# Patient Record
Sex: MALE | Race: WHITE | Employment: OTHER | URBAN - NONMETROPOLITAN AREA
[De-identification: names, ages, dates, MRNs, and addresses within clinical notes are randomized per-mention and may not be internally consistent; named-entity substitution may affect disease eponyms.]

---

## 2017-03-20 ENCOUNTER — OFFICE VISIT (OUTPATIENT)
Dept: NEUROLOGY | Age: 72
End: 2017-03-20
Payer: MEDICARE

## 2017-03-20 VITALS
HEART RATE: 58 BPM | HEIGHT: 72 IN | BODY MASS INDEX: 24.52 KG/M2 | WEIGHT: 181 LBS | SYSTOLIC BLOOD PRESSURE: 140 MMHG | DIASTOLIC BLOOD PRESSURE: 78 MMHG

## 2017-03-20 DIAGNOSIS — M79.672 PAIN IN BOTH FEET: Primary | ICD-10-CM

## 2017-03-20 DIAGNOSIS — G25.81 RESTLESS LEG SYNDROME: ICD-10-CM

## 2017-03-20 DIAGNOSIS — M79.671 PAIN IN BOTH FEET: Primary | ICD-10-CM

## 2017-03-20 PROCEDURE — 1036F TOBACCO NON-USER: CPT | Performed by: PSYCHIATRY & NEUROLOGY

## 2017-03-20 PROCEDURE — 99213 OFFICE O/P EST LOW 20 MIN: CPT | Performed by: PSYCHIATRY & NEUROLOGY

## 2017-03-20 PROCEDURE — 1123F ACP DISCUSS/DSCN MKR DOCD: CPT | Performed by: PSYCHIATRY & NEUROLOGY

## 2017-03-20 PROCEDURE — 3017F COLORECTAL CA SCREEN DOC REV: CPT | Performed by: PSYCHIATRY & NEUROLOGY

## 2017-03-20 PROCEDURE — G8420 CALC BMI NORM PARAMETERS: HCPCS | Performed by: PSYCHIATRY & NEUROLOGY

## 2017-03-20 PROCEDURE — G8428 CUR MEDS NOT DOCUMENT: HCPCS | Performed by: PSYCHIATRY & NEUROLOGY

## 2017-03-20 PROCEDURE — G8484 FLU IMMUNIZE NO ADMIN: HCPCS | Performed by: PSYCHIATRY & NEUROLOGY

## 2017-03-20 PROCEDURE — 4040F PNEUMOC VAC/ADMIN/RCVD: CPT | Performed by: PSYCHIATRY & NEUROLOGY

## 2017-03-20 RX ORDER — DIAZEPAM 5 MG/1
5 TABLET ORAL EVERY 8 HOURS PRN
Qty: 90 TABLET | Refills: 5 | Status: SHIPPED | OUTPATIENT
Start: 2017-03-20 | End: 2017-11-06 | Stop reason: SDUPTHER

## 2017-07-31 ENCOUNTER — OFFICE VISIT (OUTPATIENT)
Dept: NEUROLOGY | Age: 72
End: 2017-07-31
Payer: MEDICARE

## 2017-07-31 VITALS
HEIGHT: 72 IN | BODY MASS INDEX: 24.65 KG/M2 | DIASTOLIC BLOOD PRESSURE: 80 MMHG | SYSTOLIC BLOOD PRESSURE: 126 MMHG | WEIGHT: 182 LBS

## 2017-07-31 DIAGNOSIS — M79.671 PAIN IN BOTH FEET: Primary | ICD-10-CM

## 2017-07-31 DIAGNOSIS — G25.81 RESTLESS LEG SYNDROME: ICD-10-CM

## 2017-07-31 DIAGNOSIS — M79.672 PAIN IN BOTH FEET: Primary | ICD-10-CM

## 2017-07-31 PROCEDURE — 1123F ACP DISCUSS/DSCN MKR DOCD: CPT | Performed by: PSYCHIATRY & NEUROLOGY

## 2017-07-31 PROCEDURE — 3017F COLORECTAL CA SCREEN DOC REV: CPT | Performed by: PSYCHIATRY & NEUROLOGY

## 2017-07-31 PROCEDURE — 4040F PNEUMOC VAC/ADMIN/RCVD: CPT | Performed by: PSYCHIATRY & NEUROLOGY

## 2017-07-31 PROCEDURE — G8427 DOCREV CUR MEDS BY ELIG CLIN: HCPCS | Performed by: PSYCHIATRY & NEUROLOGY

## 2017-07-31 PROCEDURE — G8420 CALC BMI NORM PARAMETERS: HCPCS | Performed by: PSYCHIATRY & NEUROLOGY

## 2017-07-31 PROCEDURE — 99213 OFFICE O/P EST LOW 20 MIN: CPT | Performed by: PSYCHIATRY & NEUROLOGY

## 2017-07-31 PROCEDURE — 1036F TOBACCO NON-USER: CPT | Performed by: PSYCHIATRY & NEUROLOGY

## 2017-08-21 LAB
ANION GAP SERPL CALCULATED.3IONS-SCNC: 15 MMOL/L (ref 7–19)
BUN BLDV-MCNC: 10 MG/DL (ref 8–23)
CALCIUM SERPL-MCNC: 9.3 MG/DL (ref 8.8–10.2)
CHLORIDE BLD-SCNC: 95 MMOL/L (ref 98–111)
CO2: 29 MMOL/L (ref 22–29)
CREAT SERPL-MCNC: 0.9 MG/DL (ref 0.5–1.2)
GFR NON-AFRICAN AMERICAN: >60
GLUCOSE BLD-MCNC: 85 MG/DL (ref 74–109)
POTASSIUM SERPL-SCNC: 4.3 MMOL/L (ref 3.5–5)
PROSTATE SPECIFIC ANTIGEN: 1.31 NG/ML (ref 0–4)
SODIUM BLD-SCNC: 139 MMOL/L (ref 136–145)

## 2017-08-28 ENCOUNTER — OFFICE VISIT (OUTPATIENT)
Dept: FAMILY MEDICINE CLINIC | Age: 72
End: 2017-08-28
Payer: MEDICARE

## 2017-08-28 VITALS
HEIGHT: 72 IN | TEMPERATURE: 98.3 F | WEIGHT: 185 LBS | HEART RATE: 56 BPM | SYSTOLIC BLOOD PRESSURE: 138 MMHG | DIASTOLIC BLOOD PRESSURE: 84 MMHG | OXYGEN SATURATION: 98 % | BODY MASS INDEX: 25.06 KG/M2 | RESPIRATION RATE: 18 BRPM

## 2017-08-28 DIAGNOSIS — I10 ESSENTIAL (PRIMARY) HYPERTENSION: ICD-10-CM

## 2017-08-28 DIAGNOSIS — E78.1 HYPERTRIGLYCERIDEMIA: ICD-10-CM

## 2017-08-28 DIAGNOSIS — E80.4 GILBERT'S SYNDROME: ICD-10-CM

## 2017-08-28 DIAGNOSIS — G60.9 IDIOPATHIC PERIPHERAL NEUROPATHY: ICD-10-CM

## 2017-08-28 DIAGNOSIS — L30.9 DERMATITIS: Primary | ICD-10-CM

## 2017-08-28 DIAGNOSIS — M15.9 GENERALIZED OSTEOARTHRITIS OF MULTIPLE SITES: ICD-10-CM

## 2017-08-28 DIAGNOSIS — R53.83 FATIGUE, UNSPECIFIED TYPE: ICD-10-CM

## 2017-08-28 DIAGNOSIS — F41.1 GENERALIZED ANXIETY DISORDER: ICD-10-CM

## 2017-08-28 DIAGNOSIS — K21.9 GASTROESOPHAGEAL REFLUX DISEASE WITHOUT ESOPHAGITIS: ICD-10-CM

## 2017-08-28 PROBLEM — K58.1 IRRITABLE BOWEL SYNDROME WITH CONSTIPATION: Status: ACTIVE | Noted: 2017-08-28

## 2017-08-28 PROBLEM — J30.1 SEASONAL ALLERGIC RHINITIS DUE TO POLLEN: Status: ACTIVE | Noted: 2017-08-28

## 2017-08-28 PROBLEM — F51.04 CHRONIC INSOMNIA: Status: ACTIVE | Noted: 2017-08-28

## 2017-08-28 PROBLEM — G25.81 RLS (RESTLESS LEGS SYNDROME): Status: ACTIVE | Noted: 2017-08-28

## 2017-08-28 PROCEDURE — 99214 OFFICE O/P EST MOD 30 MIN: CPT | Performed by: FAMILY MEDICINE

## 2017-08-28 PROCEDURE — G8419 CALC BMI OUT NRM PARAM NOF/U: HCPCS | Performed by: FAMILY MEDICINE

## 2017-08-28 PROCEDURE — 1036F TOBACCO NON-USER: CPT | Performed by: FAMILY MEDICINE

## 2017-08-28 PROCEDURE — 96372 THER/PROPH/DIAG INJ SC/IM: CPT | Performed by: FAMILY MEDICINE

## 2017-08-28 PROCEDURE — G8427 DOCREV CUR MEDS BY ELIG CLIN: HCPCS | Performed by: FAMILY MEDICINE

## 2017-08-28 PROCEDURE — 1123F ACP DISCUSS/DSCN MKR DOCD: CPT | Performed by: FAMILY MEDICINE

## 2017-08-28 PROCEDURE — 3017F COLORECTAL CA SCREEN DOC REV: CPT | Performed by: FAMILY MEDICINE

## 2017-08-28 PROCEDURE — 4040F PNEUMOC VAC/ADMIN/RCVD: CPT | Performed by: FAMILY MEDICINE

## 2017-08-28 RX ORDER — MELOXICAM 7.5 MG/1
7.5 TABLET ORAL 2 TIMES DAILY
COMMUNITY
End: 2018-03-05 | Stop reason: SDUPTHER

## 2017-08-28 RX ORDER — DEXAMETHASONE SODIUM PHOSPHATE 10 MG/ML
10 INJECTION INTRAMUSCULAR; INTRAVENOUS ONCE
Status: COMPLETED | OUTPATIENT
Start: 2017-08-28 | End: 2017-08-28

## 2017-08-28 RX ORDER — FLUTICASONE PROPIONATE 50 MCG
1 SPRAY, SUSPENSION (ML) NASAL DAILY
COMMUNITY
End: 2020-03-16 | Stop reason: SDUPTHER

## 2017-08-28 RX ORDER — BETAMETHASONE DIPROPIONATE 0.5 MG/G
CREAM TOPICAL 2 TIMES DAILY
COMMUNITY
End: 2018-03-05 | Stop reason: SDUPTHER

## 2017-08-28 RX ORDER — PSEUDOEPHEDRINE HCL 60 MG/1
60 TABLET ORAL EVERY 6 HOURS PRN
COMMUNITY

## 2017-08-28 RX ORDER — HYOSCYAMINE SULFATE 0.125 MG
125 TABLET ORAL EVERY 4 HOURS PRN
COMMUNITY
End: 2018-01-29 | Stop reason: SDUPTHER

## 2017-08-28 RX ORDER — IPRATROPIUM BROMIDE 42 UG/1
1 SPRAY, METERED NASAL DAILY PRN
COMMUNITY
End: 2019-05-14 | Stop reason: SDUPTHER

## 2017-08-28 RX ORDER — TRAZODONE HYDROCHLORIDE 50 MG/1
50 TABLET ORAL NIGHTLY
COMMUNITY
End: 2018-03-05 | Stop reason: SDUPTHER

## 2017-08-28 RX ADMIN — DEXAMETHASONE SODIUM PHOSPHATE 10 MG: 10 INJECTION INTRAMUSCULAR; INTRAVENOUS at 10:09

## 2017-11-06 ENCOUNTER — OFFICE VISIT (OUTPATIENT)
Dept: NEUROLOGY | Age: 72
End: 2017-11-06
Payer: MEDICARE

## 2017-11-06 VITALS
DIASTOLIC BLOOD PRESSURE: 76 MMHG | WEIGHT: 184 LBS | SYSTOLIC BLOOD PRESSURE: 136 MMHG | HEART RATE: 54 BPM | BODY MASS INDEX: 24.95 KG/M2 | OXYGEN SATURATION: 100 %

## 2017-11-06 DIAGNOSIS — G25.81 RESTLESS LEG SYNDROME: Primary | ICD-10-CM

## 2017-11-06 DIAGNOSIS — M79.672 PAIN IN BOTH FEET: ICD-10-CM

## 2017-11-06 DIAGNOSIS — M79.671 PAIN IN BOTH FEET: ICD-10-CM

## 2017-11-06 PROCEDURE — 99213 OFFICE O/P EST LOW 20 MIN: CPT | Performed by: PSYCHIATRY & NEUROLOGY

## 2017-11-06 PROCEDURE — 1123F ACP DISCUSS/DSCN MKR DOCD: CPT | Performed by: PSYCHIATRY & NEUROLOGY

## 2017-11-06 PROCEDURE — G8484 FLU IMMUNIZE NO ADMIN: HCPCS | Performed by: PSYCHIATRY & NEUROLOGY

## 2017-11-06 PROCEDURE — 3017F COLORECTAL CA SCREEN DOC REV: CPT | Performed by: PSYCHIATRY & NEUROLOGY

## 2017-11-06 PROCEDURE — 4040F PNEUMOC VAC/ADMIN/RCVD: CPT | Performed by: PSYCHIATRY & NEUROLOGY

## 2017-11-06 PROCEDURE — G8420 CALC BMI NORM PARAMETERS: HCPCS | Performed by: PSYCHIATRY & NEUROLOGY

## 2017-11-06 PROCEDURE — 1036F TOBACCO NON-USER: CPT | Performed by: PSYCHIATRY & NEUROLOGY

## 2017-11-06 PROCEDURE — G8427 DOCREV CUR MEDS BY ELIG CLIN: HCPCS | Performed by: PSYCHIATRY & NEUROLOGY

## 2017-11-06 RX ORDER — DIAZEPAM 5 MG/1
5 TABLET ORAL EVERY 8 HOURS PRN
Qty: 90 TABLET | Refills: 5 | Status: SHIPPED | OUTPATIENT
Start: 2017-11-06 | End: 2018-06-11 | Stop reason: SDUPTHER

## 2017-11-06 RX ORDER — GABAPENTIN 300 MG/1
CAPSULE ORAL
Qty: 60 CAPSULE | Refills: 5 | Status: SHIPPED | OUTPATIENT
Start: 2017-11-06 | End: 2018-06-11 | Stop reason: SDUPTHER

## 2017-11-06 NOTE — PROGRESS NOTES
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 The patient indicates he has had a long history of problem with his feet. He has spent approximately 8 to 12 hours per day for 40 years on his feet working five to six days a week period he has had inserts placed in his soles for the constant stress on his feet. Over the last two to three years he has noticed an increasing sensitivity over his feet. He will typically notice this at evening after he takes his shoes off. He describes a wave like sensation and moves over his feet and is difficult to describe. It is quite uncomfortable and limits his ability to sleep. He has been treated with Klonopin for possible restless leg syndrome without improvement in his symptoms. He denies any weakness or cramps in his legs. He has a chronic mild mid back pain. There is no involvement of his hands. He indicates his father also had complaints of pain in his feet when he got older in his 80's. Feels Requip is helping but now needs a second one. Feels slowly worsening with time. Neurontin did not help but Sinemet is helping at bedtime. Since his last visit about the same. Typically take 2 mg Requip at night. Also takes 500mg neurontin and then valium at bedtime. Feels symtoms are about the same. 2 nights in a week may only sleep about 2 hours due to restless legs. Norco helps. uses it maybe 2x per week. alpha lipoic acid helping. Symptoms are stable.     Chief complaint:restless legs      Active Ambulatory Problems     Diagnosis Date Noted    Pain in both feet 06/06/2016    Restless leg syndrome 06/06/2016    Dermatitis 08/28/2017    Fatigue 08/28/2017    Essential (primary) hypertension 08/28/2017    Gastroesophageal reflux disease without esophagitis 08/28/2017    RLS (restless legs syndrome) 08/28/2017    Gilbert's syndrome 08/28/2017    Hypertriglyceridemia 08/28/2017    Idiopathic peripheral neuropathy 08/28/2017    Generalized anxiety disorder 08/28/2017    Seasonal allergic rhinitis due to pollen 08/28/2017    Irritable bowel syndrome with constipation 08/28/2017    Chronic insomnia 08/28/2017    Generalized osteoarthritis of multiple sites 08/28/2017     Resolved Ambulatory Problems     Diagnosis Date Noted    No Resolved Ambulatory Problems     Past Medical History:   Diagnosis Date    Generalized osteoarthritis of multiple sites 8/28/2017    GERD (gastroesophageal reflux disease)     Hypertension     Hypertriglyceridemia 8/28/2017    Idiopathic peripheral neuropathy 8/28/2017    Irritable bowel syndrome with constipation 8/28/2017    Restless legs syndrome     Seasonal allergic rhinitis due to pollen 8/28/2017       Past Surgical History:   Procedure Laterality Date    HERNIA REPAIR  1999/2001    TURP  1994       Family History   Problem Relation Age of Onset    Dementia Mother     Heart Disease Father     Colon Cancer Father     Dementia Father     High Blood Pressure Father     Heart Attack Father        Allergies   Allergen Reactions    Sulfa Antibiotics        Social History     Social History    Marital status:      Spouse name: N/A    Number of children: N/A    Years of education: N/A     Occupational History    Not on file. Social History Main Topics    Smoking status: Never Smoker    Smokeless tobacco: Never Used    Alcohol use No    Drug use: No    Sexual activity: Not on file     Other Topics Concern    Not on file     Social History Narrative    No narrative on file       Review of Systems     Constitutional - No fever or chills. No diaphoresis or significant fatigue. HENT -  No tinnitus or significant hearing loss.   Eyes - no sudden vision change or eye pain  Respiratory - no significant shortness of breath or cough  Cardiovascular - no chest pain No palpitations or significant leg swelling  Gastrointestinal - no abdominal swelling or pain. Genitourinary - No difficulty urinating, dysuria  Musculoskeletal - no back pain or myalgia. Skin - no color change or rash  Neurologic - No seizures. No lateralizing weakness. Hematologic - no easy bruising or excessive bleeding. Psychiatric - no severe anxiety or nervousness. All other review of systems are negative. Current Outpatient Prescriptions   Medication Sig Dispense Refill    gabapentin (NEURONTIN) 300 MG capsule 2 caps at night 60 capsule 5    diazepam (VALIUM) 5 MG tablet Take 1 tablet by mouth every 8 hours as needed for Anxiety 90 tablet 5    betamethasone dipropionate (DIPROLENE) 0.05 % cream Apply topically 2 times daily Apply topically 2 times daily.       fluticasone (FLONASE) 50 MCG/ACT nasal spray 1 spray by Nasal route daily      hyoscyamine (ANASPAZ;LEVSIN) 125 MCG tablet Take 125 mcg by mouth every 4 hours as needed for Cramping      ipratropium (ATROVENT) 0.06 % nasal spray 1 spray by Nasal route daily as needed for Rhinitis      meloxicam (MOBIC) 7.5 MG tablet Take 7.5 mg by mouth 2 times daily      pseudoephedrine (SUDAFED) 60 MG tablet Take 60 mg by mouth every 6 hours as needed for Congestion      traZODone (DESYREL) 50 MG tablet Take 50 mg by mouth nightly      rOPINIRole (REQUIP) 1 MG tablet Take 1 tablet by mouth 2 times daily 180 tablet 4    gabapentin (NEURONTIN) 100 MG capsule 2 at night 60 capsule 5    acyclovir (ZOVIRAX) 400 MG tablet       bisoprolol-hydrochlorothiazide (ZIAC) 10-6.25 MG per tablet Take 1 tablet by mouth daily      fexofenadine (ALLEGRA) 60 MG tablet Take 60 mg by mouth daily      saw palmetto 160 MG capsule Take 160 mg by mouth daily      magnesium hydroxide (MILK OF MAGNESIA) 400 MG/5ML suspension Take by mouth daily as needed for Constipation      vitamin E 1000 UNITS capsule Take 1,000 Units by mouth daily      citalopram (CELEXA) 10 MG tablet Take 10 mg by mouth daily      omeprazole (PRILOSEC) 20 MG capsule

## 2017-12-18 ENCOUNTER — OFFICE VISIT (OUTPATIENT)
Dept: UROLOGY | Age: 72
End: 2017-12-18
Payer: MEDICARE

## 2017-12-18 VITALS
SYSTOLIC BLOOD PRESSURE: 135 MMHG | DIASTOLIC BLOOD PRESSURE: 74 MMHG | WEIGHT: 189 LBS | HEART RATE: 60 BPM | HEIGHT: 72 IN | BODY MASS INDEX: 25.6 KG/M2 | TEMPERATURE: 97.2 F

## 2017-12-18 DIAGNOSIS — N40.0 BENIGN NON-NODULAR PROSTATIC HYPERPLASIA WITHOUT LOWER URINARY TRACT SYMPTOMS: Primary | ICD-10-CM

## 2017-12-18 LAB
APPEARANCE FLUID: CLEAR
BILIRUBIN, POC: 0
BLOOD URINE, POC: NORMAL
CLARITY, POC: CLEAR
COLOR, POC: YELLOW
GLUCOSE URINE, POC: NORMAL
KETONES, POC: NORMAL
LEUKOCYTE EST, POC: NORMAL
NITRITE, POC: NORMAL
PH, POC: 7
PROTEIN, POC: NORMAL
SPECIFIC GRAVITY, POC: 1.01
UROBILINOGEN, POC: 0.2

## 2017-12-18 PROCEDURE — G8419 CALC BMI OUT NRM PARAM NOF/U: HCPCS | Performed by: UROLOGY

## 2017-12-18 PROCEDURE — 1123F ACP DISCUSS/DSCN MKR DOCD: CPT | Performed by: UROLOGY

## 2017-12-18 PROCEDURE — G8427 DOCREV CUR MEDS BY ELIG CLIN: HCPCS | Performed by: UROLOGY

## 2017-12-18 PROCEDURE — 4040F PNEUMOC VAC/ADMIN/RCVD: CPT | Performed by: UROLOGY

## 2017-12-18 PROCEDURE — 99213 OFFICE O/P EST LOW 20 MIN: CPT | Performed by: UROLOGY

## 2017-12-18 PROCEDURE — 1036F TOBACCO NON-USER: CPT | Performed by: UROLOGY

## 2017-12-18 PROCEDURE — 3017F COLORECTAL CA SCREEN DOC REV: CPT | Performed by: UROLOGY

## 2017-12-18 PROCEDURE — G8484 FLU IMMUNIZE NO ADMIN: HCPCS | Performed by: UROLOGY

## 2017-12-18 ASSESSMENT — ENCOUNTER SYMPTOMS
ABDOMINAL PAIN: 0
BLURRED VISION: 0
BACK PAIN: 0
SHORTNESS OF BREATH: 0
VOMITING: 0
SINUS PAIN: 0
EYE PAIN: 0
WHEEZING: 0

## 2017-12-18 NOTE — PROGRESS NOTES
Chaka Rivera is a 67 y.o. male who presents today   Chief Complaint   Patient presents with    Follow-up     im here today for my yearly  bph        Benign Prostatic Hypertrophy  Patient complains of lower urinary tract symptoms. He reports weak stream. He denies frequency, incomplete emptying, straining and urgency. Patient states symptoms are of mild severity. Onset of symptoms was several years ago and was gradual in onset. His AUA Symptom Score is, 2/35 . He has a family history of prostate cancer. He reports a history of no complicating symptoms. He denies flank pain, gross hematuria, kidney stones and recurrent UTI. He had a TURP in the remote past she takes saw palmetto. He's having no voiding problems or change in his voiding symptoms in the last year      Past Medical History:   Diagnosis Date    Generalized osteoarthritis of multiple sites 8/28/2017    GERD (gastroesophageal reflux disease)     Hypertension     Hypertriglyceridemia 8/28/2017    Idiopathic peripheral neuropathy 8/28/2017    Irritable bowel syndrome with constipation 8/28/2017    Restless legs syndrome     Seasonal allergic rhinitis due to pollen 8/28/2017       Past Surgical History:   Procedure Laterality Date    HERNIA REPAIR  1999/2001    TURP  1994       Current Outpatient Prescriptions   Medication Sig Dispense Refill    gabapentin (NEURONTIN) 300 MG capsule 2 caps at night 60 capsule 5    diazepam (VALIUM) 5 MG tablet Take 1 tablet by mouth every 8 hours as needed for Anxiety 90 tablet 5    betamethasone dipropionate (DIPROLENE) 0.05 % cream Apply topically 2 times daily Apply topically 2 times daily.       fluticasone (FLONASE) 50 MCG/ACT nasal spray 1 spray by Nasal route daily      hyoscyamine (ANASPAZ;LEVSIN) 125 MCG tablet Take 125 mcg by mouth every 4 hours as needed for Cramping      ipratropium (ATROVENT) 0.06 % nasal spray 1 spray by Nasal route daily as needed for Rhinitis      meloxicam (MOBIC) 7.5 MG tablet Take 7.5 mg by mouth 2 times daily      pseudoephedrine (SUDAFED) 60 MG tablet Take 60 mg by mouth every 6 hours as needed for Congestion      traZODone (DESYREL) 50 MG tablet Take 50 mg by mouth nightly      rOPINIRole (REQUIP) 1 MG tablet Take 1 tablet by mouth 2 times daily 180 tablet 4    gabapentin (NEURONTIN) 100 MG capsule 2 at night 60 capsule 5    acyclovir (ZOVIRAX) 400 MG tablet       bisoprolol-hydrochlorothiazide (ZIAC) 10-6.25 MG per tablet Take 1 tablet by mouth daily      fexofenadine (ALLEGRA) 60 MG tablet Take 60 mg by mouth daily      saw palmetto 160 MG capsule Take 160 mg by mouth daily      magnesium hydroxide (MILK OF MAGNESIA) 400 MG/5ML suspension Take by mouth daily as needed for Constipation      vitamin E 1000 UNITS capsule Take 1,000 Units by mouth daily      citalopram (CELEXA) 10 MG tablet Take 10 mg by mouth daily      omeprazole (PRILOSEC) 20 MG capsule Take 20 mg by mouth 2 times daily      lisinopril-hydrochlorothiazide (PRINZIDE;ZESTORETIC) 20-12.5 MG per tablet Take 2 tablets by mouth daily        No current facility-administered medications for this visit. Allergies   Allergen Reactions    Sulfa Antibiotics        Social History     Social History    Marital status:      Spouse name: N/A    Number of children: N/A    Years of education: N/A     Social History Main Topics    Smoking status: Never Smoker    Smokeless tobacco: Never Used    Alcohol use No    Drug use: No    Sexual activity: Not Asked     Other Topics Concern    None     Social History Narrative    None       Family History   Problem Relation Age of Onset    Dementia Mother     Heart Disease Father     Colon Cancer Father     Dementia Father     High Blood Pressure Father     Heart Attack Father        REVIEW OF SYSTEMS:  Review of Systems   Constitutional: Negative for malaise/fatigue and weight loss. HENT: Negative for nosebleeds and sinus pain.     Eyes: is alert and oriented to person, place, and time. Skin: Skin is warm and dry. Psychiatric: He has a normal mood and affect. His behavior is normal.   Nursing note and vitals reviewed. DATA:  CMP:    Lab Results   Component Value Date     08/21/2017    K 4.3 08/21/2017    CL 95 08/21/2017    CO2 29 08/21/2017    BUN 10 08/21/2017    CREATININE 0.9 08/21/2017    LABGLOM >60 08/21/2017    GLUCOSE 85 08/21/2017    CALCIUM 9.3 08/21/2017     Results for orders placed or performed in visit on 12/18/17   POCT Urinalysis no Micro   Result Value Ref Range    Color, UA yellow     Clarity, UA clear     Glucose, UA POC neg     Bilirubin, UA 0     Ketones, UA neg     Spec Grav, UA 1.015     Blood, UA POC trace     pH, UA 7.0     Protein, UA POC neg     Urobilinogen, UA 0.2     Leukocytes, UA neg     Nitrite, UA neg     Appearance, Fluid Clear Clear, Slightly Cloudy     Lab Results   Component Value Date    PSA 1.31 08/21/2017       1. Benign non-nodular prostatic hyperplasia without lower urinary tract symptoms    - POCT Urinalysis no Micro  - PSA, Diagnostic; Future      Orders Placed This Encounter   Procedures    PSA, Diagnostic     1yr     Standing Status:   Future     Standing Expiration Date:   12/18/2019    POCT Urinalysis no Micro        Return in about 1 year (around 12/18/2018) for PSA prior to vext visit.

## 2018-01-29 RX ORDER — HYOSCYAMINE SULFATE 0.125 MG
125 TABLET ORAL EVERY 4 HOURS PRN
Qty: 180 TABLET | Refills: 11 | Status: SHIPPED | OUTPATIENT
Start: 2018-01-29 | End: 2019-03-12 | Stop reason: SDUPTHER

## 2018-01-30 RX ORDER — ROPINIROLE 1 MG/1
1 TABLET, FILM COATED ORAL 2 TIMES DAILY
Qty: 180 TABLET | Refills: 4 | Status: SHIPPED | OUTPATIENT
Start: 2018-01-30 | End: 2018-09-10 | Stop reason: SDUPTHER

## 2018-02-26 DIAGNOSIS — I10 ESSENTIAL (PRIMARY) HYPERTENSION: ICD-10-CM

## 2018-02-26 DIAGNOSIS — R53.83 FATIGUE, UNSPECIFIED TYPE: ICD-10-CM

## 2018-02-26 LAB
ALBUMIN SERPL-MCNC: 4.4 G/DL (ref 3.5–5.2)
ALP BLD-CCNC: 97 U/L (ref 40–130)
ALT SERPL-CCNC: 25 U/L (ref 5–41)
ANION GAP SERPL CALCULATED.3IONS-SCNC: 11 MMOL/L (ref 7–19)
AST SERPL-CCNC: 27 U/L (ref 5–40)
BILIRUB SERPL-MCNC: 1.1 MG/DL (ref 0.2–1.2)
BUN BLDV-MCNC: 11 MG/DL (ref 8–23)
CALCIUM SERPL-MCNC: 9.1 MG/DL (ref 8.8–10.2)
CHLORIDE BLD-SCNC: 90 MMOL/L (ref 98–111)
CHOLESTEROL, TOTAL: 170 MG/DL (ref 160–199)
CO2: 31 MMOL/L (ref 22–29)
CREAT SERPL-MCNC: 1 MG/DL (ref 0.5–1.2)
GFR NON-AFRICAN AMERICAN: >60
GLUCOSE BLD-MCNC: 98 MG/DL (ref 74–109)
HCT VFR BLD CALC: 46.7 % (ref 42–52)
HDLC SERPL-MCNC: 63 MG/DL (ref 55–121)
HEMOGLOBIN: 15.3 G/DL (ref 14–18)
LDL CHOLESTEROL CALCULATED: 78 MG/DL
MCH RBC QN AUTO: 28.3 PG (ref 27–31)
MCHC RBC AUTO-ENTMCNC: 32.8 G/DL (ref 33–37)
MCV RBC AUTO: 86.5 FL (ref 80–94)
PDW BLD-RTO: 14.9 % (ref 11.5–14.5)
PLATELET # BLD: 162 K/UL (ref 130–400)
PMV BLD AUTO: 10.9 FL (ref 9.4–12.4)
POTASSIUM SERPL-SCNC: 4.7 MMOL/L (ref 3.5–5)
RBC # BLD: 5.4 M/UL (ref 4.7–6.1)
SODIUM BLD-SCNC: 132 MMOL/L (ref 136–145)
TOTAL PROTEIN: 7 G/DL (ref 6.6–8.7)
TRIGL SERPL-MCNC: 147 MG/DL (ref 0–149)
WBC # BLD: 5 K/UL (ref 4.8–10.8)

## 2018-03-05 ENCOUNTER — OFFICE VISIT (OUTPATIENT)
Dept: NEUROLOGY | Age: 73
End: 2018-03-05
Payer: MEDICARE

## 2018-03-05 ENCOUNTER — OFFICE VISIT (OUTPATIENT)
Dept: FAMILY MEDICINE CLINIC | Age: 73
End: 2018-03-05
Payer: MEDICARE

## 2018-03-05 VITALS
WEIGHT: 190 LBS | BODY MASS INDEX: 25.77 KG/M2 | DIASTOLIC BLOOD PRESSURE: 82 MMHG | SYSTOLIC BLOOD PRESSURE: 124 MMHG | OXYGEN SATURATION: 98 % | HEART RATE: 55 BPM | RESPIRATION RATE: 16 BRPM | TEMPERATURE: 98.2 F

## 2018-03-05 VITALS
SYSTOLIC BLOOD PRESSURE: 118 MMHG | WEIGHT: 187 LBS | DIASTOLIC BLOOD PRESSURE: 78 MMHG | HEIGHT: 72 IN | BODY MASS INDEX: 25.33 KG/M2

## 2018-03-05 DIAGNOSIS — F41.1 GENERALIZED ANXIETY DISORDER: ICD-10-CM

## 2018-03-05 DIAGNOSIS — G25.81 RESTLESS LEG SYNDROME: Primary | ICD-10-CM

## 2018-03-05 DIAGNOSIS — M79.671 PAIN IN BOTH FEET: ICD-10-CM

## 2018-03-05 DIAGNOSIS — Z12.5 ENCOUNTER FOR PROSTATE CANCER SCREENING: ICD-10-CM

## 2018-03-05 DIAGNOSIS — G60.9 IDIOPATHIC PERIPHERAL NEUROPATHY: ICD-10-CM

## 2018-03-05 DIAGNOSIS — M79.672 PAIN IN BOTH FEET: ICD-10-CM

## 2018-03-05 DIAGNOSIS — Z00.00 ANNUAL PHYSICAL EXAM: Primary | ICD-10-CM

## 2018-03-05 DIAGNOSIS — F51.04 CHRONIC INSOMNIA: ICD-10-CM

## 2018-03-05 DIAGNOSIS — K21.9 GASTROESOPHAGEAL REFLUX DISEASE WITHOUT ESOPHAGITIS: ICD-10-CM

## 2018-03-05 DIAGNOSIS — L30.9 DERMATITIS: ICD-10-CM

## 2018-03-05 DIAGNOSIS — I10 ESSENTIAL (PRIMARY) HYPERTENSION: ICD-10-CM

## 2018-03-05 DIAGNOSIS — E78.1 HYPERTRIGLYCERIDEMIA: ICD-10-CM

## 2018-03-05 PROCEDURE — 3017F COLORECTAL CA SCREEN DOC REV: CPT | Performed by: PSYCHIATRY & NEUROLOGY

## 2018-03-05 PROCEDURE — G8419 CALC BMI OUT NRM PARAM NOF/U: HCPCS | Performed by: PSYCHIATRY & NEUROLOGY

## 2018-03-05 PROCEDURE — 3017F COLORECTAL CA SCREEN DOC REV: CPT | Performed by: FAMILY MEDICINE

## 2018-03-05 PROCEDURE — 4040F PNEUMOC VAC/ADMIN/RCVD: CPT | Performed by: FAMILY MEDICINE

## 2018-03-05 PROCEDURE — G0439 PPPS, SUBSEQ VISIT: HCPCS | Performed by: FAMILY MEDICINE

## 2018-03-05 PROCEDURE — 1123F ACP DISCUSS/DSCN MKR DOCD: CPT | Performed by: PSYCHIATRY & NEUROLOGY

## 2018-03-05 PROCEDURE — 1036F TOBACCO NON-USER: CPT | Performed by: FAMILY MEDICINE

## 2018-03-05 PROCEDURE — G8484 FLU IMMUNIZE NO ADMIN: HCPCS | Performed by: FAMILY MEDICINE

## 2018-03-05 PROCEDURE — 1123F ACP DISCUSS/DSCN MKR DOCD: CPT | Performed by: FAMILY MEDICINE

## 2018-03-05 PROCEDURE — G8419 CALC BMI OUT NRM PARAM NOF/U: HCPCS | Performed by: FAMILY MEDICINE

## 2018-03-05 PROCEDURE — G8427 DOCREV CUR MEDS BY ELIG CLIN: HCPCS | Performed by: FAMILY MEDICINE

## 2018-03-05 PROCEDURE — 4040F PNEUMOC VAC/ADMIN/RCVD: CPT | Performed by: PSYCHIATRY & NEUROLOGY

## 2018-03-05 PROCEDURE — G8427 DOCREV CUR MEDS BY ELIG CLIN: HCPCS | Performed by: PSYCHIATRY & NEUROLOGY

## 2018-03-05 PROCEDURE — G8484 FLU IMMUNIZE NO ADMIN: HCPCS | Performed by: PSYCHIATRY & NEUROLOGY

## 2018-03-05 PROCEDURE — 99214 OFFICE O/P EST MOD 30 MIN: CPT | Performed by: FAMILY MEDICINE

## 2018-03-05 PROCEDURE — 99213 OFFICE O/P EST LOW 20 MIN: CPT | Performed by: PSYCHIATRY & NEUROLOGY

## 2018-03-05 PROCEDURE — 1036F TOBACCO NON-USER: CPT | Performed by: PSYCHIATRY & NEUROLOGY

## 2018-03-05 RX ORDER — BISOPROLOL FUMARATE AND HYDROCHLOROTHIAZIDE 10; 6.25 MG/1; MG/1
1 TABLET ORAL DAILY
Qty: 90 TABLET | Refills: 3 | Status: SHIPPED | OUTPATIENT
Start: 2018-03-05 | End: 2018-09-10 | Stop reason: DRUGHIGH

## 2018-03-05 RX ORDER — LISINOPRIL AND HYDROCHLOROTHIAZIDE 20; 12.5 MG/1; MG/1
2 TABLET ORAL DAILY
Qty: 90 TABLET | Refills: 3 | Status: SHIPPED | OUTPATIENT
Start: 2018-03-05 | End: 2019-03-12 | Stop reason: SDUPTHER

## 2018-03-05 RX ORDER — BETAMETHASONE DIPROPIONATE 0.5 MG/G
CREAM TOPICAL 2 TIMES DAILY
Qty: 45 G | Refills: 11 | Status: SHIPPED | OUTPATIENT
Start: 2018-03-05 | End: 2020-03-16 | Stop reason: SDUPTHER

## 2018-03-05 RX ORDER — TRAZODONE HYDROCHLORIDE 50 MG/1
50 TABLET ORAL NIGHTLY
Qty: 90 TABLET | Refills: 3 | Status: SHIPPED | OUTPATIENT
Start: 2018-03-05 | End: 2019-03-12 | Stop reason: SDUPTHER

## 2018-03-05 RX ORDER — MELOXICAM 7.5 MG/1
7.5 TABLET ORAL 2 TIMES DAILY
Qty: 180 TABLET | Refills: 3 | Status: SHIPPED | OUTPATIENT
Start: 2018-03-05 | End: 2019-03-12 | Stop reason: SDUPTHER

## 2018-03-05 RX ORDER — OMEPRAZOLE 20 MG/1
20 CAPSULE, DELAYED RELEASE ORAL 2 TIMES DAILY
Qty: 90 CAPSULE | Refills: 3 | Status: SHIPPED | OUTPATIENT
Start: 2018-03-05 | End: 2019-03-12 | Stop reason: SDUPTHER

## 2018-03-05 ASSESSMENT — ENCOUNTER SYMPTOMS
CONSTIPATION: 0
DIARRHEA: 0
SHORTNESS OF BREATH: 0
COUGH: 0
ABDOMINAL PAIN: 0
CHEST TIGHTNESS: 0
ANAL BLEEDING: 0
NAUSEA: 0

## 2018-03-05 ASSESSMENT — ANXIETY QUESTIONNAIRES: GAD7 TOTAL SCORE: 0

## 2018-03-05 ASSESSMENT — LIFESTYLE VARIABLES: HOW OFTEN DO YOU HAVE A DRINK CONTAINING ALCOHOL: 0

## 2018-03-05 ASSESSMENT — PATIENT HEALTH QUESTIONNAIRE - PHQ9: SUM OF ALL RESPONSES TO PHQ QUESTIONS 1-9: 0

## 2018-03-05 NOTE — PROGRESS NOTES
[facility] [address] [place], [place] [id number] [facility]  [address] [facility] [address]  Dept: [phone]  Dept Fax: [phone]  Loc: [phone]    Elena Lo is a 67 y.o. male who presents today for his medical conditions/complaints as noted below. Elena Lo is here for Annual Exam        HPI:   CC: Here today to discuss the following:    He continues to have bilateral elbow skin rash. He has seen dermatology and placed on betamethasone. The rash has not resolved. He states he had a biopsy performed by dermatology but I do not have those records available. He does not recall what diagnosis they gave him for the rash. Insomnia  Currently stable. Satisfied with current treatment regimen. No adverse effects from medication. Restless Legs Syndrome  Symptoms are well controlled on current medication. No adverse effects with medication. Symptoms are primarily at night and stable. No medication adjustment requested today. No issues with lower extremity claudication or numbness. Hypertension  Compliant with medications. No adverse effects from medication. No lightheadedness, palpitations, or chest pain. Lower Back Pain, Chronic  Compliant with medications. No adverse effects from medication. Symptoms continue to be stable. Lower back pain is described as a dull ache and occasionally sharp and stabbing. No radiation. Relieved with his current pain medication. No numbness or weakness in lower extremities. Currently satisfied with treatment regimen as it provides some relief. Irritable bowel syndrome  Symptoms are currently controlled. Tolerating current medication. Satisfied with results. \Allergies  Allergies are currently stable. Depression with Anxiety  Compliant with medications. No adverse effects from medication. Depression and anxiety symptoms are stable today. No suicidal or homicidal ideation.  Excessive worry, insomnia, and anxiousness are

## 2018-03-05 NOTE — PROGRESS NOTES
Review of Systems    Constitutional  No fever or chills. No diaphoresis or significant fatigue. HENT   No tinnitus or significant hearing loss. Eyes  no sudden vision change or eye pain  Respiratory  no significant shortness of breath or cough  Cardiovascular  no chest pain No palpitations or significant leg swelling  Gastrointestinal  no abdominal swelling or pain. Genitourinary  No difficulty urinating, dysuria  Musculoskeletal  no back pain or myalgia. Skin  no color change or rash  Neurologic  No seizures. No lateralizing weakness. Hematologic  no easy bruising or excessive bleeding. Psychiatric  no severe anxiety or nervousness. All other review of systems are negative.

## 2018-03-05 NOTE — PROGRESS NOTES
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 The patient indicates he has had a long history of problem with his feet. He has spent approximately 8 to 12 hours per day for 40 years on his feet working five to six days a week period he has had inserts placed in his soles for the constant stress on his feet. Over the last two to three years he has noticed an increasing sensitivity over his feet. He will typically notice this at evening after he takes his shoes off. He describes a wave like sensation and moves over his feet and is difficult to describe. It is quite uncomfortable and limits his ability to sleep. He has been treated with Klonopin for possible restless leg syndrome without improvement in his symptoms. He denies any weakness or cramps in his legs. He has a chronic mild mid back pain. There is no involvement of his hands. He indicates his father also had complaints of pain in his feet when he got older in his 80's. Feels Requip is helping but now needs a second one. Feels slowly worsening with time. Neurontin did not help but Sinemet is helping at bedtime. Since his last visit about the same. Typically take 2 mg Requip at night. Also takes 500mg neurontin and then valium at bedtime. Feels symtoms are about the same. 2 nights in a week may only sleep about 2 hours due to restless legs. Norco helps. uses it maybe 2x per week. alpha lipoic acid helping. Symptoms are stable. no new issues.     Chief complaint:restless legs      Active Ambulatory Problems     Diagnosis Date Noted    Pain in both feet 06/06/2016    Restless leg syndrome 06/06/2016    Dermatitis 08/28/2017    Fatigue 08/28/2017    Essential (primary) hypertension 08/28/2017    Gastroesophageal reflux disease without esophagitis 08/28/2017    RLS (restless legs syndrome) 08/28/2017    Gilbert's syndrome 08/28/2017    Hypertriglyceridemia 08/28/2017    abdominal swelling or pain. Genitourinary  No difficulty urinating, dysuria  Musculoskeletal  no back pain or myalgia. Skin  no color change or rash  Neurologic  No seizures. No lateralizing weakness. Hematologic  no easy bruising or excessive bleeding. Psychiatric  no severe anxiety or nervousness. All other review of systems are negative. Current Outpatient Prescriptions   Medication Sig Dispense Refill    rOPINIRole (REQUIP) 1 MG tablet Take 1 tablet by mouth 2 times daily 180 tablet 4    hyoscyamine (LEVSIN/SL) 125 MCG sublingual tablet TAKE (1) TABLET BY MOUTH FOUR TIMES DAILY AS NEEDED. 120 tablet 5    hyoscyamine (ANASPAZ;LEVSIN) 125 MCG tablet Take 1 tablet by mouth every 4 hours as needed for Cramping 180 tablet 11    gabapentin (NEURONTIN) 300 MG capsule 2 caps at night 60 capsule 5    diazepam (VALIUM) 5 MG tablet Take 1 tablet by mouth every 8 hours as needed for Anxiety 90 tablet 5    betamethasone dipropionate (DIPROLENE) 0.05 % cream Apply topically 2 times daily Apply topically 2 times daily.       fluticasone (FLONASE) 50 MCG/ACT nasal spray 1 spray by Nasal route daily      ipratropium (ATROVENT) 0.06 % nasal spray 1 spray by Nasal route daily as needed for Rhinitis      meloxicam (MOBIC) 7.5 MG tablet Take 7.5 mg by mouth 2 times daily      pseudoephedrine (SUDAFED) 60 MG tablet Take 60 mg by mouth every 6 hours as needed for Congestion      traZODone (DESYREL) 50 MG tablet Take 50 mg by mouth nightly      gabapentin (NEURONTIN) 100 MG capsule 2 at night 60 capsule 5    acyclovir (ZOVIRAX) 400 MG tablet       bisoprolol-hydrochlorothiazide (ZIAC) 10-6.25 MG per tablet Take 1 tablet by mouth daily      fexofenadine (ALLEGRA) 60 MG tablet Take 60 mg by mouth daily      saw palmetto 160 MG capsule Take 160 mg by mouth daily      magnesium hydroxide (MILK OF MAGNESIA) 400 MG/5ML suspension Take by mouth daily as needed for Constipation      vitamin E 1000 UNITS capsule Take 1,000 Units by mouth daily      citalopram (CELEXA) 10 MG tablet Take 10 mg by mouth daily      omeprazole (PRILOSEC) 20 MG capsule Take 20 mg by mouth 2 times daily      lisinopril-hydrochlorothiazide (PRINZIDE;ZESTORETIC) 20-12.5 MG per tablet Take 2 tablets by mouth daily        No current facility-administered medications for this visit. /78   Ht 6' (1.829 m)   Wt 187 lb (84.8 kg)   BMI 25.36 kg/m²     Constitutional  well developed, well nourished. HENT  head normocephalic. Eyes  conjunctiva normal.  EOMS normal.  Neck- ROM appears normal, no tracheal deviation. Extremities -no edema     Musculoskeletal  ROM appears normal.  No significant edema. Skin  warm, dry, and intact. No rash, erythema, or pallor. Psychiatric  mood, affect, and behavior appear normal.      Neurological exam  Awake, alert, fluent oriented appropriate affect  Speech normal without dysarthria    Cranial Nerve Exam  EOMI, No nystagmus, conjugate eye movements, no ptosis  Symmetric facies      Motor Exam  antigravity      Tremors- no tremors in hands or head noted    Gait  Normal base and speed  No ataxia      No results found for: [id number]  Lab Results   Component Value Date    WBC 5.0 02/26/2018    HGB 15.3 02/26/2018    HCT 46.7 02/26/2018    MCV 86.5 02/26/2018     02/26/2018     Lab Results   Component Value Date     (L) 02/26/2018    K 4.7 02/26/2018    CL 90 (L) 02/26/2018    CO2 31 (H) 02/26/2018    BUN 11 02/26/2018    CREATININE 1.0 02/26/2018    GLUCOSE 98 02/26/2018    CALCIUM 9.1 02/26/2018    PROT 7.0 02/26/2018    LABALBU 4.4 02/26/2018    BILITOT 1.1 02/26/2018    ALKPHOS 97 02/26/2018    AST 27 02/26/2018    ALT 25 02/26/2018    LABGLOM >60 02/26/2018           Assessment    ICD-10-CM ICD-9-CM    1. Restless leg syndrome G25.81 333.94    2.  Pain in both feet M79.671 729.5     M79.672         His neurological examination today was unremarkable except for some mild decreased vibration over his feet. The rest of this sensory exam and motor exam was unremarkable. His reflexes were intact and he had no sway on Romberg. Based upon his history and examination it is unclear what the etiology of his discomfort is. Certainly a sensory neuropathy in his feet is a possibility along with restless leg syndrome although his symptoms are quite limited to his feet. The other possibility is that he has some chronic trauma to his feet from a history of long standing stress on these extremities from his work leading to his symptoms of discomfort. His EMG with nerve conduction study of his legs suggested a mild sensory neuropathy. His blood work including JERRI, heavy metals screen, hemoglobin A1C, HIV, RPR, sed rate, SPEP, and B12 was unremarkable. Requip can be helpful in both restless leg syndrome and neuropathy. He is off his Sinemet and neurontin at night. He is also taking trazadone at a low dose to help him sleep. At this time he will have his Valium increased to 5 mg at bedtime. He is on 2 mg Requip at night. He will have his dose of Neurontin continued. At this time he was given a small dose of Lortab for breakthrough pain. continue  Present care. Planning on going to [place] in February. The patient indicated understanding of the diagnoses and treatment plan. He is to follow up with me in approximately 3 months and call with any further problems. continue care    Plan    No orders of the defined types were placed in this encounter. No orders of the defined types were placed in this encounter. Return in about 3 months (around 6/5/2018).

## 2018-06-11 ENCOUNTER — OFFICE VISIT (OUTPATIENT)
Dept: NEUROLOGY | Age: 73
End: 2018-06-11
Payer: MEDICARE

## 2018-06-11 VITALS
BODY MASS INDEX: 25.6 KG/M2 | DIASTOLIC BLOOD PRESSURE: 71 MMHG | SYSTOLIC BLOOD PRESSURE: 118 MMHG | HEIGHT: 72 IN | WEIGHT: 189 LBS

## 2018-06-11 DIAGNOSIS — M79.672 PAIN IN BOTH FEET: ICD-10-CM

## 2018-06-11 DIAGNOSIS — G25.81 RESTLESS LEG SYNDROME: Primary | ICD-10-CM

## 2018-06-11 DIAGNOSIS — M79.671 PAIN IN BOTH FEET: ICD-10-CM

## 2018-06-11 PROCEDURE — 4040F PNEUMOC VAC/ADMIN/RCVD: CPT | Performed by: PSYCHIATRY & NEUROLOGY

## 2018-06-11 PROCEDURE — G8419 CALC BMI OUT NRM PARAM NOF/U: HCPCS | Performed by: PSYCHIATRY & NEUROLOGY

## 2018-06-11 PROCEDURE — G8428 CUR MEDS NOT DOCUMENT: HCPCS | Performed by: PSYCHIATRY & NEUROLOGY

## 2018-06-11 PROCEDURE — 3017F COLORECTAL CA SCREEN DOC REV: CPT | Performed by: PSYCHIATRY & NEUROLOGY

## 2018-06-11 PROCEDURE — 1123F ACP DISCUSS/DSCN MKR DOCD: CPT | Performed by: PSYCHIATRY & NEUROLOGY

## 2018-06-11 PROCEDURE — 1036F TOBACCO NON-USER: CPT | Performed by: PSYCHIATRY & NEUROLOGY

## 2018-06-11 PROCEDURE — 99213 OFFICE O/P EST LOW 20 MIN: CPT | Performed by: PSYCHIATRY & NEUROLOGY

## 2018-06-11 RX ORDER — GABAPENTIN 300 MG/1
CAPSULE ORAL
Qty: 180 CAPSULE | Refills: 5 | Status: SHIPPED | OUTPATIENT
Start: 2018-06-11 | End: 2019-09-16 | Stop reason: ALTCHOICE

## 2018-06-11 RX ORDER — ROPINIROLE 2 MG/1
2 TABLET, FILM COATED ORAL NIGHTLY
Qty: 90 TABLET | Refills: 5 | Status: SHIPPED | OUTPATIENT
Start: 2018-06-11 | End: 2018-09-17 | Stop reason: SDUPTHER

## 2018-06-11 RX ORDER — DIAZEPAM 5 MG/1
5 TABLET ORAL EVERY 8 HOURS PRN
Qty: 90 TABLET | Refills: 5 | Status: SHIPPED | OUTPATIENT
Start: 2018-06-11 | End: 2018-07-11

## 2018-06-29 ENCOUNTER — TELEPHONE (OUTPATIENT)
Dept: FAMILY MEDICINE CLINIC | Age: 73
End: 2018-06-29

## 2018-06-29 NOTE — TELEPHONE ENCOUNTER
Pt is needing a statement saying that he is not taking Hyoscyamine for Parkinsons. He needs the letter to state what he is taking the drug for. Pt needs this so he can get drug card.  Call [phone]

## 2018-08-31 DIAGNOSIS — Z12.5 ENCOUNTER FOR PROSTATE CANCER SCREENING: ICD-10-CM

## 2018-08-31 LAB — PROSTATE SPECIFIC ANTIGEN: 1.97 NG/ML (ref 0–4)

## 2018-09-10 ENCOUNTER — OFFICE VISIT (OUTPATIENT)
Dept: FAMILY MEDICINE CLINIC | Age: 73
End: 2018-09-10
Payer: MEDICARE

## 2018-09-10 VITALS
TEMPERATURE: 97.8 F | DIASTOLIC BLOOD PRESSURE: 86 MMHG | WEIGHT: 191 LBS | SYSTOLIC BLOOD PRESSURE: 130 MMHG | HEART RATE: 67 BPM | BODY MASS INDEX: 25.9 KG/M2 | RESPIRATION RATE: 16 BRPM | OXYGEN SATURATION: 98 %

## 2018-09-10 DIAGNOSIS — K21.9 GASTROESOPHAGEAL REFLUX DISEASE WITHOUT ESOPHAGITIS: Primary | ICD-10-CM

## 2018-09-10 DIAGNOSIS — E78.1 HYPERTRIGLYCERIDEMIA: ICD-10-CM

## 2018-09-10 DIAGNOSIS — F51.04 CHRONIC INSOMNIA: ICD-10-CM

## 2018-09-10 DIAGNOSIS — R53.83 OTHER FATIGUE: ICD-10-CM

## 2018-09-10 DIAGNOSIS — E78.01 FAMILIAL HYPERCHOLESTEROLEMIA: ICD-10-CM

## 2018-09-10 DIAGNOSIS — F41.1 GENERALIZED ANXIETY DISORDER: ICD-10-CM

## 2018-09-10 PROCEDURE — G8427 DOCREV CUR MEDS BY ELIG CLIN: HCPCS | Performed by: FAMILY MEDICINE

## 2018-09-10 PROCEDURE — 1123F ACP DISCUSS/DSCN MKR DOCD: CPT | Performed by: FAMILY MEDICINE

## 2018-09-10 PROCEDURE — 1036F TOBACCO NON-USER: CPT | Performed by: FAMILY MEDICINE

## 2018-09-10 PROCEDURE — 99214 OFFICE O/P EST MOD 30 MIN: CPT | Performed by: FAMILY MEDICINE

## 2018-09-10 PROCEDURE — 3017F COLORECTAL CA SCREEN DOC REV: CPT | Performed by: FAMILY MEDICINE

## 2018-09-10 PROCEDURE — 1101F PT FALLS ASSESS-DOCD LE1/YR: CPT | Performed by: FAMILY MEDICINE

## 2018-09-10 PROCEDURE — 4040F PNEUMOC VAC/ADMIN/RCVD: CPT | Performed by: FAMILY MEDICINE

## 2018-09-10 PROCEDURE — G8419 CALC BMI OUT NRM PARAM NOF/U: HCPCS | Performed by: FAMILY MEDICINE

## 2018-09-10 RX ORDER — BISOPROLOL FUMARATE AND HYDROCHLOROTHIAZIDE 5; 6.25 MG/1; MG/1
1 TABLET ORAL DAILY
COMMUNITY
End: 2019-04-15

## 2018-09-10 ASSESSMENT — ENCOUNTER SYMPTOMS
CONSTIPATION: 0
ANAL BLEEDING: 0
DIARRHEA: 0
NAUSEA: 0
COUGH: 0
SHORTNESS OF BREATH: 0
ABDOMINAL PAIN: 0
CHEST TIGHTNESS: 0

## 2018-09-10 NOTE — PROGRESS NOTES
[facility] [address] [place] [address]  [address] [address]  Dept: [phone]  Dept Fax: [phone]  Loc: [phone]    Rajani Bond is a 75 y.o. male who presents today for his medical conditions/complaints as noted below. Rajani Bond is here for 6 Month Follow-Up        HPI:   CC: Here today to discuss the following:    Insomnia  Currently stable. Satisfied with current treatment regimen. No adverse effects from medication. Restless Legs Syndrome  Symptoms are well controlled on current medication. No adverse effects with medication. Symptoms are primarily at night and stable. No medication adjustment requested today. No issues with lower extremity claudication or numbness. Gastroesophageal Reflux Disease  Symptoms currently under control. Medication adequately controls his symptoms. No hematochezia or melena. Hypertension  Compliant with medications. No adverse effects from medication. No lightheadedness, palpitations, or chest pain. Depression with Anxiety  Compliant with medications. No adverse effects from medication. Depression and anxiety symptoms are stable today. No suicidal or homicidal ideation. Excessive worry, insomnia, and anxiousness are stable. Energy, concentration, and apathy are stable.           HPI    Past Medical History:   Diagnosis Date    Generalized osteoarthritis of multiple sites 8/28/2017    GERD (gastroesophageal reflux disease)     Hypertension     Hypertriglyceridemia 8/28/2017    Idiopathic peripheral neuropathy 8/28/2017    Irritable bowel syndrome with constipation 8/28/2017    Restless legs syndrome     Seasonal allergic rhinitis due to pollen 8/28/2017      Past Surgical History:   Procedure Laterality Date    HERNIA REPAIR  1999/2001    BARBARA  1994       Family History   Problem Relation Age of Onset    Dementia Mother     Heart Disease Father     Colon Cancer Father     Dementia MG tablet Take 10 mg by mouth daily       No current facility-administered medications for this visit. Allergies   Allergen Reactions    Sulfa Antibiotics        Health Maintenance   Topic Date Due    Flu vaccine (1) 09/01/2018    DTaP/Tdap/Td vaccine (1 - Tdap) 01/01/2021 (Originally 1/2/2011)    Shingles Vaccine (2 of 2 - 2 Dose Series) 11/30/2018    Potassium monitoring  02/26/2019    Creatinine monitoring  02/26/2019    Colon cancer screen colonoscopy  04/18/2021    Lipid screen  02/26/2023    Pneumococcal low/med risk  Completed       Subjective:      Review of Systems   Constitutional: Negative for chills and fever. HENT: Negative for congestion. Respiratory: Negative for cough, chest tightness and shortness of breath. Cardiovascular: Negative for chest pain, palpitations and leg swelling. Gastrointestinal: Negative for abdominal pain, anal bleeding, constipation, diarrhea and nausea. Genitourinary: Negative for difficulty urinating. Psychiatric/Behavioral: Negative. See HPI for visit specific review of symptoms. All others negative      Objective:   /86   Pulse 67   Temp 97.8 °F (36.6 °C)   Resp 16   Wt 191 lb (86.6 kg)   SpO2 98%   BMI 25.90 kg/m²   Physical Exam   Constitutional: He appears well-developed. He does not appear ill. Eyes: Pupils are equal, round, and reactive to light. Neck: Normal range of motion. Neck supple. Cardiovascular: Normal rate and regular rhythm. Exam reveals no friction rub. No murmur heard. Pulmonary/Chest: Effort normal and breath sounds normal. No respiratory distress. He has no wheezes. He has no rales. Abdominal: Soft. Bowel sounds are normal. He exhibits no distension. There is no tenderness. There is no rebound and no guarding. Musculoskeletal: He exhibits no edema. Neurological: He is alert. Psychiatric: He has a normal mood and affect.  His behavior is normal.     Recent Results (from the past 672 hour(s))   Psa screening    Collection Time: 08/31/18  6:59 AM   Result Value Ref Range    PSA 1.97 0.00 - 4.00 ng/mL       Lab Results   Component Value Date    CHOL 170 02/26/2018     Lab Results   Component Value Date    TRIG 147 02/26/2018     Lab Results   Component Value Date    HDL 63 02/26/2018     Lab Results   Component Value Date    LDLCALC 78 02/26/2018     No results found for: LABVLDL, VLDL  No results found for: CHOLHDLRATIO          Assessment & Plan: The following diagnoses and conditions are stable with no further orders unless indicated:  1. Gastroesophageal reflux disease without esophagitis  Continue with omeprazole    Lab Results   Component Value Date    CREATININE 1.0 02/26/2018     Lab Results   Component Value Date    BUN 11 02/26/2018         2. Familial hypercholesterolemia  Lab Results   Component Value Date    CHOL 170 02/26/2018     Lab Results   Component Value Date    TRIG 147 02/26/2018     Lab Results   Component Value Date    HDL 63 02/26/2018     Lab Results   Component Value Date    LDLCALC 78 02/26/2018     No results found for: LABVLDL, VLDL  No results found for: CHOLHDLRATIO  Recheck next visit. Discussed lifestyle changes such as diet and exercise. Recommended eliminate processed food from diet such as sugar and fried foods. Recommended exercising at least 150 minutes/week. Try to do full body resistance training twice a week as well. Offered suggestions for calorie counting such as phone apps and online resources such as My fitness pal and Lose it. Discussed healthy weight. 3. Other fatigue  Check a CBC next visit  Lab Results   Component Value Date    HGB 15.3 02/26/2018       - CBC Auto Differential; Future    4. Hypertriglyceridemia    - Comprehensive Metabolic Panel; Future  - Lipid Panel; Future    5. Chronic insomnia  Continue his current medication    6.  Generalized anxiety disorder  Stable            Return in about 6 months (around 3/10/2019) for

## 2018-09-17 ENCOUNTER — OFFICE VISIT (OUTPATIENT)
Dept: NEUROLOGY | Age: 73
End: 2018-09-17
Payer: MEDICARE

## 2018-09-17 VITALS
BODY MASS INDEX: 25.19 KG/M2 | SYSTOLIC BLOOD PRESSURE: 134 MMHG | HEIGHT: 72 IN | HEART RATE: 65 BPM | DIASTOLIC BLOOD PRESSURE: 84 MMHG | WEIGHT: 186 LBS

## 2018-09-17 DIAGNOSIS — M79.672 PAIN IN BOTH FEET: ICD-10-CM

## 2018-09-17 DIAGNOSIS — M79.671 PAIN IN BOTH FEET: ICD-10-CM

## 2018-09-17 DIAGNOSIS — G25.81 RESTLESS LEG SYNDROME: Primary | ICD-10-CM

## 2018-09-17 PROCEDURE — G8419 CALC BMI OUT NRM PARAM NOF/U: HCPCS | Performed by: PSYCHIATRY & NEUROLOGY

## 2018-09-17 PROCEDURE — 1101F PT FALLS ASSESS-DOCD LE1/YR: CPT | Performed by: PSYCHIATRY & NEUROLOGY

## 2018-09-17 PROCEDURE — 1123F ACP DISCUSS/DSCN MKR DOCD: CPT | Performed by: PSYCHIATRY & NEUROLOGY

## 2018-09-17 PROCEDURE — 99213 OFFICE O/P EST LOW 20 MIN: CPT | Performed by: PSYCHIATRY & NEUROLOGY

## 2018-09-17 PROCEDURE — G8427 DOCREV CUR MEDS BY ELIG CLIN: HCPCS | Performed by: PSYCHIATRY & NEUROLOGY

## 2018-09-17 PROCEDURE — 1036F TOBACCO NON-USER: CPT | Performed by: PSYCHIATRY & NEUROLOGY

## 2018-09-17 PROCEDURE — 4040F PNEUMOC VAC/ADMIN/RCVD: CPT | Performed by: PSYCHIATRY & NEUROLOGY

## 2018-09-17 PROCEDURE — 3017F COLORECTAL CA SCREEN DOC REV: CPT | Performed by: PSYCHIATRY & NEUROLOGY

## 2018-09-17 RX ORDER — ROPINIROLE 2 MG/1
2 TABLET, FILM COATED ORAL 2 TIMES DAILY PRN
Qty: 100 TABLET | Refills: 5 | Status: SHIPPED | OUTPATIENT
Start: 2018-09-17 | End: 2019-03-12 | Stop reason: SDUPTHER

## 2018-09-17 RX ORDER — DIAZEPAM 5 MG/1
5 TABLET ORAL 3 TIMES DAILY PRN
COMMUNITY
End: 2020-01-29 | Stop reason: SDUPTHER

## 2018-09-17 NOTE — PROGRESS NOTES
daily      magnesium hydroxide (MILK OF MAGNESIA) 400 MG/5ML suspension Take by mouth daily as needed for Constipation      vitamin E 1000 UNITS capsule Take 1,000 Units by mouth daily      citalopram (CELEXA) 10 MG tablet Take 10 mg by mouth daily      gabapentin (NEURONTIN) 300 MG capsule 2 caps at night. 180 capsule 5     No current facility-administered medications for this visit. /84   Pulse 65   Ht 6' (1.829 m)   Wt 186 lb (84.4 kg)   BMI 25.23 kg/m²     Constitutional  well developed, well nourished. HENT  head normocephalic. Eyes  conjunctiva normal.  EOMS normal.  Neck- ROM appears normal, no tracheal deviation. Extremities -no edema     Musculoskeletal  ROM appears normal.  No significant edema. Skin  warm, dry, and intact. No rash, erythema, or pallor. Psychiatric  mood, affect, and behavior appear normal.      Neurological exam  Awake, alert, fluent oriented appropriate affect  Speech normal without dysarthria    Cranial Nerve Exam  EOMI, No nystagmus, conjugate eye movements, no ptosis  Symmetric facies      Motor Exam  antigravity      Tremors- no tremors in hands or head noted    Gait  Normal base and speed  No ataxia      No results found for: [id number]  Lab Results   Component Value Date    WBC 5.0 02/26/2018    HGB 15.3 02/26/2018    HCT 46.7 02/26/2018    MCV 86.5 02/26/2018     02/26/2018     Lab Results   Component Value Date     (L) 02/26/2018    K 4.7 02/26/2018    CL 90 (L) 02/26/2018    CO2 31 (H) 02/26/2018    BUN 11 02/26/2018    CREATININE 1.0 02/26/2018    GLUCOSE 98 02/26/2018    CALCIUM 9.1 02/26/2018    PROT 7.0 02/26/2018    LABALBU 4.4 02/26/2018    BILITOT 1.1 02/26/2018    ALKPHOS 97 02/26/2018    AST 27 02/26/2018    ALT 25 02/26/2018    LABGLOM >60 02/26/2018           Assessment    ICD-10-CM ICD-9-CM    1. Restless leg syndrome G25.81 333.94 rOPINIRole (REQUIP) 2 MG tablet   2.  Pain in both feet M79.671 729.5 rOPINIRole (REQUIP)

## 2018-12-31 ENCOUNTER — OFFICE VISIT (OUTPATIENT)
Dept: NEUROLOGY | Age: 73
End: 2018-12-31
Payer: MEDICARE

## 2018-12-31 VITALS
HEIGHT: 72 IN | HEART RATE: 62 BPM | WEIGHT: 193 LBS | BODY MASS INDEX: 26.14 KG/M2 | DIASTOLIC BLOOD PRESSURE: 78 MMHG | SYSTOLIC BLOOD PRESSURE: 128 MMHG

## 2018-12-31 DIAGNOSIS — G25.81 RESTLESS LEG SYNDROME: Primary | ICD-10-CM

## 2018-12-31 PROCEDURE — 1101F PT FALLS ASSESS-DOCD LE1/YR: CPT | Performed by: PSYCHIATRY & NEUROLOGY

## 2018-12-31 PROCEDURE — 3017F COLORECTAL CA SCREEN DOC REV: CPT | Performed by: PSYCHIATRY & NEUROLOGY

## 2018-12-31 PROCEDURE — 4040F PNEUMOC VAC/ADMIN/RCVD: CPT | Performed by: PSYCHIATRY & NEUROLOGY

## 2018-12-31 PROCEDURE — G8419 CALC BMI OUT NRM PARAM NOF/U: HCPCS | Performed by: PSYCHIATRY & NEUROLOGY

## 2018-12-31 PROCEDURE — 1036F TOBACCO NON-USER: CPT | Performed by: PSYCHIATRY & NEUROLOGY

## 2018-12-31 PROCEDURE — G8484 FLU IMMUNIZE NO ADMIN: HCPCS | Performed by: PSYCHIATRY & NEUROLOGY

## 2018-12-31 PROCEDURE — G8427 DOCREV CUR MEDS BY ELIG CLIN: HCPCS | Performed by: PSYCHIATRY & NEUROLOGY

## 2018-12-31 PROCEDURE — 99213 OFFICE O/P EST LOW 20 MIN: CPT | Performed by: PSYCHIATRY & NEUROLOGY

## 2018-12-31 PROCEDURE — 1123F ACP DISCUSS/DSCN MKR DOCD: CPT | Performed by: PSYCHIATRY & NEUROLOGY

## 2018-12-31 NOTE — PROGRESS NOTES
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 The patient indicates he has had a long history of problem with his feet. He has spent approximately 8 to 12 hours per day for 40 years on his feet working five to six days a week period he has had inserts placed in his soles for the constant stress on his feet. Over the last two to three years he has noticed an increasing sensitivity over his feet. He will typically notice this at evening after he takes his shoes off. He describes a wave like sensation and moves over his feet and is difficult to describe. It is quite uncomfortable and limits his ability to sleep. He has been treated with Klonopin for possible restless leg syndrome without improvement in his symptoms. He denies any weakness or cramps in his legs. He has a chronic mild mid back pain. There is no involvement of his hands. He indicates his father also had complaints of pain in his feet when he got older in his 80's. Feels Requip is helping but now needs a second one. Feels slowly worsening with time. Neurontin did not help but Sinemet is helping at bedtime. Since his last visit about the same. Typically take 2 mg Requip at night. Also takes 500mg neurontin and then valium at bedtime. Feels symtoms are about the same. 2 nights in a week may only sleep about 2 hours due to restless legs. Norco helps. uses it maybe 2x per week. alpha lipoic acid helping. Symptoms are stable.  No acute issues    Chief complaint:restless legs      Active Ambulatory Problems     Diagnosis Date Noted    Pain in both feet 06/06/2016    Restless leg syndrome 06/06/2016    Dermatitis 08/28/2017    Fatigue 08/28/2017    Essential (primary) hypertension 08/28/2017    Gastroesophageal reflux disease without esophagitis 08/28/2017    RLS (restless legs syndrome) 08/28/2017    Gilbert's syndrome 08/28/2017    Hypertriglyceridemia 08/28/2017    swelling  Gastrointestinal - no abdominal swelling or pain. Genitourinary - No difficulty urinating, dysuria  Musculoskeletal - no back pain or myalgia. Skin - no color change or rash  Neurologic - No seizures. No lateralizing weakness. Hematologic - no easy bruising or excessive bleeding. Psychiatric - no severe anxiety or nervousness. All other review of systems are negative. Current Outpatient Prescriptions   Medication Sig Dispense Refill    diazepam (VALIUM) 5 MG tablet Take 5 mg by mouth 3 times daily as needed for Anxiety. Arty Reap rOPINIRole (REQUIP) 2 MG tablet Take 1 tablet by mouth 2 times daily as needed (restless legs) 100 tablet 5    bisoprolol-hydrochlorothiazide (ZIAC) 5-6.25 MG per tablet Take 1 tablet by mouth daily      Crisaborole (EUCRISA) 2 % OINT Apply BID to affected area 60 g 5    omeprazole (PRILOSEC) 20 MG delayed release capsule Take 1 capsule by mouth 2 times daily 90 capsule 3    meloxicam (MOBIC) 7.5 MG tablet Take 1 tablet by mouth 2 times daily 180 tablet 3    lisinopril-hydrochlorothiazide (PRINZIDE;ZESTORETIC) 20-12.5 MG per tablet Take 2 tablets by mouth daily 90 tablet 3    traZODone (DESYREL) 50 MG tablet Take 1 tablet by mouth nightly 90 tablet 3    betamethasone dipropionate (DIPROLENE) 0.05 % cream Apply topically 2 times daily Apply topically 2 times daily.  45 g 11    hyoscyamine (ANASPAZ;LEVSIN) 125 MCG tablet Take 1 tablet by mouth every 4 hours as needed for Cramping 180 tablet 11    fluticasone (FLONASE) 50 MCG/ACT nasal spray 1 spray by Nasal route daily      ipratropium (ATROVENT) 0.06 % nasal spray 1 spray by Nasal route daily as needed for Rhinitis      pseudoephedrine (SUDAFED) 60 MG tablet Take 60 mg by mouth every 6 hours as needed for Congestion      acyclovir (ZOVIRAX) 400 MG tablet       fexofenadine (ALLEGRA) 60 MG tablet Take 60 mg by mouth daily      saw palmetto 160 MG capsule Take 160 mg by mouth daily      magnesium hydroxide

## 2019-01-08 ENCOUNTER — OFFICE VISIT (OUTPATIENT)
Dept: UROLOGY | Age: 74
End: 2019-01-08
Payer: MEDICARE

## 2019-01-08 VITALS — HEIGHT: 72 IN | TEMPERATURE: 97.1 F | BODY MASS INDEX: 26.14 KG/M2 | WEIGHT: 193 LBS

## 2019-01-08 DIAGNOSIS — N40.0 BENIGN NON-NODULAR PROSTATIC HYPERPLASIA WITHOUT LOWER URINARY TRACT SYMPTOMS: Primary | ICD-10-CM

## 2019-01-08 LAB
BILIRUBIN, POC: 0
BLOOD URINE, POC: NORMAL
CLARITY, POC: CLEAR
COLOR, POC: YELLOW
GLUCOSE URINE, POC: NORMAL
KETONES, POC: NORMAL
LEUKOCYTE EST, POC: NORMAL
NITRITE, POC: NORMAL
PH, POC: 8.5
PROTEIN, POC: NORMAL
SPECIFIC GRAVITY, POC: 1.02
UROBILINOGEN, POC: 0.2

## 2019-01-08 PROCEDURE — 1036F TOBACCO NON-USER: CPT | Performed by: PHYSICIAN ASSISTANT

## 2019-01-08 PROCEDURE — G8484 FLU IMMUNIZE NO ADMIN: HCPCS | Performed by: PHYSICIAN ASSISTANT

## 2019-01-08 PROCEDURE — 81003 URINALYSIS AUTO W/O SCOPE: CPT | Performed by: PHYSICIAN ASSISTANT

## 2019-01-08 PROCEDURE — G8427 DOCREV CUR MEDS BY ELIG CLIN: HCPCS | Performed by: PHYSICIAN ASSISTANT

## 2019-01-08 PROCEDURE — 4040F PNEUMOC VAC/ADMIN/RCVD: CPT | Performed by: PHYSICIAN ASSISTANT

## 2019-01-08 PROCEDURE — 1123F ACP DISCUSS/DSCN MKR DOCD: CPT | Performed by: PHYSICIAN ASSISTANT

## 2019-01-08 PROCEDURE — 3017F COLORECTAL CA SCREEN DOC REV: CPT | Performed by: PHYSICIAN ASSISTANT

## 2019-01-08 PROCEDURE — 1101F PT FALLS ASSESS-DOCD LE1/YR: CPT | Performed by: PHYSICIAN ASSISTANT

## 2019-01-08 PROCEDURE — 99213 OFFICE O/P EST LOW 20 MIN: CPT | Performed by: PHYSICIAN ASSISTANT

## 2019-01-08 PROCEDURE — G8419 CALC BMI OUT NRM PARAM NOF/U: HCPCS | Performed by: PHYSICIAN ASSISTANT

## 2019-01-08 ASSESSMENT — ENCOUNTER SYMPTOMS
VOMITING: 0
WHEEZING: 0
SINUS PAIN: 0
EYE PAIN: 0
BACK PAIN: 0
ABDOMINAL PAIN: 0
SHORTNESS OF BREATH: 0

## 2019-02-05 ENCOUNTER — TELEPHONE (OUTPATIENT)
Dept: NEUROLOGY | Age: 74
End: 2019-02-05

## 2019-03-06 DIAGNOSIS — E78.1 HYPERTRIGLYCERIDEMIA: ICD-10-CM

## 2019-03-06 DIAGNOSIS — R53.83 OTHER FATIGUE: ICD-10-CM

## 2019-03-06 LAB
ALBUMIN SERPL-MCNC: 4.5 G/DL (ref 3.5–5.2)
ALP BLD-CCNC: 105 U/L (ref 40–130)
ALT SERPL-CCNC: 32 U/L (ref 5–41)
ANION GAP SERPL CALCULATED.3IONS-SCNC: 15 MMOL/L (ref 7–19)
AST SERPL-CCNC: 34 U/L (ref 5–40)
BASOPHILS ABSOLUTE: 0.1 K/UL (ref 0–0.2)
BASOPHILS RELATIVE PERCENT: 1.1 % (ref 0–1)
BILIRUB SERPL-MCNC: 1.4 MG/DL (ref 0.2–1.2)
BUN BLDV-MCNC: 11 MG/DL (ref 8–23)
CALCIUM SERPL-MCNC: 9.5 MG/DL (ref 8.8–10.2)
CHLORIDE BLD-SCNC: 97 MMOL/L (ref 98–111)
CHOLESTEROL, TOTAL: 165 MG/DL (ref 160–199)
CO2: 28 MMOL/L (ref 22–29)
CREAT SERPL-MCNC: 1.1 MG/DL (ref 0.5–1.2)
EOSINOPHILS ABSOLUTE: 0.3 K/UL (ref 0–0.6)
EOSINOPHILS RELATIVE PERCENT: 6.1 % (ref 0–5)
GFR NON-AFRICAN AMERICAN: >60
GLUCOSE BLD-MCNC: 104 MG/DL (ref 74–109)
HCT VFR BLD CALC: 46.8 % (ref 42–52)
HDLC SERPL-MCNC: 63 MG/DL (ref 55–121)
HEMOGLOBIN: 14.8 G/DL (ref 14–18)
LDL CHOLESTEROL CALCULATED: 85 MG/DL
LYMPHOCYTES ABSOLUTE: 1 K/UL (ref 1.1–4.5)
LYMPHOCYTES RELATIVE PERCENT: 18.4 % (ref 20–40)
MCH RBC QN AUTO: 27.8 PG (ref 27–31)
MCHC RBC AUTO-ENTMCNC: 31.6 G/DL (ref 33–37)
MCV RBC AUTO: 88 FL (ref 80–94)
MONOCYTES ABSOLUTE: 0.5 K/UL (ref 0–0.9)
MONOCYTES RELATIVE PERCENT: 10.2 % (ref 0–10)
NEUTROPHILS ABSOLUTE: 3.3 K/UL (ref 1.5–7.5)
NEUTROPHILS RELATIVE PERCENT: 63.8 % (ref 50–65)
PDW BLD-RTO: 14.9 % (ref 11.5–14.5)
PLATELET # BLD: 168 K/UL (ref 130–400)
PMV BLD AUTO: 10.4 FL (ref 9.4–12.4)
POTASSIUM SERPL-SCNC: 5.3 MMOL/L (ref 3.5–5)
RBC # BLD: 5.32 M/UL (ref 4.7–6.1)
SODIUM BLD-SCNC: 140 MMOL/L (ref 136–145)
TOTAL PROTEIN: 6.9 G/DL (ref 6.6–8.7)
TRIGL SERPL-MCNC: 84 MG/DL (ref 0–149)
WBC # BLD: 5.2 K/UL (ref 4.8–10.8)

## 2019-03-11 ASSESSMENT — LIFESTYLE VARIABLES: HOW OFTEN DO YOU HAVE A DRINK CONTAINING ALCOHOL: 0

## 2019-03-12 ENCOUNTER — OFFICE VISIT (OUTPATIENT)
Dept: FAMILY MEDICINE CLINIC | Age: 74
End: 2019-03-12
Payer: MEDICARE

## 2019-03-12 VITALS
TEMPERATURE: 97.8 F | DIASTOLIC BLOOD PRESSURE: 78 MMHG | OXYGEN SATURATION: 98 % | HEIGHT: 72 IN | SYSTOLIC BLOOD PRESSURE: 126 MMHG | HEART RATE: 63 BPM | WEIGHT: 194 LBS | BODY MASS INDEX: 26.28 KG/M2

## 2019-03-12 DIAGNOSIS — Z00.00 ANNUAL PHYSICAL EXAM: Primary | ICD-10-CM

## 2019-03-12 DIAGNOSIS — E78.1 HYPERTRIGLYCERIDEMIA: ICD-10-CM

## 2019-03-12 DIAGNOSIS — I10 ESSENTIAL (PRIMARY) HYPERTENSION: ICD-10-CM

## 2019-03-12 DIAGNOSIS — K21.9 GASTROESOPHAGEAL REFLUX DISEASE WITHOUT ESOPHAGITIS: ICD-10-CM

## 2019-03-12 DIAGNOSIS — M79.671 PAIN IN BOTH FEET: ICD-10-CM

## 2019-03-12 DIAGNOSIS — E80.4 GILBERT'S SYNDROME: ICD-10-CM

## 2019-03-12 DIAGNOSIS — G60.9 IDIOPATHIC PERIPHERAL NEUROPATHY: ICD-10-CM

## 2019-03-12 DIAGNOSIS — J30.89 CHRONIC NONSEASONAL ALLERGIC RHINITIS DUE TO POLLEN: ICD-10-CM

## 2019-03-12 DIAGNOSIS — F51.04 CHRONIC INSOMNIA: ICD-10-CM

## 2019-03-12 DIAGNOSIS — G25.81 RESTLESS LEG SYNDROME: ICD-10-CM

## 2019-03-12 DIAGNOSIS — Z12.5 ENCOUNTER FOR PROSTATE CANCER SCREENING: ICD-10-CM

## 2019-03-12 DIAGNOSIS — M79.672 PAIN IN BOTH FEET: ICD-10-CM

## 2019-03-12 PROCEDURE — G8484 FLU IMMUNIZE NO ADMIN: HCPCS | Performed by: FAMILY MEDICINE

## 2019-03-12 PROCEDURE — G0439 PPPS, SUBSEQ VISIT: HCPCS | Performed by: FAMILY MEDICINE

## 2019-03-12 PROCEDURE — 1036F TOBACCO NON-USER: CPT | Performed by: FAMILY MEDICINE

## 2019-03-12 PROCEDURE — 3017F COLORECTAL CA SCREEN DOC REV: CPT | Performed by: FAMILY MEDICINE

## 2019-03-12 PROCEDURE — G8427 DOCREV CUR MEDS BY ELIG CLIN: HCPCS | Performed by: FAMILY MEDICINE

## 2019-03-12 PROCEDURE — 1123F ACP DISCUSS/DSCN MKR DOCD: CPT | Performed by: FAMILY MEDICINE

## 2019-03-12 PROCEDURE — 99213 OFFICE O/P EST LOW 20 MIN: CPT | Performed by: FAMILY MEDICINE

## 2019-03-12 PROCEDURE — 1101F PT FALLS ASSESS-DOCD LE1/YR: CPT | Performed by: FAMILY MEDICINE

## 2019-03-12 PROCEDURE — 4040F PNEUMOC VAC/ADMIN/RCVD: CPT | Performed by: FAMILY MEDICINE

## 2019-03-12 PROCEDURE — G8419 CALC BMI OUT NRM PARAM NOF/U: HCPCS | Performed by: FAMILY MEDICINE

## 2019-03-12 RX ORDER — OMEPRAZOLE 20 MG/1
20 CAPSULE, DELAYED RELEASE ORAL 2 TIMES DAILY
Qty: 180 CAPSULE | Refills: 3 | Status: SHIPPED | OUTPATIENT
Start: 2019-03-12 | End: 2020-03-16 | Stop reason: SDUPTHER

## 2019-03-12 RX ORDER — CITALOPRAM 10 MG/1
20 TABLET ORAL DAILY
Qty: 90 TABLET | Refills: 3 | Status: SHIPPED | OUTPATIENT
Start: 2019-03-12 | End: 2020-03-16 | Stop reason: SDUPTHER

## 2019-03-12 RX ORDER — TRAZODONE HYDROCHLORIDE 50 MG/1
50 TABLET ORAL NIGHTLY
Qty: 90 TABLET | Refills: 3 | Status: SHIPPED | OUTPATIENT
Start: 2019-03-12 | End: 2020-03-16 | Stop reason: SDUPTHER

## 2019-03-12 RX ORDER — HYOSCYAMINE SULFATE 0.125 MG
125 TABLET ORAL EVERY 4 HOURS PRN
Qty: 180 TABLET | Refills: 11 | Status: SHIPPED | OUTPATIENT
Start: 2019-03-12 | End: 2020-03-16 | Stop reason: SDUPTHER

## 2019-03-12 RX ORDER — MELOXICAM 7.5 MG/1
7.5 TABLET ORAL 2 TIMES DAILY
Qty: 180 TABLET | Refills: 3 | Status: SHIPPED | OUTPATIENT
Start: 2019-03-12 | End: 2020-03-16 | Stop reason: SDUPTHER

## 2019-03-12 RX ORDER — BISOPROLOL FUMARATE 5 MG/1
5 TABLET ORAL DAILY
Qty: 100 TABLET | Refills: 11 | Status: SHIPPED | OUTPATIENT
Start: 2019-03-12 | End: 2020-03-16 | Stop reason: SDUPTHER

## 2019-03-12 RX ORDER — ROPINIROLE 2 MG/1
2 TABLET, FILM COATED ORAL 2 TIMES DAILY PRN
Qty: 180 TABLET | Refills: 3 | Status: SHIPPED | OUTPATIENT
Start: 2019-03-12 | End: 2020-01-29 | Stop reason: SDUPTHER

## 2019-03-12 RX ORDER — LISINOPRIL AND HYDROCHLOROTHIAZIDE 20; 12.5 MG/1; MG/1
2 TABLET ORAL DAILY
Qty: 90 TABLET | Refills: 3 | Status: SHIPPED | OUTPATIENT
Start: 2019-03-12 | End: 2020-03-16 | Stop reason: SDUPTHER

## 2019-03-12 ASSESSMENT — PATIENT HEALTH QUESTIONNAIRE - PHQ9
SUM OF ALL RESPONSES TO PHQ QUESTIONS 1-9: 0
SUM OF ALL RESPONSES TO PHQ QUESTIONS 1-9: 0

## 2019-04-15 ENCOUNTER — OFFICE VISIT (OUTPATIENT)
Dept: NEUROLOGY | Age: 74
End: 2019-04-15
Payer: MEDICARE

## 2019-04-15 VITALS — SYSTOLIC BLOOD PRESSURE: 121 MMHG | HEART RATE: 54 BPM | DIASTOLIC BLOOD PRESSURE: 74 MMHG

## 2019-04-15 DIAGNOSIS — M79.672 PAIN IN BOTH FEET: ICD-10-CM

## 2019-04-15 DIAGNOSIS — M79.671 PAIN IN BOTH FEET: ICD-10-CM

## 2019-04-15 DIAGNOSIS — G25.81 RESTLESS LEG SYNDROME: Primary | ICD-10-CM

## 2019-04-15 PROCEDURE — 1036F TOBACCO NON-USER: CPT | Performed by: PSYCHIATRY & NEUROLOGY

## 2019-04-15 PROCEDURE — 3017F COLORECTAL CA SCREEN DOC REV: CPT | Performed by: PSYCHIATRY & NEUROLOGY

## 2019-04-15 PROCEDURE — 99214 OFFICE O/P EST MOD 30 MIN: CPT | Performed by: PSYCHIATRY & NEUROLOGY

## 2019-04-15 PROCEDURE — G8427 DOCREV CUR MEDS BY ELIG CLIN: HCPCS | Performed by: PSYCHIATRY & NEUROLOGY

## 2019-04-15 PROCEDURE — 1123F ACP DISCUSS/DSCN MKR DOCD: CPT | Performed by: PSYCHIATRY & NEUROLOGY

## 2019-04-15 PROCEDURE — G8419 CALC BMI OUT NRM PARAM NOF/U: HCPCS | Performed by: PSYCHIATRY & NEUROLOGY

## 2019-04-15 PROCEDURE — 4040F PNEUMOC VAC/ADMIN/RCVD: CPT | Performed by: PSYCHIATRY & NEUROLOGY

## 2019-04-15 RX ORDER — METHYLPREDNISOLONE 4 MG/1
TABLET ORAL
Qty: 1 KIT | Refills: 1 | Status: SHIPPED | OUTPATIENT
Start: 2019-04-15 | End: 2019-04-21

## 2019-04-15 RX ORDER — CYCLOBENZAPRINE HCL 10 MG
10 TABLET ORAL 3 TIMES DAILY PRN
Qty: 90 TABLET | Refills: 0 | Status: SHIPPED | OUTPATIENT
Start: 2019-04-15 | End: 2019-05-15

## 2019-04-15 NOTE — PROGRESS NOTES
Physical activity:     Days per week: Not on file     Minutes per session: Not on file    Stress: Not on file   Relationships    Social connections:     Talks on phone: Not on file     Gets together: Not on file     Attends Buddhism service: Not on file     Active member of club or organization: Not on file     Attends meetings of clubs or organizations: Not on file     Relationship status: Not on file    Intimate partner violence:     Fear of current or ex partner: Not on file     Emotionally abused: Not on file     Physically abused: Not on file     Forced sexual activity: Not on file   Other Topics Concern    Not on file   Social History Narrative    Not on file   Review of Systems     Constitutional - No fever or chills. No diaphoresis or significant fatigue. HENT -  No tinnitus or significant hearing loss. Eyes - no sudden vision change or eye pain  Respiratory - no significant shortness of breath or cough  Cardiovascular - no chest pain No palpitations or significant leg swelling  Gastrointestinal - no abdominal swelling or pain. Genitourinary - No difficulty urinating, dysuria  Musculoskeletal - no back pain or myalgia. Skin - no color change or rash  Neurologic - No seizures. No lateralizing weakness. Hematologic - no easy bruising or excessive bleeding. Psychiatric - no severe anxiety or nervousness. All other review of systems are negative    Current Outpatient Medications   Medication Sig Dispense Refill    cyclobenzaprine (FLEXERIL) 10 MG tablet Take 1 tablet by mouth 3 times daily as needed for Muscle spasms 90 tablet 0    methylPREDNISolone (MEDROL, AALIYAH,) 4 MG tablet Take by mouth.  1 kit 1    omeprazole (PRILOSEC) 20 MG delayed release capsule Take 1 capsule by mouth 2 times daily 180 capsule 3    meloxicam (MOBIC) 7.5 MG tablet Take 1 tablet by mouth 2 times daily 180 tablet 3    lisinopril-hydrochlorothiazide (PRINZIDE;ZESTORETIC) 20-12.5 MG per tablet Take 2 tablets by mouth appropriate affect  Speech normal without dysarthria    Cranial Nerve Exam  EOMI, No nystagmus, conjugate eye movements, no ptosis  Symmetric facies      Motor Exam  antigravity      Tremors- no tremors in hands or head noted    Gait  Normal base and speed  No ataxia      No results found for: [id number]  Lab Results   Component Value Date    WBC 5.2 03/06/2019    HGB 14.8 03/06/2019    HCT 46.8 03/06/2019    MCV 88.0 03/06/2019     03/06/2019     Lab Results   Component Value Date     03/06/2019    K 5.3 (H) 03/06/2019    CL 97 (L) 03/06/2019    CO2 28 03/06/2019    BUN 11 03/06/2019    CREATININE 1.1 03/06/2019    GLUCOSE 104 03/06/2019    CALCIUM 9.5 03/06/2019    PROT 6.9 03/06/2019    LABALBU 4.5 03/06/2019    BILITOT 1.4 (H) 03/06/2019    ALKPHOS 105 03/06/2019    AST 34 03/06/2019    ALT 32 03/06/2019    LABGLOM >60 03/06/2019           Assessment    ICD-10-CM    1. Restless leg syndrome G25.81    2. Pain in both feet M79.671     M79.672        His neurological examination today was unremarkable except for some mild decreased vibration over his feet. The rest of this sensory exam and motor exam was unremarkable. His reflexes were intact and he had no sway on Romberg. Based upon his history and examination it is unclear what the etiology of his discomfort is. Certainly a sensory neuropathy in his feet is a possibility along with restless leg syndrome although his symptoms are quite limited to his feet. The other possibility is that he has some chronic trauma to his feet from a history of long standing stress on these extremities from his work leading to his symptoms of discomfort. His EMG with nerve conduction study of his legs suggested a mild sensory neuropathy. His blood work including JERRI, heavy metals screen, hemoglobin A1C, HIV, RPR, sed rate, SPEP, and B12 was unremarkable. Requip can be helpful in both restless leg syndrome and neuropathy. He is off his Sinemet and neurontin at night.  He is also taking trazadone at a low dose to help him sleep. At this time he will have his Valium increased to 5 mg at bedtime. He is on 2 mg Requip at night. He will have his dose of Neurontin continued. At this time he was given a small dose of Lortab for breakthrough pain. The patient indicated understanding of the diagnoses and treatment plan. He will be given a medrol dose aaliyah and flexeril to see if this helps his sciatica. He is to follow up with me in approximately 3 months and call with any further problems. continue current care    Plan    No orders of the defined types were placed in this encounter. Orders Placed This Encounter   Medications    cyclobenzaprine (FLEXERIL) 10 MG tablet     Sig: Take 1 tablet by mouth 3 times daily as needed for Muscle spasms     Dispense:  90 tablet     Refill:  0    methylPREDNISolone (MEDROL, AALIYAH,) 4 MG tablet     Sig: Take by mouth. Dispense:  1 kit     Refill:  1       Return in about 3 months (around 7/15/2019).

## 2019-05-14 RX ORDER — IPRATROPIUM BROMIDE 42 UG/1
1 SPRAY, METERED NASAL DAILY PRN
Qty: 1 BOTTLE | Refills: 2 | Status: SHIPPED | OUTPATIENT
Start: 2019-05-14 | End: 2020-03-16 | Stop reason: SDUPTHER

## 2019-07-15 ENCOUNTER — OFFICE VISIT (OUTPATIENT)
Dept: NEUROLOGY | Age: 74
End: 2019-07-15
Payer: MEDICARE

## 2019-07-15 VITALS
DIASTOLIC BLOOD PRESSURE: 71 MMHG | WEIGHT: 196 LBS | HEIGHT: 72 IN | SYSTOLIC BLOOD PRESSURE: 131 MMHG | HEART RATE: 56 BPM | BODY MASS INDEX: 26.55 KG/M2

## 2019-07-15 DIAGNOSIS — M79.671 PAIN IN BOTH FEET: ICD-10-CM

## 2019-07-15 DIAGNOSIS — M79.672 PAIN IN BOTH FEET: ICD-10-CM

## 2019-07-15 DIAGNOSIS — G25.81 RESTLESS LEG SYNDROME: Primary | ICD-10-CM

## 2019-07-15 PROCEDURE — 1123F ACP DISCUSS/DSCN MKR DOCD: CPT | Performed by: PSYCHIATRY & NEUROLOGY

## 2019-07-15 PROCEDURE — 99213 OFFICE O/P EST LOW 20 MIN: CPT | Performed by: PSYCHIATRY & NEUROLOGY

## 2019-07-15 PROCEDURE — 3017F COLORECTAL CA SCREEN DOC REV: CPT | Performed by: PSYCHIATRY & NEUROLOGY

## 2019-07-15 PROCEDURE — G8427 DOCREV CUR MEDS BY ELIG CLIN: HCPCS | Performed by: PSYCHIATRY & NEUROLOGY

## 2019-07-15 PROCEDURE — G8419 CALC BMI OUT NRM PARAM NOF/U: HCPCS | Performed by: PSYCHIATRY & NEUROLOGY

## 2019-07-15 PROCEDURE — 1036F TOBACCO NON-USER: CPT | Performed by: PSYCHIATRY & NEUROLOGY

## 2019-07-15 PROCEDURE — 4040F PNEUMOC VAC/ADMIN/RCVD: CPT | Performed by: PSYCHIATRY & NEUROLOGY

## 2019-07-15 NOTE — PROGRESS NOTES
03/06/2019     03/06/2019     Lab Results   Component Value Date     03/06/2019    K 5.3 (H) 03/06/2019    CL 97 (L) 03/06/2019    CO2 28 03/06/2019    BUN 11 03/06/2019    CREATININE 1.1 03/06/2019    GLUCOSE 104 03/06/2019    CALCIUM 9.5 03/06/2019    PROT 6.9 03/06/2019    LABALBU 4.5 03/06/2019    BILITOT 1.4 (H) 03/06/2019    ALKPHOS 105 03/06/2019    AST 34 03/06/2019    ALT 32 03/06/2019    LABGLOM >60 03/06/2019           Assessment    ICD-10-CM    1. Restless leg syndrome G25.81    2. Pain in both feet M79.671     M79.672        His neurological examination today was unremarkable except for some mild decreased vibration over his feet. The rest of this sensory exam and motor exam was unremarkable. His reflexes were intact and he had no sway on Romberg. Based upon his history and examination it is unclear what the etiology of his discomfort is. Certainly a sensory neuropathy in his feet is a possibility along with restless leg syndrome although his symptoms are quite limited to his feet. The other possibility is that he has some chronic trauma to his feet from a history of long standing stress on these extremities from his work leading to his symptoms of discomfort. His EMG with nerve conduction study of his legs suggested a mild sensory neuropathy. His blood work including JERRI, heavy metals screen, hemoglobin A1C, HIV, RPR, sed rate, SPEP, and B12 was unremarkable. Requip can be helpful in both restless leg syndrome and neuropathy. He is off his Sinemet and neurontin at night. He is also taking trazadone at a low dose to help him sleep. At this time he will have his Valium increased to 5 mg at bedtime. He is on 2 mg Requip at night. At this time he was given a small dose of Lortab for breakthrough pain. The patient indicated understanding of the diagnoses and treatment plan. He was given a medrol dose jackeline and flexeril to see if this helps his sciatica.  He is to follow up with me in approximately

## 2019-09-09 ENCOUNTER — HOSPITAL ENCOUNTER (OUTPATIENT)
Dept: GENERAL RADIOLOGY | Facility: HOSPITAL | Age: 74
Discharge: HOME | End: 2019-09-09
Admitting: NURSE PRACTITIONER

## 2019-09-09 ENCOUNTER — TRANSCRIBE ORDERS (OUTPATIENT)
Dept: GENERAL RADIOLOGY | Facility: HOSPITAL | Age: 74
End: 2019-09-09

## 2019-09-09 DIAGNOSIS — M54.42 ACUTE BACK PAIN WITH SCIATICA, LEFT: Primary | ICD-10-CM

## 2019-09-09 DIAGNOSIS — I10 ESSENTIAL (PRIMARY) HYPERTENSION: ICD-10-CM

## 2019-09-09 DIAGNOSIS — Z12.5 ENCOUNTER FOR PROSTATE CANCER SCREENING: ICD-10-CM

## 2019-09-09 DIAGNOSIS — M54.42 ACUTE BACK PAIN WITH SCIATICA, LEFT: ICD-10-CM

## 2019-09-09 LAB
ALBUMIN SERPL-MCNC: 4.4 G/DL (ref 3.5–5.2)
ALP BLD-CCNC: 110 U/L (ref 40–130)
ALT SERPL-CCNC: 41 U/L (ref 5–41)
ANION GAP SERPL CALCULATED.3IONS-SCNC: 11 MMOL/L (ref 7–19)
AST SERPL-CCNC: 31 U/L (ref 5–40)
BILIRUB SERPL-MCNC: 1.4 MG/DL (ref 0.2–1.2)
BUN BLDV-MCNC: 3 MG/DL (ref 8–23)
CALCIUM SERPL-MCNC: 9.4 MG/DL (ref 8.8–10.2)
CHLORIDE BLD-SCNC: 95 MMOL/L (ref 98–111)
CO2: 30 MMOL/L (ref 22–29)
CREAT SERPL-MCNC: 1.1 MG/DL (ref 0.5–1.2)
GFR NON-AFRICAN AMERICAN: >60
GLUCOSE BLD-MCNC: 102 MG/DL (ref 74–109)
POTASSIUM SERPL-SCNC: 5.1 MMOL/L (ref 3.5–5)
PROSTATE SPECIFIC ANTIGEN: 1.97 NG/ML (ref 0–4)
SODIUM BLD-SCNC: 136 MMOL/L (ref 136–145)
TOTAL PROTEIN: 6.9 G/DL (ref 6.6–8.7)

## 2019-09-09 PROCEDURE — 72220 X-RAY EXAM SACRUM TAILBONE: CPT

## 2019-09-09 PROCEDURE — 72110 X-RAY EXAM L-2 SPINE 4/>VWS: CPT

## 2019-09-16 ENCOUNTER — OFFICE VISIT (OUTPATIENT)
Dept: FAMILY MEDICINE CLINIC | Age: 74
End: 2019-09-16
Payer: MEDICARE

## 2019-09-16 VITALS
BODY MASS INDEX: 25.63 KG/M2 | SYSTOLIC BLOOD PRESSURE: 122 MMHG | HEART RATE: 64 BPM | WEIGHT: 189 LBS | TEMPERATURE: 98.3 F | OXYGEN SATURATION: 99 % | DIASTOLIC BLOOD PRESSURE: 84 MMHG

## 2019-09-16 DIAGNOSIS — G25.81 RESTLESS LEG SYNDROME: ICD-10-CM

## 2019-09-16 DIAGNOSIS — K21.9 GASTROESOPHAGEAL REFLUX DISEASE WITHOUT ESOPHAGITIS: ICD-10-CM

## 2019-09-16 DIAGNOSIS — F51.04 CHRONIC INSOMNIA: ICD-10-CM

## 2019-09-16 DIAGNOSIS — E78.1 HYPERTRIGLYCERIDEMIA: ICD-10-CM

## 2019-09-16 DIAGNOSIS — J30.89 CHRONIC NONSEASONAL ALLERGIC RHINITIS DUE TO POLLEN: ICD-10-CM

## 2019-09-16 DIAGNOSIS — I10 ESSENTIAL (PRIMARY) HYPERTENSION: Primary | ICD-10-CM

## 2019-09-16 PROCEDURE — 1123F ACP DISCUSS/DSCN MKR DOCD: CPT | Performed by: FAMILY MEDICINE

## 2019-09-16 PROCEDURE — G8419 CALC BMI OUT NRM PARAM NOF/U: HCPCS | Performed by: FAMILY MEDICINE

## 2019-09-16 PROCEDURE — 99214 OFFICE O/P EST MOD 30 MIN: CPT | Performed by: FAMILY MEDICINE

## 2019-09-16 PROCEDURE — 1036F TOBACCO NON-USER: CPT | Performed by: FAMILY MEDICINE

## 2019-09-16 PROCEDURE — G8427 DOCREV CUR MEDS BY ELIG CLIN: HCPCS | Performed by: FAMILY MEDICINE

## 2019-09-16 PROCEDURE — 3017F COLORECTAL CA SCREEN DOC REV: CPT | Performed by: FAMILY MEDICINE

## 2019-09-16 PROCEDURE — 4040F PNEUMOC VAC/ADMIN/RCVD: CPT | Performed by: FAMILY MEDICINE

## 2019-09-16 NOTE — PROGRESS NOTES
8/28/2017      Past Surgical History:   Procedure Laterality Date    HERNIA REPAIR  1999/2001    BARBARA  1994       Family History   Problem Relation Age of Onset    Dementia Mother     Heart Disease Father     Colon Cancer Father     Dementia Father     High Blood Pressure Father     Heart Attack Father        Social History     Tobacco Use    Smoking status: Never Smoker    Smokeless tobacco: Never Used   Substance Use Topics    Alcohol use: No     Alcohol/week: 0.0 standard drinks     Current Outpatient Medications   Medication Sig Dispense Refill    ipratropium (ATROVENT) 0.06 % nasal spray 1 spray by Nasal route daily as needed for Rhinitis 1 Bottle 2    omeprazole (PRILOSEC) 20 MG delayed release capsule Take 1 capsule by mouth 2 times daily 180 capsule 3    meloxicam (MOBIC) 7.5 MG tablet Take 1 tablet by mouth 2 times daily 180 tablet 3    lisinopril-hydrochlorothiazide (PRINZIDE;ZESTORETIC) 20-12.5 MG per tablet Take 2 tablets by mouth daily 90 tablet 3    rOPINIRole (REQUIP) 2 MG tablet Take 1 tablet by mouth 2 times daily as needed (restless legs) 180 tablet 3    citalopram (CELEXA) 10 MG tablet Take 2 tablets by mouth daily 90 tablet 3    traZODone (DESYREL) 50 MG tablet Take 1 tablet by mouth nightly 90 tablet 3    hyoscyamine (ANASPAZ;LEVSIN) 125 MCG tablet Take 1 tablet by mouth every 4 hours as needed for Cramping 180 tablet 11    bisoprolol (ZEBETA) 5 MG tablet Take 1 tablet by mouth daily 100 tablet 11    diazepam (VALIUM) 5 MG tablet Take 5 mg by mouth 3 times daily as needed for Anxiety. [employer] betamethasone dipropionate (DIPROLENE) 0.05 % cream Apply topically 2 times daily Apply topically 2 times daily.  45 g 11    fluticasone (FLONASE) 50 MCG/ACT nasal spray 1 spray by Nasal route daily      pseudoephedrine (SUDAFED) 60 MG tablet Take 60 mg by mouth every 6 hours as needed for Congestion      acyclovir (ZOVIRAX) 400 MG tablet       fexofenadine (ALLEGRA) 60 MG tablet Take

## 2019-11-04 ENCOUNTER — OFFICE VISIT (OUTPATIENT)
Dept: NEUROLOGY | Age: 74
End: 2019-11-04
Payer: MEDICARE

## 2019-11-04 VITALS
WEIGHT: 195 LBS | HEIGHT: 72 IN | BODY MASS INDEX: 26.41 KG/M2 | HEART RATE: 66 BPM | DIASTOLIC BLOOD PRESSURE: 77 MMHG | SYSTOLIC BLOOD PRESSURE: 133 MMHG

## 2019-11-04 DIAGNOSIS — M79.671 PAIN IN BOTH FEET: ICD-10-CM

## 2019-11-04 DIAGNOSIS — G25.81 RESTLESS LEG SYNDROME: Primary | ICD-10-CM

## 2019-11-04 DIAGNOSIS — M79.672 PAIN IN BOTH FEET: ICD-10-CM

## 2019-11-04 DIAGNOSIS — M21.372 LEFT FOOT DROP: ICD-10-CM

## 2019-11-04 PROCEDURE — 4040F PNEUMOC VAC/ADMIN/RCVD: CPT | Performed by: PSYCHIATRY & NEUROLOGY

## 2019-11-04 PROCEDURE — G8427 DOCREV CUR MEDS BY ELIG CLIN: HCPCS | Performed by: PSYCHIATRY & NEUROLOGY

## 2019-11-04 PROCEDURE — 1036F TOBACCO NON-USER: CPT | Performed by: PSYCHIATRY & NEUROLOGY

## 2019-11-04 PROCEDURE — G8484 FLU IMMUNIZE NO ADMIN: HCPCS | Performed by: PSYCHIATRY & NEUROLOGY

## 2019-11-04 PROCEDURE — 3017F COLORECTAL CA SCREEN DOC REV: CPT | Performed by: PSYCHIATRY & NEUROLOGY

## 2019-11-04 PROCEDURE — 99214 OFFICE O/P EST MOD 30 MIN: CPT | Performed by: PSYCHIATRY & NEUROLOGY

## 2019-11-04 PROCEDURE — G8417 CALC BMI ABV UP PARAM F/U: HCPCS | Performed by: PSYCHIATRY & NEUROLOGY

## 2019-11-04 PROCEDURE — 1123F ACP DISCUSS/DSCN MKR DOCD: CPT | Performed by: PSYCHIATRY & NEUROLOGY

## 2019-12-11 ENCOUNTER — TELEPHONE (OUTPATIENT)
Dept: NEUROLOGY | Age: 74
End: 2019-12-11

## 2020-01-13 ENCOUNTER — OFFICE VISIT (OUTPATIENT)
Dept: UROLOGY | Age: 75
End: 2020-01-13
Payer: MEDICARE

## 2020-01-13 VITALS
TEMPERATURE: 96.7 F | BODY MASS INDEX: 26.01 KG/M2 | HEART RATE: 59 BPM | SYSTOLIC BLOOD PRESSURE: 137 MMHG | HEIGHT: 72 IN | WEIGHT: 192 LBS | DIASTOLIC BLOOD PRESSURE: 76 MMHG

## 2020-01-13 LAB
BILIRUBIN, POC: 0
BLOOD URINE, POC: NORMAL
CLARITY, POC: CLEAR
COLOR, POC: NORMAL
GLUCOSE URINE, POC: NORMAL
KETONES, POC: NORMAL
LEUKOCYTE EST, POC: NORMAL
NITRITE, POC: NORMAL
PH, POC: 7
PROTEIN, POC: NORMAL
SPECIFIC GRAVITY, POC: 1.02
UROBILINOGEN, POC: 0.2

## 2020-01-13 PROCEDURE — 1036F TOBACCO NON-USER: CPT | Performed by: PHYSICIAN ASSISTANT

## 2020-01-13 PROCEDURE — 4040F PNEUMOC VAC/ADMIN/RCVD: CPT | Performed by: PHYSICIAN ASSISTANT

## 2020-01-13 PROCEDURE — 81003 URINALYSIS AUTO W/O SCOPE: CPT | Performed by: PHYSICIAN ASSISTANT

## 2020-01-13 PROCEDURE — 3017F COLORECTAL CA SCREEN DOC REV: CPT | Performed by: PHYSICIAN ASSISTANT

## 2020-01-13 PROCEDURE — G8484 FLU IMMUNIZE NO ADMIN: HCPCS | Performed by: PHYSICIAN ASSISTANT

## 2020-01-13 PROCEDURE — 1123F ACP DISCUSS/DSCN MKR DOCD: CPT | Performed by: PHYSICIAN ASSISTANT

## 2020-01-13 PROCEDURE — 99213 OFFICE O/P EST LOW 20 MIN: CPT | Performed by: PHYSICIAN ASSISTANT

## 2020-01-13 PROCEDURE — G8417 CALC BMI ABV UP PARAM F/U: HCPCS | Performed by: PHYSICIAN ASSISTANT

## 2020-01-13 PROCEDURE — G8427 DOCREV CUR MEDS BY ELIG CLIN: HCPCS | Performed by: PHYSICIAN ASSISTANT

## 2020-01-13 ASSESSMENT — ENCOUNTER SYMPTOMS
ABDOMINAL PAIN: 0
EYE PAIN: 0
BACK PAIN: 0
WHEEZING: 0
VOMITING: 0
SHORTNESS OF BREATH: 0
SINUS PAIN: 0

## 2020-01-13 NOTE — PROGRESS NOTES
symptoms  Benign digital rectal exam AUA score 1 out of 35 he is on no urologic medications. PSA 09/09/2019 was 1.97. And follow-up. - POCT Urinalysis No Micro (Auto)  - PSA, Diagnostic; Future      Orders Placed This Encounter   Procedures    PSA, Diagnostic     Standing Status:   Future     Standing Expiration Date:   1/13/2021    POCT Urinalysis No Micro (Auto)     No orders of the defined types were placed in this encounter. Plan:  Follow up in 1 year with PSA.

## 2020-01-29 ENCOUNTER — OFFICE VISIT (OUTPATIENT)
Dept: NEUROLOGY | Age: 75
End: 2020-01-29
Payer: MEDICARE

## 2020-01-29 VITALS
SYSTOLIC BLOOD PRESSURE: 132 MMHG | HEART RATE: 50 BPM | DIASTOLIC BLOOD PRESSURE: 83 MMHG | HEIGHT: 72 IN | WEIGHT: 185 LBS | BODY MASS INDEX: 25.06 KG/M2

## 2020-01-29 PROCEDURE — 99213 OFFICE O/P EST LOW 20 MIN: CPT | Performed by: PSYCHIATRY & NEUROLOGY

## 2020-01-29 PROCEDURE — G8417 CALC BMI ABV UP PARAM F/U: HCPCS | Performed by: PSYCHIATRY & NEUROLOGY

## 2020-01-29 PROCEDURE — 1123F ACP DISCUSS/DSCN MKR DOCD: CPT | Performed by: PSYCHIATRY & NEUROLOGY

## 2020-01-29 PROCEDURE — G8484 FLU IMMUNIZE NO ADMIN: HCPCS | Performed by: PSYCHIATRY & NEUROLOGY

## 2020-01-29 PROCEDURE — 1036F TOBACCO NON-USER: CPT | Performed by: PSYCHIATRY & NEUROLOGY

## 2020-01-29 PROCEDURE — G8427 DOCREV CUR MEDS BY ELIG CLIN: HCPCS | Performed by: PSYCHIATRY & NEUROLOGY

## 2020-01-29 PROCEDURE — 3017F COLORECTAL CA SCREEN DOC REV: CPT | Performed by: PSYCHIATRY & NEUROLOGY

## 2020-01-29 PROCEDURE — 4040F PNEUMOC VAC/ADMIN/RCVD: CPT | Performed by: PSYCHIATRY & NEUROLOGY

## 2020-01-29 RX ORDER — ROPINIROLE 2 MG/1
2 TABLET, FILM COATED ORAL 3 TIMES DAILY PRN
Qty: 200 TABLET | Refills: 5 | Status: SHIPPED | OUTPATIENT
Start: 2020-01-29 | End: 2020-03-16 | Stop reason: SDUPTHER

## 2020-01-29 RX ORDER — DIAZEPAM 5 MG/1
5 TABLET ORAL EVERY 6 HOURS PRN
Qty: 100 TABLET | Refills: 5 | Status: SHIPPED | OUTPATIENT
Start: 2020-01-29 | End: 2020-10-26 | Stop reason: SDUPTHER

## 2020-01-29 NOTE — PROGRESS NOTES
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 The patient indicates he has had a long history of problem with his feet. He has spent approximately 8 to 12 hours per day for 40 years on his feet working five to six days a week period he has had inserts placed in his soles for the constant stress on his feet. He has noticed an increasing sensitivity over his feet. He will typically notice this at evening after he takes his shoes off. He describes a wave like sensation and moves over his feet and is difficult to describe. It is quite uncomfortable and limits his ability to sleep. He has been treated with Klonopin for possible restless leg syndrome without improvement in his symptoms. He denies any weakness or cramps in his legs. He has a chronic mild mid back pain. There is no involvement of his hands. He indicates his father also had complaints of pain in his feet when he got older in his 80's. Feels Requip is helping but now needs a second one. Feels slowly worsening with time. Neurontin did not help but Sinemet is helping at bedtime. Since his last visit about the same. Typically take 2 mg Requip at night. Also takes 500mg neurontin and then valium at bedtime. Feels symtoms are about the same. 2 nights in a week may only sleep about 2 hours due to restless legs. Norco helps. uses it maybe 2x per week. alpha lipoic acid helping. Feel better coming off Neuronin and cognitive issues better. Left leg turing over years but worse after sliding off chair. x ray spine degenerative changes. Whe to orthopedics who gave him steroid injection and then therapy and now pain in feet is better.     Chief complaint:restless legs      Active Ambulatory Problems     Diagnosis Date Noted    Pain in both feet 06/06/2016    Restless leg syndrome 06/06/2016    Dermatitis 08/28/2017    Fatigue 08/28/2017    Essential (primary) hypertension 08/28/2017    Component Value Date    WBC 5.2 03/06/2019    HGB 14.8 03/06/2019    HCT 46.8 03/06/2019    MCV 88.0 03/06/2019     03/06/2019     Lab Results   Component Value Date     09/09/2019    K 5.1 (H) 09/09/2019    CL 95 (L) 09/09/2019    CO2 30 (H) 09/09/2019    BUN 3 (L) 09/09/2019    CREATININE 1.1 09/09/2019    GLUCOSE 102 09/09/2019    CALCIUM 9.4 09/09/2019    PROT 6.9 09/09/2019    LABALBU 4.4 09/09/2019    BILITOT 1.4 (H) 09/09/2019    ALKPHOS 110 09/09/2019    AST 31 09/09/2019    ALT 41 09/09/2019    LABGLOM >60 09/09/2019           Assessment    ICD-10-CM    1. Restless leg syndrome G25.81 rOPINIRole (REQUIP) 2 MG tablet     diazepam (VALIUM) 5 MG tablet   2. Pain in both feet M79.671 rOPINIRole (REQUIP) 2 MG tablet    M79.672 diazepam (VALIUM) 5 MG tablet       His neurological examination was significant for a left foot drop and weakness in inversion and eversionfor some mild decreased vibration over his feet and decreased sensation lateral left lower leg The rest of this sensory exam and motor exam was unremarkable. His reflexes were intact and he had no sway on Romberg. Based upon his history and examination it is unclear what the etiology of his discomfort is. Certainly a sensory neuropathy in his feet is a possibility along with restless leg syndrome although his symptoms are quite limited to his feet. The other possibility is that he has some chronic trauma to his feet from a history of long standing stress on these extremities from his work leading to his symptoms of discomfort. His EMG with nerve conduction study of his legs suggested a mild sensory neuropathy. His blood work including JERRI, heavy metals screen, hemoglobin A1C, HIV, RPR, sed rate, SPEP, and B12 was unremarkable. Requip can be helpful in both restless leg syndrome and neuropathy. He is off his Sinemet and neurontin at night. He is also taking trazadone at a low dose to help him sleep.  At this time he will have his Valium increased to 5 mg at bedtime. He is on 2 mg Requip at night. At this time he was given a small dose of Lortab for breakthrough pain. The patient indicated understanding of the diagnoses and treatment plan. He was given a medrol dose jackeline and flexeril to see if this helps his sciatica. He will be referred to PT for his left foot drop which is most likely secondary to a peroneal nerve lesion. Long talk about narcotics as he is scared to take it. Continue care    Plan    No orders of the defined types were placed in this encounter. Orders Placed This Encounter   Medications    rOPINIRole (REQUIP) 2 MG tablet     Sig: Take 1 tablet by mouth 3 times daily as needed (restless legs)     Dispense:  200 tablet     Refill:  5    diazepam (VALIUM) 5 MG tablet     Sig: Take 1 tablet by mouth every 6 hours as needed for Anxiety for up to 30 days. Dispense:  100 tablet     Refill:  5       Return in about 3 months (around 4/29/2020).

## 2020-03-09 DIAGNOSIS — I10 ESSENTIAL (PRIMARY) HYPERTENSION: ICD-10-CM

## 2020-03-09 DIAGNOSIS — E78.1 HYPERTRIGLYCERIDEMIA: ICD-10-CM

## 2020-03-09 LAB
ALBUMIN SERPL-MCNC: 4.2 G/DL (ref 3.5–5.2)
ALP BLD-CCNC: 107 U/L (ref 40–130)
ALT SERPL-CCNC: 28 U/L (ref 5–41)
ANION GAP SERPL CALCULATED.3IONS-SCNC: 11 MMOL/L (ref 7–19)
AST SERPL-CCNC: 25 U/L (ref 5–40)
BASOPHILS ABSOLUTE: 0 K/UL (ref 0–0.2)
BASOPHILS RELATIVE PERCENT: 0.7 % (ref 0–1)
BILIRUB SERPL-MCNC: 0.9 MG/DL (ref 0.2–1.2)
BUN BLDV-MCNC: 13 MG/DL (ref 8–23)
CALCIUM SERPL-MCNC: 9.2 MG/DL (ref 8.8–10.2)
CHLORIDE BLD-SCNC: 97 MMOL/L (ref 98–111)
CHOLESTEROL, TOTAL: 155 MG/DL (ref 160–199)
CO2: 29 MMOL/L (ref 22–29)
CREAT SERPL-MCNC: 1 MG/DL (ref 0.5–1.2)
EOSINOPHILS ABSOLUTE: 0.3 K/UL (ref 0–0.6)
EOSINOPHILS RELATIVE PERCENT: 4.8 % (ref 0–5)
GFR NON-AFRICAN AMERICAN: >60
GLUCOSE BLD-MCNC: 88 MG/DL (ref 74–109)
HCT VFR BLD CALC: 44.5 % (ref 42–52)
HDLC SERPL-MCNC: 49 MG/DL (ref 55–121)
HEMOGLOBIN: 14.3 G/DL (ref 14–18)
IMMATURE GRANULOCYTES #: 0 K/UL
LDL CHOLESTEROL CALCULATED: 77 MG/DL
LYMPHOCYTES ABSOLUTE: 1.3 K/UL (ref 1.1–4.5)
LYMPHOCYTES RELATIVE PERCENT: 23.2 % (ref 20–40)
MCH RBC QN AUTO: 28 PG (ref 27–31)
MCHC RBC AUTO-ENTMCNC: 32.1 G/DL (ref 33–37)
MCV RBC AUTO: 87.1 FL (ref 80–94)
MONOCYTES ABSOLUTE: 0.5 K/UL (ref 0–0.9)
MONOCYTES RELATIVE PERCENT: 9.3 % (ref 0–10)
NEUTROPHILS ABSOLUTE: 3.5 K/UL (ref 1.5–7.5)
NEUTROPHILS RELATIVE PERCENT: 61.6 % (ref 50–65)
PDW BLD-RTO: 14.3 % (ref 11.5–14.5)
PLATELET # BLD: 193 K/UL (ref 130–400)
PMV BLD AUTO: 10.8 FL (ref 9.4–12.4)
POTASSIUM SERPL-SCNC: 4.6 MMOL/L (ref 3.5–5)
RBC # BLD: 5.11 M/UL (ref 4.7–6.1)
SODIUM BLD-SCNC: 137 MMOL/L (ref 136–145)
TOTAL PROTEIN: 6.7 G/DL (ref 6.6–8.7)
TRIGL SERPL-MCNC: 144 MG/DL (ref 0–149)
WBC # BLD: 5.6 K/UL (ref 4.8–10.8)

## 2020-03-16 ENCOUNTER — OFFICE VISIT (OUTPATIENT)
Dept: FAMILY MEDICINE CLINIC | Age: 75
End: 2020-03-16
Payer: MEDICARE

## 2020-03-16 VITALS
WEIGHT: 189.5 LBS | DIASTOLIC BLOOD PRESSURE: 84 MMHG | BODY MASS INDEX: 25.67 KG/M2 | HEIGHT: 72 IN | TEMPERATURE: 97.4 F | HEART RATE: 55 BPM | SYSTOLIC BLOOD PRESSURE: 122 MMHG | OXYGEN SATURATION: 99 %

## 2020-03-16 PROCEDURE — 1123F ACP DISCUSS/DSCN MKR DOCD: CPT | Performed by: FAMILY MEDICINE

## 2020-03-16 PROCEDURE — G0439 PPPS, SUBSEQ VISIT: HCPCS | Performed by: FAMILY MEDICINE

## 2020-03-16 PROCEDURE — 99214 OFFICE O/P EST MOD 30 MIN: CPT | Performed by: FAMILY MEDICINE

## 2020-03-16 PROCEDURE — 4040F PNEUMOC VAC/ADMIN/RCVD: CPT | Performed by: FAMILY MEDICINE

## 2020-03-16 PROCEDURE — G8427 DOCREV CUR MEDS BY ELIG CLIN: HCPCS | Performed by: FAMILY MEDICINE

## 2020-03-16 PROCEDURE — 3017F COLORECTAL CA SCREEN DOC REV: CPT | Performed by: FAMILY MEDICINE

## 2020-03-16 PROCEDURE — G8482 FLU IMMUNIZE ORDER/ADMIN: HCPCS | Performed by: FAMILY MEDICINE

## 2020-03-16 PROCEDURE — 1036F TOBACCO NON-USER: CPT | Performed by: FAMILY MEDICINE

## 2020-03-16 PROCEDURE — G8417 CALC BMI ABV UP PARAM F/U: HCPCS | Performed by: FAMILY MEDICINE

## 2020-03-16 RX ORDER — BETAMETHASONE DIPROPIONATE 0.5 MG/G
CREAM TOPICAL 2 TIMES DAILY
Qty: 45 G | Refills: 11 | Status: SHIPPED | OUTPATIENT
Start: 2020-03-16

## 2020-03-16 RX ORDER — BISOPROLOL FUMARATE 5 MG/1
5 TABLET ORAL DAILY
Qty: 90 TABLET | Refills: 3 | Status: SHIPPED | OUTPATIENT
Start: 2020-03-16 | End: 2021-04-26 | Stop reason: SDUPTHER

## 2020-03-16 RX ORDER — LISINOPRIL AND HYDROCHLOROTHIAZIDE 20; 12.5 MG/1; MG/1
2 TABLET ORAL DAILY
Qty: 180 TABLET | Refills: 3 | Status: SHIPPED | OUTPATIENT
Start: 2020-03-16 | End: 2021-04-26 | Stop reason: SDUPTHER

## 2020-03-16 RX ORDER — OMEPRAZOLE 20 MG/1
20 CAPSULE, DELAYED RELEASE ORAL 2 TIMES DAILY
Qty: 180 CAPSULE | Refills: 3 | Status: SHIPPED | OUTPATIENT
Start: 2020-03-16 | End: 2021-04-26 | Stop reason: SDUPTHER

## 2020-03-16 RX ORDER — HYOSCYAMINE SULFATE 0.125 MG
125 TABLET ORAL EVERY 4 HOURS PRN
Qty: 180 TABLET | Refills: 11 | Status: SHIPPED | OUTPATIENT
Start: 2020-03-16

## 2020-03-16 RX ORDER — ROPINIROLE 2 MG/1
2 TABLET, FILM COATED ORAL 3 TIMES DAILY PRN
Qty: 270 TABLET | Refills: 5 | Status: SHIPPED | OUTPATIENT
Start: 2020-03-16 | End: 2020-10-26 | Stop reason: SDUPTHER

## 2020-03-16 RX ORDER — FLUTICASONE PROPIONATE 50 MCG
1 SPRAY, SUSPENSION (ML) NASAL DAILY
Qty: 1 BOTTLE | Refills: 3 | Status: SHIPPED | OUTPATIENT
Start: 2020-03-16

## 2020-03-16 RX ORDER — CITALOPRAM 10 MG/1
20 TABLET ORAL DAILY
Qty: 180 TABLET | Refills: 3 | Status: SHIPPED | OUTPATIENT
Start: 2020-03-16 | End: 2021-04-26 | Stop reason: SDUPTHER

## 2020-03-16 RX ORDER — ACYCLOVIR 400 MG/1
400 TABLET ORAL 4 TIMES DAILY
Qty: 30 TABLET | Refills: 5 | Status: SHIPPED | OUTPATIENT
Start: 2020-03-16

## 2020-03-16 RX ORDER — MELOXICAM 7.5 MG/1
7.5 TABLET ORAL 2 TIMES DAILY
Qty: 180 TABLET | Refills: 3 | Status: SHIPPED | OUTPATIENT
Start: 2020-03-16 | End: 2021-04-26 | Stop reason: SDUPTHER

## 2020-03-16 RX ORDER — TRAZODONE HYDROCHLORIDE 50 MG/1
50 TABLET ORAL NIGHTLY
Qty: 90 TABLET | Refills: 3 | Status: SHIPPED | OUTPATIENT
Start: 2020-03-16 | End: 2021-04-26 | Stop reason: SDUPTHER

## 2020-03-16 RX ORDER — IPRATROPIUM BROMIDE 42 UG/1
1 SPRAY, METERED NASAL DAILY PRN
Qty: 1 BOTTLE | Refills: 2 | Status: SHIPPED | OUTPATIENT
Start: 2020-03-16 | End: 2021-04-26 | Stop reason: SDUPTHER

## 2020-03-16 ASSESSMENT — LIFESTYLE VARIABLES: HOW OFTEN DO YOU HAVE A DRINK CONTAINING ALCOHOL: 0

## 2020-03-16 ASSESSMENT — PATIENT HEALTH QUESTIONNAIRE - PHQ9
SUM OF ALL RESPONSES TO PHQ QUESTIONS 1-9: 0
SUM OF ALL RESPONSES TO PHQ QUESTIONS 1-9: 0

## 2020-03-16 NOTE — PROGRESS NOTES
were made and/or referrals ordered. Risk Factor Screenings with Interventions     Fall Risk:  Timed Up and Go Test > 12 seconds?  (Complete if either Fall Risk answers are Yes): no  2 or more falls in past year?: no  Fall with injury in past year?: no  Fall Risk Interventions:    · Not indicated     Depression:  PHQ-2 Score: 0  Depression Interventions:  · Not indicated     Anxiety:     Anxiety Interventions:  · Not indicated     Cognitive:  Clock Drawing Test (CDT) Score: Normal  Cognitive Impairment Interventions:  · Not indicated     Substance Abuse:  Social History     Socioeconomic History    Marital status:      Spouse name: Not on file    Number of children: Not on file    Years of education: Not on file    Highest education level: Not on file   Occupational History    Not on file   Social Needs    Financial resource strain: Not on file    Food insecurity     Worry: Not on file     Inability: Not on file    Transportation needs     Medical: Not on file     Non-medical: Not on file   Tobacco Use    Smoking status: Never Smoker    Smokeless tobacco: Never Used   Substance and Sexual Activity    Alcohol use: No     Alcohol/week: 0.0 standard drinks    Drug use: No    Sexual activity: Not on file   Lifestyle    Physical activity     Days per week: Not on file     Minutes per session: Not on file    Stress: Not on file   Relationships    Social connections     Talks on phone: Not on file     Gets together: Not on file     Attends Orthodox service: Not on file     Active member of club or organization: Not on file     Attends meetings of clubs or organizations: Not on file     Relationship status: Not on file    Intimate partner violence     Fear of current or ex partner: Not on file     Emotionally abused: Not on file     Physically abused: Not on file     Forced sexual activity: Not on file   Other Topics Concern    Not on file   Social History Narrative    Not on file     Audit

## 2020-03-16 NOTE — PROGRESS NOTES
[facility] [address] [place] [address]  Dept: [phone]  Dept Fax: [phone]  Loc: [phone]    Sophie Parada is a 75 y.o. male who presents today for his medical conditions/complaints as noted below. Sophie Parada is here for Medicare AWV        HPI:   CC: Here today to discuss the followin-year-old here for annual wellness visit and to address the following issues:    Hypertension  Compliant with medications. No adverse effects from medication. No lightheadedness, palpitations, or chest pain. Gastroesophageal Reflux Disease  Symptoms currently under control. Medication adequately controls his symptoms. No hematochezia or melena. Restless Legs Syndrome  Symptoms are well controlled on current medication. No adverse effects with medication. Symptoms are primarily at night and stable. No medication adjustment requested today. No issues with lower extremity claudication or numbness. Insomnia  Currently stable. Satisfied with current treatment regimen. No adverse effects from medication. Depression with Anxiety  Compliant with medications. No adverse effects from medication. Depression and anxiety symptoms are stable today. No suicidal or homicidal ideation. Excessive worry, insomnia, and anxiousness are stable. Energy, concentration, and apathy are stable. He was having some increased left hip pain last visit. He had an MRI and was told he had degenerative changes. He then saw he spice specialist at the [address]. He has done physical therapy for 2 months.          HPI    Past Medical History:   Diagnosis Date    Generalized anxiety disorder     Generalized osteoarthritis of multiple sites 2017    GERD (gastroesophageal reflux disease)     Hypertension     Hypertriglyceridemia 2017    Idiopathic peripheral neuropathy 2017    Irritable bowel syndrome with constipation 8/28/2017    Restless legs syndrome     Seasonal allergic rhinitis due to pollen 8/28/2017      Past Surgical History:   Procedure Laterality Date    HERNIA REPAIR  1999/2001    TURP  1994       Family History   Problem Relation Age of Onset    Dementia Mother     Heart Disease Father     Colon Cancer Father     Dementia Father     High Blood Pressure Father     Heart Attack Father        Social History     Tobacco Use    Smoking status: Never Smoker    Smokeless tobacco: Never Used   Substance Use Topics    Alcohol use: No     Alcohol/week: 0.0 standard drinks     Current Outpatient Medications   Medication Sig Dispense Refill    pseudoephedrine (SUDAFED) 60 MG tablet Take 60 mg by mouth every 6 hours as needed for Congestion      fexofenadine (ALLEGRA) 60 MG tablet Take 60 mg by mouth daily      saw palmetto 160 MG capsule Take 160 mg by mouth daily      magnesium hydroxide (MILK OF MAGNESIA) 400 MG/5ML suspension Take by mouth daily as needed for Constipation      vitamin E 1000 UNITS capsule Take 1,000 Units by mouth daily      bisoprolol (ZEBETA) 5 MG tablet Take 1 tablet by mouth daily 90 tablet 3    citalopram (CELEXA) 10 MG tablet Take 2 tablets by mouth daily 180 tablet 3    betamethasone dipropionate (DIPROLENE) 0.05 % cream Apply topically 2 times daily Apply topically 2 times daily.  45 g 11    hyoscyamine (ANASPAZ;LEVSIN) 125 MCG tablet Take 1 tablet by mouth every 4 hours as needed for Cramping 180 tablet 11    ipratropium (ATROVENT) 0.06 % nasal spray 1 spray by Nasal route daily as needed for Rhinitis 1 Bottle 2    lisinopril-hydroCHLOROthiazide (PRINZIDE;ZESTORETIC) 20-12.5 MG per tablet Take 2 tablets by mouth daily 180 tablet 3    meloxicam (MOBIC) 7.5 MG tablet Take 1 tablet by mouth 2 times daily 180 tablet 3    omeprazole (PRILOSEC) 20 MG delayed release capsule Take 1 capsule by mouth 2 times daily 180 capsule 3    rOPINIRole (REQUIP) Neck Symmetric. Normal tracheal position. No thyroid enlargement  Cardiovascular: Normal rate and regular rhythm. Exam reveals no friction rub. Carotid arteries: no bruit observed. No murmur heard. Respiratory:  Effort normal and breath sounds normal. No respiratory distress. No wheezes. No rales. No use of accessory muscles or intercostal retractions. Abdominal: Soft. Bowel sounds are normal. exhibits no distension. There is no tenderness. There is no rebound and no guarding. Musculoskeletal: exhibits no edema. Normal gait. Neurological: alert. Psychiatric: normal mood and affect. His behavior is normal. Normal judgement and insight observed.       Recent Results (from the past 672 hour(s))   CBC Auto Differential    Collection Time: 03/09/20  7:40 AM   Result Value Ref Range    WBC 5.6 4.8 - 10.8 K/uL    RBC 5.11 4.70 - 6.10 M/uL    Hemoglobin 14.3 14.0 - 18.0 g/dL    Hematocrit 44.5 42.0 - 52.0 %    MCV 87.1 80.0 - 94.0 fL    MCH 28.0 27.0 - 31.0 pg    MCHC 32.1 (L) 33.0 - 37.0 g/dL    RDW 14.3 11.5 - 14.5 %    Platelets 715 242 - 046 K/uL    MPV 10.8 9.4 - 12.4 fL    Neutrophils % 61.6 50.0 - 65.0 %    Lymphocytes % 23.2 20.0 - 40.0 %    Monocytes % 9.3 0.0 - 10.0 %    Eosinophils % 4.8 0.0 - 5.0 %    Basophils % 0.7 0.0 - 1.0 %    Neutrophils Absolute 3.5 1.5 - 7.5 K/uL    Immature Granulocytes # 0.0 K/uL    Lymphocytes Absolute 1.3 1.1 - 4.5 K/uL    Monocytes Absolute 0.50 0.00 - 0.90 K/uL    Eosinophils Absolute 0.30 0.00 - 0.60 K/uL    Basophils Absolute 0.00 0.00 - 0.20 K/uL   Lipid Panel    Collection Time: 03/09/20  7:40 AM   Result Value Ref Range    Cholesterol, Total 155 (L) 160 - 199 mg/dL    Triglycerides 144 0 - 149 mg/dL    HDL 49 (L) 55 - 121 mg/dL    LDL Calculated 77 <100 mg/dL   Comprehensive Metabolic Panel    Collection Time: 03/09/20  7:40 AM   Result Value Ref Range    Sodium 137 136 - 145 mmol/L    Potassium 4.6 3.5 - 5.0 mmol/L    Chloride 97 (L) 98 - 111 mmol/L    CO2 29 22 - 29 mmol/L    Anion Gap 11 7 - 19 mmol/L    Glucose 88 74 - 109 mg/dL    BUN 13 8 - 23 mg/dL    CREATININE 1.0 0.5 - 1.2 mg/dL    GFR Non-African American >60 >60    Calcium 9.2 8.8 - 10.2 mg/dL    Total Protein 6.7 6.6 - 8.7 g/dL    Alb 4.2 3.5 - 5.2 g/dL    Total Bilirubin 0.9 0.2 - 1.2 mg/dL    Alkaline Phosphatase 107 40 - 130 U/L    ALT 28 5 - 41 U/L    AST 25 5 - 40 U/L               Assessment & Plan: The following diagnoses and conditions are stable with no further orders unless indicated:  1. Annual physical exam  Discussed lifestyle changes such as diet and exercise. Recommended eliminate processed food from diet such as sugar and fried foods. Recommended exercising at least 150 minutes/week. Try to do full body resistance training twice a week as well. Offered suggestions for calorie counting such as phone apps and online resources such as My fitness pal and Lose it. Discussed healthy weight. 2. Essential (primary) hypertension  BP Readings from Last 3 Encounters:   03/16/20 122/84   01/29/20 132/83   01/13/20 137/76     Blood pressure stable  - bisoprolol (ZEBETA) 5 MG tablet; Take 1 tablet by mouth daily  Dispense: 90 tablet; Refill: 3  - lisinopril-hydroCHLOROthiazide (PRINZIDE;ZESTORETIC) 20-12.5 MG per tablet; Take 2 tablets by mouth daily  Dispense: 180 tablet; Refill: 3    3. Gastroesophageal reflux disease without esophagitis  Stable  - omeprazole (PRILOSEC) 20 MG delayed release capsule; Take 1 capsule by mouth 2 times daily  Dispense: 180 capsule; Refill: 3    4. Restless leg syndrome  Continue with Requip  - rOPINIRole (REQUIP) 2 MG tablet; Take 1 tablet by mouth 3 times daily as needed (restless legs)  Dispense: 270 tablet; Refill: 5    5. Pain in both feet    - rOPINIRole (REQUIP) 2 MG tablet; Take 1 tablet by mouth 3 times daily as needed (restless legs)  Dispense: 270 tablet; Refill: 5    6. Chronic insomnia  Stable  - traZODone (DESYREL) 50 MG tablet;  Take 1 tablet by mouth nightly  Dispense: 90 tablet; Refill: 3            Return in about 6 months (around 9/16/2020) for Routine follow up - 15 minute visit. Discussed use, benefit, and side effects of prescribed medications. All patient questions answered. Pt voiced understanding. Reviewed health maintenance. Instructedto continue current medications, diet and exercise. Patient agreed with treatmentplan. Follow up as directed.        Note dictated using [id number] [place] [address]

## 2020-04-27 ENCOUNTER — TELEMEDICINE (OUTPATIENT)
Dept: NEUROLOGY | Age: 75
End: 2020-04-27
Payer: MEDICARE

## 2020-04-27 PROCEDURE — 3017F COLORECTAL CA SCREEN DOC REV: CPT | Performed by: PSYCHIATRY & NEUROLOGY

## 2020-04-27 PROCEDURE — G8427 DOCREV CUR MEDS BY ELIG CLIN: HCPCS | Performed by: PSYCHIATRY & NEUROLOGY

## 2020-04-27 PROCEDURE — 1123F ACP DISCUSS/DSCN MKR DOCD: CPT | Performed by: PSYCHIATRY & NEUROLOGY

## 2020-04-27 PROCEDURE — 4040F PNEUMOC VAC/ADMIN/RCVD: CPT | Performed by: PSYCHIATRY & NEUROLOGY

## 2020-04-27 PROCEDURE — 99213 OFFICE O/P EST LOW 20 MIN: CPT | Performed by: PSYCHIATRY & NEUROLOGY

## 2020-04-27 RX ORDER — DIAZEPAM 5 MG/1
5 TABLET ORAL EVERY 6 HOURS PRN
COMMUNITY

## 2020-04-27 NOTE — PROGRESS NOTES
lisinopril-hydroCHLOROthiazide (PRINZIDE;ZESTORETIC) 20-12.5 MG per tablet Take 2 tablets by mouth daily 180 tablet 3    meloxicam (MOBIC) 7.5 MG tablet Take 1 tablet by mouth 2 times daily 180 tablet 3    omeprazole (PRILOSEC) 20 MG delayed release capsule Take 1 capsule by mouth 2 times daily 180 capsule 3    rOPINIRole (REQUIP) 2 MG tablet Take 1 tablet by mouth 3 times daily as needed (restless legs) 270 tablet 5    traZODone (DESYREL) 50 MG tablet Take 1 tablet by mouth nightly 90 tablet 3    acyclovir (ZOVIRAX) 400 MG tablet Take 1 tablet by mouth 4 times daily 30 tablet 5    fluticasone (FLONASE) 50 MCG/ACT nasal spray 1 spray by Nasal route daily 1 Bottle 3    pseudoephedrine (SUDAFED) 60 MG tablet Take 60 mg by mouth every 6 hours as needed for Congestion      fexofenadine (ALLEGRA) 60 MG tablet Take 60 mg by mouth daily      saw palmetto 160 MG capsule Take 160 mg by mouth daily      magnesium hydroxide (MILK OF MAGNESIA) 400 MG/5ML suspension Take by mouth daily as needed for Constipation      vitamin E 1000 UNITS capsule Take 1,000 Units by mouth daily       No current facility-administered medications for this visit. There were no vitals taken for this visit. Constitutional - well developed, well nourished. HENT - head normocephalic. Eyes - conjunctiva normal.  EOMS normal.  Neck- ROM appears normal, no tracheal deviation. Extremities -no edema     Musculoskeletal - ROM appears normal.  No significant edema. Skin - warm, dry, and intact. No rash, erythema, or pallor.   Psychiatric - mood, affect, and behavior appear normal.      Neurological exam  Awake, alert, fluent oriented appropriate affect  Speech normal without dysarthria    Cranial Nerve Exam  EOMI, No nystagmus, conjugate eye movements, no ptosis  Symmetric facies      Motor Exam  Antigravity left foot drop      Tremors- no tremors in hands or head noted    Gait        No results found for: Lizz Zamudio  Lab Results and treatment plan. Continue current care    Tele video call 15 minutes  Both parties in [facility] to E&M services    Plan    No orders of the defined types were placed in this encounter. No orders of the defined types were placed in this encounter. Return in about 6 months (around 10/27/2020).

## 2020-09-28 ENCOUNTER — OFFICE VISIT (OUTPATIENT)
Dept: FAMILY MEDICINE CLINIC | Age: 75
End: 2020-09-28
Payer: MEDICARE

## 2020-09-28 VITALS
OXYGEN SATURATION: 99 % | TEMPERATURE: 97.3 F | WEIGHT: 192 LBS | DIASTOLIC BLOOD PRESSURE: 80 MMHG | BODY MASS INDEX: 26.04 KG/M2 | HEART RATE: 59 BPM | SYSTOLIC BLOOD PRESSURE: 120 MMHG

## 2020-09-28 DIAGNOSIS — N40.0 BENIGN NON-NODULAR PROSTATIC HYPERPLASIA WITHOUT LOWER URINARY TRACT SYMPTOMS: ICD-10-CM

## 2020-09-28 LAB — PROSTATE SPECIFIC ANTIGEN: 2.94 NG/ML (ref 0–4)

## 2020-09-28 PROCEDURE — G8417 CALC BMI ABV UP PARAM F/U: HCPCS | Performed by: FAMILY MEDICINE

## 2020-09-28 PROCEDURE — 1123F ACP DISCUSS/DSCN MKR DOCD: CPT | Performed by: FAMILY MEDICINE

## 2020-09-28 PROCEDURE — 1036F TOBACCO NON-USER: CPT | Performed by: FAMILY MEDICINE

## 2020-09-28 PROCEDURE — 99214 OFFICE O/P EST MOD 30 MIN: CPT | Performed by: FAMILY MEDICINE

## 2020-09-28 PROCEDURE — G8427 DOCREV CUR MEDS BY ELIG CLIN: HCPCS | Performed by: FAMILY MEDICINE

## 2020-09-28 PROCEDURE — 4040F PNEUMOC VAC/ADMIN/RCVD: CPT | Performed by: FAMILY MEDICINE

## 2020-09-28 NOTE — PROGRESS NOTES
[facility] PHYSICIAN SERVICES  [facility] FAM[facility] MEDICINE  [address] [facility] [address]  [address] [address]  Dept: [phone]  Dept Fax: [phone]  Loc: [phone]    Vernice Nissen is a 67 y.o. male who presents today for his medical conditions/complaints as noted below. Vernice Nissen is here for 6 Month Follow-Up        HPI:   CC: Here today to discuss the followin-year-old here for six-month follow-up. Hypertension  Compliant with medications. No adverse effects from medication. No lightheadedness, palpitations, or chest pain. BP Readings from Last 3 Encounters:   20 120/80   20 122/84   20 132/83       Since his last visit with me he followed up with his neurologist in April. He has a history of restless leg syndrome. Is being evaluated and managed for combination of peripheral neuropathy as well as restless leg syndrome. EMG performed did show mild sensory neuropathy. He had his Valium increased to 5 mg and was maintained on Requip. Restless Legs Syndrome  Symptoms are well controlled on current medication. No adverse effects with medication. Symptoms are primarily at night and stable. No medication adjustment requested today. No issues with lower extremity claudication or numbness. Gastroesophageal Reflux Disease  Symptoms currently under control. Medication adequately controls his symptoms. No hematochezia or melena. Insomnia  Currently stable. Satisfied with current treatment regimen. No adverse effects from medication. Allergies  Allergies are currently stable. Hyperlipidemia  Tolerating current cholesterol medication without side effects. No body aches.  Attempting to reduce processed sugar and cholesterol from diet.      _______________________________________________________________        HPI    Past Medical History:   Diagnosis Date    Generalized anxiety disorder     Generalized osteoarthritis of multiple sites 2017   [facility] GERD (gastroesophageal reflux disease)     Hypertension     Hypertriglyceridemia 8/28/2017    Idiopathic peripheral neuropathy 8/28/2017    Irritable bowel syndrome with constipation 8/28/2017    Restless legs syndrome     Seasonal allergic rhinitis due to pollen 8/28/2017      Past Surgical History:   Procedure Laterality Date    HERNIA REPAIR  1999/2001    TURP  1994       Family History   Problem Relation Age of Onset    Dementia Mother     Heart Disease Father     Colon Cancer Father     Dementia Father     High Blood Pressure Father     Heart Attack Father        Social History     Tobacco Use    Smoking status: Never Smoker    Smokeless tobacco: Never Used   Substance Use Topics    Alcohol use: No     Alcohol/week: 0.0 standard drinks     Current Outpatient Medications   Medication Sig Dispense Refill    diazePAM (VALIUM) 5 MG tablet Take 5 mg by mouth every 6 hours as needed for Anxiety.  bisoprolol (ZEBETA) 5 MG tablet Take 1 tablet by mouth daily 90 tablet 3    citalopram (CELEXA) 10 MG tablet Take 2 tablets by mouth daily 180 tablet 3    betamethasone dipropionate (DIPROLENE) 0.05 % cream Apply topically 2 times daily Apply topically 2 times daily.  45 g 11    hyoscyamine (ANASPAZ;LEVSIN) 125 MCG tablet Take 1 tablet by mouth every 4 hours as needed for Cramping 180 tablet 11    ipratropium (ATROVENT) 0.06 % nasal spray 1 spray by Nasal route daily as needed for Rhinitis 1 Bottle 2    lisinopril-hydroCHLOROthiazide (PRINZIDE;ZESTORETIC) 20-12.5 MG per tablet Take 2 tablets by mouth daily 180 tablet 3    meloxicam (MOBIC) 7.5 MG tablet Take 1 tablet by mouth 2 times daily 180 tablet 3    omeprazole (PRILOSEC) 20 MG delayed release capsule Take 1 capsule by mouth 2 times daily 180 capsule 3    rOPINIRole (REQUIP) 2 MG tablet Take 1 tablet by mouth 3 times daily as needed (restless legs) 270 tablet 5    traZODone (DESYREL) 50 MG tablet Take 1 tablet by mouth nightly 90 tablet 3  acyclovir (ZOVIRAX) 400 MG tablet Take 1 tablet by mouth 4 times daily 30 tablet 5    fluticasone (FLONASE) 50 MCG/ACT nasal spray 1 spray by Nasal route daily 1 Bottle 3    pseudoephedrine (SUDAFED) 60 MG tablet Take 60 mg by mouth every 6 hours as needed for Congestion      fexofenadine (ALLEGRA) 60 MG tablet Take 60 mg by mouth daily      saw palmetto 160 MG capsule Take 160 mg by mouth daily      magnesium hydroxide (MILK OF MAGNESIA) 400 MG/5ML suspension Take by mouth daily as needed for Constipation      vitamin E 1000 UNITS capsule Take 1,000 Units by mouth daily       No current facility-administered medications for this visit. Allergies   Allergen Reactions    Sulfa Antibiotics        Health Maintenance   Topic Date Due    Flu vaccine (1) 09/01/2020    DTaP/Tdap/Td vaccine (1 - Tdap) 01/01/2021 (Originally 8/20/1964)    Potassium monitoring  03/09/2021    Creatinine monitoring  03/09/2021    Annual Wellness Visit (AWV)  03/17/2021    Shingles Vaccine  Completed    Pneumococcal 65+ years Vaccine  Completed    Hepatitis A vaccine  Aged Out    Hepatitis B vaccine  Aged Out    Hib vaccine  Aged Out    Meningococcal (ACWY) vaccine  Aged Out       Subjective:      Review of Systems  Review of Systems   Constitutional: Negative for chills and fever. HENT: Negative for congestion. Respiratory: Negative for cough, chest tightness and shortness of breath. Cardiovascular: Negative for chest pain, palpitations and leg swelling. Gastrointestinal: Negative for abdominal pain, anal bleeding, constipation, diarrhea and nausea. Genitourinary: Negative for difficulty urinating. Psychiatric/Behavioral: Negative. SeeHPI for visit specific review of symptoms. All others negative      Objective:   /80   Pulse 59   Temp 97.3 °F (36.3 °C)   Wt 192 lb (87.1 kg)   SpO2 99%   BMI 26.04 kg/m²   Physical Exam  Physical Exam   Constitutional: He appears well-developed.  Does not appear ill. Eyes: Pupils are equal, round, and reactive to light. Conjunctiva and Lids normal.  Neck: Normal range of motion. Neck supple. No masses. Neck Symmetric. Normal tracheal position. No thyroid enlargement  Cardiovascular: Normal rate and regular rhythm. Exam reveals no friction rub. Carotid arteries: no bruit observed. No murmur heard. Respiratory:  Effort normal and breath sounds normal. No respiratory distress. No wheezes. No rales. No use of accessory muscles or intercostal retractions. Abdominal: Soft. Bowel sounds are normal. exhibits no distension. There is no tenderness. There is no rebound and no guarding. Musculoskeletal: exhibits no edema. Normal gait. Neurological: alert. Psychiatric: normal mood and affect. His behavior is normal. Normal judgement and insight observed. Recent Results (from the past 672 hour(s))   PSA, Diagnostic    Collection Time: 09/28/20  9:02 AM   Result Value Ref Range    PSA 2.94 0.00 - 4.00 ng/mL               Assessment & Plan: The following diagnoses and conditions are stable with no further orders unless indicated:  1. Essential (primary) hypertension  BP Readings from Last 3 Encounters:   09/28/20 120/80   03/16/20 122/84   01/29/20 132/83     Blood pressure stable  - CBC Auto Differential; Future    2. Need for influenza vaccination      3. Gastroesophageal reflux disease without esophagitis  Stable    4. Restless leg syndrome  Stable    5. Chronic insomnia  Stable    6. Chronic nonseasonal allergic rhinitis due to pollen  Stable    7.  Hypertriglyceridemia  Lab Results   Component Value Date    CHOL 155 (L) 03/09/2020    CHOL 165 03/06/2019    CHOL 170 02/26/2018     Lab Results   Component Value Date    TRIG 144 03/09/2020    TRIG 84 03/06/2019    TRIG 147 02/26/2018     Lab Results   Component Value Date    HDL 49 (L) 03/09/2020    HDL 63 03/06/2019    HDL 63 02/26/2018     Lab Results   Component Value Date    LDLCALC 77 03/09/2020 [address] 03/06/2019    LDLCALC 78 02/26/2018     No results found for: LABVLDL, VLDL  No results found for: CHOLHDLRATIO  Recheck next visit  - Comprehensive Metabolic Panel; Future  - Lipid Panel; Future    8. Depression with anxiety  Stable            Return in about 6 months (around 3/28/2021) for AWV - 30 minute visit. Discussed use, benefit, and side effects of prescribed medications. All patient questions answered. Pt voiced understanding. Reviewed health maintenance. Instructedto continue current medications, diet and exercise. Patient agreed with treatmentplan. Follow up as directed.        Note dictated using [id number] [email or web address]

## 2020-10-26 ENCOUNTER — OFFICE VISIT (OUTPATIENT)
Dept: NEUROLOGY | Age: 75
End: 2020-10-26
Payer: MEDICARE

## 2020-10-26 VITALS
SYSTOLIC BLOOD PRESSURE: 131 MMHG | BODY MASS INDEX: 26.01 KG/M2 | DIASTOLIC BLOOD PRESSURE: 78 MMHG | WEIGHT: 192 LBS | HEIGHT: 72 IN | HEART RATE: 58 BPM

## 2020-10-26 PROCEDURE — 4040F PNEUMOC VAC/ADMIN/RCVD: CPT | Performed by: PSYCHIATRY & NEUROLOGY

## 2020-10-26 PROCEDURE — G8427 DOCREV CUR MEDS BY ELIG CLIN: HCPCS | Performed by: PSYCHIATRY & NEUROLOGY

## 2020-10-26 PROCEDURE — 1036F TOBACCO NON-USER: CPT | Performed by: PSYCHIATRY & NEUROLOGY

## 2020-10-26 PROCEDURE — G8484 FLU IMMUNIZE NO ADMIN: HCPCS | Performed by: PSYCHIATRY & NEUROLOGY

## 2020-10-26 PROCEDURE — 99213 OFFICE O/P EST LOW 20 MIN: CPT | Performed by: PSYCHIATRY & NEUROLOGY

## 2020-10-26 PROCEDURE — G8417 CALC BMI ABV UP PARAM F/U: HCPCS | Performed by: PSYCHIATRY & NEUROLOGY

## 2020-10-26 PROCEDURE — 1123F ACP DISCUSS/DSCN MKR DOCD: CPT | Performed by: PSYCHIATRY & NEUROLOGY

## 2020-10-26 RX ORDER — DIAZEPAM 5 MG/1
5 TABLET ORAL EVERY 6 HOURS PRN
Qty: 100 TABLET | Refills: 5 | Status: SHIPPED | OUTPATIENT
Start: 2020-10-26 | End: 2021-04-29 | Stop reason: SDUPTHER

## 2020-10-26 RX ORDER — ROPINIROLE 2 MG/1
2 TABLET, FILM COATED ORAL 3 TIMES DAILY PRN
Qty: 270 TABLET | Refills: 5 | Status: SHIPPED | OUTPATIENT
Start: 2020-10-26 | End: 2021-11-03 | Stop reason: SDUPTHER

## 2020-10-26 NOTE — PROGRESS NOTES
esophagitis 08/28/2017    RLS (restless legs syndrome) 08/28/2017    Gilbert's syndrome 08/28/2017    Hypertriglyceridemia 08/28/2017    Idiopathic peripheral neuropathy 08/28/2017    Generalized anxiety disorder 08/28/2017    Seasonal allergic rhinitis due to pollen 08/28/2017    Irritable bowel syndrome with constipation 08/28/2017    Chronic insomnia 08/28/2017    Generalized osteoarthritis of multiple sites 08/28/2017     Resolved Ambulatory Problems     Diagnosis Date Noted    No Resolved Ambulatory Problems     Past Medical History:   Diagnosis Date    GERD (gastroesophageal reflux disease)     Hypertension     Restless legs syndrome        Past Surgical History:   Procedure Laterality Date    HERNIA REPAIR  1999/2001    TURP  1994       Family History   Problem Relation Age of Onset    Dementia Mother     Heart Disease Father     Colon Cancer Father     Dementia Father     High Blood Pressure Father     Heart Attack Father        Allergies   Allergen Reactions    Sulfa Antibiotics        Social History     Socioeconomic History    Marital status:      Spouse name: Not on file    Number of children: Not on file    Years of education: Not on file    Highest education level: Not on file   Occupational History    Not on file   Social Needs    Financial resource strain: Not on file    Food insecurity     Worry: Not on file     Inability: Not on file    Transportation needs     Medical: Not on file     Non-medical: Not on file   Tobacco Use    Smoking status: Never Smoker    Smokeless tobacco: Never Used   Substance and Sexual Activity    Alcohol use: No     Alcohol/week: 0.0 standard drinks    Drug use: No    Sexual activity: Not on file   Lifestyle    Physical activity     Days per week: Not on file     Minutes per session: Not on file    Stress: Not on file   Relationships    Social connections     Talks on phone: Not on file     Gets together: Not on file     Attends Yazidi service: Not on file     Active member of club or organization: Not on file     Attends meetings of clubs or organizations: Not on file     Relationship status: Not on file    Intimate partner violence     Fear of current or ex partner: Not on file     Emotionally abused: Not on file     Physically abused: Not on file     Forced sexual activity: Not on file   Other Topics Concern    Not on file   Social History Narrative    Not on file   Review of Systems     Constitutional - No fever or chills. No diaphoresis or significant fatigue. HENT -  No tinnitus or significant hearing loss. Eyes - no sudden vision change or eye pain  Respiratory - no significant shortness of breath or cough  Cardiovascular - no chest pain No palpitations or significant leg swelling  Gastrointestinal - no abdominal swelling or pain. Genitourinary - No difficulty urinating, dysuria  Musculoskeletal - no back pain or myalgia. Skin - no color change or rash  Neurologic - No seizures. No lateralizing weakness. Hematologic - no easy bruising or excessive bleeding. Psychiatric - no severe anxiety or nervousness. All other review of systems are negative. Current Outpatient Medications   Medication Sig Dispense Refill    diazePAM (VALIUM) 5 MG tablet Take 1 tablet by mouth every 6 hours as needed for Anxiety for up to 30 days. 100 tablet 5    rOPINIRole (REQUIP) 2 MG tablet Take 1 tablet by mouth 3 times daily as needed (restless legs) 270 tablet 5    diazePAM (VALIUM) 5 MG tablet Take 5 mg by mouth every 6 hours as needed for Anxiety.  bisoprolol (ZEBETA) 5 MG tablet Take 1 tablet by mouth daily 90 tablet 3    citalopram (CELEXA) 10 MG tablet Take 2 tablets by mouth daily 180 tablet 3    betamethasone dipropionate (DIPROLENE) 0.05 % cream Apply topically 2 times daily Apply topically 2 times daily.  45 g 11    hyoscyamine (ANASPAZ;LEVSIN) 125 MCG tablet Take 1 tablet by mouth every 4 hours as needed for Cramping 180 tablet 11    ipratropium (ATROVENT) 0.06 % nasal spray 1 spray by Nasal route daily as needed for Rhinitis 1 Bottle 2    lisinopril-hydroCHLOROthiazide (PRINZIDE;ZESTORETIC) 20-12.5 MG per tablet Take 2 tablets by mouth daily 180 tablet 3    meloxicam (MOBIC) 7.5 MG tablet Take 1 tablet by mouth 2 times daily 180 tablet 3    omeprazole (PRILOSEC) 20 MG delayed release capsule Take 1 capsule by mouth 2 times daily 180 capsule 3    traZODone (DESYREL) 50 MG tablet Take 1 tablet by mouth nightly 90 tablet 3    acyclovir (ZOVIRAX) 400 MG tablet Take 1 tablet by mouth 4 times daily 30 tablet 5    fluticasone (FLONASE) 50 MCG/ACT nasal spray 1 spray by Nasal route daily 1 Bottle 3    pseudoephedrine (SUDAFED) 60 MG tablet Take 60 mg by mouth every 6 hours as needed for Congestion      fexofenadine (ALLEGRA) 60 MG tablet Take 60 mg by mouth daily      saw palmetto 160 MG capsule Take 160 mg by mouth daily      magnesium hydroxide (MILK OF MAGNESIA) 400 MG/5ML suspension Take by mouth daily as needed for Constipation      vitamin E 1000 UNITS capsule Take 1,000 Units by mouth daily       No current facility-administered medications for this visit. /78   Pulse 58   Ht 6' (1.829 m)   Wt 192 lb (87.1 kg)   BMI 26.04 kg/m²     Constitutional - well developed, well nourished. Eyes - conjunctiva normal.  Ear, nose, throat - No scars, masses, or lesions over external nose or ears, no atrophy of tongue  Neck-symmetric, no masses noted, no jugular vein distension  Respiration- chest wall appears symmetric, good expansion,   normal effort without use of accessory muscles  Musculoskeletal - no significant wasting of muscles noted, no bony deformities  Extremities-no clubbing, cyanosis or edema  Skin - warm, dry, and intact. No rash, erythema, or pallor.   Psychiatric - mood, affect, and behavior appear normal.      Neurological exam  Awake, alert, fluent oriented  appropriate a history of long standing stress on these extremities from his work leading to his symptoms of discomfort. His EMG with nerve conduction study of his legs suggested a mild sensory neuropathy. His blood work including JERRI, heavy metals screen, hemoglobin A1C, HIV, RPR, sed rate, SPEP, and B12 was unremarkable. Requip can be helpful in both restless leg syndrome and neuropathy. He is off his Sinemet and neurontin at night. He is also taking trazadone at a low dose to help him sleep. At this time he will have his Valium increased to 5 mg at bedtime. He is on 2 mg Requip at night. He is off Lortab. The patient indicated understanding of the diagnoses and treatment plan. Continue present care. Follow 6 months      Plan    No orders of the defined types were placed in this encounter. Orders Placed This Encounter   Medications    diazePAM (VALIUM) 5 MG tablet     Sig: Take 1 tablet by mouth every 6 hours as needed for Anxiety for up to 30 days. Dispense:  100 tablet     Refill:  5    rOPINIRole (REQUIP) 2 MG tablet     Sig: Take 1 tablet by mouth 3 times daily as needed (restless legs)     Dispense:  270 tablet     Refill:  5       Return in about 6 months (around 4/26/2021).

## 2021-01-14 ENCOUNTER — APPOINTMENT (OUTPATIENT)
Dept: VACCINE CLINIC | Facility: HOSPITAL | Age: 76
End: 2021-01-14

## 2021-02-08 ENCOUNTER — OFFICE VISIT (OUTPATIENT)
Dept: FAMILY MEDICINE CLINIC | Age: 76
End: 2021-02-08
Payer: MEDICARE

## 2021-02-08 VITALS
HEIGHT: 72 IN | OXYGEN SATURATION: 100 % | HEART RATE: 62 BPM | SYSTOLIC BLOOD PRESSURE: 132 MMHG | TEMPERATURE: 97.3 F | BODY MASS INDEX: 26.61 KG/M2 | WEIGHT: 196.5 LBS | DIASTOLIC BLOOD PRESSURE: 82 MMHG

## 2021-02-08 DIAGNOSIS — F51.04 CHRONIC INSOMNIA: ICD-10-CM

## 2021-02-08 DIAGNOSIS — N40.0 BENIGN PROSTATIC HYPERPLASIA WITHOUT LOWER URINARY TRACT SYMPTOMS: Primary | ICD-10-CM

## 2021-02-08 DIAGNOSIS — K21.9 GASTROESOPHAGEAL REFLUX DISEASE WITHOUT ESOPHAGITIS: ICD-10-CM

## 2021-02-08 DIAGNOSIS — F41.8 DEPRESSION WITH ANXIETY: ICD-10-CM

## 2021-02-08 DIAGNOSIS — I10 ESSENTIAL (PRIMARY) HYPERTENSION: ICD-10-CM

## 2021-02-08 PROCEDURE — 1036F TOBACCO NON-USER: CPT | Performed by: FAMILY MEDICINE

## 2021-02-08 PROCEDURE — G8484 FLU IMMUNIZE NO ADMIN: HCPCS | Performed by: FAMILY MEDICINE

## 2021-02-08 PROCEDURE — 99214 OFFICE O/P EST MOD 30 MIN: CPT | Performed by: FAMILY MEDICINE

## 2021-02-08 PROCEDURE — G8417 CALC BMI ABV UP PARAM F/U: HCPCS | Performed by: FAMILY MEDICINE

## 2021-02-08 PROCEDURE — 4040F PNEUMOC VAC/ADMIN/RCVD: CPT | Performed by: FAMILY MEDICINE

## 2021-02-08 PROCEDURE — 1123F ACP DISCUSS/DSCN MKR DOCD: CPT | Performed by: FAMILY MEDICINE

## 2021-02-08 PROCEDURE — G8427 DOCREV CUR MEDS BY ELIG CLIN: HCPCS | Performed by: FAMILY MEDICINE

## 2021-02-08 SDOH — ECONOMIC STABILITY: TRANSPORTATION INSECURITY
IN THE PAST 12 MONTHS, HAS THE LACK OF TRANSPORTATION KEPT YOU FROM MEDICAL APPOINTMENTS OR FROM GETTING MEDICATIONS?: NO

## 2021-02-08 SDOH — ECONOMIC STABILITY: INCOME INSECURITY: HOW HARD IS IT FOR YOU TO PAY FOR THE VERY BASICS LIKE FOOD, HOUSING, MEDICAL CARE, AND HEATING?: NOT HARD AT ALL

## 2021-02-08 SDOH — ECONOMIC STABILITY: TRANSPORTATION INSECURITY
IN THE PAST 12 MONTHS, HAS LACK OF TRANSPORTATION KEPT YOU FROM MEETINGS, WORK, OR FROM GETTING THINGS NEEDED FOR DAILY LIVING?: NO

## 2021-02-08 SDOH — ECONOMIC STABILITY: FOOD INSECURITY: WITHIN THE PAST 12 MONTHS, THE FOOD YOU BOUGHT JUST DIDN'T LAST AND YOU DIDN'T HAVE MONEY TO GET MORE.: NEVER TRUE

## 2021-02-08 ASSESSMENT — PATIENT HEALTH QUESTIONNAIRE - PHQ9
SUM OF ALL RESPONSES TO PHQ9 QUESTIONS 1 & 2: 0
SUM OF ALL RESPONSES TO PHQ QUESTIONS 1-9: 0
SUM OF ALL RESPONSES TO PHQ QUESTIONS 1-9: 0
2. FEELING DOWN, DEPRESSED OR HOPELESS: 0

## 2021-02-08 NOTE — PROGRESS NOTES
[facility] PHYSICIAN SERVICES  [facility] FAMILY MEDICINE  [address] [address] [address]  Dept: [phone]  Dept Fax: [phone]  Loc: [phone]    Magdaleno Sheppard is a 67 y.o. male who presents today for his medical conditions/complaints as noted below. Magdaleno Sheppard is here for Benign Prostatic Hypertrophy (Discuss urology symptoms)        HPI:   CC: Here today to discuss the following:    Benign Prostatic Hypertrophy  Symptoms are currently stable. He has been followed by urology but wants to switch his care back to me. His most recent PSA was in September    Hypertension  Compliant with medications. No adverse effects from medication. No lightheadedness, palpitations, or chest pain. Gastroesophageal Reflux Disease  Symptoms currently under control. Medication adequately controls his symptoms. No hematochezia or melena. Anxiety  Compliant with medications. No adverse effects from medication. No panic or anxiety attacks since last visit. Symptoms are controlled. No excessive worry or insomnia. No issues with depression    Insomnia  Currently stable. Satisfied with current treatment regimen. No adverse effects from medication.              HPI    Past Medical History:   Diagnosis Date    Generalized anxiety disorder     Generalized osteoarthritis of multiple sites 8/28/2017    GERD (gastroesophageal reflux disease)     Hypertension     Hypertriglyceridemia 8/28/2017    Idiopathic peripheral neuropathy 8/28/2017    Irritable bowel syndrome with constipation 8/28/2017    Restless legs syndrome     Seasonal allergic rhinitis due to pollen 8/28/2017      Past Surgical History:   Procedure Laterality Date    HERNIA REPAIR  1999/2001    TURP  1994       Family History   Problem Relation Age of Onset    Dementia Mother     Heart Disease Father     Colon Cancer Father     Dementia Father     High Blood Pressure Father     Heart Attack Father Social History     Tobacco Use    Smoking status: Never Smoker    Smokeless tobacco: Never Used   Substance Use Topics    Alcohol use: No     Alcohol/week: 0.0 standard drinks     Current Outpatient Medications   Medication Sig Dispense Refill    rOPINIRole (REQUIP) 2 MG tablet Take 1 tablet by mouth 3 times daily as needed (restless legs) 270 tablet 5    diazePAM (VALIUM) 5 MG tablet Take 5 mg by mouth every 6 hours as needed for Anxiety.  bisoprolol (ZEBETA) 5 MG tablet Take 1 tablet by mouth daily 90 tablet 3    citalopram (CELEXA) 10 MG tablet Take 2 tablets by mouth daily 180 tablet 3    betamethasone dipropionate (DIPROLENE) 0.05 % cream Apply topically 2 times daily Apply topically 2 times daily.  45 g 11    hyoscyamine (ANASPAZ;LEVSIN) 125 MCG tablet Take 1 tablet by mouth every 4 hours as needed for Cramping 180 tablet 11    ipratropium (ATROVENT) 0.06 % nasal spray 1 spray by Nasal route daily as needed for Rhinitis 1 Bottle 2    lisinopril-hydroCHLOROthiazide (PRINZIDE;ZESTORETIC) 20-12.5 MG per tablet Take 2 tablets by mouth daily 180 tablet 3    meloxicam (MOBIC) 7.5 MG tablet Take 1 tablet by mouth 2 times daily 180 tablet 3    omeprazole (PRILOSEC) 20 MG delayed release capsule Take 1 capsule by mouth 2 times daily 180 capsule 3    traZODone (DESYREL) 50 MG tablet Take 1 tablet by mouth nightly 90 tablet 3    acyclovir (ZOVIRAX) 400 MG tablet Take 1 tablet by mouth 4 times daily 30 tablet 5    fluticasone (FLONASE) 50 MCG/ACT nasal spray 1 spray by Nasal route daily 1 Bottle 3    pseudoephedrine (SUDAFED) 60 MG tablet Take 60 mg by mouth every 6 hours as needed for Congestion      fexofenadine (ALLEGRA) 60 MG tablet Take 60 mg by mouth daily      saw palmetto 160 MG capsule Take 160 mg by mouth daily      magnesium hydroxide (MILK OF MAGNESIA) 400 MG/5ML suspension Take by mouth daily as needed for Constipation  vitamin E 1000 UNITS capsule Take 1,000 Units by mouth daily       No current facility-administered medications for this visit. Allergies   Allergen Reactions    Sulfa Antibiotics        Health Maintenance   Topic Date Due    Hepatitis C screen  1945    DTaP/Tdap/Td vaccine (1 - Tdap) 08/20/1963    COVID-19 Vaccine (2 of 2 - Moderna series) 02/08/2021    Potassium monitoring  03/09/2021    Creatinine monitoring  03/09/2021    Annual Wellness Visit (AWV)  03/17/2021    Flu vaccine  Completed    Shingles Vaccine  Completed    Pneumococcal 65+ years Vaccine  Completed    Hepatitis A vaccine  Aged Out    Hepatitis B vaccine  Aged Out    Hib vaccine  Aged Out    Meningococcal (ACWY) vaccine  Aged Out       Subjective:      Review of Systems    Review of Systems   Constitutional: Negative for chills and fever. HENT: Negative for congestion. Respiratory: Negative for cough, chest tightness and shortness of breath. Cardiovascular: Negative for chest pain, palpitations and leg swelling. Gastrointestinal: Negative for abdominal pain, anal bleeding, constipation, diarrhea and nausea. Genitourinary: Negative for difficulty urinating. Psychiatric/Behavioral: Negative. SeeHPI for visit specific review of symptoms. All others negative      Objective:   /82   Pulse 62   Temp 97.3 °F (36.3 °C) (Temporal)   Ht 6' (1.829 m)   Wt 196 lb 8 oz (89.1 kg)   SpO2 100%   BMI 26.65 kg/m²   Physical Exam  Physical Exam   Constitutional: He appears well-developed. Does not appear ill. Eyes: Pupils are equal, round, and reactive to light. Conjunctiva and Lids normal.  Neck: Normal range of motion. Neck supple. No masses. Neck Symmetric. Normal tracheal position. No thyroid enlargement  Cardiovascular: Normal rate and regular rhythm. Exam reveals no friction rub. Carotid arteries: no bruit observed. No murmur heard. Respiratory:  Effort normal and breath sounds normal. No respiratory distress. No wheezes. No rales. No use of accessory muscles or intercostal retractions. Abdominal: Soft. Bowel sounds are normal. exhibits no distension. There is no tenderness. There is no rebound and no guarding. Musculoskeletal: exhibits no edema. Normal gait. Neurological: alert. Psychiatric: normal mood and affect. His behavior is normal. Normal judgement and insight observed. No results found for this or any previous visit (from the past 672 hour(s)). Assessment & Plan: The following diagnoses and conditions are stable with no further orders unless indicated:  1. Benign prostatic hyperplasia without lower urinary tract symptoms  Stable  Recheck PSA in September  2. Essential (primary) hypertension  BP Readings from Last 3 Encounters:   02/08/21 132/82   10/26/20 131/78   09/28/20 120/80     Stable    3. Gastroesophageal reflux disease without esophagitis  Stable    4. Depression with anxiety  Stable    5. Chronic insomnia  Stable            Return if symptoms worsen or fail to improve. Discussed use, benefit, and side effects of prescribed medications. All patient questions answered. Pt voiced understanding. Reviewed health maintenance. Instructedto continue current medications, diet and exercise. Patient agreed with treatmentplan. Follow up as directed. Note dictated using Dragon Dictation software  Sometimes this dictation software makes erroneous transcriptions.

## 2021-02-11 ENCOUNTER — APPOINTMENT (OUTPATIENT)
Dept: VACCINE CLINIC | Facility: HOSPITAL | Age: 76
End: 2021-02-11

## 2021-02-23 ENCOUNTER — OFFICE VISIT (OUTPATIENT)
Dept: GASTROENTEROLOGY | Facility: CLINIC | Age: 76
End: 2021-02-23

## 2021-02-23 VITALS
WEIGHT: 197 LBS | TEMPERATURE: 96.9 F | HEIGHT: 72 IN | OXYGEN SATURATION: 98 % | SYSTOLIC BLOOD PRESSURE: 146 MMHG | BODY MASS INDEX: 26.68 KG/M2 | HEART RATE: 50 BPM | DIASTOLIC BLOOD PRESSURE: 82 MMHG

## 2021-02-23 DIAGNOSIS — K21.9 GASTROESOPHAGEAL REFLUX DISEASE WITHOUT ESOPHAGITIS: ICD-10-CM

## 2021-02-23 DIAGNOSIS — Z86.010 HX OF COLONIC POLYP: Primary | ICD-10-CM

## 2021-02-23 DIAGNOSIS — I10 ESSENTIAL HYPERTENSION: ICD-10-CM

## 2021-02-23 PROBLEM — Z86.0100 HX OF COLONIC POLYP: Status: ACTIVE | Noted: 2021-02-23

## 2021-02-23 PROCEDURE — 99204 OFFICE O/P NEW MOD 45 MIN: CPT | Performed by: NURSE PRACTITIONER

## 2021-02-23 RX ORDER — POLYETHYLENE GLYCOL 3350, SODIUM SULFATE ANHYDROUS, SODIUM BICARBONATE, SODIUM CHLORIDE, POTASSIUM CHLORIDE 236; 22.74; 6.74; 5.86; 2.97 G/4L; G/4L; G/4L; G/4L; G/4L
4 POWDER, FOR SOLUTION ORAL ONCE
Qty: 4000 ML | Refills: 0 | Status: SHIPPED | OUTPATIENT
Start: 2021-02-23 | End: 2021-02-23

## 2021-02-23 NOTE — PROGRESS NOTES
[facility] Gastroenterology    Primary Physician Keenan Perez MD    2/23/2021    Agueda Tyler   1945      Chief Complaint   Patient presents with   • Colonoscopy   • Endoscopy   gerd    Subjective     HPI    Agueda Tyler is a 75 y.o. male who presents as a referral for preventative maintenance. He has no complaints of nausea or vomiting. No change in bowels. No wt loss. No BRBPR. No melena. No abdominal pain.       GERD  He has 1-2 days per week with breakthrough reflux symptoms manifested by heartburn. He takes omeprazole twice daily.  He has noted eating late or spicy foods will trigger reflux symptoms. He has gained some weight. No dysphagia. No weight loss. No n/v. No cough.            Last colonoscopy was 4/2016 noting sigmoid diverticulosis and internal hemorrhoid.  The patient does have history of colon polyps. The patient does not  have history of colon cancer. There is not a family history of colon polyps/colon cancer.       Last EGD April 2016 noting Z-line variable at the GE junction biopsied and a 2 cm hiatal hernia.  Path was negative for Palmer's esophagus.      Past Medical History:   Diagnosis Date   • Anxiety    • BPH (benign prostatic hyperplasia)    • Colon polyp    • Diverticulosis    • Essential hypertension    • GERD (gastroesophageal reflux disease)    • Hypertriglyceridemia    • Idiopathic peripheral neuropathy    • Osteoarthritis        Past Surgical History:   Procedure Laterality Date   • COLONOSCOPY  04/18/2016    diverticulosis, internal hemorrhoids,    • COLONOSCOPY  03/30/2011    internal hemorrhoid, diverticulosis   • TURP / TRANSURETHRAL INCISION / DRAINAGE PROSTATE         Outpatient Medications Marked as Taking for the 2/23/21 encounter (Office Visit) with Ling Lee APRN   Medication Sig Dispense Refill   • Bisoprolol-hydroCHLOROthiazide (ZIAC PO) Take  by mouth.     • lisinopril-hydrochlorothiazide (PRINZIDE,ZESTORETIC) 20-12.5 MG per tablet Take 1  tablet by mouth Daily.     • Magnesium Hydroxide (MILK OF MAGNESIA PO) Take  by mouth.     • Meloxicam (MOBIC PO) Take  by mouth.     • omeprazole (priLOSEC) 20 MG capsule Take 20 mg by mouth Daily.     • Psyllium (METAMUCIL PO) Take  by mouth As Needed.     • rOPINIRole HCl (REQUIP PO) Take  by mouth.         Allergies   Allergen Reactions   • Sulfa Antibiotics Rash       Social History     Socioeconomic History   • Marital status:      Spouse name: Not on file   • Number of children: Not on file   • Years of education: Not on file   • Highest education level: Not on file   Tobacco Use   • Smoking status: Never Smoker   • Smokeless tobacco: Never Used   Substance and Sexual Activity   • Alcohol use: Not Currently   • Drug use: Not Currently   • Sexual activity: Defer       Family History   Problem Relation Age of Onset   • Colon cancer Neg Hx        Review of Systems   Constitutional: Negative for chills, fever and unexpected weight change.   Respiratory: Negative for shortness of breath and wheezing.    Cardiovascular: Negative for chest pain and palpitations.   Gastrointestinal: Negative for abdominal distention, abdominal pain, anal bleeding, blood in stool, constipation, diarrhea, nausea and vomiting.       Objective     Vitals:    02/23/21 0814   BP: 146/82   Pulse: 50   Temp: 96.9 °F (36.1 °C)   SpO2: 98%         02/23/21 0814   Weight: 89.4 kg (197 lb)     Body mass index is 26.72 kg/m².    Physical Exam  Vitals signs reviewed.   Constitutional:       General: He is not in acute distress.  Cardiovascular:      Rate and Rhythm: Normal rate and regular rhythm.      Heart sounds: Normal heart sounds.   Pulmonary:      Effort: Pulmonary effort is normal.      Breath sounds: Normal breath sounds.   Abdominal:      General: Bowel sounds are normal. There is no distension.      Palpations: Abdomen is soft.      Tenderness: There is no abdominal tenderness.   Skin:     General: Skin is warm and dry.    Neurological:      Mental Status: He is alert.         Imaging Results (Most Recent)     None          Assessment/Plan     Diagnoses and all orders for this visit:    1. Hx of colonic polyp (Primary)  -     Case Request; Standing  -     Case Request    2. Gastroesophageal reflux disease without esophagitis  -     Case Request; Standing  -     Case Request    3. Essential hypertension    Other orders  -     Follow Anesthesia Guidelines / Protocol; Future  -     Implement Anesthesia Orders Day of Procedure; Standing  -     Obtain Informed Consent; Standing  -     Obtain Informed Consent; Future  -     polyethylene glycol (Golytely) 236 g solution; Take 4,000 mL by mouth 1 (One) Time for 1 dose. Take as directed per instruction sheet.  Dispense: 4000 mL; Refill: 0      Plan for colonoscopy. The patient was advised to take any blood pressure or heart  medications the morning of  procedure if that is when he/she normally takes.        In regards to reflux, I discussed non pharmaceutical treatment of gerd.  This includes gradually losing weight to achieve ideal body wt., elevation of the head of bed by 4-6 inches, nothing to eat or drink 3 hours prior to lying down, avoiding tight clothing, stress reduction, tobacco cessation, reduction of alcohol intake, and dietary restrictions (avoiding caffeine, coffee, fatty foods, mints, chocolate, spicy foods and tomato based sauces as much as possible).    I advised calcium with vit. D supplementation daily and why.  I discussed possible assoc. of renal disease and dementia with PPI use, although so far there has been no definitive evidence of causation.  An ideal goal, would be to stop PPI and other acid reducing medication use as soon as medically reasonable and use non-medical treatments such as healthy life style modifications to control symptoms and disease.  Although that goal is not obtainable for all, life style changes should always be tried to see if that goal can be  obtained.            Body mass index is 26.72 kg/m².    Patient's Body mass index is 26.72 kg/m². BMI is within normal parameters. No follow-up required..      ESOPHAGOGASTRODUODENOSCOPY WITH ANESTHESIA (N/A), COLONOSCOPY WITH ANESTHESIA (N/A)  All risks, benefits, alternatives, and indications of colonoscopy procedure have been discussed with the patient. Risks to include perforation of the colon requiring possible surgery or colostomy, risk of bleeding from biopsies or removal of colon tissue, possibility of missing a colon polyp or cancer, or adverse drug reaction.  Benefits to include the diagnosis and management of disease of the colon and rectum. Alternatives to include barium enema, radiographic evaluation, lab testing or no intervention. Pt verbalizes understanding and agrees.     Risk, benefits, and alternatives of endoscopy were explained in full.  They understand that there is a risk of bleeding, perforation, and infection.  The risk of perforation goes up with esophageal dilation.  Other options to evaluate UGI complaints could involve barium swallow or UGI series, but these would be diagnostic tests only.  Patient was given time to ask questions.  I answered them to their satisfaction and they are agreeable to proceeding.         MIO De Anda      EMR Dragon/transcription disclaimer:  Much of this encounter note is electronic transcription/translation of spoken language to printed text.  The electronic translation of spoken language may be erroneous, or at times, nonsensical words or phrases may be inadvertently transcribed.  Although I have reviewed the note for such errors, some may still exist.

## 2021-03-08 ENCOUNTER — TRANSCRIBE ORDERS (OUTPATIENT)
Dept: ADMINISTRATIVE | Facility: HOSPITAL | Age: 76
End: 2021-03-08

## 2021-03-08 ENCOUNTER — LAB (OUTPATIENT)
Dept: LAB | Facility: HOSPITAL | Age: 76
End: 2021-03-08

## 2021-03-08 DIAGNOSIS — Z01.818 PREOP TESTING: Primary | ICD-10-CM

## 2021-03-08 PROCEDURE — U0003 INFECTIOUS AGENT DETECTION BY NUCLEIC ACID (DNA OR RNA); SEVERE ACUTE RESPIRATORY SYNDROME CORONAVIRUS 2 (SARS-COV-2) (CORONAVIRUS DISEASE [COVID-19]), AMPLIFIED PROBE TECHNIQUE, MAKING USE OF HIGH THROUGHPUT TECHNOLOGIES AS DESCRIBED BY CMS-2020-01-R: HCPCS | Performed by: INTERNAL MEDICINE

## 2021-03-08 PROCEDURE — C9803 HOPD COVID-19 SPEC COLLECT: HCPCS | Performed by: INTERNAL MEDICINE

## 2021-03-08 PROCEDURE — U0005 INFEC AGEN DETEC AMPLI PROBE: HCPCS | Performed by: INTERNAL MEDICINE

## 2021-03-09 LAB — SARS-COV-2 RNA RESP QL NAA+PROBE: NOT DETECTED

## 2021-03-11 ENCOUNTER — HOSPITAL ENCOUNTER (OUTPATIENT)
Facility: HOSPITAL | Age: 76
Setting detail: HOSPITAL OUTPATIENT SURGERY
Discharge: HOME | End: 2021-03-11
Attending: INTERNAL MEDICINE | Admitting: INTERNAL MEDICINE

## 2021-03-11 ENCOUNTER — ANESTHESIA EVENT (OUTPATIENT)
Dept: GASTROENTEROLOGY | Facility: HOSPITAL | Age: 76
End: 2021-03-11

## 2021-03-11 ENCOUNTER — TELEPHONE (OUTPATIENT)
Dept: GASTROENTEROLOGY | Facility: CLINIC | Age: 76
End: 2021-03-11

## 2021-03-11 ENCOUNTER — ANESTHESIA (OUTPATIENT)
Dept: GASTROENTEROLOGY | Facility: HOSPITAL | Age: 76
End: 2021-03-11

## 2021-03-11 VITALS
DIASTOLIC BLOOD PRESSURE: 70 MMHG | BODY MASS INDEX: 26.41 KG/M2 | RESPIRATION RATE: 13 BRPM | OXYGEN SATURATION: 98 % | TEMPERATURE: 98 F | HEIGHT: 72 IN | SYSTOLIC BLOOD PRESSURE: 105 MMHG | WEIGHT: 195 LBS | HEART RATE: 59 BPM

## 2021-03-11 DIAGNOSIS — Z86.010 HX OF COLONIC POLYP: ICD-10-CM

## 2021-03-11 DIAGNOSIS — K21.9 GASTROESOPHAGEAL REFLUX DISEASE WITHOUT ESOPHAGITIS: ICD-10-CM

## 2021-03-11 PROCEDURE — 43239 EGD BIOPSY SINGLE/MULTIPLE: CPT | Performed by: INTERNAL MEDICINE

## 2021-03-11 PROCEDURE — G0105 COLORECTAL SCRN; HI RISK IND: HCPCS | Performed by: INTERNAL MEDICINE

## 2021-03-11 PROCEDURE — 25010000002 PROPOFOL 10 MG/ML EMULSION: Performed by: NURSE ANESTHETIST, CERTIFIED REGISTERED

## 2021-03-11 PROCEDURE — 88305 TISSUE EXAM BY PATHOLOGIST: CPT | Performed by: INTERNAL MEDICINE

## 2021-03-11 RX ORDER — SODIUM CHLORIDE 0.9 % (FLUSH) 0.9 %
10 SYRINGE (ML) INJECTION EVERY 12 HOURS SCHEDULED
Status: CANCELLED | OUTPATIENT
Start: 2021-03-11

## 2021-03-11 RX ORDER — LIDOCAINE HYDROCHLORIDE 10 MG/ML
0.5 INJECTION, SOLUTION EPIDURAL; INFILTRATION; INTRACAUDAL; PERINEURAL ONCE AS NEEDED
Status: DISCONTINUED | OUTPATIENT
Start: 2021-03-11 | End: 2021-03-11 | Stop reason: HOSPADM

## 2021-03-11 RX ORDER — SODIUM CHLORIDE 0.9 % (FLUSH) 0.9 %
10 SYRINGE (ML) INJECTION AS NEEDED
Status: CANCELLED | OUTPATIENT
Start: 2021-03-11

## 2021-03-11 RX ORDER — SODIUM CHLORIDE 9 MG/ML
500 INJECTION, SOLUTION INTRAVENOUS CONTINUOUS PRN
Status: DISCONTINUED | OUTPATIENT
Start: 2021-03-11 | End: 2021-03-11 | Stop reason: HOSPADM

## 2021-03-11 RX ORDER — ONDANSETRON 2 MG/ML
4 INJECTION INTRAMUSCULAR; INTRAVENOUS ONCE AS NEEDED
Status: DISCONTINUED | OUTPATIENT
Start: 2021-03-11 | End: 2021-03-11 | Stop reason: HOSPADM

## 2021-03-11 RX ORDER — SODIUM CHLORIDE 9 MG/ML
100 INJECTION, SOLUTION INTRAVENOUS CONTINUOUS
Status: CANCELLED | OUTPATIENT
Start: 2021-03-11

## 2021-03-11 RX ORDER — MIDAZOLAM HYDROCHLORIDE 1 MG/ML
0.5 INJECTION, SOLUTION INTRAMUSCULAR; INTRAVENOUS
Status: CANCELLED | OUTPATIENT
Start: 2021-03-11

## 2021-03-11 RX ORDER — PROPOFOL 10 MG/ML
VIAL (ML) INTRAVENOUS AS NEEDED
Status: DISCONTINUED | OUTPATIENT
Start: 2021-03-11 | End: 2021-03-11 | Stop reason: SURG

## 2021-03-11 RX ORDER — MIDAZOLAM HYDROCHLORIDE 1 MG/ML
1 INJECTION, SOLUTION INTRAMUSCULAR; INTRAVENOUS
Status: CANCELLED | OUTPATIENT
Start: 2021-03-11

## 2021-03-11 RX ORDER — SODIUM CHLORIDE 0.9 % (FLUSH) 0.9 %
10 SYRINGE (ML) INJECTION AS NEEDED
Status: DISCONTINUED | OUTPATIENT
Start: 2021-03-11 | End: 2021-03-11 | Stop reason: HOSPADM

## 2021-03-11 RX ADMIN — SODIUM CHLORIDE 500 ML: 9 INJECTION, SOLUTION INTRAVENOUS at 08:51

## 2021-03-11 RX ADMIN — PROPOFOL 260 MG: 10 INJECTION, EMULSION INTRAVENOUS at 09:47

## 2021-03-11 NOTE — ANESTHESIA PREPROCEDURE EVALUATION
Anesthesia Evaluation     NPO Solid Status: > 8 hours  NPO Liquid Status: > 4 hours           Airway   No difficulty expected  Dental - normal exam     Pulmonary    (-) not a smoker  Cardiovascular   Exercise tolerance: good (4-7 METS)    (+) hypertension, hyperlipidemia,   (-) past MI, CAD      Neuro/Psych  (-) seizures, CVA  GI/Hepatic/Renal/Endo    (+)  GERD,    (-) liver disease, no renal disease, diabetes    Musculoskeletal (-) negative ROS    Abdominal    Substance History - negative use     OB/GYN negative ob/gyn ROS         Other                        Anesthesia Plan    ASA 2     MAC     intravenous induction     Anesthetic plan, all risks, benefits, and alternatives have been provided, discussed and informed consent has been obtained with: patient.

## 2021-03-11 NOTE — ANESTHESIA POSTPROCEDURE EVALUATION
"Patient: Agueda Tyler    Procedure Summary     Date: 03/11/21 Room / Location:  [facility] /  [facility]    Anesthesia Start: 0940 Anesthesia Stop: 1020    Procedures:       ESOPHAGOGASTRODUODENOSCOPY WITH ANESTHESIA (N/A )      COLONOSCOPY WITH ANESTHESIA (N/A ) Diagnosis:       Hx of colonic polyp      Gastroesophageal reflux disease without esophagitis      (Hx of colonic polyp [Z86.010])      (Gastroesophageal reflux disease without esophagitis [K21.9])    Surgeons: Ricky House MD Provider: David Marie CRNA    Anesthesia Type: MAC ASA Status: 2          Anesthesia Type: MAC    Vitals  Vitals Value Taken Time   /70 03/11/21 1035   Temp     Pulse 50 03/11/21 1036   Resp 13 03/11/21 1035   SpO2 100 % 03/11/21 1036   Vitals shown include unvalidated device data.        Post Anesthesia Care and Evaluation    Patient location during evaluation: PACU  Patient participation: complete - patient participated  Level of consciousness: awake and alert  Pain management: adequate  Airway patency: patent  Anesthetic complications: No anesthetic complications    Cardiovascular status: acceptable  Respiratory status: acceptable  Hydration status: acceptable    Comments: Blood pressure 105/70, pulse 59, temperature 98 °F (36.7 °C), temperature source Temporal, resp. rate 13, height 182.9 cm (72\"), weight 88.5 kg (195 lb), SpO2 98 %.    Pt discharged from PACU based on mary score >8      "

## 2021-03-12 LAB
CYTO UR: NORMAL
LAB AP CASE REPORT: NORMAL
PATH REPORT.FINAL DX SPEC: NORMAL
PATH REPORT.GROSS SPEC: NORMAL

## 2021-04-19 ASSESSMENT — PATIENT HEALTH QUESTIONNAIRE - PHQ9
1. LITTLE INTEREST OR PLEASURE IN DOING THINGS: 0
SUM OF ALL RESPONSES TO PHQ9 QUESTIONS 1 & 2: 0
2. FEELING DOWN, DEPRESSED OR HOPELESS: 0
SUM OF ALL RESPONSES TO PHQ QUESTIONS 1-9: 0

## 2021-04-19 ASSESSMENT — LIFESTYLE VARIABLES
AUDIT-C TOTAL SCORE: INCOMPLETE
HOW OFTEN DO YOU HAVE A DRINK CONTAINING ALCOHOL: NEVER
AUDIT TOTAL SCORE: INCOMPLETE

## 2021-04-20 DIAGNOSIS — I10 ESSENTIAL (PRIMARY) HYPERTENSION: ICD-10-CM

## 2021-04-20 DIAGNOSIS — E78.1 HYPERTRIGLYCERIDEMIA: ICD-10-CM

## 2021-04-20 LAB
ALBUMIN SERPL-MCNC: 4.5 G/DL (ref 3.5–5.2)
ALP BLD-CCNC: 114 U/L (ref 40–130)
ALT SERPL-CCNC: 21 U/L (ref 5–41)
ANION GAP SERPL CALCULATED.3IONS-SCNC: 11 MMOL/L (ref 7–19)
AST SERPL-CCNC: 30 U/L (ref 5–40)
BASOPHILS ABSOLUTE: 0.1 K/UL (ref 0–0.2)
BASOPHILS RELATIVE PERCENT: 1.2 % (ref 0–1)
BILIRUB SERPL-MCNC: 1.3 MG/DL (ref 0.2–1.2)
BUN BLDV-MCNC: 19 MG/DL (ref 8–23)
CALCIUM SERPL-MCNC: 9.3 MG/DL (ref 8.8–10.2)
CHLORIDE BLD-SCNC: 96 MMOL/L (ref 98–111)
CHOLESTEROL, TOTAL: 160 MG/DL (ref 160–199)
CO2: 28 MMOL/L (ref 22–29)
CREAT SERPL-MCNC: 1.1 MG/DL (ref 0.5–1.2)
EOSINOPHILS ABSOLUTE: 0.3 K/UL (ref 0–0.6)
EOSINOPHILS RELATIVE PERCENT: 5.2 % (ref 0–5)
GFR AFRICAN AMERICAN: >59
GFR NON-AFRICAN AMERICAN: >60
GLUCOSE BLD-MCNC: 92 MG/DL (ref 74–109)
HCT VFR BLD CALC: 43.5 % (ref 42–52)
HDLC SERPL-MCNC: 55 MG/DL (ref 55–121)
HEMOGLOBIN: 13.8 G/DL (ref 14–18)
IMMATURE GRANULOCYTES #: 0 K/UL
LDL CHOLESTEROL CALCULATED: 82 MG/DL
LYMPHOCYTES ABSOLUTE: 1.3 K/UL (ref 1.1–4.5)
LYMPHOCYTES RELATIVE PERCENT: 22 % (ref 20–40)
MCH RBC QN AUTO: 26.5 PG (ref 27–31)
MCHC RBC AUTO-ENTMCNC: 31.7 G/DL (ref 33–37)
MCV RBC AUTO: 83.7 FL (ref 80–94)
MONOCYTES ABSOLUTE: 0.6 K/UL (ref 0–0.9)
MONOCYTES RELATIVE PERCENT: 9.8 % (ref 0–10)
NEUTROPHILS ABSOLUTE: 3.6 K/UL (ref 1.5–7.5)
NEUTROPHILS RELATIVE PERCENT: 61.5 % (ref 50–65)
PDW BLD-RTO: 14.9 % (ref 11.5–14.5)
PLATELET # BLD: 185 K/UL (ref 130–400)
PMV BLD AUTO: 10.7 FL (ref 9.4–12.4)
POTASSIUM SERPL-SCNC: 4.3 MMOL/L (ref 3.5–5)
RBC # BLD: 5.2 M/UL (ref 4.7–6.1)
SODIUM BLD-SCNC: 135 MMOL/L (ref 136–145)
TOTAL PROTEIN: 6.9 G/DL (ref 6.6–8.7)
TRIGL SERPL-MCNC: 117 MG/DL (ref 0–149)
WBC # BLD: 5.8 K/UL (ref 4.8–10.8)

## 2021-04-26 ENCOUNTER — OFFICE VISIT (OUTPATIENT)
Dept: FAMILY MEDICINE CLINIC | Age: 76
End: 2021-04-26
Payer: MEDICARE

## 2021-04-26 VITALS
SYSTOLIC BLOOD PRESSURE: 132 MMHG | BODY MASS INDEX: 26.55 KG/M2 | TEMPERATURE: 97.3 F | DIASTOLIC BLOOD PRESSURE: 88 MMHG | OXYGEN SATURATION: 98 % | HEIGHT: 72 IN | HEART RATE: 53 BPM | WEIGHT: 196 LBS

## 2021-04-26 DIAGNOSIS — K21.9 GASTROESOPHAGEAL REFLUX DISEASE WITHOUT ESOPHAGITIS: ICD-10-CM

## 2021-04-26 DIAGNOSIS — I10 ESSENTIAL (PRIMARY) HYPERTENSION: ICD-10-CM

## 2021-04-26 DIAGNOSIS — D64.9 ANEMIA, UNSPECIFIED TYPE: ICD-10-CM

## 2021-04-26 DIAGNOSIS — Z12.5 ENCOUNTER FOR PROSTATE CANCER SCREENING: ICD-10-CM

## 2021-04-26 DIAGNOSIS — F51.04 CHRONIC INSOMNIA: ICD-10-CM

## 2021-04-26 DIAGNOSIS — Z11.59 NEED FOR HEPATITIS C SCREENING TEST: ICD-10-CM

## 2021-04-26 DIAGNOSIS — Z00.00 ANNUAL PHYSICAL EXAM: Primary | ICD-10-CM

## 2021-04-26 DIAGNOSIS — F41.8 DEPRESSION WITH ANXIETY: ICD-10-CM

## 2021-04-26 PROCEDURE — 1123F ACP DISCUSS/DSCN MKR DOCD: CPT | Performed by: FAMILY MEDICINE

## 2021-04-26 PROCEDURE — G0439 PPPS, SUBSEQ VISIT: HCPCS | Performed by: FAMILY MEDICINE

## 2021-04-26 PROCEDURE — G8427 DOCREV CUR MEDS BY ELIG CLIN: HCPCS | Performed by: FAMILY MEDICINE

## 2021-04-26 PROCEDURE — 4040F PNEUMOC VAC/ADMIN/RCVD: CPT | Performed by: FAMILY MEDICINE

## 2021-04-26 PROCEDURE — 1036F TOBACCO NON-USER: CPT | Performed by: FAMILY MEDICINE

## 2021-04-26 PROCEDURE — 99214 OFFICE O/P EST MOD 30 MIN: CPT | Performed by: FAMILY MEDICINE

## 2021-04-26 PROCEDURE — G8417 CALC BMI ABV UP PARAM F/U: HCPCS | Performed by: FAMILY MEDICINE

## 2021-04-26 RX ORDER — LISINOPRIL AND HYDROCHLOROTHIAZIDE 20; 12.5 MG/1; MG/1
2 TABLET ORAL DAILY
Qty: 180 TABLET | Refills: 3 | Status: SHIPPED | OUTPATIENT
Start: 2021-04-26 | End: 2022-03-11 | Stop reason: ALTCHOICE

## 2021-04-26 RX ORDER — IPRATROPIUM BROMIDE 42 UG/1
1 SPRAY, METERED NASAL DAILY PRN
Qty: 1 BOTTLE | Refills: 2 | Status: SHIPPED | OUTPATIENT
Start: 2021-04-26

## 2021-04-26 RX ORDER — OMEPRAZOLE 20 MG/1
20 CAPSULE, DELAYED RELEASE ORAL 2 TIMES DAILY
Qty: 180 CAPSULE | Refills: 3 | Status: SHIPPED | OUTPATIENT
Start: 2021-04-26

## 2021-04-26 RX ORDER — MELOXICAM 7.5 MG/1
7.5 TABLET ORAL 2 TIMES DAILY
Qty: 180 TABLET | Refills: 3 | Status: SHIPPED | OUTPATIENT
Start: 2021-04-26

## 2021-04-26 RX ORDER — CITALOPRAM 10 MG/1
20 TABLET ORAL DAILY
Qty: 180 TABLET | Refills: 3 | Status: SHIPPED | OUTPATIENT
Start: 2021-04-26

## 2021-04-26 RX ORDER — BISOPROLOL FUMARATE 5 MG/1
5 TABLET ORAL DAILY
Qty: 90 TABLET | Refills: 3 | Status: SHIPPED | OUTPATIENT
Start: 2021-04-26 | End: 2022-01-07 | Stop reason: SDUPTHER

## 2021-04-26 RX ORDER — TRAZODONE HYDROCHLORIDE 50 MG/1
50 TABLET ORAL NIGHTLY
Qty: 90 TABLET | Refills: 3 | Status: SHIPPED | OUTPATIENT
Start: 2021-04-26

## 2021-04-26 NOTE — PROGRESS NOTES
acyclovir (ZOVIRAX) 400 MG tablet Take 1 tablet by mouth 4 times daily Yes Matheus Infante MD   fluticasone [facility]) 50 MCG/ACT nasal spray 1 spray by Nasal route daily Yes Matheus Infante MD   pseudoephedrine (SUDAFED) 60 MG tablet Take 60 mg by mouth every 6 hours as needed for Congestion Yes Historical Provider, MD   fexofenadine (ALLEGRA) 60 MG tablet Take 60 mg by mouth daily Yes Historical Provider, MD   saw palmetto 160 MG capsule Take 160 mg by mouth daily Yes Historical Provider, MD   magnesium hydroxide (MILK OF MAGNESIA) 400 MG/5ML suspension Take by mouth daily as needed for Constipation Yes Historical Provider, MD   vitamin E 1000 UNITS capsule Take 1,000 Units by mouth daily Yes Historical Provider, MD       Past Medical History:   Diagnosis Date    Generalized anxiety disorder     Generalized osteoarthritis of multiple sites 8/28/2017    GERD (gastroesophageal reflux disease)     Hypertension     Hypertriglyceridemia 8/28/2017    Idiopathic peripheral neuropathy 8/28/2017    Irritable bowel syndrome with constipation 8/28/2017    Restless legs syndrome     Seasonal allergic rhinitis due to pollen 8/28/2017       Past Surgical History:   Procedure Laterality Date    HERNIA REPAIR  1999/2001    BARBARA  1994       Family History   Problem Relation Age of Onset    Dementia Mother     Heart Disease Father     Colon Cancer Father     Dementia Father     High Blood Pressure Father     Heart Attack Father        CareTeam (Including outside providers/suppliers regularly involved in providing care):   Patient Care Team:  Matheus Infante MD as PCP - General (Family Medicine)  Matheus Infante MD as PCP - [facility] Empaneled Provider  Rita Pringle MD as Neurologist (Neurology)    Wt Readings from Last 3 Encounters:   04/26/21 196 lb (88.9 kg)   02/08/21 196 lb 8 oz (89.1 kg)   10/26/20 192 lb (87.1 kg)     Vitals:    04/26/21 0806   BP: (!) 132/92   Pulse: 53   Temp: 97.3 °F (36.3 °C) SpO2: 98%   Weight: 196 lb (88.9 kg)   Height: 6' (1.829 m)           The following problems were reviewed today and where indicated follow up appointments were made and/or referrals ordered.     Risk Factor Screenings with Interventions     Fall Risk:  2 or more falls in past year?: no  Fall with injury in past year?: no  Fall Risk Interventions:    · Not indicated     Depression:  PHQ-2 Score: 0  Depression Interventions:  · Not indicated     Anxiety:     Anxiety Interventions:  · Not indicated     Cognitive:  Clock Drawing Test (CDT) Score: Normal  Cognitive Impairment Interventions:  · Not indicated     Substance Abuse:  Social History     Socioeconomic History    Marital status:      Spouse name: Not on file    Number of children: Not on file    Years of education: Not on file    Highest education level: Not on file   Occupational History    Not on file   Social Needs    Financial resource strain: Not hard at all   [employer] insecurity     Worry: Never true     Inability: Never true   [employer] needs     Medical: No     Non-medical: No   Tobacco Use    Smoking status: Never Smoker    Smokeless tobacco: Never Used   Substance and Sexual Activity    Alcohol use: No     Alcohol/week: 0.0 standard drinks    Drug use: No    Sexual activity: Not on file   Lifestyle    Physical activity     Days per week: Not on file     Minutes per session: Not on file    Stress: Not on file   Relationships    Social connections     Talks on phone: Not on file     Gets together: Not on file     Attends [employer] service: Not on file     Active member of club or organization: Not on file     Attends meetings of clubs or organizations: Not on file     Relationship status: Not on file    Intimate partner violence     Fear of current or ex partner: Not on file     Emotionally abused: Not on file     Physically abused: Not on file     Forced sexual activity: Not on file   Other Topics Concern    Not on file Social History Narrative    Not on file     Audit Questionnaire: Screen for Alcohol Misuse  How often do you have a drink containing alcohol?: Never  Substance Abuse Interventions:  · Not indicated     Health Risk Assessment:     General  In general, how would you say your health is?: Excellent  In the past 7 days, have you experienced any of the following? New or Increased Pain, New or Increased Fatigue, Loneliness, Social Isolation, Stress or Anger?: (!) Stress  Do you get the social and emotional support that you need?: Yes  Do you have a Living Will?: Yes  General Health Risk Interventions:  · Not indicated     Health Habits/Nutrition  Do you exercise for at least 20 minutes 2-3 times per week?: Yes  Have you lost any weight without trying in the past 3 months?: No  Do you eat only one meal per day?: No  Have you seen the dentist within the past year?: Yes  Body mass index is 26.58 kg/m². Health Habits/Nutrition Interventions:  · Not indicated     Hearing/Vision  Do you or your family notice any trouble with your hearing that hasn't been managed with hearing aids?: No  Do you have difficulty driving, watching TV, or doing any of your daily activities because of your eyesight?: No  Have you had an eye exam within the past year?: Yes  Hearing/Vision Interventions:  · Not indicated     Safety  Do you have working smoke detectors?: Yes  Have all throw rugs been removed or fastened?: Yes  Do you have non-slip mats or surfaces in all bathtubs/showers?: Yes  Do all of your stairways have a railing or banister?: Yes  Are your doorways, halls and stairs free of clutter?: Yes  Do you always fasten your seatbelt when you are in a car?: Yes  Safety Interventions:  · Not indicated     ADLs  In the past 7 days, did you need help from others to perform any of the following everyday activities?  Eating, dressing, grooming, bathing, toileting, or walking/balance?: None  In the past 7 days, did you need help from others to

## 2021-04-26 NOTE — PROGRESS NOTES
[facility] PHYSICIAN SERVICES  [facility] FAMILY MEDICINE  [address] [facility] [address] [address]  Dept: [phone]  Dept Fax: [phone]  Loc: [phone]    Vaishali Saleem is a 67 y.o. male who presents today for his medical conditions/complaints as noted below. Vaishali Saleem is here for Medicare AWV        HPI:   CC: Here today to discuss the following:    Here today for annual physical and to address the following issues:    Hypertension  Compliant with medications. No adverse effects from medication. No lightheadedness, palpitations, or chest pain. Gastroesophageal Reflux Disease  Symptoms currently under control. Medication adequately controls his symptoms. No hematochezia or melena. Insomnia  Currently stable. Satisfied with current treatment regimen. No adverse effects from medication. Depression with Anxiety  Compliant with medications. No adverse effects from medication. Depression and anxiety symptoms are stable today. No suicidal or homicidal ideation. Excessive worry, insomnia, and anxiousness are stable. Energy, concentration, and apathy are stable.             HPI    Past Medical History:   Diagnosis Date    Generalized anxiety disorder     Generalized osteoarthritis of multiple sites 8/28/2017    GERD (gastroesophageal reflux disease)     Hypertension     Hypertriglyceridemia 8/28/2017    Idiopathic peripheral neuropathy 8/28/2017    Irritable bowel syndrome with constipation 8/28/2017    Restless legs syndrome     Seasonal allergic rhinitis due to pollen 8/28/2017      Past Surgical History:   Procedure Laterality Date    HERNIA REPAIR  1999/2001    BARBARA  1994       Family History   Problem Relation Age of Onset    Dementia Mother     Heart Disease Father     Colon Cancer Father     Dementia Father     High Blood Pressure Father     Heart Attack Father        Social History     Tobacco Use    Smoking status: Never Smoker    Smokeless tobacco: Never Used   Substance Use Topics    Alcohol use: No     Alcohol/week: 0.0 standard drinks     Current Outpatient Medications   Medication Sig Dispense Refill    bisoprolol (ZEBETA) 5 MG tablet Take 1 tablet by mouth daily 90 tablet 3    lisinopril-hydroCHLOROthiazide (PRINZIDE;ZESTORETIC) 20-12.5 MG per tablet Take 2 tablets by mouth daily 180 tablet 3    meloxicam (MOBIC) 7.5 MG tablet Take 1 tablet by mouth 2 times daily 180 tablet 3    omeprazole (PRILOSEC) 20 MG delayed release capsule Take 1 capsule by mouth 2 times daily 180 capsule 3    citalopram (CELEXA) 10 MG tablet Take 2 tablets by mouth daily 180 tablet 3    traZODone (DESYREL) 50 MG tablet Take 1 tablet by mouth nightly 90 tablet 3    ipratropium (ATROVENT) 0.06 % nasal spray 1 spray by Nasal route daily as needed for Rhinitis 1 Bottle 2    rOPINIRole (REQUIP) 2 MG tablet Take 1 tablet by mouth 3 times daily as needed (restless legs) 270 tablet 5    diazePAM (VALIUM) 5 MG tablet Take 5 mg by mouth every 6 hours as needed for Anxiety.  betamethasone dipropionate (DIPROLENE) 0.05 % cream Apply topically 2 times daily Apply topically 2 times daily. 45 g 11    hyoscyamine (ANASPAZ;LEVSIN) 125 MCG tablet Take 1 tablet by mouth every 4 hours as needed for Cramping 180 tablet 11    acyclovir (ZOVIRAX) 400 MG tablet Take 1 tablet by mouth 4 times daily 30 tablet 5    fluticasone (FLONASE) 50 MCG/ACT nasal spray 1 spray by Nasal route daily 1 Bottle 3    pseudoephedrine (SUDAFED) 60 MG tablet Take 60 mg by mouth every 6 hours as needed for Congestion      fexofenadine (ALLEGRA) 60 MG tablet Take 60 mg by mouth daily      saw palmetto 160 MG capsule Take 160 mg by mouth daily      magnesium hydroxide (MILK OF MAGNESIA) 400 MG/5ML suspension Take by mouth daily as needed for Constipation      vitamin E 1000 UNITS capsule Take 1,000 Units by mouth daily       No current facility-administered medications for this visit.       Allergies 672 hour(s))   Lipid Panel    Collection Time: 04/20/21  6:59 AM   Result Value Ref Range    Cholesterol, Total 160 160 - 199 mg/dL    Triglycerides 117 0 - 149 mg/dL    HDL 55 55 - 121 mg/dL    LDL Calculated 82 <100 mg/dL   CBC Auto Differential    Collection Time: 04/20/21  6:59 AM   Result Value Ref Range    WBC 5.8 4.8 - 10.8 K/uL    RBC 5.20 4.70 - 6.10 M/uL    Hemoglobin 13.8 (L) 14.0 - 18.0 g/dL    Hematocrit 43.5 42.0 - 52.0 %    MCV 83.7 80.0 - 94.0 fL    MCH 26.5 (L) 27.0 - 31.0 pg    MCHC 31.7 (L) 33.0 - 37.0 g/dL    RDW 14.9 (H) 11.5 - 14.5 %    Platelets 859 089 - 692 K/uL    MPV 10.7 9.4 - 12.4 fL    Neutrophils % 61.5 50.0 - 65.0 %    Lymphocytes % 22.0 20.0 - 40.0 %    Monocytes % 9.8 0.0 - 10.0 %    Eosinophils % 5.2 (H) 0.0 - 5.0 %    Basophils % 1.2 (H) 0.0 - 1.0 %    Neutrophils Absolute 3.6 1.5 - 7.5 K/uL    Immature Granulocytes # 0.0 K/uL    Lymphocytes Absolute 1.3 1.1 - 4.5 K/uL    Monocytes Absolute 0.60 0.00 - 0.90 K/uL    Eosinophils Absolute 0.30 0.00 - 0.60 K/uL    Basophils Absolute 0.10 0.00 - 0.20 K/uL   Comprehensive Metabolic Panel    Collection Time: 04/20/21  6:59 AM   Result Value Ref Range    Sodium 135 (L) 136 - 145 mmol/L    Potassium 4.3 3.5 - 5.0 mmol/L    Chloride 96 (L) 98 - 111 mmol/L    CO2 28 22 - 29 mmol/L    Anion Gap 11 7 - 19 mmol/L    Glucose 92 74 - 109 mg/dL    BUN 19 8 - 23 mg/dL    CREATININE 1.1 0.5 - 1.2 mg/dL    GFR Non-African American >60 >60    GFR African American >59 >59    Calcium 9.3 8.8 - 10.2 mg/dL    Total Protein 6.9 6.6 - 8.7 g/dL    Albumin 4.5 3.5 - 5.2 g/dL    Total Bilirubin 1.3 (H) 0.2 - 1.2 mg/dL    Alkaline Phosphatase 114 40 - 130 U/L    ALT 21 5 - 41 U/L    AST 30 5 - 40 U/L               Assessment & Plan: The following diagnoses and conditions are stable with no further orders unless indicated:  1. Annual physical exam  Discussed lifestyle changes such as diet and exercise.  Recommended eliminate processed food from diet such

## 2021-04-29 ENCOUNTER — OFFICE VISIT (OUTPATIENT)
Dept: NEUROLOGY | Age: 76
End: 2021-04-29
Payer: MEDICARE

## 2021-04-29 VITALS
WEIGHT: 196 LBS | SYSTOLIC BLOOD PRESSURE: 128 MMHG | DIASTOLIC BLOOD PRESSURE: 79 MMHG | HEIGHT: 72 IN | HEART RATE: 54 BPM | BODY MASS INDEX: 26.55 KG/M2

## 2021-04-29 DIAGNOSIS — G25.81 RESTLESS LEG SYNDROME: Primary | ICD-10-CM

## 2021-04-29 DIAGNOSIS — M79.671 PAIN IN BOTH FEET: ICD-10-CM

## 2021-04-29 DIAGNOSIS — M79.672 PAIN IN BOTH FEET: ICD-10-CM

## 2021-04-29 PROCEDURE — 1036F TOBACCO NON-USER: CPT | Performed by: PSYCHIATRY & NEUROLOGY

## 2021-04-29 PROCEDURE — 99213 OFFICE O/P EST LOW 20 MIN: CPT | Performed by: PSYCHIATRY & NEUROLOGY

## 2021-04-29 PROCEDURE — G8417 CALC BMI ABV UP PARAM F/U: HCPCS | Performed by: PSYCHIATRY & NEUROLOGY

## 2021-04-29 PROCEDURE — G8427 DOCREV CUR MEDS BY ELIG CLIN: HCPCS | Performed by: PSYCHIATRY & NEUROLOGY

## 2021-04-29 PROCEDURE — 1123F ACP DISCUSS/DSCN MKR DOCD: CPT | Performed by: PSYCHIATRY & NEUROLOGY

## 2021-04-29 PROCEDURE — 4040F PNEUMOC VAC/ADMIN/RCVD: CPT | Performed by: PSYCHIATRY & NEUROLOGY

## 2021-04-29 RX ORDER — DIAZEPAM 5 MG/1
5 TABLET ORAL EVERY 6 HOURS PRN
Qty: 100 TABLET | Refills: 5 | Status: SHIPPED | OUTPATIENT
Start: 2021-04-29 | End: 2021-11-03 | Stop reason: SDUPTHER

## 2021-04-29 NOTE — PROGRESS NOTES
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 The patient indicates he has had a long history of problem with his feet. He has spent approximately 8 to 12 hours per day for 40 years on his feet working five to six days a week period he has had inserts placed in his soles for the constant stress on his feet. He has noticed an increasing sensitivity over his feet. He will typically notice this at evening after he takes his shoes off. He describes a wave like sensation and moves over his feet and is difficult to describe. It is quite uncomfortable and limits his ability to sleep. He has been treated with Klonopin for possible restless leg syndrome without improvement in his symptoms. He denies any weakness or cramps in his legs. He has a chronic mild mid back pain. There is no involvement of his hands. He indicates his father also had complaints of pain in his feet when he got older in his 80's. Feels Requip is helping but now needs a second one. Feels slowly worsening with time. Neurontin did not help but Sinemet is helping at bedtime. Typically take 2 mg Requip at night. . 2 nights in a week may only sleep about 2 hours due to restless legs. Norco helps. uses it maybe 2x per week. alpha lipoic acid helping. Feel better coming off Neuronin and cognitive issues better. Left leg turing over years but worse after sliding off chair. x ray spine degenerative changes. Whe to orthopedics who gave him steroid injection and then therapy and now pain in feet is better. On Requip, Valium and alpha lipoic acid. He does not take the Norco. Stable. Feel sugar is trigger.     Chief complaint:restless legs      Active Ambulatory Problems     Diagnosis Date Noted    Pain in both feet 06/06/2016    Restless leg syndrome 06/06/2016    Dermatitis 08/28/2017    Fatigue 08/28/2017    Essential (primary) hypertension 08/28/2017    Gastroesophageal reflux disease without esophagitis 08/28/2017    RLS (restless legs syndrome) 08/28/2017    Gilbert's syndrome 08/28/2017    Hypertriglyceridemia 08/28/2017    Idiopathic peripheral neuropathy 08/28/2017    Generalized anxiety disorder 08/28/2017    Seasonal allergic rhinitis due to pollen 08/28/2017    Irritable bowel syndrome with constipation 08/28/2017    Chronic insomnia 08/28/2017    Generalized osteoarthritis of multiple sites 08/28/2017     Resolved Ambulatory Problems     Diagnosis Date Noted    No Resolved Ambulatory Problems     Past Medical History:   Diagnosis Date    GERD (gastroesophageal reflux disease)     Hypertension     Restless legs syndrome        Past Surgical History:   Procedure Laterality Date    HERNIA REPAIR  1999/2001    TURP  1994       Family History   Problem Relation Age of Onset    Dementia Mother     Heart Disease Father     Colon Cancer Father     Dementia Father     High Blood Pressure Father     Heart Attack Father        Allergies   Allergen Reactions    Sulfa Antibiotics        Social History     Socioeconomic History    Marital status:      Spouse name: Not on file    Number of children: Not on file    Years of education: Not on file    Highest education level: Not on file   Occupational History    Not on file   Social Needs    Financial resource strain: Not hard at all   [employer] insecurity     Worry: Never true     Inability: Never true    Transportation needs     Medical: No     Non-medical: No   Tobacco Use    Smoking status: Never Smoker    Smokeless tobacco: Never Used   Substance and Sexual Activity    Alcohol use: No     Alcohol/week: 0.0 standard drinks    Drug use: No    Sexual activity: Not on file   Lifestyle    Physical activity     Days per week: Not on file     Minutes per session: Not on file    Stress: Not on file   Relationships    Social connections     Talks on phone: Not on file     Gets together: Not on file     Attends nasal spray 1 spray by Nasal route daily as needed for Rhinitis 1 Bottle 2    rOPINIRole (REQUIP) 2 MG tablet Take 1 tablet by mouth 3 times daily as needed (restless legs) 270 tablet 5    diazePAM (VALIUM) 5 MG tablet Take 5 mg by mouth every 6 hours as needed for Anxiety.  betamethasone dipropionate (DIPROLENE) 0.05 % cream Apply topically 2 times daily Apply topically 2 times daily. 45 g 11    hyoscyamine (ANASPAZ;LEVSIN) 125 MCG tablet Take 1 tablet by mouth every 4 hours as needed for Cramping 180 tablet 11    acyclovir (ZOVIRAX) 400 MG tablet Take 1 tablet by mouth 4 times daily 30 tablet 5    fluticasone (FLONASE) 50 MCG/ACT nasal spray 1 spray by Nasal route daily 1 Bottle 3    pseudoephedrine (SUDAFED) 60 MG tablet Take 60 mg by mouth every 6 hours as needed for Congestion      fexofenadine (ALLEGRA) 60 MG tablet Take 60 mg by mouth daily      saw palmetto 160 MG capsule Take 160 mg by mouth daily      magnesium hydroxide (MILK OF MAGNESIA) 400 MG/5ML suspension Take by mouth daily as needed for Constipation      vitamin E 1000 UNITS capsule Take 1,000 Units by mouth daily       No current facility-administered medications for this visit. /79   Pulse 54   Ht 6' (1.829 m)   Wt 196 lb (88.9 kg)   BMI 26.58 kg/m²     Constitutional  well developed, well nourished. Eyes  conjunctiva normal.  Ear, nose, throat - No scars, masses, or lesions over external nose or ears, no atrophy of tongue  Neck-symmetric, no masses noted, no jugular vein distension  Respiration- chest wall appears symmetric, good expansion,   normal effort without use of accessory muscles  Musculoskeletal  no significant wasting of muscles noted, no bony deformities  Extremities-no clubbing, cyanosis or edema  Skin  warm, dry, and intact. No rash, erythema, or pallor.   Psychiatric  mood, affect, and behavior appear normal.      Neurological exam  Awake, alert, fluent oriented  appropriate affect  Attention and concentration appear appropriate  Recent and remote memory appears unremarkable  Speech normal without dysarthria  No clear issues with language of fund of knowledge    Cranial Nerve Exam     CN III, IV,VI-EOMI, No nystagmus, conjugate eye movements, no ptosis  CN VII-no facial assymetry        Motor Exam  antigravity throughout upper and lower extremities bilaterally    Tremors- no tremors in hands or head noted    Gait  Normal base and speed  No ataxia    No results found for: [id number]  Lab Results   Component Value Date    WBC 5.8 04/20/2021    HGB 13.8 (L) 04/20/2021    HCT 43.5 04/20/2021    MCV 83.7 04/20/2021     04/20/2021     Lab Results   Component Value Date     (L) 04/20/2021    K 4.3 04/20/2021    CL 96 (L) 04/20/2021    CO2 28 04/20/2021    BUN 19 04/20/2021    CREATININE 1.1 04/20/2021    GLUCOSE 92 04/20/2021    CALCIUM 9.3 04/20/2021    PROT 6.9 04/20/2021    LABALBU 4.5 04/20/2021    BILITOT 1.3 (H) 04/20/2021    ALKPHOS 114 04/20/2021    AST 30 04/20/2021    ALT 21 04/20/2021    LABGLOM >60 04/20/2021    GFRAA >59 04/20/2021           Assessment    ICD-10-CM    1. Restless leg syndrome  G25.81 diazePAM (VALIUM) 5 MG tablet   2. Pain in both feet  M79.671 diazePAM (VALIUM) 5 MG tablet    M79.672        His neurological examination previously was significant for a left foot drop and weakness in inversion and eversionfor some mild decreased vibration over his feet and decreased sensation lateral left lower leg The rest of this sensory exam and motor exam was unremarkable. His reflexes were intact and he had no sway on Romberg. Based upon his history and examination it is unclear what the etiology of his discomfort is. Certainly a sensory neuropathy in his feet is a possibility along with restless leg syndrome although his symptoms are quite limited to his feet.  The other possibility is that he has some chronic trauma to his feet from a history of long standing stress on these extremities from his work leading to his symptoms of discomfort. His EMG with nerve conduction study of his legs suggested a mild sensory neuropathy. His blood work including JERRI, heavy metals screen, hemoglobin A1C, HIV, RPR, sed rate, SPEP, and B12 was unremarkable. Requip can be helpful in both restless leg syndrome and neuropathy. He is off his Sinemet and neurontin at night. He is also taking trazadone at a low dose to help him sleep. At this time he will have his Valium increased to 5 mg at bedtime. He is on 2 mg Requip at night. He is off Lortab. The patient indicated understanding of the diagnoses and treatment plan. Continue current care as noted. . Follow 6 months      Plan    No orders of the defined types were placed in this encounter. Orders Placed This Encounter   Medications    diazePAM (VALIUM) 5 MG tablet     Sig: Take 1 tablet by mouth every 6 hours as needed for Anxiety for up to 30 days. Dispense:  100 tablet     Refill:  5       Return in about 6 months (around 10/29/2021).

## 2021-05-17 ENCOUNTER — TRANSCRIBE ORDERS (OUTPATIENT)
Dept: GENERAL RADIOLOGY | Facility: HOSPITAL | Age: 76
End: 2021-05-17

## 2021-05-17 ENCOUNTER — HOSPITAL ENCOUNTER (OUTPATIENT)
Dept: GENERAL RADIOLOGY | Facility: HOSPITAL | Age: 76
Discharge: HOME | End: 2021-05-17
Admitting: INTERNAL MEDICINE

## 2021-05-17 DIAGNOSIS — M25.531 RIGHT WRIST PAIN: ICD-10-CM

## 2021-05-17 DIAGNOSIS — M25.531 RIGHT WRIST PAIN: Primary | ICD-10-CM

## 2021-05-17 PROCEDURE — 73110 X-RAY EXAM OF WRIST: CPT

## 2021-06-07 DIAGNOSIS — D64.9 ANEMIA, UNSPECIFIED TYPE: ICD-10-CM

## 2021-06-07 LAB
BASOPHILS ABSOLUTE: 0.1 K/UL (ref 0–0.2)
BASOPHILS RELATIVE PERCENT: 0.9 % (ref 0–1)
EOSINOPHILS ABSOLUTE: 0.3 K/UL (ref 0–0.6)
EOSINOPHILS RELATIVE PERCENT: 3.3 % (ref 0–5)
FERRITIN: 31.2 NG/ML (ref 30–400)
FOLATE: >20 NG/ML (ref 4.5–32.2)
HCT VFR BLD CALC: 42.1 % (ref 42–52)
HEMOGLOBIN: 13.7 G/DL (ref 14–18)
IMMATURE GRANULOCYTES #: 0 K/UL
IRON: 45 UG/DL (ref 59–158)
LYMPHOCYTES ABSOLUTE: 1.3 K/UL (ref 1.1–4.5)
LYMPHOCYTES RELATIVE PERCENT: 16.4 % (ref 20–40)
MCH RBC QN AUTO: 26.8 PG (ref 27–31)
MCHC RBC AUTO-ENTMCNC: 32.5 G/DL (ref 33–37)
MCV RBC AUTO: 82.2 FL (ref 80–94)
MONOCYTES ABSOLUTE: 0.8 K/UL (ref 0–0.9)
MONOCYTES RELATIVE PERCENT: 9.6 % (ref 0–10)
NEUTROPHILS ABSOLUTE: 5.6 K/UL (ref 1.5–7.5)
NEUTROPHILS RELATIVE PERCENT: 69.4 % (ref 50–65)
PDW BLD-RTO: 15.8 % (ref 11.5–14.5)
PLATELET # BLD: 171 K/UL (ref 130–400)
PMV BLD AUTO: 10.5 FL (ref 9.4–12.4)
RBC # BLD: 5.12 M/UL (ref 4.7–6.1)
TOTAL IRON BINDING CAPACITY: 367 UG/DL (ref 250–400)
VITAMIN B-12: 444 PG/ML (ref 211–946)
WBC # BLD: 8.1 K/UL (ref 4.8–10.8)

## 2021-06-09 DIAGNOSIS — D64.9 ANEMIA, UNSPECIFIED TYPE: Primary | ICD-10-CM

## 2021-07-12 ENCOUNTER — TELEPHONE (OUTPATIENT)
Dept: NEUROLOGY | Age: 76
End: 2021-07-12

## 2021-07-12 NOTE — TELEPHONE ENCOUNTER
Spoke with patient about a changed appointment with Dr Piedad Rice , patient is aware of the changed appointment .

## 2021-09-12 ENCOUNTER — HOSPITAL ENCOUNTER (EMERGENCY)
Facility: HOSPITAL | Age: 76
Discharge: HOME | End: 2021-09-12
Attending: EMERGENCY MEDICINE | Admitting: INTERNAL MEDICINE

## 2021-09-12 ENCOUNTER — APPOINTMENT (OUTPATIENT)
Dept: GENERAL RADIOLOGY | Facility: HOSPITAL | Age: 76
End: 2021-09-12

## 2021-09-12 VITALS
SYSTOLIC BLOOD PRESSURE: 140 MMHG | HEIGHT: 72 IN | WEIGHT: 192 LBS | RESPIRATION RATE: 18 BRPM | DIASTOLIC BLOOD PRESSURE: 86 MMHG | TEMPERATURE: 98.4 F | OXYGEN SATURATION: 98 % | HEART RATE: 51 BPM | BODY MASS INDEX: 26.01 KG/M2

## 2021-09-12 DIAGNOSIS — I10 ESSENTIAL HYPERTENSION: Primary | ICD-10-CM

## 2021-09-12 LAB
ALBUMIN SERPL-MCNC: 4.3 G/DL (ref 3.5–5.2)
ALBUMIN/GLOB SERPL: 2 G/DL
ALP SERPL-CCNC: 122 U/L (ref 39–117)
ALT SERPL W P-5'-P-CCNC: 21 U/L (ref 1–41)
ANION GAP SERPL CALCULATED.3IONS-SCNC: 11 MMOL/L (ref 5–15)
AST SERPL-CCNC: 31 U/L (ref 1–40)
BASOPHILS # BLD AUTO: 0.05 10*3/MM3 (ref 0–0.2)
BASOPHILS NFR BLD AUTO: 0.5 % (ref 0–1.5)
BILIRUB SERPL-MCNC: 1.4 MG/DL (ref 0–1.2)
BUN SERPL-MCNC: 13 MG/DL (ref 8–23)
BUN/CREAT SERPL: 16.3 (ref 7–25)
CALCIUM SPEC-SCNC: 9 MG/DL (ref 8.6–10.5)
CHLORIDE SERPL-SCNC: 93 MMOL/L (ref 98–107)
CK SERPL-CCNC: 342 U/L (ref 20–200)
CO2 SERPL-SCNC: 26 MMOL/L (ref 22–29)
CREAT SERPL-MCNC: 0.8 MG/DL (ref 0.76–1.27)
D DIMER PPP FEU-MCNC: 0.37 MG/L (FEU) (ref 0–0.5)
DEPRECATED RDW RBC AUTO: 41.6 FL (ref 37–54)
EOSINOPHIL # BLD AUTO: 0.2 10*3/MM3 (ref 0–0.4)
EOSINOPHIL NFR BLD AUTO: 2.1 % (ref 0.3–6.2)
ERYTHROCYTE [DISTWIDTH] IN BLOOD BY AUTOMATED COUNT: 14.6 % (ref 12.3–15.4)
GFR SERPL CREATININE-BSD FRML MDRD: 94 ML/MIN/1.73
GLOBULIN UR ELPH-MCNC: 2.1 GM/DL
GLUCOSE SERPL-MCNC: 102 MG/DL (ref 65–99)
HCT VFR BLD AUTO: 40.6 % (ref 37.5–51)
HGB BLD-MCNC: 14.1 G/DL (ref 13–17.7)
IMM GRANULOCYTES # BLD AUTO: 0.04 10*3/MM3 (ref 0–0.05)
IMM GRANULOCYTES NFR BLD AUTO: 0.4 % (ref 0–0.5)
LIPASE SERPL-CCNC: 55 U/L (ref 13–60)
LYMPHOCYTES # BLD AUTO: 1.11 10*3/MM3 (ref 0.7–3.1)
LYMPHOCYTES NFR BLD AUTO: 11.5 % (ref 19.6–45.3)
MCH RBC QN AUTO: 27.4 PG (ref 26.6–33)
MCHC RBC AUTO-ENTMCNC: 34.7 G/DL (ref 31.5–35.7)
MCV RBC AUTO: 79 FL (ref 79–97)
MONOCYTES # BLD AUTO: 0.72 10*3/MM3 (ref 0.1–0.9)
MONOCYTES NFR BLD AUTO: 7.4 % (ref 5–12)
NEUTROPHILS NFR BLD AUTO: 7.57 10*3/MM3 (ref 1.7–7)
NEUTROPHILS NFR BLD AUTO: 78.1 % (ref 42.7–76)
NRBC BLD AUTO-RTO: 0 /100 WBC (ref 0–0.2)
NT-PROBNP SERPL-MCNC: 115.6 PG/ML (ref 0–1800)
PLATELET # BLD AUTO: 161 10*3/MM3 (ref 140–450)
PMV BLD AUTO: 10.1 FL (ref 6–12)
POTASSIUM SERPL-SCNC: 3.8 MMOL/L (ref 3.5–5.2)
PROT SERPL-MCNC: 6.4 G/DL (ref 6–8.5)
RBC # BLD AUTO: 5.14 10*6/MM3 (ref 4.14–5.8)
SODIUM SERPL-SCNC: 130 MMOL/L (ref 136–145)
TROPONIN T SERPL-MCNC: <0.01 NG/ML (ref 0–0.03)
TROPONIN T SERPL-MCNC: <0.01 NG/ML (ref 0–0.03)
TSH SERPL DL<=0.05 MIU/L-ACNC: 5.41 UIU/ML (ref 0.27–4.2)
WBC # BLD AUTO: 9.69 10*3/MM3 (ref 3.4–10.8)

## 2021-09-12 PROCEDURE — 93010 ELECTROCARDIOGRAM REPORT: CPT | Performed by: EMERGENCY MEDICINE

## 2021-09-12 PROCEDURE — 82550 ASSAY OF CK (CPK): CPT | Performed by: INTERNAL MEDICINE

## 2021-09-12 PROCEDURE — 83690 ASSAY OF LIPASE: CPT | Performed by: INTERNAL MEDICINE

## 2021-09-12 PROCEDURE — 85379 FIBRIN DEGRADATION QUANT: CPT | Performed by: INTERNAL MEDICINE

## 2021-09-12 PROCEDURE — 99284 EMERGENCY DEPT VISIT MOD MDM: CPT

## 2021-09-12 PROCEDURE — 93005 ELECTROCARDIOGRAM TRACING: CPT

## 2021-09-12 PROCEDURE — 71045 X-RAY EXAM CHEST 1 VIEW: CPT

## 2021-09-12 PROCEDURE — 84443 ASSAY THYROID STIM HORMONE: CPT | Performed by: INTERNAL MEDICINE

## 2021-09-12 PROCEDURE — 80053 COMPREHEN METABOLIC PANEL: CPT | Performed by: INTERNAL MEDICINE

## 2021-09-12 PROCEDURE — 85025 COMPLETE CBC W/AUTO DIFF WBC: CPT | Performed by: INTERNAL MEDICINE

## 2021-09-12 PROCEDURE — 93005 ELECTROCARDIOGRAM TRACING: CPT | Performed by: INTERNAL MEDICINE

## 2021-09-12 PROCEDURE — 83880 ASSAY OF NATRIURETIC PEPTIDE: CPT | Performed by: INTERNAL MEDICINE

## 2021-09-12 PROCEDURE — 84484 ASSAY OF TROPONIN QUANT: CPT | Performed by: INTERNAL MEDICINE

## 2021-09-12 RX ORDER — NITROGLYCERIN 0.4 MG/1
0.4 TABLET SUBLINGUAL
Status: DISCONTINUED | OUTPATIENT
Start: 2021-09-12 | End: 2021-09-13 | Stop reason: HOSPADM

## 2021-09-12 RX ORDER — ASPIRIN 81 MG/1
324 TABLET, CHEWABLE ORAL ONCE
Status: COMPLETED | OUTPATIENT
Start: 2021-09-12 | End: 2021-09-12

## 2021-09-12 RX ADMIN — ASPIRIN 324 MG: 81 TABLET, CHEWABLE ORAL at 20:29

## 2021-09-13 NOTE — ED PROVIDER NOTES
Subjective   Patient is a 76-year-old gentleman with known history of hypertension he states that his blood pressures been up more than usual the last couple days.  He states the last 3 days he has been having some nausea and fatigue and occasionally having some cold sweats.  He states tonight he developed the nausea along with the fatigue and cold sweats he states he really did not have true chest pain but did have some weird sensation at which point time he checked his blood pressure and noticed a systolic blood pressure over 190 and a diastolic over 110 because this he decided come to the ER for further evaluation.  He denies any slurred speech or difficulty with range of motion.          Review of Systems   Constitutional: Positive for diaphoresis and fatigue. Negative for chills and fever.   HENT: Negative for congestion, ear pain and sinus pressure.    Eyes: Negative for photophobia, pain and visual disturbance.   Respiratory: Negative for cough, chest tightness, shortness of breath and wheezing.    Cardiovascular: Negative for chest pain and palpitations.   Gastrointestinal: Positive for nausea. Negative for abdominal pain and diarrhea.   Endocrine: Negative for cold intolerance and heat intolerance.   Genitourinary: Negative for difficulty urinating and urgency.   Musculoskeletal: Negative for arthralgias, joint swelling and myalgias.   Skin: Negative for color change and wound.   Neurological: Negative for dizziness and headaches.   Hematological: Negative for adenopathy. Does not bruise/bleed easily.   Psychiatric/Behavioral: Negative for agitation, behavioral problems, confusion and decreased concentration.       Past Medical History:   Diagnosis Date   • Anxiety    • BPH (benign prostatic hyperplasia)    • Colon polyp    • Diverticulosis    • Essential hypertension    • GERD (gastroesophageal reflux disease)    • Hypertriglyceridemia    • Idiopathic peripheral neuropathy    • Osteoarthritis         Allergies   Allergen Reactions   • Sulfa Antibiotics Rash       Past Surgical History:   Procedure Laterality Date   • COLONOSCOPY  04/18/2016    diverticulosis, internal hemorrhoids,    • COLONOSCOPY  03/30/2011    internal hemorrhoid, diverticulosis   • COLONOSCOPY N/A 3/11/2021    Procedure: COLONOSCOPY WITH ANESTHESIA;  Surgeon: Ricky House MD;  Location:  [facility];  Service: Gastroenterology;  Laterality: N/A;  pre: hx polyps  post: diverticulosis. hemorrhoid.   Keenan Perez MD   • ENDOSCOPY N/A 3/11/2021    Procedure: ESOPHAGOGASTRODUODENOSCOPY WITH ANESTHESIA;  Surgeon: Ricky House MD;  Location:  [facility];  Service: Gastroenterology;  Laterality: N/A;  pre: GERD  post: hiatal hernia. gastric polyps.  Keenan Preez MD   • HERNIA REPAIR      inguinal   • TURP / TRANSURETHRAL INCISION / DRAINAGE PROSTATE         Family History   Problem Relation Age of Onset   • Colon cancer Neg Hx        Social History     Socioeconomic History   • Marital status:      Spouse name: Not on file   • Number of children: Not on file   • Years of education: Not on file   • Highest education level: Not on file   Tobacco Use   • Smoking status: Never Smoker   • Smokeless tobacco: Never Used   Substance and Sexual Activity   • Alcohol use: Not Currently   • Drug use: Not Currently   • Sexual activity: Defer           Objective   Physical Exam  Vitals and nursing note reviewed.   Constitutional:       Appearance: Normal appearance. He is well-developed.   HENT:      Head: Normocephalic and atraumatic.   Eyes:      Extraocular Movements: Extraocular movements intact.      Conjunctiva/sclera: Conjunctivae normal.      Pupils: Pupils are equal, round, and reactive to light.   Cardiovascular:      Rate and Rhythm: Normal rate and regular rhythm.      Heart sounds: Normal heart sounds.   Pulmonary:      Effort: Pulmonary effort is normal.      Breath sounds: Normal breath sounds.    Abdominal:      General: Bowel sounds are normal.      Palpations: Abdomen is soft.      Tenderness: There is no abdominal tenderness.   Musculoskeletal:         General: Normal range of motion.      Cervical back: Normal range of motion and neck supple.   Skin:     General: Skin is warm and dry.      Findings: No rash.   Neurological:      General: No focal deficit present.      Mental Status: He is alert and oriented to person, place, and time.      Cranial Nerves: No cranial nerve deficit.      Deep Tendon Reflexes: Reflexes are normal and symmetric.   Psychiatric:         Behavior: Behavior normal.         Thought Content: Thought content normal.         Procedures           ED Course  ED Course as of Sep 12 2308   Sun Sep 12, 2021   2246 Labs and imaging reviewed with the patient the patient be discharged home with recommendation to follow-up with primary care provider for further work-up    [RW]      ED Course User Index  [RW] Ryan Molina MD                                         HEART Score (for prediction of 6-week risk of major adverse cardiac event) reviewed and/or performed as part of the patient evaluation and treatment planning process.  The result associated with this review/performance is: 3       MDM    Final diagnoses:   Essential hypertension       ED Disposition  ED Disposition     ED Disposition Condition Comment    Discharge Stable           Keenan Perez MD  [address] [place] [address] [address]  [facility] [address]  [phone]    In 1 day  Further work-up of persistent hypertension and fatigue         Medication List      No changes were made to your prescriptions during this visit.          Ryan Molina MD  09/12/21 2248       Ryan Molina MD  09/12/21 2308

## 2021-09-14 ENCOUNTER — OFFICE VISIT (OUTPATIENT)
Dept: FAMILY MEDICINE CLINIC | Age: 76
End: 2021-09-14
Payer: MEDICARE

## 2021-09-14 VITALS
HEART RATE: 54 BPM | TEMPERATURE: 97.4 F | DIASTOLIC BLOOD PRESSURE: 96 MMHG | WEIGHT: 193 LBS | SYSTOLIC BLOOD PRESSURE: 158 MMHG | OXYGEN SATURATION: 99 % | BODY MASS INDEX: 26.18 KG/M2

## 2021-09-14 DIAGNOSIS — E03.8 SUBCLINICAL HYPOTHYROIDISM: ICD-10-CM

## 2021-09-14 DIAGNOSIS — R42 DIZZINESS: ICD-10-CM

## 2021-09-14 DIAGNOSIS — R06.02 SHORTNESS OF BREATH: ICD-10-CM

## 2021-09-14 DIAGNOSIS — E87.1 HYPONATREMIA: ICD-10-CM

## 2021-09-14 DIAGNOSIS — R94.31 ABNORMAL ELECTROCARDIOGRAM (ECG) (EKG): ICD-10-CM

## 2021-09-14 DIAGNOSIS — I10 ESSENTIAL (PRIMARY) HYPERTENSION: Primary | ICD-10-CM

## 2021-09-14 LAB
QT INTERVAL: 438 MS
QT INTERVAL: 464 MS
QTC INTERVAL: 422 MS
QTC INTERVAL: 427 MS

## 2021-09-14 PROCEDURE — 1036F TOBACCO NON-USER: CPT | Performed by: FAMILY MEDICINE

## 2021-09-14 PROCEDURE — 1123F ACP DISCUSS/DSCN MKR DOCD: CPT | Performed by: FAMILY MEDICINE

## 2021-09-14 PROCEDURE — G8428 CUR MEDS NOT DOCUMENT: HCPCS | Performed by: FAMILY MEDICINE

## 2021-09-14 PROCEDURE — 99214 OFFICE O/P EST MOD 30 MIN: CPT | Performed by: FAMILY MEDICINE

## 2021-09-14 PROCEDURE — 4040F PNEUMOC VAC/ADMIN/RCVD: CPT | Performed by: FAMILY MEDICINE

## 2021-09-14 PROCEDURE — G8417 CALC BMI ABV UP PARAM F/U: HCPCS | Performed by: FAMILY MEDICINE

## 2021-09-14 RX ORDER — HYDROXYZINE HYDROCHLORIDE 10 MG/1
TABLET, FILM COATED ORAL
COMMUNITY
Start: 2021-09-09

## 2021-09-14 RX ORDER — HYDROCORTISONE ACETATE 25 MG/1
25 SUPPOSITORY RECTAL EVERY 12 HOURS
Qty: 12 SUPPOSITORY | Refills: 11 | Status: SHIPPED | OUTPATIENT
Start: 2021-09-14

## 2021-09-14 RX ORDER — LEVOCETIRIZINE DIHYDROCHLORIDE 5 MG/1
5 TABLET, FILM COATED ORAL NIGHTLY
COMMUNITY

## 2021-09-14 RX ORDER — AMLODIPINE BESYLATE 2.5 MG/1
2.5 TABLET ORAL DAILY
Qty: 30 TABLET | Refills: 5 | Status: SHIPPED | OUTPATIENT
Start: 2021-09-14 | End: 2022-01-31 | Stop reason: SDUPTHER

## 2021-09-14 NOTE — PROGRESS NOTES
[facility] PHYSICIAN SERVICES  [address] [facility] MEDICINE  [address] [address] [address]  Dept: [phone]  Dept Fax: [phone]  Loc: [phone]    Deedee Lynn is a 67 y.o. male who presents today for his medical conditions/complaints as noted below. Deedee Lynn is here for No chief complaint on file. HPI:   CC: Here today to discuss the followin-year-old went to the emergency department on  for concerns for elevated blood pressure. He states the previous 3 days he been experiencing some nausea and fatigue and occasional cold sweats. He had checked his blood pressure and it was 190/110 and he decided to go to emergency department to be further evaluated. No changes were made to his prescriptions    In the emergency department laboratory work and EKG were obtained. His troponin was negative. D-dimer was negative  TSH was slightly elevated at 5.4  Chest x-ray was clear. Atopic dermatitis has developed on the left arm and has been going to dermatology. He is taking hydroxyzine and zyrtec for his itching. He had adverse effects to these two medications. His rls was worse on this combination as well.          HPI    Past Medical History:   Diagnosis Date    Generalized anxiety disorder     Generalized osteoarthritis of multiple sites 2017    GERD (gastroesophageal reflux disease)     Hypertension     Hypertriglyceridemia 2017    Idiopathic peripheral neuropathy 2017    Irritable bowel syndrome with constipation 2017    Restless legs syndrome     Seasonal allergic rhinitis due to pollen 2017      Past Surgical History:   Procedure Laterality Date    HERNIA REPAIR      BARBARA         Family History   Problem Relation Age of Onset    Dementia Mother     Heart Disease Father     Colon Cancer Father     Dementia Father     High Blood Pressure Father     Heart Attack Father        Social History Tobacco Use    Smoking status: Never Smoker    Smokeless tobacco: Never Used   Substance Use Topics    Alcohol use: No     Alcohol/week: 0.0 standard drinks     Current Outpatient Medications   Medication Sig Dispense Refill    hydrOXYzine (ATARAX) 10 MG tablet TAKE 2 TABLETS BY MOUTH ONCE DAILY AT BEDTIME AS NEEDED FOR ITCHING.  levocetirizine (XYZAL) 5 MG tablet Take 5 mg by mouth nightly      amLODIPine (NORVASC) 2.5 MG tablet Take 1 tablet by mouth daily 30 tablet 5    hydrocortisone (ANUSOL-HC) 25 MG suppository Place 1 suppository rectally every 12 hours 12 suppository 11    bisoprolol (ZEBETA) 5 MG tablet Take 1 tablet by mouth daily 90 tablet 3    lisinopril-hydroCHLOROthiazide (PRINZIDE;ZESTORETIC) 20-12.5 MG per tablet Take 2 tablets by mouth daily 180 tablet 3    meloxicam (MOBIC) 7.5 MG tablet Take 1 tablet by mouth 2 times daily 180 tablet 3    omeprazole (PRILOSEC) 20 MG delayed release capsule Take 1 capsule by mouth 2 times daily 180 capsule 3    citalopram (CELEXA) 10 MG tablet Take 2 tablets by mouth daily 180 tablet 3    traZODone (DESYREL) 50 MG tablet Take 1 tablet by mouth nightly 90 tablet 3    ipratropium (ATROVENT) 0.06 % nasal spray 1 spray by Nasal route daily as needed for Rhinitis 1 Bottle 2    rOPINIRole (REQUIP) 2 MG tablet Take 1 tablet by mouth 3 times daily as needed (restless legs) 270 tablet 5    diazePAM (VALIUM) 5 MG tablet Take 5 mg by mouth every 6 hours as needed for Anxiety.  betamethasone dipropionate (DIPROLENE) 0.05 % cream Apply topically 2 times daily Apply topically 2 times daily.  45 g 11    hyoscyamine (ANASPAZ;LEVSIN) 125 MCG tablet Take 1 tablet by mouth every 4 hours as needed for Cramping 180 tablet 11    acyclovir (ZOVIRAX) 400 MG tablet Take 1 tablet by mouth 4 times daily 30 tablet 5    fluticasone (FLONASE) 50 MCG/ACT nasal spray 1 spray by Nasal route daily 1 Bottle 3    pseudoephedrine (SUDAFED) 60 MG tablet Take 60 mg by mouth every 6 hours as needed for Congestion      fexofenadine (ALLEGRA) 60 MG tablet Take 60 mg by mouth daily      saw palmetto 160 MG capsule Take 160 mg by mouth daily      magnesium hydroxide (MILK OF MAGNESIA) 400 MG/5ML suspension Take by mouth daily as needed for Constipation      vitamin E 1000 UNITS capsule Take 1,000 Units by mouth daily       No current facility-administered medications for this visit. Allergies   Allergen Reactions    Sulfa Antibiotics        Health Maintenance   Topic Date Due    Flu vaccine (1) 09/01/2021    Potassium monitoring  04/20/2022    Creatinine monitoring  04/20/2022    Annual Wellness Visit (AWV)  04/27/2022    DTaP/Tdap/Td vaccine (2 - Td or Tdap) 04/26/2031    Shingles Vaccine  Completed    Pneumococcal 65+ years Vaccine  Completed    COVID-19 Vaccine  Completed    Hepatitis C screen  Completed    Hepatitis A vaccine  Aged Out    Hepatitis B vaccine  Aged Out    Hib vaccine  Aged Out    Meningococcal (ACWY) vaccine  Aged Out       Subjective:      Review of Systems   Constitutional: Positive for fatigue. Negative for chills and fever. HENT: Negative for congestion. Respiratory: Positive for shortness of breath. Negative for cough and chest tightness. Cardiovascular: Negative for chest pain, palpitations and leg swelling. Gastrointestinal: Negative for abdominal pain, anal bleeding, constipation, diarrhea and nausea. Genitourinary: Negative for difficulty urinating. Psychiatric/Behavioral: Negative for agitation, behavioral problems, confusion, decreased concentration, dysphoric mood and hallucinations. The patient is nervous/anxious. The patient is not hyperactive. SeeHPI for visit specific review of symptoms. All others negative      Objective:   BP (!) 158/96   Pulse 54   Temp 97.4 °F (36.3 °C)   Wt 193 lb (87.5 kg)   SpO2 99%   BMI 26.18 kg/m²   Physical Exam  Constitutional:       Appearance: He is well-developed.  He is not ill-appearing. Eyes:      Pupils: Pupils are equal, round, and reactive to light. Cardiovascular:      Rate and Rhythm: Normal rate and regular rhythm. Heart sounds: No murmur heard. No friction rub. Pulmonary:      Effort: Pulmonary effort is normal. No respiratory distress. Breath sounds: Normal breath sounds. No wheezing or rales. Abdominal:      General: Bowel sounds are normal. There is no distension. Palpations: Abdomen is soft. Tenderness: There is no abdominal tenderness. There is no guarding or rebound. Musculoskeletal:      Cervical back: Normal range of motion and neck supple. Neurological:      Mental Status: He is alert. Psychiatric:         Behavior: Behavior normal.           No results found for this or any previous visit (from the past 672 hour(s)). Assessment & Plan: The following diagnoses and conditions are stable with no further orders unless indicated:  1. Essential (primary) hypertension  BP Readings from Last 3 Encounters:   09/14/21 (!) 158/96   04/29/21 128/79   04/26/21 132/88     Suggest restart amlodipine 2.5 mg daily  We will follow up in 2 weeks  - amLODIPine (NORVASC) 2.5 MG tablet; Take 1 tablet by mouth daily  Dispense: 30 tablet; Refill: 5    2. Hyponatremia    - T4, Free; Future  - Comprehensive Metabolic Panel; Future  - Creatinine, Random Urine; Future  - Magnesium; Future  - Osmolality, Serum; Future  - Osmolality, Urine; Future  - Sodium, urine, random; Future  - Uric Acid; Future    3. Subclinical hypothyroidism    - T3, Free; Future  - Thyroid Peroxidase Antibody; Future  - TSH without Reflex; Future    4. Shortness of breath  - ECHO Exercise Stress Test  Concern for sudden increase in blood pressure and shortness of breath would suggest getting a stress echocardiogram  5. Dizziness    - VL DUP CAROTID BILATERAL; Future      6.  Abnormal electrocardiogram (ECG) (EKG)     - ECHO Exercise Stress Test            Return in about 2 weeks (around 9/28/2021) for Routine follow up - 15 minute visit. Discussed use, benefit, and side effects of prescribed medications. All patient questions answered. Pt voiced understanding. Reviewed health maintenance. Instructedto continue current medications, diet and exercise. Patient agreed with treatmentplan. Follow up as directed. Note dictated using Dragon Dictation software  Sometimes this dictation software makes erroneous transcriptions.

## 2021-09-15 ENCOUNTER — TRANSCRIBE ORDERS (OUTPATIENT)
Dept: ADMINISTRATIVE | Facility: HOSPITAL | Age: 76
End: 2021-09-15

## 2021-09-15 DIAGNOSIS — R42 DIZZINESS: ICD-10-CM

## 2021-09-15 DIAGNOSIS — R94.31 ABNORMAL EKG: Primary | ICD-10-CM

## 2021-09-15 ASSESSMENT — ENCOUNTER SYMPTOMS
COUGH: 0
CONSTIPATION: 0
CHEST TIGHTNESS: 0
DIARRHEA: 0
ANAL BLEEDING: 0
NAUSEA: 0
SHORTNESS OF BREATH: 1
ABDOMINAL PAIN: 0

## 2021-09-28 ENCOUNTER — OFFICE VISIT (OUTPATIENT)
Dept: FAMILY MEDICINE CLINIC | Age: 76
End: 2021-09-28
Payer: MEDICARE

## 2021-09-28 VITALS
DIASTOLIC BLOOD PRESSURE: 84 MMHG | OXYGEN SATURATION: 99 % | SYSTOLIC BLOOD PRESSURE: 124 MMHG | TEMPERATURE: 97.5 F | BODY MASS INDEX: 26.85 KG/M2 | HEART RATE: 56 BPM | WEIGHT: 198 LBS

## 2021-09-28 DIAGNOSIS — I10 ESSENTIAL (PRIMARY) HYPERTENSION: Primary | ICD-10-CM

## 2021-09-28 DIAGNOSIS — D50.9 IRON DEFICIENCY ANEMIA, UNSPECIFIED IRON DEFICIENCY ANEMIA TYPE: ICD-10-CM

## 2021-09-28 PROCEDURE — G8417 CALC BMI ABV UP PARAM F/U: HCPCS | Performed by: FAMILY MEDICINE

## 2021-09-28 PROCEDURE — 4040F PNEUMOC VAC/ADMIN/RCVD: CPT | Performed by: FAMILY MEDICINE

## 2021-09-28 PROCEDURE — 99213 OFFICE O/P EST LOW 20 MIN: CPT | Performed by: FAMILY MEDICINE

## 2021-09-28 PROCEDURE — G8428 CUR MEDS NOT DOCUMENT: HCPCS | Performed by: FAMILY MEDICINE

## 2021-09-28 PROCEDURE — 1123F ACP DISCUSS/DSCN MKR DOCD: CPT | Performed by: FAMILY MEDICINE

## 2021-09-28 PROCEDURE — 1036F TOBACCO NON-USER: CPT | Performed by: FAMILY MEDICINE

## 2021-09-28 NOTE — PROGRESS NOTES
[facility] PHYSICIAN SERVICES  [facility] FAMILY MEDICINE  [address] [address] [address]  Dept: [phone]  Dept Fax: [phone]  Loc: [phone]    Elizabeth Plummer is a 67 y.o. male who presents today for his medical conditions/complaints as noted below. Elizabeth Plummer is here for Follow-up        HPI:   CC: Here today to discuss the followin-week follow-up. Last visit he was following up from ER visit where his blood pressure was 190/110. His blood pressure in the office was 158/96 and he was started on amlodipine 2.5 mg daily. Tolerating medication well. His blood pressure has been consistently less than 140/90. He has a carotid ultrasound as well as a stress test scheduled for later this week. He is on hydroxyzine and zyrtec from his dermatologist for idiopathic urticaria seems to be responding well. Lc MAGAÑA    Past Medical History:   Diagnosis Date    Generalized anxiety disorder     Generalized osteoarthritis of multiple sites 2017    GERD (gastroesophageal reflux disease)     Hypertension     Hypertriglyceridemia 2017    Idiopathic peripheral neuropathy 2017    Irritable bowel syndrome with constipation 2017    Restless legs syndrome     Seasonal allergic rhinitis due to pollen 2017      Past Surgical History:   Procedure Laterality Date    HERNIA REPAIR      BARBARA         Family History   Problem Relation Age of Onset    Dementia Mother     Heart Disease Father     Colon Cancer Father     Dementia Father     High Blood Pressure Father     Heart Attack Father        Social History     Tobacco Use    Smoking status: Never Smoker    Smokeless tobacco: Never Used   Substance Use Topics    Alcohol use: No     Alcohol/week: 0.0 standard drinks     Current Outpatient Medications   Medication Sig Dispense Refill    hydrOXYzine (ATARAX) 10 MG tablet TAKE 2 TABLETS BY MOUTH ONCE DAILY AT BEDTIME AS NEEDED FOR ITCHING.  levocetirizine (XYZAL) 5 MG tablet Take 5 mg by mouth nightly      amLODIPine (NORVASC) 2.5 MG tablet Take 1 tablet by mouth daily 30 tablet 5    hydrocortisone (ANUSOL-HC) 25 MG suppository Place 1 suppository rectally every 12 hours 12 suppository 11    bisoprolol (ZEBETA) 5 MG tablet Take 1 tablet by mouth daily 90 tablet 3    lisinopril-hydroCHLOROthiazide (PRINZIDE;ZESTORETIC) 20-12.5 MG per tablet Take 2 tablets by mouth daily 180 tablet 3    meloxicam (MOBIC) 7.5 MG tablet Take 1 tablet by mouth 2 times daily 180 tablet 3    omeprazole (PRILOSEC) 20 MG delayed release capsule Take 1 capsule by mouth 2 times daily 180 capsule 3    citalopram (CELEXA) 10 MG tablet Take 2 tablets by mouth daily 180 tablet 3    traZODone (DESYREL) 50 MG tablet Take 1 tablet by mouth nightly 90 tablet 3    ipratropium (ATROVENT) 0.06 % nasal spray 1 spray by Nasal route daily as needed for Rhinitis 1 Bottle 2    rOPINIRole (REQUIP) 2 MG tablet Take 1 tablet by mouth 3 times daily as needed (restless legs) 270 tablet 5    diazePAM (VALIUM) 5 MG tablet Take 5 mg by mouth every 6 hours as needed for Anxiety.  betamethasone dipropionate (DIPROLENE) 0.05 % cream Apply topically 2 times daily Apply topically 2 times daily.  45 g 11    hyoscyamine (ANASPAZ;LEVSIN) 125 MCG tablet Take 1 tablet by mouth every 4 hours as needed for Cramping 180 tablet 11    acyclovir (ZOVIRAX) 400 MG tablet Take 1 tablet by mouth 4 times daily 30 tablet 5    fluticasone (FLONASE) 50 MCG/ACT nasal spray 1 spray by Nasal route daily 1 Bottle 3    pseudoephedrine (SUDAFED) 60 MG tablet Take 60 mg by mouth every 6 hours as needed for Congestion      fexofenadine (ALLEGRA) 60 MG tablet Take 60 mg by mouth daily      saw palmetto 160 MG capsule Take 160 mg by mouth daily      magnesium hydroxide (MILK OF MAGNESIA) 400 MG/5ML suspension Take by mouth daily as needed for Constipation      vitamin E 1000 UNITS capsule Take 1,000 Units by mouth daily       No current facility-administered medications for this visit. Allergies   Allergen Reactions    Sulfa Antibiotics        Health Maintenance   Topic Date Due    Flu vaccine (1) 09/01/2021    Potassium monitoring  04/20/2022    Creatinine monitoring  04/20/2022    Annual Wellness Visit (AWV)  04/27/2022    DTaP/Tdap/Td vaccine (2 - Td or Tdap) 04/26/2031    Shingles Vaccine  Completed    Pneumococcal 65+ years Vaccine  Completed    COVID-19 Vaccine  Completed    Hepatitis C screen  Completed    Hepatitis A vaccine  Aged Out    Hepatitis B vaccine  Aged Out    Hib vaccine  Aged Out    Meningococcal (ACWY) vaccine  Aged Out       Subjective:      Review of Systems    Review of Systems   Constitutional: Negative for chills and fever. HENT: Negative for congestion. Respiratory: Negative for cough, chest tightness and shortness of breath. Cardiovascular: Negative for chest pain, palpitations and leg swelling. Gastrointestinal: Negative for abdominal pain, anal bleeding, constipation, diarrhea and nausea. Genitourinary: Negative for difficulty urinating. Psychiatric/Behavioral: Negative. SeeHPI for visit specific review of symptoms. All others negative      Objective:   /84   Pulse 56   Temp 97.5 °F (36.4 °C)   Wt 198 lb (89.8 kg)   SpO2 99%   BMI 26.85 kg/m²   Physical Exam  Physical Exam   Constitutional: He appears well-developed. Does not appear ill. Neck: Normal range of motion. Neck supple. No masses. Neck Symmetric. Normal tracheal position. No thyroid enlargement  Cardiovascular: Normal rate and regular rhythm. Exam reveals no friction rub. Carotid arteries: no bruit observed. No murmur heard. Respiratory:  Effort normal and breath sounds normal. No respiratory distress. No wheezes. No rales. No use of accessory muscles or intercostal retractions. Neurological: alert. Psychiatric: normal mood and affect.  His behavior is normal. Normal judgement and insight observed. No results found for this or any previous visit (from the past 672 hour(s)). Assessment & Plan: The following diagnoses and conditions are stable with no further orders unless indicated:  1. Essential (primary) hypertension  BP Readings from Last 3 Encounters:   09/28/21 124/84   09/14/21 (!) 158/96   04/29/21 128/79     Continue with current medication regimen  Advised him to get laboratory work today. He is scheduled to get a stress test later this week. Return today (on 9/28/2021). Discussed use, benefit, and side effects of prescribed medications. All patient questions answered. Pt voiced understanding. Reviewed health maintenance. Instructedto continue current medications, diet and exercise. Patient agreed with treatmentplan. Follow up as directed. Note dictated using Dragon Dictation software  Sometimes this dictation software makes erroneous transcriptions.

## 2021-09-30 ENCOUNTER — HOSPITAL ENCOUNTER (OUTPATIENT)
Dept: ULTRASOUND IMAGING | Facility: HOSPITAL | Age: 76
Discharge: HOME | End: 2021-09-30

## 2021-09-30 ENCOUNTER — HOSPITAL ENCOUNTER (OUTPATIENT)
Dept: CARDIOLOGY | Facility: HOSPITAL | Age: 76
Discharge: HOME | End: 2021-09-30

## 2021-09-30 VITALS
WEIGHT: 195 LBS | SYSTOLIC BLOOD PRESSURE: 144 MMHG | BODY MASS INDEX: 26.41 KG/M2 | HEIGHT: 72 IN | DIASTOLIC BLOOD PRESSURE: 84 MMHG | HEART RATE: 57 BPM

## 2021-09-30 DIAGNOSIS — R42 DIZZINESS: ICD-10-CM

## 2021-09-30 DIAGNOSIS — R94.31 ABNORMAL EKG: ICD-10-CM

## 2021-09-30 PROCEDURE — 93350 STRESS TTE ONLY: CPT | Performed by: INTERNAL MEDICINE

## 2021-09-30 PROCEDURE — 93352 ADMIN ECG CONTRAST AGENT: CPT | Performed by: INTERNAL MEDICINE

## 2021-09-30 PROCEDURE — 93880 EXTRACRANIAL BILAT STUDY: CPT

## 2021-09-30 PROCEDURE — 25010000002 PERFLUTREN 6.52 MG/ML SUSPENSION: Performed by: INTERNAL MEDICINE

## 2021-09-30 PROCEDURE — 93017 CV STRESS TEST TRACING ONLY: CPT

## 2021-09-30 PROCEDURE — 93350 STRESS TTE ONLY: CPT

## 2021-09-30 PROCEDURE — 93880 EXTRACRANIAL BILAT STUDY: CPT | Performed by: SURGERY

## 2021-09-30 PROCEDURE — 93018 CV STRESS TEST I&R ONLY: CPT | Performed by: INTERNAL MEDICINE

## 2021-09-30 RX ADMIN — PERFLUTREN 8.48 MG: 6.52 INJECTION, SUSPENSION INTRAVENOUS at 12:34

## 2021-10-01 DIAGNOSIS — R42 DIZZINESS: ICD-10-CM

## 2021-10-01 LAB
BH CV STRESS BP STAGE 1: NORMAL
BH CV STRESS BP STAGE 2: NORMAL
BH CV STRESS BP STAGE 3: NORMAL
BH CV STRESS DURATION MIN STAGE 1: 3
BH CV STRESS DURATION MIN STAGE 2: 3
BH CV STRESS DURATION MIN STAGE 3: 0
BH CV STRESS DURATION SEC STAGE 1: 0
BH CV STRESS DURATION SEC STAGE 2: 0
BH CV STRESS DURATION SEC STAGE 3: 45
BH CV STRESS GRADE STAGE 1: 10
BH CV STRESS GRADE STAGE 2: 12
BH CV STRESS GRADE STAGE 3: 14
BH CV STRESS HR STAGE 1: 84
BH CV STRESS HR STAGE 2: 104
BH CV STRESS HR STAGE 3: 116
BH CV STRESS METS STAGE 1: 5
BH CV STRESS METS STAGE 2: 7.5
BH CV STRESS METS STAGE 3: 10
BH CV STRESS PROTOCOL 1: NORMAL
BH CV STRESS RECOVERY BP: NORMAL MMHG
BH CV STRESS RECOVERY HR: 76 BPM
BH CV STRESS SPEED STAGE 1: 1.7
BH CV STRESS SPEED STAGE 2: 2.5
BH CV STRESS SPEED STAGE 3: 3.4
BH CV STRESS STAGE 1: 1
BH CV STRESS STAGE 2: 2
BH CV STRESS STAGE 3: 3
MAXIMAL PREDICTED HEART RATE: 143 BPM
PERCENT MAX PREDICTED HR: 81.12 %
STRESS BASELINE BP: NORMAL MMHG
STRESS BASELINE HR: 57 BPM
STRESS PERCENT HR: 95 %
STRESS POST ESTIMATED WORKLOAD: 10 METS
STRESS POST EXERCISE DUR MIN: 6 MIN
STRESS POST EXERCISE DUR SEC: 45 SEC
STRESS POST PEAK BP: NORMAL MMHG
STRESS POST PEAK HR: 116 BPM
STRESS TARGET HR: 122 BPM

## 2021-10-26 ENCOUNTER — OFFICE VISIT (OUTPATIENT)
Dept: FAMILY MEDICINE CLINIC | Age: 76
End: 2021-10-26
Payer: MEDICARE

## 2021-10-26 VITALS
TEMPERATURE: 97.2 F | SYSTOLIC BLOOD PRESSURE: 128 MMHG | WEIGHT: 198 LBS | HEART RATE: 53 BPM | BODY MASS INDEX: 26.85 KG/M2 | DIASTOLIC BLOOD PRESSURE: 88 MMHG | OXYGEN SATURATION: 98 %

## 2021-10-26 DIAGNOSIS — F41.8 DEPRESSION WITH ANXIETY: ICD-10-CM

## 2021-10-26 DIAGNOSIS — G25.81 RESTLESS LEG SYNDROME: ICD-10-CM

## 2021-10-26 DIAGNOSIS — G47.10 HYPERSOMNIA: ICD-10-CM

## 2021-10-26 DIAGNOSIS — I10 ESSENTIAL (PRIMARY) HYPERTENSION: Primary | ICD-10-CM

## 2021-10-26 DIAGNOSIS — R53.83 OTHER FATIGUE: ICD-10-CM

## 2021-10-26 DIAGNOSIS — E03.8 SUBCLINICAL HYPOTHYROIDISM: ICD-10-CM

## 2021-10-26 DIAGNOSIS — K21.9 GASTROESOPHAGEAL REFLUX DISEASE WITHOUT ESOPHAGITIS: ICD-10-CM

## 2021-10-26 PROCEDURE — G8482 FLU IMMUNIZE ORDER/ADMIN: HCPCS | Performed by: FAMILY MEDICINE

## 2021-10-26 PROCEDURE — 1123F ACP DISCUSS/DSCN MKR DOCD: CPT | Performed by: FAMILY MEDICINE

## 2021-10-26 PROCEDURE — G8417 CALC BMI ABV UP PARAM F/U: HCPCS | Performed by: FAMILY MEDICINE

## 2021-10-26 PROCEDURE — 99214 OFFICE O/P EST MOD 30 MIN: CPT | Performed by: FAMILY MEDICINE

## 2021-10-26 PROCEDURE — 1036F TOBACCO NON-USER: CPT | Performed by: FAMILY MEDICINE

## 2021-10-26 PROCEDURE — G8428 CUR MEDS NOT DOCUMENT: HCPCS | Performed by: FAMILY MEDICINE

## 2021-10-26 PROCEDURE — 4040F PNEUMOC VAC/ADMIN/RCVD: CPT | Performed by: FAMILY MEDICINE

## 2021-10-26 ASSESSMENT — ENCOUNTER SYMPTOMS
SHORTNESS OF BREATH: 0
ABDOMINAL PAIN: 0
ANAL BLEEDING: 0
CONSTIPATION: 0
DIARRHEA: 0
COUGH: 0
CHEST TIGHTNESS: 0
NAUSEA: 0

## 2021-10-26 NOTE — PROGRESS NOTES
[facility] PHYSICIAN SERVICES  [facility] FAMILY MEDICINE  [address] [address] [address]  Dept: [phone]  Dept Fax: [phone]  Loc: [phone]    Angela Palomares is a 67 y.o. male who presents today for his medical conditions/complaints as noted below. Angela Palomares is here for 6 Month Follow-Up        HPI:   CC: Here today to discuss the following:    He was in the emergency department on September 12 for elevated blood pressures. He followed up with me on September 14 and was started on amlodipine 2.5 mg daily. Cardiac stress test was obtained on October 1 which was normal. He also had carotid ultrasounds which showed less than 50% stenosis bilaterally. At his follow-up visit with me on September 28 his blood pressure was 124/84. He was tolerating the amlodipine. Blood pressure checks at home  Average 107 to 138 with average of 405 systolic. Diastolic 58U. He takes celexa 10 mg for anxiety as well. Overall his eczema is doing better. He has been on hydroxyzine. He has been feeling tired and had daytime somnolence.   Wondering if a sleep study would be indicated      HPI    Past Medical History:   Diagnosis Date    Generalized anxiety disorder     Generalized osteoarthritis of multiple sites 8/28/2017    GERD (gastroesophageal reflux disease)     Hypertension     Hypertriglyceridemia 8/28/2017    Idiopathic peripheral neuropathy 8/28/2017    Irritable bowel syndrome with constipation 8/28/2017    Restless legs syndrome     Seasonal allergic rhinitis due to pollen 8/28/2017      Past Surgical History:   Procedure Laterality Date    HERNIA REPAIR  1999/2001    BARBARA  1994       Family History   Problem Relation Age of Onset    Dementia Mother     Heart Disease Father     Colon Cancer Father     Dementia Father     High Blood Pressure Father     Heart Attack Father        Social History     Tobacco Use    Smoking status: Never Smoker    Smokeless tobacco: Never Used   Substance Use Topics    Alcohol use: No     Alcohol/week: 0.0 standard drinks     Current Outpatient Medications   Medication Sig Dispense Refill    hydrOXYzine (ATARAX) 10 MG tablet TAKE 2 TABLETS BY MOUTH ONCE DAILY AT BEDTIME AS NEEDED FOR ITCHING.  levocetirizine (XYZAL) 5 MG tablet Take 5 mg by mouth nightly      amLODIPine (NORVASC) 2.5 MG tablet Take 1 tablet by mouth daily 30 tablet 5    hydrocortisone (ANUSOL-HC) 25 MG suppository Place 1 suppository rectally every 12 hours 12 suppository 11    bisoprolol (ZEBETA) 5 MG tablet Take 1 tablet by mouth daily 90 tablet 3    lisinopril-hydroCHLOROthiazide (PRINZIDE;ZESTORETIC) 20-12.5 MG per tablet Take 2 tablets by mouth daily 180 tablet 3    meloxicam (MOBIC) 7.5 MG tablet Take 1 tablet by mouth 2 times daily 180 tablet 3    omeprazole (PRILOSEC) 20 MG delayed release capsule Take 1 capsule by mouth 2 times daily 180 capsule 3    citalopram (CELEXA) 10 MG tablet Take 2 tablets by mouth daily 180 tablet 3    traZODone (DESYREL) 50 MG tablet Take 1 tablet by mouth nightly 90 tablet 3    ipratropium (ATROVENT) 0.06 % nasal spray 1 spray by Nasal route daily as needed for Rhinitis 1 Bottle 2    rOPINIRole (REQUIP) 2 MG tablet Take 1 tablet by mouth 3 times daily as needed (restless legs) 270 tablet 5    diazePAM (VALIUM) 5 MG tablet Take 5 mg by mouth every 6 hours as needed for Anxiety.  betamethasone dipropionate (DIPROLENE) 0.05 % cream Apply topically 2 times daily Apply topically 2 times daily.  45 g 11    hyoscyamine (ANASPAZ;LEVSIN) 125 MCG tablet Take 1 tablet by mouth every 4 hours as needed for Cramping 180 tablet 11    acyclovir (ZOVIRAX) 400 MG tablet Take 1 tablet by mouth 4 times daily 30 tablet 5    fluticasone (FLONASE) 50 MCG/ACT nasal spray 1 spray by Nasal route daily 1 Bottle 3    pseudoephedrine (SUDAFED) 60 MG tablet Take 60 mg by mouth every 6 hours as needed for Congestion      fexofenadine (ALLEGRA) 60 MG tablet Take 60 mg by mouth daily      saw palmetto 160 MG capsule Take 160 mg by mouth daily      magnesium hydroxide (MILK OF MAGNESIA) 400 MG/5ML suspension Take by mouth daily as needed for Constipation      vitamin E 1000 UNITS capsule Take 1,000 Units by mouth daily       No current facility-administered medications for this visit. Allergies   Allergen Reactions    Sulfa Antibiotics        Health Maintenance   Topic Date Due    Annual Wellness Visit (AWV)  04/27/2022    Potassium monitoring  09/28/2022    Creatinine monitoring  09/28/2022    DTaP/Tdap/Td vaccine (2 - Td or Tdap) 04/26/2031    Flu vaccine  Completed    Shingles Vaccine  Completed    Pneumococcal 65+ years Vaccine  Completed    COVID-19 Vaccine  Completed    Hepatitis C screen  Completed    Hepatitis A vaccine  Aged Out    Hepatitis B vaccine  Aged Out    Hib vaccine  Aged Out    Meningococcal (ACWY) vaccine  Aged Out       Subjective:      Review of Systems   Constitutional: Positive for fatigue. Negative for chills and fever. HENT: Negative for congestion. Respiratory: Negative for cough, chest tightness and shortness of breath. Cardiovascular: Negative for chest pain, palpitations and leg swelling. Gastrointestinal: Negative for abdominal pain, anal bleeding, constipation, diarrhea and nausea. Genitourinary: Negative for difficulty urinating. Psychiatric/Behavioral: Negative. Review of Systems   Constitutional: Negative for chills and fever. HENT: Negative for congestion. Respiratory: Negative for cough, chest tightness and shortness of breath. Cardiovascular: Negative for chest pain, palpitations and leg swelling. Gastrointestinal: Negative for abdominal pain, anal bleeding, constipation, diarrhea and nausea. Genitourinary: Negative for difficulty urinating. Psychiatric/Behavioral: Negative. SeeHPI for visit specific review of symptoms.   All others negative      Objective:   /88   Pulse 53   Temp 97.2 °F (36.2 °C)   Wt 198 lb (89.8 kg)   SpO2 98%   BMI 26.85 kg/m²   Physical Exam  Constitutional:       Appearance: He is well-developed. He is not ill-appearing. Eyes:      Pupils: Pupils are equal, round, and reactive to light. Cardiovascular:      Rate and Rhythm: Normal rate and regular rhythm. Heart sounds: No murmur heard. No friction rub. Pulmonary:      Effort: Pulmonary effort is normal. No respiratory distress. Breath sounds: Normal breath sounds. No wheezing or rales. Abdominal:      General: Bowel sounds are normal. There is no distension. Palpations: Abdomen is soft. Tenderness: There is no abdominal tenderness. There is no guarding or rebound. Musculoskeletal:      Cervical back: Normal range of motion and neck supple. Neurological:      Mental Status: He is alert. Psychiatric:         Behavior: Behavior normal.       Physical Exam   Constitutional: He appears well-developed. Does not appear ill. Neck: Normal range of motion. Neck supple. No masses. Neck Symmetric. Normal tracheal position. No thyroid enlargement  Cardiovascular: Normal rate and regular rhythm. Exam reveals no friction rub. Carotid arteries: no bruit observed. No murmur heard. Respiratory:  Effort normal and breath sounds normal. No respiratory distress. No wheezes. No rales. No use of accessory muscles or intercostal retractions. Neurological: alert. Psychiatric: normal mood and affect. His behavior is normal. Normal judgement and insight observed. No results found for this or any previous visit (from the past 672 hour(s)). Assessment & Plan: The following diagnoses and conditions are stable with no further orders unless indicated:  1.  Essential (primary) hypertension  BP Readings from Last 3 Encounters:   10/26/21 128/88   09/28/21 124/84   09/14/21 (!) 158/96     Stable  Continue with amlodipine 2.5

## 2021-11-03 ENCOUNTER — OFFICE VISIT (OUTPATIENT)
Dept: NEUROLOGY | Age: 76
End: 2021-11-03
Payer: MEDICARE

## 2021-11-03 VITALS
HEIGHT: 72 IN | WEIGHT: 193 LBS | HEART RATE: 53 BPM | BODY MASS INDEX: 26.14 KG/M2 | SYSTOLIC BLOOD PRESSURE: 147 MMHG | DIASTOLIC BLOOD PRESSURE: 75 MMHG

## 2021-11-03 DIAGNOSIS — R26.89 IMBALANCE: ICD-10-CM

## 2021-11-03 DIAGNOSIS — G25.81 RESTLESS LEG SYNDROME: Primary | ICD-10-CM

## 2021-11-03 DIAGNOSIS — M79.671 PAIN IN BOTH FEET: ICD-10-CM

## 2021-11-03 DIAGNOSIS — M79.672 PAIN IN BOTH FEET: ICD-10-CM

## 2021-11-03 PROCEDURE — 4040F PNEUMOC VAC/ADMIN/RCVD: CPT | Performed by: PSYCHIATRY & NEUROLOGY

## 2021-11-03 PROCEDURE — G8482 FLU IMMUNIZE ORDER/ADMIN: HCPCS | Performed by: PSYCHIATRY & NEUROLOGY

## 2021-11-03 PROCEDURE — 1123F ACP DISCUSS/DSCN MKR DOCD: CPT | Performed by: PSYCHIATRY & NEUROLOGY

## 2021-11-03 PROCEDURE — 99214 OFFICE O/P EST MOD 30 MIN: CPT | Performed by: PSYCHIATRY & NEUROLOGY

## 2021-11-03 PROCEDURE — 1036F TOBACCO NON-USER: CPT | Performed by: PSYCHIATRY & NEUROLOGY

## 2021-11-03 PROCEDURE — G8427 DOCREV CUR MEDS BY ELIG CLIN: HCPCS | Performed by: PSYCHIATRY & NEUROLOGY

## 2021-11-03 PROCEDURE — G8417 CALC BMI ABV UP PARAM F/U: HCPCS | Performed by: PSYCHIATRY & NEUROLOGY

## 2021-11-03 RX ORDER — DIAZEPAM 5 MG/1
5 TABLET ORAL EVERY 6 HOURS PRN
Qty: 100 TABLET | Refills: 5 | Status: SHIPPED | OUTPATIENT
Start: 2021-11-03 | End: 2021-12-03

## 2021-11-03 RX ORDER — ROPINIROLE 2 MG/1
2 TABLET, FILM COATED ORAL 3 TIMES DAILY PRN
Qty: 270 TABLET | Refills: 5 | Status: SHIPPED | OUTPATIENT
Start: 2021-11-03 | End: 2022-05-04 | Stop reason: SDUPTHER

## 2021-11-03 NOTE — PROGRESS NOTES
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 The patient indicates he has had a long history of problem with his feet. He has spent approximately 8 to 12 hours per day for 40 years on his feet working five to six days a week period he has had inserts placed in his soles for the constant stress on his feet. He has noticed an increasing sensitivity over his feet. He will typically notice this at evening after he takes his shoes off. He describes a wave like sensation and moves over his feet and is difficult to describe. It is quite uncomfortable and limits his ability to sleep. He has been treated with Klonopin for possible restless leg syndrome without improvement in his symptoms. He denies any weakness or cramps in his legs. He has a chronic mild mid back pain. There is no involvement of his hands. He indicates his father also had complaints of pain in his feet when he got older in his 80's. Feels Requip is helping but now needs a second one. Feels slowly worsening with time. Neurontin did not help but Sinemet is helping at bedtime. Typically take 2 mg Requip at night. . 2 nights in a week may only sleep about 2 hours due to restless legs. Norco helps. uses it maybe 2x per week. alpha lipoic acid helping. Feel better coming off Neuronin and cognitive issues better. Left leg turing over years but worse after sliding off chair. x ray spine degenerative changes. Whe to orthopedics who gave him steroid injection and then therapy and now pain in feet is better. On Requip, Valium and alpha lipoic acid. He does not take the Norco. Stable. Feel sugar is trigger. Drowsy in day. Planning on sleep study.      Chief complaint:restless legs      Active Ambulatory Problems     Diagnosis Date Noted    Pain in both feet 06/06/2016    Restless leg syndrome 06/06/2016    Dermatitis 08/28/2017    Fatigue 08/28/2017    Essential (primary) hypertension Needs: No Transportation Needs    Lack of Transportation (Medical): No    Lack of Transportation (Non-Medical): No   Physical Activity:     Days of Exercise per Week:     Minutes of Exercise per Session:    Stress:     Feeling of Stress :    Social Connections:     Frequency of Communication with Friends and Family:     Frequency of Social Gatherings with Friends and Family:     Attends [employer] Services:     Active Member of Clubs or Organizations:     Attends Club or Organization Meetings:     Marital Status:    Intimate Partner Violence:     Fear of Current or Ex-Partner:     Emotionally Abused:     Physically Abused:     Sexually Abused:      Review of Systems     Constitutional  No fever or chills. No diaphoresis or significant fatigue. HENT   No tinnitus or significant hearing loss. Eyes  no sudden vision change or eye pain  Respiratory  no significant shortness of breath or cough  Cardiovascular  no chest pain No palpitations or significant leg swelling  Gastrointestinal  no abdominal swelling or pain. Genitourinary  No difficulty urinating, dysuria  Musculoskeletal  no back pain or myalgia. Skin  no color change or rash  Neurologic  No seizures. No lateralizing weakness. Hematologic  no easy bruising or excessive bleeding. Psychiatric  no severe anxiety or nervousness. All other review of systems are negative.         Current Outpatient Medications   Medication Sig Dispense Refill    diazePAM (VALIUM) 5 MG tablet Take 1 tablet by mouth every 6 hours as needed for Anxiety for up to 30 days. 100 tablet 5    rOPINIRole (REQUIP) 2 MG tablet Take 1 tablet by mouth 3 times daily as needed (restless legs) 270 tablet 5    hydrOXYzine (ATARAX) 10 MG tablet TAKE 2 TABLETS BY MOUTH ONCE DAILY AT BEDTIME AS NEEDED FOR ITCHING.       levocetirizine (XYZAL) 5 MG tablet Take 5 mg by mouth nightly      amLODIPine (NORVASC) 2.5 MG tablet Take 1 tablet by mouth daily 30 tablet 5    hydrocortisone (ANUSOL-HC) 25 MG suppository Place 1 suppository rectally every 12 hours 12 suppository 11    bisoprolol (ZEBETA) 5 MG tablet Take 1 tablet by mouth daily 90 tablet 3    lisinopril-hydroCHLOROthiazide (PRINZIDE;ZESTORETIC) 20-12.5 MG per tablet Take 2 tablets by mouth daily 180 tablet 3    meloxicam (MOBIC) 7.5 MG tablet Take 1 tablet by mouth 2 times daily 180 tablet 3    omeprazole (PRILOSEC) 20 MG delayed release capsule Take 1 capsule by mouth 2 times daily 180 capsule 3    citalopram (CELEXA) 10 MG tablet Take 2 tablets by mouth daily 180 tablet 3    traZODone (DESYREL) 50 MG tablet Take 1 tablet by mouth nightly 90 tablet 3    ipratropium (ATROVENT) 0.06 % nasal spray 1 spray by Nasal route daily as needed for Rhinitis 1 Bottle 2    diazePAM (VALIUM) 5 MG tablet Take 5 mg by mouth every 6 hours as needed for Anxiety.  betamethasone dipropionate (DIPROLENE) 0.05 % cream Apply topically 2 times daily Apply topically 2 times daily. 45 g 11    hyoscyamine (ANASPAZ;LEVSIN) 125 MCG tablet Take 1 tablet by mouth every 4 hours as needed for Cramping 180 tablet 11    acyclovir (ZOVIRAX) 400 MG tablet Take 1 tablet by mouth 4 times daily 30 tablet 5    fluticasone (FLONASE) 50 MCG/ACT nasal spray 1 spray by Nasal route daily 1 Bottle 3    pseudoephedrine (SUDAFED) 60 MG tablet Take 60 mg by mouth every 6 hours as needed for Congestion      fexofenadine (ALLEGRA) 60 MG tablet Take 60 mg by mouth daily      saw palmetto 160 MG capsule Take 160 mg by mouth daily      magnesium hydroxide (MILK OF MAGNESIA) 400 MG/5ML suspension Take by mouth daily as needed for Constipation      vitamin E 1000 UNITS capsule Take 1,000 Units by mouth daily       No current facility-administered medications for this visit. BP (!) 147/75   Pulse 53   Ht 6' (1.829 m)   Wt 193 lb (87.5 kg)   BMI 26.18 kg/m²     Constitutional  well developed, well nourished.     Eyes  conjunctiva normal.  Ear, nose, throat - No scars, masses, or lesions over external nose or ears, no atrophy of tongue  Neck-symmetric, no masses noted, no jugular vein distension  Respiration- chest wall appears symmetric, good expansion,   normal effort without use of accessory muscles  Musculoskeletal  no significant wasting of muscles noted, no bony deformities  Extremities-no clubbing, cyanosis or edema  Skin  warm, dry, and intact. No rash, erythema, or pallor. Psychiatric  mood, affect, and behavior appear normal.      Neurological exam  Awake, alert, fluent oriented  appropriate affect  Attention and concentration appear appropriate  Recent and remote memory appears unremarkable  Speech normal without dysarthria  No clear issues with language of fund of knowledge    Cranial Nerve Exam     CN III, IV,VI-EOMI, No nystagmus, conjugate eye movements, no ptosis  CN VII-no facial assymetry        Motor Exam  antigravity throughout upper and lower extremities bilaterally    Tremors- no tremors in hands or head noted    Gait  Normal base and speed  No ataxia    Lab Results   Component Value Date    [id number] 619 09/28/2021     Lab Results   Component Value Date    WBC 7.4 09/28/2021    HGB 14.5 09/28/2021    HCT 46.3 09/28/2021    MCV 86.2 09/28/2021     09/28/2021     Lab Results   Component Value Date     09/28/2021    K 4.3 09/28/2021    CL 96 (L) 09/28/2021    CO2 27 09/28/2021    BUN 13 09/28/2021    CREATININE 1.0 09/28/2021    GLUCOSE 102 09/28/2021    CALCIUM 9.4 09/28/2021    PROT 6.9 09/28/2021    LABALBU 4.4 09/28/2021    BILITOT 0.8 09/28/2021    ALKPHOS 141 (H) 09/28/2021    AST 33 09/28/2021    ALT 31 09/28/2021    LABGLOM >60 09/28/2021    GFRAA >59 09/28/2021           Assessment    ICD-10-CM    1. Restless leg syndrome  G25.81 Ambulatory referral to Physical Therapy     diazePAM (VALIUM) 5 MG tablet     rOPINIRole (REQUIP) 2 MG tablet   2.  Pain in both feet  M79.671 Ambulatory referral to Physical Therapy V51.404 diazePAM (VALIUM) 5 MG tablet     rOPINIRole (REQUIP) 2 MG tablet   3. Imbalance  R26.89 Ambulatory referral to Physical Therapy       His neurological examination previously was significant for a left foot drop and weakness in inversion and eversion for some mild decreased vibration over his feet and decreased sensation lateral left lower leg The rest of this sensory exam and motor exam was unremarkable. His reflexes were intact and he had no sway on Romberg. Based upon his history and examination it is unclear what the etiology of his discomfort is. Certainly a sensory neuropathy in his feet is a possibility along with restless leg syndrome although his symptoms are quite limited to his feet. The other possibility is that he has some chronic trauma to his feet from a history of long standing stress on these extremities from his work leading to his symptoms of discomfort. His EMG with nerve conduction study of his legs suggested a mild sensory neuropathy. His blood work including JERRI, heavy metals screen, hemoglobin A1C, HIV, RPR, sed rate, SPEP, and B12 was unremarkable. Requip can be helpful in both restless leg syndrome and neuropathy. He is off his Sinemet and neurontin at night. He is also taking trazadone at a low dose to help him sleep. At this time he will have his Valium increased to 5 mg at bedtime. He is on 2 mg Requip at night. He is off Lortab. He will be referred to PT again. The patient indicated understanding of the diagnoses and treatment plan. Continue present care as noted. . Follow 6 months      Plan    Orders Placed This Encounter   Procedures    Ambulatory referral to Physical Therapy       Orders Placed This Encounter   Medications    diazePAM (VALIUM) 5 MG tablet     Sig: Take 1 tablet by mouth every 6 hours as needed for Anxiety for up to 30 days.      Dispense:  100 tablet     Refill:  5    rOPINIRole (REQUIP) 2 MG tablet     Sig: Take 1 tablet by mouth 3 times daily as needed (restless legs)     Dispense:  270 tablet     Refill:  5       Return in about 6 months (around 5/3/2022).

## 2022-01-07 DIAGNOSIS — I10 ESSENTIAL (PRIMARY) HYPERTENSION: ICD-10-CM

## 2022-01-07 RX ORDER — BISOPROLOL FUMARATE 5 MG/1
5 TABLET ORAL DAILY
Qty: 90 TABLET | Refills: 3 | Status: SHIPPED | OUTPATIENT
Start: 2022-01-07 | End: 2023-01-07

## 2022-01-24 ENCOUNTER — HOSPITAL ENCOUNTER (OUTPATIENT)
Dept: SLEEP CENTER | Age: 77
Discharge: HOME | End: 2022-01-26
Payer: MEDICARE

## 2022-01-24 PROCEDURE — 95810 POLYSOM 6/> YRS 4/> PARAM: CPT

## 2022-01-24 PROCEDURE — 95810 POLYSOM 6/> YRS 4/> PARAM: CPT | Performed by: INTERNAL MEDICINE

## 2022-01-25 NOTE — PROGRESS NOTES
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 The patient indicates he has had a long history of problem with his feet. He has spent approximately 8 to 12 hours per day for 40 years on his feet working five to six days a week period he has had inserts placed in his soles for the constant stress on his feet. He has noticed an increasing sensitivity over his feet. He will typically notice this at evening after he takes his shoes off. He describes a wave like sensation and moves over his feet and is difficult to describe. It is quite uncomfortable and limits his ability to sleep. He has been treated with Klonopin for possible restless leg syndrome without improvement in his symptoms. He denies any weakness or cramps in his legs. He has a chronic mild mid back pain. There is no involvement of his hands. He indicates his father also had complaints of pain in his feet when he got older in his 80's. Feels Requip is helping but now needs a second one. Feels slowly worsening with time. Neurontin did not help but Sinemet is helping at bedtime. Typically take 2 mg Requip at night. . 2 nights in a week may only sleep about 2 hours due to restless legs. Norco helps. uses it maybe 2x per week. alpha lipoic acid helping. Feel better coming off Neuronin and cognitive issues better. Left leg turing over years but worse after sliding off chair. x ray spine degenerative changes. Whe to orthopedics who gave him steroid injection and then therapy and now pain in feet is better. On Requip, Valium and alpha lipoic acid. He does not take the Norco. Stable. Feel sugar is trigger. Drowsy in day. Planning on sleep study. Height: 72 inches  Weight: 198 lbs  BMI: 26.85  Neck Circ: 17 inches  MALLAMPATI:3  ESS: 10/24      Type of Study: PSG  Time Stage Position Snore Hypopnea Obs Apnea Kodak Apnea PAP O2   2200 awake supine no no no no  RA   2300 2 Left side no no no no  RA   2400 2 Right side no no no no  RA   0100 rem Right side yes yes no yes  RA   0200 rem supine yes yes yes yes  RA   0300 2 supine yes yes no no  RA   0400 awake supine yes yes no yes  RA     Summary:  Patient slept off and on, He had multipe centrals and hypopneas. DME: [facility]       The study was reviewed briefly with Tammy Bradley. He will be notified of the formal results and recommendations after the study is scored and interpreted. The report will be sent to his referring provider.     Technician: Jeannette Salinas RRT, RPSGT

## 2022-01-27 NOTE — PROGRESS NOTES
[address], [facility] [address]  Phone [phone] Fax [phone]    Polysomnography Report  Patient Name Christos Hunter Account Number [de-identified]    1945 Referring Provider Orlando Lao M.D.   Age/ Gender 67 years/M Interpreting physician Hal Louie M.D., [facility]   Neck circumference/  Mallampati classification 17.0 in/class 3 Night Technician Clifford Hawthorne RRT, RPSGT   Butler score 10/24 Scoring Technician Halima Landrum, CRT, RPSGT   Height 72.0 in Indications for the test excessive daytime somnolence   Weight 198.0 lbs Test Diagnostic Polysomnogram   BMI 26.9 Date of test 2022     Procedure  A Diagnostic Polysomnogram was conducted on the night of 2022. The study was performed and scored per AASM guidelines. The following were monitored: frontal, central, and occipital EEG, electrooculogram (EOG), submentalis EMG, nasal and oral airflow, intranasal pressure, thoracic plethysmography, abdominal plethysmography, anterior tibialis EMG, electrocardiogram, body position, and positive airway pressure (PAP). Arterial oxygen saturation was monitored with a pulse oximeter. The study was scored utilizing 30 second epochs. Hypopneas were scored using per AASM definition VIII, D, 1B.     Sleep Scoring Data  Lights out 8:54:16 PM Sleep latency 21.2 min Time in N1 163.0 min N1% 59.9%   Lights on 4:32:46 AM WASO 165.3 min Time in N2 101.5 min N2% 37.3%   TIB/.5 min Sleep efficiency 66.1% Time in N3 0.0 min N3% 0.0%   .0 min REM latency 221.0 min Time in R 7.5 min R% 2.8%     Respiratory Events Summary   NREM REM Total   Hypopnea index 23.1 0.0 23.2   Apnea index 7.5 0.0 7.3   RERA index 0.0 0.0 0.0   AHI 30.6 0.0 30.4   RDI (AHI + RERA index) 30.6 0.0 30.4     Respiratory Events by Sleep Stage   Obstructive Apneas OA Index Central Apneas CA Index Mixed Apneas MA Index   Hypopneas H Index   RERAs   R Index NREM 21 4.8 12 2.7 0 0.0 102 23.1 0 0.0   REM 0 0.0 0 0.0 0 0.0 0 0.0 0 0.0   Total 21 4.6 12 2.6 0 0.0 105 23.2 0 0.0     Respiratory Events by Body Position   Time (min) Obstructive Apneas OA Index Central Apneas CA Index Mixed Apneas MA Index   Hypopneas H Index   RERAs RERA  Index Total  AHI Total RDI   Supine 215.1  21 12.3 12 7.0 0 0.0 76 44.5 0 0.0 63.8 63.8   R/Supine                                           L/Supine                                           Right 27.7  0 0.0 0 0.0 0 0.0 16 54.9 0 0.0 54.9 54.9   Left 0.3  0 0.0 0 0.0 0 0.0 0 0.0 0 0.0 0.0 0.0   Prone 164.5  0 0.0 0 0.0 0 0.0 13 5.1 0 0.0 5.1 5.1   R/Prone                                           L/Prone                                           Up                                             Snoring  Snoring Rating (loudness 0-4) 1   Snoring Amount (% of total sleep time with snoring) 23.6%     Oximetry Data              Wake NREM REM TST   Average Saturation  94% 94% 95% 94%   Desaturation Index (#/hour)  18.8 0.0 18.5   Desaturation Max Duration (sec)  68.5 N/A 68.5   Minimum O2 Saturation    85%     Oximetry Distribution              WaKe REM NREM TOTAL %TIB   <90% (min) 0.7 0.0 4.4 5.1 1.1%   <85% (min) 0.1 0.0 0.0 0.1 0.0%   <80% (min) 0.1 0.0 0.0 0.1 0.0%   <75% (min) 0.1 0.0 0.0 0.1 0.0%   <70% (min) 0.1 0.0 0.0 0.1 0.0%   <60% (min) 0.0 0.0 0.0 0.0 0.0%   <50% (min) 0.0 0.0 0.0 0.0 0.0%   Fail    (min) 4.9 0.0 0.0 4.9      Respiratory Pattern   Present Absent Duration   Hypoventilation  ? N/A   Cheyne Yanes Respirations  ? N/A   Periodic Breathing  ? N/A     Heart Rate   Mean heart rate (bmp) Maximum heart rate (bmp)   During recording       74   During sleep 51.5 68     Heart Rhythm Summary  Normal sinus rhythm     Heart Rhythm Events  Type of events Present Absent Rate-Duration/Total Duration   Bradycardia  ? N/A   Asystole  ? N/A   Sinus Tachycardia During Sleep  ? N/A   Narrow Complex Tachycardia  ?  N/A   Wide Complex Tachycardia  ? N/A   Atrial Fibrillation  ? N/A     Other Arrhythmias  ? N/A     Arousals   NREM REM Total Arousal Index   Spontaneous 6 0 9 2.0   Respiratory 124 0 131 28.9   Snore 4 0 4 0.9   Leg movement 6 0 7 1.5   Total 140 0 155 34.2     Periodic Limb Movement Data (legs unless otherwise noted)   Leg Movements PLMS PLMS with arousal   # of events 8 8 7   Index (#/hr TST) 1.8 1.8 1.5     Interpretation:     1. 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 Recommendations:    1. PAP titration. 2. Avoid risky activity such as driving if sleepy. 3. Discuss sleep hygiene with the patient. Durga Butler MD, [facility], [facility]        Note:   The interpreting physician named above, reviewed this record in its entirety, including sleep staging, EMG activity, EEG, EKG, breathing parameters, oxygen saturation, body position, and behavior unless otherwise noted. The interpretation is based on this information in addition to available clinical history and physical examination data.

## 2022-01-30 DIAGNOSIS — G47.31 COMPLEX SLEEP APNEA SYNDROME: Primary | ICD-10-CM

## 2022-01-31 ENCOUNTER — APPOINTMENT (OUTPATIENT)
Dept: GENERAL RADIOLOGY | Age: 77
End: 2022-01-31
Payer: MEDICARE

## 2022-01-31 ENCOUNTER — APPOINTMENT (OUTPATIENT)
Dept: CT IMAGING | Age: 77
End: 2022-01-31
Payer: MEDICARE

## 2022-01-31 ENCOUNTER — HOSPITAL ENCOUNTER (EMERGENCY)
Age: 77
Discharge: ANOTHER HOSPITAL | End: 2022-01-31
Payer: MEDICARE

## 2022-01-31 VITALS
BODY MASS INDEX: 25.06 KG/M2 | HEIGHT: 72 IN | TEMPERATURE: 98.5 F | SYSTOLIC BLOOD PRESSURE: 129 MMHG | DIASTOLIC BLOOD PRESSURE: 77 MMHG | OXYGEN SATURATION: 93 % | HEART RATE: 84 BPM | RESPIRATION RATE: 18 BRPM | WEIGHT: 185 LBS

## 2022-01-31 DIAGNOSIS — I10 ESSENTIAL (PRIMARY) HYPERTENSION: ICD-10-CM

## 2022-01-31 DIAGNOSIS — S27.329A CONTUSION OF LUNG, UNSPECIFIED LATERALITY, INITIAL ENCOUNTER: ICD-10-CM

## 2022-01-31 DIAGNOSIS — S22.42XA CLOSED FRACTURE OF MULTIPLE RIBS OF LEFT SIDE, INITIAL ENCOUNTER: ICD-10-CM

## 2022-01-31 DIAGNOSIS — S32.9XXA CLOSED NONDISPLACED FRACTURE OF PELVIS, UNSPECIFIED PART OF PELVIS, INITIAL ENCOUNTER (HCC): Primary | ICD-10-CM

## 2022-01-31 DIAGNOSIS — S37.032A CLOSED KIDNEY LACERATION, LEFT, INITIAL ENCOUNTER: ICD-10-CM

## 2022-01-31 LAB
ANION GAP SERPL CALCULATED.3IONS-SCNC: 15 MMOL/L (ref 7–19)
BASOPHILS ABSOLUTE: 0 K/UL (ref 0–0.2)
BASOPHILS RELATIVE PERCENT: 0.2 % (ref 0–1)
BUN BLDV-MCNC: 15 MG/DL (ref 8–23)
CALCIUM SERPL-MCNC: 10 MG/DL (ref 8.8–10.2)
CHLORIDE BLD-SCNC: 96 MMOL/L (ref 98–111)
CO2: 26 MMOL/L (ref 22–29)
CREAT SERPL-MCNC: 1.3 MG/DL (ref 0.5–1.2)
EOSINOPHILS ABSOLUTE: 0 K/UL (ref 0–0.6)
EOSINOPHILS RELATIVE PERCENT: 0 % (ref 0–5)
GFR AFRICAN AMERICAN: >59
GFR NON-AFRICAN AMERICAN: 53
GLUCOSE BLD-MCNC: 144 MG/DL (ref 74–109)
HCT VFR BLD CALC: 40.6 % (ref 42–52)
HEMOGLOBIN: 13.3 G/DL (ref 14–18)
IMMATURE GRANULOCYTES #: 0.2 K/UL
LYMPHOCYTES ABSOLUTE: 0.5 K/UL (ref 1.1–4.5)
LYMPHOCYTES RELATIVE PERCENT: 2.1 % (ref 20–40)
MCH RBC QN AUTO: 27.5 PG (ref 27–31)
MCHC RBC AUTO-ENTMCNC: 32.8 G/DL (ref 33–37)
MCV RBC AUTO: 83.9 FL (ref 80–94)
MONOCYTES ABSOLUTE: 0.9 K/UL (ref 0–0.9)
MONOCYTES RELATIVE PERCENT: 3.7 % (ref 0–10)
NEUTROPHILS ABSOLUTE: 23.7 K/UL (ref 1.5–7.5)
NEUTROPHILS RELATIVE PERCENT: 93.3 % (ref 50–65)
PDW BLD-RTO: 14.6 % (ref 11.5–14.5)
PLATELET # BLD: 166 K/UL (ref 130–400)
PMV BLD AUTO: 10.1 FL (ref 9.4–12.4)
POTASSIUM REFLEX MAGNESIUM: 4.8 MMOL/L (ref 3.5–5)
RBC # BLD: 4.84 M/UL (ref 4.7–6.1)
REASON FOR REJECTION: NORMAL
REJECTED TEST: NORMAL
SARS-COV-2, NAAT: NOT DETECTED
SODIUM BLD-SCNC: 137 MMOL/L (ref 136–145)
WBC # BLD: 25.4 K/UL (ref 4.8–10.8)

## 2022-01-31 PROCEDURE — 72128 CT CHEST SPINE W/O DYE: CPT

## 2022-01-31 PROCEDURE — 87635 SARS-COV-2 COVID-19 AMP PRB: CPT

## 2022-01-31 PROCEDURE — 99284 EMERGENCY DEPT VISIT MOD MDM: CPT

## 2022-01-31 PROCEDURE — 72131 CT LUMBAR SPINE W/O DYE: CPT

## 2022-01-31 PROCEDURE — 73502 X-RAY EXAM HIP UNI 2-3 VIEWS: CPT

## 2022-01-31 PROCEDURE — 96375 TX/PRO/DX INJ NEW DRUG ADDON: CPT

## 2022-01-31 PROCEDURE — 80048 BASIC METABOLIC PNL TOTAL CA: CPT

## 2022-01-31 PROCEDURE — 72125 CT NECK SPINE W/O DYE: CPT

## 2022-01-31 PROCEDURE — 71260 CT THORAX DX C+: CPT

## 2022-01-31 PROCEDURE — 36415 COLL VENOUS BLD VENIPUNCTURE: CPT

## 2022-01-31 PROCEDURE — 85025 COMPLETE CBC W/AUTO DIFF WBC: CPT

## 2022-01-31 PROCEDURE — 6360000002 HC RX W HCPCS: Performed by: PHYSICIAN ASSISTANT

## 2022-01-31 PROCEDURE — 6360000004 HC RX CONTRAST MEDICATION: Performed by: PHYSICIAN ASSISTANT

## 2022-01-31 PROCEDURE — 74177 CT ABD & PELVIS W/CONTRAST: CPT

## 2022-01-31 PROCEDURE — 96374 THER/PROPH/DIAG INJ IV PUSH: CPT

## 2022-01-31 RX ORDER — ONDANSETRON 2 MG/ML
4 INJECTION INTRAMUSCULAR; INTRAVENOUS ONCE
Status: COMPLETED | OUTPATIENT
Start: 2022-01-31 | End: 2022-01-31

## 2022-01-31 RX ORDER — AMLODIPINE BESYLATE 2.5 MG/1
2.5 TABLET ORAL DAILY
Qty: 90 TABLET | Refills: 3 | Status: SHIPPED | OUTPATIENT
Start: 2022-01-31 | End: 2022-05-23 | Stop reason: ALTCHOICE

## 2022-01-31 RX ORDER — HYDROMORPHONE HYDROCHLORIDE 1 MG/ML
1 INJECTION, SOLUTION INTRAMUSCULAR; INTRAVENOUS; SUBCUTANEOUS ONCE
Status: COMPLETED | OUTPATIENT
Start: 2022-01-31 | End: 2022-01-31

## 2022-01-31 RX ORDER — MORPHINE SULFATE 4 MG/ML
4 INJECTION, SOLUTION INTRAMUSCULAR; INTRAVENOUS ONCE
Status: COMPLETED | OUTPATIENT
Start: 2022-01-31 | End: 2022-01-31

## 2022-01-31 RX ADMIN — ONDANSETRON 4 MG: 2 INJECTION INTRAMUSCULAR; INTRAVENOUS at 18:22

## 2022-01-31 RX ADMIN — MORPHINE SULFATE 4 MG: 4 INJECTION INTRAVENOUS at 15:44

## 2022-01-31 RX ADMIN — IOPAMIDOL 90 ML: 755 INJECTION, SOLUTION INTRAVENOUS at 16:33

## 2022-01-31 RX ADMIN — HYDROMORPHONE HYDROCHLORIDE 1 MG: 1 INJECTION, SOLUTION INTRAMUSCULAR; INTRAVENOUS; SUBCUTANEOUS at 18:22

## 2022-01-31 ASSESSMENT — PAIN SCALES - GENERAL
PAINLEVEL_OUTOF10: 8
PAINLEVEL_OUTOF10: 10
PAINLEVEL_OUTOF10: 5

## 2022-01-31 ASSESSMENT — ENCOUNTER SYMPTOMS
NAUSEA: 0
ABDOMINAL PAIN: 1
DIARRHEA: 0
SHORTNESS OF BREATH: 0
COUGH: 0
VOMITING: 0

## 2022-01-31 ASSESSMENT — PAIN DESCRIPTION - ORIENTATION: ORIENTATION: LEFT

## 2022-01-31 ASSESSMENT — PAIN DESCRIPTION - LOCATION: LOCATION: RIB CAGE

## 2022-01-31 NOTE — ED PROVIDER NOTES
[facility]  eMERGENCY dEPARTMENT eNCOUnter      Pt Name: Gabo Pickett  MRN: [id number]  [place] 1945  Date of evaluation: 1/31/2022  Provider: Dilan Vitale Dr       Chief Complaint   Patient presents with    Fall    Rib Injury    Hip Pain    Shoulder Pain         HISTORY OF PRESENT ILLNESS   (Location/Symptom, Timing/Onset,Context/Setting, Quality, Duration, Modifying Factors, Severity)  Note limiting factors. Gabo Pickett is a 67 y.o. male with history of restless leg, hypertension, GERD, and anxiety who presents to the emergency department with complaint of a fall. Around 1115 earlier today, the patient was on a stepstool approximately 4 feet high. He states he was reaching for something in the attic and went too far over. He states that he fell to the left in the stool fell to the right. He states that he landed on the left ribs and flank. The patient was ambulatory coming in but states that he has significant pain with ambulation. He also states that he has pain in the left ribs and left flank. He denies any significant bruising. He denies any head injury with his symptoms. He has no headache or dizziness. He states that he does have pain with breathing but denies any shortness of breath or labored breathing. HPI    NursingNotes were reviewed. REVIEW OF SYSTEMS    (2-9 systems for level 4, 10 or more for level 5)     Review of Systems   Respiratory: Negative for cough and shortness of breath (Painful breathing). Cardiovascular: Positive for chest pain. Gastrointestinal: Positive for abdominal pain. Negative for diarrhea, nausea and vomiting. Musculoskeletal: Positive for arthralgias, gait problem and myalgias. Neurological: Negative for dizziness, syncope, numbness and headaches. All other systems reviewed and are negative.            PAST MEDICALHISTORY     Past Medical History:   Diagnosis Date    Generalized anxiety disorder     Generalized osteoarthritis of multiple sites 8/28/2017    GERD (gastroesophageal reflux disease)     Hypertension     Hypertriglyceridemia 8/28/2017    Idiopathic peripheral neuropathy 8/28/2017    Irritable bowel syndrome with constipation 8/28/2017    Restless legs syndrome     Seasonal allergic rhinitis due to pollen 8/28/2017         SURGICAL HISTORY       Past Surgical History:   Procedure Laterality Date    HERNIA REPAIR  1999/2001    TURP  1994         CURRENT MEDICATIONS     Previous Medications    ACYCLOVIR (ZOVIRAX) 400 MG TABLET    Take 1 tablet by mouth 4 times daily    BETAMETHASONE DIPROPIONATE (DIPROLENE) 0.05 % CREAM    Apply topically 2 times daily Apply topically 2 times daily. BISOPROLOL (ZEBETA) 5 MG TABLET    Take 1 tablet by mouth daily    CITALOPRAM (CELEXA) 10 MG TABLET    Take 2 tablets by mouth daily    DIAZEPAM (VALIUM) 5 MG TABLET    Take 5 mg by mouth every 6 hours as needed for Anxiety. FEXOFENADINE (ALLEGRA) 60 MG TABLET    Take 60 mg by mouth daily    FLUTICASONE (FLONASE) 50 MCG/ACT NASAL SPRAY    1 spray by Nasal route daily    HYDROCORTISONE (ANUSOL-HC) 25 MG SUPPOSITORY    Place 1 suppository rectally every 12 hours    HYDROXYZINE (ATARAX) 10 MG TABLET    TAKE 2 TABLETS BY MOUTH ONCE DAILY AT BEDTIME AS NEEDED FOR ITCHING.     HYOSCYAMINE (ANASPAZ;LEVSIN) 125 MCG TABLET    Take 1 tablet by mouth every 4 hours as needed for Cramping    IPRATROPIUM (ATROVENT) 0.06 % NASAL SPRAY    1 spray by Nasal route daily as needed for Rhinitis    LEVOCETIRIZINE (XYZAL) 5 MG TABLET    Take 5 mg by mouth nightly    LISINOPRIL-HYDROCHLOROTHIAZIDE (PRINZIDE;ZESTORETIC) 20-12.5 MG PER TABLET    Take 2 tablets by mouth daily    MAGNESIUM HYDROXIDE (MILK OF MAGNESIA) 400 MG/5ML SUSPENSION    Take by mouth daily as needed for Constipation    MELOXICAM (MOBIC) 7.5 MG TABLET    Take 1 tablet by mouth 2 times daily    OMEPRAZOLE (PRILOSEC) 20 MG DELAYED RELEASE CAPSULE    Take 1 capsule by mouth 2 times daily    PSEUDOEPHEDRINE (SUDAFED) 60 MG TABLET    Take 60 mg by mouth every 6 hours as needed for Congestion    ROPINIROLE (REQUIP) 2 MG TABLET    Take 1 tablet by mouth 3 times daily as needed (restless legs)    SAW PALMETTO 160 MG CAPSULE    Take 160 mg by mouth daily    TRAZODONE (DESYREL) 50 MG TABLET    Take 1 tablet by mouth nightly    VITAMIN E 1000 UNITS CAPSULE    Take 1,000 Units by mouth daily       ALLERGIES     Sulfa antibiotics    FAMILY HISTORY       Family History   Problem Relation Age of Onset    Dementia Mother     Heart Disease Father     Colon Cancer Father     Dementia Father     High Blood Pressure Father     Heart Attack Father           SOCIAL HISTORY       Social History     Socioeconomic History    Marital status:      Spouse name: None    Number of children: None    Years of education: None    Highest education level: None   Occupational History    None   Tobacco Use    Smoking status: Never Smoker    Smokeless tobacco: Never Used   Substance and Sexual Activity    Alcohol use: No     Alcohol/week: 0.0 standard drinks    Drug use: No    Sexual activity: None   Other Topics Concern    None   Social History Narrative    None     Social Determinants of Health     Financial Resource Strain: Low Risk     Difficulty of Paying Living Expenses: Not hard at all   Food Insecurity: No Food Insecurity    Worried About Running Out of Food in the Last Year: Never true    Stacie of Food in the Last Year: Never true   Transportation Needs: No Transportation Needs    Lack of Transportation (Medical): No    Lack of Transportation (Non-Medical):  No   Physical Activity:     Days of Exercise per Week: Not on file    Minutes of Exercise per Session: Not on file   Stress:     Feeling of Stress : Not on file   Social Connections:     Frequency of Communication with Friends and Family: Not on file    Frequency of Social Gatherings with Friends and Family: Not on file   [employer] Attends Sikh Services: Not on file    Active Member of Clubs or Organizations: Not on file    Attends Club or Organization Meetings: Not on file    Marital Status: Not on file   Intimate Partner Violence:     Fear of Current or Ex-Partner: Not on file    Emotionally Abused: Not on file    Physically Abused: Not on file    Sexually Abused: Not on file   Housing Stability:     Unable to Pay for Housing in the Last Year: Not on file    Number of [place] in the Last Year: Not on file    Unstable Housing in the Last Year: Not on file       SCREENINGS             PHYSICAL EXAM    (up to 7 for level 4, 8 or more for level 5)     ED Triage Vitals [01/31/22 1412]   BP Temp Temp src Pulse Resp SpO2 Height Weight   127/78 98.5 °F (36.9 °C) -- 68 18 95 % 6' (1.829 m) 185 lb (83.9 kg)       Physical Exam  Vitals and nursing note reviewed. Constitutional:       General: He is not in acute distress. Appearance: Normal appearance. He is normal weight. He is not ill-appearing or toxic-appearing. Comments: Appears uncomfortable   HENT:      Head: Normocephalic and atraumatic. Cardiovascular:      Rate and Rhythm: Normal rate and regular rhythm. Pulses: Normal pulses. Heart sounds: Normal heart sounds. Pulmonary:      Effort: Pulmonary effort is normal. No respiratory distress. Breath sounds: Normal breath sounds. Comments: No decrease in lung sounds  Chest:      Chest wall: Tenderness (Left lateral) present. Abdominal:      General: There is no distension. Palpations: Abdomen is soft. There is no mass. Tenderness: There is abdominal tenderness. Comments: Left flank tenderness   Musculoskeletal:      Cervical back: Normal range of motion and neck supple. Signs of trauma present. No swelling, edema, deformity, erythema, rigidity, tenderness or bony tenderness. No pain with movement. Normal range of motion. Thoracic back: Signs of trauma present.  No swelling, edema, deformity, tenderness or bony tenderness. Normal range of motion. Lumbar back: Signs of trauma present. No swelling, edema, deformity, tenderness or bony tenderness. Normal range of motion. Left hip: Tenderness and bony tenderness present. No deformity. Decreased range of motion. Decreased strength. Left upper leg: Normal. No swelling, deformity, tenderness or bony tenderness. Left knee: Normal. No swelling, deformity or bony tenderness. Normal range of motion. No tenderness. Normal pulse. Comments: Bilateral upper extremities are negative for deformity, tenderness, or swelling. NV intact. Bilateral lower extremities are negative for deformity, tenderness, or swelling besides left hip and pelvis. NV intact. Lymphadenopathy:      Cervical: No cervical adenopathy. Skin:     General: Skin is warm and dry. Neurological:      General: No focal deficit present. Mental Status: He is alert and oriented to person, place, and time. Gait: Gait abnormal (antalgic gait). DIAGNOSTIC RESULTS     RADIOLOGY:  Non-plain film images such as CT, Ultrasound and MRI are read by the radiologist. Plain radiographic images are visualized and preliminarily interpreted bythe emergency physician with the below findings:      CT CHEST W CONTRAST   Final Result   1. No evidence of pneumothorax. There is patchy nonsegmental opacities   scattered throughout the right lung. Given the history of trauma I   suspect these may represent pulmonary contusion. An   infectious/inflammatory process such as bronchiolitis or interstitial   pneumonitis is also a differential but there is sparing of the left   lung. There is inferior lingular atelectasis or scarring. Lungs are   otherwise clear. 2. Stranding within the anterior mediastinal fat and soft tissue   prominence posterior to the manubrium suspicious for a retrosternal   hematoma. I do not see evidence of a fracture of the sternum.  There   are mild deformities involving the left anterolateral fourth, fifth   and sixth ribs near the costochondral juncture. No evidence of   pericardial effusion. The thoracic aorta and great vessels are normal   in appearance. 3. Moderate size hiatal hernia. 4. High-grade left renal injury. This is not imaged in its entirety   but there is perinephric stranding as well as heterogeneous   enhancement of the left kidney suggesting potential underlying   vascular injury. Signed by Dr Karishma Phan Additional Contrast? None   Final Result   1. Grade III/IV left renal injury with left subcapsular hematoma and   scattered hypodensity left renal cortex which may represent   lacerations and hematomas versus segmental vascular injuries. No   active contrast extravasation identified. Integrity of the left   proximal renal collecting system cannot be ascertained due to lack of   excretion of contrast from the left kidney during image acquisition   despite delayed sequences. Left perinephric fat stranding. This   finding discussed with BRIAN Gonzalez caring for the patient, at   5:20 PM on 1/31/2022   2. Left sacral ala fracture. Nondisplaced fractures of left superior   and inferior rami. 3. Small moderate size hiatal hernia. 4. 1.6 x 1.0 cm cystic changes of the tail of the pancreas. This is   not an acute finding. Comparison to outside films be helpful to   establish chronicity of the finding. If no outside studies, follow-up   MRI abdomen/MRCP could be performed for further characterization   patient is clinically stable and capable. Signed by Dr Maya Kinney      XR HIP 2-3 VW W PELVIS LEFT   Final Result   1. Minimally displaced left inferior ramus fracture. Nondisplaced left   superior ramus fracture and left sacral ala fracture better seen on   today's CT exam. Contrast from recent CT exam identified within the   right distal ureter.  No contrast localized within the left ureter on   this exam.   Signed by Dr Moreno Ro   Final Result   1. Left subcapsular renal hematoma. Please refer to CT abdomen report. 2. Scattered groundglass densities within the visible right upper lobe   likely infectious/inflammatory. Basilar atelectasis. 3. Moderate degenerative change of the thoracic spine with no acute   thoracic vertebral fracture or traumatic malalignment. Mild right   convexity to the thoracic curvature. Signed by Dr Byron Burciaga      CT Lumbar Spine WO Contrast   Final Result   . 1. No fracture or acute osseous lumbar spine abnormality. Multilevel   degenerative changes as detailed above. 2. Acute left perinephric and left posterior pararenal hemorrhage with   a large acute subcapsular renal hematoma on the left. This is   described in more detail on today's abdomen and pelvis CT report. Report called to Philippe Cuello, [id number] in the emergency department at 1705   hours on 1/31/2022. Signed by Dr Brien Mansfield      CT Cervical Spine WO Contrast   Final Result   1. Moderate to advanced degenerative change of the cervical spine with   no acute cervical vertebral fracture or traumatic malalignment.    Signed by Dr Aftab Aguirre:  Mat Damme MG FOR LOW K - Abnormal; Notable for the following components:       Result Value    Chloride 96 (*)     Glucose 144 (*)     CREATININE 1.3 (*)     GFR Non- 53 (*)     All other components within normal limits   CBC WITH AUTO DIFFERENTIAL - Abnormal; Notable for the following components:    WBC 25.4 (*)     Hemoglobin 13.3 (*)     Hematocrit 40.6 (*)     MCHC 32.8 (*)     RDW 14.6 (*)     Neutrophils % 93.3 (*)     Lymphocytes % 2.1 (*)     Neutrophils Absolute 23.7 (*)     Lymphocytes Absolute 0.5 (*)     All other components within normal limits   SPECIMEN REJECTION       All other labs were within normal range or not returned as of this dictation. EMERGENCY DEPARTMENT COURSE and DIFFERENTIAL DIAGNOSIS/MDM:   Vitals:    Vitals:    01/31/22 1412   BP: 127/78   Pulse: 68   Resp: 18   Temp: 98.5 °F (36.9 °C)   SpO2: 95%   Weight: 185 lb (83.9 kg)   Height: 6' (1.829 m)       MDM  Is a 65-year-old male presents to the ER after a trauma that occurred earlier in the day. The patient had a fall from approximately 4 feet onto his left side. On physical exam, the patient does have tenderness noted of the left anterior lateral ribs, left flank, and left hip. He did not have any midline vertebral tenderness of the spine or head injury. Due to his trauma, CT of the cervical, thoracic, and lumbar spine were obtained and were negative for any acute abnormality. There was concerning findings on imaging of a grade 3 or 4 laceration of the left kidney with subcapsular hematoma. He had pulmonary contusions. He has anterolateral left fourth, fifth, and sixth rib fractures. He does not have any signs of a pneumothorax associated with fractures. He does have a retrosternal hematoma. Patient also has multiple left pelvic fractures. I have spoken with [address] [place] [address] who has auto accepted for Dr. Tarsha Win. The patient will be flown to [address] [place] [address]. I did sit in detail with the patient and his wife regarding his injuries and plan and they are agreeable. Patient has had medication for pain control in the ER and appears relatively comfortable. CONSULTS:  Dr Tarsha Win, Accepting trauma surgeon at [place] [address]      1. Closed nondisplaced fracture of pelvis, unspecified part of pelvis, initial encounter ([facility] [address]    2. Closed fracture of multiple ribs of left side, initial encounter    3. Closed kidney laceration, left, initial encounter    4.  Contusion of lung, unspecified laterality, initial encounter          DISPOSITION/PLAN   DISPOSITION Decision To Transfer 01/31/2022 06:06:22 PM         (Please note that portions of this note were completed with a voice recognition program.  Efforts were made to edit thedictations but occasionally words are mis-transcribed.)    BRIAN Sheridan (electronically signed)     Aditi Sheridan  01/31/22 4211

## 2022-01-31 NOTE — ED NOTES
Pt reports to ER c/o left rib, shoulder and hip pain after overextending and falling from a 4ft tall ladder.  Pt denies LOC, dizziness or striking his head     Taj Dyer RN  01/31/22 1069

## 2022-02-01 NOTE — ED NOTES
[facility] here to transport patient, report given to transfer team, Ramiro Odonnell.       Nando Valerio RN  01/31/22 7880

## 2022-02-07 ENCOUNTER — VIRTUAL VISIT (OUTPATIENT)
Dept: FAMILY MEDICINE CLINIC | Age: 77
End: 2022-02-07
Payer: MEDICARE

## 2022-02-07 DIAGNOSIS — S22.42XD CLOSED FRACTURE OF MULTIPLE RIBS OF LEFT SIDE WITH ROUTINE HEALING, SUBSEQUENT ENCOUNTER: ICD-10-CM
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
## 2022-02-07 DIAGNOSIS — K86.2 PANCREATIC CYST: ICD-10-CM

## 2022-02-07 DIAGNOSIS — S32.592D CLOSED FRACTURE OF LEFT INFERIOR PUBIC RAMUS WITH ROUTINE HEALING, SUBSEQUENT ENCOUNTER: Primary | ICD-10-CM

## 2022-02-07 DIAGNOSIS — I10 ESSENTIAL (PRIMARY) HYPERTENSION: ICD-10-CM

## 2022-02-07 PROCEDURE — 99495 TRANSJ CARE MGMT MOD F2F 14D: CPT | Performed by: FAMILY MEDICINE

## 2022-02-07 NOTE — PROGRESS NOTES
Post-Discharge Transitional Care Management Services    TELEHEALTH EVALUATION -- Audio/Visual (During TTJDS-96 public health emergency)      [address]  [address] [facility] [address]  [facility] [place] [address]  Dept: [phone]  Dept Fax: [phone]  Loc: [phone]    Raven Mancia is a 67 y.o. male who presents today for his medical conditions/complaints asnoted below. Raven Mancia is here for No chief complaint on file. Attending: Hal Lema  Date of Admission: 1/31/2022  Date of Discharge: 2/5/2022    Diagnosis:   Active Problems:  Closed fracture of multiple ribs  Sacral fracture (CMS/HCC)  Fracture of ramus of left pubis (CMS/HCC)  Closed injury of left kidney  Urinary retention  Pseudoaneurysm (CMS/HCC)  Resolved Problems:  * No resolved hospital problems. *    Procedures:   Bedside Procedures:  Orders Placed This Encounter   Procedures   [facility] follow-up to orthopaedics   [facility] follow-up with primary care provider - Incidental findings   [facility] follow-up with primary care provider in 2 weeks     OR Procedures:  IR embolization  75429 - CT CHG TRANSCATHETER RX EMBOLIZATN    67year-old status post a forefoot fall. He sustained a left sacral carlos eduardo fracture nonoperative. Left inferior pubic rami and pubic root fracture, nonoperative. Grade 3 left renal injury with subcapsular hematoma grade 5 on CTA. Incidental findings as follows:  Small hiatal hernia  Accessory splenule  Cystic lesion of the pancreatic tail measuring 2.3 x 0.8 cm. Small left inguinal hernia  Umbilical hernia    Procedures: Interventional radiology: Embolization of the 2 pseudoaneurysms of the left kidney on February 3. Orthopedist recommended weightbearing as tolerated bilateral lower extremities. His fractures were nonoperative. Suggested aspirin at discharge. He had a fall from approximately 4 feet.   He was evaluated at [facility] initially and transferred to [facility] ED for further evaluation and treatment. He was placed on a nonrebreather in transit due to hypoxia. Orthopedic service was consulted for the pelvic fractures. There was deemed to be nonoperative. He had a repeat CT angiogram to further evaluate his left renal injury. He was noticed to have progression from stage III to grade 5 on repeat imaging. Embolization was performed by interventional radiology for 2 pseudoaneurysms. He was hemodynamically stable during his hospitalization. He was advised to not resume his lisinopril until consulting with primary care physician. He was discharged home with outpatient physical therapy. His pain was adequately controlled. He was prescribed oxycodone 5 mg every 8 hours as needed. [facility] Risk Score (risk of hospital readmission):No data recorded  Active Problems:    * No active hospital problems. *      Care management risk score Rising risk (score 2-5) and Complex Care (Scores >=6): 1     Non face to face  following discharge, date last encounter closed (first attempt may havebeen earlier): *No documented post hospital discharge outreach found in the last 14 days    Call initiated 2 business days of discharge: *No response recorded in the last 14 days      HPI andCOURSE SINCE DISCHARGE   CC:  Here today to discuss thefollowing:    Regarding pain control, he states he is primarily taking Tylenol and ibuprofen. Overall he feels his pain is abdomen controlled. No issues with constipation. No renal issues. Hypertension  Compliant with medications. No adverse effects from medication. No lightheadedness, palpitations, or chest pain. Is requesting a referral to physical therapy and his local orthopedist.    He had a sleep study January 24 diagnosed severe complex sleep apnea. Has been started on CPAP.     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  betamethasone dipropionate (DIPROLENE) 0.05 % cream Apply topically 2 times daily Apply topically 2 times daily. 45 g 11    hyoscyamine (ANASPAZ;LEVSIN) 125 MCG tablet Take 1 tablet by mouth every 4 hours as needed for Cramping 180 tablet 11    acyclovir (ZOVIRAX) 400 MG tablet Take 1 tablet by mouth 4 times daily 30 tablet 5    fluticasone (FLONASE) 50 MCG/ACT nasal spray 1 spray by Nasal route daily 1 Bottle 3    pseudoephedrine (SUDAFED) 60 MG tablet Take 60 mg by mouth every 6 hours as needed for Congestion      fexofenadine (ALLEGRA) 60 MG tablet Take 60 mg by mouth daily      saw palmetto 160 MG capsule Take 160 mg by mouth daily      magnesium hydroxide (MILK OF MAGNESIA) 400 MG/5ML suspension Take by mouth daily as needed for Constipation      vitamin E 1000 UNITS capsule Take 1,000 Units by mouth daily       No current facility-administered medications for this visit.      Allergies   Allergen Reactions    Sulfa Antibiotics        Health Maintenance   Topic Date Due    COVID-19 Vaccine (3 - Booster for Moderna series) 07/09/2021    Depression Monitoring  04/19/2022    Potassium monitoring  01/31/2023    Creatinine monitoring  01/31/2023    DTaP/Tdap/Td vaccine (2 - Td or Tdap) 04/26/2031    Flu vaccine  Completed    Shingles Vaccine  Completed    Pneumococcal 65+ years Vaccine  Completed    Hepatitis C screen  Completed    Hepatitis A vaccine  Aged Out    Hepatitis B vaccine  Aged Out    Hib vaccine  Aged Out    Meningococcal (ACWY) vaccine  Aged Out       Subjective:      Review of Systems  Review of Systems   Constitutional: Negative for chills and fever. HENT: Negative for congestion. Respiratory: Negative for cough, chest tightness and shortness of breath. Cardiovascular: Negative for chest pain, palpitations and leg swelling. Gastrointestinal: Negative for abdominal pain, anal bleeding, constipation, diarrhea and nausea. Genitourinary: Negative for difficulty urinating. Psychiatric/Behavioral: Negative. See HPI for visit specific review of symptoms. All others negative      Objective:   VSS:       [ INSTRUCTIONS:  \"[x]\" Indicates a positive item  \"[]\" Indicates a negative item  -- DELETE ALL ITEMS NOT EXAMINED]  Vital Signs: (As obtained by patient/caregiver or practitioner observation)    Blood pressure-  Heart rate-    Respiratory rate-    Temperature-  Pulse oximetry-     Constitutional: [] Appears well-developed and well-nourished [] No apparent distress      [] Abnormal-   Mental status  [] Alert and awake  [] Oriented to person/place/time []Able to follow commands      Eyes:  EOM    []  Normal  [] Abnormal-  Sclera  []  Normal  [] Abnormal -         Discharge []  None visible  [] Abnormal -    HENT:   [] Normocephalic, atraumatic.   [] Abnormal   [] Mouth/Throat: Mucous membranes are moist.     External Ears [] Normal  [] Abnormal-     Neck: [] No visualized mass     Pulmonary/Chest: [] Respiratory effort normal.  [] No visualized signs of difficulty breathing or respiratory distress        [] Abnormal-      Musculoskeletal:   [] Normal gait with no signs of ataxia         [] Normal range of motion of neck        [] Abnormal-       Neurological:        [] No Facial Asymmetry (Cranial nerve 7 motor function) (limited exam to video visit)          [] No gaze palsy        [] Abnormal-         Skin:        [] No significant exanthematous lesions or discoloration noted on facial skin         [] Abnormal-            Psychiatric:       [] Normal Affect [] No Hallucinations        [] Abnormal-     Other pertinent observable physical exam findings-       Recent Results (from the past 672 hour(s))   Basic Metabolic Panel w/ Reflex to MG    Collection Time: 01/31/22  3:40 PM   Result Value Ref Range    Sodium 137 136 - 145 mmol/L    Potassium reflex Magnesium 4.8 3.5 - 5.0 mmol/L    Chloride 96 (L) 98 - 111 mmol/L    CO2 26 22 - 29 mmol/L    Anion Gap 15 7 - 19 mmol/L    Glucose 144 (H) 74 - 109 mg/dL    BUN 15 8 - 23 mg/dL    CREATININE 1.3 (H) 0.5 - 1.2 mg/dL    GFR Non- 53 (A) >60    GFR African American >59 >59    Calcium 10.0 8.8 - 10.2 mg/dL   SPECIMEN REJECTION    Collection Time: 01/31/22  4:09 PM   Result Value Ref Range    Rejected Test CBCWD     Reason for Rejection see below    CBC Auto Differential    Collection Time: 01/31/22  5:52 PM   Result Value Ref Range    WBC 25.4 (H) 4.8 - 10.8 K/uL    RBC 4.84 4.70 - 6.10 M/uL    Hemoglobin 13.3 (L) 14.0 - 18.0 g/dL    Hematocrit 40.6 (L) 42.0 - 52.0 %    MCV 83.9 80.0 - 94.0 fL    MCH 27.5 27.0 - 31.0 pg    MCHC 32.8 (L) 33.0 - 37.0 g/dL    RDW 14.6 (H) 11.5 - 14.5 %    Platelets 682 539 - 881 K/uL    MPV 10.1 9.4 - 12.4 fL    Neutrophils % 93.3 (H) 50.0 - 65.0 %    Lymphocytes % 2.1 (L) 20.0 - 40.0 %    Monocytes % 3.7 0.0 - 10.0 %    Eosinophils % 0.0 0.0 - 5.0 %    Basophils % 0.2 0.0 - 1.0 %    Neutrophils Absolute 23.7 (H) 1.5 - 7.5 K/uL    Immature Granulocytes # 0.2 K/uL    Lymphocytes Absolute 0.5 (L) 1.1 - 4.5 K/uL    Monocytes Absolute 0.90 0.00 - 0.90 K/uL    Eosinophils Absolute 0.00 0.00 - 0.60 K/uL    Basophils Absolute 0.00 0.00 - 0.20 K/uL   COVID-19, Rapid    Collection Time: 01/31/22  6:26 PM    Specimen: Nasopharyngeal Swab   Result Value Ref Range    SARS-CoV-2, NAAT Not Detected Not Detected     Impression  Performed by [facility]   Renal angiography demonstrated two renal pseudoaneurysm; one arising from the anterior branch of the left main renal artery and the second arising from the small branches of the accessory left renal artery. Both pseudoaneurysms were successfully   embolized with Gelfoam.     SUPERVISION STATEMENT: Alan Dotson, was present for the entire procedure. PROCEDURAL SEDATION STATEMENT: I was immediately available for the duration of the sedation. The patient was reassessed immediately prior to the sedation. Actual level of sedation achieved: Moderate. Electronically signed by Vashti Le on 2/4/2022 11:01 AM     I, Mariusz Cartagena, have reviewed the images and verify the above interpretation on 2/4/2022 11:55 AM.     Electronically signed by Mariusz Cartagena on 2/4/2022 11:55 AM    Impression  Performed by [facility] Dawson    1.  Left kidney with evidence of hilar hematoma and likely hilar arterial and/or venous vascular injury. There are findings suggestive of an arterial injury with areas of arterial enlargement and possible pseudoaneurysm. This was called and discussed   with the clinical team following the study. Catheter-based renal arteriogram may be useful to provide detail vascular mapping. 2.  On today's study the combined LEFT kidney and perinephric hematoma measures 72 mm x 69 mm x 121 mm whereas previously on 1/31/2022 CT measured 68 mm x 61 mm x 108 mm and has slightly increased in size. 3.  Note there is an accessory renal artery below the main renal artery supplying the lower pole of the LEFT kidney and there is delayed enhancement of the mid pole renal parenchyma only on the venous phase which may indicate a degree of vascular   compromise to the artery supplying the midpole of the injured LEFT kidney. 4.  Stable nondisplaced fractures of the LEFT superior and inferior pubic rami.    5.  Additional chronic findings as noted in the body of the report     Electronically signed by Leticia Goldstein MD on 2/3/2022 11:43 AM    Narrative  Performed by Gil[facility] Dawson      EXAM: CT ANGIOGRAM ABDOMEN PELVIS W [address]   ACC: [id number]  MRN: [id number]     ORDER DATE: 2/3/2022 6:26 AM   ATTENDING: Jeannine BUTTS       Impression  Performed by [facility]   No evidence of deep vein thrombosis in the visualized veins of the bilateral lower extremities. Electronically signed by Neftali Gillette on 2/3/2022 8:57 AM     I, Jamila Whaley, have reviewed the images and verify the above interpretation on 2/3/2022 9:03 AM.     Electronically signed by Jamila Whaley on 2/3/2022 9:03 AM      Impression  Performed by [facility]    1.  No acute fracture or malalignment of the cervical, thoracic, lumbar spine. Multilevel degenerative change as described. 2.  Patchy ground glass opacities throughout the right upper lobe, likely infectious/inflammatory. 3.  Minimally displaced fracture of the left sacral ala. 4.  Left subcapsular renal hematoma, better evaluated on outside CT of the abdomen and pelvis. Electronically signed by Areli Eli on 2/1/2022 10:43 AM     I, Mónica Palma MD, have reviewed the images and verify the above interpretation on 2/1/2022 11:24 AM.     Electronically signed by Mónica Palma MD on 2/1/2022 11:24 AM    Impression  Performed by [facility]   Nondisplaced left sacral ala, inferior pubic ramus and pubic root fractures seen on CT are radiographically occult. Electronically signed by Madina Radford DO on 2/1/2022 2:40 AM     I, Angie King MD, have reviewed the images and verify the above interpretation on 2/1/2022 2:43 AM.      Impression  Performed by [facility]   Nondisplaced left sacral ala, inferior pubic ramus and pubic root fractures seen on CT are radiographically occult.      Electronically signed by Madina Radford DO on 2/1/2022 2:40 AM     IAngie MD, have reviewed the images and verify the above interpretation on 2/1/2022 2:43 AM.     Electronically signed by Angie King MD on 2/1/2022 2:43 AM    Impression  Performed by [facility]   Nondisplaced left sacral ala, inferior pubic ramus and pubic root fractures seen on CT are radiographically occult. Electronically signed by Jose Andrews DO on 2/1/2022 2:40 AM     Alex TRUJILLO MD, have reviewed the images and verify the above interpretation on 2/1/2022 2:43 AM.     Electronically signed by Alex Lockhart MD on 2/1/2022 2:43 AM    Impression  Performed by Jonny Underwood    1.  Grade III LEFT renal injury with moderate subcapsular hematoma. The hematoma results in mass effect on the LEFT kidney with decreased enhancement and delayed excretion. Recommend clinical correlation for Page kidney phenomenon. 2.  Nondisplaced fractures of LEFT sacral ala and LEFT superior/inferior pubic rami . 3.  Right lower lobe groundglass opacities may represent aspiration or contusion. 4.  Small hiatal hernia with fluid in the distal esophagus, which may place the patient at risk for aspiration. 5.  This patient has the incidental finding of a pancreatic cyst requiring follow-up or further evaluation. The [address] [place] [address] can be reached [phone] for consultation scheduling. 6.  Additional findings as described     Electronically signed by Alex Lockhart MD on 1/31/2022 10:26 PM        Assessment & Plan: The following diagnoses and conditions are stable with no further orders unless indicated:  1. Closed fracture of left inferior pubic ramus with routine healing, subsequent encounter  States his pain is adequately controlled  - External Referral To Orthopedic Surgery  - External Referral To Physical Therapy    2. Closed fracture of multiple ribs of left side with routine healing, subsequent encounter    - External Referral To Physical Therapy    3. Fall, subsequent encounter     - External Referral To Physical Therapy    4. Pancreatic cyst  Suggested we follow-up on pancreatic cyst after he has recovered    5.  Essential (primary) hypertension  BP Readings from Last 3 Encounters:   01/31/22 129/77   11/03/21 (!) 147/75 10/26/21 128/88     Stable      Diagnostic test results reviewed. Medical Decision Making: moderate complexity    Return in about 1 month (around 3/7/2022) for Routine follow up - 15 minute visit. Pursuant to the emergency declaration under the [address] [place] [address], [facility] waiver authority and the [employer] Act, this Virtual  Visit was conducted, with patient's consent, to reduce the patient's risk of exposure to COVID-19 and provide continuity of care for an established patient. Services were provided through a video synchronous discussion virtually to substitute for in-person clinic visit. An  electronic signature was used to authenticate this note.

## 2022-02-09 DIAGNOSIS — S22.43XD MULTIPLE CLOSED FRACTURES OF RIBS OF BOTH SIDES WITH ROUTINE HEALING, SUBSEQUENT ENCOUNTER: Primary | ICD-10-CM

## 2022-02-09 RX ORDER — HYDROCODONE BITARTRATE AND ACETAMINOPHEN 5; 325 MG/1; MG/1
1 TABLET ORAL EVERY 6 HOURS PRN
Qty: 20 TABLET | Refills: 0 | Status: SHIPPED | OUTPATIENT
Start: 2022-02-09 | End: 2022-03-11

## 2022-03-11 ENCOUNTER — OFFICE VISIT (OUTPATIENT)
Dept: FAMILY MEDICINE CLINIC | Age: 77
End: 2022-03-11
Payer: MEDICARE

## 2022-03-11 VITALS
HEART RATE: 70 BPM | OXYGEN SATURATION: 99 % | DIASTOLIC BLOOD PRESSURE: 78 MMHG | WEIGHT: 185 LBS | BODY MASS INDEX: 25.09 KG/M2 | SYSTOLIC BLOOD PRESSURE: 130 MMHG | TEMPERATURE: 97.7 F

## 2022-03-11 DIAGNOSIS — S22.42XD CLOSED FRACTURE OF MULTIPLE RIBS OF LEFT SIDE WITH ROUTINE HEALING, SUBSEQUENT ENCOUNTER: ICD-10-CM

## 2022-03-11 DIAGNOSIS — S32.592D CLOSED FRACTURE OF LEFT INFERIOR PUBIC RAMUS WITH ROUTINE HEALING, SUBSEQUENT ENCOUNTER: ICD-10-CM

## 2022-03-11 DIAGNOSIS — K86.2 PANCREATIC CYST: ICD-10-CM

## 2022-03-11 DIAGNOSIS — I10 ESSENTIAL (PRIMARY) HYPERTENSION: Primary | ICD-10-CM

## 2022-03-11 DIAGNOSIS — R53.83 OTHER FATIGUE: ICD-10-CM

## 2022-03-11 DIAGNOSIS — S37.012D HEMATOMA OF LEFT KIDNEY, SUBSEQUENT ENCOUNTER: ICD-10-CM
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
## 2022-03-11 DIAGNOSIS — R53.83 OTHER FATIGUE: Primary | ICD-10-CM

## 2022-03-11 DIAGNOSIS — S22.43XD MULTIPLE CLOSED FRACTURES OF RIBS OF BOTH SIDES WITH ROUTINE HEALING, SUBSEQUENT ENCOUNTER: ICD-10-CM

## 2022-03-11 LAB
ALBUMIN SERPL-MCNC: 4.3 G/DL (ref 3.5–5.2)
ALP BLD-CCNC: 241 U/L (ref 40–130)
ALT SERPL-CCNC: 28 U/L (ref 5–41)
ANION GAP SERPL CALCULATED.3IONS-SCNC: 11 MMOL/L (ref 7–19)
AST SERPL-CCNC: 24 U/L (ref 5–40)
BACTERIA: NEGATIVE /HPF
BASOPHILS ABSOLUTE: 0 K/UL (ref 0–0.2)
BASOPHILS RELATIVE PERCENT: 0.5 % (ref 0–1)
BILIRUB SERPL-MCNC: 0.4 MG/DL (ref 0.2–1.2)
BILIRUBIN URINE: NEGATIVE
BLOOD, URINE: ABNORMAL
BUN BLDV-MCNC: 16 MG/DL (ref 8–23)
CALCIUM SERPL-MCNC: 9.6 MG/DL (ref 8.8–10.2)
CHLORIDE BLD-SCNC: 95 MMOL/L (ref 98–111)
CLARITY: ABNORMAL
CO2: 27 MMOL/L (ref 22–29)
COLOR: YELLOW
CREAT SERPL-MCNC: 1.4 MG/DL (ref 0.5–1.2)
CRYSTALS, UA: ABNORMAL /HPF
EOSINOPHILS ABSOLUTE: 0.3 K/UL (ref 0–0.6)
EOSINOPHILS RELATIVE PERCENT: 3 % (ref 0–5)
EPITHELIAL CELLS, UA: 0 /HPF (ref 0–5)
GFR AFRICAN AMERICAN: >59
GFR NON-AFRICAN AMERICAN: 49
GLUCOSE BLD-MCNC: 101 MG/DL (ref 74–109)
GLUCOSE URINE: NEGATIVE MG/DL
HCT VFR BLD CALC: 40.9 % (ref 42–52)
HEMOGLOBIN: 12.4 G/DL (ref 14–18)
HYALINE CASTS: 1 /HPF (ref 0–8)
IMMATURE GRANULOCYTES #: 0 K/UL
KETONES, URINE: NEGATIVE MG/DL
LEUKOCYTE ESTERASE, URINE: ABNORMAL
LYMPHOCYTES ABSOLUTE: 1 K/UL (ref 1.1–4.5)
LYMPHOCYTES RELATIVE PERCENT: 12.3 % (ref 20–40)
MCH RBC QN AUTO: 25.6 PG (ref 27–31)
MCHC RBC AUTO-ENTMCNC: 30.3 G/DL (ref 33–37)
MCV RBC AUTO: 84.5 FL (ref 80–94)
MONOCYTES ABSOLUTE: 0.5 K/UL (ref 0–0.9)
MONOCYTES RELATIVE PERCENT: 6.3 % (ref 0–10)
NEUTROPHILS ABSOLUTE: 6.5 K/UL (ref 1.5–7.5)
NEUTROPHILS RELATIVE PERCENT: 77.5 % (ref 50–65)
NITRITE, URINE: NEGATIVE
PDW BLD-RTO: 14.8 % (ref 11.5–14.5)
PH UA: 6.5 (ref 5–8)
PLATELET # BLD: 233 K/UL (ref 130–400)
PMV BLD AUTO: 9.5 FL (ref 9.4–12.4)
POTASSIUM SERPL-SCNC: 4.1 MMOL/L (ref 3.5–5)
PROTEIN UA: NEGATIVE MG/DL
RBC # BLD: 4.84 M/UL (ref 4.7–6.1)
RBC UA: 6 /HPF (ref 0–4)
SODIUM BLD-SCNC: 133 MMOL/L (ref 136–145)
SPECIFIC GRAVITY UA: 1.01 (ref 1–1.03)
TOTAL PROTEIN: 7.4 G/DL (ref 6.6–8.7)
UROBILINOGEN, URINE: 0.2 E.U./DL
WBC # BLD: 8.4 K/UL (ref 4.8–10.8)
WBC UA: 743 /HPF (ref 0–5)

## 2022-03-11 PROCEDURE — 4040F PNEUMOC VAC/ADMIN/RCVD: CPT | Performed by: FAMILY MEDICINE

## 2022-03-11 PROCEDURE — 99214 OFFICE O/P EST MOD 30 MIN: CPT | Performed by: FAMILY MEDICINE

## 2022-03-11 PROCEDURE — G8427 DOCREV CUR MEDS BY ELIG CLIN: HCPCS | Performed by: FAMILY MEDICINE

## 2022-03-11 PROCEDURE — 1123F ACP DISCUSS/DSCN MKR DOCD: CPT | Performed by: FAMILY MEDICINE

## 2022-03-11 PROCEDURE — 1036F TOBACCO NON-USER: CPT | Performed by: FAMILY MEDICINE

## 2022-03-11 PROCEDURE — G8417 CALC BMI ABV UP PARAM F/U: HCPCS | Performed by: FAMILY MEDICINE

## 2022-03-11 PROCEDURE — G8482 FLU IMMUNIZE ORDER/ADMIN: HCPCS | Performed by: FAMILY MEDICINE

## 2022-03-11 NOTE — PROGRESS NOTES
[facility] PHYSICIAN SERVICES  [facility] FAMILY MEDICINE  [address] [address] [address]  Dept: [phone]  Dept Fax: [phone]  Loc: [phone]    Anju Marquez is a 67 y.o. male who presents today for his medical conditions/complaints as noted below. Anju Marquez is here for Follow-up (I feel off a ladder last mth)        HPI:   CC: Here today to discuss the following:    He was last here in February after hospitalization following a fall. He had sustained a closed fracture of multiple ribs, sacral fracture, fracture of the ramus of the left pubis and a closed injury of the left kidney. He was doing well at that visit but was having some pain control issues. He was prescribed hydrocodone 5 mg as needed. Tylenol is helping his pain. He has been evaluated by orthopedic surgeon, Dr. Shira Bocanegra. Has an appointment next week for follow-up. Overall feels his pain is adequately controlled. Hypertension  Compliant with medications. No adverse effects from medication. No lightheadedness, palpitations, or chest pain. Only taking bisoprolol  Still on amlodipine 2.5mg     Not on lisinopril/hctz due to renal impairment from the hematoma and hypotension in the hospital.  Blood pressures have been consistently less than 130/80 at home. HPI    Subjective:      Review of Systems  Review of Systems   Constitutional: Negative for chills and fever. HENT: Negative for congestion. Respiratory: Negative for cough, chest tightness and shortness of breath. Cardiovascular: Negative for chest pain, palpitations and leg swelling. Gastrointestinal: Negative for abdominal pain, anal bleeding, constipation, diarrhea and nausea. Genitourinary: Negative for difficulty urinating. Psychiatric/Behavioral: Negative. SeeHPI for visit specific review of symptoms.   All others negative      Objective:   /78   Pulse 70   Temp 97.7 °F (36.5 °C) (Temporal)   Wt 185 lb (83.9 kg)   SpO2 99%   BMI 25.09 kg/m²   Physical Exam  Physical Exam   Constitutional: He appears well-developed. Does not appear ill. Neck: Normal range of motion. Neck supple. No masses. Neck Symmetric. Normal tracheal position. No thyroid enlargement  Cardiovascular: Normal rate and regular rhythm. Exam reveals no friction rub. Carotid arteries: no bruit observed. No murmur heard. Respiratory:  Effort normal and breath sounds normal. No respiratory distress. No wheezes. No rales. No use of accessory muscles or intercostal retractions. Neurological: alert. Psychiatric: normal mood and affect. His behavior is normal. Normal judgement and insight observed. Assessment & Plan: The following diagnoses and conditions are stable with no further orders unless indicated:  1. Essential (primary) hypertension  BP Readings from Last 3 Encounters:   03/11/22 130/78   01/31/22 129/77   11/03/21 (!) 147/75     Continue holding lisinopril/HCTZ  Continue with bisoprolol 5 mg  Amlodipine 2.5 mg daily      2. Other fatigue  Check the following lab studies  - Comprehensive Metabolic Panel; Future  - CBC with Auto Differential; Future  - Urinalysis with Microscopic; Future    3. Pancreatic cyst  Refer to gastroenterology  - Veronica Delaney MD, Gastroenterology, [place]    4. Hematoma of left kidney, subsequent encounter  Follow-up with renal ultrasound  - US RENAL COMPLETE; Future    5. Multiple closed fractures of ribs of both sides with routine healing, subsequent encounter  6. Closed fracture of left inferior pubic ramus with routine healing, subsequent encounter  7. Closed fracture of multiple ribs of left side with routine healing, subsequent encounter  8. Fall, subsequent encounter  Request records from orthopedic surgery            No follow-ups on file. Discussed use, benefit, and side effects of prescribed medications. All patient questions answered. Pt voiced understanding. Reviewed health maintenance. Instructedto continue current medications, diet and exercise. Patient agreed with treatmentplan. Follow up as directed.     _______________________________________________________________      Past Medical History:   Diagnosis Date    Generalized anxiety disorder     Generalized osteoarthritis of multiple sites 8/28/2017    GERD (gastroesophageal reflux disease)     Hypertension     Hypertriglyceridemia 8/28/2017    Idiopathic peripheral neuropathy 8/28/2017    Irritable bowel syndrome with constipation 8/28/2017    Restless legs syndrome     Seasonal allergic rhinitis due to pollen 8/28/2017      Past Surgical History:   Procedure Laterality Date    HERNIA REPAIR  1999/2001    KIDNEY SURGERY  02/2022    Diane JIMENEZ  1994       Family History   Problem Relation Age of Onset    Dementia Mother     Heart Disease Father     Colon Cancer Father     Dementia Father     High Blood Pressure Father     Heart Attack Father        Social History     Tobacco Use    Smoking status: Never Smoker    Smokeless tobacco: Never Used   Substance Use Topics    Alcohol use: No     Alcohol/week: 0.0 standard drinks     Current Outpatient Medications   Medication Sig Dispense Refill    amLODIPine (NORVASC) 2.5 MG tablet Take 1 tablet by mouth daily 90 tablet 3    bisoprolol (ZEBETA) 5 MG tablet Take 1 tablet by mouth daily 90 tablet 3    rOPINIRole (REQUIP) 2 MG tablet Take 1 tablet by mouth 3 times daily as needed (restless legs) 270 tablet 5    hydrOXYzine (ATARAX) 10 MG tablet TAKE 2 TABLETS BY MOUTH ONCE DAILY AT BEDTIME AS NEEDED FOR ITCHING.       levocetirizine (XYZAL) 5 MG tablet Take 5 mg by mouth nightly      hydrocortisone (ANUSOL-HC) 25 MG suppository Place 1 suppository rectally every 12 hours 12 suppository 11    meloxicam (MOBIC) 7.5 MG tablet Take 1 tablet by mouth 2 times daily 180 tablet 3    omeprazole (PRILOSEC) 20 MG delayed release capsule Take 1 capsule by mouth 2 times daily 180 capsule 3    citalopram (CELEXA) 10 MG tablet Take 2 tablets by mouth daily 180 tablet 3    traZODone (DESYREL) 50 MG tablet Take 1 tablet by mouth nightly 90 tablet 3    ipratropium (ATROVENT) 0.06 % nasal spray 1 spray by Nasal route daily as needed for Rhinitis 1 Bottle 2    diazePAM (VALIUM) 5 MG tablet Take 5 mg by mouth every 6 hours as needed for Anxiety.  betamethasone dipropionate (DIPROLENE) 0.05 % cream Apply topically 2 times daily Apply topically 2 times daily. 45 g 11    hyoscyamine (ANASPAZ;LEVSIN) 125 MCG tablet Take 1 tablet by mouth every 4 hours as needed for Cramping 180 tablet 11    acyclovir (ZOVIRAX) 400 MG tablet Take 1 tablet by mouth 4 times daily 30 tablet 5    fluticasone (FLONASE) 50 MCG/ACT nasal spray 1 spray by Nasal route daily 1 Bottle 3    pseudoephedrine (SUDAFED) 60 MG tablet Take 60 mg by mouth every 6 hours as needed for Congestion      fexofenadine (ALLEGRA) 60 MG tablet Take 60 mg by mouth daily      saw palmetto 160 MG capsule Take 160 mg by mouth daily      magnesium hydroxide (MILK OF MAGNESIA) 400 MG/5ML suspension Take by mouth daily as needed for Constipation      vitamin E 1000 UNITS capsule Take 1,000 Units by mouth daily       No current facility-administered medications for this visit.      Allergies   Allergen Reactions    Sulfa Antibiotics        Health Maintenance   Topic Date Due    COVID-19 Vaccine (3 - Booster for Moderna series) 07/09/2021    Depression Monitoring  04/19/2022    Annual Wellness Visit (AWV)  04/27/2022    Potassium monitoring  03/11/2023    Creatinine monitoring  03/11/2023    DTaP/Tdap/Td vaccine (2 - Td or Tdap) 04/26/2031    Flu vaccine  Completed    Shingles Vaccine  Completed    Pneumococcal 65+ years Vaccine  Completed    Hepatitis C screen  Completed    Hepatitis A vaccine  Aged Out    Hepatitis B vaccine  Aged Out    Hib vaccine  Aged Out    Meningococcal (ACWY) vaccine Aged Out       _______________________________________________________________    Note dictated using Dragon Dictation software  Sometimes this dictation software makes erroneous transcriptions.

## 2022-03-14 ENCOUNTER — HOSPITAL ENCOUNTER (OUTPATIENT)
Dept: ULTRASOUND IMAGING | Age: 77
Discharge: HOME | End: 2022-03-14
Payer: MEDICARE

## 2022-03-14 DIAGNOSIS — S37.012D HEMATOMA OF LEFT KIDNEY, SUBSEQUENT ENCOUNTER: ICD-10-CM

## 2022-03-14 PROCEDURE — 76770 US EXAM ABDO BACK WALL COMP: CPT

## 2022-03-15 ENCOUNTER — TELEPHONE (OUTPATIENT)
Dept: FAMILY MEDICINE CLINIC | Age: 77
End: 2022-03-15

## 2022-03-15 NOTE — TELEPHONE ENCOUNTER
Cannot get his records from Dr. Laura Casillas, [address] [facility]

## 2022-03-25 DIAGNOSIS — R53.83 OTHER FATIGUE: ICD-10-CM

## 2022-03-25 DIAGNOSIS — R79.89 AZOTEMIA: Primary | ICD-10-CM

## 2022-03-25 DIAGNOSIS — D64.9 ANEMIA, UNSPECIFIED TYPE: ICD-10-CM

## 2022-03-25 LAB
ALBUMIN SERPL-MCNC: 4.1 G/DL (ref 3.5–5.2)
ALP BLD-CCNC: 196 U/L (ref 40–130)
ALT SERPL-CCNC: 17 U/L (ref 5–41)
ANION GAP SERPL CALCULATED.3IONS-SCNC: 11 MMOL/L (ref 7–19)
AST SERPL-CCNC: 20 U/L (ref 5–40)
BASOPHILS ABSOLUTE: 0.1 K/UL (ref 0–0.2)
BASOPHILS RELATIVE PERCENT: 0.7 % (ref 0–1)
BILIRUB SERPL-MCNC: 0.8 MG/DL (ref 0.2–1.2)
BUN BLDV-MCNC: 12 MG/DL (ref 8–23)
CALCIUM SERPL-MCNC: 9.7 MG/DL (ref 8.8–10.2)
CHLORIDE BLD-SCNC: 100 MMOL/L (ref 98–111)
CO2: 28 MMOL/L (ref 22–29)
CREAT SERPL-MCNC: 1.3 MG/DL (ref 0.5–1.2)
EOSINOPHILS ABSOLUTE: 0.2 K/UL (ref 0–0.6)
EOSINOPHILS RELATIVE PERCENT: 2.8 % (ref 0–5)
GFR AFRICAN AMERICAN: >59
GFR NON-AFRICAN AMERICAN: 53
GLUCOSE BLD-MCNC: 102 MG/DL (ref 74–109)
HCT VFR BLD CALC: 40.9 % (ref 42–52)
HEMOGLOBIN: 12.5 G/DL (ref 14–18)
IMMATURE GRANULOCYTES #: 0 K/UL
LYMPHOCYTES ABSOLUTE: 1.1 K/UL (ref 1.1–4.5)
LYMPHOCYTES RELATIVE PERCENT: 13.3 % (ref 20–40)
MCH RBC QN AUTO: 25.8 PG (ref 27–31)
MCHC RBC AUTO-ENTMCNC: 30.6 G/DL (ref 33–37)
MCV RBC AUTO: 84.3 FL (ref 80–94)
MONOCYTES ABSOLUTE: 0.7 K/UL (ref 0–0.9)
MONOCYTES RELATIVE PERCENT: 8.6 % (ref 0–10)
NEUTROPHILS ABSOLUTE: 6.3 K/UL (ref 1.5–7.5)
NEUTROPHILS RELATIVE PERCENT: 74.1 % (ref 50–65)
PDW BLD-RTO: 15.7 % (ref 11.5–14.5)
PLATELET # BLD: 224 K/UL (ref 130–400)
PMV BLD AUTO: 9.9 FL (ref 9.4–12.4)
POTASSIUM SERPL-SCNC: 4.8 MMOL/L (ref 3.5–5)
RBC # BLD: 4.85 M/UL (ref 4.7–6.1)
SODIUM BLD-SCNC: 139 MMOL/L (ref 136–145)
TOTAL PROTEIN: 7 G/DL (ref 6.6–8.7)
WBC # BLD: 8.4 K/UL (ref 4.8–10.8)

## 2022-04-13 ENCOUNTER — HOSPITAL ENCOUNTER (OUTPATIENT)
Dept: SLEEP CENTER | Age: 77
Discharge: HOME | End: 2022-04-15
Payer: MEDICARE

## 2022-04-13 PROCEDURE — 95811 POLYSOM 6/>YRS CPAP 4/> PARM: CPT

## 2022-04-14 NOTE — PROGRESS NOTES
Planning on sleep study. Height: 72 inches  Weight: 198 lbs  BMI: 26.85  Neck Circ: 17 inches  MALLAMPATI:3  ESS: 10    Type of Study: Titration Sleep study  Time Stage Position Snore Hypopnea Obs Apnea [employer]ak Apnea PAP O2   2200 2 supine yes no yes no +9 RA   2300 2 Right side yes yes no no +10 RA   2400 2 supine no yes no no +11 RA   0100 awake supine no no no yes +12 RA   0200 1 Right side no no no no +12 RA   0300 2 Right side no no no no +12 RA   0400 2 supine no no no no +12 RA     Summary:  Patient did well with a full face mask and a pressure of +12 cmh2o. DME: [facility]   Final PAP settings: +12  cmh2o  Mask Type: Full face  Mask: Vitera   Mask Size:Large    The study was reviewed briefly with Eva Romeo. He will be notified of the formal results and recommendations after the study is scored and interpreted. The report will be sent to his referring provider.     Technician: PUJA Larsen

## 2022-04-19 NOTE — PROGRESS NOTES
[place] [address]  [place], [address]  Phone [phone] Fax [phone]    Polysomnography Report  Patient Name Fátima Aguilar Account Number [de-identified]    1945 Referring Provider Lorena Huff M.D.,[facility]ENKINS     Age/ Gender 67 years/M Interpreting physician Lorena Huff M.D.,[facility]     Neck circumference/  Mallampati classification 17. in/class 3 Night Technician Annie Oneal RRT, RPSGT   [place] score 10/24 Scoring Technician Marco Tomas CRT,RPSGT   Height 72.0 in Indications for the test Obstructive Sleep Apnea   Weight 198.0 lbs Test Titration Polysomnogram   BMI 26.9 Date of test 2022     Procedure  A Titration Polysomnogram was conducted on the night of 2022. The study was performed and scored per AASM guidelines. The following were monitored: frontal, central, and occipital EEG, electrooculogram (EOG), submentalis EMG, nasal and oral airflow, intranasal pressure, thoracic plethysmography, abdominal plethysmography, anterior tibialis EMG, electrocardiogram, body position, and positive airway pressure (PAP). Arterial oxygen saturation was monitored with a pulse oximeter. The study was scored utilizing 30 second epochs. Hypopneas were scored using per AASM definition VIII, D, 1B.     Sleep Scoring Data  Lights out 9:25:48 PM Sleep latency 0.0 min Time in N1 71.0 min N1% 28.2%   Lights on 4:21:48 AM WASO 164.0 min Time in N2 181.0 min N2% 71.8%   TIB/.0 min Sleep efficiency 61.8% Time in N3 0.0 min N3% 0.0%   .0 min REM latency N/A min Time in R 0.0 min R% 0.0%     Respiratory Events Summary   NREM REM Total   Hypopnea index 2.6    2.6   Apnea index 2.1    2.1   RERA index 0.0    0.0   AHI 4.8    4.8   RDI (AHI + RERA index) 4.8    4.8     Respiratory Events by Sleep Stage   Obstructive Apneas OA Index Central Apneas CA Index Mixed Apneas MA Index   Hypopneas H Index   RERAs   R Index   NREM 8 1.9 0 0.0 1 0.2 11 2.6 0 0.0   REM                                 Total 8 1.9 0 0.0 1 0.2 11 2.6 0 0.0     Respiratory Events by Body Position   Time (min) Obstructive Apneas OA Index Central Apneas CA Index Mixed Apneas MA Index   Hypopneas H Index   RERAs RERA  Index Total  AHI Total RDI   Supine 243.2  7 3.2 0 0.0 1 0.5 11 5.0 0 0.0 8.7 8.7   R/Supine                                           L/Supine                                           Right 158. 6  1 0.5 0 0.0 0 0.0 0 0.0 0 0.0 0.5 0.5   Left                                           Prone                                           R/Prone                                           L/Prone                                           Up 0.1  0 0.0 0 0.0 0 0.0 0 0.0 0 0.0 0.0 0.0   Snoring  Snoring Rating (loudness 0-4) 1   Snoring Amount (% of total sleep time with snoring) 0%     Oximetry Data              Wake NREM REM TST   Average Saturation  98% 97%   % 97%   Desaturation Index (#/hour)  4.8    4.8   Desaturation Max Duration (sec)  66.5 N/A 66.5   Minimum O2 Saturation    84%     Oximetry Distribution              WaKe REM NREM TOTAL %TIB   <90% (min) 0.4 0.0 0.1 0.5 0.1%   <85% (min) 0.0 0.0 0.0 0.0 0.0%   <80% (min) 0.0 0.0 0.0 0.0 0.0%   <75% (min) 0.0 0.0 0.0 0.0 0.0%   <70% (min) 0.0 0.0 0.0 0.0 0.0%   <60% (min) 0.0 0.0 0.0 0.0 0.0%   <50% (min) 0.0 0.0 0.0 0.0 0.0%   Fail    (min) 6.6 0.0 0.0 6.6      Respiratory Pattern   Present Absent Duration   Hypoventilation  ? N/A   Cheyne Yanes Respirations  ? N/A   Periodic Breathing  ? N/A     Heart Rate   Mean heart rate (bmp) Maximum heart rate (bmp)   During recording       78   During sleep 56.1 69     Heart Rhythm Summary  Normal sinus rhythm     Heart Rhythm Events  Type of events Present Absent Rate-Duration/Total Duration   Bradycardia        ? N/A   Asystole    ? N/A   Sinus Tachycardia During Sleep  ? N/A     Narrow Complex Tachycardia  ? N/A   Wide Complex Tachycardia  ?  N/A   Atrial Fibrillation  ? N/A   Other Arrhythmias  ? N/A     Arousals   NREM REM Total Arousal Index   Spontaneous 52 0 52 12.4   Respiratory 6 0 8 1.9   Snore 0 0 0 0.0   Leg movement 19 0 19 4.5   Total 77 0 90 21.4     Periodic Limb Movement Data (legs unless otherwise noted)   Leg Movements PLMS PLMS with arousal   # of events 35 18 15   Index (#/hr TST) 8.3 4.3 3.6     Therapy Titration  CPAP TIB Sleep REM Apneas Hypopneas RERAs   Min    (min) (min) (min) CA# OA# MA# Index # Index # Index AHI RDI SpO2    4 11.5 11.5 0.0 0 1 0 5.2 3 15.7 0 0.0 20.9 20.9 89    6 14.4 10.9 0.0 0 2 1 16.5 1 5.5 0 0.0 22.0 22.0 89    8 7.0 7.0 0.0 0 0 0 0.0 5 42.9 0 0.0 42.9 42.9 90    9 41.0 40.5 0.0 0 0 0 0.0 1 1.5 0 0.0 1.5 1.5 91    10 41.7 39.2 0.0 0 0 0 0.0 0 0.0 0 0.0 0.0 0.0 94    11 81.3 67.8 0.0 0 2 0 1.8 0 0.0 0 0.0 1.8 1.8 84     12 27.9 3.5 0.0 0 0 0 0.0 0 0.0 0 0.0 0.0 0.0 98    12 174.9 69.8 0.0 0 2 0 1.7 0 0.0 0 0.0 1.7 1.7 94      Note:   The interpreting physician named above, reviewed this record in its entirety, including sleep staging, EMG activity, EEG, EKG, breathing parameters, oxygen saturation, body position, and behavior unless otherwise noted. The interpretation is based on this information in addition to available clinical history and physical examination data. Interpretation:     1. Obstructive sleep apnea. 2. Subjective hypersomnia. 3. Inadequate PAP titration due to Absent REM sleep at the final CPAP setting. Recommendations:    1. Consider Auto CPAP to titrate between 8cm water pressure and 20cm water pressure. 2. Avoid risky activity such as driving if sleepy. 3. Discuss sleep hygiene with the patient. 4. Monitor PAP use and effectiveness.        Lorena Huff MD, FCCP, [facility]

## 2022-04-21 DIAGNOSIS — R79.89 AZOTEMIA: ICD-10-CM

## 2022-04-21 DIAGNOSIS — D64.9 ANEMIA, UNSPECIFIED TYPE: ICD-10-CM

## 2022-04-21 LAB
ALBUMIN SERPL-MCNC: 4.7 G/DL (ref 3.5–5.2)
ALP BLD-CCNC: 241 U/L (ref 40–130)
ALT SERPL-CCNC: 43 U/L (ref 5–41)
ANION GAP SERPL CALCULATED.3IONS-SCNC: 10 MMOL/L (ref 7–19)
AST SERPL-CCNC: 33 U/L (ref 5–40)
BASOPHILS ABSOLUTE: 0.1 K/UL (ref 0–0.2)
BASOPHILS RELATIVE PERCENT: 0.9 % (ref 0–1)
BILIRUB SERPL-MCNC: 0.8 MG/DL (ref 0.2–1.2)
BUN BLDV-MCNC: 12 MG/DL (ref 8–23)
CALCIUM SERPL-MCNC: 9.5 MG/DL (ref 8.8–10.2)
CHLORIDE BLD-SCNC: 101 MMOL/L (ref 98–111)
CO2: 29 MMOL/L (ref 22–29)
CREAT SERPL-MCNC: 1.2 MG/DL (ref 0.5–1.2)
EOSINOPHILS ABSOLUTE: 0.4 K/UL (ref 0–0.6)
EOSINOPHILS RELATIVE PERCENT: 5.4 % (ref 0–5)
GFR AFRICAN AMERICAN: >59
GFR NON-AFRICAN AMERICAN: 59
GLUCOSE BLD-MCNC: 88 MG/DL (ref 74–109)
HCT VFR BLD CALC: 44.4 % (ref 42–52)
HEMOGLOBIN: 13.5 G/DL (ref 14–18)
IMMATURE GRANULOCYTES #: 0 K/UL
LYMPHOCYTES ABSOLUTE: 1.7 K/UL (ref 1.1–4.5)
LYMPHOCYTES RELATIVE PERCENT: 25.5 % (ref 20–40)
MCH RBC QN AUTO: 25.9 PG (ref 27–31)
MCHC RBC AUTO-ENTMCNC: 30.4 G/DL (ref 33–37)
MCV RBC AUTO: 85.1 FL (ref 80–94)
MONOCYTES ABSOLUTE: 0.6 K/UL (ref 0–0.9)
MONOCYTES RELATIVE PERCENT: 9.7 % (ref 0–10)
NEUTROPHILS ABSOLUTE: 3.8 K/UL (ref 1.5–7.5)
NEUTROPHILS RELATIVE PERCENT: 58.2 % (ref 50–65)
PDW BLD-RTO: 16.8 % (ref 11.5–14.5)
PLATELET # BLD: 216 K/UL (ref 130–400)
PMV BLD AUTO: 10.2 FL (ref 9.4–12.4)
POTASSIUM SERPL-SCNC: 5.3 MMOL/L (ref 3.5–5)
RBC # BLD: 5.22 M/UL (ref 4.7–6.1)
SODIUM BLD-SCNC: 140 MMOL/L (ref 136–145)
TOTAL PROTEIN: 7 G/DL (ref 6.6–8.7)
WBC # BLD: 6.5 K/UL (ref 4.8–10.8)

## 2022-04-22 DIAGNOSIS — S37.012D HEMATOMA OF LEFT KIDNEY, SUBSEQUENT ENCOUNTER: Primary | ICD-10-CM

## 2022-04-25 ENCOUNTER — HOSPITAL ENCOUNTER (OUTPATIENT)
Dept: ULTRASOUND IMAGING | Age: 77
Discharge: HOME | End: 2022-04-25
Payer: MEDICARE

## 2022-04-25 DIAGNOSIS — G47.33 OSA (OBSTRUCTIVE SLEEP APNEA): Primary | ICD-10-CM

## 2022-04-25 DIAGNOSIS — S37.012D HEMATOMA OF LEFT KIDNEY, SUBSEQUENT ENCOUNTER: ICD-10-CM

## 2022-04-25 PROCEDURE — 95810 POLYSOM 6/> YRS 4/> PARAM: CPT | Performed by: INTERNAL MEDICINE

## 2022-04-25 PROCEDURE — 76770 US EXAM ABDO BACK WALL COMP: CPT

## 2022-04-26 ENCOUNTER — OFFICE VISIT (OUTPATIENT)
Dept: GASTROENTEROLOGY | Age: 77
End: 2022-04-26
Payer: MEDICARE

## 2022-04-26 VITALS
SYSTOLIC BLOOD PRESSURE: 175 MMHG | DIASTOLIC BLOOD PRESSURE: 80 MMHG | BODY MASS INDEX: 26.44 KG/M2 | HEIGHT: 72 IN | WEIGHT: 195.2 LBS

## 2022-04-26 DIAGNOSIS — K86.2 PANCREATIC CYST: Primary | ICD-10-CM

## 2022-04-26 PROCEDURE — 1036F TOBACCO NON-USER: CPT | Performed by: INTERNAL MEDICINE

## 2022-04-26 PROCEDURE — G8417 CALC BMI ABV UP PARAM F/U: HCPCS | Performed by: INTERNAL MEDICINE

## 2022-04-26 PROCEDURE — 1123F ACP DISCUSS/DSCN MKR DOCD: CPT | Performed by: INTERNAL MEDICINE

## 2022-04-26 PROCEDURE — G8427 DOCREV CUR MEDS BY ELIG CLIN: HCPCS | Performed by: INTERNAL MEDICINE

## 2022-04-26 PROCEDURE — 99204 OFFICE O/P NEW MOD 45 MIN: CPT | Performed by: INTERNAL MEDICINE

## 2022-04-26 NOTE — PROGRESS NOTES
'    Celina Gaston  Primary Care Provider Gemma Wing MD  Referral Source MD Julien Avilesi Marilin is a 67 y.o. Chief Complaint  Chief Complaint   Patient presents with    Cyst     pancreatic         ASSESSMENT/PLAN:  1. Pancreatic cyst    - MRI ABDOMEN W WO CONTRAST MRCP; Future      - Conservative surveillance for pancreatic cyst.  - MRI pancreatic protocol at end of calendar year. HPI    Pt is an 69-year-old male referred for pancreatic abnormality on CT scan. Patient is found to have what appears to be a low risk 1.6 cm pancreatic cyst.  No history of pancreatic disease that he is aware of. No new symptoms. No history of pancreatitis. Weight is stable. He has suffered a fall as well as some orthopedic injuries of various types over the past few months. Denies any jaundice. Narrative   CT abdomen and pelvis 1/31/2022 4:31 PM   HISTORY: Fall from ladder with left flank pain   TECHNIQUE: Axial images of the abdomen and pelvis were obtained   following IV contrast. . Coronal and sagittal reformatted images are   reconstructed and reviewed. COMPARISON: None. DLP: 862 mGy cm Automated exposure control was utilized to minimize   patient radiation dose. FINDINGS:    There is no suspicious focal liver or splenic lesion. No hepatic or   splenic laceration. Accessory splenule inferior to the splenic hilum. Cystic changes of the pancreatic tail. Represent focal dilatation of   the pancreatic duct versus 0.5 x 1.0 cm cystic pancreatic tail lesion,   high p.m. in considered. No traumatic pancreatic pathology. Gallbladder unremarkable. No adrenal nodule. There is a subcapsular left renal hematoma measuring up to 3 cm in   width. No active contrast extravasation identified. . There are   scattered hypodensities of the left kidney concerning for segmental   vascular injuries, however may represent grade 3/4 lacerations.  There   are portions of the left renal cortex which do enhance. No complete   avulsion of the renal hilum. Delayed images are performed, however   there is no excretion of contrast from the left kidney therefore   assessment of integrity of the left proximal renal collecting system   and ureter cannot be assessed. There is left perinephric fat   stranding. Right kidney remains normal. Bladder is intact and   distended. Bilateral inguinal hernia repairs. Prostate does not appear   enlarged. Sigmoid colonic diverticulosis. Moderate fecal stasis. No evidence for   bowel obstruction. Decompressed stomach. Small to moderate size hiatal   hernia. All vascular calcification with no aortic injury or aneurysm. There is a buckle fracture of the left inferior ramus with a   nondisplaced fracture involving the left superior ramus. Fracture of   the left sacral ala. No discrete lumbar vertebral fracture identified.       Impression   1. Grade III/IV left renal injury with left subcapsular hematoma and   scattered hypodensity left renal cortex which may represent   lacerations and hematomas versus segmental vascular injuries. No   active contrast extravasation identified. Integrity of the left   proximal renal collecting system cannot be ascertained due to lack of   excretion of contrast from the left kidney during image acquisition   despite delayed sequences. Left perinephric fat stranding. This   finding discussed with BRIAN Hood caring for the patient, at   5:20 PM on 1/31/2022   2. Left sacral ala fracture. Nondisplaced fractures of left superior   and inferior rami. 3. Small moderate size hiatal hernia. 4. 1.6 x 1.0 cm cystic changes of the tail of the pancreas. This is   not an acute finding. Comparison to outside films be helpful to   establish chronicity of the finding. If no outside studies, follow-up   MRI abdomen/MRCP could be performed for further characterization   patient is clinically stable and capable.    Signed by Dr Emily Carter Laith             Past Medical History:   Diagnosis Date    Generalized anxiety disorder     Generalized osteoarthritis of multiple sites 8/28/2017    GERD (gastroesophageal reflux disease)     Hypertension     Hypertriglyceridemia 8/28/2017    Idiopathic peripheral neuropathy 8/28/2017    Irritable bowel syndrome with constipation 8/28/2017    Restless legs syndrome     Seasonal allergic rhinitis due to pollen 8/28/2017      Past Surgical History:   Procedure Laterality Date    COLONOSCOPY  07/31/2006    Dr Newton Tlaavera    COLONOSCOPY  04/18/2016    Dr Yoli Hodgson diverticulosis, internal hemorrhoids    COLONOSCOPY  03/30/2011    Internal hemorrhoid, diverticulosis    HERNIA REPAIR  1999/2001   [facility]  02/2022    Dr Contreras Pall   [address] ENDOSCOPY  03/11/2021    Dr Katherine Rosario gastric polyp, esophastritis    UPPER GASTROINTESTINAL ENDOSCOPY  04/18/2016    Dr Aimee Cerrato,  , [facility] at [facility], no [facility]    UPPER GASTROINTESTINAL ENDOSCOPY  06/09/2006    Dr Kwesi Easton gastric polyp      Family History   Problem Relation Age of Onset    Dementia Mother     Breast Cancer Mother     Heart Disease Father     Colon Cancer Father     Dementia Father     High Blood Pressure Father     Heart Attack Father     Prostate Cancer Father     Esophageal Cancer Neg Hx     Liver Cancer Neg Hx     Liver Disease Neg Hx     Rectal Cancer Neg Hx     Stomach Cancer Neg Hx       Current Outpatient Medications   Medication Sig Dispense Refill    amLODIPine (NORVASC) 2.5 MG tablet Take 1 tablet by mouth daily 90 tablet 3    bisoprolol (ZEBETA) 5 MG tablet Take 1 tablet by mouth daily 90 tablet 3    rOPINIRole (REQUIP) 2 MG tablet Take 1 tablet by mouth 3 times daily as needed (restless legs) 270 tablet 5    hydrOXYzine (ATARAX) 10 MG tablet TAKE 2 TABLETS BY MOUTH ONCE DAILY AT BEDTIME AS NEEDED FOR ITCHING.       levocetirizine (XYZAL) 5 MG tablet Take 5 mg by mouth nightly      hydrocortisone (ANUSOL-HC) 25 MG suppository Place 1 suppository rectally every 12 hours 12 suppository 11    meloxicam (MOBIC) 7.5 MG tablet Take 1 tablet by mouth 2 times daily 180 tablet 3    omeprazole (PRILOSEC) 20 MG delayed release capsule Take 1 capsule by mouth 2 times daily 180 capsule 3    citalopram (CELEXA) 10 MG tablet Take 2 tablets by mouth daily 180 tablet 3    traZODone (DESYREL) 50 MG tablet Take 1 tablet by mouth nightly 90 tablet 3    ipratropium (ATROVENT) 0.06 % nasal spray 1 spray by Nasal route daily as needed for Rhinitis 1 Bottle 2    diazePAM (VALIUM) 5 MG tablet Take 5 mg by mouth every 6 hours as needed for Anxiety.  betamethasone dipropionate (DIPROLENE) 0.05 % cream Apply topically 2 times daily Apply topically 2 times daily. 45 g 11    hyoscyamine (ANASPAZ;LEVSIN) 125 MCG tablet Take 1 tablet by mouth every 4 hours as needed for Cramping 180 tablet 11    acyclovir (ZOVIRAX) 400 MG tablet Take 1 tablet by mouth 4 times daily 30 tablet 5    fluticasone (FLONASE) 50 MCG/ACT nasal spray 1 spray by Nasal route daily 1 Bottle 3    pseudoephedrine (SUDAFED) 60 MG tablet Take 60 mg by mouth every 6 hours as needed for Congestion      fexofenadine (ALLEGRA) 60 MG tablet Take 60 mg by mouth daily      saw palmetto 160 MG capsule Take 160 mg by mouth daily      magnesium hydroxide (MILK OF MAGNESIA) 400 MG/5ML suspension Take by mouth daily as needed for Constipation      vitamin E 1000 UNITS capsule Take 1,000 Units by mouth daily       No current facility-administered medications for this visit. Allergies   Allergen Reactions    Sulfa Antibiotics Rash         Review of Systems   Constitutional: Negative for appetite change, fever and unexpected weight change. HENT: Negative for sore throat, trouble swallowing and voice change. Eyes: Negative. Respiratory: Negative for cough, chest tightness and shortness of breath. Cardiovascular: Negative for chest pain, palpitations and leg swelling. Gastrointestinal: Negative for abdominal pain, blood in stool, constipation, diarrhea, nausea, rectal pain and vomiting. Genitourinary: Negative for dysuria, flank pain and hematuria. Musculoskeletal: Positive for arthralgias. Negative for back pain and gait problem. Skin: Negative for pallor, rash and wound. Allergic/Immunologic: Negative. Neurological: Negative for dizziness, tremors, weakness and light-headedness. Restless leg    Psychiatric/Behavioral: Negative. Physical Exam  Constitutional:       General: He is not in acute distress. Appearance: Normal appearance. He is well-developed. Comments: BP (!) 175/80 (Site: Right Upper Arm, Position: Sitting)   Ht 6' (1.829 m)   Wt 195 lb 3.2 oz (88.5 kg)   BMI 26.47 kg/m²      Eyes:      General: No scleral icterus. Conjunctiva/sclera: Conjunctivae normal.   Neck:      Trachea: No tracheal deviation. Cardiovascular:      Rate and Rhythm: Normal rate and regular rhythm. Heart sounds: Normal heart sounds, S1 normal and S2 normal. No murmur heard. Pulmonary:      Effort: Pulmonary effort is normal. No respiratory distress. Breath sounds: Normal breath sounds. No wheezing, rhonchi or rales. Abdominal:      General: Bowel sounds are normal. There is no distension. Palpations: Abdomen is soft. There is no hepatomegaly or mass. Tenderness: There is no abdominal tenderness. There is no guarding or rebound. Skin:     General: Skin is warm and dry. Coloration: Skin is not pale. Neurological:      Mental Status: He is alert and oriented to person, place, and time. Psychiatric:         Behavior: Behavior normal.         Thought Content:  Thought content normal.         Labs:  Orders Only on 04/21/2022   Component Date Value Ref Range Status    Sodium 04/21/2022 140  136 - 145 mmol/L Final    Potassium 04/21/2022 5.3* 3.5 - 5.0 mmol/L Final    Chloride 04/21/2022 101  98 - 111 mmol/L Final    CO2 04/21/2022 29  22 - 29 mmol/L Final    Anion Gap 04/21/2022 10  7 - 19 mmol/L Final    Glucose 04/21/2022 88  74 - 109 mg/dL Final    BUN 04/21/2022 12  8 - 23 mg/dL Final    CREATININE 04/21/2022 1.2  0.5 - 1.2 mg/dL Final    GFR Non- 04/21/2022 59* >60 Final    GFR  04/21/2022 >59  >59 Final    Calcium 04/21/2022 9.5  8.8 - 10.2 mg/dL Final    Total Protein 04/21/2022 7.0  6.6 - 8.7 g/dL Final    Albumin 04/21/2022 4.7  3.5 - 5.2 g/dL Final    Total Bilirubin 04/21/2022 0.8  0.2 - 1.2 mg/dL Final    Alkaline Phosphatase 04/21/2022 241* 40 - 130 U/L Final    ALT 04/21/2022 43* 5 - 41 U/L Final    AST 04/21/2022 33  5 - 40 U/L Final    WBC 04/21/2022 6.5  4.8 - 10.8 K/uL Final    RBC 04/21/2022 5.22  4.70 - 6.10 M/uL Final    Hemoglobin 04/21/2022 13.5* 14.0 - 18.0 g/dL Final    Hematocrit 04/21/2022 44.4  42.0 - 52.0 % Final    MCV 04/21/2022 85.1  80.0 - 94.0 fL Final    MCH 04/21/2022 25.9* 27.0 - 31.0 pg Final    MCHC 04/21/2022 30.4* 33.0 - 37.0 g/dL Final    RDW 04/21/2022 16.8* 11.5 - 14.5 % Final    Platelets 93/49/7797 216  130 - 400 K/uL Final    MPV 04/21/2022 10.2  9.4 - 12.4 fL Final    Neutrophils % 04/21/2022 58.2  50.0 - 65.0 % Final    Lymphocytes % 04/21/2022 25.5  20.0 - 40.0 % Final    Monocytes % 04/21/2022 9.7  0.0 - 10.0 % Final    Eosinophils % 04/21/2022 5.4* 0.0 - 5.0 % Final    Basophils % 04/21/2022 0.9  0.0 - 1.0 % Final    Neutrophils Absolute 04/21/2022 3.8  1.5 - 7.5 K/uL Final    Immature Granulocytes # 04/21/2022 0.0  K/uL Final    Lymphocytes Absolute 04/21/2022 1.7  1.1 - 4.5 K/uL Final    Monocytes Absolute 04/21/2022 0.60  0.00 - 0.90 K/uL Final    Eosinophils Absolute 04/21/2022 0.40  0.00 - 0.60 K/uL Final    Basophils Absolute 04/21/2022 0.10  0.00 - 0.20 K/uL Final       Greater than 37 minutes spent on this encounter, >50% of which was face-to-face with patient regarding counseling and discussion of treatment. Total time includes but is not limited to reviewing referral notes, reviewing labs/imaging and notes both in our EMR and through outside records, evaluating and examining the patient, discussing and formulating treatment, and writing the note.      (Please note that portions of this note were completed with a voice recognition program. Efforts were made to edit the dictations but occasionally words are mis-transcribed.)

## 2022-05-04 ENCOUNTER — HOSPITAL ENCOUNTER (OUTPATIENT)
Dept: CT IMAGING | Age: 77
Discharge: HOME | End: 2022-05-04
Payer: MEDICARE

## 2022-05-04 ENCOUNTER — OFFICE VISIT (OUTPATIENT)
Dept: NEUROLOGY | Age: 77
End: 2022-05-04
Payer: MEDICARE

## 2022-05-04 VITALS
DIASTOLIC BLOOD PRESSURE: 80 MMHG | WEIGHT: 195 LBS | SYSTOLIC BLOOD PRESSURE: 163 MMHG | HEIGHT: 72 IN | BODY MASS INDEX: 26.41 KG/M2 | HEART RATE: 60 BPM

## 2022-05-04 DIAGNOSIS — M19.072 OSTEOARTHRITIS OF SUBTALAR JOINT, LEFT: ICD-10-CM

## 2022-05-04 DIAGNOSIS — R26.89 IMBALANCE: ICD-10-CM

## 2022-05-04 DIAGNOSIS — M79.672 PAIN IN BOTH FEET: ICD-10-CM

## 2022-05-04 DIAGNOSIS — M79.671 PAIN IN BOTH FEET: ICD-10-CM

## 2022-05-04 DIAGNOSIS — G25.81 RESTLESS LEG SYNDROME: Primary | ICD-10-CM

## 2022-05-04 DIAGNOSIS — M79.672 LEFT FOOT PAIN: ICD-10-CM

## 2022-05-04 PROCEDURE — 99214 OFFICE O/P EST MOD 30 MIN: CPT | Performed by: PSYCHIATRY & NEUROLOGY

## 2022-05-04 PROCEDURE — 1123F ACP DISCUSS/DSCN MKR DOCD: CPT | Performed by: PSYCHIATRY & NEUROLOGY

## 2022-05-04 PROCEDURE — 73700 CT LOWER EXTREMITY W/O DYE: CPT

## 2022-05-04 PROCEDURE — G8417 CALC BMI ABV UP PARAM F/U: HCPCS | Performed by: PSYCHIATRY & NEUROLOGY

## 2022-05-04 PROCEDURE — 4040F PNEUMOC VAC/ADMIN/RCVD: CPT | Performed by: PSYCHIATRY & NEUROLOGY

## 2022-05-04 PROCEDURE — G8427 DOCREV CUR MEDS BY ELIG CLIN: HCPCS | Performed by: PSYCHIATRY & NEUROLOGY

## 2022-05-04 PROCEDURE — 1036F TOBACCO NON-USER: CPT | Performed by: PSYCHIATRY & NEUROLOGY

## 2022-05-04 RX ORDER — ROPINIROLE 2 MG/1
2 TABLET, FILM COATED ORAL 3 TIMES DAILY PRN
Qty: 270 TABLET | Refills: 5 | Status: SHIPPED | OUTPATIENT
Start: 2022-05-04

## 2022-05-04 NOTE — PROGRESS NOTES
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 The patient indicates he has had a long history of problem with his feet. He has spent approximately 8 to 12 hours per day for 40 years on his feet working five to six days a week period he has had inserts placed in his soles for the constant stress on his feet. He has noticed an increasing sensitivity over his feet. He will typically notice this at evening after he takes his shoes off. He describes a wave like sensation and moves over his feet and is difficult to describe. It is quite uncomfortable and limits his ability to sleep. He has been treated with Klonopin for possible restless leg syndrome without improvement in his symptoms. He denies any weakness or cramps in his legs. He has a chronic mild mid back pain. There is no involvement of his hands. He indicates his father also had complaints of pain in his feet when he got older in his 80's. Feels Requip is helping but now needs a second one. Feels slowly worsening with time. Neurontin did not help but Sinemet is helping at bedtime. Typically take 2 mg Requip at night. . 2 nights in a week may only sleep about 2 hours due to restless legs. Norco helps. uses it maybe 2x per week. alpha lipoic acid helping. Feel better coming off Neuronin and cognitive issues better. Left leg turing over years but worse after sliding off chair. x ray spine degenerative changes. Whe to orthopedics who gave him steroid injection and then therapy and now pain in feet is better. On Requip, Valium and alpha lipoic acid. He does not take the Norco.  Previously fell and broke his pelvis and 3 ribs. Hematoma on kidney. Renal function better. Went to [facility]. Doing better. Sleeping much better.      Chief complaint:restless legs      Active Ambulatory Problems     Diagnosis Date Noted    Pain in both feet 06/06/2016    Restless leg syndrome 06/06/2016    Dermatitis 08/28/2017    Fatigue 08/28/2017    Essential (primary) hypertension 08/28/2017    Gastroesophageal reflux disease without esophagitis 08/28/2017    RLS (restless legs syndrome) 08/28/2017    Gilbert's syndrome 08/28/2017    Hypertriglyceridemia 08/28/2017    Idiopathic peripheral neuropathy 08/28/2017    Generalized anxiety disorder 08/28/2017    Seasonal allergic rhinitis due to pollen 08/28/2017    Irritable bowel syndrome with constipation 08/28/2017    Chronic insomnia 08/28/2017    Generalized osteoarthritis of multiple sites 08/28/2017     Resolved Ambulatory Problems     Diagnosis Date Noted    No Resolved Ambulatory Problems     Past Medical History:   Diagnosis Date    GERD (gastroesophageal reflux disease)     Hypertension     Restless legs syndrome        Past Surgical History:   Procedure Laterality Date    COLONOSCOPY  07/31/2006    Dr Ko Dumas    COLONOSCOPY  04/18/2016    Dr Jaqueline Dyer diverticulosis, internal hemorrhoids    COLONOSCOPY  03/30/2011    Internal hemorrhoid, diverticulosis    HERNIA REPAIR  1999/2001   [facility]  02/2022    Dr Denis Villanueva   [address] ENDOSCOPY  03/11/2021    Dr Fito Higuera gastric polyp, esophastritis    UPPER GASTROINTESTINAL ENDOSCOPY  04/18/2016    Dr Benjamin Brambila,  , [facility], no [facility]    UPPER GASTROINTESTINAL ENDOSCOPY  06/09/2006    Dr Ra Mobley gastric polyp       Family History   Problem Relation Age of Onset    Dementia Mother     Breast Cancer Mother     Heart Disease Father     Colon Cancer Father     Dementia Father     High Blood Pressure Father     Heart Attack Father     Prostate Cancer Father     Esophageal Cancer Neg Hx     Liver Cancer Neg Hx     Liver Disease Neg Hx     Rectal Cancer Neg Hx     Stomach Cancer Neg Hx        Allergies   Allergen Reactions    Sulfa Antibiotics Rash       Social History     Socioeconomic History    Marital status:      Spouse name: Not on file    Number of children: Not on file    Years of education: Not on file    Highest education level: Not on file   Occupational History    Not on file   Tobacco Use    Smoking status: Never Smoker    Smokeless tobacco: Never Used   Vaping Use    Vaping Use: Never used   Substance and Sexual Activity    Alcohol use: No     Alcohol/week: 0.0 standard drinks    Drug use: No    Sexual activity: Not on file   Other Topics Concern    Not on file   Social History Narrative    Not on file     Social Determinants of Health     Financial Resource Strain:     Difficulty of Paying Living Expenses: Not on file   Food Insecurity:     Worried About Running Out of Food in the Last Year: Not on file    Stacie of Food in the Last Year: Not on file   Transportation Needs:     Lack of Transportation (Medical): Not on file    Lack of Transportation (Non-Medical): Not on file   Physical Activity:     Days of Exercise per Week: Not on file    Minutes of Exercise per Session: Not on file   Stress:     Feeling of Stress : Not on file   Social Connections:     Frequency of Communication with Friends and Family: Not on file    Frequency of Social Gatherings with Friends and Family: Not on file    Attends Muslim Services: Not on file    Active Member of [address] [place] [address] [employer] or Organizations: Not on file    Attends Club or Organization Meetings: Not on file    Marital Status: Not on file   Intimate Partner Violence:     Fear of Current or Ex-Partner: Not on file    Emotionally Abused: Not on file    Physically Abused: Not on file    Sexually Abused: Not on file   Housing Stability:     Unable to Pay for Housing in the Last Year: Not on file    Number of [place] in the Last Year: Not on file    Unstable Housing in the Last Year: Not on file     Review of Systems     Constitutional  No fever or chills. No diaphoresis or significant fatigue.   HENT   No tinnitus or significant hearing loss. Eyes  no sudden vision change or eye pain  Respiratory  no significant shortness of breath or cough  Cardiovascular  no chest pain No palpitations or significant leg swelling  Gastrointestinal  no abdominal swelling or pain. Genitourinary  No difficulty urinating, dysuria  Musculoskeletal  no back pain or myalgia. Skin  no color change or rash  Neurologic  No seizures. No lateralizing weakness. Hematologic  no easy bruising or excessive bleeding. Psychiatric  no severe anxiety or nervousness. All other review of systems are negative.         Current Outpatient Medications   Medication Sig Dispense Refill    rOPINIRole (REQUIP) 2 MG tablet Take 1 tablet by mouth 3 times daily as needed (restless legs) 270 tablet 5    amLODIPine (NORVASC) 2.5 MG tablet Take 1 tablet by mouth daily 90 tablet 3    bisoprolol (ZEBETA) 5 MG tablet Take 1 tablet by mouth daily 90 tablet 3    hydrOXYzine (ATARAX) 10 MG tablet TAKE 2 TABLETS BY MOUTH ONCE DAILY AT BEDTIME AS NEEDED FOR ITCHING.  levocetirizine (XYZAL) 5 MG tablet Take 5 mg by mouth nightly      hydrocortisone (ANUSOL-HC) 25 MG suppository Place 1 suppository rectally every 12 hours 12 suppository 11    meloxicam (MOBIC) 7.5 MG tablet Take 1 tablet by mouth 2 times daily 180 tablet 3    omeprazole (PRILOSEC) 20 MG delayed release capsule Take 1 capsule by mouth 2 times daily 180 capsule 3    citalopram (CELEXA) 10 MG tablet Take 2 tablets by mouth daily 180 tablet 3    traZODone (DESYREL) 50 MG tablet Take 1 tablet by mouth nightly 90 tablet 3    ipratropium (ATROVENT) 0.06 % nasal spray 1 spray by Nasal route daily as needed for Rhinitis 1 Bottle 2    diazePAM (VALIUM) 5 MG tablet Take 5 mg by mouth every 6 hours as needed for Anxiety.  betamethasone dipropionate (DIPROLENE) 0.05 % cream Apply topically 2 times daily Apply topically 2 times daily.  45 g 11    hyoscyamine (ANASPAZ;LEVSIN) 125 MCG tablet Take 1 tablet by mouth every 4 hours as needed for Cramping 180 tablet 11    acyclovir (ZOVIRAX) 400 MG tablet Take 1 tablet by mouth 4 times daily 30 tablet 5    fluticasone (FLONASE) 50 MCG/ACT nasal spray 1 spray by Nasal route daily 1 Bottle 3    pseudoephedrine (SUDAFED) 60 MG tablet Take 60 mg by mouth every 6 hours as needed for Congestion      fexofenadine (ALLEGRA) 60 MG tablet Take 60 mg by mouth daily      saw palmetto 160 MG capsule Take 160 mg by mouth daily      magnesium hydroxide (MILK OF MAGNESIA) 400 MG/5ML suspension Take by mouth daily as needed for Constipation      vitamin E 1000 UNITS capsule Take 1,000 Units by mouth daily       No current facility-administered medications for this visit. BP (!) 163/80   Pulse 60   Ht 6' (1.829 m)   Wt 195 lb (88.5 kg)   BMI 26.45 kg/m²     Constitutional  well developed, well nourished. Eyes  conjunctiva normal.  Ear, nose, throat - No scars, masses, or lesions over external nose or ears, no atrophy of tongue  Neck-symmetric, no masses noted, no jugular vein distension  Respiration- chest wall appears symmetric, good expansion,   normal effort without use of accessory muscles  Musculoskeletal  no significant wasting of muscles noted, no bony deformities  Extremities-no clubbing, cyanosis or edema  Skin  warm, dry, and intact. No rash, erythema, or pallor.   Psychiatric  mood, affect, and behavior appear normal.      Neurological exam  Awake, alert, fluent oriented  appropriate affect  Attention and concentration appear appropriate  Recent and remote memory appears unremarkable  Speech normal without dysarthria  No clear issues with language of fund of knowledge    Cranial Nerve Exam     CN III, IV,VI-EOMI, No nystagmus, conjugate eye movements, no ptosis  CN VII-no facial assymetry        Motor Exam  antigravity throughout upper and lower extremities bilaterally    Tremors- no tremors in hands or head noted    Gait  Normal base and speed  No ataxia    Lab Results   Component Value Date    ADMTCNEE06 619 09/28/2021     Lab Results   Component Value Date    WBC 6.5 04/21/2022    HGB 13.5 (L) 04/21/2022    HCT 44.4 04/21/2022    MCV 85.1 04/21/2022     04/21/2022     Lab Results   Component Value Date     04/21/2022    K 5.3 (H) 04/21/2022     04/21/2022    CO2 29 04/21/2022    BUN 12 04/21/2022    CREATININE 1.2 04/21/2022    GLUCOSE 88 04/21/2022    CALCIUM 9.5 04/21/2022    PROT 7.0 04/21/2022    LABALBU 4.7 04/21/2022    BILITOT 0.8 04/21/2022    ALKPHOS 241 (H) 04/21/2022    AST 33 04/21/2022    ALT 43 (H) 04/21/2022    LABGLOM 59 (A) 04/21/2022    GFRAA >59 04/21/2022           Assessment    ICD-10-CM    1. Restless leg syndrome  G25.81 rOPINIRole (REQUIP) 2 MG tablet   2. Pain in both feet  M79.671 rOPINIRole (REQUIP) 2 MG tablet    M79.672    3. Imbalance  R26.89        His neurological examination previously was significant for a left foot drop and weakness in inversion and eversion for some mild decreased vibration over his feet and decreased sensation lateral left lower leg The rest of this sensory exam and motor exam was unremarkable. His reflexes were intact and he had no sway on Romberg. Based upon his history and examination it is unclear what the etiology of his discomfort is. Certainly a sensory neuropathy in his feet is a possibility along with restless leg syndrome although his symptoms are quite limited to his feet. The other possibility is that he has some chronic trauma to his feet from a history of long standing stress on these extremities from his work leading to his symptoms of discomfort. His EMG with nerve conduction study of his legs suggested a mild sensory neuropathy. His blood work including JERRI, heavy metals screen, hemoglobin A1C, HIV, RPR, sed rate, SPEP, and B12 was unremarkable. Requip can be helpful in both restless leg syndrome and neuropathy. He is off his Sinemet and neurontin at night. He is also taking trazadone at a low dose to help him sleep. At this time he will have his Valium continued at 5 mg at bedtime. He is on 2 mg Requip at night. He is off Lortab. He will be referred to PT again. The patient indicated understanding of the diagnoses and treatment plan. Continue current care as noted. . Follow 6 months      Plan    No orders of the defined types were placed in this encounter. Orders Placed This Encounter   Medications    rOPINIRole (REQUIP) 2 MG tablet     Sig: Take 1 tablet by mouth 3 times daily as needed (restless legs)     Dispense:  270 tablet     Refill:  5       Return in about 6 months (around 11/4/2022).

## 2022-05-23 ENCOUNTER — OFFICE VISIT (OUTPATIENT)
Dept: FAMILY MEDICINE CLINIC | Age: 77
End: 2022-05-23
Payer: MEDICARE

## 2022-05-23 DIAGNOSIS — R74.8 ELEVATED ALKALINE PHOSPHATASE LEVEL: ICD-10-CM

## 2022-05-23 DIAGNOSIS — Z99.89 OSA ON CPAP: ICD-10-CM

## 2022-05-23 DIAGNOSIS — G47.33 OSA ON CPAP: ICD-10-CM

## 2022-05-23 DIAGNOSIS — I10 ESSENTIAL (PRIMARY) HYPERTENSION: ICD-10-CM

## 2022-05-23 DIAGNOSIS — E87.5 HYPERKALEMIA: ICD-10-CM

## 2022-05-23 DIAGNOSIS — R79.89 AZOTEMIA: ICD-10-CM

## 2022-05-23 DIAGNOSIS — S37.012D HEMATOMA OF LEFT KIDNEY, SUBSEQUENT ENCOUNTER: ICD-10-CM

## 2022-05-23 DIAGNOSIS — I10 ESSENTIAL (PRIMARY) HYPERTENSION: Primary | ICD-10-CM

## 2022-05-23 DIAGNOSIS — Z00.00 ANNUAL PHYSICAL EXAM: Primary | ICD-10-CM

## 2022-05-23 LAB
ALBUMIN SERPL-MCNC: 4.5 G/DL (ref 3.5–5.2)
ALP BLD-CCNC: 191 U/L (ref 40–130)
ALT SERPL-CCNC: 21 U/L (ref 5–41)
ANION GAP SERPL CALCULATED.3IONS-SCNC: 10 MMOL/L (ref 7–19)
AST SERPL-CCNC: 22 U/L (ref 5–40)
BILIRUB SERPL-MCNC: 0.9 MG/DL (ref 0.2–1.2)
BUN BLDV-MCNC: 12 MG/DL (ref 8–23)
CALCIUM SERPL-MCNC: 9.5 MG/DL (ref 8.8–10.2)
CHLORIDE BLD-SCNC: 99 MMOL/L (ref 98–111)
CO2: 29 MMOL/L (ref 22–29)
CREAT SERPL-MCNC: 1.1 MG/DL (ref 0.5–1.2)
GFR AFRICAN AMERICAN: >59
GFR NON-AFRICAN AMERICAN: >60
GLUCOSE BLD-MCNC: 100 MG/DL (ref 74–109)
POTASSIUM SERPL-SCNC: 3.9 MMOL/L (ref 3.5–5)
SODIUM BLD-SCNC: 138 MMOL/L (ref 136–145)
TOTAL PROTEIN: 7.3 G/DL (ref 6.6–8.7)

## 2022-05-23 PROCEDURE — G0439 PPPS, SUBSEQ VISIT: HCPCS | Performed by: FAMILY MEDICINE

## 2022-05-23 PROCEDURE — 99214 OFFICE O/P EST MOD 30 MIN: CPT | Performed by: FAMILY MEDICINE

## 2022-05-23 PROCEDURE — G8427 DOCREV CUR MEDS BY ELIG CLIN: HCPCS | Performed by: FAMILY MEDICINE

## 2022-05-23 PROCEDURE — G8417 CALC BMI ABV UP PARAM F/U: HCPCS | Performed by: FAMILY MEDICINE

## 2022-05-23 PROCEDURE — 1036F TOBACCO NON-USER: CPT | Performed by: FAMILY MEDICINE

## 2022-05-23 PROCEDURE — 1123F ACP DISCUSS/DSCN MKR DOCD: CPT | Performed by: FAMILY MEDICINE

## 2022-05-23 RX ORDER — LISINOPRIL 20 MG/1
20 TABLET ORAL DAILY
Qty: 30 TABLET | Refills: 5 | Status: SHIPPED | OUTPATIENT
Start: 2022-05-23 | End: 2022-06-23 | Stop reason: SDUPTHER

## 2022-05-23 RX ORDER — ALFUZOSIN HYDROCHLORIDE 10 MG/1
10 TABLET, EXTENDED RELEASE ORAL DAILY
Qty: 30 TABLET | Refills: 3 | Status: SHIPPED | OUTPATIENT
Start: 2022-05-23

## 2022-05-23 ASSESSMENT — PATIENT HEALTH QUESTIONNAIRE - PHQ9
5. POOR APPETITE OR OVEREATING: 0
6. FEELING BAD ABOUT YOURSELF - OR THAT YOU ARE A FAILURE OR HAVE LET YOURSELF OR YOUR FAMILY DOWN: 0
7. TROUBLE CONCENTRATING ON THINGS, SUCH AS READING THE NEWSPAPER OR WATCHING TELEVISION: 0
1. LITTLE INTEREST OR PLEASURE IN DOING THINGS: 0
2. FEELING DOWN, DEPRESSED OR HOPELESS: 0
SUM OF ALL RESPONSES TO PHQ QUESTIONS 1-9: 0
3. TROUBLE FALLING OR STAYING ASLEEP: 0
4. FEELING TIRED OR HAVING LITTLE ENERGY: 0
9. THOUGHTS THAT YOU WOULD BE BETTER OFF DEAD, OR OF HURTING YOURSELF: 0
SUM OF ALL RESPONSES TO PHQ QUESTIONS 1-9: 0
8. MOVING OR SPEAKING SO SLOWLY THAT OTHER PEOPLE COULD HAVE NOTICED. OR THE OPPOSITE, BEING SO FIGETY OR RESTLESS THAT YOU HAVE BEEN MOVING AROUND A LOT MORE THAN USUAL: 0
SUM OF ALL RESPONSES TO PHQ9 QUESTIONS 1 & 2: 0
SUM OF ALL RESPONSES TO PHQ QUESTIONS 1-9: 0
SUM OF ALL RESPONSES TO PHQ QUESTIONS 1-9: 0

## 2022-05-23 ASSESSMENT — LIFESTYLE VARIABLES: HOW OFTEN DO YOU HAVE A DRINK CONTAINING ALCOHOL: NEVER

## 2022-05-23 NOTE — PROGRESS NOTES
[facility] PHYSICIAN SERVICES  [facility] FAMILY MEDICINE  [address] [address] [address]  Dept: [phone]  Dept Fax: [phone]  Loc: [phone]    Casey Edwards is a 67 y.o. male who presents today for his medical conditions/complaints as noted below. Casey Edwards is here for Medicare AWV and Leg Swelling        HPI:   CC: Here today to discuss the following:    Injury continues to have left leg swelling and left lower extremity discomfort. Continues to be followed at the [facility] by Dr. Susi Mccray and Dr. Helen Nieves. Hypertension  Compliant with medications. No adverse effects from medication. No lightheadedness, palpitations, or chest pain. MARGARETH on CPAP  Compliant with CPAP. No daytime somnolence. Feels rested for the most part when wearing CPAP. Hypertension  Compliant with medications. No adverse effects from medication. No lightheadedness, palpitations, or chest pain. Current medications:   zebeta 5 mg daily    He was off lisinopril/HCTZ due to kidney trauma and hyperkalemia after his fall. Is also having symptoms consistent with BPH. He is having increased urinary frequency at night. No dysuria or hematuria. HPI    Subjective:      Review of Systems    Review of Systems   Constitutional: Negative for chills and fever. HENT: Negative for congestion. Respiratory: Negative for cough, chest tightness and shortness of breath. Cardiovascular: Negative for chest pain, palpitations and leg swelling. Gastrointestinal: Negative for abdominal pain, anal bleeding, constipation, diarrhea and nausea. Genitourinary: Negative for difficulty urinating. Psychiatric/Behavioral: Negative. SeeHPI for visit specific review of symptoms.   All others negative      Objective:   /82   Pulse 76   Temp 98.5 °F (36.9 °C)   Ht 6' (1.829 m)   Wt 195 lb (88.5 kg)   SpO2 99%   BMI 26.45 kg/m²   Physical Exam  Physical Exam   Constitutional: He appears well-developed. Does not appear ill. Neck: Neck supple. No masses. Neck Symmetric. Normal tracheal position. No thyroid enlargement  Cardiovascular: Normal rate and regular rhythm. Exam reveals no friction rub. No murmur heard. Respiratory:  Effort normal and breath sounds normal. No respiratory distress. No wheezes. No rales. No use of accessory muscles or intercostal retractions. Neurological: alert. Psychiatric: normal mood and affect. His behavior is normal.       Recent Results (from the past 672 hour(s))   Comprehensive Metabolic Panel    Collection Time: 05/23/22  2:53 PM   Result Value Ref Range    Sodium 138 136 - 145 mmol/L    Potassium 3.9 3.5 - 5.0 mmol/L    Chloride 99 98 - 111 mmol/L    CO2 29 22 - 29 mmol/L    Anion Gap 10 7 - 19 mmol/L    Glucose 100 74 - 109 mg/dL    BUN 12 8 - 23 mg/dL    CREATININE 1.1 0.5 - 1.2 mg/dL    GFR Non-African American >60 >60    GFR African American >59 >59    Calcium 9.5 8.8 - 10.2 mg/dL    Total Protein 7.3 6.6 - 8.7 g/dL    Albumin 4.5 3.5 - 5.2 g/dL    Total Bilirubin 0.9 0.2 - 1.2 mg/dL    Alkaline Phosphatase 191 (H) 40 - 130 U/L    ALT 21 5 - 41 U/L    AST 22 5 - 40 U/L       Radiation Dose Estimates    No radiation information found for this patient  Narrative   EXAMINATION: CT FOOT LEFT WO CONTRAST 5/4/2022 4:44 PM   HISTORY: Left foot pain, osteoarthritis of the subtalar joint. DOSE: 1160 mGycm. Automatic exposure control was utilized in an effort   to use as little radiation as possible, without compromising image   quality. REPORT: Spiral CT of the left foot was performed without contrast,   reconstructed coronal and sagittal images are also reviewed. COMPARISON: There are no correlative imaging studies for comparison. Review of bone windows demonstrates normal alignment of the osseous   structures. The joint spaces of the hindfoot and midfoot are   preserved, including the subtalar joint.  There is mild degenerative   narrowing of the MP joints diffusely. There is mild overgrowth of the   first metatarsal head, with associated spurring, no significant hallux   valgus deformity is identified. The hallux sesamoids appear intact. No   fracture or evidence of osteomyelitis is identified. There are diffuse   vascular calcifications which may indicate diabetes. Review of soft   tissue windows shows no gross evidence of tendon disruption, MRI would   be more sensitive. Increased attenuation of subcutaneous test fat   planes over the lower leg and ankle region extending to the dorsal   aspect of the foot greater laterally compatible with diffuse edema is   nonspecific. Correlate clinically for cellulitis.       Impression   No fracture, no acute osseous abnormality is identified. Degenerative changes are present, but without signal involvement of   the subtalar joint. Diffuse subcutis edema extending over the dorsal   lateral aspect of the foot, is nonspecific. Cellulitis is not   excluded. No soft tissue gas is identified. Diffuse vascular   calcifications probably reflect diabetes. Signed by Dr Adriana Chew. Donal           Interpretation:      1. Obstructive sleep apnea. 2. Subjective hypersomnia. 3. Inadequate PAP titration due to Absent REM sleep at the final CPAP setting.     Recommendations:     1. Consider Auto CPAP to titrate between 8cm water pressure and 20cm water pressure. 2. Avoid risky activity such as driving if sleepy. 3. Discuss sleep hygiene with the patient. 4. Monitor PAP use and effectiveness.        Lorena Huff MD, [facility], [facility]      Assessment & Plan:      Completed annual wellness and address the following issues as well: The following diagnoses and conditions are stable with no further orders unless indicated:  1. Elevated alkaline phosphatase level  Alkaline phosphatase still elevated. Will get isoenzymes to differentiate between bone and liver. - Comprehensive Metabolic Panel;  Future  - Alkaline Phosphatase, Isoenzymes; Future    2. Hyperkalemia/azotemia/hematoma of the left kidney  Lab Results   Component Value Date    K 3.9 05/23/2022   Acute issue resolved  Renal function back to baseline. Potassium has normalized. Recommend he increase his fluid intake  - Comprehensive Metabolic Panel; Future    3. MARGARETH on CPAP  Compliant and satisfied with results. Continues to wear his CPAP at night. Send prescription for:  Mask: q3 months; full face cushions qmonthly, Headgear q6months; Tubing qmonthly, Humidifier q6months; filter-disposable [id number]. 4. Essential (primary) hypertension  Restart lisinopril 20 mg daily          Return in about 1 month (around 6/23/2022) for Routine follow up - 15 minute visit. Discussed use, benefit, and side effects of prescribed medications. All patient questions answered. Pt voiced understanding. Reviewed health maintenance. Instructedto continue current medications, diet and exercise. Patient agreed with treatmentplan.  Follow up as directed.     _______________________________________________________________      Past Medical History:   Diagnosis Date    Generalized anxiety disorder     Generalized osteoarthritis of multiple sites 8/28/2017    GERD (gastroesophageal reflux disease)     Hypertension     Hypertriglyceridemia 8/28/2017    Idiopathic peripheral neuropathy 8/28/2017    Irritable bowel syndrome with constipation 8/28/2017    Restless legs syndrome     Seasonal allergic rhinitis due to pollen 8/28/2017      Past Surgical History:   Procedure Laterality Date    COLONOSCOPY  07/31/2006    Dr Jeromy Ceron    COLONOSCOPY  04/18/2016    Dr Andrea Mera diverticulosis, internal hemorrhoids    COLONOSCOPY  03/30/2011    Internal hemorrhoid, diverticulosis    HERNIA REPAIR  1999/2001   [facility]  02/2022    Dr Tommy Hopson ENDOSCOPY  03/11/2021    Dr Gabriella Bojorquez gastric polyp, esophastritis  UPPER GASTROINTESTINAL ENDOSCOPY  04/18/2016    Dr Melanie Lo,  [place] [facility] [place] [facility]    UPPER GASTROINTESTINAL ENDOSCOPY  06/09/2006    Dr Maria Alejandra Brown gastric polyp       Family History   Problem Relation Age of Onset    Dementia Mother     Breast Cancer Mother     Heart Disease Father     Colon Cancer Father     Dementia Father     High Blood Pressure Father     Heart Attack Father     Prostate Cancer Father     Esophageal Cancer Neg Hx     Liver Cancer Neg Hx     Liver Disease Neg Hx     Rectal Cancer Neg Hx     Stomach Cancer Neg Hx        Social History     Tobacco Use    Smoking status: Never Smoker    Smokeless tobacco: Never Used   Substance Use Topics    Alcohol use: No     Alcohol/week: 0.0 standard drinks     Current Outpatient Medications   Medication Sig Dispense Refill    alfuzosin (UROXATRAL) 10 MG extended release tablet Take 1 tablet by mouth daily 30 tablet 3    rOPINIRole (REQUIP) 2 MG tablet Take 1 tablet by mouth 3 times daily as needed (restless legs) 270 tablet 5    bisoprolol (ZEBETA) 5 MG tablet Take 1 tablet by mouth daily 90 tablet 3    hydrOXYzine (ATARAX) 10 MG tablet TAKE 2 TABLETS BY MOUTH ONCE DAILY AT BEDTIME AS NEEDED FOR ITCHING.       levocetirizine (XYZAL) 5 MG tablet Take 5 mg by mouth nightly      hydrocortisone (ANUSOL-HC) 25 MG suppository Place 1 suppository rectally every 12 hours 12 suppository 11    meloxicam (MOBIC) 7.5 MG tablet Take 1 tablet by mouth 2 times daily (Patient taking differently: Take 15 mg by mouth daily ) 180 tablet 3    omeprazole (PRILOSEC) 20 MG delayed release capsule Take 1 capsule by mouth 2 times daily 180 capsule 3    citalopram (CELEXA) 10 MG tablet Take 2 tablets by mouth daily 180 tablet 3    traZODone (DESYREL) 50 MG tablet Take 1 tablet by mouth nightly 90 tablet 3    ipratropium (ATROVENT) 0.06 % nasal spray 1 spray by Nasal route daily as needed for Rhinitis 1 Bottle 2    diazePAM (VALIUM) 5 MG tablet Take 5 mg by mouth every 6 hours as needed for Anxiety.  betamethasone dipropionate (DIPROLENE) 0.05 % cream Apply topically 2 times daily Apply topically 2 times daily. 45 g 11    hyoscyamine (ANASPAZ;LEVSIN) 125 MCG tablet Take 1 tablet by mouth every 4 hours as needed for Cramping 180 tablet 11    fluticasone (FLONASE) 50 MCG/ACT nasal spray 1 spray by Nasal route daily 1 Bottle 3    pseudoephedrine (SUDAFED) 60 MG tablet Take 60 mg by mouth every 6 hours as needed for Congestion      fexofenadine (ALLEGRA) 60 MG tablet Take 60 mg by mouth daily      saw palmetto 160 MG capsule Take 160 mg by mouth daily      magnesium hydroxide (MILK OF MAGNESIA) 400 MG/5ML suspension Take by mouth daily as needed for Constipation      vitamin E 1000 UNITS capsule Take 1,000 Units by mouth daily      lisinopril (PRINIVIL;ZESTRIL) 20 MG tablet Take 1 tablet by mouth daily 30 tablet 5    acyclovir (ZOVIRAX) 400 MG tablet Take 1 tablet by mouth 4 times daily (Patient not taking: Reported on 5/23/2022) 30 tablet 5     No current facility-administered medications for this visit. Allergies   Allergen Reactions    Sulfa Antibiotics Rash       Health Maintenance   Topic Date Due    COVID-19 Vaccine (3 - Booster for Moderna series) 07/09/2021    Annual Wellness Visit (AWV)  04/27/2022    Depression Monitoring  05/23/2023    DTaP/Tdap/Td vaccine (2 - Td or Tdap) 04/26/2031    Flu vaccine  Completed    Shingles vaccine  Completed    Pneumococcal 65+ years Vaccine  Completed    Hepatitis C screen  Completed    Hepatitis A vaccine  Aged Out    Hepatitis B vaccine  Aged Out    Hib vaccine  Aged Out    Meningococcal (ACWY) vaccine  Aged Out       _______________________________________________________________    Note dictated using [id number] [facility]  Sometimes this dictation software makes erroneous transcriptions.

## 2022-05-23 NOTE — PATIENT INSTRUCTIONS
We are committed to providing you with the best care possible. In order to help us achieve these goals please remember to bring all medications, herbal products, and over the counter supplements with you to each visit. If your provider has ordered testing for you, please be sure to follow up with our office if you have not received results within 7 days after the testing took place. *If you receive a survey after visiting one of our offices, please take time to share your experience concerning your physician office visit. These surveys are confidential and no health information about you is shared. We are eager to improve for you and we are counting on your feedback to help make that happen.  _______________________________________________________________         Advance Care Planning: Care Instructions  Your Care Instructions    It can be hard to live with an illness that cannot be cured. But if your health is getting worse, you may want to make decisions about end-of-life care. Planning for the end of your life does not mean that you are giving up. It is a way to make sure that your wishes are met. Clearly stating your wishes can make it easier for your loved ones. Making plans while you are still able may also ease your mind and make your final days less stressful and more meaningful. Follow-up care is a key part of your treatment and safety. Be sure to make and go to all appointments, and call your doctor if you are having problems. It's also a good idea to know your test results and keep a list of the medicines you take. What can you do to plan for the end of life? · Visit:  https://[place].gov/consumer-protection/livingwills  · You can bring these issues up with your doctor. You do not need to wait until your doctor starts the conversation. You might start with \"I would not be willing to live with . Love  Love  Love  \" When you complete this sentence it helps your doctor understand your wishes.   · Talk openly and honestly with your doctor. This is the best way to understand the decisions you will need to make as your health changes. Know that you can always change your mind. · Ask your doctor about commonly used life-support measures. These include tube feedings, breathing machines, and fluids given through a vein (IV). Understanding these treatments will help you decide whether you want them. · You may choose to have these life-supporting treatments for a limited time. This allows a trial period to see whether they will help you. You may also decide that you want your doctor to take only certain measures to keep you alive. It is important to spell out these conditions so that your doctor and family understand them. · Talk to your doctor about how long you are likely to live. He or she may be able to give you an idea of what usually happens with your specific illness. · Think about preparing papers that state your wishes. This way there will not be any confusion about what you want. You can change your instructions at any time. Which papers should you prepare? Advance directives are legal papers that tell doctors how you want to be cared for at the end of your life. You do not need a  to write these papers. Ask your doctor or your state health department for information on how to write your advance directives. They may have the forms for each of these types of papers. Make sure your doctor has a copy of these on file, and give a copy to a family member or close friend. · Consider a do-not-resuscitate order (DNR). This order asks that no extra treatments be done if your heart stops or you stop breathing. Extra treatments may include cardiopulmonary resuscitation (CPR), electrical shock to restart your heart, or a machine to breathe for you. If you decide to have a DNR order, ask your doctor to explain and write it. Place the order in your home where everyone can easily see it. · Consider a living will.  A living will explains your wishes about life support and other treatments at the end of your life if you become unable to speak for yourself. Living tabor tell doctors to use or not use treatments that would keep you alive. You must have one or two witnesses or a notary present when you sign this form. · Consider a durable power of  for health care. This allows you to name a person to make decisions about your care if you are not able to. Most people ask a close friend or family member. Talk to this person about the kinds of treatments you want and those that you do not want. Make sure this person understands your wishes. These legal papers are simple to change. Tell your doctor what you want to change, and ask him or her to make a note in your medical file. Give your family updated copies of the papers. Where can you learn more? Go to [email or web address] org and sign in to your [facility] account. Enter P184 in the Douban box to learn more about \"Advance Care Planning: Care Instructions. \"     If you do not have an account, please click on the \"Sign Up Now\" link. Current as of: September 24, 2016  Content Version: 11.5  © 1162-5955 Healthwise, [employer]. Care instructions adapted under license by [facility] ([facility]). If you have questions about a medical condition or this instruction, always ask your healthcare professional. [place] [address] any warranty or liability for your use of this information. Learning About Living [place]  What is a living will? A living will is a legal form you use to write down the kind of care you want at the end of your life. It is used by the health professionals who will treat you if you aren't able to decide for yourself. If you put your wishes in writing, your loved ones and others will know what kind of care you want. They won't need to guess. This can ease your mind and be helpful to others.   A living will is not the same as an estate or property will. An estate will explains what you want to happen with your money and property after you die. Is a living will a legal document? A living will is a legal document. Each state has its own laws about living tabor. If you move to another state, make sure that your living will is legal in the state where you now live. Or you might use a universal form that has been approved by many states. This kind of form can sometimes be completed and stored online. Your electronic copy will then be available wherever you have a connection to the Internet. In most cases, doctors will respect your wishes even if you have a form from a different state. · You don't need an  to complete a living will. But legal advice can be helpful if your state's laws are unclear, your health history is complicated, or your family can't agree on what should be in your living will. · You can change your living will at any time. Some people find that their wishes about end-of-life care change as their health changes. · In addition to making a living will, think about completing a medical power of  form. This form lets you name the person you want to make end-of-life treatment decisions for you (your \"health care agent\") if you're not able to. Many hospitals and nursing homes will give you the forms you need to complete a living will and a medical power of . · Your living will is used only if you can't make or communicate decisions for yourself anymore. If you become able to make decisions again, you can accept or refuse any treatment, no matter what you wrote in your living will. · Your state may offer an online registry. This is a place where you can store your living will online so the doctors and nurses who need to treat you can find it right away. What should you think about when creating a living will? Talk about your end-of-life wishes with your family members and your doctor.  Let them know what you want. That way the people making decisions for you won't be surprised by your choices. Think about these questions as you make your living will:  · Do you know enough about life support methods that might be used? If not, talk to your doctor so you know what might be done if you can't breathe on your own, your heart stops, or you're unable to swallow. · What things would you still want to be able to do after you receive life-support methods? Would you want to be able to walk? To speak? To eat on your own? To live without the help of machines? · If you have a choice, where do you want to be cared for? In your home? At a hospital or nursing home? · Do you want certain Synagogue practices performed if you become very ill? · If you have a choice at the end of your life, where would you prefer to die? At home? In a hospital or nursing home? Somewhere else? · Would you prefer to be buried or cremated? · Do you want your organs to be donated after you die? What should you do with your living will? · Make sure that your family members and your health care agent have copies of your living will. · Give your doctor a copy of your living will to keep in your medical record. If you have more than one doctor, make sure that each one has a copy. · You may want to put a copy of your living will where it can be easily found. Where can you learn more? Go to https://AucteliapegarryewImageBrief.[employer]. org and sign in to your [employer] account. Enter Y356 in the "[employer] box to learn more about \"Learning About Living [place]. \"     If you do not have an account, please click on the \"Sign Up Now\" link. Current as of: September 24, 2016  Content Version: 11.5  © 3032-3554 Healthwise, [employer]. Care instructions adapted under license by [facility] ([facility]).  If you have questions about a medical condition or this instruction, always ask your healthcare professional. [address] any warranty or liability for your use of this information. Learning About Durable Power of  for Health Care  What is a durable power of  for health care? A durable power of  for health care is one type of the legal forms called advance directives. It lets you decide who you want to make treatment decisions for you if you cannot speak or decide for yourself. The person you choose is called your health care agent. Another type of advance directive is a living will. It lets you write down what kinds of treatment or life support you want or do not want. What should you think about when choosing a health care agent? Choose your health care agent carefully. This person may or may not be a family member. Talk to the person before you make your final decision. Make sure he or she is comfortable with this responsibility. It's a good idea to choose someone who:  · Is at least 25years old. · Knows you well and understands what makes life meaningful for you. · Understands your Mandaen and moral values. · Will do what you want, not what he or she wants. · Will be able to make difficult choices at a stressful time. · Will be able to refuse or stop treatment, if that is what you would want, even if you could die. · Will be firm and confident with health professionals if needed. · Will ask questions to get necessary information. · Lives near you or agrees to travel to you if needed. Your family may help you make medical decisions while you can still be part of that process. But it is important to choose one person to be your health care agent in case you are not able to make decisions for yourself. If you don't fill out the legal form and name a health care agent, the decisions your family can make may be limited. Who will make decisions for you if you do not have a health care agent?   If you don't have a health care agent or a living will, your family members may disagree about your medical care. And then some medical professionals who may not know you as well might have to make decisions for you. In some cases, a  makes the decisions. When you name a health care agent, it is very clear who has the power to make health decisions for you. How do you name a health care agent? You name your health care agent on a legal form. It is usually called a durable power of  for health care. Ask your hospital, state bar association, or office on aging where to find these forms. You must sign the form to make it legal. Some states require you to get the form notarized. This means that a person called a  watches you sign the form and then he or she signs the form. Some states also require that two or more witnesses sign the form. Be sure to tell your family members and doctors who your health care agent is. Keep your forms in a safe place. But make sure that your loved ones know where the forms are. This could be in your desk where you keep other important papers. Make sure your doctor has a copy of your forms. Where can you learn more? Go to https://chpepiceweb.[employer]. org and sign in to your [employer] account. Enter [id number] in the Track box to learn more about \"Learning About Durable Power of  for Health Care. \"     If you do not have an account, please click on the \"Sign Up Now\" link. Current as of: September 24, 2016  Content Version: 11.5  © 0985-1563 Healthwise, Incorporated. Care instructions adapted under license by [facility] ([facility]). If you have questions about a medical condition or this instruction, always ask your healthcare professional. [place] [address] any warranty or liability for your use of this information. _______________________________________________________________    Home Safety Tips    Each year, thousands of older Americans fall at home. Many of them are seriously injured, and some are disabled.  In , more than 8,500 people over age 72  because of falls. Falls are often due to hazards that are easy to overlook but easy to fix. This checklist will help you find and fix those hazards in your home. The checklist asks about hazards found in each room of your home. For each hazard, the checklist tells you how to fix the problem. At the end of the checklist, you will find other tips for preventing falls. o Look at the floor in each room  o When he walks through room do you have to walk around furniture? - Ask someone to move the furniture to clear your past  o You have throw rugs on the floor? - Remove the rugs or use double-sided tape or nonslip backing on the rugs so they dont slip  o Are papers, magazines, books, shoes, boxes, blankets, towels, or other objects on the floor?  -  things that are on the floor. Always keep objects off the floor  o Do you have to walk over or around cords or wires (like cords from lamps, extension cords, or telephone cords)? - Coil or tape cords and wires next to the wall so you cant trip over them. Have an  put in another outlet. o Are papers, shoes, books, or other objects on the stairs? -  things on the stairs. Always keep objects off the stairs. o Are some steps broken or uneven?   - Fix loose or uneven steps. o Are you missing a light over the stairway?   - Have a  or an  put in an overhead light at the top and bottom of the stairs. o Has the stairway light bulb burned out?   - Have a friend or family member change the light bulb.   o Do you have only one light switch for your stairs (only at the top or at the bottom of the stairs)? - Have a  or an  put in a light switch at the top and bottom of the stairs. You can get light switches that glow  o Are the handrails loose or broken? Is there a handrail on only one side of the stairs? - Fix loose handrails or put in new ones.  Make sure handrails emergency numbers in large print near each phone.   o Put a phone near the floor in case you fall and cant get up.   o Think about wearing an alarm device that will bring help in case you fall and cant get up.    www.cdc.gov/safeusa

## 2022-05-23 NOTE — PROGRESS NOTES
Medicare Annual Wellness Visit - Subsequent    Name: Samson Contreras Date: 2022   MRN: [id number] Sex: Male   Age: 67 y.o. Ethnicity: Non- / Non    : 1945 Race: White (non-)      Romy Phillips is here for   Chief Complaint   Patient presents with    Medicare AWV    Leg Swelling        Screenings for behavioral, psychosocial and functional/safety risks, and cognitive dysfunction are all negative except as indicated below. These results, as well as other patient data from the [email or web address] form, are documented in Flowsheets linked to this Encounter. Allergies   Allergen Reactions    Sulfa Antibiotics Rash       Prior to Visit Medications    Medication Sig Taking? Authorizing Provider   rOPINIRole (REQUIP) 2 MG tablet Take 1 tablet by mouth 3 times daily as needed (restless legs) Yes Anastasia Mcwilliams MD   amLODIPine (NORVASC) 2.5 MG tablet Take 1 tablet by mouth daily Yes Twin Bentley MD   bisoprolol (ZEBETA) 5 MG tablet Take 1 tablet by mouth daily Yes Twin Bentley MD   hydrOXYzine (ATARAX) 10 MG tablet TAKE 2 TABLETS BY MOUTH ONCE DAILY AT BEDTIME AS NEEDED FOR ITCHING.  Yes Historical Provider, MD   levocetirizine (XYZAL) 5 MG tablet Take 5 mg by mouth nightly Yes Historical Provider, MD   hydrocortisone (ANUSOL-HC) 25 MG suppository Place 1 suppository rectally every 12 hours Yes Twin Bentley MD   meloxicam (MOBIC) 7.5 MG tablet Take 1 tablet by mouth 2 times daily  Patient taking differently: Take 15 mg by mouth daily  Yes Twin Bentley MD   omeprazole (PRILOSEC) 20 MG delayed release capsule Take 1 capsule by mouth 2 times daily Yes Twin Bentley MD   citalopram (CELEXA) 10 MG tablet Take 2 tablets by mouth daily Yes Twin Bentley MD   traZODone (DESYREL) 50 MG tablet Take 1 tablet by mouth nightly Yes Twin Bentley MD   ipratropium (ATROVENT) 0.06 % nasal spray 1 spray by Nasal route daily as needed for Rhinitis Yes Twin Bentley MD   diazePAM (VALIUM) 5 MG tablet Take 5 mg by mouth every 6 hours as needed for Anxiety. Yes Historical Provider, MD   betamethasone dipropionate (DIPROLENE) 0.05 % cream Apply topically 2 times daily Apply topically 2 times daily.  Yes Edison Perez MD   hyoscyamine (ANASPAZ;LEVSIN) 125 MCG tablet Take 1 tablet by mouth every 4 hours as needed for Cramping Yes Edison Perez MD   fluticasone (FLONASE) 50 MCG/ACT nasal spray 1 spray by Nasal route daily Yes Edison Perez MD   pseudoephedrine (SUDAFED) 60 MG tablet Take 60 mg by mouth every 6 hours as needed for Congestion Yes Historical Provider, MD   fexofenadine (ALLEGRA) 60 MG tablet Take 60 mg by mouth daily Yes Historical Provider, MD   saw palmetto 160 MG capsule Take 160 mg by mouth daily Yes Historical Provider, MD   magnesium hydroxide (MILK OF MAGNESIA) 400 MG/5ML suspension Take by mouth daily as needed for Constipation Yes Historical Provider, MD   vitamin E 1000 UNITS capsule Take 1,000 Units by mouth daily Yes Historical Provider, MD   acyclovir (ZOVIRAX) 400 MG tablet Take 1 tablet by mouth 4 times daily  Patient not taking: Reported on 5/23/2022  Edison Perez MD       Past Medical History:   Diagnosis Date    Generalized anxiety disorder     Generalized osteoarthritis of multiple sites 8/28/2017    GERD (gastroesophageal reflux disease)     Hypertension     Hypertriglyceridemia 8/28/2017    Idiopathic peripheral neuropathy 8/28/2017    Irritable bowel syndrome with constipation 8/28/2017    Restless legs syndrome     Seasonal allergic rhinitis due to pollen 8/28/2017       Past Surgical History:   Procedure Laterality Date    COLONOSCOPY  07/31/2006    Dr Jeromy Ceron    COLONOSCOPY  04/18/2016    Dr Andrea Mera diverticulosis, internal hemorrhoids    COLONOSCOPY  03/30/2011    Internal hemorrhoid, diverticulosis    HERNIA REPAIR  1999/2001    KIDNEY SURGERY  02/2022    Dr Josie Cordova GASTROINTESTINAL ENDOSCOPY  03/11/2021    Dr Denisa Griggs gastric polyp, esophastritis    UPPER GASTROINTESTINAL ENDOSCOPY  04/18/2016    Dr Justin Bear,  , [facility] [place] [facility]    UPPER GASTROINTESTINAL ENDOSCOPY  06/09/2006    Dr Ashwini Ball gastric polyp       Family History   Problem Relation Age of Onset    Dementia Mother     Breast Cancer Mother     Heart Disease Father     Colon Cancer Father     Dementia Father     High Blood Pressure Father     Heart Attack Father     Prostate Cancer Father     Esophageal Cancer Neg Hx     Liver Cancer Neg Hx     Liver Disease Neg Hx     Rectal Cancer Neg Hx     Stomach Cancer Neg Hx        CareTeam (Including outside providers/suppliers regularly involved in providing care):   Patient Care Team:  Susie Giordano MD as PCP - General (Family Medicine)  Susie Giordano MD as PCP - [address] [place] [address]  [facility] Provider  Mansi Jacob MD as Neurologist (Neurology)    Wt Readings from Last 3 Encounters:   05/23/22 195 lb (88.5 kg)   05/04/22 195 lb (88.5 kg)   04/26/22 195 lb 3.2 oz (88.5 kg)     Vitals:    05/23/22 1357   BP: (!) 132/94   Pulse: 76   Temp: 98.5 °F (36.9 °C)   SpO2: 99%   Weight: 195 lb (88.5 kg)   Height: 6' (1.829 m)           The following problems were reviewed today and where indicated follow up appointments were made and/or referrals ordered.     Risk Factor Screenings with Interventions     Fall Risk:  2 or more falls in past year?: no  Fall with injury in past year?: (!) yes  Fall Risk Interventions:    · Home safety tips provided    Depression:  PHQ-2 Score: 0  Depression Interventions:  · Not indicated    Anxiety:     Anxiety Interventions:  · Not indicated    Cognitive:     Cognitive Impairment Interventions:  · Not indicated  · Clock drawing test normal    Substance Abuse:  Social History     Socioeconomic History    Marital status:      Spouse name: Not on file    Number of children: Not on file    Years of education: Not on file    Highest education level: Not on file   Occupational History    Not on file   Tobacco Use    Smoking status: Never Smoker    Smokeless tobacco: Never Used   Vaping Use    Vaping Use: Never used   Substance and Sexual Activity    Alcohol use: No     Alcohol/week: 0.0 standard drinks    Drug use: No    Sexual activity: Not on file   Other Topics Concern    Not on file   Social History Narrative    Not on file     Social Determinants of Health     Financial Resource Strain:     Difficulty of Paying Living Expenses: Not on file   Food Insecurity:     Worried About Running Out of Food in the Last Year: Not on file    Stacie of Food in the Last Year: Not on file   Transportation Needs:     Lack of Transportation (Medical): Not on file    Lack of Transportation (Non-Medical):  Not on file   Physical Activity: Insufficiently Active    Days of Exercise per Week: 2 days    Minutes of Exercise per Session: 50 min   Stress:     Feeling of Stress : Not on file   Social Connections:     Frequency of Communication with Friends and Family: Not on file    Frequency of Social Gatherings with Friends and Family: Not on file    Attends Hinduism Services: Not on file    Active Member of Clubs or Organizations: Not on file    Attends Club or Organization Meetings: Not on file    Marital Status: Not on file   Intimate Partner Violence:     Fear of Current or Ex-Partner: Not on file    Emotionally Abused: Not on file    Physically Abused: Not on file    Sexually Abused: Not on file   Housing Stability:     Unable to Pay for Housing in the Last Year: Not on file    Number of [place] in the Last Year: Not on file    Unstable Housing in the Last Year: Not on file        Substance Abuse Interventions:  · Not indicated    Health Risk Assessment:     General  In general, how would you say your health is?: Good  In the past 7 days, have you experienced any of the following: New or Increased Pain, New or Increased Fatigue, Loneliness, Social Isolation, Stress or Anger?: No  Do you get the social and emotional support that you need?: Yes  General Health Risk Interventions:  · Not indicated    Health Habits/Nutrition  On average, how many days per week do you engage in moderate to strenuous exercise (like a brisk walk)?: 2 days  On average, how many minutes do you engage in exercise at this level?: 50 min  Have you lost any weight without trying in the past 3 months?: No  Have you seen the dentist within the past year?: Yes  Body mass index is 26.45 kg/m².   Health Habits/Nutrition Interventions:  · Not indicated    Hearing/Vision  Do you or your family notice any trouble with your hearing that hasn't been managed with hearing aids?: No  Do you have difficulty driving, watching TV, or doing any of your daily activities because of your eyesight?: No  Have you had an eye exam within the past year?: Yes  Hearing/Vision Interventions:  · Not indicated    Safety  Do you have working smoke detectors?: Yes  Do you have any tripping hazards - loose or unsecured carpets or rugs?: No  Do you have any tripping hazards - clutter in doorways, halls, or stairs?: No  Do you have either shower bars, grab bars, non-slip mats or non-slip surfaces in your shower or bathtub?: Yes  Do all of your stairways have a railing or banister?: Yes  Do you always fasten your seatbelt when you are in a car?: Yes  Safety Interventions:  · Not indicated    ADLs  In the past 7 days, did you need help from others to perform any of the following everyday activities: Eating, dressing, grooming, bathing, toileting, or walking/balance?: (!) Yes  Select all that apply: (!) Walking/Balance  In the past 7 days, did you need help from others to take care of any of the following: Laundry, housekeeping, banking/finances, shopping, telephone use, food preparation, transportation, or taking medications?: No  ADL Interventions:  · Continue with activities of daily living    Personalized Preventive Plan   Current Health Maintenance Status  Immunization History   Administered Date(s) Administered    COVID-19, Carlos Ron, Primary or Immunocompromised, PF, 100mcg/0.5mL 01/11/2021, 02/09/2021    Influenza Virus Vaccine 10/09/2020    Influenza, High Dose (Fluzone 65 yrs and older) 09/01/2017, 10/01/2019, 10/12/2021    Pneumococcal Conjugate 13-valent ([id number]) 02/15/2016    Pneumococcal Polysaccharide ([id number]) 01/01/2011    Td, unspecified formulation 01/01/2011    Tdap (Boostrix, Adacel) 04/26/2021    Zoster Recombinant (Shingrix) 05/31/2018, 07/01/2019, 09/01/2019        Health Maintenance   Topic Date Due    COVID-19 Vaccine (3 - Booster for Moderna series) 07/09/2021    Depression Monitoring  04/19/2022    Annual Wellness Visit (AWV)  04/27/2022    DTaP/Tdap/Td vaccine (2 - Td or Tdap) 04/26/2031    Flu vaccine  Completed    Shingles vaccine  Completed    Pneumococcal 65+ years Vaccine  Completed    Hepatitis C screen  Completed    Hepatitis A vaccine  Aged Out    Hepatitis B vaccine  Aged Out    Hib vaccine  Aged Out    Meningococcal (ACWY) vaccine  Aged Out       Recommendations for [employer] customer insight Due: see orders.   Recommended screening schedule for the next 5-10 years is provided to the patient in written form: see Patient Instructions/AVS.

## 2022-05-25 VITALS
WEIGHT: 195 LBS | BODY MASS INDEX: 26.41 KG/M2 | TEMPERATURE: 98.5 F | HEART RATE: 76 BPM | SYSTOLIC BLOOD PRESSURE: 132 MMHG | DIASTOLIC BLOOD PRESSURE: 82 MMHG | OXYGEN SATURATION: 99 % | HEIGHT: 72 IN

## 2022-05-25 LAB
ALK PHOS OTHER CALC: 0 U/L
ALK PHOSPHATASE: 188 U/L (ref 40–120)
ALKALINE PHOSPHATASE BONE FRACTION: 90 U/L (ref 0–55)
ALKALINE PHOSPHATASE LIVER FRACTION: 98 U/L (ref 0–94)

## 2022-05-26 ENCOUNTER — TELEPHONE (OUTPATIENT)
Dept: FAMILY MEDICINE CLINIC | Age: 77
End: 2022-05-26

## 2022-05-26 ASSESSMENT — ENCOUNTER SYMPTOMS
SHORTNESS OF BREATH: 0
ALLERGIC/IMMUNOLOGIC NEGATIVE: 1
CHEST TIGHTNESS: 0
VOMITING: 0
BLOOD IN STOOL: 0
SORE THROAT: 0
BACK PAIN: 0
ABDOMINAL PAIN: 0
CONSTIPATION: 0
VOICE CHANGE: 0
COUGH: 0
RECTAL PAIN: 0
NAUSEA: 0
EYES NEGATIVE: 1
TROUBLE SWALLOWING: 0
DIARRHEA: 0

## 2022-05-26 NOTE — TELEPHONE ENCOUNTER
After signing this encounter, I noticed that [facility] pulm/sleep med faxed this on 5/20

## 2022-05-26 NOTE — TELEPHONE ENCOUNTER
He needs his cpap supples called to the following company:   [employer]:   [phone]    Prescription in chart now.

## 2022-06-22 NOTE — PROGRESS NOTES
[facility] PHYSICIAN SERVICES  [facility] FAMILY MEDICINE  [address] [address]  Dept: [phone]  Dept Fax: [phone]  Loc: [phone]    Pauline Fishman is a 67 y.o. male who presents today for his medical conditions/complaints as noted below. Pauline Fishman is here for 1 Month Follow-Up        HPI:   CC: Here today to discuss the following:    He was admitted January 31 to February 5 after sustaining a fall and suffering fractures of multiple ribs, sacral fracture, fracture of the ramus of the left pubis, closed injury to the left kidney. He has been under the care of of Dr. Karine Peguero, orthopedic surgery locally. Continues with physical therapy. 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 He has established with [facility] gastroenterology to follow this as well. Last visit he was restarted on his lisinopril which was held after his kidney injury. For blood pressure he is taking the following medications:  Lisinopril 20 mg twice daily  Zebeta 5 mg daily    Prior to his injury he was on the following medications:  Zebeta 5 mg daily  Lisinopril/HCTZ 40 mg / 25 mg    His laboratory work obtained on May 23 showed his renal function was back to baseline. Electrolytes were normal.    Blood pressure at home has been 150-160. Also experiencing elevated alkaline phosphatase. Isoenzymes were obtained and both bone and liver were elevated. Remains on Uroxatrol for BPH. On Celexa for mood. Satisfied with results. Cataract surgery was successful. HPI    Subjective:      Review of Systems  Review of Systems   Constitutional: Negative for chills and fever. HENT: Negative for congestion. Respiratory: Negative for cough, chest tightness and shortness of breath. Cardiovascular: Negative for chest pain, palpitations and leg swelling. Gastrointestinal: Negative for abdominal pain, anal bleeding, constipation, diarrhea and nausea.    Genitourinary: Negative for difficulty urinating. Psychiatric/Behavioral: Negative. SeeHPI for visit specific review of symptoms. All others negative      Objective:   BP (!) 152/82   Pulse 56   Temp 98.7 °F (37.1 °C)   Wt 193 lb (87.5 kg)   SpO2 96%   BMI 26.18 kg/m²   Physical Exam    Physical Exam   Constitutional: He appears well-developed. Does not appear ill. Neck: Neck supple. No masses. Neck Symmetric. Normal tracheal position. No thyroid enlargement  Cardiovascular: Normal rate and regular rhythm. Exam reveals no friction rub. No murmur heard. Respiratory:  Effort normal and breath sounds normal. No respiratory distress. No wheezes. No rales. No use of accessory muscles or intercostal retractions. Neurological: alert. Psychiatric: normal mood and affect. His behavior is normal.     No results found for this or any previous visit (from the past 672 hour(s)). Assessment & Plan: The following diagnoses and conditions are stable with no further orders unless indicated:  1. Essential (primary) hypertension  BP Readings from Last 3 Encounters:   06/23/22 (!) 152/82   05/23/22 132/82   05/04/22 (!) 163/80     Continue Zebeta 5 mg daily  Continue with lisinopril 20 mg twice daily  Add hydrochlorothiazide 12.5 mg daily    Recheck electrolytes and renal function in 4 weeks. 2. At high risk for falls  Home safety tips provided. 3.  Elevated alkaline phosphatase. Recheck in 4 weeks. Fractionated showed both elevations in bone and liver component. Likely related to recent trauma. Alkaline phosphatase was normal prior to September 28, 2021      Return in about 1 month (around 7/23/2022) for Routine follow up - 15 minute visit. Discussed use, benefit, and side effects of prescribed medications. All patient questions answered. Pt voiced understanding. Reviewed health maintenance. Instructedto continue current medications, diet and exercise.   Patient agreed with treatmentplan.  Follow up as directed.     _______________________________________________________________      Past Medical History:   Diagnosis Date    Generalized anxiety disorder     Generalized osteoarthritis of multiple sites 8/28/2017    GERD (gastroesophageal reflux disease)     Hypertension     Hypertriglyceridemia 8/28/2017    Idiopathic peripheral neuropathy 8/28/2017    Irritable bowel syndrome with constipation 8/28/2017    Restless legs syndrome     Seasonal allergic rhinitis due to pollen 8/28/2017      Past Surgical History:   Procedure Laterality Date    COLONOSCOPY  07/31/2006    Dr James Claudio    COLONOSCOPY  04/18/2016    Dr Stephanie Eric diverticulosis, internal hemorrhoids    COLONOSCOPY  03/30/2011    Internal hemorrhoid, diverticulosis    HERNIA REPAIR  1999/2001    KIDNEY SURGERY  02/2022    Dr Alas [facility]   [address] [facility] ENDOSCOPY  03/11/2021    Dr George Farfan gastric polyp, esophastritis    UPPER GASTROINTESTINAL ENDOSCOPY  04/18/2016    Dr Ramirez Saldivar,  , [place] [facility] [place]    UPPER GASTROINTESTINAL ENDOSCOPY  06/09/2006    Dr Evalee Mcardle gastric polyp       Family History   Problem Relation Age of Onset    Dementia Mother     Breast Cancer Mother     Heart Disease Father     Colon Cancer Father     Dementia Father     High Blood Pressure Father     Heart Attack Father     Prostate Cancer Father     Esophageal Cancer Neg Hx     Liver Cancer Neg Hx     Liver Disease Neg Hx     Rectal Cancer Neg Hx     Stomach Cancer Neg Hx        Social History     Tobacco Use    Smoking status: Never Smoker    Smokeless tobacco: Never Used   Substance Use Topics    Alcohol use: No     Alcohol/week: 0.0 standard drinks     Current Outpatient Medications   Medication Sig Dispense Refill    hydroCHLOROthiazide (MICROZIDE) 12.5 MG capsule Take 1 capsule by mouth every morning 90 capsule 1    lisinopril (PRINIVIL;ZESTRIL) 20 MG tablet Take 1 tablet by mouth in the morning and at bedtime 180 tablet 5    Misc. Devices MISC Mask: q3 months; full face cushions qmonthly, Headgear q6months; Tubing qmonthly, Humidifier q6months; filter-disposable [id number]. Diagnosis: G47.33 1 each 0    alfuzosin (UROXATRAL) 10 MG extended release tablet Take 1 tablet by mouth daily 30 tablet 3    rOPINIRole (REQUIP) 2 MG tablet Take 1 tablet by mouth 3 times daily as needed (restless legs) 270 tablet 5    bisoprolol (ZEBETA) 5 MG tablet Take 1 tablet by mouth daily 90 tablet 3    hydrOXYzine (ATARAX) 10 MG tablet TAKE 2 TABLETS BY MOUTH ONCE DAILY AT BEDTIME AS NEEDED FOR ITCHING.  levocetirizine (XYZAL) 5 MG tablet Take 5 mg by mouth nightly      hydrocortisone (ANUSOL-HC) 25 MG suppository Place 1 suppository rectally every 12 hours 12 suppository 11    meloxicam (MOBIC) 7.5 MG tablet Take 1 tablet by mouth 2 times daily (Patient taking differently: Take 15 mg by mouth daily ) 180 tablet 3    omeprazole (PRILOSEC) 20 MG delayed release capsule Take 1 capsule by mouth 2 times daily 180 capsule 3    citalopram (CELEXA) 10 MG tablet Take 2 tablets by mouth daily 180 tablet 3    traZODone (DESYREL) 50 MG tablet Take 1 tablet by mouth nightly 90 tablet 3    ipratropium (ATROVENT) 0.06 % nasal spray 1 spray by Nasal route daily as needed for Rhinitis 1 Bottle 2    diazePAM (VALIUM) 5 MG tablet Take 5 mg by mouth every 6 hours as needed for Anxiety.  betamethasone dipropionate (DIPROLENE) 0.05 % cream Apply topically 2 times daily Apply topically 2 times daily.  45 g 11    hyoscyamine (ANASPAZ;LEVSIN) 125 MCG tablet Take 1 tablet by mouth every 4 hours as needed for Cramping 180 tablet 11    fluticasone (FLONASE) 50 MCG/ACT nasal spray 1 spray by Nasal route daily 1 Bottle 3    pseudoephedrine (SUDAFED) 60 MG tablet Take 60 mg by mouth every 6 hours as needed for Congestion      fexofenadine (ALLEGRA) 60 MG tablet Take 60 mg by mouth daily      saw palmetto 160 MG capsule Take 160 mg by mouth daily      magnesium hydroxide (MILK OF MAGNESIA) 400 MG/5ML suspension Take by mouth daily as needed for Constipation      vitamin E 1000 UNITS capsule Take 1,000 Units by mouth daily      acyclovir (ZOVIRAX) 400 MG tablet Take 1 tablet by mouth 4 times daily (Patient not taking: Reported on 5/23/2022) 30 tablet 5     No current facility-administered medications for this visit. Allergies   Allergen Reactions    Sulfa Antibiotics Rash       Health Maintenance   Topic Date Due    COVID-19 Vaccine (3 - Booster for Moderna series) 07/09/2021    Depression Monitoring  05/23/2023    Annual Wellness Visit (AWV)  05/24/2023    DTaP/Tdap/Td vaccine (2 - Td or Tdap) 04/26/2031    Flu vaccine  Completed    Shingles vaccine  Completed    Pneumococcal 65+ years Vaccine  Completed    Hepatitis C screen  Completed    Hepatitis A vaccine  Aged Out    Hepatitis B vaccine  Aged Out    Hib vaccine  Aged Out    Meningococcal (ACWY) vaccine  Aged Out       _______________________________________________________________    Note dictated using [id number] [facility]  Sometimes this dictation software makes erroneous transcriptions. On the basis of positive falls risk screening, assessment and plan is as follows: home safety tips provided.

## 2022-06-23 ENCOUNTER — OFFICE VISIT (OUTPATIENT)
Dept: FAMILY MEDICINE CLINIC | Age: 77
End: 2022-06-23
Payer: MEDICARE

## 2022-06-23 VITALS
WEIGHT: 193 LBS | SYSTOLIC BLOOD PRESSURE: 152 MMHG | BODY MASS INDEX: 26.18 KG/M2 | TEMPERATURE: 98.7 F | DIASTOLIC BLOOD PRESSURE: 82 MMHG | OXYGEN SATURATION: 96 % | HEART RATE: 56 BPM

## 2022-06-23 DIAGNOSIS — I10 ESSENTIAL (PRIMARY) HYPERTENSION: Primary | ICD-10-CM

## 2022-06-23 DIAGNOSIS — Z91.81 AT HIGH RISK FOR FALLS: ICD-10-CM

## 2022-06-23 DIAGNOSIS — R74.8 ELEVATED ALKALINE PHOSPHATASE LEVEL: ICD-10-CM

## 2022-06-23 PROCEDURE — 1123F ACP DISCUSS/DSCN MKR DOCD: CPT | Performed by: FAMILY MEDICINE

## 2022-06-23 PROCEDURE — G8427 DOCREV CUR MEDS BY ELIG CLIN: HCPCS | Performed by: FAMILY MEDICINE

## 2022-06-23 PROCEDURE — G8417 CALC BMI ABV UP PARAM F/U: HCPCS | Performed by: FAMILY MEDICINE

## 2022-06-23 PROCEDURE — 1036F TOBACCO NON-USER: CPT | Performed by: FAMILY MEDICINE

## 2022-06-23 PROCEDURE — 99213 OFFICE O/P EST LOW 20 MIN: CPT | Performed by: FAMILY MEDICINE

## 2022-06-23 RX ORDER — HYDROCHLOROTHIAZIDE 12.5 MG/1
12.5 CAPSULE, GELATIN COATED ORAL EVERY MORNING
Qty: 90 CAPSULE | Refills: 1 | Status: SHIPPED | OUTPATIENT
Start: 2022-06-23

## 2022-06-23 RX ORDER — LISINOPRIL 20 MG/1
20 TABLET ORAL 2 TIMES DAILY
Qty: 180 TABLET | Refills: 5 | Status: SHIPPED | OUTPATIENT
Start: 2022-06-23

## 2022-07-19 DIAGNOSIS — I10 ESSENTIAL (PRIMARY) HYPERTENSION: ICD-10-CM

## 2022-07-19 LAB
ALBUMIN SERPL-MCNC: 4 G/DL (ref 3.5–5.2)
ALP BLD-CCNC: 147 U/L (ref 40–130)
ALT SERPL-CCNC: 29 U/L (ref 5–41)
ANION GAP SERPL CALCULATED.3IONS-SCNC: 6 MMOL/L (ref 7–19)
AST SERPL-CCNC: 27 U/L (ref 5–40)
BILIRUB SERPL-MCNC: 0.8 MG/DL (ref 0.2–1.2)
BUN BLDV-MCNC: 15 MG/DL (ref 8–23)
CALCIUM SERPL-MCNC: 9.1 MG/DL (ref 8.8–10.2)
CHLORIDE BLD-SCNC: 99 MMOL/L (ref 98–111)
CO2: 31 MMOL/L (ref 22–29)
CREAT SERPL-MCNC: 1.4 MG/DL (ref 0.5–1.2)
GFR AFRICAN AMERICAN: >59
GFR NON-AFRICAN AMERICAN: 49
GLUCOSE BLD-MCNC: 93 MG/DL (ref 74–109)
POTASSIUM SERPL-SCNC: 5.3 MMOL/L (ref 3.5–5)
SODIUM BLD-SCNC: 136 MMOL/L (ref 136–145)
TOTAL PROTEIN: 6.2 G/DL (ref 6.6–8.7)

## 2022-07-20 DIAGNOSIS — E86.0 DEHYDRATION: Primary | ICD-10-CM

## 2022-07-25 ENCOUNTER — OFFICE VISIT (OUTPATIENT)
Dept: FAMILY MEDICINE CLINIC | Age: 77
End: 2022-07-25
Payer: MEDICARE

## 2022-07-25 VITALS
TEMPERATURE: 98.4 F | BODY MASS INDEX: 26.85 KG/M2 | DIASTOLIC BLOOD PRESSURE: 82 MMHG | SYSTOLIC BLOOD PRESSURE: 122 MMHG | HEART RATE: 72 BPM | WEIGHT: 198 LBS | OXYGEN SATURATION: 98 %

## 2022-07-25 DIAGNOSIS — K59.00 CONSTIPATION, UNSPECIFIED CONSTIPATION TYPE: ICD-10-CM

## 2022-07-25 DIAGNOSIS — G47.33 OSA ON CPAP: ICD-10-CM

## 2022-07-25 DIAGNOSIS — I10 ESSENTIAL (PRIMARY) HYPERTENSION: Primary | ICD-10-CM

## 2022-07-25 DIAGNOSIS — Z99.89 OSA ON CPAP: ICD-10-CM

## 2022-07-25 DIAGNOSIS — Z13.220 LIPID SCREENING: ICD-10-CM

## 2022-07-25 PROCEDURE — 1123F ACP DISCUSS/DSCN MKR DOCD: CPT | Performed by: FAMILY MEDICINE

## 2022-07-25 PROCEDURE — 99213 OFFICE O/P EST LOW 20 MIN: CPT | Performed by: FAMILY MEDICINE

## 2022-07-25 NOTE — PATIENT INSTRUCTIONS
How to improve constipation:  Increase water intake. Try to drink 6-8 glasses of water a day. Increase fiber intake with fruits and vegetables. May also try an over the counter fiber supplement such as benefiber, fibersure, metamucil, citrucil, or fibercon. Try stool softeners and laxatives:  Plan A:  Senna: 2 tablets twice a day PLUS  Colace 100mg twice a day        Plan B:  Stop Senna  Start miralax 17 g (1 capful) once a day PLUS  Colace 100mg twice a day        Plan C:  If no bowel movement in 4-5 days with the above may try either fleets enema or 1 bottle of magnesium citrate. If still no relief, contact physician.

## 2022-07-25 NOTE — PROGRESS NOTES
[facility] PHYSICIAN SERVICES  [facility] FAMILY MEDICINE  [address] [address] [place] [address]  Dept: [phone]  Dept Fax: [phone]  Loc: [phone]    James Levy is a 67 y.o. male who presents today for his medical conditions/complaints as noted below. James Levy is here for 1 Month Follow-Up        HPI:   CC: Here today to discuss the following:    Getting physical therapy at [address]    MARGARETH on CPAP  Compliant with CPAP. No daytime somnolence. Feels rested for the most part when wearing CPAP. Hypertension  Compliant with medications. No adverse effects from medication. No lightheadedness, palpitations, or chest pain. Continues to have issues with constipation. HPI    Subjective:      Review of Systems    Review of Systems   Constitutional: Negative for chills and fever. HENT: Negative for congestion. Respiratory: Negative for cough, chest tightness and shortness of breath. Cardiovascular: Negative for chest pain, palpitations and leg swelling. Gastrointestinal: Negative for abdominal pain, anal bleeding, constipation, diarrhea and nausea. Genitourinary: Negative for difficulty urinating. Psychiatric/Behavioral: Negative. SeeHPI for visit specific review of symptoms. All others negative      Objective:   /82   Pulse 72   Temp 98.4 °F (36.9 °C)   Wt 198 lb (89.8 kg)   SpO2 98%   BMI 26.85 kg/m²   Physical Exam  Physical Exam   Constitutional: He appears well-developed. Does not appear ill. Neck: Neck supple. No masses. Neck Symmetric. Normal tracheal position. No thyroid enlargement  Cardiovascular: Normal rate and regular rhythm. Exam reveals no friction rub. No murmur heard. Respiratory:  Effort normal and breath sounds normal. No respiratory distress. No wheezes. No rales. No use of accessory muscles or intercostal retractions. Neurological: alert. Psychiatric: normal mood and affect.  His behavior is normal.       Recent Results (from the past 672 hour(s))   Comprehensive Metabolic Panel    Collection Time: 07/19/22 10:19 AM   Result Value Ref Range    Sodium 136 136 - 145 mmol/L    Potassium 5.3 (H) 3.5 - 5.0 mmol/L    Chloride 99 98 - 111 mmol/L    CO2 31 (H) 22 - 29 mmol/L    Anion Gap 6 (L) 7 - 19 mmol/L    Glucose 93 74 - 109 mg/dL    BUN 15 8 - 23 mg/dL    Creatinine 1.4 (H) 0.5 - 1.2 mg/dL    GFR Non- 49 (A) >60    GFR African American >59 >59    Calcium 9.1 8.8 - 10.2 mg/dL    Total Protein 6.2 (L) 6.6 - 8.7 g/dL    Albumin 4.0 3.5 - 5.2 g/dL    Total Bilirubin 0.8 0.2 - 1.2 mg/dL    Alkaline Phosphatase 147 (H) 40 - 130 U/L    ALT 29 5 - 41 U/L    AST 27 5 - 40 U/L               Assessment & Plan: The following diagnoses and conditions are stable with no further orders unless indicated:  1. Essential (primary) hypertension  BP Readings from Last 3 Encounters:   07/25/22 122/82   06/23/22 (!) 152/82   05/23/22 132/82   Blood pressure stable    - CBC with Auto Differential; Future    2. MARGARETH on CPAP  Compliant and satisfied with results    3. Constipation, unspecified constipation type  How to improve constipation:  Increase water intake. Try to drink 6-8 glasses of water a day. Increase fiber intake with fruits and vegetables. May also try an over the counter fiber supplement such as benefiber, fibersure, metamucil, citrucil, or fibercon. Try stool softeners and laxatives:  Plan A:  Senna: 2 tablets twice a day PLUS  Colace 100mg twice a day        Plan B:  Stop Senna  Start miralax 17 g (1 capful) once a day PLUS  Colace 100mg twice a day        Plan C:  If no bowel movement in 4-5 days with the above may try either fleets enema or 1 bottle of magnesium citrate. If still no relief, contact physician. 4. Lipid screening    - Lipid Panel; Future          Return in about 3 months (around 10/25/2022) for Routine follow up - 20 minutes.              Discussed use, benefit, and side effects of prescribed medications. All patient questions answered. Pt voiced understanding. Reviewed health maintenance. Instructedto continue current medications, diet and exercise. Patient agreed with treatmentplan.  Follow up as directed.     _______________________________________________________________      Past Medical History:   Diagnosis Date    Generalized anxiety disorder     Generalized osteoarthritis of multiple sites 8/28/2017    GERD (gastroesophageal reflux disease)     Hypertension     Hypertriglyceridemia 8/28/2017    Idiopathic peripheral neuropathy 8/28/2017    Irritable bowel syndrome with constipation 8/28/2017    Restless legs syndrome     Seasonal allergic rhinitis due to pollen 8/28/2017      Past Surgical History:   Procedure Laterality Date    COLONOSCOPY  07/31/2006    Dr Foster Solis    COLONOSCOPY  04/18/2016    Dr Tammy Aldrich diverticulosis, internal hemorrhoids    COLONOSCOPY  03/30/2011    Internal hemorrhoid, diverticulosis    HERNIA REPAIR  1999/2001    KIDNEY SURGERY  02/2022    Dr Latonia Don ENDOSCOPY  03/11/2021    Dr Phi Byrne gastric polyp, esophastritis    UPPER GASTROINTESTINAL ENDOSCOPY  04/18/2016    Dr Shoaib Kyle,  , Lubertha Harada at the [facility] [place], no Braga's    UPPER GASTROINTESTINAL ENDOSCOPY  06/09/2006    Dr João Medina gastric polyp       Family History   Problem Relation Age of Onset    Dementia Mother     Breast Cancer Mother     Heart Disease Father     Colon Cancer Father     Dementia Father     High Blood Pressure Father     Heart Attack Father     Prostate Cancer Father     Esophageal Cancer Neg Hx     Liver Cancer Neg Hx     Liver Disease Neg Hx     Rectal Cancer Neg Hx     Stomach Cancer Neg Hx        Social History     Tobacco Use    Smoking status: Never    Smokeless tobacco: Never   Substance Use Topics    Alcohol use: No     Alcohol/week: 0.0 standard drinks     Current Outpatient Medications   Medication Sig Dispense Refill    hydroCHLOROthiazide (MICROZIDE) 12.5 MG capsule Take 1 capsule by mouth every morning 90 capsule 1    lisinopril (PRINIVIL;ZESTRIL) 20 MG tablet Take 1 tablet by mouth in the morning and at bedtime 180 tablet 5    Misc. Devices MISC Mask: q3 months; full face cushions qmonthly, Headgear q6months; Tubing qmonthly, Humidifier q6months; filter-disposable [id number]. Diagnosis: G47.33 1 each 0    alfuzosin (UROXATRAL) 10 MG extended release tablet Take 1 tablet by mouth daily 30 tablet 3    rOPINIRole (REQUIP) 2 MG tablet Take 1 tablet by mouth 3 times daily as needed (restless legs) 270 tablet 5    bisoprolol (ZEBETA) 5 MG tablet Take 1 tablet by mouth daily 90 tablet 3    hydrOXYzine (ATARAX) 10 MG tablet TAKE 2 TABLETS BY MOUTH ONCE DAILY AT BEDTIME AS NEEDED FOR ITCHING. levocetirizine (XYZAL) 5 MG tablet Take 5 mg by mouth nightly      hydrocortisone (ANUSOL-HC) 25 MG suppository Place 1 suppository rectally every 12 hours 12 suppository 11    meloxicam (MOBIC) 7.5 MG tablet Take 1 tablet by mouth 2 times daily (Patient taking differently: Take 15 mg by mouth in the morning.) 180 tablet 3    omeprazole (PRILOSEC) 20 MG delayed release capsule Take 1 capsule by mouth 2 times daily 180 capsule 3    citalopram (CELEXA) 10 MG tablet Take 2 tablets by mouth daily 180 tablet 3    traZODone (DESYREL) 50 MG tablet Take 1 tablet by mouth nightly 90 tablet 3    ipratropium (ATROVENT) 0.06 % nasal spray 1 spray by Nasal route daily as needed for Rhinitis 1 Bottle 2    diazePAM (VALIUM) 5 MG tablet Take 5 mg by mouth every 6 hours as needed for Anxiety. betamethasone dipropionate (DIPROLENE) 0.05 % cream Apply topically 2 times daily Apply topically 2 times daily.  45 g 11    hyoscyamine (ANASPAZ;LEVSIN) 125 MCG tablet Take 1 tablet by mouth every 4 hours as needed for Cramping 180 tablet 11    fluticasone (FLONASE) 50 MCG/ACT nasal spray 1 spray by Nasal route daily 1 Bottle 3    pseudoephedrine (SUDAFED) 60 MG tablet Take 60 mg by mouth every 6 hours as needed for Congestion      fexofenadine (ALLEGRA) 60 MG tablet Take 60 mg by mouth daily      saw palmetto 160 MG capsule Take 160 mg by mouth daily      magnesium hydroxide (MILK OF MAGNESIA) 400 MG/5ML suspension Take by mouth daily as needed for Constipation      vitamin E 1000 UNITS capsule Take 1,000 Units by mouth daily      acyclovir (ZOVIRAX) 400 MG tablet Take 1 tablet by mouth 4 times daily 30 tablet 5     No current facility-administered medications for this visit. Allergies   Allergen Reactions    Sulfa Antibiotics Rash       Health Maintenance   Topic Date Due    COVID-19 Vaccine (3 - Booster for Moderna series) 07/09/2021    Flu vaccine (1) 09/01/2022    Depression Monitoring  05/23/2023    Annual Wellness Visit (AWV)  05/24/2023    DTaP/Tdap/Td vaccine (2 - Td or Tdap) 04/26/2031    Shingles vaccine  Completed    Pneumococcal 65+ years Vaccine  Completed    Hepatitis C screen  Completed    Hepatitis A vaccine  Aged Out    Hepatitis B vaccine  Aged Out    Hib vaccine  Aged Out    Meningococcal (ACWY) vaccine  Aged Out       _______________________________________________________________    Note dictated using Dragon Dictation software  Sometimes this dictation software makes erroneous transcriptions.

## 2022-08-17 DIAGNOSIS — Z13.220 LIPID SCREENING: ICD-10-CM

## 2022-08-17 DIAGNOSIS — E86.0 DEHYDRATION: ICD-10-CM

## 2022-08-17 DIAGNOSIS — I10 ESSENTIAL (PRIMARY) HYPERTENSION: ICD-10-CM

## 2022-08-17 LAB
ALBUMIN SERPL-MCNC: 4.5 G/DL (ref 3.5–5.2)
ALP BLD-CCNC: 153 U/L (ref 40–130)
ALT SERPL-CCNC: 24 U/L (ref 5–41)
ANION GAP SERPL CALCULATED.3IONS-SCNC: 7 MMOL/L (ref 7–19)
AST SERPL-CCNC: 29 U/L (ref 5–40)
BASOPHILS ABSOLUTE: 0.1 K/UL (ref 0–0.2)
BASOPHILS RELATIVE PERCENT: 0.8 % (ref 0–1)
BILIRUB SERPL-MCNC: 1.2 MG/DL (ref 0.2–1.2)
BUN BLDV-MCNC: 16 MG/DL (ref 8–23)
CALCIUM SERPL-MCNC: 9.2 MG/DL (ref 8.8–10.2)
CHLORIDE BLD-SCNC: 98 MMOL/L (ref 98–111)
CHOLESTEROL, TOTAL: 160 MG/DL (ref 160–199)
CO2: 32 MMOL/L (ref 22–29)
CREAT SERPL-MCNC: 1.4 MG/DL (ref 0.5–1.2)
EOSINOPHILS ABSOLUTE: 0.2 K/UL (ref 0–0.6)
EOSINOPHILS RELATIVE PERCENT: 3.3 % (ref 0–5)
GFR AFRICAN AMERICAN: >59
GFR NON-AFRICAN AMERICAN: 49
GLUCOSE BLD-MCNC: 91 MG/DL (ref 74–109)
HCT VFR BLD CALC: 43.2 % (ref 42–52)
HDLC SERPL-MCNC: 61 MG/DL (ref 55–121)
HEMOGLOBIN: 13.6 G/DL (ref 14–18)
IMMATURE GRANULOCYTES #: 0 K/UL
LDL CHOLESTEROL CALCULATED: 76 MG/DL
LYMPHOCYTES ABSOLUTE: 1.2 K/UL (ref 1.1–4.5)
LYMPHOCYTES RELATIVE PERCENT: 18.3 % (ref 20–40)
MCH RBC QN AUTO: 25.4 PG (ref 27–31)
MCHC RBC AUTO-ENTMCNC: 31.5 G/DL (ref 33–37)
MCV RBC AUTO: 80.6 FL (ref 80–94)
MONOCYTES ABSOLUTE: 0.5 K/UL (ref 0–0.9)
MONOCYTES RELATIVE PERCENT: 7.6 % (ref 0–10)
NEUTROPHILS ABSOLUTE: 4.5 K/UL (ref 1.5–7.5)
NEUTROPHILS RELATIVE PERCENT: 69.7 % (ref 50–65)
PDW BLD-RTO: 16.3 % (ref 11.5–14.5)
PLATELET # BLD: 164 K/UL (ref 130–400)
PMV BLD AUTO: 10.3 FL (ref 9.4–12.4)
POTASSIUM SERPL-SCNC: 5.1 MMOL/L (ref 3.5–5)
RBC # BLD: 5.36 M/UL (ref 4.7–6.1)
SODIUM BLD-SCNC: 137 MMOL/L (ref 136–145)
TOTAL PROTEIN: 6.4 G/DL (ref 6.6–8.7)
TRIGL SERPL-MCNC: 113 MG/DL (ref 0–149)
WBC # BLD: 6.5 K/UL (ref 4.8–10.8)

## 2022-10-12 ENCOUNTER — TELEPHONE (OUTPATIENT)
Dept: NEUROLOGY | Age: 77
End: 2022-10-12

## 2022-10-12 NOTE — TELEPHONE ENCOUNTER
Called and spoke with patient to let him know that his appointment with Dr. Hamzah Cobb for 11-04-22 had to be rescheduled due to the provider is out of the office. Patient is aware of when I have his appointment rescheduled too.

## 2022-10-27 ENCOUNTER — OFFICE VISIT (OUTPATIENT)
Dept: FAMILY MEDICINE CLINIC | Age: 77
End: 2022-10-27
Payer: MEDICARE

## 2022-10-27 VITALS
WEIGHT: 200 LBS | OXYGEN SATURATION: 100 % | BODY MASS INDEX: 27.12 KG/M2 | HEART RATE: 62 BPM | TEMPERATURE: 97.6 F | DIASTOLIC BLOOD PRESSURE: 76 MMHG | SYSTOLIC BLOOD PRESSURE: 134 MMHG

## 2022-10-27 DIAGNOSIS — G25.81 RESTLESS LEG SYNDROME: ICD-10-CM

## 2022-10-27 DIAGNOSIS — F41.8 DEPRESSION WITH ANXIETY: ICD-10-CM

## 2022-10-27 DIAGNOSIS — J30.89 CHRONIC NONSEASONAL ALLERGIC RHINITIS DUE TO POLLEN: ICD-10-CM

## 2022-10-27 DIAGNOSIS — G47.33 OSA ON CPAP: ICD-10-CM

## 2022-10-27 DIAGNOSIS — Z91.81 AT HIGH RISK FOR FALLS: ICD-10-CM

## 2022-10-27 DIAGNOSIS — N40.0 BENIGN PROSTATIC HYPERPLASIA WITHOUT LOWER URINARY TRACT SYMPTOMS: ICD-10-CM

## 2022-10-27 DIAGNOSIS — E03.8 SUBCLINICAL HYPOTHYROIDISM: ICD-10-CM

## 2022-10-27 DIAGNOSIS — S32.592D CLOSED FRACTURE OF LEFT INFERIOR PUBIC RAMUS WITH ROUTINE HEALING, SUBSEQUENT ENCOUNTER: ICD-10-CM

## 2022-10-27 DIAGNOSIS — Z13.220 LIPID SCREENING: ICD-10-CM

## 2022-10-27 DIAGNOSIS — I10 ESSENTIAL (PRIMARY) HYPERTENSION: Primary | ICD-10-CM

## 2022-10-27 DIAGNOSIS — Z99.89 OSA ON CPAP: ICD-10-CM

## 2022-10-27 PROCEDURE — 3078F DIAST BP <80 MM HG: CPT | Performed by: FAMILY MEDICINE

## 2022-10-27 PROCEDURE — 3074F SYST BP LT 130 MM HG: CPT | Performed by: FAMILY MEDICINE

## 2022-10-27 PROCEDURE — 1036F TOBACCO NON-USER: CPT | Performed by: FAMILY MEDICINE

## 2022-10-27 PROCEDURE — G8427 DOCREV CUR MEDS BY ELIG CLIN: HCPCS | Performed by: FAMILY MEDICINE

## 2022-10-27 PROCEDURE — G8484 FLU IMMUNIZE NO ADMIN: HCPCS | Performed by: FAMILY MEDICINE

## 2022-10-27 PROCEDURE — 99214 OFFICE O/P EST MOD 30 MIN: CPT | Performed by: FAMILY MEDICINE

## 2022-10-27 PROCEDURE — 1123F ACP DISCUSS/DSCN MKR DOCD: CPT | Performed by: FAMILY MEDICINE

## 2022-10-27 PROCEDURE — G8417 CALC BMI ABV UP PARAM F/U: HCPCS | Performed by: FAMILY MEDICINE

## 2022-10-27 ASSESSMENT — ENCOUNTER SYMPTOMS
NAUSEA: 0
CHEST TIGHTNESS: 0
BACK PAIN: 0
CONSTIPATION: 0
ANAL BLEEDING: 0
DIARRHEA: 0
SHORTNESS OF BREATH: 0
COUGH: 0
ABDOMINAL PAIN: 0

## 2022-10-27 NOTE — PROGRESS NOTES
formerly [facility] PHYSICIAN SERVICES  [facility] FAMILY MEDICINE  [address] [facility] [address] [address]  Dept: [phone]  Dept Fax: [phone]  Loc: [phone]    Fadumo Colon is a 65 y.o. male who presents today for his medical conditions/complaints as noted below. Fadumo Colon is here for 3 Month Follow-Up        HPI:   CC: Here today to discuss the following:    Constipation is well controlled. Hypertension  Compliant with medications. No adverse effects from medication. No lightheadedness, palpitations, or chest pain. Depression with Anxiety  Compliant with medications. No adverse effects from medication. Depression and anxiety symptoms are stable today. No suicidal or homicidal ideation. Excessive worry, insomnia, and anxiousness are stable. Energy, concentration, and apathy are stable. Allergies  Allergies are currently stable. Benign Prostatic Hypertrophy  Tolerating medication without adverse effects. Symptoms of hesitancy, nocturia, and dribbling are adequately controlled. No hematuria or dysuria      MARGARETH on CPAP  Compliant with CPAP. No daytime somnolence. Feels rested for the most part when wearing CPAP. Restless Legs Syndrome  Symptoms are well controlled on current medication. No adverse effects with medication. Symptoms are primarily at night and stable. No medication adjustment requested today. No issues with lower extremity claudication or numbness. History of falls and chronic hip and lower back pain. Requesting a rollator walker. HPI    Subjective:      Review of Systems   Constitutional:  Negative for chills and fever. HENT:  Negative for congestion. Respiratory:  Negative for cough, chest tightness and shortness of breath. Cardiovascular:  Negative for chest pain, palpitations and leg swelling. Gastrointestinal:  Negative for abdominal pain, anal bleeding, constipation, diarrhea and nausea.    Genitourinary:  Negative for difficulty urinating. Musculoskeletal:  Positive for arthralgias and myalgias. Negative for back pain, gait problem and joint swelling. Psychiatric/Behavioral:  Negative for sleep disturbance. The patient is nervous/anxious. SeeHPI for visit specific review of symptoms. All others negative      Objective:   /76   Pulse 62   Temp 97.6 °F (36.4 °C)   Wt 200 lb (90.7 kg)   SpO2 100%   BMI 27.12 kg/m²   Physical Exam  Physical Exam   Constitutional: He appears well-developed. Does not appear ill. Neck: Neck supple. No masses. Neck Symmetric. Normal tracheal position. No thyroid enlargement  Cardiovascular: Normal rate and regular rhythm. Exam reveals no friction rub. No murmur heard. Respiratory:  Effort normal and breath sounds normal. No respiratory distress. No wheezes. No rales. No use of accessory muscles or intercostal retractions. Neurological: alert. Psychiatric: normal mood and affect. His behavior is normal.       No results found for this or any previous visit (from the past 672 hour(s)). Assessment & Plan: The following diagnoses and conditions are stable with no further orders unless indicated:  1. Essential (primary) hypertension  Current medications:  Bisoprolol 5 mg daily  Hydrochlorothiazide 12.5 mg daily  Lisinopril 20 mg twice daily    BP Readings from Last 3 Encounters:   10/27/22 134/76   07/25/22 122/82   06/23/22 (!) 152/82     Stable. 2. Depression with anxiety  Celexa 20 mg daily  Stable. 3. Chronic nonseasonal   Lab Results   Component Value Date    TSH 5.060 (H) 09/28/2021     allergic rhinitis due to pollen  Flonase  Ipratropium nose spray  Stable. 4. Benign prostatic hyperplasia without lower urinary tract symptoms  Alfuzosin 10 mg daily  Stable. 5. MARGARETH on CPAP  Complaint and stable. Satisfied with results  \"Changed my life\"  \"I'm sleeping now\".      6. Subclinical hypothyroidism  Lab Results   Component Value Date    TSH 5.060 (H) 09/28/2021     Recheck next visit. 7. Restless leg syndrome  Requip 2 mg 3 times a day  Stable. 8.  Reflux  Omeprazole 20 mg twice a day   Stable. 9. Constipation:   - last colonoscopy in 2021.   -     10. Pancreatic cyst  Scheduled for scan on 11/7. Orders Placed This Encounter   Procedures    TSH     Standing Status:   Future     Standing Expiration Date:   10/27/2023    T4, Free     Standing Status:   Future     Standing Expiration Date:   10/27/2023    CBC with Auto Differential     Standing Status:   Future     Standing Expiration Date:   10/27/2023    CBC with Auto Differential     Standing Status:   Future     Standing Expiration Date:   10/27/2023    Comprehensive Metabolic Panel     Standing Status:   Future     Standing Expiration Date:   10/27/2023    Lipid Panel     Standing Status:   Future     Standing Expiration Date:   10/27/2023     Order Specific Question:   Is Patient Fasting?/# of Hours     Answer:   12           Return in about 6 months (around 4/27/2023) for Routine follow up - 20 minutes. Discussed use, benefit, and side effects of prescribed medications. All patient questions answered. Pt voiced understanding. Reviewed health maintenance. Instructedto continue current medications, diet and exercise. Patient agreed with treatmentplan.  Follow up as directed.     _______________________________________________________________      Past Medical History:   Diagnosis Date    Generalized anxiety disorder     Generalized osteoarthritis of multiple sites 8/28/2017    GERD (gastroesophageal reflux disease)     Hypertension     Hypertriglyceridemia 8/28/2017    Idiopathic peripheral neuropathy 8/28/2017    Irritable bowel syndrome with constipation 8/28/2017    Restless legs syndrome     Seasonal allergic rhinitis due to pollen 8/28/2017      Past Surgical History:   Procedure Laterality Date    COLONOSCOPY  07/31/2006    Dr Frank Rachel    COLONOSCOPY  04/18/2016 Dr Roxene Lesch diverticulosis, internal hemorrhoids    COLONOSCOPY  03/30/2011    Internal hemorrhoid, diverticulosis    HERNIA REPAIR  1999/2001    KIDNEY SURGERY  02/2022    Dr Collette Gathers ENDOSCOPY  03/11/2021    Dr Rocio Leavitt gastric polyp, esophastritis    UPPER GASTROINTESTINAL ENDOSCOPY  04/18/2016    Dr Atiya Abbasi,  , Elías Rodriguez at the [facility] [place], no Braga's    UPPER GASTROINTESTINAL ENDOSCOPY  06/09/2006    Dr Lorelie Gaucher gastric polyp       Family History   Problem Relation Age of Onset    Dementia Mother     Breast Cancer Mother     Heart Disease Father     Colon Cancer Father     Dementia Father     High Blood Pressure Father     Heart Attack Father     Prostate Cancer Father     Esophageal Cancer Neg Hx     Liver Cancer Neg Hx     Liver Disease Neg Hx     Rectal Cancer Neg Hx     Stomach Cancer Neg Hx        Social History     Tobacco Use    Smoking status: Never    Smokeless tobacco: Never   Substance Use Topics    Alcohol use: No     Alcohol/week: 0.0 standard drinks     Current Outpatient Medications   Medication Sig Dispense Refill    Sennosides-Docusate Sodium (SENNA PLUS PO) Take by mouth      Misc. Devices MISC Rollator walker. 1 each 0    hydroCHLOROthiazide (MICROZIDE) 12.5 MG capsule Take 1 capsule by mouth every morning 90 capsule 1    lisinopril (PRINIVIL;ZESTRIL) 20 MG tablet Take 1 tablet by mouth in the morning and at bedtime 180 tablet 5    Misc. Devices MISC Mask: q3 months; full face cushions qmonthly, Headgear q6months; Tubing qmonthly, Humidifier q6months; filter-disposable [id number].   Diagnosis: G47.33 1 each 0    alfuzosin (UROXATRAL) 10 MG extended release tablet Take 1 tablet by mouth daily 30 tablet 3    rOPINIRole (REQUIP) 2 MG tablet Take 1 tablet by mouth 3 times daily as needed (restless legs) 270 tablet 5    bisoprolol (ZEBETA) 5 MG tablet Take 1 tablet by mouth daily 90 tablet 3 hydrOXYzine (ATARAX) 10 MG tablet TAKE 2 TABLETS BY MOUTH ONCE DAILY AT BEDTIME AS NEEDED FOR ITCHING. levocetirizine (XYZAL) 5 MG tablet Take 5 mg by mouth nightly      hydrocortisone (ANUSOL-HC) 25 MG suppository Place 1 suppository rectally every 12 hours 12 suppository 11    meloxicam (MOBIC) 7.5 MG tablet Take 1 tablet by mouth 2 times daily (Patient taking differently: Take 15 mg by mouth daily) 180 tablet 3    omeprazole (PRILOSEC) 20 MG delayed release capsule Take 1 capsule by mouth 2 times daily 180 capsule 3    citalopram (CELEXA) 10 MG tablet Take 2 tablets by mouth daily 180 tablet 3    traZODone (DESYREL) 50 MG tablet Take 1 tablet by mouth nightly 90 tablet 3    ipratropium (ATROVENT) 0.06 % nasal spray 1 spray by Nasal route daily as needed for Rhinitis 1 Bottle 2    diazePAM (VALIUM) 5 MG tablet Take 5 mg by mouth every 6 hours as needed for Anxiety. betamethasone dipropionate (DIPROLENE) 0.05 % cream Apply topically 2 times daily Apply topically 2 times daily. 45 g 11    hyoscyamine (ANASPAZ;LEVSIN) 125 MCG tablet Take 1 tablet by mouth every 4 hours as needed for Cramping 180 tablet 11    acyclovir (ZOVIRAX) 400 MG tablet Take 1 tablet by mouth 4 times daily 30 tablet 5    fluticasone (FLONASE) 50 MCG/ACT nasal spray 1 spray by Nasal route daily 1 Bottle 3    pseudoephedrine (SUDAFED) 60 MG tablet Take 60 mg by mouth every 6 hours as needed for Congestion      fexofenadine (ALLEGRA) 60 MG tablet Take 180 mg by mouth daily      saw palmetto 160 MG capsule Take 160 mg by mouth daily      magnesium hydroxide (MILK OF MAGNESIA) 400 MG/5ML suspension Take by mouth daily as needed for Constipation      vitamin E 1000 UNITS capsule Take 1,000 Units by mouth daily       No current facility-administered medications for this visit.      Allergies   Allergen Reactions    Sulfa Antibiotics Rash       Health Maintenance   Topic Date Due    Depression Monitoring  05/23/2023    Annual Wellness Visit (AWV)  05/24/2023    DTaP/Tdap/Td vaccine (2 - Td or Tdap) 04/26/2031    Flu vaccine  Completed    Shingles vaccine  Completed    Pneumococcal 65+ years Vaccine  Completed    COVID-19 Vaccine  Completed    Hepatitis C screen  Completed    Hepatitis A vaccine  Aged Out    Hib vaccine  Aged Out    Meningococcal (ACWY) vaccine  Aged Out       _______________________________________________________________    Note dictated using Dragon Dictation software  Sometimes this dictation software makes erroneous transcriptions.

## 2022-10-27 NOTE — PATIENT INSTRUCTIONS
We are committed to providing you with the best care possible. In order to help us achieve these goals please remember to bring all medications, herbal products, and over the counter supplements with you to each visit. If your provider has ordered testing for you, please be sure to follow up with our office if you have not received results within 7 days after the testing took place. *If you receive a survey after visiting one of our offices, please take time to share your experience concerning your physician office visit. These surveys are confidential and no health information about you is shared. We are eager to improve for you and we are counting on your   feedback to help make that happen.     _______________________________________________________________  Labs before next appointment:  Go to room 405 for labs prior to next visit. Be sure to fast for at least 10 hours prior to laboratory appointment. Would recommend getting labs about 1 week prior to the appointment time to ensure they are available at the time of the appointment. No appointment is necessary for laboratory work as the orders have already been placed.     Lab opens at 6:30 am and closes at 4:30 pm.

## 2022-11-07 ENCOUNTER — HOSPITAL ENCOUNTER (OUTPATIENT)
Dept: MRI IMAGING | Age: 77
Discharge: HOME | End: 2022-11-07
Payer: MEDICARE

## 2022-11-07 DIAGNOSIS — K86.2 PANCREATIC CYST: ICD-10-CM

## 2022-11-07 LAB
GFR SERPL CREATININE-BSD FRML MDRD: >60 ML/MIN/{1.73_M2}
PERFORMED ON: NORMAL
POC CREATININE: 1.2 MG/DL (ref 0.3–1.3)
POC SAMPLE TYPE: NORMAL

## 2022-11-07 PROCEDURE — 82565 ASSAY OF CREATININE: CPT

## 2022-11-07 PROCEDURE — A9577 INJ MULTIHANCE: HCPCS | Performed by: INTERNAL MEDICINE

## 2022-11-07 PROCEDURE — G1010 CDSM STANSON: HCPCS

## 2022-11-07 PROCEDURE — 6360000004 HC RX CONTRAST MEDICATION: Performed by: INTERNAL MEDICINE

## 2022-11-07 RX ADMIN — GADOBENATE DIMEGLUMINE 18 ML: 529 INJECTION, SOLUTION INTRAVENOUS at 11:38

## 2022-11-16 NOTE — TELEPHONE ENCOUNTER
Leonard Goyal called to request a refill on his medication.       Last office visit : 10/27/2022   Next office visit : 4/27/2023     Requested Prescriptions     Pending Prescriptions Disp Refills    meloxicam (MOBIC) 7.5 MG tablet 180 tablet 3     Sig: Take 1 tablet by mouth 2 times daily            Guy Rosenberg MA

## 2022-11-17 RX ORDER — MELOXICAM 7.5 MG/1
7.5 TABLET ORAL 2 TIMES DAILY
Qty: 180 TABLET | Refills: 3 | Status: SHIPPED | OUTPATIENT
Start: 2022-11-17

## 2022-11-18 ENCOUNTER — OFFICE VISIT (OUTPATIENT)
Dept: NEUROLOGY | Age: 77
End: 2022-11-18
Payer: MEDICARE

## 2022-11-18 VITALS
SYSTOLIC BLOOD PRESSURE: 136 MMHG | WEIGHT: 200 LBS | DIASTOLIC BLOOD PRESSURE: 76 MMHG | HEIGHT: 72 IN | HEART RATE: 74 BPM | BODY MASS INDEX: 27.09 KG/M2 | OXYGEN SATURATION: 99 %

## 2022-11-18 DIAGNOSIS — M79.671 PAIN IN BOTH FEET: ICD-10-CM

## 2022-11-18 DIAGNOSIS — M79.672 PAIN IN BOTH FEET: ICD-10-CM

## 2022-11-18 DIAGNOSIS — G25.81 RESTLESS LEG SYNDROME: Primary | ICD-10-CM

## 2022-11-18 DIAGNOSIS — R26.89 IMBALANCE: ICD-10-CM

## 2022-11-18 PROBLEM — B30.9 VIRAL CONJUNCTIVITIS, UNSPECIFIED: Status: ACTIVE | Noted: 2022-02-24

## 2022-11-18 PROBLEM — H21.269 CHANDLER SYNDROME: Status: ACTIVE | Noted: 2021-11-24

## 2022-11-18 PROBLEM — S32.592A FRACTURE OF RAMUS OF LEFT PUBIS (HCC): Status: ACTIVE | Noted: 2022-02-01

## 2022-11-18 PROBLEM — H25.819 COMBINED FORMS OF AGE-RELATED CATARACT: Status: ACTIVE | Noted: 2021-11-24

## 2022-11-18 PROBLEM — S32.10XA SACRAL FRACTURE (HCC): Status: ACTIVE | Noted: 2022-02-01

## 2022-11-18 PROBLEM — I72.9 PSEUDOANEURYSM (HCC): Status: ACTIVE | Noted: 2022-02-03

## 2022-11-18 PROBLEM — H40.50X0 CHANDLER SYNDROME: Status: ACTIVE | Noted: 2021-11-24

## 2022-11-18 PROBLEM — H18.20 CHANDLER SYNDROME: Status: ACTIVE | Noted: 2021-11-24

## 2022-11-18 PROBLEM — H04.129 TEAR FILM INSUFFICIENCY: Status: ACTIVE | Noted: 2021-11-24

## 2022-11-18 PROBLEM — R33.9 RETENTION OF URINE: Status: ACTIVE | Noted: 2022-02-01

## 2022-11-18 PROBLEM — Z96.1 PRESENCE OF INTRAOCULAR LENS: Status: ACTIVE | Noted: 2022-01-07

## 2022-11-18 PROBLEM — M19.049 DEGENERATIVE JOINT DISEASE OF HAND: Status: ACTIVE | Noted: 2022-11-18

## 2022-11-18 PROBLEM — S22.49XA CLOSED FRACTURE OF MULTIPLE RIBS: Status: ACTIVE | Noted: 2022-02-01

## 2022-11-18 PROBLEM — S37.002A: Status: ACTIVE | Noted: 2022-02-01

## 2022-11-18 PROCEDURE — G8482 FLU IMMUNIZE ORDER/ADMIN: HCPCS | Performed by: PSYCHIATRY & NEUROLOGY

## 2022-11-18 PROCEDURE — G8427 DOCREV CUR MEDS BY ELIG CLIN: HCPCS | Performed by: PSYCHIATRY & NEUROLOGY

## 2022-11-18 PROCEDURE — 3074F SYST BP LT 130 MM HG: CPT | Performed by: PSYCHIATRY & NEUROLOGY

## 2022-11-18 PROCEDURE — 3078F DIAST BP <80 MM HG: CPT | Performed by: PSYCHIATRY & NEUROLOGY

## 2022-11-18 PROCEDURE — 1036F TOBACCO NON-USER: CPT | Performed by: PSYCHIATRY & NEUROLOGY

## 2022-11-18 PROCEDURE — 99213 OFFICE O/P EST LOW 20 MIN: CPT | Performed by: PSYCHIATRY & NEUROLOGY

## 2022-11-18 PROCEDURE — G8417 CALC BMI ABV UP PARAM F/U: HCPCS | Performed by: PSYCHIATRY & NEUROLOGY

## 2022-11-18 PROCEDURE — 1123F ACP DISCUSS/DSCN MKR DOCD: CPT | Performed by: PSYCHIATRY & NEUROLOGY

## 2022-11-18 RX ORDER — DIAZEPAM 5 MG/1
5 TABLET ORAL EVERY 6 HOURS PRN
Qty: 360 TABLET | Refills: 1 | Status: SHIPPED | OUTPATIENT
Start: 2022-11-18 | End: 2022-11-22 | Stop reason: SDUPTHER

## 2022-11-18 NOTE — PROGRESS NOTES
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 The patient indicates he has had a long history of problem with his feet. He has spent approximately 8 to 12 hours per day for 40 years on his feet working five to six days a week period he has had inserts placed in his soles for the constant stress on his feet. He has noticed an increasing sensitivity over his feet. He will typically notice this at evening after he takes his shoes off. He describes a wave like sensation and moves over his feet and is difficult to describe. It is quite uncomfortable and limits his ability to sleep. He has been treated with Klonopin for possible restless leg syndrome without improvement in his symptoms. He denies any weakness or cramps in his legs. He has a chronic mild mid back pain. There is no involvement of his hands. He indicates his father also had complaints of pain in his feet when he got older in his 80's. Feels Requip is helping but now needs a second one. Feels slowly worsening with time. Neurontin did not help but Sinemet is helping at bedtime. Typically take 2 mg Requip at night. . 2 nights in a week may only sleep about 2 hours due to restless legs. Norco helps. uses it maybe 2x per week. alpha lipoic acid helping. Feel better coming off Neuronin and cognitive issues better. Left leg turing over years but worse after sliding off chair. x ray spine degenerative changes. Whe to orthopedics who gave him steroid injection and then therapy and now pain in feet is better. On Requip, Valium and alpha lipoic acid. He does not take the Norco.  Previously fell and broke his pelvis and 3 ribs. Hematoma on kidney. Renal function better. Went to [facility]. Balance still an issues. Feels feet still go numb.      Chief complaint:restless legs      Active Ambulatory Problems     Diagnosis Date Noted    Pain in both feet 06/06/2016    Restless leg syndrome 06/06/2016    Dermatitis 08/28/2017    Fatigue 08/28/2017    Essential (primary) hypertension 08/28/2017    Gastroesophageal reflux disease without esophagitis 08/28/2017    RLS (restless legs syndrome) 08/28/2017    Gilbert's syndrome 08/28/2017    Hypertriglyceridemia 08/28/2017    Idiopathic peripheral neuropathy 08/28/2017    Generalized anxiety disorder 08/28/2017    Seasonal allergic rhinitis due to pollen 08/28/2017    Irritable bowel syndrome with constipation 08/28/2017    Chronic insomnia 08/28/2017    Generalized osteoarthritis of multiple sites 08/28/2017    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    Closed fracture of multiple ribs 02/01/2022    Closed injury of left kidney 02/01/2022    Combined forms of age-related cataract 11/24/2021    Degenerative joint disease of hand 11/18/2022    Fracture of ramus of left pubis ([address] 02/01/2022    Presence of intraocular lens 01/07/2022    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    Retention of urine 02/01/2022    Sacral fracture ([facility]CC) 02/01/2022    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    Viral conjunctivitis, unspecified 02/24/2022     Resolved Ambulatory Problems     Diagnosis Date Noted    No Resolved Ambulatory Problems     Past Medical History:   Diagnosis Date    GERD (gastroesophageal reflux disease)     Hypertension     Restless legs syndrome        Past Surgical History:   Procedure Laterality Date    COLONOSCOPY  07/31/2006    Dr Joan Taveras    COLONOSCOPY  04/18/2016    Dr Roxene Lesch diverticulosis, internal hemorrhoids    COLONOSCOPY  03/30/2011    Internal hemorrhoid, diverticulosis    HERNIA REPAIR  1999/2001    KIDNEY SURGERY  02/2022    Dr Yogesh Baker ENDOSCOPY  03/11/2021    Dr Rocio Leavitt gastric polyp, esophastritis    UPPER GASTROINTESTINAL ENDOSCOPY  04/18/2016    Dr Atiya Abbasi,  [facility], [facility] at the [facility], no [facility]    UPPER GASTROINTESTINAL ENDOSCOPY  06/09/2006    Dr Lorelie Gaucher gastric polyp       Family History   Problem Relation Age of Onset    Dementia Mother     Breast Cancer Mother     Heart Disease Father     Colon Cancer Father     Dementia Father     High Blood Pressure Father     Heart Attack Father     Prostate Cancer Father     Esophageal Cancer Neg Hx     Liver Cancer Neg Hx     Liver Disease Neg Hx     Rectal Cancer Neg Hx     Stomach Cancer Neg Hx        Allergies   Allergen Reactions    Sulfa Antibiotics Rash       Social History     Socioeconomic History    Marital status:      Spouse name: Not on file    Number of children: Not on file    Years of education: Not on file    Highest education level: Not on file   Occupational History    Not on file   Tobacco Use    Smoking status: Never    Smokeless tobacco: Never   Vaping Use    Vaping Use: Never used   Substance and Sexual Activity    Alcohol use: No     Alcohol/week: 0.0 standard drinks    Drug use: No    Sexual activity: Not Currently   Other Topics Concern    Not on file   Social History Narrative    Not on file     Social Determinants of Health     Financial Resource Strain: Not on file   Food Insecurity: Not on file   Transportation Needs: Not on file   Physical Activity: Insufficiently Active    Days of Exercise per Week: 2 days    Minutes of Exercise per Session: 50 min   Stress: Not on file   Social Connections: Not on file   Intimate Partner Violence: Not on file   Housing Stability: Not on file     Review of Systems     Constitutional - No fever or chills. No diaphoresis or significant fatigue. HENT -  No tinnitus or significant hearing loss. Eyes - no sudden vision change or eye pain  Respiratory - no significant shortness of breath or cough  Cardiovascular - no chest pain No palpitations or significant leg swelling  Gastrointestinal - no abdominal swelling or pain. Genitourinary - No difficulty urinating, dysuria  Musculoskeletal - no back pain or myalgia. Skin - no color change or rash  Neurologic - No seizures.   No lateralizing weakness. Hematologic - no easy bruising or excessive bleeding. Psychiatric - no severe anxiety or nervousness. All other review of systems are negative. Current Outpatient Medications   Medication Sig Dispense Refill    diazePAM (VALIUM) 5 MG tablet Take 1 tablet by mouth every 6 hours as needed for Anxiety for up to 30 days. 360 tablet 1    meloxicam (MOBIC) 7.5 MG tablet Take 1 tablet by mouth 2 times daily 180 tablet 3    Sennosides-Docusate Sodium (SENNA PLUS PO) Take by mouth      Misc. Devices MISC Rollator walker. 1 each 0    hydroCHLOROthiazide (MICROZIDE) 12.5 MG capsule Take 1 capsule by mouth every morning 90 capsule 1    lisinopril (PRINIVIL;ZESTRIL) 20 MG tablet Take 1 tablet by mouth in the morning and at bedtime 180 tablet 5    Misc. Devices MISC Mask: q3 months; full face cushions qmonthly, Headgear q6months; Tubing qmonthly, Humidifier q6months; filter-disposable x5enceii. Diagnosis: G47.33 1 each 0    alfuzosin (UROXATRAL) 10 MG extended release tablet Take 1 tablet by mouth daily 30 tablet 3    rOPINIRole (REQUIP) 2 MG tablet Take 1 tablet by mouth 3 times daily as needed (restless legs) 270 tablet 5    bisoprolol (ZEBETA) 5 MG tablet Take 1 tablet by mouth daily 90 tablet 3    hydrOXYzine (ATARAX) 10 MG tablet TAKE 2 TABLETS BY MOUTH ONCE DAILY AT BEDTIME AS NEEDED FOR ITCHING.       levocetirizine (XYZAL) 5 MG tablet Take 5 mg by mouth nightly      hydrocortisone (ANUSOL-HC) 25 MG suppository Place 1 suppository rectally every 12 hours 12 suppository 11    omeprazole (PRILOSEC) 20 MG delayed release capsule Take 1 capsule by mouth 2 times daily 180 capsule 3    citalopram (CELEXA) 10 MG tablet Take 2 tablets by mouth daily 180 tablet 3    traZODone (DESYREL) 50 MG tablet Take 1 tablet by mouth nightly 90 tablet 3    ipratropium (ATROVENT) 0.06 % nasal spray 1 spray by Nasal route daily as needed for Rhinitis 1 Bottle 2    betamethasone dipropionate (DIPROLENE) 0.05 % cream Apply topically 2 times daily Apply topically 2 times daily. 45 g 11    hyoscyamine (ANASPAZ;LEVSIN) 125 MCG tablet Take 1 tablet by mouth every 4 hours as needed for Cramping 180 tablet 11    acyclovir (ZOVIRAX) 400 MG tablet Take 1 tablet by mouth 4 times daily 30 tablet 5    fluticasone (FLONASE) 50 MCG/ACT nasal spray 1 spray by Nasal route daily 1 Bottle 3    pseudoephedrine (SUDAFED) 60 MG tablet Take 60 mg by mouth every 6 hours as needed for Congestion      fexofenadine (ALLEGRA) 60 MG tablet Take 180 mg by mouth daily      saw palmetto 160 MG capsule Take 160 mg by mouth daily      magnesium hydroxide (MILK OF MAGNESIA) 400 MG/5ML suspension Take by mouth daily as needed for Constipation      vitamin E 1000 UNITS capsule Take 1,000 Units by mouth daily       No current facility-administered medications for this visit. /76   Pulse 74   Ht 6' (1.829 m)   Wt 200 lb (90.7 kg)   SpO2 99%   BMI 27.12 kg/m²     Constitutional - well developed, well nourished. Eyes - conjunctiva normal.  Ear, nose, throat - No scars, masses, or lesions over external nose or ears, no atrophy of tongue  Neck-symmetric, no masses noted, no jugular vein distension  Respiration- chest wall appears symmetric, good expansion,   normal effort without use of accessory muscles  Musculoskeletal - no significant wasting of muscles noted, no bony deformities  Extremities-no clubbing, cyanosis or edema  Skin - warm, dry, and intact. No rash, erythema, or pallor.   Psychiatric - mood, affect, and behavior appear normal.      Neurological exam  Awake, alert, fluent oriented  appropriate affect  Attention and concentration appear appropriate  Recent and remote memory appears unremarkable  Speech normal without dysarthria  No clear issues with language of fund of knowledge    Cranial Nerve Exam     CN III, IV,VI-EOMI, No nystagmus, conjugate eye movements, no ptosis  CN VII-no facial assymetry        Motor Exam  antigravity throughout upper and lower extremities bilaterally    Tremors- no tremors in hands or head noted    Gait  Normal base and speed  No ataxia    Lab Results   Component Value Date    OIYFNMEE24 619 09/28/2021     Lab Results   Component Value Date    WBC 6.5 08/17/2022    HGB 13.6 (L) 08/17/2022    HCT 43.2 08/17/2022    MCV 80.6 08/17/2022     08/17/2022     Lab Results   Component Value Date     08/17/2022    K 5.1 (H) 08/17/2022    CL 98 08/17/2022    CO2 32 (H) 08/17/2022    BUN 16 08/17/2022    CREATININE 1.2 11/07/2022    GLUCOSE 91 08/17/2022    CALCIUM 9.2 08/17/2022    PROT 6.4 (L) 08/17/2022    LABALBU 4.5 08/17/2022    BILITOT 1.2 08/17/2022    ALKPHOS 153 (H) 08/17/2022    AST 29 08/17/2022    ALT 24 08/17/2022    LABGLOM >60 11/07/2022    GFRAA >59 08/17/2022           Assessment    ICD-10-CM    1. Restless leg syndrome  G25.81 diazePAM (VALIUM) 5 MG tablet      2. Pain in both feet  M79.671     M79.672       3. Imbalance  R26.89             His neurological examination previously was significant for a left foot drop and weakness in inversion and eversion for some mild decreased vibration over his feet and decreased sensation lateral left lower leg The rest of this sensory exam and motor exam was unremarkable. His reflexes were intact and he had no sway on Romberg. Based upon his history and examination it is unclear what the etiology of his discomfort is. Certainly a sensory neuropathy in his feet is a possibility along with restless leg syndrome although his symptoms are quite limited to his feet. The other possibility is that he has some chronic trauma to his feet from a history of long standing stress on these extremities from his work leading to his symptoms of discomfort. His EMG with nerve conduction study of his legs suggested a mild sensory neuropathy. His blood work including JERRI, heavy metals screen, hemoglobin A1C, HIV, RPR, sed rate, SPEP, and B12 was unremarkable.  Requip can be helpful in both restless leg syndrome and neuropathy. He is off his Sinemet and neurontin at night. He is also taking trazadone at a low dose to help him sleep. At this time he will have his Valium continued at 5 mg at bedtime. He is on 2 mg Requip at night. He is off Lortab. He will be referred to PT again. The patient indicated understanding of the diagnoses and treatment plan. Continue present care as noted. As things stable. . Follow 6 months      Plan    No orders of the defined types were placed in this encounter. Orders Placed This Encounter   Medications    diazePAM (VALIUM) 5 MG tablet     Sig: Take 1 tablet by mouth every 6 hours as needed for Anxiety for up to 30 days. Dispense:  360 tablet     Refill:  1         Return in about 3 months (around 2/18/2023).

## 2022-11-22 DIAGNOSIS — G25.81 RESTLESS LEG SYNDROME: ICD-10-CM

## 2022-11-23 RX ORDER — DIAZEPAM 5 MG/1
5 TABLET ORAL EVERY 6 HOURS PRN
Qty: 360 TABLET | Refills: 1 | Status: SHIPPED | OUTPATIENT
Start: 2022-11-23 | End: 2022-12-23

## 2022-11-23 NOTE — TELEPHONE ENCOUNTER
Canceled script (spoke to del) that was sent to mail order pharmacy. Controlled patient can not file there due to it not showing on amena. PLEASE SEND TO LOCAL PHARMACY. Requested Prescriptions     Pending Prescriptions Disp Refills    diazePAM (VALIUM) 5 MG tablet 360 tablet 1     Sig: Take 1 tablet by mouth every 6 hours as needed for Anxiety for up to 30 days.        Last Office Visit:  11/18/2022  Next Office Visit:  2/22/2023  Last Medication Refill:  11/3/2021 with 5 RF   Monet Osoiro up to date:  11/23/2022     *RX updated to reflect   11/23/2022  fill date*

## 2022-11-24 DIAGNOSIS — I10 ESSENTIAL (PRIMARY) HYPERTENSION: ICD-10-CM

## 2022-11-28 RX ORDER — HYDROCHLOROTHIAZIDE 12.5 MG/1
CAPSULE, GELATIN COATED ORAL
Qty: 90 CAPSULE | Refills: 1 | Status: SHIPPED | OUTPATIENT
Start: 2022-11-28

## 2022-11-28 NOTE — TELEPHONE ENCOUNTER
Nic Caruso called to request a refill on his medication.       Last office visit : 10/27/2022   Next office visit : 4/27/2023     Requested Prescriptions     Pending Prescriptions Disp Refills    hydroCHLOROthiazide (MICROZIDE) 12.5 MG capsule [Pharmacy Med Name: HYDROCHLOROT CAP 12.5MG] 90 capsule 1     Sig: TAKE 1 CAPSULE EVERY       [address], LPN

## 2022-12-13 ENCOUNTER — TELEPHONE (OUTPATIENT)
Dept: GASTROENTEROLOGY | Age: 77
End: 2022-12-13

## 2022-12-13 NOTE — TELEPHONE ENCOUNTER
PT advised per Dr Ashley Carpenter today, MRI looked ok, repeat in 1 yr. PT voiced understanding and appreciation.

## 2022-12-20 DIAGNOSIS — I10 ESSENTIAL (PRIMARY) HYPERTENSION: ICD-10-CM

## 2022-12-20 RX ORDER — BISOPROLOL FUMARATE 5 MG/1
TABLET, FILM COATED ORAL
Qty: 90 TABLET | Refills: 3 | Status: SHIPPED | OUTPATIENT
Start: 2022-12-20

## 2023-02-22 ENCOUNTER — OFFICE VISIT (OUTPATIENT)
Dept: NEUROLOGY | Age: 78
End: 2023-02-22
Payer: MEDICARE

## 2023-02-22 VITALS
SYSTOLIC BLOOD PRESSURE: 131 MMHG | HEART RATE: 52 BPM | DIASTOLIC BLOOD PRESSURE: 73 MMHG | HEIGHT: 72 IN | BODY MASS INDEX: 27.09 KG/M2 | WEIGHT: 200 LBS

## 2023-02-22 DIAGNOSIS — G25.81 RESTLESS LEG SYNDROME: Primary | ICD-10-CM

## 2023-02-22 DIAGNOSIS — I72.9 PSEUDOANEURYSM (HCC): ICD-10-CM

## 2023-02-22 DIAGNOSIS — R26.89 IMBALANCE: ICD-10-CM

## 2023-02-22 DIAGNOSIS — M79.672 PAIN IN BOTH FEET: ICD-10-CM

## 2023-02-22 DIAGNOSIS — S32.592S CLOSED FRACTURE OF RAMUS OF LEFT PUBIS, SEQUELA: ICD-10-CM

## 2023-02-22 DIAGNOSIS — M79.671 PAIN IN BOTH FEET: ICD-10-CM

## 2023-02-22 PROCEDURE — G8482 FLU IMMUNIZE ORDER/ADMIN: HCPCS | Performed by: PSYCHIATRY & NEUROLOGY

## 2023-02-22 PROCEDURE — 1036F TOBACCO NON-USER: CPT | Performed by: PSYCHIATRY & NEUROLOGY

## 2023-02-22 PROCEDURE — 99213 OFFICE O/P EST LOW 20 MIN: CPT | Performed by: PSYCHIATRY & NEUROLOGY

## 2023-02-22 PROCEDURE — G8427 DOCREV CUR MEDS BY ELIG CLIN: HCPCS | Performed by: PSYCHIATRY & NEUROLOGY

## 2023-02-22 PROCEDURE — 1123F ACP DISCUSS/DSCN MKR DOCD: CPT | Performed by: PSYCHIATRY & NEUROLOGY

## 2023-02-22 PROCEDURE — 3075F SYST BP GE 130 - 139MM HG: CPT | Performed by: PSYCHIATRY & NEUROLOGY

## 2023-02-22 PROCEDURE — G8417 CALC BMI ABV UP PARAM F/U: HCPCS | Performed by: PSYCHIATRY & NEUROLOGY

## 2023-02-22 PROCEDURE — 3078F DIAST BP <80 MM HG: CPT | Performed by: PSYCHIATRY & NEUROLOGY

## 2023-02-22 RX ORDER — DIAZEPAM 5 MG/1
5 TABLET ORAL EVERY 6 HOURS PRN
COMMUNITY

## 2023-02-22 RX ORDER — DIAZEPAM 5 MG/1
5 TABLET ORAL EVERY 6 HOURS PRN
Qty: 360 TABLET | Refills: 1 | Status: SHIPPED | OUTPATIENT
Start: 2023-02-22 | End: 2023-03-24

## 2023-02-22 RX ORDER — ROPINIROLE 2 MG/1
2 TABLET, FILM COATED ORAL 3 TIMES DAILY PRN
Qty: 270 TABLET | Refills: 5 | Status: SHIPPED | OUTPATIENT
Start: 2023-02-22

## 2023-02-22 NOTE — PROGRESS NOTES
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 The patient indicates he has had a long history of problem with his feet. He has spent approximately 8 to 12 hours per day for 40 years on his feet working five to six days a week period he has had inserts placed in his soles for the constant stress on his feet. He has noticed an increasing sensitivity over his feet. He will typically notice this at evening after he takes his shoes off. He describes a wave like sensation and moves over his feet and is difficult to describe. It is quite uncomfortable and limits his ability to sleep. He has been treated with Klonopin for possible restless leg syndrome without improvement in his symptoms. He denies any weakness or cramps in his legs. He has a chronic mild mid back pain. There is no involvement of his hands. He indicates his father also had complaints of pain in his feet when he got older in his 80's. Feels Requip is helping but now needs a second one. Feels slowly worsening with time. Neurontin did not help but Sinemet is helping at bedtime. Typically take 2 mg Requip at night. . 2 nights in a week may only sleep about 2 hours due to restless legs. Norco helps. uses it maybe 2x per week. alpha lipoic acid helping. Feel better coming off Neuronin and cognitive issues better. Left leg turing over years but worse after sliding off chair. x ray spine degenerative changes. Whe to orthopedics who gave him steroid injection and then therapy and now pain in feet is better. On Requip, Valium and alpha lipoic acid. He does not take the Norco.  Previously fell and broke his pelvis and 3 ribs. Hematoma on kidney. Renal function better. Went to [place]. Balance still an issues. Feels feet still go numb. Still discomfort.     Chief complaint:restless legs      Active Ambulatory Problems     Diagnosis Date Noted    Pain in both feet 06/06/2016 Restless leg syndrome 06/06/2016    Dermatitis 08/28/2017    Fatigue 08/28/2017    Essential (primary) hypertension 08/28/2017    Gastroesophageal reflux disease without esophagitis 08/28/2017    RLS (restless legs syndrome) 08/28/2017    Gilbert's syndrome 08/28/2017    Hypertriglyceridemia 08/28/2017    Idiopathic peripheral neuropathy 08/28/2017    Generalized anxiety disorder 08/28/2017    Seasonal allergic rhinitis due to pollen 08/28/2017    Irritable bowel syndrome with constipation 08/28/2017    Chronic insomnia 08/28/2017    Generalized osteoarthritis of multiple sites 08/28/2017    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    Closed fracture of multiple ribs 02/01/2022    Closed injury of left kidney 02/01/2022    Combined forms of age-related cataract 11/24/2021    Degenerative joint disease of hand 11/18/2022    Fracture of ramus of left pubis ([address] 02/01/2022    Presence of intraocular lens 01/07/2022    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    Retention of urine 02/01/2022    Sacral fracture (HCC) 02/01/2022    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    Viral conjunctivitis, unspecified 02/24/2022     Resolved Ambulatory Problems     Diagnosis Date Noted    No Resolved Ambulatory Problems     Past Medical History:   Diagnosis Date    GERD (gastroesophageal reflux disease)     Hypertension     Restless legs syndrome        Past Surgical History:   Procedure Laterality Date    COLONOSCOPY  07/31/2006    Dr Zulma Manning    COLONOSCOPY  04/18/2016    Dr Anisa Weaver diverticulosis, internal hemorrhoids    COLONOSCOPY  03/30/2011    Internal hemorrhoid, diverticulosis    HERNIA REPAIR  1999/2001    KIDNEY SURGERY  02/2022    Dr Mitzy Foreman ENDOSCOPY  03/11/2021    Dr Susan Higuera gastric polyp, esophastritis    UPPER GASTROINTESTINAL ENDOSCOPY  04/18/2016    Dr Samantha Almonte,  , Ramiro Walters at the [facility], no [facility]    UPPER GASTROINTESTINAL ENDOSCOPY  06/09/2006    Dr Manuel Cerrato gastric polyp       Family History   Problem Relation Age of Onset    Dementia Mother     Breast Cancer Mother     Heart Disease Father     Colon Cancer Father     Dementia Father     High Blood Pressure Father     Heart Attack Father     Prostate Cancer Father     Esophageal Cancer Neg Hx     Liver Cancer Neg Hx     Liver Disease Neg Hx     Rectal Cancer Neg Hx     Stomach Cancer Neg Hx        Allergies   Allergen Reactions    Sulfa Antibiotics Rash       Social History     Socioeconomic History    Marital status:      Spouse name: Not on file    Number of children: Not on file    Years of education: Not on file    Highest education level: Not on file   Occupational History    Not on file   Tobacco Use    Smoking status: Never    Smokeless tobacco: Never   Vaping Use    Vaping Use: Never used   Substance and Sexual Activity    Alcohol use: No     Alcohol/week: 0.0 standard drinks    Drug use: No    Sexual activity: Not Currently   Other Topics Concern    Not on file   Social History Narrative    Not on file     Social Determinants of Health     Financial Resource Strain: Not on file   Food Insecurity: Not on file   Transportation Needs: Not on file   Physical Activity: Insufficiently Active    Days of Exercise per Week: 2 days    Minutes of Exercise per Session: 50 min   Stress: Not on file   Social Connections: Not on file   Intimate Partner Violence: Not on file   Housing Stability: Not on file     Review of Systems     Constitutional - No fever or chills. No diaphoresis or significant fatigue. HENT -  No tinnitus or significant hearing loss. Eyes - no sudden vision change or eye pain  Respiratory - no significant shortness of breath or cough  Cardiovascular - no chest pain No palpitations or significant leg swelling  Gastrointestinal - no abdominal swelling or pain. Genitourinary - No difficulty urinating, dysuria  Musculoskeletal - no back pain or myalgia.   Skin - no color change or rash  Neurologic - No seizures. No lateralizing weakness. Hematologic - no easy bruising or excessive bleeding. Psychiatric - no severe anxiety or nervousness. All other review of systems are negative. Current Outpatient Medications   Medication Sig Dispense Refill    diazePAM (VALIUM) 5 MG tablet Take 5 mg by mouth every 6 hours as needed for Anxiety. diazePAM (VALIUM) 5 MG tablet Take 1 tablet by mouth every 6 hours as needed for Anxiety for up to 30 days. 360 tablet 1    rOPINIRole (REQUIP) 2 MG tablet Take 1 tablet by mouth 3 times daily as needed (restless legs) 270 tablet 5    bisoprolol (ZEBETA) 5 MG tablet TAKE 1 TABLET DAILY 90 tablet 3    hydroCHLOROthiazide (MICROZIDE) 12.5 MG capsule TAKE 1 CAPSULE EVERY       MORNING 90 capsule 1    meloxicam (MOBIC) 7.5 MG tablet Take 1 tablet by mouth 2 times daily 180 tablet 3    Sennosides-Docusate Sodium (SENNA PLUS PO) Take by mouth      Misc. Devices MISC Rollator walker. 1 each 0    lisinopril (PRINIVIL;ZESTRIL) 20 MG tablet Take 1 tablet by mouth in the morning and at bedtime 180 tablet 5    Misc. Devices MISC Mask: q3 months; full face cushions qmonthly, Headgear q6months; Tubing qmonthly, Humidifier q6months; filter-disposable [id number]. Diagnosis: G47.33 1 each 0    alfuzosin (UROXATRAL) 10 MG extended release tablet Take 1 tablet by mouth daily 30 tablet 3    hydrOXYzine (ATARAX) 10 MG tablet TAKE 2 TABLETS BY MOUTH ONCE DAILY AT BEDTIME AS NEEDED FOR ITCHING.       levocetirizine (XYZAL) 5 MG tablet Take 5 mg by mouth nightly      hydrocortisone (ANUSOL-HC) 25 MG suppository Place 1 suppository rectally every 12 hours 12 suppository 11    omeprazole (PRILOSEC) 20 MG delayed release capsule Take 1 capsule by mouth 2 times daily 180 capsule 3    citalopram (CELEXA) 10 MG tablet Take 2 tablets by mouth daily 180 tablet 3    traZODone (DESYREL) 50 MG tablet Take 1 tablet by mouth nightly 90 tablet 3    ipratropium (ATROVENT) 0.06 % nasal spray 1 spray by Nasal route daily as needed for Rhinitis 1 Bottle 2    betamethasone dipropionate (DIPROLENE) 0.05 % cream Apply topically 2 times daily Apply topically 2 times daily. 45 g 11    hyoscyamine (ANASPAZ;LEVSIN) 125 MCG tablet Take 1 tablet by mouth every 4 hours as needed for Cramping 180 tablet 11    acyclovir (ZOVIRAX) 400 MG tablet Take 1 tablet by mouth 4 times daily 30 tablet 5    fluticasone (FLONASE) 50 MCG/ACT nasal spray 1 spray by Nasal route daily 1 Bottle 3    pseudoephedrine (SUDAFED) 60 MG tablet Take 60 mg by mouth every 6 hours as needed for Congestion      fexofenadine (ALLEGRA) 60 MG tablet Take 180 mg by mouth daily      saw palmetto 160 MG capsule Take 160 mg by mouth daily      magnesium hydroxide (MILK OF MAGNESIA) 400 MG/5ML suspension Take by mouth daily as needed for Constipation      vitamin E 1000 UNITS capsule Take 1,000 Units by mouth daily       No current facility-administered medications for this visit. /73   Pulse 52   Ht 6' (1.829 m)   Wt 200 lb (90.7 kg)   BMI 27.12 kg/m²     Constitutional - well developed, well nourished. Eyes - conjunctiva normal.  Ear, nose, throat - No scars, masses, or lesions over external nose or ears, no atrophy of tongue  Neck-symmetric, no masses noted, no jugular vein distension  Respiration- chest wall appears symmetric, good expansion,   normal effort without use of accessory muscles  Musculoskeletal - no significant wasting of muscles noted, no bony deformities  Extremities-no clubbing, cyanosis or edema  Skin - warm, dry, and intact. No rash, erythema, or pallor.   Psychiatric - mood, affect, and behavior appear normal.      Neurological exam  Awake, alert, fluent oriented  appropriate affect  Attention and concentration appear appropriate  Recent and remote memory appears unremarkable  Speech normal without dysarthria  No clear issues with language of fund of knowledge    Cranial Nerve Exam     CN III, IV,VI-EOMI, No nystagmus, conjugate eye movements, no ptosis  CN VII-no facial assymetry        Motor Exam  antigravity throughout upper and lower extremities bilaterally    Tremors- no tremors in hands or head noted    Gait  Normal base and speed  No ataxia    Lab Results   Component Value Date    [id number] 619 09/28/2021     Lab Results   Component Value Date    WBC 6.5 08/17/2022    HGB 13.6 (L) 08/17/2022    HCT 43.2 08/17/2022    MCV 80.6 08/17/2022     08/17/2022     Lab Results   Component Value Date     08/17/2022    K 5.1 (H) 08/17/2022    CL 98 08/17/2022    CO2 32 (H) 08/17/2022    BUN 16 08/17/2022    CREATININE 1.2 11/07/2022    GLUCOSE 91 08/17/2022    CALCIUM 9.2 08/17/2022    PROT 6.4 (L) 08/17/2022    LABALBU 4.5 08/17/2022    BILITOT 1.2 08/17/2022    ALKPHOS 153 (H) 08/17/2022    AST 29 08/17/2022    ALT 24 08/17/2022    LABGLOM >60 11/07/2022    GFRAA >59 08/17/2022           Assessment    ICD-10-CM    1. Restless leg syndrome  G25.81 diazePAM (VALIUM) 5 MG tablet     rOPINIRole (REQUIP) 2 MG tablet      2. Pain in both feet  M79.671 rOPINIRole (REQUIP) 2 MG tablet    M79.672       3. Imbalance  R26.89       4. 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 Closed fracture of ramus of left pubis, sequela  S32.592S             His neurological examination previously was significant for a left foot drop and weakness in inversion and eversion for some mild decreased vibration over his feet and decreased sensation lateral left lower leg The rest of this sensory exam and motor exam was unremarkable. His reflexes were intact and he had no sway on Romberg. Based upon his history and examination it is unclear what the etiology of his discomfort is. Certainly a sensory neuropathy in his feet is a possibility along with restless leg syndrome although his symptoms are quite limited to his feet. The other possibility is that he has some chronic trauma to his feet from a history of long standing stress on these  extremities from his work leading to his symptoms of discomfort. His EMG with nerve conduction study of his legs suggested a mild sensory neuropathy. His blood work including JERRI, heavy metals screen, hemoglobin A1C, HIV, RPR, sed rate, SPEP, and B12 was unremarkable. Requip can be helpful in both restless leg syndrome and neuropathy. He is off his Sinemet and neurontin at night. He is also taking trazadone at a low dose to help him sleep. At this time he will have his Valium continued at 5 mg at bedtime. He is on 2 mg Requip at night. He is off Lortab. He will be referred to PT again. The patient indicated understanding of the diagnoses and treatment plan. Continue present care as noted. Follow 6 months Continue current care as overall stable. Plan    No orders of the defined types were placed in this encounter. Orders Placed This Encounter   Medications    diazePAM (VALIUM) 5 MG tablet     Sig: Take 1 tablet by mouth every 6 hours as needed for Anxiety for up to 30 days. Dispense:  360 tablet     Refill:  1    rOPINIRole (REQUIP) 2 MG tablet     Sig: Take 1 tablet by mouth 3 times daily as needed (restless legs)     Dispense:  270 tablet     Refill:  5           Return in about 3 months (around 5/22/2023).

## 2023-04-24 SDOH — ECONOMIC STABILITY: FOOD INSECURITY: WITHIN THE PAST 12 MONTHS, THE FOOD YOU BOUGHT JUST DIDN'T LAST AND YOU DIDN'T HAVE MONEY TO GET MORE.: NEVER TRUE

## 2023-04-24 SDOH — ECONOMIC STABILITY: INCOME INSECURITY: HOW HARD IS IT FOR YOU TO PAY FOR THE VERY BASICS LIKE FOOD, HOUSING, MEDICAL CARE, AND HEATING?: PATIENT DECLINED

## 2023-04-24 SDOH — ECONOMIC STABILITY: FOOD INSECURITY: WITHIN THE PAST 12 MONTHS, YOU WORRIED THAT YOUR FOOD WOULD RUN OUT BEFORE YOU GOT MONEY TO BUY MORE.: NEVER TRUE

## 2023-04-24 SDOH — ECONOMIC STABILITY: HOUSING INSECURITY
IN THE LAST 12 MONTHS, WAS THERE A TIME WHEN YOU DID NOT HAVE A STEADY PLACE TO SLEEP OR SLEPT IN A SHELTER (INCLUDING NOW)?: NO

## 2023-04-27 ENCOUNTER — OFFICE VISIT (OUTPATIENT)
Dept: PRIMARY CARE CLINIC | Age: 78
End: 2023-04-27

## 2023-04-27 VITALS
BODY MASS INDEX: 27.94 KG/M2 | TEMPERATURE: 97.4 F | HEART RATE: 67 BPM | DIASTOLIC BLOOD PRESSURE: 75 MMHG | OXYGEN SATURATION: 96 % | WEIGHT: 206 LBS | SYSTOLIC BLOOD PRESSURE: 138 MMHG

## 2023-04-27 DIAGNOSIS — M15.9 GENERALIZED OSTEOARTHRITIS OF MULTIPLE SITES: ICD-10-CM

## 2023-04-27 DIAGNOSIS — K21.9 GASTROESOPHAGEAL REFLUX DISEASE WITHOUT ESOPHAGITIS: ICD-10-CM

## 2023-04-27 DIAGNOSIS — D69.6 THROMBOCYTOPENIA, UNSPECIFIED (HCC): ICD-10-CM

## 2023-04-27 DIAGNOSIS — N40.0 BENIGN PROSTATIC HYPERPLASIA WITHOUT LOWER URINARY TRACT SYMPTOMS: Primary | ICD-10-CM

## 2023-04-27 DIAGNOSIS — E78.1 HYPERTRIGLYCERIDEMIA: ICD-10-CM

## 2023-04-27 DIAGNOSIS — K58.1 IRRITABLE BOWEL SYNDROME WITH CONSTIPATION: ICD-10-CM

## 2023-04-27 DIAGNOSIS — R06.02 SHORTNESS OF BREATH: ICD-10-CM

## 2023-04-27 DIAGNOSIS — E06.3 HYPOTHYROIDISM DUE TO HASHIMOTO'S THYROIDITIS: ICD-10-CM

## 2023-04-27 DIAGNOSIS — E03.8 HYPOTHYROIDISM DUE TO HASHIMOTO'S THYROIDITIS: ICD-10-CM

## 2023-04-27 DIAGNOSIS — E80.4 GILBERT'S SYNDROME: ICD-10-CM

## 2023-04-27 DIAGNOSIS — F41.8 DEPRESSION WITH ANXIETY: ICD-10-CM

## 2023-04-27 DIAGNOSIS — R53.83 OTHER FATIGUE: ICD-10-CM

## 2023-04-27 DIAGNOSIS — I10 ESSENTIAL (PRIMARY) HYPERTENSION: ICD-10-CM

## 2023-04-27 DIAGNOSIS — G25.81 RESTLESS LEG SYNDROME: ICD-10-CM

## 2023-04-27 DIAGNOSIS — J30.1 SEASONAL ALLERGIC RHINITIS DUE TO POLLEN: ICD-10-CM

## 2023-04-27 DIAGNOSIS — M79.672 PAIN IN BOTH FEET: ICD-10-CM

## 2023-04-27 DIAGNOSIS — M79.671 PAIN IN BOTH FEET: ICD-10-CM

## 2023-04-27 DIAGNOSIS — F51.04 CHRONIC INSOMNIA: ICD-10-CM

## 2023-04-27 DIAGNOSIS — G60.9 IDIOPATHIC PERIPHERAL NEUROPATHY: ICD-10-CM

## 2023-04-27 PROBLEM — S37.002A: Status: RESOLVED | Noted: 2022-02-01 | Resolved: 2023-04-27

## 2023-04-27 PROBLEM — S32.10XA SACRAL FRACTURE (HCC): Status: RESOLVED | Noted: 2022-02-01 | Resolved: 2023-04-27

## 2023-04-27 PROBLEM — L30.9 DERMATITIS: Status: RESOLVED | Noted: 2017-08-28 | Resolved: 2023-04-27

## 2023-04-27 PROBLEM — S22.49XA CLOSED FRACTURE OF MULTIPLE RIBS: Status: RESOLVED | Noted: 2022-02-01 | Resolved: 2023-04-27

## 2023-04-27 PROBLEM — R33.9 RETENTION OF URINE: Status: RESOLVED | Noted: 2022-02-01 | Resolved: 2023-04-27

## 2023-04-27 PROBLEM — S32.592A FRACTURE OF RAMUS OF LEFT PUBIS (HCC): Status: RESOLVED | Noted: 2022-02-01 | Resolved: 2023-04-27

## 2023-04-27 RX ORDER — HYDROCHLOROTHIAZIDE 12.5 MG/1
12.5 CAPSULE, GELATIN COATED ORAL EVERY MORNING
Qty: 500 CAPSULE | Refills: 1 | Status: SHIPPED | OUTPATIENT
Start: 2023-04-27

## 2023-04-27 RX ORDER — ALFUZOSIN HYDROCHLORIDE 10 MG/1
10 TABLET, EXTENDED RELEASE ORAL DAILY
Qty: 500 TABLET | Refills: 3 | Status: SHIPPED | OUTPATIENT
Start: 2023-04-27

## 2023-04-27 RX ORDER — LISINOPRIL 20 MG/1
20 TABLET ORAL 2 TIMES DAILY
Qty: 500 TABLET | Refills: 5 | Status: SHIPPED | OUTPATIENT
Start: 2023-04-27

## 2023-04-27 RX ORDER — CITALOPRAM 10 MG/1
20 TABLET ORAL DAILY
Qty: 500 TABLET | Refills: 3 | Status: SHIPPED | OUTPATIENT
Start: 2023-04-27

## 2023-04-27 RX ORDER — MELOXICAM 7.5 MG/1
7.5 TABLET ORAL 2 TIMES DAILY
Qty: 500 TABLET | Refills: 3 | Status: SHIPPED | OUTPATIENT
Start: 2023-04-27

## 2023-04-27 RX ORDER — LEVOTHYROXINE SODIUM 0.07 MG/1
75 TABLET ORAL DAILY
Qty: 30 TABLET | Refills: 5 | Status: SHIPPED | OUTPATIENT
Start: 2023-04-27

## 2023-04-27 RX ORDER — TRAZODONE HYDROCHLORIDE 50 MG/1
50 TABLET ORAL NIGHTLY
Qty: 500 TABLET | Refills: 3 | Status: SHIPPED | OUTPATIENT
Start: 2023-04-27

## 2023-04-27 RX ORDER — BISOPROLOL FUMARATE 5 MG/1
5 TABLET, FILM COATED ORAL DAILY
Qty: 500 TABLET | Refills: 3 | Status: SHIPPED | OUTPATIENT
Start: 2023-04-27

## 2023-04-27 SDOH — ECONOMIC STABILITY: INCOME INSECURITY: HOW HARD IS IT FOR YOU TO PAY FOR THE VERY BASICS LIKE FOOD, HOUSING, MEDICAL CARE, AND HEATING?: NOT HARD AT ALL

## 2023-04-27 SDOH — ECONOMIC STABILITY: FOOD INSECURITY: WITHIN THE PAST 12 MONTHS, THE FOOD YOU BOUGHT JUST DIDN'T LAST AND YOU DIDN'T HAVE MONEY TO GET MORE.: NEVER TRUE

## 2023-04-27 SDOH — ECONOMIC STABILITY: FOOD INSECURITY: WITHIN THE PAST 12 MONTHS, YOU WORRIED THAT YOUR FOOD WOULD RUN OUT BEFORE YOU GOT MONEY TO BUY MORE.: NEVER TRUE

## 2023-04-27 ASSESSMENT — PATIENT HEALTH QUESTIONNAIRE - PHQ9
6. FEELING BAD ABOUT YOURSELF - OR THAT YOU ARE A FAILURE OR HAVE LET YOURSELF OR YOUR FAMILY DOWN: 0
5. POOR APPETITE OR OVEREATING: 1
SUM OF ALL RESPONSES TO PHQ QUESTIONS 1-9: 6
7. TROUBLE CONCENTRATING ON THINGS, SUCH AS READING THE NEWSPAPER OR WATCHING TELEVISION: 2
8. MOVING OR SPEAKING SO SLOWLY THAT OTHER PEOPLE COULD HAVE NOTICED. OR THE OPPOSITE, BEING SO FIGETY OR RESTLESS THAT YOU HAVE BEEN MOVING AROUND A LOT MORE THAN USUAL: 0
10. IF YOU CHECKED OFF ANY PROBLEMS, HOW DIFFICULT HAVE THESE PROBLEMS MADE IT FOR YOU TO DO YOUR WORK, TAKE CARE OF THINGS AT HOME, OR GET ALONG WITH OTHER PEOPLE: 1
2. FEELING DOWN, DEPRESSED OR HOPELESS: 0
SUM OF ALL RESPONSES TO PHQ9 QUESTIONS 1 & 2: 1
SUM OF ALL RESPONSES TO PHQ QUESTIONS 1-9: 6
1. LITTLE INTEREST OR PLEASURE IN DOING THINGS: 1
9. THOUGHTS THAT YOU WOULD BE BETTER OFF DEAD, OR OF HURTING YOURSELF: 0
SUM OF ALL RESPONSES TO PHQ QUESTIONS 1-9: 6
SUM OF ALL RESPONSES TO PHQ QUESTIONS 1-9: 6
4. FEELING TIRED OR HAVING LITTLE ENERGY: 2
3. TROUBLE FALLING OR STAYING ASLEEP: 0

## 2023-04-27 ASSESSMENT — ENCOUNTER SYMPTOMS
ANAL BLEEDING: 0
CHEST TIGHTNESS: 0
ABDOMINAL PAIN: 0
DIARRHEA: 0
NAUSEA: 0
COUGH: 0
SHORTNESS OF BREATH: 0
CONSTIPATION: 0

## 2023-04-27 NOTE — PROGRESS NOTES
Value Date    HDL 55 04/18/2023    HDL 61 08/17/2022    HDL 55 04/20/2021     Lab Results   Component Value Date    LDLCALC 76 04/18/2023    LDLCALC 76 08/17/2022    LDLCALC 82 04/20/2021     No results found for: LABVLDL, VLDL  No results found for: CHOLHDLRATIO  - Stable    10. Idiopathic peripheral neuropathy  -Continue with recommendations per neurology    11. Irritable bowel syndrome with constipation  FODMAP diet    12. Restless leg syndrome  Continue recommendations per neurology. Requip 2 mg 3 times a day  Trazodone 50 mg at night    13. Seasonal allergic rhinitis due to pollen  Flonase    14. Elevated tsh:   -Start levothyroxine 75 mcg and recheck TSH in 6 weeks. -Reexploration for the fatigue he is experiencing. 15 Shortness of breath:  -EKG shows no significant changes. He has palpitations or chest pain.  -We will follow-up in 6 weeks 6 hypothyroidism is contributing to overall fatigue. Discussed his fall risk. Encouraged him to continue physical activity and may wish to pursue physical therapy. Recommend utilization of a cane and walker. Discussed home safety tips including staying off ladders and stepstools. -Risk of fall is complicated by his peripheral neuropathy as well. 40-minute office visit today. 5 minutes was spent reviewing his chart before arrival.  26 minutes were spent face-to-face with the patient. An additional 9 minutes was spent reviewing his EKG, Omnipaque instructions and completing the note        Orders Placed This Encounter   Procedures    T4, Free     Standing Status:   Future     Standing Expiration Date:   10/27/2024    TSH     Standing Status:   Future     Standing Expiration Date:   10/27/2024    EKG 12 Lead     Order Specific Question:   Reason for Exam?     Answer:   Hypertension         Return in about 7 weeks (around 6/15/2023) for Routine follow up - 20 minutes. Discussed use, benefit, and side effects of prescribed medications.   All patient

## 2023-04-27 NOTE — PATIENT INSTRUCTIONS
Patient Instructions    Start levothyroxine 75 mcg first thing in the morning.  -Levothyroxine must be taken on an empty stomach. Take 30 minutes before food and your other medications. 2.  Check your blood pressure every morning and every evening and bring back to your next visit. 3.  Get laboratory work in 6 weeks to recheck thyroid hormone. I will see you the following week.

## 2023-06-08 ENCOUNTER — OFFICE VISIT (OUTPATIENT)
Dept: PRIMARY CARE CLINIC | Age: 78
End: 2023-06-08
Payer: MEDICARE

## 2023-06-08 ENCOUNTER — HOSPITAL ENCOUNTER (OUTPATIENT)
Dept: GENERAL RADIOLOGY | Age: 78
Discharge: HOME | End: 2023-06-08
Payer: MEDICARE

## 2023-06-08 VITALS
BODY MASS INDEX: 27.94 KG/M2 | SYSTOLIC BLOOD PRESSURE: 112 MMHG | TEMPERATURE: 98.6 F | WEIGHT: 206 LBS | DIASTOLIC BLOOD PRESSURE: 84 MMHG | HEART RATE: 69 BPM | OXYGEN SATURATION: 99 %

## 2023-06-08 DIAGNOSIS — S99.922A INJURY OF LEFT TOE, INITIAL ENCOUNTER: Primary | ICD-10-CM

## 2023-06-08 DIAGNOSIS — M79.675 PAIN IN TOE OF LEFT FOOT: ICD-10-CM

## 2023-06-08 DIAGNOSIS — S99.922A INJURY OF LEFT TOE, INITIAL ENCOUNTER: ICD-10-CM

## 2023-06-08 PROCEDURE — 1036F TOBACCO NON-USER: CPT | Performed by: NURSE PRACTITIONER

## 2023-06-08 PROCEDURE — 1123F ACP DISCUSS/DSCN MKR DOCD: CPT | Performed by: NURSE PRACTITIONER

## 2023-06-08 PROCEDURE — 73660 X-RAY EXAM OF TOE(S): CPT

## 2023-06-08 PROCEDURE — G8417 CALC BMI ABV UP PARAM F/U: HCPCS | Performed by: NURSE PRACTITIONER

## 2023-06-08 PROCEDURE — G8427 DOCREV CUR MEDS BY ELIG CLIN: HCPCS | Performed by: NURSE PRACTITIONER

## 2023-06-08 PROCEDURE — 3074F SYST BP LT 130 MM HG: CPT | Performed by: NURSE PRACTITIONER

## 2023-06-08 PROCEDURE — 99213 OFFICE O/P EST LOW 20 MIN: CPT | Performed by: NURSE PRACTITIONER

## 2023-06-08 PROCEDURE — 3079F DIAST BP 80-89 MM HG: CPT | Performed by: NURSE PRACTITIONER

## 2023-06-08 ASSESSMENT — ENCOUNTER SYMPTOMS
EYES NEGATIVE: 1
ALLERGIC/IMMUNOLOGIC NEGATIVE: 1
GASTROINTESTINAL NEGATIVE: 1
RESPIRATORY NEGATIVE: 1

## 2023-06-08 NOTE — PROGRESS NOTES
mouth 2 times daily 500 tablet 3    citalopram (CELEXA) 10 MG tablet Take 2 tablets by mouth daily 500 tablet 3    alfuzosin (UROXATRAL) 10 MG extended release tablet Take 1 tablet by mouth daily 500 tablet 3    traZODone (DESYREL) 50 MG tablet Take 1 tablet by mouth nightly 500 tablet 3    diazePAM (VALIUM) 5 MG tablet Take 1 tablet by mouth every 6 hours as needed for Anxiety. rOPINIRole (REQUIP) 2 MG tablet Take 1 tablet by mouth 3 times daily as needed (restless legs) 270 tablet 5    Sennosides-Docusate Sodium (SENNA PLUS PO) Take by mouth      Misc. Devices MISC Rollator walker. 1 each 0    Misc. Devices MISC Mask: q3 months; full face cushions qmonthly, Headgear q6months; Tubing qmonthly, Humidifier q6months; filter-disposable [id number]. Diagnosis: G47.33 1 each 0    hydrOXYzine (ATARAX) 10 MG tablet TAKE 2 TABLETS BY MOUTH ONCE DAILY AT BEDTIME AS NEEDED FOR ITCHING. levocetirizine (XYZAL) 5 MG tablet Take 1 tablet by mouth nightly      hydrocortisone (ANUSOL-HC) 25 MG suppository Place 1 suppository rectally every 12 hours 12 suppository 11    omeprazole (PRILOSEC) 20 MG delayed release capsule Take 1 capsule by mouth 2 times daily 180 capsule 3    ipratropium (ATROVENT) 0.06 % nasal spray 1 spray by Nasal route daily as needed for Rhinitis 1 Bottle 2    betamethasone dipropionate (DIPROLENE) 0.05 % cream Apply topically 2 times daily Apply topically 2 times daily.  45 g 11    hyoscyamine (ANASPAZ;LEVSIN) 125 MCG tablet Take 1 tablet by mouth every 4 hours as needed for Cramping 180 tablet 11    acyclovir (ZOVIRAX) 400 MG tablet Take 1 tablet by mouth 4 times daily 30 tablet 5    fluticasone (FLONASE) 50 MCG/ACT nasal spray 1 spray by Nasal route daily 1 Bottle 3    pseudoephedrine (SUDAFED) 60 MG tablet Take 1 tablet by mouth every 6 hours as needed for Congestion      fexofenadine (ALLEGRA) 60 MG tablet Take 3 tablets by mouth daily      saw palmetto 160 MG capsule Take 1 capsule by mouth daily

## 2023-06-14 DIAGNOSIS — E03.8 HYPOTHYROIDISM DUE TO HASHIMOTO'S THYROIDITIS: ICD-10-CM

## 2023-06-14 DIAGNOSIS — E06.3 HYPOTHYROIDISM DUE TO HASHIMOTO'S THYROIDITIS: ICD-10-CM

## 2023-06-14 LAB
T4 FREE SERPL-MCNC: 1.28 NG/DL (ref 0.93–1.7)
TSH SERPL DL<=0.005 MIU/L-ACNC: 2.75 UIU/ML (ref 0.27–4.2)

## 2023-06-20 ENCOUNTER — OFFICE VISIT (OUTPATIENT)
Dept: PRIMARY CARE CLINIC | Age: 78
End: 2023-06-20

## 2023-06-20 VITALS
OXYGEN SATURATION: 97 % | DIASTOLIC BLOOD PRESSURE: 82 MMHG | HEIGHT: 72 IN | TEMPERATURE: 97.2 F | SYSTOLIC BLOOD PRESSURE: 150 MMHG | BODY MASS INDEX: 27.9 KG/M2 | WEIGHT: 206 LBS | HEART RATE: 56 BPM

## 2023-06-20 DIAGNOSIS — M25.50 POLYARTHRALGIA: ICD-10-CM

## 2023-06-20 DIAGNOSIS — I10 ESSENTIAL (PRIMARY) HYPERTENSION: ICD-10-CM

## 2023-06-20 DIAGNOSIS — E06.3 HYPOTHYROIDISM DUE TO HASHIMOTO'S THYROIDITIS: ICD-10-CM

## 2023-06-20 DIAGNOSIS — N40.0 BENIGN PROSTATIC HYPERPLASIA WITHOUT LOWER URINARY TRACT SYMPTOMS: ICD-10-CM

## 2023-06-20 DIAGNOSIS — G25.81 RESTLESS LEG SYNDROME: ICD-10-CM

## 2023-06-20 DIAGNOSIS — F51.01 PRIMARY INSOMNIA: ICD-10-CM

## 2023-06-20 DIAGNOSIS — E03.8 HYPOTHYROIDISM DUE TO HASHIMOTO'S THYROIDITIS: ICD-10-CM

## 2023-06-20 DIAGNOSIS — F41.8 DEPRESSION WITH ANXIETY: ICD-10-CM

## 2023-06-20 DIAGNOSIS — Z91.81 AT MAXIMUM RISK FOR FALL: ICD-10-CM

## 2023-06-20 DIAGNOSIS — J30.89 CHRONIC NONSEASONAL ALLERGIC RHINITIS DUE TO POLLEN: ICD-10-CM

## 2023-06-20 DIAGNOSIS — Z00.00 ANNUAL PHYSICAL EXAM: Primary | ICD-10-CM

## 2023-06-20 DIAGNOSIS — R26.89 BALANCE DISORDER: ICD-10-CM

## 2023-06-20 LAB
BASOPHILS # BLD: 0.1 K/UL (ref 0–0.2)
BASOPHILS NFR BLD: 0.8 % (ref 0–1)
CK SERPL-CCNC: 402 U/L (ref 39–308)
CRP SERPL HS-MCNC: <0.3 MG/DL (ref 0–0.5)
EOSINOPHIL # BLD: 0.2 K/UL (ref 0–0.6)
EOSINOPHIL NFR BLD: 3 % (ref 0–5)
ERYTHROCYTE [DISTWIDTH] IN BLOOD BY AUTOMATED COUNT: 15.7 % (ref 11.5–14.5)
ERYTHROCYTE [SEDIMENTATION RATE] IN BLOOD BY WESTERGREN METHOD: 3 MM/HR (ref 0–15)
HCT VFR BLD AUTO: 44.4 % (ref 42–52)
HGB BLD-MCNC: 13.9 G/DL (ref 14–18)
IMM GRANULOCYTES # BLD: 0 K/UL
LYMPHOCYTES # BLD: 1.2 K/UL (ref 1.1–4.5)
LYMPHOCYTES NFR BLD: 18.3 % (ref 20–40)
MCH RBC QN AUTO: 25.2 PG (ref 27–31)
MCHC RBC AUTO-ENTMCNC: 31.3 G/DL (ref 33–37)
MCV RBC AUTO: 80.6 FL (ref 80–94)
MONOCYTES # BLD: 0.6 K/UL (ref 0–0.9)
MONOCYTES NFR BLD: 9.7 % (ref 0–10)
NEUTROPHILS # BLD: 4.3 K/UL (ref 1.5–7.5)
NEUTS SEG NFR BLD: 67.7 % (ref 50–65)
PLATELET # BLD AUTO: 149 K/UL (ref 130–400)
PMV BLD AUTO: 10.4 FL (ref 9.4–12.4)
RBC # BLD AUTO: 5.51 M/UL (ref 4.7–6.1)
RHEUMATOID FACT SER NEPH-ACNC: <10 IU/ML
URATE SERPL-MCNC: 6.4 MG/DL (ref 3.4–7)
WBC # BLD AUTO: 6.3 K/UL (ref 4.8–10.8)

## 2023-06-20 RX ORDER — LISINOPRIL 40 MG/1
20 TABLET ORAL 2 TIMES DAILY
Qty: 500 TABLET | Refills: 5
Start: 2023-06-20 | End: 2023-06-20

## 2023-06-20 RX ORDER — HYDROCHLOROTHIAZIDE 12.5 MG/1
12.5 CAPSULE, GELATIN COATED ORAL 2 TIMES DAILY
Qty: 500 CAPSULE | Refills: 1
Start: 2023-06-20

## 2023-06-20 RX ORDER — LISINOPRIL 20 MG/1
20 TABLET ORAL 2 TIMES DAILY
Qty: 500 TABLET | Refills: 5
Start: 2023-06-20

## 2023-06-20 RX ORDER — LISINOPRIL 40 MG/1
40 TABLET ORAL 2 TIMES DAILY
Qty: 500 TABLET | Refills: 5
Start: 2023-06-20 | End: 2023-06-20 | Stop reason: SDUPTHER

## 2023-06-20 SDOH — HEALTH STABILITY: PHYSICAL HEALTH: ON AVERAGE, HOW MANY DAYS PER WEEK DO YOU ENGAGE IN MODERATE TO STRENUOUS EXERCISE (LIKE A BRISK WALK)?: 1 DAY

## 2023-06-20 SDOH — HEALTH STABILITY: PHYSICAL HEALTH: ON AVERAGE, HOW MANY MINUTES DO YOU ENGAGE IN EXERCISE AT THIS LEVEL?: 10 MIN

## 2023-06-20 ASSESSMENT — PATIENT HEALTH QUESTIONNAIRE - PHQ9
5. POOR APPETITE OR OVEREATING: 0
SUM OF ALL RESPONSES TO PHQ QUESTIONS 1-9: 1
SUM OF ALL RESPONSES TO PHQ9 QUESTIONS 1 & 2: 0
SUM OF ALL RESPONSES TO PHQ9 QUESTIONS 1 & 2: 1
6. FEELING BAD ABOUT YOURSELF - OR THAT YOU ARE A FAILURE OR HAVE LET YOURSELF OR YOUR FAMILY DOWN: 0
SUM OF ALL RESPONSES TO PHQ QUESTIONS 1-9: 1
SUM OF ALL RESPONSES TO PHQ QUESTIONS 1-9: 1
7. TROUBLE CONCENTRATING ON THINGS, SUCH AS READING THE NEWSPAPER OR WATCHING TELEVISION: 0
SUM OF ALL RESPONSES TO PHQ QUESTIONS 1-9: 0
2. FEELING DOWN, DEPRESSED OR HOPELESS: 0
SUM OF ALL RESPONSES TO PHQ QUESTIONS 1-9: 0
3. TROUBLE FALLING OR STAYING ASLEEP: 0
SUM OF ALL RESPONSES TO PHQ QUESTIONS 1-9: 1
1. LITTLE INTEREST OR PLEASURE IN DOING THINGS: 0
9. THOUGHTS THAT YOU WOULD BE BETTER OFF DEAD, OR OF HURTING YOURSELF: 0
SUM OF ALL RESPONSES TO PHQ QUESTIONS 1-9: 0
10. IF YOU CHECKED OFF ANY PROBLEMS, HOW DIFFICULT HAVE THESE PROBLEMS MADE IT FOR YOU TO DO YOUR WORK, TAKE CARE OF THINGS AT HOME, OR GET ALONG WITH OTHER PEOPLE: 0
SUM OF ALL RESPONSES TO PHQ QUESTIONS 1-9: 0
1. LITTLE INTEREST OR PLEASURE IN DOING THINGS: 1
8. MOVING OR SPEAKING SO SLOWLY THAT OTHER PEOPLE COULD HAVE NOTICED. OR THE OPPOSITE, BEING SO FIGETY OR RESTLESS THAT YOU HAVE BEEN MOVING AROUND A LOT MORE THAN USUAL: 0
2. FEELING DOWN, DEPRESSED OR HOPELESS: 0
4. FEELING TIRED OR HAVING LITTLE ENERGY: 0

## 2023-06-20 ASSESSMENT — ENCOUNTER SYMPTOMS
COUGH: 0
CHEST TIGHTNESS: 0
CONSTIPATION: 0
ANAL BLEEDING: 0
BACK PAIN: 1
DIARRHEA: 0
NAUSEA: 0
SHORTNESS OF BREATH: 0
ABDOMINAL PAIN: 0

## 2023-06-20 ASSESSMENT — LIFESTYLE VARIABLES
HOW MANY STANDARD DRINKS CONTAINING ALCOHOL DO YOU HAVE ON A TYPICAL DAY: 0
HOW MANY STANDARD DRINKS CONTAINING ALCOHOL DO YOU HAVE ON A TYPICAL DAY: PATIENT DOES NOT DRINK
HOW OFTEN DO YOU HAVE SIX OR MORE DRINKS ON ONE OCCASION: 1
HOW OFTEN DO YOU HAVE A DRINK CONTAINING ALCOHOL: 1
HOW OFTEN DO YOU HAVE A DRINK CONTAINING ALCOHOL: NEVER

## 2023-06-20 NOTE — PROGRESS NOTES
Medicare Annual Wellness Visit - Subsequent    Name: Ada Desai Date: 2023   MRN: [id number] Sex: Male   Age: 65 y.o. Ethnicity: Non- / Non    : 1945 Race: White (non-)      Dudley Lockwood is here for   Chief Complaint   Patient presents with    Medicare AWV    Hypertension        Screenings for behavioral, psychosocial and functional/safety risks, and cognitive dysfunction are all negative except as indicated below. These results, as well as other patient data from the [address] form, are documented in Flowsheets linked to this Encounter. Allergies   Allergen Reactions    Sulfa Antibiotics Rash       Prior to Visit Medications    Medication Sig Taking? Authorizing Provider   levothyroxine (SYNTHROID) 75 MCG tablet Take 1 tablet by mouth daily Yes Raj Miranda MD   hydroCHLOROthiazide (MICROZIDE) 12.5 MG capsule Take 1 capsule by mouth every morning Yes Raj Miranda MD   lisinopril (PRINIVIL;ZESTRIL) 20 MG tablet Take 1 tablet by mouth in the morning and at bedtime Yes Raj Miranda MD   bisoprolol (ZEBETA) 5 MG tablet Take 1 tablet by mouth daily Yes Raj Miranda MD   meloxicam (MOBIC) 7.5 MG tablet Take 1 tablet by mouth 2 times daily  Patient taking differently: Take 2 tablets by mouth daily Yes Raj Miranda MD   citalopram (CELEXA) 10 MG tablet Take 2 tablets by mouth daily Yes Raj Miranda MD   alfuzosin (UROXATRAL) 10 MG extended release tablet Take 1 tablet by mouth daily Yes Raj Miranda MD   traZODone (DESYREL) 50 MG tablet Take 1 tablet by mouth nightly Yes Raj Miranda MD   diazePAM (VALIUM) 5 MG tablet Take 1 tablet by mouth every 6 hours as needed for Anxiety. Yes Historical Provider, MD   rOPINIRole (REQUIP) 2 MG tablet Take 1 tablet by mouth 3 times daily as needed (restless legs) Yes Krista Jackson MD   Sennosides-Docusate Sodium (SENNA PLUS PO) Take by mouth Yes Historical Provider, MD   Misc.  Devices
COLONOSCOPY  04/18/2016    Dr Neema Schwarz diverticulosis, internal hemorrhoids    COLONOSCOPY  03/30/2011    Internal hemorrhoid, diverticulosis    HERNIA REPAIR  1999/2001    KIDNEY SURGERY  02/2022    Dr Bailey Derik  03/11/2021    Dr Keven Garcia gastric polyp, esophastritis    UPPER GASTROINTESTINAL ENDOSCOPY  04/18/2016    Dr Eugenio Jones,  , Marisela Camilo at the [facility], no [facility]    UPPER GASTROINTESTINAL ENDOSCOPY  06/09/2006    Dr Austyn Eddy gastric polyp      Family History   Problem Relation Age of Onset    Dementia Mother     Breast Cancer Mother     Heart Disease Father     Colon Cancer Father     Dementia Father     High Blood Pressure Father     Heart Attack Father     Prostate Cancer Father     Esophageal Cancer Neg Hx     Liver Cancer Neg Hx     Liver Disease Neg Hx     Rectal Cancer Neg Hx     Stomach Cancer Neg Hx     Social History     Tobacco Use    Smoking status: Never    Smokeless tobacco: Never   Substance Use Topics    Alcohol use: No     Alcohol/week: 0.0 standard drinks        _______________________________________________________________    Note dictated using Dragon Dictation software  Sometimes this dictation software makes erroneous transcriptions.

## 2023-06-20 NOTE — PATIENT INSTRUCTIONS
Contact physical therapy and provide them with referral sheet. Blood pressure:  -Increase hydrochlorothiazide to 12.5 mg twice a day    Continue same levothyroxine dose    Get laboratory work today for check for autoimmune disorder. Labs before next appointment:  Go to room 405 for labs prior to next visit. Be sure to fast for at least 10 hours prior to laboratory appointment. Would recommend getting labs about 1 week prior to the appointment time to ensure they are available at the time of the appointment. No appointment is necessary for laboratory work as the orders have already been placed.     Lab opens at 6:30 am and closes at 4:30 pm.

## 2023-06-21 DIAGNOSIS — R74.8 ELEVATED CK: Primary | ICD-10-CM

## 2023-06-22 LAB — NUCLEAR IGG SER QL IA: NORMAL

## 2023-07-05 DIAGNOSIS — R74.8 ELEVATED CK: ICD-10-CM

## 2023-07-05 LAB — CK SERPL-CCNC: 455 U/L (ref 39–308)

## 2023-08-04 RX ORDER — HYOSCYAMINE SULFATE 0.125 MG
125 TABLET ORAL EVERY 4 HOURS PRN
Qty: 180 TABLET | Refills: 11 | Status: SHIPPED | OUTPATIENT
Start: 2023-08-04

## 2023-08-29 DIAGNOSIS — R53.83 OTHER FATIGUE: ICD-10-CM

## 2023-08-29 DIAGNOSIS — R74.8 ELEVATED CK: Primary | ICD-10-CM

## 2023-09-02 ENCOUNTER — OFFICE VISIT (OUTPATIENT)
Age: 78
End: 2023-09-02
Payer: MEDICARE

## 2023-09-02 ENCOUNTER — HOSPITAL ENCOUNTER (OUTPATIENT)
Dept: GENERAL RADIOLOGY | Age: 78
End: 2023-09-02
Payer: MEDICARE

## 2023-09-02 VITALS
SYSTOLIC BLOOD PRESSURE: 168 MMHG | BODY MASS INDEX: 26.99 KG/M2 | OXYGEN SATURATION: 99 % | TEMPERATURE: 97.9 F | RESPIRATION RATE: 20 BRPM | HEART RATE: 71 BPM | DIASTOLIC BLOOD PRESSURE: 94 MMHG | WEIGHT: 199 LBS

## 2023-09-02 DIAGNOSIS — M79.672 BILATERAL FOOT PAIN: Primary | ICD-10-CM

## 2023-09-02 DIAGNOSIS — M79.671 BILATERAL FOOT PAIN: ICD-10-CM

## 2023-09-02 DIAGNOSIS — M79.671 BILATERAL FOOT PAIN: Primary | ICD-10-CM

## 2023-09-02 DIAGNOSIS — M79.672 BILATERAL FOOT PAIN: ICD-10-CM

## 2023-09-02 PROCEDURE — 1036F TOBACCO NON-USER: CPT | Performed by: NURSE PRACTITIONER

## 2023-09-02 PROCEDURE — 1123F ACP DISCUSS/DSCN MKR DOCD: CPT | Performed by: NURSE PRACTITIONER

## 2023-09-02 PROCEDURE — 3077F SYST BP >= 140 MM HG: CPT | Performed by: NURSE PRACTITIONER

## 2023-09-02 PROCEDURE — 3080F DIAST BP >= 90 MM HG: CPT | Performed by: NURSE PRACTITIONER

## 2023-09-02 PROCEDURE — G8417 CALC BMI ABV UP PARAM F/U: HCPCS | Performed by: NURSE PRACTITIONER

## 2023-09-02 PROCEDURE — 99213 OFFICE O/P EST LOW 20 MIN: CPT | Performed by: NURSE PRACTITIONER

## 2023-09-02 PROCEDURE — 73630 X-RAY EXAM OF FOOT: CPT

## 2023-09-02 PROCEDURE — G8427 DOCREV CUR MEDS BY ELIG CLIN: HCPCS | Performed by: NURSE PRACTITIONER

## 2023-09-02 ASSESSMENT — ENCOUNTER SYMPTOMS
SHORTNESS OF BREATH: 0
COLOR CHANGE: 0
EYE DISCHARGE: 0
WHEEZING: 0
TROUBLE SWALLOWING: 0
CHEST TIGHTNESS: 0
ABDOMINAL PAIN: 0
ABDOMINAL DISTENTION: 0
SINUS PRESSURE: 0
EYE PAIN: 0
COUGH: 0
STRIDOR: 0
SORE THROAT: 0

## 2023-09-02 NOTE — PATIENT INSTRUCTIONS
Xrays pending  Rest, Ice, Elevate  Will refer to [facility] if indicated by xray  Follow-up with PCP as needed  Go to ER for worrisome symptoms      Patient verbalized understanding and agrees to plan.

## 2023-09-12 DIAGNOSIS — R74.8 ELEVATED CK: ICD-10-CM

## 2023-09-12 DIAGNOSIS — R53.83 OTHER FATIGUE: ICD-10-CM

## 2023-09-12 LAB
ALBUMIN SERPL-MCNC: 4.4 G/DL (ref 3.5–5.2)
ALP SERPL-CCNC: 150 U/L (ref 40–130)
ALT SERPL-CCNC: 22 U/L (ref 5–41)
ANION GAP SERPL CALCULATED.3IONS-SCNC: 13 MMOL/L (ref 7–19)
AST SERPL-CCNC: 23 U/L (ref 5–40)
BASOPHILS # BLD: 0 K/UL (ref 0–0.2)
BASOPHILS NFR BLD: 0.6 % (ref 0–1)
BILIRUB SERPL-MCNC: 0.9 MG/DL (ref 0.2–1.2)
BUN SERPL-MCNC: 16 MG/DL (ref 8–23)
CALCIUM SERPL-MCNC: 9.3 MG/DL (ref 8.8–10.2)
CHLORIDE SERPL-SCNC: 93 MMOL/L (ref 98–111)
CK SERPL-CCNC: 280 U/L (ref 39–308)
CO2 SERPL-SCNC: 28 MMOL/L (ref 22–29)
CREAT SERPL-MCNC: 1.4 MG/DL (ref 0.5–1.2)
EOSINOPHIL # BLD: 0.2 K/UL (ref 0–0.6)
EOSINOPHIL NFR BLD: 3.5 % (ref 0–5)
ERYTHROCYTE [DISTWIDTH] IN BLOOD BY AUTOMATED COUNT: 16.3 % (ref 11.5–14.5)
GLUCOSE SERPL-MCNC: 89 MG/DL (ref 74–109)
HCT VFR BLD AUTO: 41.6 % (ref 42–52)
HGB BLD-MCNC: 13.2 G/DL (ref 14–18)
IMM GRANULOCYTES # BLD: 0 K/UL
LYMPHOCYTES # BLD: 1.1 K/UL (ref 1.1–4.5)
LYMPHOCYTES NFR BLD: 17.5 % (ref 20–40)
Lab: NORMAL
MCH RBC QN AUTO: 25.6 PG (ref 27–31)
MCHC RBC AUTO-ENTMCNC: 31.7 G/DL (ref 33–37)
MCV RBC AUTO: 80.6 FL (ref 80–94)
MONOCYTES # BLD: 0.5 K/UL (ref 0–0.9)
MONOCYTES NFR BLD: 7.7 % (ref 0–10)
NEUTROPHILS # BLD: 4.6 K/UL (ref 1.5–7.5)
NEUTS SEG NFR BLD: 70.2 % (ref 50–65)
PLATELET # BLD AUTO: 180 K/UL (ref 130–400)
PMV BLD AUTO: 10.2 FL (ref 9.4–12.4)
POTASSIUM SERPL-SCNC: 3.9 MMOL/L (ref 3.5–5)
PROT SERPL-MCNC: 6.9 G/DL (ref 6.6–8.7)
RBC # BLD AUTO: 5.16 M/UL (ref 4.7–6.1)
REPORT: NORMAL
SODIUM SERPL-SCNC: 134 MMOL/L (ref 136–145)
T4 FREE SERPL-MCNC: 1.24 NG/DL (ref 0.93–1.7)
THIS TEST SENT TO: NORMAL
TSH SERPL DL<=0.005 MIU/L-ACNC: 5.42 UIU/ML (ref 0.27–4.2)
WBC # BLD AUTO: 6.5 K/UL (ref 4.8–10.8)

## 2023-09-14 ENCOUNTER — TELEPHONE (OUTPATIENT)
Dept: GASTROENTEROLOGY | Age: 78
End: 2023-09-14

## 2023-09-14 DIAGNOSIS — K86.2 PANCREATIC CYST: Primary | ICD-10-CM

## 2023-09-14 LAB — NUCLEAR IGG SER QL IA: NORMAL

## 2023-09-14 NOTE — TELEPHONE ENCOUNTER
Pt is in MRI recall for November. I have placed the order. [facility] [address] [email or web address]

## 2023-09-14 NOTE — TELEPHONE ENCOUNTER
Pt called about a MRI recall. Told pt that Dr. Lesa Louis has to put the order in, then we will call and get that scheduled.  -EMILY

## 2023-09-15 LAB — MISCELLANEOUS LAB TEST RESULT: NORMAL

## 2023-09-20 LAB
GLIADIN IGG SER IA-ACNC: <0.4 U/ML
IGA SERPL-MCNC: 0.6 U/ML
IGA SERPL-MCNC: 181 MG/DL (ref 70–400)
TTG IGA SER IA-ACNC: 0.3 U/ML

## 2023-09-22 ENCOUNTER — OFFICE VISIT (OUTPATIENT)
Dept: PRIMARY CARE CLINIC | Age: 78
End: 2023-09-22

## 2023-09-22 VITALS
BODY MASS INDEX: 27.67 KG/M2 | SYSTOLIC BLOOD PRESSURE: 128 MMHG | TEMPERATURE: 97.7 F | HEART RATE: 58 BPM | DIASTOLIC BLOOD PRESSURE: 81 MMHG | OXYGEN SATURATION: 97 % | WEIGHT: 204 LBS

## 2023-09-22 DIAGNOSIS — M15.9 GENERALIZED OSTEOARTHRITIS OF MULTIPLE SITES: ICD-10-CM

## 2023-09-22 DIAGNOSIS — F41.8 DEPRESSION WITH ANXIETY: ICD-10-CM

## 2023-09-22 DIAGNOSIS — K58.1 IRRITABLE BOWEL SYNDROME WITH CONSTIPATION: ICD-10-CM

## 2023-09-22 DIAGNOSIS — E03.8 HYPOTHYROIDISM DUE TO HASHIMOTO'S THYROIDITIS: ICD-10-CM

## 2023-09-22 DIAGNOSIS — J30.89 CHRONIC NONSEASONAL ALLERGIC RHINITIS DUE TO POLLEN: ICD-10-CM

## 2023-09-22 DIAGNOSIS — E06.3 HYPOTHYROIDISM DUE TO HASHIMOTO'S THYROIDITIS: ICD-10-CM

## 2023-09-22 DIAGNOSIS — G25.81 RESTLESS LEG SYNDROME: ICD-10-CM

## 2023-09-22 DIAGNOSIS — K21.9 GASTROESOPHAGEAL REFLUX DISEASE WITHOUT ESOPHAGITIS: Primary | ICD-10-CM

## 2023-09-22 DIAGNOSIS — D64.9 ANEMIA, UNSPECIFIED TYPE: ICD-10-CM

## 2023-09-22 DIAGNOSIS — E80.4 GILBERT'S SYNDROME: ICD-10-CM

## 2023-09-22 DIAGNOSIS — R74.8 ELEVATED ALKALINE PHOSPHATASE LEVEL: ICD-10-CM

## 2023-09-22 DIAGNOSIS — N40.0 BENIGN PROSTATIC HYPERPLASIA WITHOUT LOWER URINARY TRACT SYMPTOMS: ICD-10-CM

## 2023-09-22 DIAGNOSIS — N17.9 ACUTE KIDNEY INJURY (HCC): ICD-10-CM

## 2023-09-22 DIAGNOSIS — F51.04 CHRONIC INSOMNIA: ICD-10-CM

## 2023-09-22 DIAGNOSIS — I10 ESSENTIAL (PRIMARY) HYPERTENSION: ICD-10-CM

## 2023-09-22 RX ORDER — LEVOTHYROXINE SODIUM 88 UG/1
88 TABLET ORAL DAILY
Qty: 90 TABLET | Refills: 3 | Status: SHIPPED | OUTPATIENT
Start: 2023-09-22

## 2023-09-22 NOTE — PATIENT INSTRUCTIONS
Return to lab in 6 weeks for fasting labs. We will recheck the kidney function and blood count          Consider the following vaccinations:  Influenza vaccine  The following vaccinations can be obtained at your local pharmacy or health department:  New COVID 19 vaccine  RSV vaccine.     RSV vaccine information:    http://[employer]/  https://www.fda.gov/news-events/press-announcements/fda-approves-first-respiratory-syncytial-virus-rsv-vaccine    Or go to www.cdc.gov and www.fda.gov and search \"RSV vaccine information\"    The vaccine can be obtained at your local pharmacy or health department

## 2023-10-19 ENCOUNTER — OFFICE VISIT (OUTPATIENT)
Dept: NEUROLOGY | Age: 78
End: 2023-10-19
Payer: MEDICARE

## 2023-10-19 VITALS
SYSTOLIC BLOOD PRESSURE: 138 MMHG | DIASTOLIC BLOOD PRESSURE: 75 MMHG | HEIGHT: 72 IN | BODY MASS INDEX: 27.63 KG/M2 | HEART RATE: 60 BPM | WEIGHT: 204 LBS

## 2023-10-19 DIAGNOSIS — R26.89 IMBALANCE: ICD-10-CM

## 2023-10-19 DIAGNOSIS — M79.671 PAIN IN BOTH FEET: ICD-10-CM

## 2023-10-19 DIAGNOSIS — G25.81 RESTLESS LEG SYNDROME: Primary | ICD-10-CM

## 2023-10-19 DIAGNOSIS — M79.672 PAIN IN BOTH FEET: ICD-10-CM

## 2023-10-19 DIAGNOSIS — S32.592S CLOSED FRACTURE OF RAMUS OF LEFT PUBIS, SEQUELA: ICD-10-CM

## 2023-10-19 PROCEDURE — 99214 OFFICE O/P EST MOD 30 MIN: CPT | Performed by: PSYCHIATRY & NEUROLOGY

## 2023-10-19 PROCEDURE — G8417 CALC BMI ABV UP PARAM F/U: HCPCS | Performed by: PSYCHIATRY & NEUROLOGY

## 2023-10-19 PROCEDURE — G8484 FLU IMMUNIZE NO ADMIN: HCPCS | Performed by: PSYCHIATRY & NEUROLOGY

## 2023-10-19 PROCEDURE — 3075F SYST BP GE 130 - 139MM HG: CPT | Performed by: PSYCHIATRY & NEUROLOGY

## 2023-10-19 PROCEDURE — 3078F DIAST BP <80 MM HG: CPT | Performed by: PSYCHIATRY & NEUROLOGY

## 2023-10-19 PROCEDURE — G8427 DOCREV CUR MEDS BY ELIG CLIN: HCPCS | Performed by: PSYCHIATRY & NEUROLOGY

## 2023-10-19 PROCEDURE — 1123F ACP DISCUSS/DSCN MKR DOCD: CPT | Performed by: PSYCHIATRY & NEUROLOGY

## 2023-10-19 PROCEDURE — 1036F TOBACCO NON-USER: CPT | Performed by: PSYCHIATRY & NEUROLOGY

## 2023-10-19 RX ORDER — DIAZEPAM 5 MG/1
5 TABLET ORAL EVERY 6 HOURS PRN
Qty: 360 TABLET | Refills: 1 | Status: SHIPPED | OUTPATIENT
Start: 2023-10-19 | End: 2024-04-16

## 2023-10-30 DIAGNOSIS — R74.8 ELEVATED ALKALINE PHOSPHATASE LEVEL: ICD-10-CM

## 2023-10-30 DIAGNOSIS — N17.9 ACUTE KIDNEY INJURY (HCC): ICD-10-CM

## 2023-10-30 DIAGNOSIS — D64.9 ANEMIA, UNSPECIFIED TYPE: ICD-10-CM

## 2023-10-30 DIAGNOSIS — E06.3 HYPOTHYROIDISM DUE TO HASHIMOTO'S THYROIDITIS: ICD-10-CM

## 2023-10-30 DIAGNOSIS — E03.8 HYPOTHYROIDISM DUE TO HASHIMOTO'S THYROIDITIS: ICD-10-CM

## 2023-10-30 LAB
ALBUMIN SERPL-MCNC: 4.3 G/DL (ref 3.5–5.2)
ALP SERPL-CCNC: 134 U/L (ref 40–130)
ALT SERPL-CCNC: 24 U/L (ref 5–41)
ANION GAP SERPL CALCULATED.3IONS-SCNC: 12 MMOL/L (ref 7–19)
AST SERPL-CCNC: 26 U/L (ref 5–40)
BASOPHILS # BLD: 0.1 K/UL (ref 0–0.2)
BASOPHILS NFR BLD: 0.9 % (ref 0–1)
BILIRUB SERPL-MCNC: 1 MG/DL (ref 0.2–1.2)
BILIRUB UR QL STRIP: NEGATIVE
BUN SERPL-MCNC: 10 MG/DL (ref 8–23)
CALCIUM SERPL-MCNC: 9.1 MG/DL (ref 8.8–10.2)
CHLORIDE SERPL-SCNC: 95 MMOL/L (ref 98–111)
CLARITY UR: CLEAR
CO2 SERPL-SCNC: 28 MMOL/L (ref 22–29)
COLOR UR: YELLOW
CREAT SERPL-MCNC: 1.3 MG/DL (ref 0.5–1.2)
EOSINOPHIL # BLD: 0.2 K/UL (ref 0–0.6)
EOSINOPHIL NFR BLD: 4.1 % (ref 0–5)
ERYTHROCYTE [DISTWIDTH] IN BLOOD BY AUTOMATED COUNT: 15.7 % (ref 11.5–14.5)
FERRITIN SERPL-MCNC: 24.1 NG/ML (ref 30–400)
FOLATE SERPL-MCNC: 7.4 NG/ML (ref 4.5–32.2)
GLUCOSE SERPL-MCNC: 93 MG/DL (ref 74–109)
GLUCOSE UR STRIP.AUTO-MCNC: NEGATIVE MG/DL
HCT VFR BLD AUTO: 42.6 % (ref 42–52)
HGB BLD-MCNC: 13.6 G/DL (ref 14–18)
HGB UR STRIP.AUTO-MCNC: NEGATIVE MG/L
IMM GRANULOCYTES # BLD: 0 K/UL
IRON SERPL-MCNC: 41 UG/DL (ref 59–158)
KETONES UR STRIP.AUTO-MCNC: NEGATIVE MG/DL
LEUKOCYTE ESTERASE UR QL STRIP.AUTO: NEGATIVE
LYMPHOCYTES # BLD: 1.1 K/UL (ref 1.1–4.5)
LYMPHOCYTES NFR BLD: 20.3 % (ref 20–40)
MCH RBC QN AUTO: 25.2 PG (ref 27–31)
MCHC RBC AUTO-ENTMCNC: 31.9 G/DL (ref 33–37)
MCV RBC AUTO: 79 FL (ref 80–94)
MONOCYTES # BLD: 0.5 K/UL (ref 0–0.9)
MONOCYTES NFR BLD: 9.8 % (ref 0–10)
NEUTROPHILS # BLD: 3.4 K/UL (ref 1.5–7.5)
NEUTS SEG NFR BLD: 64.3 % (ref 50–65)
NITRITE UR QL STRIP.AUTO: NEGATIVE
PH UR STRIP.AUTO: 8 [PH] (ref 5–8)
PHOSPHATE SERPL-MCNC: 3.2 MG/DL (ref 2.5–4.5)
PLATELET # BLD AUTO: 195 K/UL (ref 130–400)
PMV BLD AUTO: 9.9 FL (ref 9.4–12.4)
POTASSIUM SERPL-SCNC: 4.2 MMOL/L (ref 3.5–5)
PROT SERPL-MCNC: 6.7 G/DL (ref 6.6–8.7)
PROT UR STRIP.AUTO-MCNC: NEGATIVE MG/DL
PTH-INTACT SERPL-MCNC: 43.3 PG/ML (ref 15–65)
RBC # BLD AUTO: 5.39 M/UL (ref 4.7–6.1)
SODIUM SERPL-SCNC: 135 MMOL/L (ref 136–145)
SP GR UR STRIP.AUTO: 1.01 (ref 1–1.03)
T4 FREE SERPL-MCNC: 1.33 NG/DL (ref 0.93–1.7)
TIBC SERPL-MCNC: 410 UG/DL (ref 250–400)
TSH SERPL DL<=0.005 MIU/L-ACNC: 3.55 UIU/ML (ref 0.27–4.2)
URATE SERPL-MCNC: 6.5 MG/DL (ref 3.4–7)
UROBILINOGEN UR STRIP.AUTO-MCNC: 0.2 E.U./DL
VIT B12 SERPL-MCNC: 459 PG/ML (ref 211–946)
WBC # BLD AUTO: 5.3 K/UL (ref 4.8–10.8)

## 2023-10-31 LAB — TRANSFERRIN SERPL-MCNC: 325 MG/DL (ref 200–360)

## 2023-11-01 LAB
ALP BONE SERPL-CCNC: 44 U/L (ref 0–55)
ALP ISOS SERPL HS-CCNC: 0 U/L
ALP LIVER SERPL-CCNC: 86 U/L (ref 0–94)
ALP SERPL-CCNC: 130 U/L (ref 40–120)

## 2023-11-22 ENCOUNTER — HOSPITAL ENCOUNTER (OUTPATIENT)
Dept: MRI IMAGING | Age: 78
Discharge: HOME | End: 2023-11-22
Attending: INTERNAL MEDICINE
Payer: MEDICARE

## 2023-11-22 DIAGNOSIS — K86.2 PANCREATIC CYST: ICD-10-CM

## 2023-11-22 PROCEDURE — 6360000004 HC RX CONTRAST MEDICATION: Performed by: INTERNAL MEDICINE

## 2023-11-22 PROCEDURE — A9577 INJ MULTIHANCE: HCPCS | Performed by: INTERNAL MEDICINE

## 2023-11-22 PROCEDURE — 74183 MRI ABD W/O CNTR FLWD CNTR: CPT

## 2023-11-22 RX ADMIN — GADOBENATE DIMEGLUMINE 20 ML: 529 INJECTION, SOLUTION INTRAVENOUS at 10:15

## 2024-01-04 DIAGNOSIS — E03.8 HYPOTHYROIDISM DUE TO HASHIMOTO'S THYROIDITIS: ICD-10-CM

## 2024-01-04 DIAGNOSIS — E06.3 HYPOTHYROIDISM DUE TO HASHIMOTO'S THYROIDITIS: ICD-10-CM

## 2024-01-04 RX ORDER — LEVOTHYROXINE SODIUM 0.07 MG/1
75 TABLET ORAL DAILY
Qty: 200 TABLET | Refills: 4 | OUTPATIENT
Start: 2024-01-04

## 2024-02-28 ENCOUNTER — TELEPHONE (OUTPATIENT)
Dept: NEUROLOGY | Age: 79
End: 2024-02-28

## 2024-02-28 NOTE — TELEPHONE ENCOUNTER
Called the patient to reschedule their appointment for 04/19/24 due to the provider out of the office. The patient appointment is rescheduled to 05/24/24 at 8:15 with Dr Jordan. The patient clarified with understanding about their appointment date and time.

## 2024-03-18 DIAGNOSIS — E06.3 HYPOTHYROIDISM DUE TO HASHIMOTO'S THYROIDITIS: ICD-10-CM

## 2024-03-18 DIAGNOSIS — E03.8 HYPOTHYROIDISM DUE TO HASHIMOTO'S THYROIDITIS: Primary | ICD-10-CM

## 2024-03-18 DIAGNOSIS — I10 ESSENTIAL (PRIMARY) HYPERTENSION: ICD-10-CM

## 2024-03-18 DIAGNOSIS — E03.8 HYPOTHYROIDISM DUE TO HASHIMOTO'S THYROIDITIS: ICD-10-CM

## 2024-03-18 DIAGNOSIS — D64.9 ANEMIA, UNSPECIFIED TYPE: ICD-10-CM

## 2024-03-18 DIAGNOSIS — E06.3 HYPOTHYROIDISM DUE TO HASHIMOTO'S THYROIDITIS: Primary | ICD-10-CM

## 2024-03-18 DIAGNOSIS — Z13.220 LIPID SCREENING: ICD-10-CM

## 2024-03-18 LAB
ALBUMIN SERPL-MCNC: 4.6 G/DL (ref 3.5–5.2)
ALP SERPL-CCNC: 144 U/L (ref 40–130)
ALT SERPL-CCNC: 24 U/L (ref 5–41)
ANION GAP SERPL CALCULATED.3IONS-SCNC: 14 MMOL/L (ref 7–19)
AST SERPL-CCNC: 26 U/L (ref 5–40)
BASOPHILS # BLD: 0 K/UL (ref 0–0.2)
BASOPHILS NFR BLD: 0.5 % (ref 0–1)
BILIRUB SERPL-MCNC: 0.7 MG/DL (ref 0.2–1.2)
BUN SERPL-MCNC: 20 MG/DL (ref 8–23)
CALCIUM SERPL-MCNC: 9.8 MG/DL (ref 8.8–10.2)
CHLORIDE SERPL-SCNC: 98 MMOL/L (ref 98–111)
CHOLEST SERPL-MCNC: 145 MG/DL (ref 160–199)
CO2 SERPL-SCNC: 27 MMOL/L (ref 22–29)
CREAT SERPL-MCNC: 1.4 MG/DL (ref 0.5–1.2)
EOSINOPHIL # BLD: 0.1 K/UL (ref 0–0.6)
EOSINOPHIL NFR BLD: 1.8 % (ref 0–5)
ERYTHROCYTE [DISTWIDTH] IN BLOOD BY AUTOMATED COUNT: 16.1 % (ref 11.5–14.5)
FERRITIN SERPL-MCNC: 21.8 NG/ML (ref 30–400)
FOLATE SERPL-MCNC: >20 NG/ML (ref 4.5–32.2)
GLUCOSE SERPL-MCNC: 92 MG/DL (ref 74–109)
HCT VFR BLD AUTO: 44.5 % (ref 42–52)
HDLC SERPL-MCNC: 48 MG/DL (ref 55–121)
HGB BLD-MCNC: 13.8 G/DL (ref 14–18)
IMM GRANULOCYTES # BLD: 0 K/UL
IRON SERPL-MCNC: 64 UG/DL (ref 59–158)
LDLC SERPL CALC-MCNC: 51 MG/DL
LYMPHOCYTES # BLD: 1.3 K/UL (ref 1.1–4.5)
LYMPHOCYTES NFR BLD: 17.1 % (ref 20–40)
MCH RBC QN AUTO: 24.5 PG (ref 27–31)
MCHC RBC AUTO-ENTMCNC: 31 G/DL (ref 33–37)
MCV RBC AUTO: 78.9 FL (ref 80–94)
MONOCYTES # BLD: 0.6 K/UL (ref 0–0.9)
MONOCYTES NFR BLD: 8 % (ref 0–10)
NEUTROPHILS # BLD: 5.3 K/UL (ref 1.5–7.5)
NEUTS SEG NFR BLD: 72.2 % (ref 50–65)
PLATELET # BLD AUTO: 185 K/UL (ref 130–400)
PMV BLD AUTO: 9.9 FL (ref 9.4–12.4)
POTASSIUM SERPL-SCNC: 5.3 MMOL/L (ref 3.5–5)
PROT SERPL-MCNC: 7.2 G/DL (ref 6.6–8.7)
RBC # BLD AUTO: 5.64 M/UL (ref 4.7–6.1)
SODIUM SERPL-SCNC: 139 MMOL/L (ref 136–145)
T4 FREE SERPL-MCNC: 1.3 NG/DL (ref 0.93–1.7)
TRIGL SERPL-MCNC: 231 MG/DL (ref 0–149)
TSH SERPL DL<=0.005 MIU/L-ACNC: 2.01 UIU/ML (ref 0.27–4.2)
VIT B12 SERPL-MCNC: 507 PG/ML (ref 211–946)
WBC # BLD AUTO: 7.4 K/UL (ref 4.8–10.8)

## 2024-03-22 ENCOUNTER — OFFICE VISIT (OUTPATIENT)
Dept: PRIMARY CARE CLINIC | Age: 79
End: 2024-03-22

## 2024-03-22 VITALS
HEIGHT: 72 IN | WEIGHT: 204 LBS | HEART RATE: 61 BPM | DIASTOLIC BLOOD PRESSURE: 74 MMHG | TEMPERATURE: 98.2 F | SYSTOLIC BLOOD PRESSURE: 124 MMHG | BODY MASS INDEX: 27.63 KG/M2 | OXYGEN SATURATION: 97 %

## 2024-03-22 DIAGNOSIS — M15.9 GENERALIZED OSTEOARTHRITIS OF MULTIPLE SITES: ICD-10-CM

## 2024-03-22 DIAGNOSIS — K21.9 GASTROESOPHAGEAL REFLUX DISEASE WITHOUT ESOPHAGITIS: ICD-10-CM

## 2024-03-22 DIAGNOSIS — N40.0 BENIGN PROSTATIC HYPERPLASIA WITHOUT LOWER URINARY TRACT SYMPTOMS: ICD-10-CM

## 2024-03-22 DIAGNOSIS — D69.6 THROMBOCYTOPENIA, UNSPECIFIED (HCC): ICD-10-CM

## 2024-03-22 DIAGNOSIS — E03.9 PRIMARY HYPOTHYROIDISM: ICD-10-CM

## 2024-03-22 DIAGNOSIS — D50.9 IRON DEFICIENCY ANEMIA, UNSPECIFIED IRON DEFICIENCY ANEMIA TYPE: ICD-10-CM

## 2024-03-22 DIAGNOSIS — K62.5 RECTAL BLEEDING: ICD-10-CM

## 2024-03-22 DIAGNOSIS — I10 ESSENTIAL (PRIMARY) HYPERTENSION: Primary | ICD-10-CM

## 2024-03-22 DIAGNOSIS — J30.89 CHRONIC NONSEASONAL ALLERGIC RHINITIS DUE TO POLLEN: ICD-10-CM

## 2024-03-22 DIAGNOSIS — K58.1 IRRITABLE BOWEL SYNDROME WITH CONSTIPATION: ICD-10-CM

## 2024-03-22 DIAGNOSIS — N18.31 STAGE 3A CHRONIC KIDNEY DISEASE (HCC): ICD-10-CM

## 2024-03-22 DIAGNOSIS — G25.81 RESTLESS LEG SYNDROME: ICD-10-CM

## 2024-03-22 DIAGNOSIS — F51.01 PRIMARY INSOMNIA: ICD-10-CM

## 2024-03-22 DIAGNOSIS — F41.8 DEPRESSION WITH ANXIETY: ICD-10-CM

## 2024-03-22 PROBLEM — I72.9 PSEUDOANEURYSM (HCC): Status: RESOLVED | Noted: 2022-02-03 | Resolved: 2024-03-22

## 2024-03-22 ASSESSMENT — PATIENT HEALTH QUESTIONNAIRE - PHQ9
SUM OF ALL RESPONSES TO PHQ9 QUESTIONS 1 & 2: 0
SUM OF ALL RESPONSES TO PHQ QUESTIONS 1-9: 5
SUM OF ALL RESPONSES TO PHQ QUESTIONS 1-9: 5
6. FEELING BAD ABOUT YOURSELF - OR THAT YOU ARE A FAILURE OR HAVE LET YOURSELF OR YOUR FAMILY DOWN: NOT AT ALL
3. TROUBLE FALLING OR STAYING ASLEEP: NEARLY EVERY DAY
8. MOVING OR SPEAKING SO SLOWLY THAT OTHER PEOPLE COULD HAVE NOTICED. OR THE OPPOSITE, BEING SO FIGETY OR RESTLESS THAT YOU HAVE BEEN MOVING AROUND A LOT MORE THAN USUAL: NOT AT ALL
2. FEELING DOWN, DEPRESSED OR HOPELESS: NOT AT ALL
4. FEELING TIRED OR HAVING LITTLE ENERGY: MORE THAN HALF THE DAYS
SUM OF ALL RESPONSES TO PHQ QUESTIONS 1-9: 5
10. IF YOU CHECKED OFF ANY PROBLEMS, HOW DIFFICULT HAVE THESE PROBLEMS MADE IT FOR YOU TO DO YOUR WORK, TAKE CARE OF THINGS AT HOME, OR GET ALONG WITH OTHER PEOPLE: NOT DIFFICULT AT ALL
9. THOUGHTS THAT YOU WOULD BE BETTER OFF DEAD, OR OF HURTING YOURSELF: NOT AT ALL
7. TROUBLE CONCENTRATING ON THINGS, SUCH AS READING THE NEWSPAPER OR WATCHING TELEVISION: NOT AT ALL
5. POOR APPETITE OR OVEREATING: NOT AT ALL
1. LITTLE INTEREST OR PLEASURE IN DOING THINGS: NOT AT ALL
SUM OF ALL RESPONSES TO PHQ QUESTIONS 1-9: 5

## 2024-03-22 NOTE — PROGRESS NOTES
03/11/2021    Dr Castellon-HP gastric polyp, esophastritis    UPPER GASTROINTESTINAL ENDOSCOPY  04/18/2016    Dr Castellon-Esophagitis,  HH, Zline variabel at the [facility], no [facility]    UPPER GASTROINTESTINAL ENDOSCOPY  06/09/2006    Dr Elder-Benign gastric polyp      Family History   Problem Relation Age of Onset    Dementia Mother     Breast Cancer Mother     Heart Disease Father     Colon Cancer Father     Dementia Father     High Blood Pressure Father     Heart Attack Father     Prostate Cancer Father     Esophageal Cancer Neg Hx     Liver Cancer Neg Hx     Liver Disease Neg Hx     Rectal Cancer Neg Hx     Stomach Cancer Neg Hx     Social History     Tobacco Use    Smoking status: Never    Smokeless tobacco: Never   Substance Use Topics    Alcohol use: No     Alcohol/week: 0.0 standard drinks of alcohol        _______________________________________________________________    Note dictated using Dragon Dictation software  Sometimes this dictation software makes erroneous transcriptions.

## 2024-04-08 NOTE — PROGRESS NOTES
Subjective    Mr. Tyler is 78 y.o. male    Chief Complaint: BPH    History of Present Illness  78-year-old male new patient watch my ED webinar and comes in today for discussion regarding enlarged prostate and ED treatment.  He has taken alfuzosin for a long time for enlarged prostate.  He had TURP approximately 30 years ago by Dr. Wayne.  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  He reports sense of incomplete emptying with occasional urge incontinence.  Decision was made to proceed with cystoscopy today in office to rule out stricture, rule out bladder stone, evaluate bladder outlet.  He previously followed with Kishore Avery at [facility].    The following portions of the patient's history were reviewed and updated as appropriate: allergies, current medications, past family history, past medical history, past social history, past surgical history and problem list.    Review of Systems      Current Outpatient Medications:     alfuzosin (UROXATRAL) 10 MG 24 hr tablet, Take 1 tablet by mouth Daily., Disp: , Rfl:     Bisoprolol-hydroCHLOROthiazide (ZIAC PO), Take  by mouth., Disp: , Rfl:     citalopram (CeleXA) 20 MG tablet, Take 1 tablet by mouth Daily., Disp: , Rfl:     diazePAM (VALIUM) 5 MG tablet, Take 1 tablet by mouth Every 6 (Six) Hours As Needed., Disp: , Rfl:     hyoscyamine (ANASPAZ,LEVSIN) 0.125 MG tablet, Take 1 tablet by mouth Every 4 (Four) Hours As Needed., Disp: , Rfl:     levocetirizine (XYZAL) 5 MG tablet, Take 1 tablet by mouth Every Night., Disp: , Rfl:     lisinopril-hydrochlorothiazide (PRINZIDE,ZESTORETIC) 20-12.5 MG per tablet, Take 1 tablet by mouth Daily., Disp: , Rfl:     Magnesium Hydroxide (MILK OF MAGNESIA PO), Take  by mouth., Disp: , Rfl:     Meloxicam (MOBIC PO), Take  by mouth., Disp: , Rfl:     omeprazole (priLOSEC) 20 MG capsule, Take 1 capsule by mouth Daily., Disp: , Rfl:     rOPINIRole HCl (REQUIP PO), Take  by mouth.,  "Disp: , Rfl:     traZODone (DESYREL) 50 MG tablet, Take 1 tablet by mouth Daily., Disp: , Rfl:     finasteride (PROSCAR) 5 MG tablet, Take 1 tablet by mouth Daily., Disp: 90 tablet, Rfl: 3    Psyllium (METAMUCIL PO), Take  by mouth As Needed. (Patient not taking: Reported on 4/10/2024), Disp: , Rfl:     Past Medical History:   Diagnosis Date    Anxiety     BPH (benign prostatic hyperplasia)     Colon polyp     Diverticulosis     Essential hypertension     GERD (gastroesophageal reflux disease)     Hypertriglyceridemia     Idiopathic peripheral neuropathy     Osteoarthritis        Past Surgical History:   Procedure Laterality Date    COLONOSCOPY  04/18/2016    diverticulosis, internal hemorrhoids,     COLONOSCOPY  03/30/2011    internal hemorrhoid, diverticulosis    COLONOSCOPY N/A 3/11/2021    Procedure: COLONOSCOPY WITH ANESTHESIA;  Surgeon: Ricky House MD;  Location:  [facility];  Service: Gastroenterology;  Laterality: N/A;  pre: hx polyps  post: diverticulosis. hemorrhoid.   Keenan Perez MD    ENDOSCOPY N/A 3/11/2021    Procedure: ESOPHAGOGASTRODUODENOSCOPY WITH ANESTHESIA;  Surgeon: Ricky House MD;  Location:  [facility];  Service: Gastroenterology;  Laterality: N/A;  pre: GERD  post: hiatal hernia. gastric polyps.  Keenan Perez MD    HERNIA REPAIR      inguinal    TURP / TRANSURETHRAL INCISION / DRAINAGE PROSTATE         Social History     Socioeconomic History    Marital status:    Tobacco Use    Smoking status: Never    Smokeless tobacco: Never   Vaping Use    Vaping status: Never Used   Substance and Sexual Activity    Alcohol use: Not Currently    Drug use: Not Currently    Sexual activity: Defer       Family History   Problem Relation Age of Onset    Colon cancer Neg Hx        Objective    Temp 97.8 °F (36.6 °C)   Ht 182.9 cm (72\")   Wt 93.3 kg (205 lb 9.6 oz)   BMI 27.88 kg/m²     Physical Exam    Pre- operative diagnosis:  Lower urinary tract " symptoms    Post operative diagnosis:  Mild urinary retention.  Mild prostatic regrowth after previous TURP    Procedure:  The patient was prepped and draped in a normal sterile fashion.  The urethra was anesthetized with 2% lidocaine jelly.  A flexible cystoscope was introduced per urethra.      Urethra:  Normal    Bladder:  Normal mucosa, No bladder tumor, moderate trabeculation, and No bladder neck contracture    Ureteral orifices:  Normal position bilaterally and Clear efflux bilaterally    Prostate:  lateral lobe hypertrophy-mild prostatic adenoma regrowth, barely kissing lateral lobes    Patient tolerated the procedure well    Complications: none    Blood loss: minimal    Uroflow after cystoscopy today with Q-José 19.7 mL/s, Q average 9.9, voided volume 575 cc, PVR by bladder scan 231 cc.          Results for orders placed or performed in visit on 04/10/24   POC Urinalysis Dipstick, Multipro    Specimen: Urine   Result Value Ref Range    Color Yellow Yellow, Straw, Dark Yellow, Phyllis    Clarity, UA Clear Clear    Glucose, UA Negative Negative mg/dL    Bilirubin Negative Negative    Ketones, UA Negative Negative    Specific Gravity  1.010 1.005 - 1.030    Blood, UA Trace (A) Negative    pH, Urine 6.0 5.0 - 8.0    Protein, POC Negative Negative mg/dL    Urobilinogen, UA Normal Normal, 0.2 E.U./dL    Nitrite, UA Negative Negative    Leukocytes Negative Negative     IPSS Questionnaire (AUA-7):  Incomplete emptying  Over the past month, how often have you had a sensation of not emptying your bladder completely after you finished urinating?: Less than half the time (04/10/24 1433)  Frequency  Over the past month, how often have you had to urinate again less than two hours after you finishied urinating ?: About half the time (04/10/24 1433)  Intermittency  Over the past month, how often have you found you stopped and started again several time when you urinated ?: About half the time (04/10/24 1433)  Urgency  Over  the last month, how often have you found it difficult  have you found it difficult to postpone urination ?: More than half the time (04/10/24 1433)  Weak Stream  Over the past month, how often have you had a weak urinary stream ?: Less than half the time (04/10/24 1433)  Straining  Over the past month, how often have you had to push or strain to begin urination ?: Not at all (04/10/24 1433)  Nocturia  Over the past month, how many times did you most typically get up to urinate from the time you went to bed until the time you got up in the morning ?: 3 times (04/10/24 1433)  Quality of life due to urinary symptoms  If you were to spend the rest of your life with your urinary condition the way it is now, how would feel about that?: Mostly dissatisfied (04/10/24 1433)    Scores  Total IPSS Score: (!) 17 (04/10/24 1433)  Total Score = Symptomatic Level: Moderately symptomatic: 8-19 (04/10/24 1433)        Bladder Scan interpretation  Estimation of residual urine via abdominal ultrasound  Residual Urine: 231 ml  Indication: BPH  Position: Supine  Examination: Incremental scanning of the suprapubic area using 3 MHz transducer using copious amounts of acoustic gel.   Findings: An anechoic area was demonstrated which represented the bladder, with measurement of residual urine as noted. I inspected this myself. In that the residual urine was stable or insignificant, no treatment will be necessary at this time.     Assessment and Plan    Diagnoses and all orders for this visit:    1. Benign prostatic hyperplasia with incomplete bladder emptying (Primary)  -     POC Urinalysis Dipstick, Multipro  -     finasteride (PROSCAR) 5 MG tablet; Take 1 tablet by mouth Daily.  Dispense: 90 tablet; Refill: 3    2. Erectile dysfunction due to arterial insufficiency      Mild prostatic regrowth after previous TURP many years ago but with acceptable uroflow parameters not consistent with recurrent bladder outlet obstruction at this time.  I  recommended adding finasteride to alfuzosin for combination therapy.  We also discussed the need for timed double voiding to prevent urinary retention and/or future myogenic detrusor failure.  He will follow-up with Kishore Avery in 3 months for PVR recheck.  He does not want any treatment for his ED at this time.      This discussion was significant and separately identifiable from the cystoscopy today.

## 2024-04-10 ENCOUNTER — OFFICE VISIT (OUTPATIENT)
Dept: UROLOGY | Facility: CLINIC | Age: 79
End: 2024-04-10
Payer: MEDICARE

## 2024-04-10 ENCOUNTER — HOSPITAL ENCOUNTER (OUTPATIENT)
Dept: INFUSION THERAPY | Age: 79
Discharge: HOME | End: 2024-04-10
Payer: MEDICARE

## 2024-04-10 ENCOUNTER — OFFICE VISIT (OUTPATIENT)
Dept: HEMATOLOGY | Age: 79
End: 2024-04-10
Payer: MEDICARE

## 2024-04-10 ENCOUNTER — PATIENT ROUNDING (BHMG ONLY) (OUTPATIENT)
Dept: UROLOGY | Facility: CLINIC | Age: 79
End: 2024-04-10
Payer: MEDICARE

## 2024-04-10 VITALS — HEIGHT: 72 IN | BODY MASS INDEX: 27.85 KG/M2 | WEIGHT: 205.6 LBS | TEMPERATURE: 97.8 F

## 2024-04-10 VITALS
HEART RATE: 97 BPM | BODY MASS INDEX: 27.5 KG/M2 | WEIGHT: 203 LBS | OXYGEN SATURATION: 97 % | DIASTOLIC BLOOD PRESSURE: 76 MMHG | HEIGHT: 72 IN | TEMPERATURE: 98.7 F | SYSTOLIC BLOOD PRESSURE: 138 MMHG

## 2024-04-10 DIAGNOSIS — Z71.89 CARE PLAN DISCUSSED WITH PATIENT: ICD-10-CM

## 2024-04-10 DIAGNOSIS — D50.9 IRON DEFICIENCY ANEMIA, UNSPECIFIED IRON DEFICIENCY ANEMIA TYPE: Primary | ICD-10-CM

## 2024-04-10 DIAGNOSIS — N40.1 BENIGN PROSTATIC HYPERPLASIA WITH INCOMPLETE BLADDER EMPTYING: Primary | ICD-10-CM

## 2024-04-10 DIAGNOSIS — D50.8 OTHER IRON DEFICIENCY ANEMIA: ICD-10-CM

## 2024-04-10 DIAGNOSIS — N52.01 ERECTILE DYSFUNCTION DUE TO ARTERIAL INSUFFICIENCY: ICD-10-CM

## 2024-04-10 DIAGNOSIS — K58.1 IRRITABLE BOWEL SYNDROME WITH CONSTIPATION: ICD-10-CM

## 2024-04-10 DIAGNOSIS — R39.14 BENIGN PROSTATIC HYPERPLASIA WITH INCOMPLETE BLADDER EMPTYING: Primary | ICD-10-CM

## 2024-04-10 DIAGNOSIS — K90.49 MALABSORPTION DUE TO INTOLERANCE, NOT ELSEWHERE CLASSIFIED: ICD-10-CM

## 2024-04-10 DIAGNOSIS — D50.8 OTHER IRON DEFICIENCY ANEMIA: Primary | ICD-10-CM

## 2024-04-10 DIAGNOSIS — R53.82 CHRONIC FATIGUE: ICD-10-CM

## 2024-04-10 LAB
BILIRUB BLD-MCNC: NEGATIVE MG/DL
CLARITY, POC: CLEAR
COLOR UR: YELLOW
GLUCOSE UR STRIP-MCNC: NEGATIVE MG/DL
KETONES UR QL: NEGATIVE
LEUKOCYTE EST, POC: NEGATIVE
NITRITE UR-MCNC: NEGATIVE MG/ML
PH UR: 6 [PH] (ref 5–8)
PROT UR STRIP-MCNC: NEGATIVE MG/DL
RBC # UR STRIP: ABNORMAL /UL
SP GR UR: 1.01 (ref 1–1.03)
UROBILINOGEN UR QL: NORMAL

## 2024-04-10 PROCEDURE — 1123F ACP DISCUSS/DSCN MKR DOCD: CPT | Performed by: NURSE PRACTITIONER

## 2024-04-10 PROCEDURE — 99212 OFFICE O/P EST SF 10 MIN: CPT

## 2024-04-10 PROCEDURE — G8427 DOCREV CUR MEDS BY ELIG CLIN: HCPCS | Performed by: NURSE PRACTITIONER

## 2024-04-10 PROCEDURE — 3078F DIAST BP <80 MM HG: CPT | Performed by: NURSE PRACTITIONER

## 2024-04-10 PROCEDURE — G8417 CALC BMI ABV UP PARAM F/U: HCPCS | Performed by: NURSE PRACTITIONER

## 2024-04-10 PROCEDURE — 3075F SYST BP GE 130 - 139MM HG: CPT | Performed by: NURSE PRACTITIONER

## 2024-04-10 PROCEDURE — 99204 OFFICE O/P NEW MOD 45 MIN: CPT | Performed by: NURSE PRACTITIONER

## 2024-04-10 PROCEDURE — 1036F TOBACCO NON-USER: CPT | Performed by: NURSE PRACTITIONER

## 2024-04-10 RX ORDER — TRAZODONE HYDROCHLORIDE 50 MG/1
50 TABLET ORAL DAILY
COMMUNITY

## 2024-04-10 RX ORDER — HYOSCYAMINE SULFATE 0.125 MG
0.12 TABLET ORAL EVERY 4 HOURS PRN
COMMUNITY

## 2024-04-10 RX ORDER — DIAZEPAM 5 MG/1
1 TABLET ORAL EVERY 6 HOURS PRN
COMMUNITY
Start: 2023-10-19

## 2024-04-10 RX ORDER — FINASTERIDE 5 MG/1
5 TABLET, FILM COATED ORAL DAILY
Qty: 90 TABLET | Refills: 3 | Status: SHIPPED | OUTPATIENT
Start: 2024-04-10

## 2024-04-10 RX ORDER — LEVOCETIRIZINE DIHYDROCHLORIDE 5 MG/1
1 TABLET, FILM COATED ORAL NIGHTLY
COMMUNITY

## 2024-04-10 RX ORDER — ALFUZOSIN HYDROCHLORIDE 10 MG/1
10 TABLET, EXTENDED RELEASE ORAL DAILY
COMMUNITY

## 2024-04-10 RX ORDER — CITALOPRAM 20 MG/1
20 TABLET ORAL DAILY
COMMUNITY

## 2024-04-10 ASSESSMENT — PROMIS GLOBAL HEALTH SCALE
IN GENERAL, HOW WOULD YOU RATE YOUR PHYSICAL HEALTH [ON A SCALE OF 1 (POOR) TO 5 (EXCELLENT)]?: EXCELLENT
SUM OF RESPONSES TO QUESTIONS 3, 6, 7, & 8: 13
IN THE PAST 7 DAYS, HOW OFTEN HAVE YOU BEEN BOTHERED BY EMOTIONAL PROBLEMS, SUCH AS FEELING ANXIOUS, DEPRESSED, OR IRRITABLE [ON A SCALE FROM 1 (NEVER) TO 5 (ALWAYS)]?: NEVER
SUM OF RESPONSES TO QUESTIONS 2, 4, 5, & 10: 19
IN THE PAST 7 DAYS, HOW WOULD YOU RATE YOUR PAIN ON AVERAGE [ON A SCALE FROM 0 (NO PAIN) TO 10 (WORST IMAGINABLE PAIN)]?: 2
IN GENERAL, WOULD YOU SAY YOUR QUALITY OF LIFE IS...[ON A SCALE OF 1 (POOR) TO 5 (EXCELLENT)]: EXCELLENT
TO WHAT EXTENT ARE YOU ABLE TO CARRY OUT YOUR EVERYDAY PHYSICAL ACTIVITIES SUCH AS WALKING, CLIMBING STAIRS, CARRYING GROCERIES, OR MOVING A CHAIR [ON A SCALE OF 1 (NOT AT ALL) TO 5 (COMPLETELY)]?: MODERATELY
IN GENERAL, HOW WOULD YOU RATE YOUR MENTAL HEALTH, INCLUDING YOUR MOOD AND YOUR ABILITY TO THINK [ON A SCALE OF 1 (POOR) TO 5 (EXCELLENT)]?: VERY GOOD
IN GENERAL, PLEASE RATE HOW WELL YOU CARRY OUT YOUR USUAL SOCIAL ACTIVITIES (INCLUDES ACTIVITIES AT HOME, AT WORK, AND IN YOUR COMMUNITY, AND RESPONSIBILITIES AS A PARENT, CHILD, SPOUSE, EMPLOYEE, FRIEND, ETC) [ON A SCALE OF 1 (POOR) TO 5 (EXCELLENT)]?: EXCELLENT
IN GENERAL, WOULD YOU SAY YOUR HEALTH IS...[ON A SCALE OF 1 (POOR) TO 5 (EXCELLENT)]: EXCELLENT
IN THE PAST 7 DAYS, HOW WOULD YOU RATE YOUR FATIGUE ON AVERAGE [ON A SCALE FROM 1 (NONE) TO 5 (VERY SEVERE)]?: MODERATE
IN GENERAL, HOW WOULD YOU RATE YOUR SATISFACTION WITH YOUR SOCIAL ACTIVITIES AND RELATIONSHIPS [ON A SCALE OF 1 (POOR) TO 5 (EXCELLENT)]?: EXCELLENT

## 2024-04-10 ASSESSMENT — ENCOUNTER SYMPTOMS
EYE ITCHING: 0
VOMITING: 0
CONSTIPATION: 1
ABDOMINAL DISTENTION: 1
NAUSEA: 0
TROUBLE SWALLOWING: 0
COUGH: 0
SHORTNESS OF BREATH: 0
SORE THROAT: 0
DIARRHEA: 1
ABDOMINAL PAIN: 1
WHEEZING: 0

## 2024-04-10 NOTE — LETTER
April 11, 2024     Keenan Perez MD  [address] [place] [address] [address]  [place] [address]    Patient: Agueda Tyler   YOB: 1945   Date of Visit: 4/10/2024     Dear Dr. Ana MD:    Thank you for referring Agueda Tyler to me for evaluation. Below are the relevant portions of my assessment and plan of care.    If you have questions, please do not hesitate to call me. I look forward to following Agueda along with you.         Sincerely,        Celso Clark MD        CC: No Recipients      Progress Notes:  Subjective    Mr. Tyler is 78 y.o. male    Chief Complaint: BPH    History of Present Illness  78-year-old male new patient watch my ED webinar and comes in today for discussion regarding enlarged prostate and ED treatment.  He has taken alfuzosin for a long time for enlarged prostate.  He had TURP approximately 30 years ago by Dr. Wayne.  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  He reports sense of incomplete emptying with occasional urge incontinence.  Decision was made to proceed with cystoscopy today in office to rule out stricture, rule out bladder stone, evaluate bladder outlet.  He previously followed with Kishore Avery at [facility].    The following portions of the patient's history were reviewed and updated as appropriate: allergies, current medications, past family history, past medical history, past social history, past surgical history and problem list.    Review of Systems      Current Outpatient Medications:   •  alfuzosin (UROXATRAL) 10 MG 24 hr tablet, Take 1 tablet by mouth Daily., Disp: , Rfl:   •  Bisoprolol-hydroCHLOROthiazide (ZIAC PO), Take  by mouth., Disp: , Rfl:   •  citalopram (CeleXA) 20 MG tablet, Take 1 tablet by mouth Daily., Disp: , Rfl:   •  diazePAM (VALIUM) 5 MG tablet, Take 1 tablet by mouth Every 6 (Six) Hours As Needed., Disp: , Rfl:   •  hyoscyamine (ANASPAZ,LEVSIN) 0.125 MG tablet, Take 1 tablet by  mouth Every 4 (Four) Hours As Needed., Disp: , Rfl:   •  levocetirizine (XYZAL) 5 MG tablet, Take 1 tablet by mouth Every Night., Disp: , Rfl:   •  lisinopril-hydrochlorothiazide (PRINZIDE,ZESTORETIC) 20-12.5 MG per tablet, Take 1 tablet by mouth Daily., Disp: , Rfl:   •  Magnesium Hydroxide (MILK OF MAGNESIA PO), Take  by mouth., Disp: , Rfl:   •  Meloxicam (MOBIC PO), Take  by mouth., Disp: , Rfl:   •  omeprazole (priLOSEC) 20 MG capsule, Take 1 capsule by mouth Daily., Disp: , Rfl:   •  rOPINIRole HCl (REQUIP PO), Take  by mouth., Disp: , Rfl:   •  traZODone (DESYREL) 50 MG tablet, Take 1 tablet by mouth Daily., Disp: , Rfl:   •  finasteride (PROSCAR) 5 MG tablet, Take 1 tablet by mouth Daily., Disp: 90 tablet, Rfl: 3  •  Psyllium (METAMUCIL PO), Take  by mouth As Needed. (Patient not taking: Reported on 4/10/2024), Disp: , Rfl:     Past Medical History:   Diagnosis Date   • Anxiety    • BPH (benign prostatic hyperplasia)    • Colon polyp    • Diverticulosis    • Essential hypertension    • GERD (gastroesophageal reflux disease)    • Hypertriglyceridemia    • Idiopathic peripheral neuropathy    • Osteoarthritis        Past Surgical History:   Procedure Laterality Date   • COLONOSCOPY  04/18/2016    diverticulosis, internal hemorrhoids,    • COLONOSCOPY  03/30/2011    internal hemorrhoid, diverticulosis   • COLONOSCOPY N/A 3/11/2021    Procedure: COLONOSCOPY WITH ANESTHESIA;  Surgeon: Ricky House MD;  Location: [facility];  Service: Gastroenterology;  Laterality: N/A;  pre: hx polyps  post: diverticulosis. hemorrhoid.   Keenan Perez MD   • ENDOSCOPY N/A 3/11/2021    Procedure: ESOPHAGOGASTRODUODENOSCOPY WITH ANESTHESIA;  Surgeon: Ricky House MD;  Location: [facility];  Service: Gastroenterology;  Laterality: N/A;  pre: GERD  post: hiatal hernia. gastric polyps.  Keenan Perez MD   • HERNIA REPAIR      inguinal   • TURP / TRANSURETHRAL INCISION / DRAINAGE PROSTATE    "      Social History     Socioeconomic History   • Marital status:    Tobacco Use   • Smoking status: Never   • Smokeless tobacco: Never   Vaping Use   • Vaping status: Never Used   Substance and Sexual Activity   • Alcohol use: Not Currently   • Drug use: Not Currently   • Sexual activity: Defer       Family History   Problem Relation Age of Onset   • Colon cancer Neg Hx        Objective    Temp 97.8 °F (36.6 °C)   Ht 182.9 cm (72\")   Wt 93.3 kg (205 lb 9.6 oz)   BMI 27.88 kg/m²     Physical Exam    Pre- operative diagnosis:  Lower urinary tract symptoms    Post operative diagnosis:  Mild urinary retention.  Mild prostatic regrowth after previous TURP    Procedure:  The patient was prepped and draped in a normal sterile fashion.  The urethra was anesthetized with 2% lidocaine jelly.  A flexible cystoscope was introduced per urethra.      Urethra:  Normal    Bladder:  Normal mucosa, No bladder tumor, moderate trabeculation, and No bladder neck contracture    Ureteral orifices:  Normal position bilaterally and Clear efflux bilaterally    Prostate:  lateral lobe hypertrophy-mild prostatic adenoma regrowth, barely kissing lateral lobes    Patient tolerated the procedure well    Complications: none    Blood loss: minimal    Uroflow after cystoscopy today with Q-José 19.7 mL/s, Q average 9.9, voided volume 575 cc, PVR by bladder scan 231 cc.          Results for orders placed or performed in visit on 04/10/24   POC Urinalysis Dipstick, Multipro    Specimen: Urine   Result Value Ref Range    Color Yellow Yellow, Straw, Dark Yellow, Phyllis    Clarity, UA Clear Clear    Glucose, UA Negative Negative mg/dL    Bilirubin Negative Negative    Ketones, UA Negative Negative    Specific Gravity  1.010 1.005 - 1.030    Blood, UA Trace (A) Negative    pH, Urine 6.0 5.0 - 8.0    Protein, POC Negative Negative mg/dL    Urobilinogen, UA Normal Normal, 0.2 E.U./dL    Nitrite, UA Negative Negative    Leukocytes Negative Negative "     IPSS Questionnaire (AUA-7):  Incomplete emptying  Over the past month, how often have you had a sensation of not emptying your bladder completely after you finished urinating?: Less than half the time (04/10/24 1433)  Frequency  Over the past month, how often have you had to urinate again less than two hours after you finishied urinating ?: About half the time (04/10/24 1433)  Intermittency  Over the past month, how often have you found you stopped and started again several time when you urinated ?: About half the time (04/10/24 1433)  Urgency  Over the last month, how often have you found it difficult  have you found it difficult to postpone urination ?: More than half the time (04/10/24 1433)  Weak Stream  Over the past month, how often have you had a weak urinary stream ?: Less than half the time (04/10/24 1433)  Straining  Over the past month, how often have you had to push or strain to begin urination ?: Not at all (04/10/24 1433)  Nocturia  Over the past month, how many times did you most typically get up to urinate from the time you went to bed until the time you got up in the morning ?: 3 times (04/10/24 1433)  Quality of life due to urinary symptoms  If you were to spend the rest of your life with your urinary condition the way it is now, how would feel about that?: Mostly dissatisfied (04/10/24 1433)    Scores  Total IPSS Score: (!) 17 (04/10/24 1433)  Total Score = Symptomatic Level: Moderately symptomatic: 8-19 (04/10/24 1433)        Bladder Scan interpretation  Estimation of residual urine via abdominal ultrasound  Residual Urine: 231 ml  Indication: BPH  Position: Supine  Examination: Incremental scanning of the suprapubic area using 3 MHz transducer using copious amounts of acoustic gel.   Findings: An anechoic area was demonstrated which represented the bladder, with measurement of residual urine as noted. I inspected this myself. In that the residual urine was stable or insignificant, no  treatment will be necessary at this time.     Assessment and Plan    Diagnoses and all orders for this visit:    1. Benign prostatic hyperplasia with incomplete bladder emptying (Primary)  -     POC Urinalysis Dipstick, Multipro  -     finasteride (PROSCAR) 5 MG tablet; Take 1 tablet by mouth Daily.  Dispense: 90 tablet; Refill: 3    2. Erectile dysfunction due to arterial insufficiency      Mild prostatic regrowth after previous TURP many years ago but with acceptable uroflow parameters not consistent with recurrent bladder outlet obstruction at this time.  I recommended adding finasteride to alfuzosin for combination therapy.  We also discussed the need for timed double voiding to prevent urinary retention and/or future myogenic detrusor failure.  He will follow-up with Kishore Avery in 3 months for PVR recheck.  He does not want any treatment for his ED at this time.      This discussion was significant and separately identifiable from the cystoscopy today.              Pt. Of Dr. Clark here for Uroflow testing as part of pre operative requirements for surgery.  Pt. Instructed to void into Uroflow measuring device. Test completed. Pt. Bladder scanned to obtain a post void residual of 231 ml. Pt. Tolerated procedure well. Uroflow results scanned to chart. Dr. Clark  was present in office at the time of procedure. MARCI Hackett RN        I have read and agree with the medical assistance documentation above

## 2024-04-10 NOTE — PROGRESS NOTES
April 10, 2024    Hello, may I speak with Agueda Tyler?    My name is Carisa      I am  with [facility]  [address], [address]  [facility] [address] [phone].    Before we get started may I verify your date of birth? 1945    I am calling to officially welcome you to our practice and ask about your recent visit. Is this a good time to talk? yes    Tell me about your visit with us. What things went well?  Yes, you all have been great       We're always looking for ways to make our patients' experiences even better. Do you have recommendations on ways we may improve?  no    Overall were you satisfied with your first visit to our practice? yes       I appreciate you taking the time to speak with me today. Is there anything else I can do for you? no      Thank you, and have a great day.

## 2024-04-10 NOTE — PROGRESS NOTES
Pt. Of Dr. Clark here for Uroflow testing as part of pre operative requirements for surgery.  Pt. Instructed to void into Uroflow measuring device. Test completed. Pt. Bladder scanned to obtain a post void residual of 231 ml. Pt. Tolerated procedure well. Uroflow results scanned to chart. Dr. Clark  was present in office at the time of procedure. MARCI Hackett RN        I have read and agree with the medical assistance documentation above

## 2024-04-11 DIAGNOSIS — D50.8 OTHER IRON DEFICIENCY ANEMIA: Primary | ICD-10-CM

## 2024-04-11 DIAGNOSIS — K90.9 IRON MALABSORPTION: ICD-10-CM

## 2024-04-11 PROBLEM — D50.9 IRON DEFICIENCY ANEMIA, UNSPECIFIED: Status: ACTIVE | Noted: 2024-04-11

## 2024-04-11 RX ORDER — ALBUTEROL SULFATE 90 UG/1
4 AEROSOL, METERED RESPIRATORY (INHALATION) PRN
OUTPATIENT
Start: 2024-04-18

## 2024-04-11 RX ORDER — SODIUM CHLORIDE 9 MG/ML
5-250 INJECTION, SOLUTION INTRAVENOUS PRN
Status: CANCELLED | OUTPATIENT
Start: 2024-04-18

## 2024-04-11 RX ORDER — ACETAMINOPHEN 325 MG/1
650 TABLET ORAL
OUTPATIENT
Start: 2024-04-18

## 2024-04-11 RX ORDER — SODIUM CHLORIDE 9 MG/ML
INJECTION, SOLUTION INTRAVENOUS CONTINUOUS
OUTPATIENT
Start: 2024-04-18

## 2024-04-11 RX ORDER — DIPHENHYDRAMINE HYDROCHLORIDE 50 MG/ML
50 INJECTION INTRAMUSCULAR; INTRAVENOUS
OUTPATIENT
Start: 2024-04-18

## 2024-04-11 RX ORDER — EPINEPHRINE 1 MG/ML
0.3 INJECTION, SOLUTION, CONCENTRATE INTRAVENOUS PRN
OUTPATIENT
Start: 2024-04-18

## 2024-04-11 RX ORDER — SODIUM CHLORIDE 0.9 % (FLUSH) 0.9 %
5-40 SYRINGE (ML) INJECTION PRN
Status: CANCELLED | OUTPATIENT
Start: 2024-04-18

## 2024-04-11 RX ORDER — ONDANSETRON 2 MG/ML
8 INJECTION INTRAMUSCULAR; INTRAVENOUS
OUTPATIENT
Start: 2024-04-18

## 2024-04-11 RX ORDER — FAMOTIDINE 10 MG/ML
20 INJECTION, SOLUTION INTRAVENOUS
OUTPATIENT
Start: 2024-04-18

## 2024-04-12 ENCOUNTER — HOSPITAL ENCOUNTER (OUTPATIENT)
Dept: INFUSION THERAPY | Age: 79
Setting detail: INFUSION SERIES
Discharge: HOME | End: 2024-04-12
Payer: MEDICARE

## 2024-04-12 ENCOUNTER — CLINICAL DOCUMENTATION (OUTPATIENT)
Facility: HOSPITAL | Age: 79
End: 2024-04-12

## 2024-04-12 VITALS
TEMPERATURE: 98 F | SYSTOLIC BLOOD PRESSURE: 136 MMHG | RESPIRATION RATE: 18 BRPM | HEART RATE: 61 BPM | OXYGEN SATURATION: 96 % | DIASTOLIC BLOOD PRESSURE: 82 MMHG

## 2024-04-12 DIAGNOSIS — D50.8 OTHER IRON DEFICIENCY ANEMIA: ICD-10-CM

## 2024-04-12 DIAGNOSIS — K90.9 IRON MALABSORPTION: Primary | ICD-10-CM

## 2024-04-12 PROCEDURE — 96366 THER/PROPH/DIAG IV INF ADDON: CPT

## 2024-04-12 PROCEDURE — 2580000003 HC RX 258: Performed by: NURSE PRACTITIONER

## 2024-04-12 PROCEDURE — 96365 THER/PROPH/DIAG IV INF INIT: CPT

## 2024-04-12 PROCEDURE — 6360000002 HC RX W HCPCS: Performed by: NURSE PRACTITIONER

## 2024-04-12 RX ORDER — SODIUM CHLORIDE 0.9 % (FLUSH) 0.9 %
5-40 SYRINGE (ML) INJECTION PRN
Status: CANCELLED | OUTPATIENT
Start: 2024-04-26

## 2024-04-12 RX ORDER — ONDANSETRON 2 MG/ML
8 INJECTION INTRAMUSCULAR; INTRAVENOUS
Status: CANCELLED | OUTPATIENT
Start: 2024-04-26

## 2024-04-12 RX ORDER — ALBUTEROL SULFATE 90 UG/1
4 AEROSOL, METERED RESPIRATORY (INHALATION) PRN
Status: CANCELLED | OUTPATIENT
Start: 2024-04-26

## 2024-04-12 RX ORDER — SODIUM CHLORIDE 9 MG/ML
5-250 INJECTION, SOLUTION INTRAVENOUS PRN
Status: CANCELLED | OUTPATIENT
Start: 2024-04-26

## 2024-04-12 RX ORDER — SODIUM CHLORIDE 9 MG/ML
INJECTION, SOLUTION INTRAVENOUS CONTINUOUS
Status: CANCELLED | OUTPATIENT
Start: 2024-04-26

## 2024-04-12 RX ORDER — EPINEPHRINE 1 MG/ML
0.3 INJECTION, SOLUTION, CONCENTRATE INTRAVENOUS PRN
Status: CANCELLED | OUTPATIENT
Start: 2024-04-26

## 2024-04-12 RX ORDER — ACETAMINOPHEN 325 MG/1
650 TABLET ORAL
Status: CANCELLED | OUTPATIENT
Start: 2024-04-26

## 2024-04-12 RX ORDER — SODIUM CHLORIDE 9 MG/ML
5-250 INJECTION, SOLUTION INTRAVENOUS PRN
Status: DISCONTINUED | OUTPATIENT
Start: 2024-04-12 | End: 2024-04-13 | Stop reason: HOSPADM

## 2024-04-12 RX ORDER — SODIUM CHLORIDE 0.9 % (FLUSH) 0.9 %
5-40 SYRINGE (ML) INJECTION PRN
Status: DISCONTINUED | OUTPATIENT
Start: 2024-04-12 | End: 2024-04-13 | Stop reason: HOSPADM

## 2024-04-12 RX ORDER — DIPHENHYDRAMINE HYDROCHLORIDE 50 MG/ML
50 INJECTION INTRAMUSCULAR; INTRAVENOUS
Status: CANCELLED | OUTPATIENT
Start: 2024-04-26

## 2024-04-12 RX ADMIN — IRON SUCROSE 300 MG: 20 INJECTION, SOLUTION INTRAVENOUS at 13:33

## 2024-04-12 RX ADMIN — SODIUM CHLORIDE, PRESERVATIVE FREE 10 ML: 5 INJECTION INTRAVENOUS at 15:46

## 2024-04-12 RX ADMIN — SODIUM CHLORIDE 20 ML/HR: 9 INJECTION, SOLUTION INTRAVENOUS at 13:32

## 2024-04-12 NOTE — PROGRESS NOTES
Patient Assistance                   Additional notes: Reviewed patients chart and no assistance is needed.  Patient has Medicare A & B as well as [employer] Medicare supplement.

## 2024-04-12 NOTE — PROGRESS NOTES
Venofer 300 mg administered as ordered.  Pt was monitored for 30 minutes post infusion.  Tolerated well.  Brisk blood return was noted from IV prior to removal. Discharge and appointments reviewed with pt.  Pt states understanding of all instructions.

## 2024-04-12 NOTE — DISCHARGE INSTRUCTIONS
iron sucrose (injection)  Pronunciation:  EYE urn YUDELKA doss  Brand:  Venofer  What is the most important information I should know about iron sucrose?  Follow all directions on your medicine label and package. Tell each of your healthcare providers about all your medical conditions, allergies, and all medicines you use.  What is iron sucrose?  Iron sucrose is used to treat iron deficiency anemia in people with kidney disease.  Iron sucrose is for use in adults and children at least 2 years old.  Iron sucrose is not for treating other forms of anemia not caused by iron deficiency.   Iron sucrose may also be used for purposes not listed in this medication guide.  What should I discuss with my healthcare provider before I receive iron sucrose?  You should not be treated with this medicine if you have ever had an allergic reaction to an iron injection.  Tell your doctor if you have ever had:  hemochromatosis or iron overload (the buildup of excess iron).  Tell your doctor if you are pregnant or plan to become pregnant. Iron sucrose can harm an unborn baby if you have a severe reaction to this medicine during your second or third trimester. However, not treating iron deficiency anemia during pregnancy may cause complications such as premature birth or low birth weight. The benefit of treating your condition during pregnancy may outweigh any risks.  If you are breastfeeding, tell your doctor if you notice diarrhea or constipation in the nursing baby.  How is iron sucrose given?  Iron sucrose is given as an infusion into a vein. A healthcare provider will give you this injection.  This medicine is sometimes given slowly, and the infusion can take up to 2.5 hours to complete.  Tell your caregivers if you feel any burning, pain, or swelling around the IV needle when iron sucrose is injected.  You will be watched closely for at least 30 minutes to make sure you do not have an allergic reaction.  You will need frequent

## 2024-04-16 ENCOUNTER — HOSPITAL ENCOUNTER (OUTPATIENT)
Dept: INFUSION THERAPY | Age: 79
Setting detail: INFUSION SERIES
Discharge: HOME | End: 2024-04-16
Payer: MEDICARE

## 2024-04-16 VITALS
TEMPERATURE: 97.6 F | OXYGEN SATURATION: 96 % | RESPIRATION RATE: 18 BRPM | DIASTOLIC BLOOD PRESSURE: 57 MMHG | HEART RATE: 71 BPM | SYSTOLIC BLOOD PRESSURE: 125 MMHG

## 2024-04-16 DIAGNOSIS — D50.8 OTHER IRON DEFICIENCY ANEMIA: ICD-10-CM

## 2024-04-16 DIAGNOSIS — K90.9 IRON MALABSORPTION: Primary | ICD-10-CM

## 2024-04-16 PROCEDURE — 2580000003 HC RX 258: Performed by: NURSE PRACTITIONER

## 2024-04-16 PROCEDURE — 96366 THER/PROPH/DIAG IV INF ADDON: CPT

## 2024-04-16 PROCEDURE — 6360000002 HC RX W HCPCS: Performed by: NURSE PRACTITIONER

## 2024-04-16 PROCEDURE — 96365 THER/PROPH/DIAG IV INF INIT: CPT

## 2024-04-16 RX ORDER — ONDANSETRON 2 MG/ML
8 INJECTION INTRAMUSCULAR; INTRAVENOUS
Status: CANCELLED | OUTPATIENT
Start: 2024-04-23

## 2024-04-16 RX ORDER — EPINEPHRINE 1 MG/ML
0.3 INJECTION, SOLUTION, CONCENTRATE INTRAVENOUS PRN
Status: CANCELLED | OUTPATIENT
Start: 2024-04-23

## 2024-04-16 RX ORDER — ACETAMINOPHEN 325 MG/1
650 TABLET ORAL
Status: CANCELLED | OUTPATIENT
Start: 2024-04-23

## 2024-04-16 RX ORDER — SODIUM CHLORIDE 9 MG/ML
5-250 INJECTION, SOLUTION INTRAVENOUS PRN
Status: DISCONTINUED | OUTPATIENT
Start: 2024-04-16 | End: 2024-04-17 | Stop reason: HOSPADM

## 2024-04-16 RX ORDER — SODIUM CHLORIDE 9 MG/ML
5-250 INJECTION, SOLUTION INTRAVENOUS PRN
Status: CANCELLED | OUTPATIENT
Start: 2024-04-23

## 2024-04-16 RX ORDER — ALBUTEROL SULFATE 90 UG/1
4 AEROSOL, METERED RESPIRATORY (INHALATION) PRN
Status: CANCELLED | OUTPATIENT
Start: 2024-04-23

## 2024-04-16 RX ORDER — SODIUM CHLORIDE 0.9 % (FLUSH) 0.9 %
5-40 SYRINGE (ML) INJECTION PRN
Status: DISCONTINUED | OUTPATIENT
Start: 2024-04-16 | End: 2024-04-17 | Stop reason: HOSPADM

## 2024-04-16 RX ORDER — SODIUM CHLORIDE 9 MG/ML
INJECTION, SOLUTION INTRAVENOUS CONTINUOUS
Status: CANCELLED | OUTPATIENT
Start: 2024-04-23

## 2024-04-16 RX ORDER — SODIUM CHLORIDE 0.9 % (FLUSH) 0.9 %
5-40 SYRINGE (ML) INJECTION PRN
Status: CANCELLED | OUTPATIENT
Start: 2024-04-23

## 2024-04-16 RX ORDER — DIPHENHYDRAMINE HYDROCHLORIDE 50 MG/ML
50 INJECTION INTRAMUSCULAR; INTRAVENOUS
Status: CANCELLED | OUTPATIENT
Start: 2024-04-23

## 2024-04-16 RX ADMIN — SODIUM CHLORIDE 300 MG: 9 INJECTION, SOLUTION INTRAVENOUS at 13:07

## 2024-04-16 RX ADMIN — SODIUM CHLORIDE 20 ML/HR: 9 INJECTION, SOLUTION INTRAVENOUS at 13:08

## 2024-04-16 NOTE — PROGRESS NOTES
Venofer 300mg administered. Pt monitored for 30 minutes post infusion. Pt tolerated well.     Electronically signed by Bella Danielle RN on 4/16/2024 at 3:15 PM

## 2024-04-18 ENCOUNTER — HOSPITAL ENCOUNTER (OUTPATIENT)
Dept: INFUSION THERAPY | Age: 79
Setting detail: INFUSION SERIES
Discharge: HOME | End: 2024-04-18
Payer: MEDICARE

## 2024-04-18 VITALS
HEART RATE: 69 BPM | RESPIRATION RATE: 18 BRPM | OXYGEN SATURATION: 99 % | SYSTOLIC BLOOD PRESSURE: 147 MMHG | DIASTOLIC BLOOD PRESSURE: 74 MMHG | TEMPERATURE: 97.1 F

## 2024-04-18 DIAGNOSIS — K90.9 IRON MALABSORPTION: Primary | ICD-10-CM

## 2024-04-18 DIAGNOSIS — D50.8 OTHER IRON DEFICIENCY ANEMIA: ICD-10-CM

## 2024-04-18 PROCEDURE — 2580000003 HC RX 258: Performed by: NURSE PRACTITIONER

## 2024-04-18 PROCEDURE — 96366 THER/PROPH/DIAG IV INF ADDON: CPT

## 2024-04-18 PROCEDURE — 6360000002 HC RX W HCPCS: Performed by: NURSE PRACTITIONER

## 2024-04-18 PROCEDURE — 96365 THER/PROPH/DIAG IV INF INIT: CPT

## 2024-04-18 RX ORDER — ALBUTEROL SULFATE 90 UG/1
4 AEROSOL, METERED RESPIRATORY (INHALATION) PRN
Status: CANCELLED | OUTPATIENT
Start: 2024-05-10

## 2024-04-18 RX ORDER — SODIUM CHLORIDE 9 MG/ML
5-250 INJECTION, SOLUTION INTRAVENOUS PRN
Status: DISCONTINUED | OUTPATIENT
Start: 2024-04-18 | End: 2024-04-19 | Stop reason: HOSPADM

## 2024-04-18 RX ORDER — SODIUM CHLORIDE 9 MG/ML
5-250 INJECTION, SOLUTION INTRAVENOUS PRN
Status: CANCELLED | OUTPATIENT
Start: 2024-05-10

## 2024-04-18 RX ORDER — SODIUM CHLORIDE 0.9 % (FLUSH) 0.9 %
5-40 SYRINGE (ML) INJECTION PRN
Status: CANCELLED | OUTPATIENT
Start: 2024-05-10

## 2024-04-18 RX ORDER — SODIUM CHLORIDE 0.9 % (FLUSH) 0.9 %
5-40 SYRINGE (ML) INJECTION PRN
Status: DISCONTINUED | OUTPATIENT
Start: 2024-04-18 | End: 2024-04-19 | Stop reason: HOSPADM

## 2024-04-18 RX ORDER — DIPHENHYDRAMINE HYDROCHLORIDE 50 MG/ML
50 INJECTION INTRAMUSCULAR; INTRAVENOUS
Status: CANCELLED | OUTPATIENT
Start: 2024-05-10

## 2024-04-18 RX ORDER — ONDANSETRON 2 MG/ML
8 INJECTION INTRAMUSCULAR; INTRAVENOUS
Status: CANCELLED | OUTPATIENT
Start: 2024-05-10

## 2024-04-18 RX ORDER — ACETAMINOPHEN 325 MG/1
650 TABLET ORAL
Status: CANCELLED | OUTPATIENT
Start: 2024-05-10

## 2024-04-18 RX ORDER — SODIUM CHLORIDE 9 MG/ML
INJECTION, SOLUTION INTRAVENOUS CONTINUOUS
Status: CANCELLED | OUTPATIENT
Start: 2024-05-10

## 2024-04-18 RX ORDER — EPINEPHRINE 1 MG/ML
0.3 INJECTION, SOLUTION, CONCENTRATE INTRAVENOUS PRN
Status: CANCELLED | OUTPATIENT
Start: 2024-05-10

## 2024-04-18 RX ADMIN — SODIUM CHLORIDE 400 MG: 9 INJECTION, SOLUTION INTRAVENOUS at 14:53

## 2024-04-18 RX ADMIN — SODIUM CHLORIDE 20 ML/HR: 9 INJECTION, SOLUTION INTRAVENOUS at 14:52

## 2024-04-18 NOTE — PROGRESS NOTES
Venofer 400mg administered. Pt tolerated well.     Electronically signed by Bella Danielle RN on 4/18/2024 at 5:36 PM

## 2024-05-01 ENCOUNTER — OFFICE VISIT (OUTPATIENT)
Dept: GASTROENTEROLOGY | Facility: CLINIC | Age: 79
End: 2024-05-01
Payer: MEDICARE

## 2024-05-01 VITALS
WEIGHT: 208 LBS | HEART RATE: 48 BPM | SYSTOLIC BLOOD PRESSURE: 142 MMHG | HEIGHT: 72 IN | DIASTOLIC BLOOD PRESSURE: 76 MMHG | OXYGEN SATURATION: 95 % | TEMPERATURE: 97.5 F | BODY MASS INDEX: 28.17 KG/M2

## 2024-05-01 DIAGNOSIS — Z86.010 HX OF COLONIC POLYP: ICD-10-CM

## 2024-05-01 DIAGNOSIS — D50.9 IRON DEFICIENCY ANEMIA, UNSPECIFIED IRON DEFICIENCY ANEMIA TYPE: Primary | ICD-10-CM

## 2024-05-01 DIAGNOSIS — K62.5 BRBPR (BRIGHT RED BLOOD PER RECTUM): ICD-10-CM

## 2024-05-01 PROCEDURE — 1160F RVW MEDS BY RX/DR IN RCRD: CPT | Performed by: NURSE PRACTITIONER

## 2024-05-01 PROCEDURE — 99204 OFFICE O/P NEW MOD 45 MIN: CPT | Performed by: NURSE PRACTITIONER

## 2024-05-01 PROCEDURE — 1159F MED LIST DOCD IN RCRD: CPT | Performed by: NURSE PRACTITIONER

## 2024-05-01 RX ORDER — HYDROCHLOROTHIAZIDE 12.5 MG/1
12.5 TABLET ORAL 2 TIMES DAILY
COMMUNITY

## 2024-05-01 RX ORDER — DOCUSATE CALCIUM 240 MG
240 CAPSULE ORAL 2 TIMES DAILY
COMMUNITY

## 2024-05-01 RX ORDER — LISINOPRIL 20 MG/1
20 TABLET ORAL 2 TIMES DAILY
COMMUNITY

## 2024-05-01 RX ORDER — POLYETHYLENE GLYCOL 3350, SODIUM SULFATE ANHYDROUS, SODIUM BICARBONATE, SODIUM CHLORIDE, POTASSIUM CHLORIDE 236; 22.74; 6.74; 5.86; 2.97 G/4L; G/4L; G/4L; G/4L; G/4L
4 POWDER, FOR SOLUTION ORAL ONCE
Qty: 4000 ML | Refills: 0 | Status: SHIPPED | OUTPATIENT
Start: 2024-05-01 | End: 2024-05-01

## 2024-05-01 NOTE — PROGRESS NOTES
[facility] GASTROENTEROLOGY - OFFICE NOTE    5/1/2024    Agueda Tyler   1945    Primary Physician: Keenan Perez MD    No chief complaint on file.  brbpr      HISTORY OF PRESENT ILLNESS:     Agueda Tyler is a 78 y.o. male presents  with iron deficiency anemia and rectal bleeding.       Iron deficiency anemia   This is new x 1 year. This is mild.  He has been seen by [facility] hematology recently. Iron infusions started.       Rectal bleeding   Intermittent for years. Bright red blood per rectum. Small amount. No rectal pain. No weight loss. No abdominal pain. No black stool.       He has chronic constipation. He has been told had extremely long colon by GI doc several years ago. Takes milk of magnesia one dose daily with bm daily. Fiber supplement does not help. Miralax in the past does not help. Drinks a lot of water.         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     3-28-24 hgb 13.8, mcv 78, creat 1.4.  Iron has been low as well.             UPPER GI ENDOSCOPY (03/11/2021 09:37) no recall.   Tissue Pathology Exam (03/11/2021 09:44) benign.       COLONOSCOPY (03/11/2021 09:37) recall 5 years.   Has history of colon polyps.   No family history of colon cancer/polyps.     Past Medical History:   Diagnosis Date    Anxiety     BPH (benign prostatic hyperplasia)     Colon polyp     Diverticulosis     Essential hypertension     GERD (gastroesophageal reflux disease)     Hypertriglyceridemia     Idiopathic peripheral neuropathy     Osteoarthritis        Past Surgical History:   Procedure Laterality Date    COLONOSCOPY  04/18/2016    diverticulosis, internal hemorrhoids,     COLONOSCOPY  03/30/2011    internal hemorrhoid, diverticulosis    COLONOSCOPY N/A 3/11/2021    Procedure: COLONOSCOPY WITH ANESTHESIA;  Surgeon: Ricky House MD;  Location: [facility] ENDOSCOPY;  Service: Gastroenterology;  Laterality: N/A;  pre: hx polyps  post: diverticulosis. hemorrhoid.   Keenan Perez MD     ENDOSCOPY N/A 3/11/2021    Procedure: ESOPHAGOGASTRODUODENOSCOPY WITH ANESTHESIA;  Surgeon: Ricky House MD;  Location: [facility] ENDOSCOPY;  Service: Gastroenterology;  Laterality: N/A;  pre: GERD  post: hiatal hernia. gastric polyps.  Keenan Perez MD    HERNIA REPAIR      inguinal    TURP / TRANSURETHRAL INCISION / DRAINAGE PROSTATE         Outpatient Medications Marked as Taking for the 5/1/24 encounter (Office Visit) with Ling Lee APRN   Medication Sig Dispense Refill    alfuzosin (UROXATRAL) 10 MG 24 hr tablet Take 1 tablet by mouth Daily.      Bisoprolol-hydroCHLOROthiazide (ZIAC PO) Take  by mouth.      citalopram (CeleXA) 20 MG tablet Take 1 tablet by mouth Daily.      diazePAM (VALIUM) 5 MG tablet Take 1 tablet by mouth Every 6 (Six) Hours As Needed.      docusate calcium (SURFAK) 240 MG capsule Take 1 capsule by mouth 2 (Two) Times a Day.      hydroCHLOROthiazide 12.5 MG tablet Take 1 tablet by mouth 2 (Two) Times a Day.      hyoscyamine (ANASPAZ,LEVSIN) 0.125 MG tablet Take 1 tablet by mouth Every 4 (Four) Hours As Needed.      levocetirizine (XYZAL) 5 MG tablet Take 1 tablet by mouth As Needed.      lisinopril (PRINIVIL,ZESTRIL) 20 MG tablet Take 1 tablet by mouth 2 (Two) Times a Day.      Magnesium Hydroxide (MILK OF MAGNESIA PO) Take  by mouth.      Meloxicam (MOBIC PO) Take  by mouth.      omeprazole (priLOSEC) 20 MG capsule Take 1 capsule by mouth Daily.      rOPINIRole HCl (REQUIP PO) Take  by mouth.      traZODone (DESYREL) 50 MG tablet Take 1 tablet by mouth Daily.         Allergies   Allergen Reactions    Sulfa Antibiotics Rash       Social History     Socioeconomic History    Marital status:    Tobacco Use    Smoking status: Never    Smokeless tobacco: Never   Vaping Use    Vaping status: Never Used   Substance and Sexual Activity    Alcohol use: Not Currently    Drug use: Not Currently    Sexual activity: Defer       Family History   Problem Relation Age of Onset     "Colon cancer Neg Hx        Review of Systems   Constitutional:  Positive for fatigue. Negative for chills, fever and unexpected weight change.   Respiratory:  Negative for shortness of breath.    Cardiovascular:  Negative for chest pain.   Gastrointestinal:  Negative for abdominal distention, abdominal pain, diarrhea, nausea and vomiting.        Vitals:    05/01/24 1002   BP: 142/76   Pulse: (!) 48   Temp: 97.5 °F (36.4 °C)   SpO2: 95%   Weight: 94.3 kg (208 lb)   Height: 182.9 cm (72\")      Body mass index is 28.21 kg/m².    Physical Exam  Vitals reviewed.   Constitutional:       General: He is not in acute distress.  Cardiovascular:      Rate and Rhythm: Normal rate and regular rhythm.      Heart sounds: Normal heart sounds.   Pulmonary:      Effort: Pulmonary effort is normal.      Breath sounds: Normal breath sounds.   Abdominal:      General: Bowel sounds are normal. There is no distension.      Palpations: Abdomen is soft.      Tenderness: There is no abdominal tenderness.   Skin:     General: Skin is warm and dry.   Neurological:      Mental Status: He is alert.         Results for orders placed or performed in visit on 04/10/24   POC Urinalysis Dipstick, Multipro    Specimen: Urine   Result Value Ref Range    Color Yellow Yellow, Straw, Dark Yellow, Phyllis    Clarity, UA Clear Clear    Glucose, UA Negative Negative mg/dL    Bilirubin Negative Negative    Ketones, UA Negative Negative    Specific Gravity  1.010 1.005 - 1.030    Blood, UA Trace (A) Negative    pH, Urine 6.0 5.0 - 8.0    Protein, POC Negative Negative mg/dL    Urobilinogen, UA Normal Normal, 0.2 E.U./dL    Nitrite, UA Negative Negative    Leukocytes Negative Negative           ASSESSMENT AND PLAN    Assessment & Plan     Diagnoses and all orders for this visit:    1. Iron deficiency anemia, unspecified iron deficiency anemia type (Primary)  -     Cancel: Case Request; Standing  -     Cancel: Case Request  -     Case Request; Standing  -     Case " Request    2. BRBPR (bright red blood per rectum)  -     Cancel: Case Request; Standing  -     Cancel: Case Request  -     Case Request; Standing  -     Case Request    3. Hx of colonic polyp  -     Cancel: Case Request; Standing  -     Cancel: Case Request  -     Case Request; Standing  -     Case Request    Other orders  -     polyethylene glycol (Golytely) 236 g solution; Take 4,000 mL by mouth 1 (One) Time for 1 dose. Take as directed per instruction sheet.  Dispense: 4000 mL; Refill: 0  -     Cancel: Implement Anesthesia Orders Day of Procedure; Standing  -     Cancel: Obtain Informed Consent; Standing  -     Implement Anesthesia Orders Day of Procedure; Standing  -     Obtain Informed Consent; Standing      In regards to iron deficiency anemia and bright red blood per rectum, differential diagnoses discussed.   I recommend repeat colonoscopy and he is agreeable. He also wants to have egd done at the same time.             ESOPHAGOGASTRODUODENOSCOPY WITH ANESTHESIA (N/A), COLONOSCOPY WITH ANESTHESIA (N/A)  All risks, benefits, alternatives, and indications of colonoscopy procedure have been discussed with the patient. Risks to include perforation of the colon requiring possible surgery or colostomy, risk of bleeding from biopsies or removal of colon tissue, possibility of missing a colon polyp or cancer, or adverse drug reaction.  Benefits to include the diagnosis and management of disease of the colon and rectum. Alternatives to include barium enema, radiographic evaluation, lab testing or no intervention. Pt verbalizes understanding and agrees.        Risk, benefits, and alternatives of endoscopy were explained in full.  They understand that there is a risk of bleeding, perforation, and infection.  The risk of perforation goes up with esophageal dilation.  Other options to evaluate UGI complaints could involve barium swallow or UGI series, but these would be diagnostic tests only.  Patient was given time to  ask questions.  I answered them to their satisfaction and they are agreeable to proceeding    No follow-ups on file.            There are no Patient Instructions on file for this visit.      Ling Lee, APRN

## 2024-05-12 ENCOUNTER — APPOINTMENT (OUTPATIENT)
Dept: GENERAL RADIOLOGY | Facility: HOSPITAL | Age: 79
End: 2024-05-12
Payer: MEDICARE

## 2024-05-12 ENCOUNTER — APPOINTMENT (OUTPATIENT)
Dept: CT IMAGING | Facility: HOSPITAL | Age: 79
End: 2024-05-12
Payer: MEDICARE

## 2024-05-12 ENCOUNTER — APPOINTMENT (OUTPATIENT)
Dept: MRI IMAGING | Facility: HOSPITAL | Age: 79
End: 2024-05-12
Payer: MEDICARE

## 2024-05-12 ENCOUNTER — HOSPITAL ENCOUNTER (INPATIENT)
Facility: HOSPITAL | Age: 79
LOS: 3 days | Discharge: NURSING, REHAB OR LONG-TERM CARE | End: 2024-05-15
Attending: EMERGENCY MEDICINE | Admitting: INTERNAL MEDICINE
Payer: MEDICARE

## 2024-05-12 DIAGNOSIS — I63.9 CEREBROVASCULAR ACCIDENT (CVA), UNSPECIFIED MECHANISM: Primary | ICD-10-CM

## 2024-05-12 DIAGNOSIS — R47.89 MOTOR SPEECH DISORDER: ICD-10-CM

## 2024-05-12 DIAGNOSIS — Z74.09 IMPAIRED MOBILITY: ICD-10-CM

## 2024-05-12 DIAGNOSIS — R13.10 DYSPHAGIA, UNSPECIFIED TYPE: ICD-10-CM

## 2024-05-12 PROBLEM — R29.90 STROKE-LIKE SYMPTOM: Status: ACTIVE | Noted: 2024-05-12

## 2024-05-12 PROBLEM — J18.1 RIGHT UPPER LOBE CONSOLIDATION: Status: ACTIVE | Noted: 2024-05-12

## 2024-05-12 PROBLEM — I10 ESSENTIAL HYPERTENSION: Status: ACTIVE | Noted: 2024-05-12

## 2024-05-12 LAB
ABO GROUP BLD: NORMAL
ALBUMIN SERPL-MCNC: 4.7 G/DL (ref 3.5–5.2)
ALBUMIN/GLOB SERPL: 1.7 G/DL
ALP SERPL-CCNC: 128 U/L (ref 39–117)
ALT SERPL W P-5'-P-CCNC: 27 U/L (ref 1–41)
ANION GAP SERPL CALCULATED.3IONS-SCNC: 10 MMOL/L (ref 5–15)
APTT PPP: 28.8 SECONDS (ref 24.5–36)
AST SERPL-CCNC: 30 U/L (ref 1–40)
BASOPHILS # BLD AUTO: 0.05 10*3/MM3 (ref 0–0.2)
BASOPHILS NFR BLD AUTO: 0.9 % (ref 0–1.5)
BILIRUB SERPL-MCNC: 1.3 MG/DL (ref 0–1.2)
BLD GP AB SCN SERPL QL: NEGATIVE
BUN SERPL-MCNC: 14 MG/DL (ref 8–23)
BUN/CREAT SERPL: 11.6 (ref 7–25)
CALCIUM SPEC-SCNC: 9.6 MG/DL (ref 8.6–10.5)
CHLORIDE SERPL-SCNC: 99 MMOL/L (ref 98–107)
CO2 SERPL-SCNC: 26 MMOL/L (ref 22–29)
CREAT SERPL-MCNC: 1.21 MG/DL (ref 0.76–1.27)
DEPRECATED RDW RBC AUTO: 53.2 FL (ref 37–54)
DEVELOPER EXPIRATION DATE: NORMAL
DEVELOPER LOT NUMBER: 261
EGFRCR SERPLBLD CKD-EPI 2021: 60.9 ML/MIN/1.73
EOSINOPHIL # BLD AUTO: 0.15 10*3/MM3 (ref 0–0.4)
EOSINOPHIL NFR BLD AUTO: 2.6 % (ref 0.3–6.2)
ERYTHROCYTE [DISTWIDTH] IN BLOOD BY AUTOMATED COUNT: 18.9 % (ref 12.3–15.4)
EXPIRATION DATE: NORMAL
FECAL OCCULT BLOOD SCREEN, POC: NEGATIVE
GLOBULIN UR ELPH-MCNC: 2.7 GM/DL
GLUCOSE BLDC GLUCOMTR-MCNC: 89 MG/DL (ref 70–130)
GLUCOSE BLDC GLUCOMTR-MCNC: 98 MG/DL (ref 70–130)
GLUCOSE SERPL-MCNC: 99 MG/DL (ref 65–99)
HCT VFR BLD AUTO: 46.4 % (ref 37.5–51)
HGB BLD-MCNC: 15.5 G/DL (ref 13–17.7)
HOLD SPECIMEN: NORMAL
HOLD SPECIMEN: NORMAL
IMM GRANULOCYTES # BLD AUTO: 0.02 10*3/MM3 (ref 0–0.05)
IMM GRANULOCYTES NFR BLD AUTO: 0.3 % (ref 0–0.5)
INR PPP: 0.94 (ref 0.91–1.09)
LYMPHOCYTES # BLD AUTO: 1.25 10*3/MM3 (ref 0.7–3.1)
LYMPHOCYTES NFR BLD AUTO: 21.3 % (ref 19.6–45.3)
Lab: 261
MCH RBC QN AUTO: 27 PG (ref 26.6–33)
MCHC RBC AUTO-ENTMCNC: 33.4 G/DL (ref 31.5–35.7)
MCV RBC AUTO: 80.7 FL (ref 79–97)
MONOCYTES # BLD AUTO: 0.51 10*3/MM3 (ref 0.1–0.9)
MONOCYTES NFR BLD AUTO: 8.7 % (ref 5–12)
NEGATIVE CONTROL: NEGATIVE
NEUTROPHILS NFR BLD AUTO: 3.9 10*3/MM3 (ref 1.7–7)
NEUTROPHILS NFR BLD AUTO: 66.2 % (ref 42.7–76)
NRBC BLD AUTO-RTO: 0 /100 WBC (ref 0–0.2)
PLATELET # BLD AUTO: 150 10*3/MM3 (ref 140–450)
PMV BLD AUTO: 9.9 FL (ref 6–12)
POSITIVE CONTROL: POSITIVE
POTASSIUM SERPL-SCNC: 4.1 MMOL/L (ref 3.5–5.2)
PROT SERPL-MCNC: 7.4 G/DL (ref 6–8.5)
PROTHROMBIN TIME: 13 SECONDS (ref 11.8–14.8)
RBC # BLD AUTO: 5.75 10*6/MM3 (ref 4.14–5.8)
RH BLD: POSITIVE
SODIUM SERPL-SCNC: 135 MMOL/L (ref 136–145)
T&S EXPIRATION DATE: NORMAL
TROPONIN T SERPL HS-MCNC: 23 NG/L
TROPONIN T SERPL HS-MCNC: 25 NG/L
WBC NRBC COR # BLD AUTO: 5.88 10*3/MM3 (ref 3.4–10.8)
WHOLE BLOOD HOLD COAG: NORMAL
WHOLE BLOOD HOLD SPECIMEN: NORMAL

## 2024-05-12 PROCEDURE — 82948 REAGENT STRIP/BLOOD GLUCOSE: CPT

## 2024-05-12 PROCEDURE — 4A03X5D MEASUREMENT OF ARTERIAL FLOW, INTRACRANIAL, EXTERNAL APPROACH: ICD-10-PCS | Performed by: RADIOLOGY

## 2024-05-12 PROCEDURE — 84484 ASSAY OF TROPONIN QUANT: CPT

## 2024-05-12 PROCEDURE — 25510000001 IOPAMIDOL PER 1 ML: Performed by: EMERGENCY MEDICINE

## 2024-05-12 PROCEDURE — A9577 INJ MULTIHANCE: HCPCS | Performed by: INTERNAL MEDICINE

## 2024-05-12 PROCEDURE — 25010000002 ONDANSETRON PER 1 MG: Performed by: EMERGENCY MEDICINE

## 2024-05-12 PROCEDURE — 85730 THROMBOPLASTIN TIME PARTIAL: CPT

## 2024-05-12 PROCEDURE — 71045 X-RAY EXAM CHEST 1 VIEW: CPT

## 2024-05-12 PROCEDURE — 80053 COMPREHEN METABOLIC PANEL: CPT

## 2024-05-12 PROCEDURE — 84484 ASSAY OF TROPONIN QUANT: CPT | Performed by: EMERGENCY MEDICINE

## 2024-05-12 PROCEDURE — 85025 COMPLETE CBC W/AUTO DIFF WBC: CPT

## 2024-05-12 PROCEDURE — 70450 CT HEAD/BRAIN W/O DYE: CPT

## 2024-05-12 PROCEDURE — 70496 CT ANGIOGRAPHY HEAD: CPT

## 2024-05-12 PROCEDURE — 93010 ELECTROCARDIOGRAM REPORT: CPT | Performed by: INTERNAL MEDICINE

## 2024-05-12 PROCEDURE — 86901 BLOOD TYPING SEROLOGIC RH(D): CPT

## 2024-05-12 PROCEDURE — 0 GADOBENATE DIMEGLUMINE 529 MG/ML SOLUTION: Performed by: INTERNAL MEDICINE

## 2024-05-12 PROCEDURE — 93005 ELECTROCARDIOGRAM TRACING: CPT

## 2024-05-12 PROCEDURE — 36415 COLL VENOUS BLD VENIPUNCTURE: CPT

## 2024-05-12 PROCEDURE — 0042T HC CT CEREBRAL PERFUSION W/WO CONTRAST: CPT

## 2024-05-12 PROCEDURE — 86850 RBC ANTIBODY SCREEN: CPT

## 2024-05-12 PROCEDURE — 82270 OCCULT BLOOD FECES: CPT | Performed by: EMERGENCY MEDICINE

## 2024-05-12 PROCEDURE — 99285 EMERGENCY DEPT VISIT HI MDM: CPT

## 2024-05-12 PROCEDURE — 70553 MRI BRAIN STEM W/O & W/DYE: CPT

## 2024-05-12 PROCEDURE — 99223 1ST HOSP IP/OBS HIGH 75: CPT | Performed by: STUDENT IN AN ORGANIZED HEALTH CARE EDUCATION/TRAINING PROGRAM

## 2024-05-12 PROCEDURE — 86900 BLOOD TYPING SEROLOGIC ABO: CPT

## 2024-05-12 PROCEDURE — 70498 CT ANGIOGRAPHY NECK: CPT

## 2024-05-12 PROCEDURE — 85610 PROTHROMBIN TIME: CPT

## 2024-05-12 RX ORDER — SODIUM CHLORIDE 0.9 % (FLUSH) 0.9 %
10 SYRINGE (ML) INJECTION AS NEEDED
Status: DISCONTINUED | OUTPATIENT
Start: 2024-05-12 | End: 2024-05-15 | Stop reason: HOSPADM

## 2024-05-12 RX ORDER — ATORVASTATIN CALCIUM 40 MG/1
80 TABLET, FILM COATED ORAL NIGHTLY
Status: DISCONTINUED | OUTPATIENT
Start: 2024-05-12 | End: 2024-05-15 | Stop reason: HOSPADM

## 2024-05-12 RX ORDER — AMOXICILLIN 250 MG
2 CAPSULE ORAL 2 TIMES DAILY
Status: DISCONTINUED | OUTPATIENT
Start: 2024-05-12 | End: 2024-05-15 | Stop reason: HOSPADM

## 2024-05-12 RX ORDER — TAMSULOSIN HYDROCHLORIDE 0.4 MG/1
0.4 CAPSULE ORAL NIGHTLY
Status: DISCONTINUED | OUTPATIENT
Start: 2024-05-12 | End: 2024-05-15 | Stop reason: HOSPADM

## 2024-05-12 RX ORDER — LANOLIN ALCOHOL/MO/W.PET/CERES
3 CREAM (GRAM) TOPICAL NIGHTLY PRN
Status: DISCONTINUED | OUTPATIENT
Start: 2024-05-12 | End: 2024-05-15 | Stop reason: HOSPADM

## 2024-05-12 RX ORDER — ASPIRIN 300 MG/1
300 SUPPOSITORY RECTAL DAILY
Status: DISCONTINUED | OUTPATIENT
Start: 2024-05-12 | End: 2024-05-15 | Stop reason: HOSPADM

## 2024-05-12 RX ORDER — ASPIRIN 81 MG/1
81 TABLET, CHEWABLE ORAL DAILY
Status: DISCONTINUED | OUTPATIENT
Start: 2024-05-12 | End: 2024-05-15 | Stop reason: HOSPADM

## 2024-05-12 RX ORDER — ACETAMINOPHEN 160 MG/5ML
650 SOLUTION ORAL EVERY 4 HOURS PRN
Status: DISCONTINUED | OUTPATIENT
Start: 2024-05-12 | End: 2024-05-15 | Stop reason: HOSPADM

## 2024-05-12 RX ORDER — ALUMINA, MAGNESIA, AND SIMETHICONE 2400; 2400; 240 MG/30ML; MG/30ML; MG/30ML
15 SUSPENSION ORAL EVERY 6 HOURS PRN
Status: DISCONTINUED | OUTPATIENT
Start: 2024-05-12 | End: 2024-05-15 | Stop reason: HOSPADM

## 2024-05-12 RX ORDER — DIAZEPAM 5 MG/1
5 TABLET ORAL EVERY 6 HOURS PRN
Status: DISCONTINUED | OUTPATIENT
Start: 2024-05-12 | End: 2024-05-15 | Stop reason: HOSPADM

## 2024-05-12 RX ORDER — SODIUM CHLORIDE 0.9 % (FLUSH) 0.9 %
10 SYRINGE (ML) INJECTION EVERY 12 HOURS SCHEDULED
Status: DISCONTINUED | OUTPATIENT
Start: 2024-05-12 | End: 2024-05-15 | Stop reason: HOSPADM

## 2024-05-12 RX ORDER — SODIUM CHLORIDE 9 MG/ML
40 INJECTION, SOLUTION INTRAVENOUS AS NEEDED
Status: DISCONTINUED | OUTPATIENT
Start: 2024-05-12 | End: 2024-05-15 | Stop reason: HOSPADM

## 2024-05-12 RX ORDER — BISACODYL 5 MG/1
5 TABLET, DELAYED RELEASE ORAL DAILY PRN
Status: DISCONTINUED | OUTPATIENT
Start: 2024-05-12 | End: 2024-05-15 | Stop reason: HOSPADM

## 2024-05-12 RX ORDER — LEVOTHYROXINE SODIUM 88 UG/1
88 TABLET ORAL DAILY
COMMUNITY

## 2024-05-12 RX ORDER — ACETAMINOPHEN 325 MG/1
650 TABLET ORAL EVERY 4 HOURS PRN
Status: DISCONTINUED | OUTPATIENT
Start: 2024-05-12 | End: 2024-05-15 | Stop reason: HOSPADM

## 2024-05-12 RX ORDER — POLYETHYLENE GLYCOL 3350 17 G/17G
17 POWDER, FOR SOLUTION ORAL DAILY
Status: DISCONTINUED | OUTPATIENT
Start: 2024-05-12 | End: 2024-05-15 | Stop reason: HOSPADM

## 2024-05-12 RX ORDER — ONDANSETRON 2 MG/ML
4 INJECTION INTRAMUSCULAR; INTRAVENOUS EVERY 6 HOURS PRN
Status: DISCONTINUED | OUTPATIENT
Start: 2024-05-12 | End: 2024-05-15 | Stop reason: HOSPADM

## 2024-05-12 RX ORDER — BISACODYL 10 MG
10 SUPPOSITORY, RECTAL RECTAL DAILY PRN
Status: DISCONTINUED | OUTPATIENT
Start: 2024-05-12 | End: 2024-05-15 | Stop reason: HOSPADM

## 2024-05-12 RX ORDER — LABETALOL HYDROCHLORIDE 5 MG/ML
10 INJECTION, SOLUTION INTRAVENOUS EVERY 4 HOURS PRN
Status: DISCONTINUED | OUTPATIENT
Start: 2024-05-12 | End: 2024-05-15 | Stop reason: HOSPADM

## 2024-05-12 RX ORDER — ACETAMINOPHEN 650 MG/1
650 SUPPOSITORY RECTAL EVERY 4 HOURS PRN
Status: DISCONTINUED | OUTPATIENT
Start: 2024-05-12 | End: 2024-05-15 | Stop reason: HOSPADM

## 2024-05-12 RX ORDER — CITALOPRAM 20 MG/1
20 TABLET ORAL DAILY
Status: DISCONTINUED | OUTPATIENT
Start: 2024-05-12 | End: 2024-05-15 | Stop reason: HOSPADM

## 2024-05-12 RX ORDER — PANTOPRAZOLE SODIUM 40 MG/1
40 TABLET, DELAYED RELEASE ORAL
Status: DISCONTINUED | OUTPATIENT
Start: 2024-05-13 | End: 2024-05-15 | Stop reason: HOSPADM

## 2024-05-12 RX ORDER — ONDANSETRON 2 MG/ML
4 INJECTION INTRAMUSCULAR; INTRAVENOUS ONCE
Status: COMPLETED | OUTPATIENT
Start: 2024-05-12 | End: 2024-05-12

## 2024-05-12 RX ADMIN — Medication 10 ML: at 20:20

## 2024-05-12 RX ADMIN — ONDANSETRON 4 MG: 2 INJECTION INTRAMUSCULAR; INTRAVENOUS at 14:27

## 2024-05-12 RX ADMIN — Medication 10 ML: at 20:16

## 2024-05-12 RX ADMIN — ALUMINUM HYDROXIDE, MAGNESIUM HYDROXIDE, AND DIMETHICONE 15 ML: 400; 400; 40 SUSPENSION ORAL at 20:17

## 2024-05-12 RX ADMIN — CITALOPRAM 20 MG: 20 TABLET, FILM COATED ORAL at 19:34

## 2024-05-12 RX ADMIN — ASPIRIN 81 MG CHEWABLE TABLET 81 MG: 81 TABLET CHEWABLE at 19:34

## 2024-05-12 RX ADMIN — TAMSULOSIN HYDROCHLORIDE 0.4 MG: 0.4 CAPSULE ORAL at 20:16

## 2024-05-12 RX ADMIN — IOPAMIDOL 125 ML: 755 INJECTION, SOLUTION INTRAVENOUS at 13:49

## 2024-05-12 RX ADMIN — ATORVASTATIN CALCIUM 80 MG: 40 TABLET ORAL at 20:16

## 2024-05-12 RX ADMIN — IOPAMIDOL 125 ML: 755 INJECTION, SOLUTION INTRAVENOUS at 13:55

## 2024-05-12 RX ADMIN — POLYETHYLENE GLYCOL 3350 17 G: 17 POWDER, FOR SOLUTION ORAL at 19:34

## 2024-05-12 RX ADMIN — GADOBENATE DIMEGLUMINE 18 ML: 529 INJECTION, SOLUTION INTRAVENOUS at 18:22

## 2024-05-12 NOTE — H&P
[facility] Medicine Services  HISTORY AND PHYSICAL    Date of Admission: 5/12/2024  Primary Care Physician: Keenan Perez MD    Subjective   Primary Historian: Patient    Chief Complaint: Stroke symptoms    History of Present Illness  Patient is a 78-year-old  male with past medical history significant for osteoarthritis, gastroesophageal reflux disease, hypertension that presented to our facility with strokelike symptoms.  Patient reports that he woke up this morning and noticed some tingling and numbness in 2 of his fingers involving his right hand.  As the day progressed he noticed that his symptoms began to get worse, specifically around 12-12 30 this afternoon.  He reports that his spouse told him that lunch was ready, as he was getting up he noticed that his right side including his arm and his leg was very weak.  Not too long thereafter he also noticed that he was having slurred speech in addition to difficulty finding his words.  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  Currently, he feels like that his symptoms have largely resolved.  He does describe having a brief episode that occurred about a month ago where he woke up in the middle the night and had trouble speaking, however symptoms dissipated rather quickly.  He states that he has been rather tired as of late.  Denies any chest pain, chest pressure, or palpitations.  He does not take any antiplatelet treatment in the outpatient setting.  He is not on any lipid-lowering medication in the outpatient setting.  He is a non-smoker.      Review of Systems   Otherwise complete ROS reviewed and negative except as mentioned in the HPI.    Past Medical History:   Past Medical History:   Diagnosis Date    Anxiety     BPH (benign prostatic hyperplasia)     Colon polyp     Diverticulosis     Essential  hypertension     GERD (gastroesophageal reflux disease)     Hypertriglyceridemia     Idiopathic peripheral neuropathy     Osteoarthritis      Past Surgical History:  Past Surgical History:   Procedure Laterality Date    COLONOSCOPY  04/18/2016    diverticulosis, internal hemorrhoids,     COLONOSCOPY  03/30/2011    internal hemorrhoid, diverticulosis    COLONOSCOPY N/A 3/11/2021    Procedure: COLONOSCOPY WITH ANESTHESIA;  Surgeon: Ricky House MD;  Location:  [facility];  Service: Gastroenterology;  Laterality: N/A;  pre: hx polyps  post: diverticulosis. hemorrhoid.   Keenan Perez MD    ENDOSCOPY N/A 3/11/2021    Procedure: ESOPHAGOGASTRODUODENOSCOPY WITH ANESTHESIA;  Surgeon: Ricky House MD;  Location:  [facility];  Service: Gastroenterology;  Laterality: N/A;  pre: GERD  post: hiatal hernia. gastric polyps.  Keenan Perez MD    HERNIA REPAIR      inguinal    TURP / TRANSURETHRAL INCISION / DRAINAGE PROSTATE       Social History:  reports that he has never smoked. He has never used smokeless tobacco. He reports that he does not currently use alcohol. He reports that he does not currently use drugs.    Family History: family history is not on file.       Allergies:  Allergies   Allergen Reactions    Sulfa Antibiotics Rash       Medications:  Prior to Admission medications    Medication Sig Start Date End Date Taking? Authorizing Provider   alfuzosin (UROXATRAL) 10 MG 24 hr tablet Take 1 tablet by mouth Daily.    Caleb Lomeli MD   Bisoprolol-hydroCHLOROthiazide (ZIAC PO) Take  by mouth.    Caleb Lomeli MD   citalopram (CeleXA) 20 MG tablet Take 1 tablet by mouth Daily.    Caleb Lomeli MD   diazePAM (VALIUM) 5 MG tablet Take 1 tablet by mouth Every 6 (Six) Hours As Needed. 10/19/23   Caleb Lomeli MD   docusate calcium (SURFAK) 240 MG capsule Take 1 capsule by mouth 2 (Two) Times a Day.    Caleb Lomeli MD   finasteride (PROSCAR) 5 MG  "tablet Take 1 tablet by mouth Daily.  Patient not taking: Reported on 5/1/2024 4/10/24   Celso Clark MD   hydroCHLOROthiazide 12.5 MG tablet Take 1 tablet by mouth 2 (Two) Times a Day.    Caleb Lomeli MD   hyoscyamine (ANASPAZ,LEVSIN) 0.125 MG tablet Take 1 tablet by mouth Every 4 (Four) Hours As Needed.    Caleb Lomeli MD   levocetirizine (XYZAL) 5 MG tablet Take 1 tablet by mouth As Needed.    Caleb Lomeli MD   lisinopril (PRINIVIL,ZESTRIL) 20 MG tablet Take 1 tablet by mouth 2 (Two) Times a Day.    Caleb Lomeli MD   lisinopril-hydrochlorothiazide (PRINZIDE,ZESTORETIC) 20-12.5 MG per tablet Take 1 tablet by mouth Daily.    Caleb Lomeli MD   Magnesium Hydroxide (MILK OF MAGNESIA PO) Take  by mouth.    Caleb Lomeli MD   Meloxicam (MOBIC PO) Take  by mouth.    Caleb Lomeli MD   omeprazole (priLOSEC) 20 MG capsule Take 1 capsule by mouth Daily.    Caleb Lomeli MD   Psyllium (METAMUCIL PO) Take  by mouth As Needed.    Caleb Lomeli MD   rOPINIRole HCl (REQUIP PO) Take  by mouth.    Caleb Lomeli MD   traZODone (DESYREL) 50 MG tablet Take 1 tablet by mouth Daily.    Caleb Lomeli MD     I have utilized all available immediate resources to obtain, update, or review the patient's current medications (including all prescriptions, over-the-counter products, herbals, cannabis/cannabidiol products, and vitamin/mineral/dietary (nutritional) supplements).    Objective     Vital Signs: /86   Pulse 74   Temp 97.8 °F (36.6 °C)   Resp 16   Ht 182.9 cm (72\")   Wt 92.8 kg (204 lb 8 oz)   SpO2 96%   BMI 27.74 kg/m²   Physical Exam  Vitals reviewed.   Constitutional:       General: He is not in acute distress.  HENT:      Head: Normocephalic.      Mouth/Throat:      Mouth: Mucous membranes are moist.   Eyes:      Pupils: Pupils are equal, round, and reactive to light.   Cardiovascular:      Rate and Rhythm: Normal rate. "   Pulmonary:      Effort: Pulmonary effort is normal. No respiratory distress.   Musculoskeletal:         General: No swelling.   Skin:     General: Skin is warm.   Neurological:      General: No focal deficit present.      Mental Status: He is alert and oriented to person, place, and time.      Cranial Nerves: No cranial nerve deficit.      Motor: No weakness.   Psychiatric:         Mood and Affect: Mood normal.          Results Reviewed:  Lab Results (last 24 hours)       Procedure Component Value Units Date/Time    Comprehensive Metabolic Panel [[id number]]  (Abnormal) Collected: 05/12/24 1340    Specimen: Blood Updated: 05/12/24 1408     Glucose 99 mg/dL      BUN 14 mg/dL      Creatinine 1.21 mg/dL      Sodium 135 mmol/L      Potassium 4.1 mmol/L      Chloride 99 mmol/L      CO2 26.0 mmol/L      Calcium 9.6 mg/dL      Total Protein 7.4 g/dL      Albumin 4.7 g/dL      ALT (SGPT) 27 U/L      AST (SGOT) 30 U/L      Alkaline Phosphatase 128 U/L      Total Bilirubin 1.3 mg/dL      Globulin 2.7 gm/dL      A/G Ratio 1.7 g/dL      BUN/Creatinine Ratio 11.6     Anion Gap 10.0 mmol/L      eGFR 60.9 mL/min/1.73     Narrative:      GFR Normal >60  Chronic Kidney Disease <60  Kidney Failure <15    The GFR formula is only valid for adults with stable renal function between ages 18 and 70.    Single High Sensitivity Troponin T [[id number]]  (Abnormal) Collected: 05/12/24 1340    Specimen: Blood Updated: 05/12/24 1406     HS Troponin T 25 ng/L     Narrative:      High Sensitive Troponin T Reference Range:  <14.0 ng/L- Negative Female for AMI  <22.0 ng/L- Negative Male for AMI  >=14 - Abnormal Female indicating possible myocardial injury.  >=22 - Abnormal Male indicating possible myocardial injury.   Clinicians would have to utilize clinical acumen, EKG, Troponin, and serial changes to determine if it is an Acute Myocardial Infarction or myocardial injury due to an underlying chronic condition.         Protime-INR [[id number]]   (Normal) Collected: 05/12/24 1340    Specimen: Blood Updated: 05/12/24 1401     Protime 13.0 Seconds      INR 0.94    aPTT [[id number]]  (Normal) Collected: 05/12/24 1340    Specimen: Blood Updated: 05/12/24 1401     PTT 28.8 seconds     POC Glucose Once [[id number]]  (Normal) Collected: 05/12/24 1350    Specimen: Blood Updated: 05/12/24 1401     Glucose 89 mg/dL      Comment: : [id number] Santino Tejeda ID: [id number]       [place] Draw [[id number]] Collected: 05/12/24 1340    Specimen: Blood Updated: 05/12/24 1400    Narrative:      The following orders were created for panel order [place] Draw.  Procedure                               Abnormality         Status                     ---------                               -----------         ------                     Green Top (Gel)[[id number]]                                  Final result               Lavender Top[[id number]]                                     Final result               Red Top[[id number]]                                          Final result               Light Blue Top[[id number]]                                   Final result                 Please view results for these tests on the individual orders.    Green Top (Gel) [[id number]] Collected: 05/12/24 1340    Specimen: Blood Updated: 05/12/24 1400     Extra Tube Hold for add-ons.     Comment: Auto resulted.       Lavender Top [[id number]] Collected: 05/12/24 1340    Specimen: Blood Updated: 05/12/24 1400     Extra Tube hold for add-on     Comment: Auto resulted       Red Top [[id number]] Collected: 05/12/24 1340    Specimen: Blood Updated: 05/12/24 1400     Extra Tube Hold for add-ons.     Comment: Auto resulted.       Light Blue Top [[id number]] Collected: 05/12/24 1340    Specimen: Blood Updated: 05/12/24 1400     Extra Tube Hold for add-ons.     Comment: Auto resulted       CBC & Differential [[id number]]  (Abnormal) Collected: 05/12/24 1340    Specimen: Blood Updated: 05/12/24 1349     Narrative:      The following orders were created for panel order CBC & Differential.  Procedure                               Abnormality         Status                     ---------                               -----------         ------                     CBC Auto Differential[[id number]]        Abnormal            Final result                 Please view results for these tests on the individual orders.    CBC Auto Differential [[id number]]  (Abnormal) Collected: 05/12/24 1340    Specimen: Blood Updated: 05/12/24 1349     WBC 5.88 10*3/mm3      RBC 5.75 10*6/mm3      Hemoglobin 15.5 g/dL      Hematocrit 46.4 %      MCV 80.7 fL      MCH 27.0 pg      MCHC 33.4 g/dL      RDW 18.9 %      RDW-SD 53.2 fl      MPV 9.9 fL      Platelets 150 10*3/mm3      Neutrophil % 66.2 %      Lymphocyte % 21.3 %      Monocyte % 8.7 %      Eosinophil % 2.6 %      Basophil % 0.9 %      Immature Grans % 0.3 %      Neutrophils, Absolute 3.90 10*3/mm3      Lymphocytes, Absolute 1.25 10*3/mm3      Monocytes, Absolute 0.51 10*3/mm3      Eosinophils, Absolute 0.15 10*3/mm3      Basophils, Absolute 0.05 10*3/mm3      Immature Grans, Absolute 0.02 10*3/mm3      nRBC 0.0 /100 WBC           Imaging Results (Last 24 Hours)       Procedure Component Value Units Date/Time    XR Chest 1 View [[id number]] Collected: 05/12/24 1429     Updated: 05/12/24 1435    Narrative:      XR CHEST 1 VW- 5/12/2024 1:10 PM     HISTORY: Acute Stroke Protocol (Onset < 12 hrs)       COMPARISON: Chest x-ray dated 9/12/2021     FINDINGS:  Upright frontal radiograph of the chest was obtained     There appears to be a 4-5 cm focus of right upper lobe consolidation.  The lungs are otherwise clear. The cardiomediastinal silhouette and  pulmonary vascularity are within normal limits. The osseous structures  and surrounding soft tissues demonstrate no acute abnormality.       Impression:      1.  There appears to be a 4-5 cm right upper lobe consolidation. This  could reflect  an upper lobe pneumonia. Upper lobe neoplasm is also a  consideration. Chest CT versus short interval follow-up is recommended  for further evaluation.     This report was signed and finalized on 5/12/2024 2:32 PM by Dr Joshua Sin.       CT CEREBRAL PERFUSION WITH & WITHOUT CONTRAST [[id number]] Collected: 05/12/24 1428     Updated: 05/12/24 1432    Narrative:      CT CEREBRAL PERFUSION W WO CONTRAST- 5/12/2024 12:39 PM     HISTORY: Neuro deficit, acute, stroke suspected     Technique:     1. Perfusion CT is performed to acquire images tracking the temporal  course of iodinated contrast material passing through the cerebral  circulation. Perfusion parameters, such as cerebral blood flow (CBF),  cerebral blood volume (CBV), mean transit time (MTT), etc. are  calculated by [employer] with additional provided perfusion maps and  estimated stroke volumes.  2. Automated exposure control (AEC) protocols are utilized on the  scanner to ensure dose lowered technique.     Comparison: Noncontrast CT brain and brain angiogram dated 5/12/2024  12:39 PM     CT dose: 1637.31 mGy.cm     Findings:     Normal, symmetric and MTT, TTP, CBV and CBF.      Tmax >6.0 seconds volume: 0 ml     CBF < 30% volume: 0 ml     Mismatch volume: 0 ml     Mismatch ratio: None       Impression:      Impression:  1. Normal, symmetric cerebral perfusion. No discrete infarct or at risk  territory detected by the perfusion software.     This report was signed and finalized on 5/12/2024 2:29 PM by Dr Joshua Sin.       CT Angiogram Head w AI Analysis of LVO [[id number]] Collected: 05/12/24 1403     Updated: 05/12/24 1410    Narrative:      EXAM: CT ANGIOGRAM HEAD W AI ANALYSIS OF LVO- - 5/12/2024 12:38 PM     HISTORY: Neuro Deficit, acute, Stroke suspected       COMPARISON: None.     DOSE LENGTH PRODUCT: 461.15 mGy.cm. Automated exposure control was also  utilized to decrease patient radiation dose.     TECHNIQUE: CTA head and neck performed with  intravenous contrast. 3D  postprocessing, including MIPs, performed and images saved to PACS. AI  ANALYSIS OF LVO WAS UTILIZED.  Grading of carotid stenosis performed  with NASCET criteria.     FINDINGS:     There is a typical three-vessel branching pattern off the aortic arch  without significant ostial narrowing. Common carotid arteries are patent  and without flow-limiting stenosis. There is a normal course and caliber  of the internal and external carotid arteries without evidence of a  flow-limiting stenosis. The vertebral arteries originate from the  subclavian arteries without significant ostial narrowing. Lung apices  are clear. Homogeneous thyroid gland. No cervical adenopathy advanced  cervical spine osteoarthritis changes.     Distal ICAs are patent and without flow-limiting stenosis. No  intracranial large vessel occlusion. Anterior and middle cerebral  arteries are patent and without flow-limiting stenosis. Intracranial  vertebral arteries and basilar artery are normal in caliber. Posterior  cerebral arteries are patent and normal in caliber. Fetal origin of the  left posterior cerebral artery. No visualized aneurysm. No intracranial  hemorrhage or mass effect. Lateral ventriculomegaly.       Impression:         1. No arterial occlusion or flow-limiting stenosis in the neck.  2. No intracranial large vessel occlusion or flow-limiting stenosis.     This report was signed and finalized on 5/12/2024 2:07 PM by Dr Joshua Sin.       CT Head Without Contrast Stroke Protocol [[id number]] Collected: 05/12/24 1359     Updated: 05/12/24 1405    Narrative:      CT HEAD WO CONTRAST STROKE PROTOCOL- 5/12/2024 12:37 PM     HISTORY: Neuro deficit, acute, stroke suspected       DOSE LENGTH PRODUCT: 907.63 mGy.cm. Automated exposure control was also  utilized to decrease patient radiation dose.     TECHNIQUE:  Axial CT of the brain without IV contrast. Sagittal and coronal  reformations are also provided for review.  Soft tissue and bone kernels  are available for interpretation.     COMPARISON: None.     FINDINGS:     There is no evidence of acute large vascular territory infarct. Chronic  small vessel ischemic changes. No intra-axial or extra-axial hemorrhage.  No visualized mass lesion or mass effect. Lateral ventriculomegaly.  Bowing of the corpus callosum with attenuation of the subarachnoid space  along the high convexities. Posterior fossa structures are unremarkable.  Visualized paranasal sinuses and mastoids are clear.       Impression:      1. No acute intracranial process. In particular, there is no acute  hemorrhage identified.  2. Advanced chronic small vessel ischemic changes.  3. Ventriculomegaly is present, pattern suspicious for communicating  hydrocephalus such as NPH.     Findings were discussed with Dr. Koo on 5/12/2024 2:02 PM.     This report was signed and finalized on 5/12/2024 2:02 PM by Dr Joshua Sin.       CT Angiogram Neck [[id number]] Resulted: 05/12/24 1339     Updated: 05/12/24 1350          I have personally reviewed and interpreted the radiology studies and ECG obtained at time of admission.     Assessment / Plan   Assessment:   Active Hospital Problems    Diagnosis     Essential hypertension     Stroke-like symptom     Right upper lobe consolidation     Iron deficiency anemia     Gastroesophageal reflux disease without esophagitis        Treatment Plan  Neuro consultation  MRI Brain  Echo with bubble  Continuous telemetry  ASA + statin  Check lipids and A1c  PT/OT/ST  SCDs  CT Chest without contrast to assess ? Consolidation RUL - patient with no respiratory symptoms  Hold outpatient anti-HTN medications for now  Dr. Koo did perform rectal exam in ED; negative for blood; hemorrhoids noted.  Bowel regimen  Workup ongoing    Medical Decision Making  Number and Complexity of problems: 1 acute; high complexity      Conditions and Status        Condition is improving.     MDM  Data  External documents reviewed: none  Cardiac tracing (EKG, telemetry) interpretation: personally reviewed and normal sinus rhythm with no acute ST-changes  Radiology interpretation: right upper lobe consolidation    Labs reviewed: as above  Any tests that were considered but not ordered: none at this time     Decision rules/scores evaluated (example [id number], [place], etc): none     Discussed with: patient; Dr. Koo     Care Planning  Shared decision making: Discussed with patient with agreement to proceed with treatment plan as outlined  Code status and discussions: Full code    Disposition  Social Determinants of Health that impact treatment or disposition: None apparent at this time  Estimated length of stay is 2-3 days    I confirmed that the patient's advanced care plan is present, code status is documented, and a surrogate decision maker is listed in the patient's medical record.     The patient's surrogate decision maker is his spouse        Electronically signed by Michael Balbuena MD, 05/12/24, 16:40 CDT.

## 2024-05-12 NOTE — ED PROVIDER NOTES
Subjective   History of Present Illness  78-year-old male presents to the ED with complaint of slurred speech, right arm and right leg weakness and numbness.  He has a history of hypertension, hypertriglyceridemia.   Last known well last night around 10 PM before going to bed.  Patient states he woke up this morning and had some numbness to the fingers on his right hand.  Described pins and needle sensation.  Symptoms waxed and waned but patient states never had a period of time where he had resolution of symptoms.  About 45 minutes prior to arrival, he developed sudden onset of slurred speech, right arm and right leg weakness, worsening right arm and leg numbness.  He had difficulty ambulating due to right leg weakness.  His speech was slurred but there was no facial asymmetry.  He never became confused or altered.  No visual change/vision loss.  Wife brought him to the ED for evaluation.  On arrival to the ER, he did have weakness of his right arm and right leg but no drift.  Slurred speech and mild decrease in station on the right, NIH of 2.    Denies associated chest pain, shortness of breath, abdominal pain, nausea and vomiting.  Rates symptoms as moderate severity, no aggravating relieving factors.    History provided by:  Patient      Review of Systems   All other systems reviewed and are negative.      Past Medical History:   Diagnosis Date    Anxiety     BPH (benign prostatic hyperplasia)     Colon polyp     Diverticulosis     Essential hypertension     GERD (gastroesophageal reflux disease)     Hypertriglyceridemia     Idiopathic peripheral neuropathy     Osteoarthritis        Allergies   Allergen Reactions    Sulfa Antibiotics Rash       Past Surgical History:   Procedure Laterality Date    COLONOSCOPY  04/18/2016    diverticulosis, internal hemorrhoids,     COLONOSCOPY  03/30/2011    internal hemorrhoid, diverticulosis    COLONOSCOPY N/A 3/11/2021    Procedure: COLONOSCOPY WITH ANESTHESIA;  Surgeon:  Ricky House MD;  Location:  [facility];  Service: Gastroenterology;  Laterality: N/A;  pre: hx polyps  post: diverticulosis. hemorrhoid.   Keenan Perez MD    ENDOSCOPY N/A 3/11/2021    Procedure: ESOPHAGOGASTRODUODENOSCOPY WITH ANESTHESIA;  Surgeon: Ricky House MD;  Location:  [facility];  Service: Gastroenterology;  Laterality: N/A;  pre: GERD  post: hiatal hernia. gastric polyps.  Keenan Perez MD    HERNIA REPAIR      inguinal    TURP / TRANSURETHRAL INCISION / DRAINAGE PROSTATE         Family History   Problem Relation Age of Onset    Colon cancer Neg Hx        Social History     Socioeconomic History    Marital status:    Tobacco Use    Smoking status: Never    Smokeless tobacco: Never   Vaping Use    Vaping status: Never Used   Substance and Sexual Activity    Alcohol use: Not Currently    Drug use: Not Currently    Sexual activity: Defer           Objective   Physical Exam  Vitals and nursing note reviewed.   Constitutional:       Appearance: Normal appearance. He is normal weight.   HENT:      Head: Normocephalic and atraumatic.      Nose: Nose normal. No congestion or rhinorrhea.      Mouth/Throat:      Mouth: Mucous membranes are moist.      Pharynx: No oropharyngeal exudate or posterior oropharyngeal erythema.   Eyes:      Extraocular Movements: Extraocular movements intact.      Conjunctiva/sclera: Conjunctivae normal.      Pupils: Pupils are equal, round, and reactive to light.   Cardiovascular:      Pulses: Normal pulses.      Heart sounds: Normal heart sounds. No murmur heard.  Pulmonary:      Effort: Pulmonary effort is normal.      Breath sounds: Normal breath sounds. No wheezing, rhonchi or rales.   Abdominal:      General: Abdomen is flat. Bowel sounds are normal.      Palpations: Abdomen is soft.   Musculoskeletal:         General: Normal range of motion.      Cervical back: Normal range of motion and neck supple.   Skin:     Capillary Refill:  Capillary refill takes less than 2 seconds.   Neurological:      Mental Status: He is alert and oriented to person, place, and time.      Cranial Nerves: No cranial nerve deficit.      Sensory: Sensory deficit present.      Motor: Weakness present.      Comments: Strength 4/5 right upper extremity  4/5 right lower extremity  No facial asymmetry  Dysarthria  No aphasia  Mild decrease sensation right hemibody         Procedures       Lab Results (last 24 hours)       Procedure Component Value Units Date/Time    CBC & Differential [[id number]]  (Abnormal) Collected: 05/12/24 1340    Specimen: Blood Updated: 05/12/24 1349    Narrative:      The following orders were created for panel order CBC & Differential.  Procedure                               Abnormality         Status                     ---------                               -----------         ------                     CBC Auto Differential[[id number]]        Abnormal            Final result                 Please view results for these tests on the individual orders.    Comprehensive Metabolic Panel [[id number]]  (Abnormal) Collected: 05/12/24 1340    Specimen: Blood Updated: 05/12/24 1408     Glucose 99 mg/dL      BUN 14 mg/dL      Creatinine 1.21 mg/dL      Sodium 135 mmol/L      Potassium 4.1 mmol/L      Chloride 99 mmol/L      CO2 26.0 mmol/L      Calcium 9.6 mg/dL      Total Protein 7.4 g/dL      Albumin 4.7 g/dL      ALT (SGPT) 27 U/L      AST (SGOT) 30 U/L      Alkaline Phosphatase 128 U/L      Total Bilirubin 1.3 mg/dL      Globulin 2.7 gm/dL      A/G Ratio 1.7 g/dL      BUN/Creatinine Ratio 11.6     Anion Gap 10.0 mmol/L      eGFR 60.9 mL/min/1.73     Narrative:      GFR Normal >60  Chronic Kidney Disease <60  Kidney Failure <15    The GFR formula is only valid for adults with stable renal function between ages 18 and 70.    Protime-INR [[id number]]  (Normal) Collected: 05/12/24 1340    Specimen: Blood Updated: 05/12/24 1401     Protime 13.0 Seconds       INR 0.94    aPTT [[id number]]  (Normal) Collected: 05/12/24 1340    Specimen: Blood Updated: 05/12/24 1401     PTT 28.8 seconds     Single High Sensitivity Troponin T [[id number]]  (Abnormal) Collected: 05/12/24 1340    Specimen: Blood Updated: 05/12/24 1406     HS Troponin T 25 ng/L     Narrative:      High Sensitive Troponin T Reference Range:  <14.0 ng/L- Negative Female for AMI  <22.0 ng/L- Negative Male for AMI  >=14 - Abnormal Female indicating possible myocardial injury.  >=22 - Abnormal Male indicating possible myocardial injury.   Clinicians would have to utilize clinical acumen, EKG, Troponin, and serial changes to determine if it is an Acute Myocardial Infarction or myocardial injury due to an underlying chronic condition.         CBC Auto Differential [[id number]]  (Abnormal) Collected: 05/12/24 1340    Specimen: Blood Updated: 05/12/24 1349     WBC 5.88 10*3/mm3      RBC 5.75 10*6/mm3      Hemoglobin 15.5 g/dL      Hematocrit 46.4 %      MCV 80.7 fL      MCH 27.0 pg      MCHC 33.4 g/dL      RDW 18.9 %      RDW-SD 53.2 fl      MPV 9.9 fL      Platelets 150 10*3/mm3      Neutrophil % 66.2 %      Lymphocyte % 21.3 %      Monocyte % 8.7 %      Eosinophil % 2.6 %      Basophil % 0.9 %      Immature Grans % 0.3 %      Neutrophils, Absolute 3.90 10*3/mm3      Lymphocytes, Absolute 1.25 10*3/mm3      Monocytes, Absolute 0.51 10*3/mm3      Eosinophils, Absolute 0.15 10*3/mm3      Basophils, Absolute 0.05 10*3/mm3      Immature Grans, Absolute 0.02 10*3/mm3      nRBC 0.0 /100 WBC     POC Glucose Once [[id number]]  (Normal) Collected: 05/12/24 1350    Specimen: Blood Updated: 05/12/24 1401     Glucose 89 mg/dL      Comment: : [id number] Santino Tejeda ID: [id number]            XR Chest 1 View    Result Date: 5/12/2024  XR CHEST 1 VW- 5/12/2024 1:10 PM  HISTORY: Acute Stroke Protocol (Onset < 12 hrs)   COMPARISON: Chest x-ray dated 9/12/2021  FINDINGS: Upright frontal radiograph of the chest was obtained   There appears to be a 4-5 cm focus of right upper lobe consolidation. The lungs are otherwise clear. The cardiomediastinal silhouette and pulmonary vascularity are within normal limits. The osseous structures and surrounding soft tissues demonstrate no acute abnormality.      1.  There appears to be a 4-5 cm right upper lobe consolidation. This could reflect an upper lobe pneumonia. Upper lobe neoplasm is also a consideration. Chest CT versus short interval follow-up is recommended for further evaluation.  This report was signed and finalized on 5/12/2024 2:32 PM by Dr Joshua Sin.      CT CEREBRAL PERFUSION WITH & WITHOUT CONTRAST    Result Date: 5/12/2024  CT CEREBRAL PERFUSION W WO CONTRAST- 5/12/2024 12:39 PM  HISTORY: Neuro deficit, acute, stroke suspected  Technique:  1. Perfusion CT is performed to acquire images tracking the temporal course of iodinated contrast material passing through the cerebral circulation. Perfusion parameters, such as cerebral blood flow (CBF), cerebral blood volume (CBV), mean transit time (MTT), etc. are calculated by [employer] with additional provided perfusion maps and estimated stroke volumes. 2. Automated exposure control (AEC) protocols are utilized on the scanner to ensure dose lowered technique.  Comparison: Noncontrast CT brain and brain angiogram dated 5/12/2024 12:39 PM  CT dose: 1637.31 mGy.cm  Findings:  Normal, symmetric and MTT, TTP, CBV and CBF.  Tmax >6.0 seconds volume: 0 ml  CBF < 30% volume: 0 ml  Mismatch volume: 0 ml  Mismatch ratio: None      Impression: 1. Normal, symmetric cerebral perfusion. No discrete infarct or at risk territory detected by the perfusion software.  This report was signed and finalized on 5/12/2024 2:29 PM by Dr Joshua Sin.      CT Angiogram Head w AI Analysis of LVO    Result Date: 5/12/2024  EXAM: CT ANGIOGRAM HEAD W AI ANALYSIS OF LVO- - 5/12/2024 12:38 PM  HISTORY: Neuro Deficit, acute, Stroke suspected   COMPARISON: None.  DOSE  LENGTH PRODUCT: 461.15 mGy.cm. Automated exposure control was also utilized to decrease patient radiation dose.  TECHNIQUE: CTA head and neck performed with intravenous contrast. 3D postprocessing, including MIPs, performed and images saved to PACS. AI ANALYSIS OF LVO WAS UTILIZED.  Grading of carotid stenosis performed with NASCET criteria.  FINDINGS:  There is a typical three-vessel branching pattern off the aortic arch without significant ostial narrowing. Common carotid arteries are patent and without flow-limiting stenosis. There is a normal course and caliber of the internal and external carotid arteries without evidence of a flow-limiting stenosis. The vertebral arteries originate from the subclavian arteries without significant ostial narrowing. Lung apices are clear. Homogeneous thyroid gland. No cervical adenopathy advanced cervical spine osteoarthritis changes.  Distal ICAs are patent and without flow-limiting stenosis. No intracranial large vessel occlusion. Anterior and middle cerebral arteries are patent and without flow-limiting stenosis. Intracranial vertebral arteries and basilar artery are normal in caliber. Posterior cerebral arteries are patent and normal in caliber. Fetal origin of the left posterior cerebral artery. No visualized aneurysm. No intracranial hemorrhage or mass effect. Lateral ventriculomegaly.       1. No arterial occlusion or flow-limiting stenosis in the neck. 2. No intracranial large vessel occlusion or flow-limiting stenosis.  This report was signed and finalized on 5/12/2024 2:07 PM by Dr Joshua Sin.      CT Head Without Contrast Stroke Protocol    Result Date: 5/12/2024  CT HEAD WO CONTRAST STROKE PROTOCOL- 5/12/2024 12:37 PM  HISTORY: Neuro deficit, acute, stroke suspected   DOSE LENGTH PRODUCT: 907.63 mGy.cm. Automated exposure control was also utilized to decrease patient radiation dose.  TECHNIQUE: Axial CT of the brain without IV contrast. Sagittal and coronal  reformations are also provided for review. Soft tissue and bone kernels are available for interpretation.  COMPARISON: None.  FINDINGS:  There is no evidence of acute large vascular territory infarct. Chronic small vessel ischemic changes. No intra-axial or extra-axial hemorrhage. No visualized mass lesion or mass effect. Lateral ventriculomegaly. Bowing of the corpus callosum with attenuation of the subarachnoid space along the high convexities. Posterior fossa structures are unremarkable. Visualized paranasal sinuses and mastoids are clear.      1. No acute intracranial process. In particular, there is no acute hemorrhage identified. 2. Advanced chronic small vessel ischemic changes. 3. Ventriculomegaly is present, pattern suspicious for communicating hydrocephalus such as NPH.  Findings were discussed with Dr. Koo on 5/12/2024 2:02 PM.  This report was signed and finalized on 5/12/2024 2:02 PM by Dr Joshua Sin.        ED Course  ED Course as of 05/12/24 1637   Sun May 12, 2024   1634 78-year-old male with history of hypertension hypertriglyceridemia presents to the ED with complaint of slurred speech, right arm and leg weakness and numbness.  Initially reported onset of symptoms 45 minutes prior to arrival, stroke alert was initiated.  However, after speaking further with the patient states he had some numbness of his right hand that was present upon waking this morning.  Last known well was last night around 10 PM.  He had slurred speech but no facial asymmetry, decree sensation right hemibody.  Right arm and leg weakness but there was no drift on exam.  NIH on arrival was 2.  Concern for possible stuttering TIAs given waxing and waning symptoms since this morning, ordered CTA head neck with perfusion.  No LVO.  CT head negative for stroke.  Negative for bleed.    Spoke to Dr. Abernathy with neurology, he felt the patient was not a candidate for TNKase as his symptoms were present longer than 6 hours,  patient not a candidate for intervention given NIH less than 6 and no LVO.    No reported history of GI bleeding initially so the plan was to start the patient on Plavix and aspirin.  However, upon chart review patient was recently seen by GI and apparently has a long history of bright red blood per rectum.  Per last colonoscopy, patient has history of internal hemorrhoids.  Fecal occult blood negative in the ED.  Hospitalist informed, will hold off on loading the patient with Plavix until after he is evaluated by GI regarding his chronic bright red blood per rectum [AW]      ED Course User Index  [AW] Marco Koo MD                          Total (NIH Stroke Scale): 2                  Medical Decision Making  Amount and/or Complexity of Data Reviewed  Labs: ordered.    Risk  Prescription drug management.        Final diagnoses:   Cerebrovascular accident (CVA), unspecified mechanism       ED Disposition  ED Disposition       ED Disposition   Decision to Admit    Condition   --    Comment   Level of Care: Remote Telemetry [26]   Diagnosis: Stroke [[id number]]   Admitting Physician: BOBBY DURHAM [[id number]]   Attending Physician: BOBBY DURHAM [[id number]]   Certification: I Certify That Inpatient Hospital Services Are Medically Necessary For Greater Than 2 Midnights                 No follow-up provider specified.       Medication List      No changes were made to your prescriptions during this visit.            Marco Koo MD  05/12/24 4139

## 2024-05-13 ENCOUNTER — APPOINTMENT (OUTPATIENT)
Dept: CT IMAGING | Facility: HOSPITAL | Age: 79
End: 2024-05-13
Payer: MEDICARE

## 2024-05-13 ENCOUNTER — APPOINTMENT (OUTPATIENT)
Dept: MRI IMAGING | Facility: HOSPITAL | Age: 79
End: 2024-05-13
Payer: MEDICARE

## 2024-05-13 LAB
CHOLEST SERPL-MCNC: 132 MG/DL (ref 0–200)
GLUCOSE BLDC GLUCOMTR-MCNC: 103 MG/DL (ref 70–130)
GLUCOSE BLDC GLUCOMTR-MCNC: 98 MG/DL (ref 70–130)
HBA1C MFR BLD: 5.2 % (ref 4.8–5.6)
HDLC SERPL-MCNC: 48 MG/DL (ref 40–60)
LDLC SERPL CALC-MCNC: 60 MG/DL (ref 0–100)
LDLC/HDLC SERPL: 1.19 {RATIO}
TRIGL SERPL-MCNC: 135 MG/DL (ref 0–150)
VLDLC SERPL-MCNC: 24 MG/DL (ref 5–40)

## 2024-05-13 PROCEDURE — 80061 LIPID PANEL: CPT | Performed by: INTERNAL MEDICINE

## 2024-05-13 PROCEDURE — 71250 CT THORAX DX C-: CPT

## 2024-05-13 PROCEDURE — 99233 SBSQ HOSP IP/OBS HIGH 50: CPT | Performed by: CLINICAL NURSE SPECIALIST

## 2024-05-13 PROCEDURE — 97161 PT EVAL LOW COMPLEX 20 MIN: CPT

## 2024-05-13 PROCEDURE — 97530 THERAPEUTIC ACTIVITIES: CPT

## 2024-05-13 PROCEDURE — 83036 HEMOGLOBIN GLYCOSYLATED A1C: CPT | Performed by: INTERNAL MEDICINE

## 2024-05-13 PROCEDURE — 70450 CT HEAD/BRAIN W/O DYE: CPT

## 2024-05-13 PROCEDURE — 25010000002 ONDANSETRON PER 1 MG: Performed by: INTERNAL MEDICINE

## 2024-05-13 PROCEDURE — 70551 MRI BRAIN STEM W/O DYE: CPT

## 2024-05-13 PROCEDURE — 92610 EVALUATE SWALLOWING FUNCTION: CPT

## 2024-05-13 PROCEDURE — 97166 OT EVAL MOD COMPLEX 45 MIN: CPT

## 2024-05-13 PROCEDURE — 82948 REAGENT STRIP/BLOOD GLUCOSE: CPT

## 2024-05-13 RX ORDER — ROPINIROLE 1 MG/1
2 TABLET, FILM COATED ORAL NIGHTLY
Status: DISCONTINUED | OUTPATIENT
Start: 2024-05-13 | End: 2024-05-15 | Stop reason: HOSPADM

## 2024-05-13 RX ORDER — ROPINIROLE 1 MG/1
2 TABLET, FILM COATED ORAL ONCE
Status: COMPLETED | OUTPATIENT
Start: 2024-05-13 | End: 2024-05-13

## 2024-05-13 RX ORDER — TRAZODONE HYDROCHLORIDE 50 MG/1
50 TABLET ORAL NIGHTLY
Status: DISCONTINUED | OUTPATIENT
Start: 2024-05-13 | End: 2024-05-15 | Stop reason: HOSPADM

## 2024-05-13 RX ADMIN — ROPINIROLE HYDROCHLORIDE 2 MG: 1 TABLET, FILM COATED ORAL at 07:58

## 2024-05-13 RX ADMIN — SENNOSIDES AND DOCUSATE SODIUM 2 TABLET: 50; 8.6 TABLET ORAL at 08:00

## 2024-05-13 RX ADMIN — SENNOSIDES AND DOCUSATE SODIUM 2 TABLET: 50; 8.6 TABLET ORAL at 20:28

## 2024-05-13 RX ADMIN — POLYETHYLENE GLYCOL 3350 17 G: 17 POWDER, FOR SOLUTION ORAL at 08:00

## 2024-05-13 RX ADMIN — MAGNESIUM HYDROXIDE 10 ML: 2400 SUSPENSION ORAL at 18:01

## 2024-05-13 RX ADMIN — TRAZODONE HYDROCHLORIDE 50 MG: 50 TABLET ORAL at 20:29

## 2024-05-13 RX ADMIN — Medication 10 ML: at 08:00

## 2024-05-13 RX ADMIN — PANTOPRAZOLE SODIUM 40 MG: 40 TABLET, DELAYED RELEASE ORAL at 05:37

## 2024-05-13 RX ADMIN — ONDANSETRON 4 MG: 2 INJECTION INTRAMUSCULAR; INTRAVENOUS at 13:01

## 2024-05-13 RX ADMIN — ASPIRIN 81 MG CHEWABLE TABLET 81 MG: 81 TABLET CHEWABLE at 08:00

## 2024-05-13 RX ADMIN — TAMSULOSIN HYDROCHLORIDE 0.4 MG: 0.4 CAPSULE ORAL at 20:30

## 2024-05-13 RX ADMIN — DIAZEPAM 5 MG: 5 TABLET ORAL at 20:29

## 2024-05-13 RX ADMIN — Medication 10 ML: at 20:38

## 2024-05-13 RX ADMIN — ATORVASTATIN CALCIUM 80 MG: 40 TABLET ORAL at 20:29

## 2024-05-13 RX ADMIN — ALUMINUM HYDROXIDE, MAGNESIUM HYDROXIDE, AND DIMETHICONE 15 ML: 400; 400; 40 SUSPENSION ORAL at 20:28

## 2024-05-13 RX ADMIN — ALUMINUM HYDROXIDE, MAGNESIUM HYDROXIDE, AND DIMETHICONE 15 ML: 400; 400; 40 SUSPENSION ORAL at 08:00

## 2024-05-13 RX ADMIN — CITALOPRAM 20 MG: 20 TABLET, FILM COATED ORAL at 08:00

## 2024-05-13 NOTE — THERAPY EVALUATION
Patient Name: Agueda Tyler  : 1945    MRN: [id number]                              Today's Date: 2024       Admit Date: 2024    Visit Dx:     ICD-10-CM ICD-9-CM   1. Cerebrovascular accident (CVA), unspecified mechanism  I63.9 434.91   2. Impaired mobility [Z74.09]  Z74.09 799.89     Patient Active Problem List   Diagnosis    Hx of colonic polyp    Gastroesophageal reflux disease without esophagitis    Iron deficiency anemia    BRBPR (bright red blood per rectum)    Stroke    Essential hypertension    Stroke-like symptom    Right upper lobe consolidation     Past Medical History:   Diagnosis Date    Anxiety     BPH (benign prostatic hyperplasia)     Colon polyp     Diverticulosis     Essential hypertension     GERD (gastroesophageal reflux disease)     Hypertriglyceridemia     Idiopathic peripheral neuropathy     Osteoarthritis      Past Surgical History:   Procedure Laterality Date    COLONOSCOPY  2016    diverticulosis, internal hemorrhoids,     COLONOSCOPY  2011    internal hemorrhoid, diverticulosis    COLONOSCOPY N/A 3/11/2021    Procedure: COLONOSCOPY WITH ANESTHESIA;  Surgeon: Ricky House MD;  Location:  [facility];  Service: Gastroenterology;  Laterality: N/A;  pre: hx polyps  post: diverticulosis. hemorrhoid.   Keenan Perez MD    ENDOSCOPY N/A 3/11/2021    Procedure: ESOPHAGOGASTRODUODENOSCOPY WITH ANESTHESIA;  Surgeon: Ricky House MD;  Location:  [facility];  Service: Gastroenterology;  Laterality: N/A;  pre: GERD  post: hiatal hernia. gastric polyps.  Keenan Perez MD    HERNIA REPAIR      inguinal    TURP / TRANSURETHRAL INCISION / DRAINAGE PROSTATE        General Information       Row Name 24 1046          Physical Therapy Time and Intention    Document Type evaluation  R 2 fingers N/T, R sided weakness, difficulty word finding, slurred speech  -LH     Mode of Treatment co-treatment;physical therapy  -       Row Name  05/13/24 1046          General Information    Patient Profile Reviewed yes  -     Prior Level of Function independent:;ADL's;driving;work  owns rw, cane, shower chair, bath bench, wc.  walk in shower  -     Existing Precautions/Restrictions fall  -     Barriers to Rehab medically complex  -[facility] Name 05/13/24 1046          Living Environment    People in Home spouse  -[facility] Name 05/13/24 1046          Home Main Entrance    Number of Stairs, Main Entrance three  -     Stair Railings, Main Entrance railing on right side (ascending)  -[facility] Name 05/13/24 1046          Stairs Within Home, Primary    Number of Stairs, Within Home, Primary none  -[facility] Name 05/13/24 1046          Cognition    Orientation Status (Cognition) oriented x 4  -[facility] Name 05/13/24 1046          Safety Issues, Functional Mobility    Safety Issues Affecting Function (Mobility) ability to follow commands  -     Impairments Affecting Function (Mobility) balance;coordination;grasp;motor control;strength;sensation/sensory awareness  -               User Key  (r) = Recorded By, (t) = Taken By, (c) = Cosigned By      Initials Name Provider Type     Oswaldo Marc, PT Physical Therapist                   Mobility       [facility] Name 05/13/24 1046          Bed Mobility    Bed Mobility supine-sit;sit-supine  -     Supine-Sit [place] (Bed Mobility) standby assist  -     Sit-Supine [place] (Bed Mobility) standby assist  -     Assistive Device (Bed Mobility) head of bed elevated  -[facility] Name 05/13/24 1046          Sit-Stand Transfer    Sit-Stand [place] (Transfers) contact guard  -[facility] Name 05/13/24 1046          Gait/Stairs (Locomotion)    [place] Level (Gait) verbal cues;moderate assist (50% patient effort)  -     Distance in Feet (Gait) 20  -     Deviations/Abnormal Patterns (Gait) gait speed decreased;susie decreased;stride length decreased;ataxic  -     Right  Sided Gait Deviations heel strike decreased  -               User Key  (r) = Recorded By, (t) = Taken By, (c) = Cosigned By      Initials Name Provider Type     Oswaldo Marc, PT Physical Therapist                   Obj/Interventions       Row Name 05/13/24 1046          Range of Motion Comprehensive    General Range of Motion bilateral upper extremity ROM WFL;bilateral lower extremity ROM WFL  -LH       Row Name 05/13/24 1046          Strength Comprehensive (MMT)    General Manual Muscle Testing (MMT) Assessment lower extremity strength deficits identified  -     Comment, General Manual Muscle Testing (MMT) Assessment RLE slow, 4/5  -LH       Row Name 05/13/24 1046          Motor Skills    Motor Skills coordination  -     Coordination gross motor deficit;right;lower extremity;heel to shin;moderate impairment  proprioception intact  -LH       Row Name 05/13/24 1046          Sensory Assessment (Somatosensory)    Sensory Assessment (Somatosensory) --  reports RUE feels like a piece of wood  -               User Key  (r) = Recorded By, (t) = Taken By, (c) = Cosigned By      Initials Name Provider Type     Oswaldo Marc, PT Physical Therapist                   Goals/Plan       Row Name 05/13/24 1046          Bed Mobility Goal 1 (PT)    Activity/Assistive Device (Bed Mobility Goal 1, PT) bed mobility activities, all  -     [place] Level/Cues Needed (Bed Mobility Goal 1, PT) independent  -     Time Frame (Bed Mobility Goal 1, PT) 10 days  -     Progress/Outcomes (Bed Mobility Goal 1, PT) new goal  -LH       Row Name 05/13/24 1046          Transfer Goal 1 (PT)    Activity/Assistive Device (Transfer Goal 1, PT) sit-to-stand/stand-to-sit  -     [place] Level/Cues Needed (Transfer Goal 1, PT) standby assist  -     Time Frame (Transfer Goal 1, PT) 10 days  -     Progress/Outcome (Transfer Goal 1, PT) new goal  -[facility] Name 05/13/24 1046          Gait Training Goal 1 (PT)     Activity/Assistive Device (Gait Training Goal 1, PT) gait (walking locomotion);decrease fall risk;diminish gait deviation  -     [place] Level (Gait Training Goal 1, PT) contact guard required  -     Distance (Gait Training Goal 1, PT) 40ft  -     Time Frame (Gait Training Goal 1, PT) 10 days  -     Progress/Outcome (Gait Training Goal 1, PT) new goal  -       Row Name 05/13/24 1046          Stairs Goal 1 (PT)    Activity/Assistive Device (Stairs Goal 1, PT) ascending stairs;descending stairs;using handrail, left;using handrail, right  -     [place] Level/Cues Needed (Stairs Goal 1, PT) contact guard required  -     Number of Stairs (Stairs Goal 1, PT) 3  -     Time Frame (Stairs Goal 1, PT) 10 days  -     Progress/Outcome (Stairs Goal 1, PT) new goal  -[facility] Name 05/13/24 1046          Therapy Assessment/Plan (PT)    Planned Therapy Interventions (PT) balance training;bed mobility training;gait training;home exercise program;strengthening;patient/family education;transfer training;neuromuscular re-education  -               User Key  (r) = Recorded By, (t) = Taken By, (c) = Cosigned By      Initials Name Provider Type     Oswaldo Marc, PT Physical Therapist                   Clinical Impression       [facility] Name 05/13/24 1046          Pain    Pretreatment Pain Rating 0/10 - no pain  -     Posttreatment Pain Rating 0/10 - no pain  -     Pain Intervention(s) Medication (See MAR)  -[facility] Name 05/13/24 1046          Plan of Care Review    Plan of Care Reviewed With patient  -     Outcome Evaluation PT IE complete.  A&Ox4.  No c/o pain.  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  Reports frustration of symptoms waxing and waning.  Today he demo decreased coordination, decreased strength, ataxic-like movement RLE.  Also reports separate issue of restless leg syndrome.  Ambulated 20ft mod A x1.  Decreased R heel strike, poor control of  RLE in swing phase.  PT to see for gait safety and balance.  Recommend acute rehab at [place].  Thank you for referral.  -[facility] Name 05/13/24 1046          Therapy Assessment/Plan (PT)    Patient/Family Therapy Goals Statement (PT) back to baseline  -     Rehab Potential (PT) good, to achieve stated therapy goals  -     Criteria for Skilled Interventions Met (PT) yes;skilled treatment is necessary  -     Therapy Frequency (PT) 2 times/day  -     Predicted Duration of Therapy Intervention (PT) until dc  -[facility] Name 05/13/24 1046          Positioning and Restraints    Pre-Treatment Position in bed  -     Post Treatment Position bed  -     In Bed fowlers;call light within reach;encouraged to call for assist;exit alarm on;side rails up x2;with family/caregiver  -               User Key  (r) = Recorded By, (t) = Taken By, (c) = Cosigned By      Initials Name Provider Type     Oswaldo Marc, PT Physical Therapist                   Outcome Measures       [facility] Name 05/13/24 1046          How much help from another person do you currently need...    Turning from your back to your side while in flat bed without using bedrails? 4  -LH     Moving from lying on back to sitting on the side of a flat bed without bedrails? 3  -LH     Moving to and from a bed to a chair (including a wheelchair)? 2  -LH     Standing up from a chair using your arms (e.g., wheelchair, bedside chair)? 3  -LH     Climbing 3-5 steps with a railing? 2  -LH     To walk in hospital room? 2  -     AM-PAC 6 Clicks Score (PT) 16  -     Highest Level of Mobility Goal 5 --> Static standing  -[facility] Name 05/13/24 1046          Modified [place] Scale    Pre-Stroke Modified [place] Scale 0 - No Symptoms at all.  -     Modified [place] Scale 4 - Moderately severe disability.  Unable to walk without assistance, and unable to attend to own bodily needs without assistance.  -[facility] Name 05/13/24 1046          Functional Assessment     Outcome Measure Options AM-PAC 6 Clicks Basic Mobility (PT);Modified [place]  -               User Key  (r) = Recorded By, (t) = Taken By, (c) = Cosigned By      Initials Name Provider Type     Oswaldo Marc, PT Physical Therapist                                 Physical Therapy Education       Title: PT OT SLP Therapies (Done)       Topic: Physical Therapy (Done)       Point: Mobility training (Done)       Learning Progress Summary             Patient Acceptance, E,D, DU,VU by  at 5/13/2024 1139    Comment: benefits of PT and POC, call for A OOB                         Point: Precautions (Done)       Learning Progress Summary             Patient Acceptance, E,D, DU,VU by  at 5/13/2024 1139    Comment: benefits of PT and POC, call for A OOB                                         User Key       Initials Effective Dates Name Provider Type Discipline     02/03/23 -  Oswaldo Marc, PT Physical Therapist PT                  PT Recommendation and Plan  Planned Therapy Interventions (PT): balance training, bed mobility training, gait training, home exercise program, strengthening, patient/family education, transfer training, neuromuscular re-education  Plan of Care Reviewed With: patient  Outcome Evaluation: PT IE complete.  A&Ox4.  No c/o pain.  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  Reports frustration of symptoms waxing and waning.  Today he demo decreased coordination, decreased strength, ataxic-like movement RLE.  Also reports separate issue of restless leg syndrome.  Ambulated 20ft mod A x1.  Decreased R heel strike, poor control of RLE in swing phase.  PT to see for gait safety and balance.  Recommend acute rehab at [place].  Thank you for referral.     Time Calculation:         PT Charges       Row Name 05/13/24 1140             Time Calculation    Start Time 1046  -      Stop Time 1128  -      Time Calculation (min) 42 min  -      PT Received On 05/13/24  -       PT Goal Re-Cert Due Date 05/23/24  -LH         Untimed Charges    PT Eval/Re-eval Minutes 42  -LH         Total Minutes    Untimed Charges Total Minutes 42  -LH       Total Minutes 42  -LH                User Key  (r) = Recorded By, (t) = Taken By, (c) = Cosigned By      Initials Name Provider Type     Oswaldo Marc, PT Physical Therapist                  Therapy Charges for Today       Code Description Service Date Service Provider Modifiers Qty    57469215436 HC PT EVAL LOW COMPLEXITY 3 5/13/2024 Oswaldo Marc, PT GP 1            PT G-Codes  Outcome Measure Options: AM-PAC 6 Clicks Basic Mobility (PT), Modified [place]  AM-PAC 6 Clicks Score (PT): 16  Modified [place] Scale: 4 - Moderately severe disability.  Unable to walk without assistance, and unable to attend to own bodily needs without assistance.  PT Discharge Summary  Anticipated Discharge Disposition (PT): inpatient rehabilitation facility    Oswaldo Marc, KIERA  5/13/2024

## 2024-05-13 NOTE — PLAN OF CARE
Goal Outcome Evaluation:           Progress: no change  Outcome Evaluation: Pt A&Ox4. VSS on RA. PPP. LEMUS. NIH 1. Pt C/O right sided numbness. Up x stand by rosemary. IV R AC IID. Family @ bedside. Call light within reach, safety maintained.

## 2024-05-13 NOTE — PLAN OF CARE
Problem: Adult Inpatient Plan of Care  Goal: Plan of Care Review  Recent Flowsheet Documentation  Taken 5/13/2024 1046 by Oswaldo Marc, PT  Plan of Care Reviewed With: patient  Outcome Evaluation: PT IE complete.  A&Ox4.  No c/o pain.  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  Reports frustration of symptoms waxing and waning.  Today he demo decreased coordination, decreased strength, ataxic-like movement RLE.  Also reports separate issue of restless leg syndrome.  Ambulated 20ft mod A x1.  Decreased R heel strike, poor control of RLE in swing phase.  PT to see for gait safety and balance.  Recommend acute rehab at [place].  Thank you for referral.   Goal Outcome Evaluation:  Plan of Care Reviewed With: patient           Outcome Evaluation: PT IE complete.  A&Ox4.  No c/o pain.  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  Reports frustration of symptoms waxing and waning.  Today he demo decreased coordination, decreased strength, ataxic-like movement RLE.  Also reports separate issue of restless leg syndrome.  Ambulated 20ft mod A x1.  Decreased R heel strike, poor control of RLE in swing phase.  PT to see for gait safety and balance.  Recommend acute rehab at [place].  Thank you for referral.      Anticipated Discharge Disposition (PT): inpatient rehabilitation facility

## 2024-05-13 NOTE — THERAPY TREATMENT NOTE
Acute Care - Physical Therapy Treatment Note  [facility]     Patient Name: Agueda Tyler  : 1945  MRN: [id number]  Today's Date: 2024      Visit Dx:     ICD-10-CM ICD-9-CM   1. Cerebrovascular accident (CVA), unspecified mechanism  I63.9 434.91   2. Impaired mobility [Z74.09]  Z74.09 799.89     Patient Active Problem List   Diagnosis    Hx of colonic polyp    Gastroesophageal reflux disease without esophagitis    Iron deficiency anemia    BRBPR (bright red blood per rectum)    Stroke    Essential hypertension    Stroke-like symptom    Right upper lobe consolidation     Past Medical History:   Diagnosis Date    Anxiety     BPH (benign prostatic hyperplasia)     Colon polyp     Diverticulosis     Essential hypertension     GERD (gastroesophageal reflux disease)     Hypertriglyceridemia     Idiopathic peripheral neuropathy     Osteoarthritis      Past Surgical History:   Procedure Laterality Date    COLONOSCOPY  2016    diverticulosis, internal hemorrhoids,     COLONOSCOPY  2011    internal hemorrhoid, diverticulosis    COLONOSCOPY N/A 3/11/2021    Procedure: COLONOSCOPY WITH ANESTHESIA;  Surgeon: Ricky House MD;  Location: [facility];  Service: Gastroenterology;  Laterality: N/A;  pre: hx polyps  post: diverticulosis. hemorrhoid.   Keenan Perez MD    ENDOSCOPY N/A 3/11/2021    Procedure: ESOPHAGOGASTRODUODENOSCOPY WITH ANESTHESIA;  Surgeon: Ricky House MD;  Location: [facility] END[facility]COPY;  Service: Gastroenterology;  Laterality: N/A;  pre: GERD  post: hiatal hernia. gastric polyps.  Keenna Perez MD    HERNIA REPAIR      inguinal    TURP / TRANSURETHRAL INCISION / DRAINAGE PROSTATE       PT Assessment (Last 12 Hours)       PT Evaluation and Treatment       Row Name 24 [facility]          Physical Therapy Time and Intention    Subjective Information complains of;weakness;nausea/vomiting  -     Document Type therapy note (daily note)  -     Mode of Treatment  physical therapy  -AH       Row Name 05/13/24 1257          General Information    Existing Precautions/Restrictions fall  -AH       Row Name 05/13/24 1257          Pain    Pretreatment Pain Rating 0/10 - no pain  -     Posttreatment Pain Rating 0/10 - no pain  -AH       Row Name 05/13/24 1257          Bed Mobility    Supine-Sit [place] (Bed Mobility) standby assist  -     Sit-Supine [place] (Bed Mobility) standby assist  -     Comment, (Bed Mobility) min assist to kevin shoes  -AH       Row Name 05/13/24 1257          Sit-Stand Transfer    Sit-Stand [place] (Transfers) minimum assist (75% patient effort);verbal cues  -AH       Row Name 05/13/24 1257          Stand-Sit Transfer    Stand-Sit [place] (Transfers) contact guard;verbal cues  -AH       Row Name 05/13/24 1257          Gait/Stairs (Locomotion)    [place] Level (Gait) moderate assist (50% patient effort);verbal cues  -     Distance in Feet (Gait) 20  -     Deviations/Abnormal Patterns (Gait) ataxic  -     Comment, (Gait/Stairs) VERY unsteady gait, ataxic, very uncoordinated  -AH       Row Name 05/13/24 1257          Safety Issues, Functional Mobility    Impairments Affecting Function (Mobility) balance;coordination;motor control   -AH       Row Name 05/13/24 1257          Positioning and Restraints    Pre-Treatment Position in bed  -     Post Treatment Position bed  -     In Bed fowlers;call light within reach;encouraged to call for assist;exit alarm on;with family/caregiver;SCD pump applied  -               User Key  (r) = Recorded By, (t) = Taken By, (c) = Cosigned By      Initials Name Provider Type     Gricel Medina, PTA Physical Therapist Assistant                    Physical Therapy Education       Title: PT OT SLP Therapies (In Progress)       Topic: Physical Therapy (Done)       Point: Mobility training (Done)       Learning Progress Summary             Patient Acceptance, E,D, DU,VU by  at 5/13/2024  1139    Comment: benefits of PT and POC, call for A OOB                         Point: Precautions (Done)       Learning Progress Summary             Patient Acceptance, E,D, DU,VU by  at 5/13/2024 1139    Comment: benefits of PT and POC, call for A OOB                                         User Key       Initials Effective Dates Name Provider Type Discipline     02/03/23 -  Oswaldo Marc, PT Physical Therapist PT                  PT Recommendation and Plan             Time Calculation:    PT Charges       Row Name 05/13/24 1326 05/13/24 1140          Time Calculation    Start Time 1257  - 1046  -     Stop Time 1320  - 1128  -     Time Calculation (min) 23 min  - 42 min  -     PT Received On 05/13/24  - 05/13/24  -     PT Goal Re-Cert Due Date -- 05/23/24  -        Time Calculation- PT    Total Timed Code Minutes- PT 23 minute(s)  - --        Timed Charges    84850 - PT Therapeutic Activity Minutes 23  -AH --        Untimed Charges    PT Eval/Re-eval Minutes -- 42  -        Total Minutes    Timed Charges Total Minutes 23  -AH --     Untimed Charges Total Minutes -- 42  -      Total Minutes 23  -AH 42  -               User Key  (r) = Recorded By, (t) = Taken By, (c) = Cosigned By      Initials Name Provider Type     Gricel Medina PTA Physical Therapist Assistant     Oswaldo Marc, PT Physical Therapist                  Therapy Charges for Today       Code Description Service Date Service Provider Modifiers Qty    45048692555 HC PT THERAPEUTIC ACT EA 15 MIN 5/13/2024 Gricel Medina PTA GP 2            PT G-Codes  Outcome Measure Options: AM-PAC 6 Clicks Basic Mobility (PT), Modified [place]  AM-PAC 6 Clicks Score (PT): 16  AM-PAC 6 Clicks Score (OT): 15  Modified [place] Scale: 4 - Moderately severe disability.  Unable to walk without assistance, and unable to attend to own bodily needs without assistance.    Gricel Medina PTA  5/13/2024

## 2024-05-13 NOTE — THERAPY EVALUATION
Acute Care - Speech Language Pathology   Swallow Initial Evaluation [facility]     Patient Name: Agueda Tyler  : 1945  MRN: [id number]  Today's Date: 2024               Admit Date: 2024    SPEECH-LANGUAGE PATHOLOGY EVALUATION - SWALLOW  Subjective: The patient was seen on this date for a Clinical Swallow evaluation.  Patient was alert and cooperative.  Significant history: Incident of slurred speech. CT of head- no acute intracranial abnormality.  Objective: Textures given included thin liquid, nectar thick liquid, honey thick liquid, puree consistency, and regular consistency.  Assessment: Difficulties were noted with regular consistency.  Observations:  Pt reports right sided facial numbness. Dysarthric speech noted at times of assessment. Mild lingual residue along tongue blade following swallow completion of regular solids. Adequate laryngeal movement with all consistencies completed. The pt does expresses concerns for difficulty with solid foods; however, no overt s/s of distress or aspiration are noted at this time.  SLP Findings:  Patient presents with mild oral dysphagia, without esophageal component.   Recommendations: Diet Textures: thin liquid, regular consistency food.  Medications should be taken whole with thin liquids. May have water and ice between meals after oral care, under staff or family supervision and with the recommended strategies for safe swallowing.   Recommended Strategies: Upright for PO, small bites and sips, and may use straw. Oral care before breakfast, after all meals and PRN.  Other Recommended Evaluations: Speech-Language Evaluation    Dysphagia therapy is recommended. Rationale: Ensure diet tolerance.     Moody Slaughter CCC-SLP 2024 14:42 CDT    Visit Dx:     ICD-10-CM ICD-9-CM   1. Cerebrovascular accident (CVA), unspecified mechanism  I63.9 434.91   2. Impaired mobility [Z74.09]  Z74.09 799.89   3. Dysphagia, unspecified type  R13.10 787.20     Patient  Active Problem List   Diagnosis    Hx of colonic polyp    Gastroesophageal reflux disease without esophagitis    Iron deficiency anemia    BRBPR (bright red blood per rectum)    Stroke    Essential hypertension    Stroke-like symptom    Right upper lobe consolidation     Past Medical History:   Diagnosis Date    Anxiety     BPH (benign prostatic hyperplasia)     Colon polyp     Diverticulosis     Essential hypertension     GERD (gastroesophageal reflux disease)     Hypertriglyceridemia     Idiopathic peripheral neuropathy     Osteoarthritis      Past Surgical History:   Procedure Laterality Date    COLONOSCOPY  04/18/2016    diverticulosis, internal hemorrhoids,     COLONOSCOPY  03/30/2011    internal hemorrhoid, diverticulosis    COLONOSCOPY N/A 3/11/2021    Procedure: COLONOSCOPY WITH ANESTHESIA;  Surgeon: Ricky House MD;  Location:  [facility];  Service: Gastroenterology;  Laterality: N/A;  pre: hx polyps  post: diverticulosis. hemorrhoid.   Keenan Perez MD    ENDOSCOPY N/A 3/11/2021    Procedure: ESOPHAGOGASTRODUODENOSCOPY WITH ANESTHESIA;  Surgeon: Ricky House MD;  Location:  [facility];  Service: Gastroenterology;  Laterality: N/A;  pre: GERD  post: hiatal hernia. gastric polyps.  Keenan Perez MD    HERNIA REPAIR      inguinal    TURP / TRANSURETHRAL INCISION / DRAINAGE PROSTATE         SLP Recommendation and Plan  SLP Swallowing Diagnosis: oral dysphagia, R/O pharyngeal dysphagia (05/13/24 1307)  SLP Diet Recommendation: regular textures, thin liquids (05/13/24 1307)  Recommended Precautions and Strategies: upright posture during/after eating, small bites of food and sips of liquid (05/13/24 1307)  SLP Rec. for Method of Medication Administration: meds whole, with thin liquids, as tolerated (05/13/24 1307)     Monitor for Signs of Aspiration: yes, notify SLP if any concerns (05/13/24 1307)  Recommended Diagnostics: SLE/Cog/Motor Speech Evaluation (05/13/24  1307)  Swallow Criteria for Skilled Therapeutic Interventions Met: demonstrates skilled criteria (05/13/24 1307)  Anticipated Discharge Disposition (SLP): unknown (05/13/24 1307)  Rehab Potential/Prognosis, Swallowing: good, to achieve stated therapy goals (05/13/24 1307)  Therapy Frequency (Swallow): at least, 3 days per week (05/13/24 1307)  Predicted Duration Therapy Intervention (Days): until discharge (05/13/24 1307)  Oral Care Recommendations: Oral Care BID/PRN (05/13/24 1307)                                        Plan of Care Reviewed With: patient, spouse  Progress: no change  Outcome Evaluation: See note      SWALLOW EVALUATION (Last 72 Hours)       SLP Adult Swallow Evaluation       Row Name 05/13/24 1307                   Rehab Evaluation    Document Type evaluation  -CS        Subjective Information complains of;weakness;numbness  -CS        Patient Observations alert;cooperative;agree to therapy  -CS        Patient/Family/Caregiver Comments/Observations S/O present  -CS        Patient Effort good  -CS           General Information    Patient Profile Reviewed yes  -CS        Pertinent History Of Current Problem CT of head- No acute intracranial abnormality. Instance of slurred speech.  -CS        Current Method of Nutrition regular textures;thin liquids  -CS        Precautions/Limitations, Vision WFL with corrective lenses  -CS        Precautions/Limitations, Hearing WFL  -CS        Prior Level of Function-Communication WFL  -CS        Prior Level of Function-Swallowing no diet consistency restrictions  -CS        Plans/Goals Discussed with patient;spouse/S.O.  -CS        Barriers to Rehab none identified  -CS        Patient's Goals for Discharge return to all previous roles/activities  -CS        Family Goals for Discharge patient able to return to all previous activities/roles  -CS           Pain    Additional Documentation Pain Scale: FACES Pre/Post-Treatment (Group)  -CS           Pain Scale: FACES  Pre/Post-Treatment    Pain: FACES Scale, Pretreatment 0-->no hurt  -CS        Posttreatment Pain Rating 0-->no hurt  -CS           Oral Motor Structure and Function    Dentition Assessment natural, present and adequate  -CS        Secretion Management WNL/WFL  -CS        Mucosal Quality moist, healthy  -CS        Gag Response WFL  -CS        Volitional Swallow WFL  -CS        Volitional Cough WFL  -CS           Oral Musculature and Cranial Nerve Assessment    Oral Motor General Assessment oral labial or buccal impairment  -CS        Mandibular Impairment Detail, Cranial Nerve V (Trigeminal) reduced facial sensation on right  -CS        Oral Labial or Buccal Impairment, Detail, Cranial Nerve VII (Facial): CN7: Motor Impairment;right labial droop;CN7: Sensory Impairment  -CS           General Eating/Swallowing Observations    Respiratory Support Currently in Use room air  -CS        Positioning During Eating upright in bed  -CS        Utensils Used spoon;straw  -CS        Consistencies Trialed regular textures;honey-thick liquids;nectar/syrup-thick liquids;thin liquids;pureed  -CS           Clinical Swallow Eval    Oral Prep Phase WFL  -CS        Oral Transit WFL  -CS        Oral Residue WFL  -CS        Pharyngeal Phase WFL  -CS        Esophageal Phase unremarkable  -CS        Clinical Swallow Evaluation Summary See note  -CS           SLP Evaluation Clinical Impression    SLP Swallowing Diagnosis oral dysphagia;R/O pharyngeal dysphagia  -CS        Functional Impact risk of aspiration/pneumonia  -CS        Rehab Potential/Prognosis, Swallowing good, to achieve stated therapy goals  -CS        Swallow Criteria for Skilled Therapeutic Interventions Met demonstrates skilled criteria  -CS           Recommendations    Therapy Frequency (Swallow) at least;3 days per week  -CS        Predicted Duration Therapy Intervention (Days) until discharge  -CS        SLP Diet Recommendation regular textures;thin liquids  -CS         Recommended Diagnostics SLE/Cog/Motor Speech Evaluation  -CS        Recommended Precautions and Strategies upright posture during/after eating;small bites of food and sips of liquid  -CS        Oral Care Recommendations Oral Care BID/PRN  -CS        SLP Rec. for Method of Medication Administration meds whole;with thin liquids;as tolerated  -CS        Monitor for Signs of Aspiration yes;notify SLP if any concerns  -CS        Anticipated Discharge Disposition (SLP) unknown  -CS           Swallow Goals (SLP)    Swallow LTGs Swallow Long Term Goal (free text)  -CS        Swallow STGs diet tolerance goal selection (SLP)  -CS        Diet Tolerance Goal Selection (SLP) Patient will tolerate trials of  -CS           (LTG) Swallow    (LTG) Swallow Pt will tolerate LRD w/o any overt s/s of aspiration.  -CS        [place] (Swallow Long Term Goal) independently (over 90% accuracy)  -CS        Time Frame (Swallow Long Term Goal) by discharge  -CS        Barriers (Swallow Long Term Goal) n/a  -CS        Progress/Outcomes (Swallow Long Term Goal) new goal  -CS           (STG) Patient will tolerate trials of    Consistencies Trialed (Tolerate trials) regular textures;thin liquids  -CS        Desired Outcome (Tolerate trials) without signs/symptoms of aspiration;without signs of distress  -CS        [place] (Tolerate trials) independently (over 90% accuracy)  -CS        Time Frame (Tolerate trials) by discharge  -CS        Progress/Outcomes (Tolerate trials) new goal  -CS                  User Key  (r) = Recorded By, (t) = Taken By, (c) = Cosigned By      Initials Name Effective Dates    CS Moody Slaughter, ENRIQUETA-SLP 05/07/24 -                     EDUCATION  The patient has been educated in the following areas:   Dysphagia (Swallowing Impairment).        SLP GOALS       Row Name 05/13/24 7115             (LTG) Swallow    (LTG) Swallow Pt will tolerate LRD w/o any overt s/s of aspiration.  -CS      [place] (Swallow Long  Term Goal) independently (over 90% accuracy)  -CS      Time Frame (Swallow Long Term Goal) by discharge  -CS      Barriers (Swallow Long Term Goal) n/a  -CS      Progress/Outcomes (Swallow Long Term Goal) new goal  -CS         (STG) Patient will tolerate trials of    Consistencies Trialed (Tolerate trials) regular textures;thin liquids  -CS      Desired Outcome (Tolerate trials) without signs/symptoms of aspiration;without signs of distress  -CS      [place] (Tolerate trials) independently (over 90% accuracy)  -CS      Time Frame (Tolerate trials) by discharge  -CS      Progress/Outcomes (Tolerate trials) new goal  -CS                User Key  (r) = Recorded By, (t) = Taken By, (c) = Cosigned By      Initials Name Provider Type    Moody Robertson CCC-SLP Speech and Language Pathologist                       Time Calculation:    Time Calculation- SLP       Row Name 05/13/24 1439             Time Calculation- SLP    SLP Start Time 1307  -CS      SLP Stop Time 1433  -CS      SLP Time Calculation (min) 86 min  -CS      SLP Received On 05/13/24  -CS      SLP Goal Re-Cert Due Date 05/23/24  -CS         Untimed Charges    57249-XP Eval Oral Pharyng Swallow Minutes 86  -CS         Total Minutes    Untimed Charges Total Minutes 86  -CS       Total Minutes 86  -CS                User Key  (r) = Recorded By, (t) = Taken By, (c) = Cosigned By      Initials Name Provider Type    Moody Robertson CCC-SLP Speech and Language Pathologist                    Therapy Charges for Today       Code Description Service Date Service Provider Modifiers Qty    [id number] HC ST EVAL ORAL PHARYNG SWALLOW 6 5/13/2024 Moody Slaughter CCC-POLI GN 1                 BRISEIDA Almeida  5/13/2024

## 2024-05-13 NOTE — THERAPY EVALUATION
Patient Name: Agueda Tyler  : 1945    MRN: [id number]                              Today's Date: 2024       Admit Date: 2024    Visit Dx:     ICD-10-CM ICD-9-CM   1. Cerebrovascular accident (CVA), unspecified mechanism  I63.9 434.91   2. Impaired mobility [Z74.09]  Z74.09 799.89     Patient Active Problem List   Diagnosis    Hx of colonic polyp    Gastroesophageal reflux disease without esophagitis    Iron deficiency anemia    BRBPR (bright red blood per rectum)    Stroke    Essential hypertension    Stroke-like symptom    Right upper lobe consolidation     Past Medical History:   Diagnosis Date    Anxiety     BPH (benign prostatic hyperplasia)     Colon polyp     Diverticulosis     Essential hypertension     GERD (gastroesophageal reflux disease)     Hypertriglyceridemia     Idiopathic peripheral neuropathy     Osteoarthritis      Past Surgical History:   Procedure Laterality Date    COLONOSCOPY  2016    diverticulosis, internal hemorrhoids,     COLONOSCOPY  2011    internal hemorrhoid, diverticulosis    COLONOSCOPY N/A 3/11/2021    Procedure: COLONOSCOPY WITH ANESTHESIA;  Surgeon: Ricky House MD;  Location:  [facility];  Service: Gastroenterology;  Laterality: N/A;  pre: hx polyps  post: diverticulosis. hemorrhoid.   Keenan Perez MD    ENDOSCOPY N/A 3/11/2021    Procedure: ESOPHAGOGASTRODUODENOSCOPY WITH ANESTHESIA;  Surgeon: Ricky House MD;  Location:  [facility];  Service: Gastroenterology;  Laterality: N/A;  pre: GERD  post: hiatal hernia. gastric polyps.  Keenan Perez MD    HERNIA REPAIR      inguinal    TURP / TRANSURETHRAL INCISION / DRAINAGE PROSTATE        General Information       Row Name 24 1045          OT Time and Intention    Document Type evaluation  Pt presents with N/T in R hand progressing to the entire RUE and RLE, slurred speech.  Dx: r/o CVA  -CS     Mode of Treatment occupational therapy;co-treatment  -CS        Row Name 05/13/24 1045          General Information    Patient Profile Reviewed yes  -CS     Prior Level of Function independent:;all household mobility;ADL's;dressing;bathing  -CS     Existing Precautions/Restrictions fall  -CS       Row Name 05/13/24 1045          Occupational Profile    Environmental Supports and Barriers (Occupational Profile) RWX, straight cane, wheelchair, shower chair, tub transfer bench, walk in shower  -CS       Row Name 05/13/24 1045          Living Environment    People in Home spouse  -CS       Row Name 05/13/24 1045          Home Main Entrance    Number of Stairs, Main Entrance three  -CS     Stair Railings, Main Entrance railing on right side (ascending)  -CS       Row Name 05/13/24 1045          Stairs Within Home, Primary    Number of Stairs, Within Home, Primary none  -CS       Row Name 05/13/24 1045          Cognition    Orientation Status (Cognition) oriented x 4  slow, heavy, slurred speech  -       Row Name 05/13/24 1045          Safety Issues, Functional Mobility    Impairments Affecting Function (Mobility) balance;coordination;grasp;motor control;postural/trunk control;sensation/sensory awareness;strength  -CS               User Key  (r) = Recorded By, (t) = Taken By, (c) = Cosigned By      Initials Name Provider Type    CS Abigail Collier S, OTR/L, CNT Occupational Therapist                     Mobility/ADL's       Row Name 05/13/24 1045          Bed Mobility    Bed Mobility supine-sit;sit-supine  -CS     Supine-Sit [place] (Bed Mobility) standby assist  -CS     Sit-Supine [place] (Bed Mobility) standby assist  -CS     Assistive Device (Bed Mobility) head of bed elevated  -       Row Name 05/13/24 1045          Transfers    Transfers sit-stand transfer;stand-sit transfer  -       Row Name 05/13/24 1045          Sit-Stand Transfer    Sit-Stand [place] (Transfers) contact guard;verbal cues  -       Row Name 05/13/24 1045          Stand-Sit Transfer     Stand-Sit [place] (Transfers) contact guard;verbal cues  -[facility] Name 05/13/24 1045          Functional Mobility    Functional Mobility- Ind. Level minimum assist (75% patient effort);2 person assist required  -     Functional Mobility- Device --  L hand held assist  -     Functional Mobility- Safety Issues balance decreased during turns;step length decreased;weight-shifting ability decreased  -     Functional Mobility- Comment Pt walked into the hallway and back to bedside.  Pt demonstrated shuffling gait with tendency to fall/lose balance forward.  -[facility] Name 05/13/24 1045          Activities of Daily Living    BADL Assessment/Intervention lower body dressing  -[facility] Name 05/13/24 1045          Lower Body Dressing Assessment/Training    [place] Level (Lower Body Dressing) don;doff;socks;minimum assist (75% patient effort)  -     Position (Lower Body Dressing) edge of bed sitting  -     Comment, (Lower Body Dressing) due to RUE impairment  -               User Key  (r) = Recorded By, (t) = Taken By, (c) = Cosigned By      Initials Name Provider Type     Abigail Collier, OTR/L, CNT Occupational Therapist                   Obj/Interventions       [facility] Name 05/13/24 1045          Sensory Assessment (Somatosensory)    Sensory Assessment (Somatosensory) right UE  -     Right UE Sensory Assessment light touch localization;intact;light touch awareness;impaired  -     Sensory Subjective Reports numbness  -     Sensory Assessment R hand numbness/diminished sensation noted compared to the L hand; Proximal RUE sensation intact  -[facility] Name 05/13/24 1045          Vision Assessment/Intervention    Visual Impairment/Limitations WFL  -[facility] Name 05/13/24 1045          Range of Motion Comprehensive    General Range of Motion bilateral upper extremity ROM WFL  -CS     Comment, General Range of Motion BUE AROM WFL, mild RUE drift noted  -[facility] Name 05/13/24 1045           Strength Comprehensive (MMT)    Comment, General Manual Muscle Testing (MMT) Assessment LUE: 5/5, RUE shoulder, elbow: 4+/5 acheived with maximum effort/attention, increase time, RUE hand: 4/5 achieved with max effort/attn, increase time  -CS       Row Name 05/13/24 1045          Motor Skills    Motor Skills coordination  -CS     Coordination left;upper extremity;WFL;right;moderate impairment;dysdiadochokinesia;finger to nose;fine motor deficit;gross motor deficit  -[facility] Name 05/13/24 1045          Balance    Balance Assessment sitting static balance;sitting dynamic balance;standing static balance;standing dynamic balance  -CS     Static Sitting Balance standby assist;verbal cues  -CS     Dynamic Sitting Balance contact guard;verbal cues  -CS     Position, Sitting Balance sitting edge of bed  -CS     Static Standing Balance contact guard;verbal cues  -CS     Dynamic Standing Balance minimal assist;2-person assist;verbal cues  -CS     Position/Device Used, Standing Balance --  L hand held assist  -CS               User Key  (r) = Recorded By, (t) = Taken By, (c) = Cosigned By      Initials Name Provider Type    CS Abigail Collier S, OTR/L, CNT Occupational Therapist                   Goals/Plan       [facility] Name 05/13/24 1145          Transfer Goal 1 (OT)    Activity/Assistive Device (Transfer Goal 1, OT) toilet  -CS     [place] Level/Cues Needed (Transfer Goal 1, OT) standby assist  -CS     Time Frame (Transfer Goal 1, OT) long term goal (LTG)  -CS     Progress/Outcome (Transfer Goal 1, OT) new goal  -CS       [facility] Name 05/13/24 1145          Self-Feeding Goal 1 (OT)    Activity/Device (Self-Feeding Goal 1, OT) self-feeding skills, all  -CS     [place] Level/Cues Needed (Self-Feeding Goal 1, OT) modified independence  -CS     Time Frame (Self-Feeding Goal 1, OT) long term goal (LTG)  -CS     Strategies/Barriers (Self-Feeding Goal 1, OT) with RUE as primary  -CS     Progress/Outcomes  (Self-Feeding Goal 1, OT) new goal  -CS       Row Name 05/13/24 1145          Problem Specific Goal 1 (OT)    Problem Specific Goal 1 (OT) Pt will be I with RUE coordination and strengthening HEP.  -CS     Time Frame (Problem Specific Goal 1, OT) long term goal (LTG)  -CS     Progress/Outcome (Problem Specific Goal 1, OT) new goal  -CS       Row Name 05/13/24 1145          Therapy Assessment/Plan (OT)    Planned Therapy Interventions (OT) activity tolerance training;adaptive equipment training;BADL retraining;functional balance retraining;neuromuscular control/coordination retraining;patient/caregiver education/training;occupation/activity based interventions;strengthening exercise;transfer/mobility retraining  -CS               User Key  (r) = Recorded By, (t) = Taken By, (c) = Cosigned By      Initials Name Provider Type    CS Abigail Collier, OTR/L, CNT Occupational Therapist                   Clinical Impression       Row Name 05/13/24 1045          Pain Assessment    Pretreatment Pain Rating 0/10 - no pain  RUE and RLE numbness per pt report, nausea  -CS     Posttreatment Pain Rating 0/10 - no pain  -CS     Pain Intervention(s) Medication (See MAR);Repositioned;Ambulation/increased activity  -CS       Row Name 05/13/24 1045          Plan of Care Review    Plan of Care Reviewed With patient  -CS     Progress no change  -CS     Outcome Evaluation OT evaluation completed. Pt is A&Ox4.  Pt reports RUE and RLE numbness and weakness that has progressed even this morning.  Pt completed bed mobility with SBA.  Pt completed functional transfers with CGA and functional mobility in the hallway with min A x2.  Pt demo significant shuffling in his gait with tendency to fall/lose balance foward.  Pt demo no focal neurological deficits in regards to his vision, however he does demo significant motor control deficits in his RUE.  He is able to achieve functional strength in all joints of his RUE however it is only with max  effort and attention, and increased time.  Pt reports significant difficulty this AM using his RUE to self feed and he is R hand dominant.  Pt also demo some light touch awareness deficits in his R hand, however his sensation is intact proximally in his RUE.  Pt completed simulated LB dressing task sitting EOB with CGA/min A due to RUE functional deficits, however he will likely required increased assist for standing ADLs due to poor balance.  OT will cont to follow to maximize his I and safety with ADLs and functional mobility.  Due to pt report change in status from this AM/yesterday further imaging in recommended.  RN notified.  It is rec for pt to discharge to acute rehab. It is unsafe at this time for pt to discharge home.  -CS       Row Name 05/13/24 1047          Therapy Assessment/Plan (OT)    Patient/Family Therapy Goal Statement (OT) to go home  -CS     Rehab Potential (OT) good, to achieve stated therapy goals  -CS     Criteria for Skilled Therapeutic Interventions Met (OT) yes;skilled treatment is necessary  -CS     Therapy Frequency (OT) 5 times/wk  -CS     Predicted Duration of Therapy Intervention (OT) until hospital discharge  -CS       Row Name 05/13/24 1042          Therapy Plan Review/Discharge Plan (OT)    Anticipated Discharge Disposition (OT) inpatient rehabilitation facility  -       Row Name 05/13/24 1043          Positioning and Restraints    Pre-Treatment Position in bed  -CS     Post Treatment Position bed  -CS     In Bed fowlers;call light within reach;notified nsg;encouraged to call for assist;side rails up x3;exit alarm on  -CS               User Key  (r) = Recorded By, (t) = Taken By, (c) = Cosigned By      Initials Name Provider Type    CS Abigail Collier, OTR/L, CNT Occupational Therapist                   Outcome Measures       Row Name 05/13/24 1042          How much help from another is currently needed...    Putting on and taking off regular lower body clothing? 2  -CS      Bathing (including washing, rinsing, and drying) 2  -CS     Toileting (which includes using toilet bed pan or urinal) 2  -CS     Putting on and taking off regular upper body clothing 3  -CS     Taking care of personal grooming (such as brushing teeth) 3  -CS     Eating meals 3  -CS     AM-PAC 6 Clicks Score (OT) 15  -       Row Name 05/13/24 1046          How much help from another person do you currently need...    Turning from your back to your side while in flat bed without using bedrails? 4  -LH     Moving from lying on back to sitting on the side of a flat bed without bedrails? 3  -LH     Moving to and from a bed to a chair (including a wheelchair)? 2  -LH     Standing up from a chair using your arms (e.g., wheelchair, bedside chair)? 3  -LH     Climbing 3-5 steps with a railing? 2  -LH     To walk in hospital room? 2  -     AM-PAC 6 Clicks Score (PT) 16  -     Highest Level of Mobility Goal 5 --> Static standing  -       Row Name 05/13/24 1046 05/13/24 1045       Modified [place] Scale    Pre-Stroke Modified [place] Scale 0 - No Symptoms at all.  -LH 0 - No Symptoms at all.  -    Modified [place] Scale 4 - Moderately severe disability.  Unable to walk without assistance, and unable to attend to own bodily needs without assistance.  -LH 4 - Moderately severe disability.  Unable to walk without assistance, and unable to attend to own bodily needs without assistance.  -      Row Name 05/13/24 1046 05/13/24 1045       Functional Assessment    Outcome Measure Options AM-PAC 6 Clicks Basic Mobility (PT);Modified [place]  - AM-PAC 6 Clicks Daily Activity (OT);Modified [place]  -CS              User Key  (r) = Recorded By, (t) = Taken By, (c) = Cosigned By      Initials Name Provider Type     Oswaldo Marc, PT Physical Therapist    Abigail Buenrostro, OTR/L, CNT Occupational Therapist                    Occupational Therapy Education       Title: PT OT SLP Therapies (In Progress)       Topic: Occupational  Therapy (In Progress)       Point: ADL training (In Progress)       Description:   Instruct learner(s) on proper safety adaptation and remediation techniques during self care or transfers.   Instruct in proper use of assistive devices.                  Learning Progress Summary             Patient Acceptance, E, NR by CS at 5/13/2024 1159    Acceptance, E, VU by CS at 5/13/2024 1049   Family Acceptance, E, VU by CS at 5/13/2024 1049                         Point: Home exercise program (In Progress)       Description:   Instruct learner(s) on appropriate technique for monitoring, assisting and/or progressing therapeutic exercises/activities.                  Learning Progress Summary             Patient Acceptance, E, NR by CS at 5/13/2024 1159    Acceptance, E, VU by CS at 5/13/2024 1049   Family Acceptance, E, VU by CS at 5/13/2024 1049                         Point: Precautions (In Progress)       Description:   Instruct learner(s) on prescribed precautions during self-care and functional transfers.                  Learning Progress Summary             Patient Acceptance, E, NR by CS at 5/13/2024 1159    Acceptance, E, VU by CS at 5/13/2024 1049   Family Acceptance, E, VU by CS at 5/13/2024 1049                         Point: Body mechanics (In Progress)       Description:   Instruct learner(s) on proper positioning and spine alignment during self-care, functional mobility activities and/or exercises.                  Learning Progress Summary             Patient Acceptance, E, NR by CS at 5/13/2024 1159    Acceptance, E, VU by CS at 5/13/2024 1049   Family Acceptance, E, VU by CS at 5/13/2024 1049                                         User Key       Initials Effective Dates Name Provider Type Discipline     02/03/23 -  Abigail Collier, OTR/L, CNT Occupational Therapist OT                  OT Recommendation and Plan  Planned Therapy Interventions (OT): activity tolerance training, adaptive equipment  training, BADL retraining, functional balance retraining, neuromuscular control/coordination retraining, patient/caregiver education/training, occupation/activity based interventions, strengthening exercise, transfer/mobility retraining  Therapy Frequency (OT): 5 times/wk  Plan of Care Review  Plan of Care Reviewed With: patient  Progress: no change  Outcome Evaluation: OT evaluation completed. Pt is A&Ox4.  Pt reports RUE and RLE numbness and weakness that has progressed even this morning.  Pt completed bed mobility with SBA.  Pt completed functional transfers with CGA and functional mobility in the hallway with min A x2.  Pt demo significant shuffling in his gait with tendency to fall/lose balance foward.  Pt demo no focal neurological deficits in regards to his vision, however he does demo significant motor control deficits in his RUE.  He is able to achieve functional strength in all joints of his RUE however it is only with max effort and attention, and increased time.  Pt reports significant difficulty this AM using his RUE to self feed and he is R hand dominant.  Pt also demo some light touch awareness deficits in his R hand, however his sensation is intact proximally in his RUE.  Pt completed simulated LB dressing task sitting EOB with CGA/min A due to RUE functional deficits, however he will likely required increased assist for standing ADLs due to poor balance.  OT will cont to follow to maximize his I and safety with ADLs and functional mobility.  Due to pt report change in status from this AM/yesterday further imaging in recommended.  RN notified.  It is rec for pt to discharge to acute rehab. It is unsafe at this time for pt to discharge home.     Time Calculation:         Time Calculation- OT       Row Name 05/13/24 1156             Time Calculation- OT    OT Start Time 1045  -CS      OT Stop Time 1133  -CS      OT Time Calculation (min) 48 min  -CS      OT Received On 05/13/24  -CS      OT Goal  Re-Cert Due Date 05/23/24  -CS         Untimed Charges    OT Eval/Re-eval Minutes 48  -CS         Total Minutes    Untimed Charges Total Minutes 48  -CS       Total Minutes 48  -CS                User Key  (r) = Recorded By, (t) = Taken By, (c) = Cosigned By      Initials Name Provider Type    CS Abigail Collier OTR/L, MARCO Occupational Therapist                  Therapy Charges for Today       Code Description Service Date Service Provider Modifiers Qty    70191413619 HC OT EVAL MOD COMPLEXITY 3 5/13/2024 Abigail Collier OTR/L, MARCO GO 1                 AGUS Iqbal/L, CNT  5/13/2024

## 2024-05-13 NOTE — PLAN OF CARE
Goal Outcome Evaluation:  Plan of Care Reviewed With: patient, spouse         Patient is alert and oriented x 4. VSS. RA.Neuro change noted this AM . Dysarthria present. Mild drift noted right upper extremity, numbness present right upper and lower extremity. Ataxia present on right side. Ataxic gait.  MD notified immediately with NIH score change. CT stat completed. MD at bedside this afternoon. Patient will have MRI repeat. Voiding in urinal. LBM yesterday. PRN and scheduled medication given per patient request. SCDs. Safety precautions maintained. Will continue to monitor.

## 2024-05-13 NOTE — PLAN OF CARE
Goal Outcome Evaluation:  Plan of Care Reviewed With: patient        Progress: no change  Outcome Evaluation: OT evaluation completed. Pt is A&Ox4.  Pt reports RUE and RLE numbness and weakness that has progressed even this morning.  Pt completed bed mobility with SBA.  Pt completed functional transfers with CGA and functional mobility in the hallway with min A x2.  Pt demo significant shuffling in his gait with tendency to fall/lose balance foward.  Pt demo no focal neurological deficits in regards to his vision, however he does demo significant motor control deficits in his RUE.  He is able to achieve functional strength in all joints of his RUE however it is only with max effort and attention, and increased time.  Pt reports significant difficulty this AM using his RUE to self feed and he is R hand dominant.  Pt also demo some light touch awareness deficits in his R hand, however his sensation is intact proximally in his RUE.  Pt completed simulated LB dressing task sitting EOB with CGA/min A due to RUE functional deficits, however he will likely required increased assist for standing ADLs due to poor balance.  OT will cont to follow to maximize his I and safety with ADLs and functional mobility.  Due to pt report change in status from this AM/yesterday further imaging in recommended.  RN notified.  It is rec for pt to discharge to acute rehab. It is unsafe at this time for pt to discharge home.      Anticipated Discharge Disposition (OT): inpatient rehabilitation facility

## 2024-05-13 NOTE — CONSULTS
Neurology Consult Note    Consult Date: 2024  Referring MD: Marco Koo, *  Reason for Consult: Concern for acute ischemic stroke    Patient: Agueda Tyler (78 y.o. male)  MRN: [id number]  : 1945    History of Present Illness:   Agueda Tyler is a 78 y.o. man with history of hypertension and iron deficiency anemia with prior rectal bleeding who is presented to the emergency department with strokelike symptoms.    Per report patient woke up this morning and noticed tingling numbness in his right hand, as the day progressed he noticed the symptoms gotten worse and around 1930 he noticed right arm and right leg weakness as well as slurred speech and difficulty finding words.  This prompted him to come to the emergency department.    On arrival to the ED, he was hypertensive into the 160s over 100s, Atralin extra scale of 2 for dysarthria and mild sensory loss in the right arm.  However the right arm was noted to be weak specifically distally with a strength of 4 out of 5.     Decision for TNK was not taken given that he presented outside the time window from last known well.  After review of CTA head and neck she was not considered candidate for EVT.      Medical History:   Past Medical/Surgical Hx:  Past Medical History:   Diagnosis Date    Anxiety     BPH (benign prostatic hyperplasia)     Colon polyp     Diverticulosis     Essential hypertension     GERD (gastroesophageal reflux disease)     Hypertriglyceridemia     Idiopathic peripheral neuropathy     Osteoarthritis      Past Surgical History:   Procedure Laterality Date    COLONOSCOPY  2016    diverticulosis, internal hemorrhoids,     COLONOSCOPY  2011    internal hemorrhoid, diverticulosis    COLONOSCOPY N/A 3/11/2021    Procedure: COLONOSCOPY WITH ANESTHESIA;  Surgeon: Ricky House MD;  Location: [facility] ENDOSCOPY;  Service: Gastroenterology;  Laterality: N/A;  pre: hx polyps  post: diverticulosis. hemorrhoid.  Subjective:      Patient ID: Leena Gregory is a 61 y.o. female Established patient, here for evaluation of the following chief complaint(s):  Chief Complaint   Patient presents with    Weight Management       HPI    79-year-old female with hx of hypovitaminosis D and low vitamin b12 presents for a f/u visit. The patient is fully achieving weight loss. Obesity: The patient is monitoring her caloric intake and actively participating in aerobic exercise daily. Her present BMI=39.46 She is compliant with semaglutide. At present he denies polyuria,  Polydipsia, constitutional, sinus, visual, cardiopulmonary, urologic, gastrointestinal, immunologic/hematologic, musculoskeletal, neurologic,dermatologic, or psychiatric complaints. Current Outpatient Medications on File Prior to Visit   Medication Sig Dispense Refill    ibuprofen (ADVIL;MOTRIN) 600 MG tablet Take 600 mg by mouth every 6 hours as needed for Pain       No current facility-administered medications on file prior to visit. Penicillins and Sulfa antibiotics    Review of Systems   Constitutional:  Negative for chills, diaphoresis, fatigue and fever. HENT:  Negative for congestion, dental problem, drooling, ear discharge, ear pain, facial swelling, hearing loss, mouth sores, nosebleeds, postnasal drip, rhinorrhea, sinus pressure, sinus pain, sneezing, sore throat, tinnitus, trouble swallowing and voice change. Eyes:  Negative for photophobia, pain, discharge, redness, itching and visual disturbance. Respiratory:  Negative for apnea, cough, chest tightness, shortness of breath and wheezing. Cardiovascular:  Negative for chest pain, palpitations and leg swelling. Gastrointestinal:  Negative for abdominal distention, abdominal pain, blood in stool, constipation, diarrhea, nausea, rectal pain and vomiting. Endocrine: Negative for cold intolerance, heat intolerance, polydipsia, polyphagia and polyuria.    Genitourinary:  Negative for   Keenan Perez MD    ENDOSCOPY N/A 3/11/2021    Procedure: ESOPHAGOGASTRODUODENOSCOPY WITH ANESTHESIA;  Surgeon: Ricky Huose MD;  Location: [facility] ENDOSCOPY;  Service: Gastroenterology;  Laterality: N/A;  pre: GERD  post: hiatal hernia. gastric polyps.  Keenan Perez MD    HERNIA REPAIR      inguinal    TURP / TRANSURETHRAL INCISION / DRAINAGE PROSTATE         Medications On Admission:  Medications Prior to Admission   Medication Sig Dispense Refill Last Dose    alfuzosin (UROXATRAL) 10 MG 24 hr tablet Take 1 tablet by mouth Every Night.       Bisoprolol-hydroCHLOROthiazide (ZIAC PO) Take  by mouth.       citalopram (CeleXA) 20 MG tablet Take 1 tablet by mouth Daily. (Patient not taking: Reported on 5/12/2024)   Not Taking    diazePAM (VALIUM) 5 MG tablet Take 1 tablet by mouth Every 6 (Six) Hours As Needed.       docusate calcium (SURFAK) 240 MG capsule Take 1 capsule by mouth 2 (Two) Times a Day.       finasteride (PROSCAR) 5 MG tablet Take 1 tablet by mouth Daily. (Patient not taking: Reported on 5/1/2024) 90 tablet 3     hydroCHLOROthiazide 12.5 MG tablet Take 1 tablet by mouth 2 (Two) Times a Day.       hyoscyamine (ANASPAZ,LEVSIN) 0.125 MG tablet Take 1 tablet by mouth Every 4 (Four) Hours As Needed. (Patient not taking: Reported on 5/12/2024)   Not Taking    levocetirizine (XYZAL) 5 MG tablet Take 1 tablet by mouth As Needed.       levothyroxine (SYNTHROID, LEVOTHROID) 88 MCG tablet Take 1 tablet by mouth Every Morning Before Breakfast.       lisinopril (PRINIVIL,ZESTRIL) 20 MG tablet Take 1 tablet by mouth 2 (Two) Times a Day.       lisinopril-hydrochlorothiazide (PRINZIDE,ZESTORETIC) 20-12.5 MG per tablet Take 1 tablet by mouth Daily. (Patient not taking: Reported on 5/12/2024)   Not Taking    Magnesium Hydroxide (MILK OF MAGNESIA PO) Take 60 mL by mouth Every Night.       Meloxicam (MOBIC PO) Take 15 mg by mouth Every Night.       omeprazole (priLOSEC) 20 MG capsule Take 1 capsule  by mouth 2 (Two) Times a Day.       Psyllium (METAMUCIL PO) Take  by mouth As Needed. (Patient not taking: Reported on 5/12/2024)   Not Taking    rOPINIRole HCl (REQUIP PO) Take 2 mg by mouth Every Night.       traZODone (DESYREL) 50 MG tablet Take 1 tablet by mouth Every Night.          Current Medications:    Current Facility-Administered Medications:     acetaminophen (TYLENOL) tablet 650 mg, 650 mg, Oral, Q4H PRN **OR** acetaminophen (TYLENOL) 160 MG/5ML oral solution 650 mg, 650 mg, Oral, Q4H PRN **OR** acetaminophen (TYLENOL) suppository 650 mg, 650 mg, Rectal, Q4H PRN, Michael Balbuena MD    aluminum-magnesium hydroxide-simethicone (MAALOX MAX) 400-400-40 MG/5ML suspension 15 mL, 15 mL, Oral, Q6H PRN, Michael Balbuena MD, 15 mL at 05/12/24 2017    aspirin chewable tablet 81 mg, 81 mg, Oral, Daily, 81 mg at 05/12/24 1934 **OR** aspirin suppository 300 mg, 300 mg, Rectal, Daily, Michael Balbuena MD    atorvastatin (LIPITOR) tablet 80 mg, 80 mg, Oral, Nightly, Michael Balbuena MD, 80 mg at 05/12/24 2016    sennosides-docusate (PERICOLACE) 8.6-50 MG per tablet 2 tablet, 2 tablet, Oral, BID **AND** polyethylene glycol (MIRALAX) packet 17 g, 17 g, Oral, Daily, 17 g at 05/12/24 1934 **AND** bisacodyl (DULCOLAX) EC tablet 5 mg, 5 mg, Oral, Daily PRN **AND** bisacodyl (DULCOLAX) suppository 10 mg, 10 mg, Rectal, Daily PRN, Michael Balbuena MD    citalopram (CeleXA) tablet 20 mg, 20 mg, Oral, Daily, Michael Balbuena MD, 20 mg at 05/12/24 1934    diazePAM (VALIUM) tablet 5 mg, 5 mg, Oral, Q6H PRN, Michael Balbuena MD    labetalol (NORMODYNE,TRANDATE) injection 10 mg, 10 mg, Intravenous, Q4H PRN, Michael Balbuena MD    melatonin tablet 3 mg, 3 mg, Oral, Nightly PRN, Michael Balbuena MD    ondansetron (ZOFRAN) injection 4 mg, 4 mg, Intravenous, Q6H PRN, Michael Balbuena MD    [START ON 5/13/2024] pantoprazole (PROTONIX) EC tablet 40 mg, 40 mg, Oral, Q AM, Sree  "Michael ABBOTT MD    sodium chloride 0.9 % flush 10 mL, 10 mL, Intravenous, PRN, Emergency, Triage Protocol, MD, 10 mL at 24    sodium chloride 0.9 % flush 10 mL, 10 mL, Intravenous, Q12H, Michael Balbuena MD, 10 mL at 24    sodium chloride 0.9 % flush 10 mL, 10 mL, Intravenous, PRN, Michael Balbuena MD    sodium chloride 0.9 % infusion 40 mL, 40 mL, Intravenous, PRN, Michael Balbuena MD    tamsulosin (FLOMAX) 24 hr capsule 0.4 mg, 0.4 mg, Oral, Nightly, Michael Balbuena MD, 0.4 mg at 24     Allergies:  Allergies   Allergen Reactions    Sulfa Antibiotics Rash       Social Hx:  Social History     Socioeconomic History    Marital status:    Tobacco Use    Smoking status: Never    Smokeless tobacco: Never   Vaping Use    Vaping status: Never Used   Substance and Sexual Activity    Alcohol use: Not Currently    Drug use: Not Currently    Sexual activity: Defer       Family Hx:  Family History   Problem Relation Age of Onset    Colon cancer Neg Hx      Physical Examination:   Vital Signs:  Vitals:    24 1546 24 1631 24 1704 24 1934   BP: 152/83 174/86 153/83 164/83   BP Location:   Left arm Left arm   Patient Position:   Lying Lying   Pulse: 67 74 60 72   Resp:  16 18 20   Temp:  97.8 °F (36.6 °C) 97.8 °F (36.6 °C) 97.6 °F (36.4 °C)   TempSrc:   Oral Oral   SpO2: 93% 96% 97% 96%   Weight:   90.9 kg (200 lb 6.4 oz)    Height:   182.9 cm (72\")        NIHSS:   1a  Level of Consciousness: 0=Alert; keenly responsive   1b. LOC Questions:  0=Performs both tasks correctly   1c. LOC Commands: 0=Performs both tasks correctly   2.  Best Gaze: 0=Normal   3. Visual: 0=No visual loss   4. Facial Palsy: 0=Normal symmetric movement   5a. Motor Right Arm: 0=No drift in limb for 10 secs   5b.  Motor Left Arm: 0=No drift in limb for 10 secs      6a. Motor Right Le=No drift in limb for 10 secs      6b  Motor Left Le=No drift in limb for 10 secs      7. " "Limb Ataxia: 0=Absent   8.  Sensory: 1=Mild to moderate sensory loss, but the patient is aware of being touched   9. Best Language:  0=No aphasia, normal   10. Dysarthria: 1=Mild to moderate, can be understood with some difficulty   11. Extinction/Inattention: 0=No abnormality    Total:   2       Recent Diagnostics:   Laboratory Results:   - Reviewed in EMR  Lab Results   Component Value Date    GLUCOSE 99 05/12/2024    CALCIUM 9.6 05/12/2024     (L) 05/12/2024    K 4.1 05/12/2024    CO2 26.0 05/12/2024    CL 99 05/12/2024    BUN 14 05/12/2024    CREATININE 1.21 05/12/2024    EGFRIFAFRI >59 08/17/2022    EGFRIFNONA 49 (A) 08/17/2022    BCR 11.6 05/12/2024    ANIONGAP 10.0 05/12/2024     Lab Results   Component Value Date    WBC 5.88 05/12/2024    HGB 15.5 05/12/2024    HCT 46.4 05/12/2024    MCV 80.7 05/12/2024     05/12/2024     Lab Results   Component Value Date    PTT 28.8 05/12/2024    INR 0.94 05/12/2024     Lab Results   Component Value Date    TRIG 231 (H) 03/18/2024    HDL 48 (L) 03/18/2024    LDL 51 03/18/2024     No results found for: \"HGBA1C\"    Imaging Results:  Imaging Results (Last 24 Hours)       Procedure Component Value Units Date/Time    MRI Brain With & Without Contrast [[id number]] Resulted: 05/12/24 1749     Updated: 05/12/24 1822    XR Chest 1 View [[id number]] Collected: 05/12/24 1429     Updated: 05/12/24 1435    Narrative:      XR CHEST 1 VW- 5/12/2024 1:10 PM     HISTORY: Acute Stroke Protocol (Onset < 12 hrs)       COMPARISON: Chest x-ray dated 9/12/2021     FINDINGS:  Upright frontal radiograph of the chest was obtained     There appears to be a 4-5 cm focus of right upper lobe consolidation.  The lungs are otherwise clear. The cardiomediastinal silhouette and  pulmonary vascularity are within normal limits. The osseous structures  and surrounding soft tissues demonstrate no acute abnormality.       Impression:      1.  There appears to be a 4-5 cm right upper lobe " consolidation. This  could reflect an upper lobe pneumonia. Upper lobe neoplasm is also a  consideration. Chest CT versus short interval follow-up is recommended  for further evaluation.     This report was signed and finalized on 5/12/2024 2:32 PM by Dr Joshua Sin.       CT CEREBRAL PERFUSION WITH & WITHOUT CONTRAST [[id number]] Collected: 05/12/24 1428     Updated: 05/12/24 1432    Narrative:      CT CEREBRAL PERFUSION W WO CONTRAST- 5/12/2024 12:39 PM     HISTORY: Neuro deficit, acute, stroke suspected     Technique:     1. Perfusion CT is performed to acquire images tracking the temporal  course of iodinated contrast material passing through the cerebral  circulation. Perfusion parameters, such as cerebral blood flow (CBF),  cerebral blood volume (CBV), mean transit time (MTT), etc. are  calculated by [employer] with additional provided perfusion maps and  estimated stroke volumes.  2. Automated exposure control (AEC) protocols are utilized on the  scanner to ensure dose lowered technique.     Comparison: Noncontrast CT brain and brain angiogram dated 5/12/2024  12:39 PM     CT dose: 1637.31 mGy.cm     Findings:     Normal, symmetric and MTT, TTP, CBV and CBF.      Tmax >6.0 seconds volume: 0 ml     CBF < 30% volume: 0 ml     Mismatch volume: 0 ml     Mismatch ratio: None       Impression:      Impression:  1. Normal, symmetric cerebral perfusion. No discrete infarct or at risk  territory detected by the perfusion software.     This report was signed and finalized on 5/12/2024 2:29 PM by Dr Joshua Sin.       CT Angiogram Head w AI Analysis of LVO [[id number]] Collected: 05/12/24 1403     Updated: 05/12/24 1410    Narrative:      EXAM: CT ANGIOGRAM HEAD W AI ANALYSIS OF LVO- - 5/12/2024 12:38 PM     HISTORY: Neuro Deficit, acute, Stroke suspected       COMPARISON: None.     DOSE LENGTH PRODUCT: 461.15 mGy.cm. Automated exposure control was also  utilized to decrease patient radiation dose.     TECHNIQUE: CTA  head and neck performed with intravenous contrast. 3D  postprocessing, including MIPs, performed and images saved to PACS. AI  ANALYSIS OF LVO WAS UTILIZED.  Grading of carotid stenosis performed  with NASCET criteria.     FINDINGS:     There is a typical three-vessel branching pattern off the aortic arch  without significant ostial narrowing. Common carotid arteries are patent  and without flow-limiting stenosis. There is a normal course and caliber  of the internal and external carotid arteries without evidence of a  flow-limiting stenosis. The vertebral arteries originate from the  subclavian arteries without significant ostial narrowing. Lung apices  are clear. Homogeneous thyroid gland. No cervical adenopathy advanced  cervical spine osteoarthritis changes.     Distal ICAs are patent and without flow-limiting stenosis. No  intracranial large vessel occlusion. Anterior and middle cerebral  arteries are patent and without flow-limiting stenosis. Intracranial  vertebral arteries and basilar artery are normal in caliber. Posterior  cerebral arteries are patent and normal in caliber. Fetal origin of the  left posterior cerebral artery. No visualized aneurysm. No intracranial  hemorrhage or mass effect. Lateral ventriculomegaly.       Impression:         1. No arterial occlusion or flow-limiting stenosis in the neck.  2. No intracranial large vessel occlusion or flow-limiting stenosis.     This report was signed and finalized on 5/12/2024 2:07 PM by Dr Joshua Sin.       CT Head Without Contrast Stroke Protocol [[id number]] Collected: 05/12/24 1359     Updated: 05/12/24 1405    Narrative:      CT HEAD WO CONTRAST STROKE PROTOCOL- 5/12/2024 12:37 PM     HISTORY: Neuro deficit, acute, stroke suspected       DOSE LENGTH PRODUCT: 907.63 mGy.cm. Automated exposure control was also  utilized to decrease patient radiation dose.     TECHNIQUE:  Axial CT of the brain without IV contrast. Sagittal and coronal  reformations  are also provided for review. Soft tissue and bone kernels  are available for interpretation.     COMPARISON: None.     FINDINGS:     There is no evidence of acute large vascular territory infarct. Chronic  small vessel ischemic changes. No intra-axial or extra-axial hemorrhage.  No visualized mass lesion or mass effect. Lateral ventriculomegaly.  Bowing of the corpus callosum with attenuation of the subarachnoid space  along the high convexities. Posterior fossa structures are unremarkable.  Visualized paranasal sinuses and mastoids are clear.       Impression:      1. No acute intracranial process. In particular, there is no acute  hemorrhage identified.  2. Advanced chronic small vessel ischemic changes.  3. Ventriculomegaly is present, pattern suspicious for communicating  hydrocephalus such as NPH.     Findings were discussed with Dr. Koo on 5/12/2024 2:02 PM.     This report was signed and finalized on 5/12/2024 2:02 PM by Dr Joshua Sin.       CT Angiogram Neck [[id number]] Resulted: 05/12/24 1339     Updated: 05/12/24 1350             Other labs:  As above  Other RAD:  CT head without contrast 5/12/2024: Without any intracranial pathology  CTA head and neck 5/12/2024: Without any flow-limiting stenosis in the neck or the head, no large vessel occlusion  CT perfusion without any areas of ischemia identified  MRI brain pending at this time.      Assessment & Plan:   Patient with multiple vascular risk factors presenting with a progressive neurological deficit that started this morning, with dysarthria, right upper extremity weakness, numbness.  But at the time of the evaluation has been mildly resolving.  The presentation is uncharacteristic for an acute ischemic stroke given that the onset was not abrupt but more in seizures and progressive.  However the pattern of neurological symptoms could be secondary to his stroke/TIA.    We will opt for not TNK given that he is outside the TNK window, will rule  out for no EVT given that there is no large vessel occlusion.    We will recommend an MRI brain with and without contrast, TTE with bubble, LDL and A1c for assessment of TIA/minor stroke.    Impression:  Neurological symptoms concerning for minor stroke      Plan:   No TNK, no EVT  Consider load DAPT  Brain MRI with and without contrast  TTE with bubble  LDL and A1c  Neurology service to follow    Griffin Wolfe MD  05/12/24  22:12 CDT    Medical Decision Making    Number/Complexity of Problems  Moderate  1 undiagnosed new problem with uncertain prognosis -   1 acute illness with systemic symptoms -   High  1 acute or chronic illness that poses a threat to life/body function -   High     MDM Data  Moderate - 1/3 categories  Extensive - 2/3 categories    Category 1: 3 of the following  Review of external notes  Review of results  Ordering of each unique test  Independent historian  Category 2:  Independent interpretation of test (ex: imaging)  Category 3:  Discussion of management with another provider    Extensive     Treatment Plan  Moderate - Prescription Drug management  High  Drug therapy requiring intensive monitoring for toxicity  Decision regarding hospitalization or escalation of care  De-escalate care/DNR decisions  High

## 2024-05-13 NOTE — PROGRESS NOTES
[facility] Medicine Services  INPATIENT PROGRESS NOTE    Patient Name: Agueda Tyler  Date of Admission: 5/12/2024  Today's Date: 05/13/24  Length of Stay: 1  Primary Care Physician: Keenan Perez MD    Subjective   Chief Complaint: Right sided weakness; dysarthria  HPI   When I saw patient last night his symptoms had essentially resolved.  He has had recurrence of dysarthria and right upper extremity weakness today.  Those symptoms persist even now.  I was able to see patient at the same time he was being evaluated by Dr. Lopez and his team with neurology.    Review of Systems   All pertinent negatives and positives are as above. All other systems have been reviewed and are negative unless otherwise stated.     Objective    Temp:  [97.3 °F (36.3 °C)-98 °F (36.7 °C)] 97.3 °F (36.3 °C)  Heart Rate:  [58-74] 58  Resp:  [16-20] 16  BP: (123-174)/(63-96) 140/68  Physical Exam  Vitals reviewed.   Constitutional:       General: He is not in acute distress.  HENT:      Head: Normocephalic.      Mouth/Throat:      Mouth: Mucous membranes are moist.   Pulmonary:      Effort: Pulmonary effort is normal. No respiratory distress.   Musculoskeletal:         General: No deformity.   Skin:     General: Skin is warm.      Capillary Refill: Capillary refill takes less than 2 seconds.   Neurological:      Mental Status: He is alert.      Cranial Nerves: No cranial nerve deficit.      Comments: Face symmetric; dysarthria is event especially when compared to my exam last PM; slight motor weakness involving RUE       Results Review:  I have reviewed the labs, radiology results, and diagnostic studies.    Laboratory Data:   Results from last 7 days   Lab Units 05/12/24  1340   WBC 10*3/mm3 5.88   HEMOGLOBIN g/dL 15.5   HEMATOCRIT % 46.4   PLATELETS 10*3/mm3 150        Results from last 7 days   Lab Units 05/12/24  1340   SODIUM mmol/L 135*   POTASSIUM mmol/L 4.1   CHLORIDE mmol/L 99   CO2  "mmol/L 26.0   BUN mg/dL 14   CREATININE mg/dL 1.21   CALCIUM mg/dL 9.6   BILIRUBIN mg/dL 1.3*   ALK PHOS U/L 128*   ALT (SGPT) U/L 27   AST (SGOT) U/L 30   GLUCOSE mg/dL 99       Culture Data:   No results found for: \"BLOODCX\", \"URINECX\", \"WOUNDCX\", \"MRSACX\", \"RESPCX\", \"STOOLCX\"    Radiology Data:   Imaging Results (Last 24 Hours)       Procedure Component Value Units Date/Time    CT Chest Without Contrast Diagnostic [[id number]] Collected: 05/13/24 1153     Updated: 05/13/24 1159    Narrative:      EXAM: CT CHEST WO CONTRAST DIAGNOSTIC-      DATE: 5/13/2024 9:15 AM     HISTORY: eval RUL consolidation on CXR; I63.9-Cerebral infarction,  unspecified       COMPARISON: Chest x-ray 5/12/2024.     DOSE LENGTH PRODUCT: 309.51 mGy.cm mGy cm. Automatic exposure control  was utilized to make radiation dose as low as reasonably achievable.     TECHNIQUE: Unenhanced CT images of the chest obtained with multiplanar  reformats.     FINDINGS:     MEDIASTINUM/EXTRATHORACIC: There is mild calcification of the thoracic  aorta and mild coronary artery calcification. No pericardial or pleural  effusion is identified. There is a small hiatal hernia. No thoracic  lymphadenopathy is seen.     LUNGS/AIRWAYS: There is mild scarring at the lung apices. There is mild  scarring versus atelectasis at the lung bases. Subtle patchy areas of  groundglass opacity are noted in the right upper lobe in the region of  the chest x-ray abnormality. This may represent pneumonia. Airways are  unremarkable.     INCLUDED UPPER ABDOMEN: The visualized portions of the upper abdomen are  within normal limits.     SOFT TISSUES: The visualized soft tissues of the chest wall are  unremarkable.     BONES: No suspicious osseous lesion identified.       Impression:      1. Mild patchy groundglass opacity in the right upper lobe may represent  pneumonia.  2. Small hiatal hernia and mild scarring in the lungs.     This report was signed and finalized on 5/13/2024 11:56 " AM by Brent Smith.       CT Head Without Contrast [[id number]] Collected: 05/13/24 1149     Updated: 05/13/24 1156    Narrative:      EXAM: CT HEAD WO CONTRAST-      DATE: 5/13/2024 9:15 AM     HISTORY: dysarthria and left sided weakness; I63.9-Cerebral infarction,  unspecified       COMPARISON: 5/12/2024.     DOSE LENGTH PRODUCT: 778.45 mGy.cm  Automated exposure control was also  utilized to decrease patient radiation dose.     TECHNIQUE: Unenhanced CT images obtained from vertex to skull base with  multiplanar reformats.     FINDINGS:  There is no acute intracranial hemorrhage, midline shift, mass effect,  or hydrocephalus. There is no CT evidence for acute infarct.     There are chronic changes with volume loss and chronic small vessel  ischemic change of the periventricular white matter.      SOFT TISSUES: The scalp soft tissues are unremarkable.        SINUS:The visualized paranasal sinuses and mastoid air cells are clear.      ORBITS: The visualized orbits and globes are unremarkable. There is  bilateral lens extraction.          Impression:      1. Chronic changes and no acute intracranial findings.      This report was signed and finalized on 5/13/2024 11:53 AM by Brent Smith.       MRI Brain With & Without Contrast [[id number]] Collected: 05/13/24 0846     Updated: 05/13/24 0859    Narrative:      MRI BRAIN W WO CONTRAST- 5/12/2024 4:49 PM     HISTORY: Stroke, follow up     TECHNIQUE: Multisequence, multiplanar MRI of the brain with and without  contrast. 20 cc MultiHance IV contrast.     COMPARISON: CT scan dated 5/12/2024     FINDINGS:     No diffusion signal abnormality. Advanced chronic small vessel ischemic  changes. Likely old cortical/subcortical ischemia in the right frontal  lobe. No intra-axial or extra-axial hemorrhage. No intracranial mass  lesion or pathologic enhancement. There is some lateral  ventriculomegaly. Third and fourth ventricles are nondilated. Posterior  fossa structures  are unremarkable. Pituitary gland and sella are  unremarkable. The major intracranial flow-voids are preserved. Orbital  contents are unremarkable. The paranasal sinuses are clear. Mastoid air  cells are clear. Normal marrow signal in the skull base and calvarium.       Impression:         1.  No acute intracranial process. In particular, no acute ischemia.  2.  No intracranial mass lesion or pathologic enhancement.  3.  Advanced chronic small vessel ischemic changes. There is quite a bit  of white matter volume loss, especially in the right frontal lobe which  I suspect is related to some old cortical/subcortical ischemia.     This report was signed and finalized on 5/13/2024 8:55 AM by Dr Joshua Sin.       CT Angiogram Head w AI Analysis of LVO [[id number]] Collected: 05/12/24 1403     Updated: 05/13/24 0854    Narrative:      EXAM: CT ANGIOGRAM HEAD W AI ANALYSIS OF LVO- - 5/12/2024 12:38 PM     HISTORY: Neuro Deficit, acute, Stroke suspected       COMPARISON: None.     DOSE LENGTH PRODUCT: 461.15 mGy.cm. Automated exposure control was also  utilized to decrease patient radiation dose.     TECHNIQUE: CTA head and neck performed with intravenous contrast. 3D  postprocessing, including MIPs, performed and images saved to PACS. AI  ANALYSIS OF LVO WAS UTILIZED.  Grading of carotid stenosis performed  with NASCET criteria.     FINDINGS:     There is a typical three-vessel branching pattern off the aortic arch  without significant ostial narrowing. Common carotid arteries are patent  and without flow-limiting stenosis. There is a normal course and caliber  of the internal and external carotid arteries without evidence of a  flow-limiting stenosis. The vertebral arteries originate from the  subclavian arteries without significant ostial narrowing. Lung apices  are clear. Homogeneous thyroid gland. No cervical adenopathy advanced  cervical spine osteoarthritis changes.     Distal ICAs are patent and without  flow-limiting stenosis. No  intracranial large vessel occlusion. Anterior and middle cerebral  arteries are patent and without flow-limiting stenosis. Intracranial  vertebral arteries and basilar artery are normal in caliber. Posterior  cerebral arteries are patent and normal in caliber. Fetal origin of the  left posterior cerebral artery. No visualized aneurysm. No intracranial  hemorrhage or mass effect. Lateral ventriculomegaly.       Impression:         1. No arterial occlusion or flow-limiting stenosis in the neck.  2. No intracranial large vessel occlusion or flow-limiting stenosis.     This report was signed and finalized on 5/12/2024 2:07 PM by Dr Joshua Sin.       CT Angiogram Neck [[id number]] Collected: 05/12/24 1403     Updated: 05/13/24 0854    Narrative:      EXAM: CT ANGIOGRAM HEAD W AI ANALYSIS OF LVO- - 5/12/2024 12:38 PM     HISTORY: Neuro Deficit, acute, Stroke suspected       COMPARISON: None.     DOSE LENGTH PRODUCT: 461.15 mGy.cm. Automated exposure control was also  utilized to decrease patient radiation dose.     TECHNIQUE: CTA head and neck performed with intravenous contrast. 3D  postprocessing, including MIPs, performed and images saved to PACS. AI  ANALYSIS OF LVO WAS UTILIZED.  Grading of carotid stenosis performed  with NASCET criteria.     FINDINGS:     There is a typical three-vessel branching pattern off the aortic arch  without significant ostial narrowing. Common carotid arteries are patent  and without flow-limiting stenosis. There is a normal course and caliber  of the internal and external carotid arteries without evidence of a  flow-limiting stenosis. The vertebral arteries originate from the  subclavian arteries without significant ostial narrowing. Lung apices  are clear. Homogeneous thyroid gland. No cervical adenopathy advanced  cervical spine osteoarthritis changes.     Distal ICAs are patent and without flow-limiting stenosis. No  intracranial large vessel occlusion.  Anterior and middle cerebral  arteries are patent and without flow-limiting stenosis. Intracranial  vertebral arteries and basilar artery are normal in caliber. Posterior  cerebral arteries are patent and normal in caliber. Fetal origin of the  left posterior cerebral artery. No visualized aneurysm. No intracranial  hemorrhage or mass effect. Lateral ventriculomegaly.       Impression:         1. No arterial occlusion or flow-limiting stenosis in the neck.  2. No intracranial large vessel occlusion or flow-limiting stenosis.     This report was signed and finalized on 5/12/2024 2:07 PM by Dr Joshua Sin.               I have reviewed the patient's current medications.     Assessment/Plan   Assessment  Active Hospital Problems    Diagnosis     Essential hypertension     Stroke-like symptom     Right upper lobe consolidation     Iron deficiency anemia     Gastroesophageal reflux disease without esophagitis        Treatment Plan  Discussed with Dr. Lopez and Neuro team; appreciate their assistance  Plan for repeat MRI Brain  Plan for dual antiplatelet treatment with ASA + Plavix for 21 days  LDL 60; currently on high intensity statin  Follow-up Echo  Continue to hold outpatient anti-HTN medications  PT/OT/ST  Requip resumed  CTA Chest reviewed; no prominent or consolidating infiltrates notes; patient with no respiratory symptoms at this time.  No fever.  WBC normal.  Clinical presentation is not consistent with an pneumonia and will hold antimicrobial treatment at this time; closely monitor  Workup continues    Medical Decision Making  Number and Complexity of problems: 1 acute; high complexity      Conditions and Status        Condition is worsening.     [facility] Data  External documents reviewed: none  Cardiac tracing (EKG, telemetry) interpretation: no new EKGs  Radiology interpretation: CT Head and CT Chest personally reviewed; I cannot appreciate any impressive infiltrates RUL  Labs reviewed: as above  Any tests  that were considered but not ordered: none     Decision rules/scores evaluated (example [id number], Wells, etc): none     Discussed with: patient; spouse; Neurology team including Dr. Lopez; Silver bedside nurse     Care Planning  Shared decision making: Discussed with patient and spouse at bedside at length this afternoon with agreement to proceed with treatment plan as outlined  Code status and discussions: Full code    Disposition  Social Determinants of Health that impact treatment or disposition: None apparent at this time  I expect the patient to be discharged likely to home in 1-2 days; this could change pending any progression in symptoms and need for additional rehabilitation.    Electronically signed by Michael Balbuena MD, 05/13/24, 14:58 CDT.

## 2024-05-13 NOTE — CASE MANAGEMENT/SOCIAL WORK
Continued Stay Note  VERONICA Yip     Patient Name: Agueda Tyler  MRN: [id number]  Today's Date: 5/13/2024    Admit Date: 5/12/2024        Discharge Plan       Row Name 05/13/24 2485       Plan    Plan Comments Noted pt is going for another MRI this afternoon. Will follow up after MRI results. Physical and Occupational therapy are recommending inpt rehab but not sure if pt has a qualifying dx. Will follow up tomorrow.                   Discharge Codes    No documentation.                       FRACISCO Stephen

## 2024-05-13 NOTE — PLAN OF CARE
Goal Outcome Evaluation:  Plan of Care Reviewed With: patient        Progress: improving       Pt A/O X4, resp e/u, HRR, VSS, LEMUS. Pt is on RA. Pt arrived to the floor with no residual effects of possible stroke. NIH score is 0. Wife is bringing cpap for pt to use at bedtime. SCD's to BLE in place. Pt has completed MRI with and without contrast. Pt has IV to RAC, CDI, IID. Pt oriented to room and admission in process. Bed in low position, call light in reach, safety maintained.

## 2024-05-14 ENCOUNTER — APPOINTMENT (OUTPATIENT)
Dept: CARDIOLOGY | Facility: HOSPITAL | Age: 79
End: 2024-05-14
Payer: MEDICARE

## 2024-05-14 ENCOUNTER — APPOINTMENT (OUTPATIENT)
Dept: CT IMAGING | Facility: HOSPITAL | Age: 79
End: 2024-05-14
Payer: MEDICARE

## 2024-05-14 PROBLEM — G47.33 OSA (OBSTRUCTIVE SLEEP APNEA): Status: ACTIVE | Noted: 2024-05-14

## 2024-05-14 PROBLEM — R26.89 IMPAIRED GAIT AND MOBILITY: Status: ACTIVE | Noted: 2024-05-14

## 2024-05-14 PROBLEM — G25.81 RESTLESS LEGS: Status: ACTIVE | Noted: 2024-05-14

## 2024-05-14 PROBLEM — G31.9 CEREBRAL VENTRICULOMEGALY DUE TO BRAIN ATROPHY: Status: ACTIVE | Noted: 2024-05-14

## 2024-05-14 LAB
BH CV ECHO MEAS - AO MAX PG: 11.3 MMHG
BH CV ECHO MEAS - AO MEAN PG: 5.2 MMHG
BH CV ECHO MEAS - AO ROOT DIAM: 3.6 CM
BH CV ECHO MEAS - AO V2 MAX: 168.4 CM/SEC
BH CV ECHO MEAS - AO V2 VTI: 36.9 CM
BH CV ECHO MEAS - AVA(I,D): 3 CM2
BH CV ECHO MEAS - EDV(CUBED): 70.4 ML
BH CV ECHO MEAS - EDV(MOD-SP2): 66.7 ML
BH CV ECHO MEAS - EDV(MOD-SP4): 102.7 ML
BH CV ECHO MEAS - EF(MOD-BP): 68 %
BH CV ECHO MEAS - EF(MOD-SP2): 65.8 %
BH CV ECHO MEAS - EF(MOD-SP4): 71.4 %
BH CV ECHO MEAS - ESV(CUBED): 16 ML
BH CV ECHO MEAS - ESV(MOD-SP2): 22.8 ML
BH CV ECHO MEAS - ESV(MOD-SP4): 29.4 ML
BH CV ECHO MEAS - FS: 38.9 %
BH CV ECHO MEAS - IVS/LVPW: 1.26 CM
BH CV ECHO MEAS - IVSD: 0.99 CM
BH CV ECHO MEAS - LA DIMENSION: 4.1 CM
BH CV ECHO MEAS - LAT PEAK E' VEL: 9 CM/SEC
BH CV ECHO MEAS - LV DIASTOLIC VOL/BSA (35-75): 48.2 CM2
BH CV ECHO MEAS - LV MASS(C)D: 113.5 GRAMS
BH CV ECHO MEAS - LV MAX PG: 8.2 MMHG
BH CV ECHO MEAS - LV MEAN PG: 4.3 MMHG
BH CV ECHO MEAS - LV SYSTOLIC VOL/BSA (12-30): 13.8 CM2
BH CV ECHO MEAS - LV V1 MAX: 143.4 CM/SEC
BH CV ECHO MEAS - LV V1 VTI: 32.1 CM
BH CV ECHO MEAS - LVIDD: 4.1 CM
BH CV ECHO MEAS - LVIDS: 2.5 CM
BH CV ECHO MEAS - LVOT AREA: 3.5 CM2
BH CV ECHO MEAS - LVOT DIAM: 2.11 CM
BH CV ECHO MEAS - LVPWD: 0.79 CM
BH CV ECHO MEAS - MED PEAK E' VEL: 9 CM/SEC
BH CV ECHO MEAS - MV A MAX VEL: 92.4 CM/SEC
BH CV ECHO MEAS - MV DEC SLOPE: 382.7 CM/SEC2
BH CV ECHO MEAS - MV DEC TIME: 0.23 SEC
BH CV ECHO MEAS - MV E MAX VEL: 87 CM/SEC
BH CV ECHO MEAS - MV E/A: 0.94
BH CV ECHO MEAS - MV MAX PG: 3.2 MMHG
BH CV ECHO MEAS - MV MEAN PG: 1.3 MMHG
BH CV ECHO MEAS - MV V2 VTI: 32.9 CM
BH CV ECHO MEAS - MVA(VTI): 3.4 CM2
BH CV ECHO MEAS - PA V2 MAX: 113 CM/SEC
BH CV ECHO MEAS - PULM A REVS DUR: 0.12 SEC
BH CV ECHO MEAS - PULM A REVS VEL: 26.7 CM/SEC
BH CV ECHO MEAS - PULM DIAS VEL: 31.3 CM/SEC
BH CV ECHO MEAS - PULM S/D: 1.84
BH CV ECHO MEAS - PULM SYS VEL: 57.6 CM/SEC
BH CV ECHO MEAS - RAP SYSTOLE: 5 MMHG
BH CV ECHO MEAS - RV MAX PG: 2.42 MMHG
BH CV ECHO MEAS - RV V1 MAX: 77.8 CM/SEC
BH CV ECHO MEAS - RV V1 VTI: 16.2 CM
BH CV ECHO MEAS - RVDD: 2.9 CM
BH CV ECHO MEAS - RVSP: 28.7 MMHG
BH CV ECHO MEAS - SV(LVOT): 112.6 ML
BH CV ECHO MEAS - SV(MOD-SP2): 43.9 ML
BH CV ECHO MEAS - SV(MOD-SP4): 73.4 ML
BH CV ECHO MEAS - SVI(LVOT): 52.8 ML/M2
BH CV ECHO MEAS - SVI(MOD-SP2): 20.6 ML/M2
BH CV ECHO MEAS - SVI(MOD-SP4): 34.4 ML/M2
BH CV ECHO MEAS - TAPSE (>1.6): 3.5 CM
BH CV ECHO MEAS - TR MAX PG: 23.7 MMHG
BH CV ECHO MEAS - TR MAX VEL: 243.2 CM/SEC
BH CV ECHO MEASUREMENTS AVERAGE E/E' RATIO: 9.67
BH CV ECHO SHUNT ASSESSMENT PERFORMED (HIDDEN SCRIPTING): 1
BH CV XLRA - RV BASE: 4.1 CM
BH CV XLRA - RV LENGTH: 7.1 CM
BH CV XLRA - RV MID: 3.5 CM
BH CV XLRA - TDI S': 17 CM/SEC
LEFT ATRIUM VOLUME INDEX: 26 ML/M2
LEFT ATRIUM VOLUME: 53 ML

## 2024-05-14 PROCEDURE — 92523 SPEECH SOUND LANG COMPREHEN: CPT

## 2024-05-14 PROCEDURE — 25010000002 ONDANSETRON PER 1 MG: Performed by: INTERNAL MEDICINE

## 2024-05-14 PROCEDURE — 93306 TTE W/DOPPLER COMPLETE: CPT

## 2024-05-14 PROCEDURE — 97116 GAIT TRAINING THERAPY: CPT

## 2024-05-14 PROCEDURE — 93306 TTE W/DOPPLER COMPLETE: CPT | Performed by: INTERNAL MEDICINE

## 2024-05-14 PROCEDURE — 99233 SBSQ HOSP IP/OBS HIGH 50: CPT | Performed by: CLINICAL NURSE SPECIALIST

## 2024-05-14 PROCEDURE — 92526 ORAL FUNCTION THERAPY: CPT

## 2024-05-14 PROCEDURE — 70450 CT HEAD/BRAIN W/O DYE: CPT

## 2024-05-14 PROCEDURE — 97530 THERAPEUTIC ACTIVITIES: CPT | Performed by: OCCUPATIONAL THERAPIST

## 2024-05-14 PROCEDURE — 97535 SELF CARE MNGMENT TRAINING: CPT | Performed by: OCCUPATIONAL THERAPIST

## 2024-05-14 RX ORDER — CLOPIDOGREL BISULFATE 75 MG/1
75 TABLET ORAL DAILY
Status: DISCONTINUED | OUTPATIENT
Start: 2024-05-14 | End: 2024-05-15 | Stop reason: HOSPADM

## 2024-05-14 RX ORDER — LEVOTHYROXINE SODIUM 88 UG/1
88 TABLET ORAL
Status: DISCONTINUED | OUTPATIENT
Start: 2024-05-14 | End: 2024-05-15 | Stop reason: HOSPADM

## 2024-05-14 RX ADMIN — CITALOPRAM 20 MG: 20 TABLET, FILM COATED ORAL at 09:41

## 2024-05-14 RX ADMIN — PANTOPRAZOLE SODIUM 40 MG: 40 TABLET, DELAYED RELEASE ORAL at 07:04

## 2024-05-14 RX ADMIN — TRAZODONE HYDROCHLORIDE 50 MG: 50 TABLET ORAL at 21:34

## 2024-05-14 RX ADMIN — MAGNESIUM HYDROXIDE 10 ML: 2400 SUSPENSION ORAL at 17:36

## 2024-05-14 RX ADMIN — Medication 10 ML: at 21:35

## 2024-05-14 RX ADMIN — SENNOSIDES AND DOCUSATE SODIUM 2 TABLET: 50; 8.6 TABLET ORAL at 21:34

## 2024-05-14 RX ADMIN — ATORVASTATIN CALCIUM 80 MG: 40 TABLET ORAL at 21:34

## 2024-05-14 RX ADMIN — SENNOSIDES AND DOCUSATE SODIUM 2 TABLET: 50; 8.6 TABLET ORAL at 09:41

## 2024-05-14 RX ADMIN — ASPIRIN 81 MG CHEWABLE TABLET 81 MG: 81 TABLET CHEWABLE at 09:41

## 2024-05-14 RX ADMIN — TAMSULOSIN HYDROCHLORIDE 0.4 MG: 0.4 CAPSULE ORAL at 21:35

## 2024-05-14 RX ADMIN — CLOPIDOGREL BISULFATE 75 MG: 75 TABLET, FILM COATED ORAL at 09:41

## 2024-05-14 RX ADMIN — POLYETHYLENE GLYCOL 3350 17 G: 17 POWDER, FOR SOLUTION ORAL at 09:41

## 2024-05-14 RX ADMIN — ONDANSETRON 4 MG: 2 INJECTION INTRAMUSCULAR; INTRAVENOUS at 08:08

## 2024-05-14 RX ADMIN — LEVOTHYROXINE SODIUM 88 MCG: 88 TABLET ORAL at 09:42

## 2024-05-14 RX ADMIN — Medication 10 ML: at 09:43

## 2024-05-14 RX ADMIN — ROPINIROLE HYDROCHLORIDE 2 MG: 1 TABLET, FILM COATED ORAL at 21:34

## 2024-05-14 NOTE — PLAN OF CARE
Goal Outcome Evaluation:  Plan of Care Reviewed With: patient        Progress: improving  Outcome Evaluation: See note      Anticipated Discharge Disposition (SLP): unknown    SLP Diagnosis: mild, dysarthria (05/14/24 1219)            Speech-language evaluation as well as swallow treatment complete. The patient was noted to be alert sitting upright in chair throughout session. He is oriented x4 at this time and is able to follow commands without difficulty. He completed immediate and delayed recall with 95% accuracy. Minimal instances of anomia observed during conversation; however, the pt's expressive and receptive language is WFL. He is noted to have mild dysarthria during diadochokinesis as well as during conversation. No difficulties noted with 3x trial of regular solids and thin liquids. PT ok to continue a regular diet with thin liquids. SLP will follow to address dysarthria and improve speech to baseline status.

## 2024-05-14 NOTE — DISCHARGE PLACEMENT REQUEST
"Agueda Castillo (78 y.o. Male)       Date of Birth   1945    Social Security Number       Address   [address] [address]    Home Phone   [phone]    MRN   [id number]       [facility]    Marital Status                               Admission Date   5/12/24    Admission Type   Emergency    Admitting Provider   Michael Balbuena MD    Attending Provider   Michale Balbuena MD    Department, Room/Bed   [facility] 3A, 336/1       Discharge Date       Discharge Disposition       Discharge Destination                                 Attending Provider: Michael Balbuena MD    Allergies: Sulfa Antibiotics    Isolation: None   Infection: None   Code Status: CPR    Ht: 182.9 cm (72\")   Wt: 90.7 kg (200 lb)    Admission Cmt: None   Principal Problem: None                  Active Insurance as of 5/12/2024       Primary Coverage       Payor Plan Insurance Group Employer/Plan Group    MEDICARE MEDICARE A & B        Payor Plan Address Payor Plan Phone Number Payor Plan Fax Number Effective Dates    [address] [phone]  8/1/2009 - None Entered    [facility] [id number]         Subscriber Name Subscriber Birth Date Member ID       AGUEDA CASTILLO 1945 [id number]               Secondary Coverage       Payor Plan Insurance Group Employer/Plan Group    [employer]                  Payor Plan Address Payor Plan Phone Number Payor Plan Fax Number Effective Dates    [address]   8/1/2018 - None Entered    [place] [address]         Subscriber Name Subscriber Birth Date Member ID       AGUEDA CASTILLO 1945 [id number]                     Emergency Contacts        (Rel.) Home Phone Work Phone Mobile Phone    leanne castillo (Spouse) [phone]              Insurance Information                  MEDICARE/MEDICARE A & B Phone: [phone]    Subscriber: Agueda Castillo Subscriber#: [id number]    Group#: -- " Precert#: --        [employer]           Phone: --    Subscriber: Agueda Tyler Subscriber#: [id number]    Group#: -- Precert#: --

## 2024-05-14 NOTE — THERAPY EVALUATION
Acute Care - Speech Language Pathology   Swallow Treatment Note [facility]     Patient Name: Agueda Tyler  : 1945  MRN: [id number]  Today's Date: 2024               Admit Date: 2024      Speech-language evaluation as well as swallow treatment complete. The patient was noted to be alert sitting upright in chair throughout session. He is oriented x4 at this time and is able to follow commands without difficulty. He completed immediate and delayed recall with 95% accuracy. Minimal instances of anomia observed during conversation; however, the pt's expressive and receptive language is WFL. He is noted to have mild dysarthria during diadochokinesis as well as during conversation. No difficulties noted with 3x trial of regular solids and thin liquids. PT ok to continue a regular diet with thin liquids. SLP will follow to address dysarthria and improve speech to baseline status.    Moody Slaughter, ENRIQUETA-SLP 2024 15:15 CDT    Visit Dx:     ICD-10-CM ICD-9-CM   1. Cerebrovascular accident (CVA), unspecified mechanism  I63.9 434.91   2. Impaired mobility [Z74.09]  Z74.09 799.89   3. Dysphagia, unspecified type  R13.10 787.20   4. Motor speech disorder  R47.89 784.59     Patient Active Problem List   Diagnosis    Hx of colonic polyp    Gastroesophageal reflux disease without esophagitis    Iron deficiency anemia    BRBPR (bright red blood per rectum)    Acute ischemic stroke    Essential hypertension    Stroke-like symptom    Right upper lobe consolidation    SINDI (obstructive sleep apnea)    Restless legs    Impaired gait and mobility    Cerebral ventriculomegaly due to brain atrophy     Past Medical History:   Diagnosis Date    Anxiety     BPH (benign prostatic hyperplasia)     Colon polyp     Diverticulosis     Essential hypertension     GERD (gastroesophageal reflux disease)     Hypertriglyceridemia     Idiopathic peripheral neuropathy     Osteoarthritis      Past Surgical History:   Procedure Laterality  Date    COLONOSCOPY  04/18/2016    diverticulosis, internal hemorrhoids,     COLONOSCOPY  03/30/2011    internal hemorrhoid, diverticulosis    COLONOSCOPY N/A 3/11/2021    Procedure: COLONOSCOPY WITH ANESTHESIA;  Surgeon: Ricky House MD;  Location:  [facility];  Service: Gastroenterology;  Laterality: N/A;  pre: hx polyps  post: diverticulosis. hemorrhoid.   Keenan Perez MD    ENDOSCOPY N/A 3/11/2021    Procedure: ESOPHAGOGASTRODUODENOSCOPY WITH ANESTHESIA;  Surgeon: Ricky House MD;  Location:  [facility];  Service: Gastroenterology;  Laterality: N/A;  pre: GERD  post: hiatal hernia. gastric polyps.  Keenan Perez MD    HERNIA REPAIR      inguinal    TURP / TRANSURETHRAL INCISION / DRAINAGE PROSTATE         SLP Recommendation and Plan                          Anticipated Discharge Disposition (SLP): unknown (05/14/24 1219)        Predicted Duration Therapy Intervention (Days): until discharge (05/14/24 1219)                                           Plan of Care Reviewed With: patient  Progress: improving  Outcome Evaluation: See note      SWALLOW EVALUATION (Last 72 Hours)       SLP Adult Swallow Evaluation       Row Name 05/14/24 1219 05/13/24 1307                Rehab Evaluation    Document Type -- evaluation  -CS       Subjective Information -- complains of;weakness;numbness  -CS       Patient Observations -- alert;cooperative;agree to therapy  -CS       Patient/Family/Caregiver Comments/Observations -- S/O present  -CS       Patient Effort -- good  -CS          General Information    Patient Profile Reviewed -- yes  -CS       Pertinent History Of Current Problem -- CT of head- No acute intracranial abnormality. Instance of slurred speech.  -CS       Current Method of Nutrition -- regular textures;thin liquids  -CS       Precautions/Limitations, Vision -- WFL with corrective lenses  -CS       Precautions/Limitations, Hearing -- WFL  -CS       Prior Level of  Function-Communication -- WFL  -CS       Prior Level of Function-Swallowing -- no diet consistency restrictions  -CS       Plans/Goals Discussed with -- patient;spouse/S.O.  -CS       Barriers to Rehab -- none identified  -CS       Patient's Goals for Discharge -- return to all previous roles/activities  -CS       Family Goals for Discharge -- patient able to return to all previous activities/roles  -CS          Pain    Additional Documentation -- Pain Scale: FACES Pre/Post-Treatment (Group)  -CS          Pain Scale: FACES Pre/Post-Treatment    Pain: FACES Scale, Pretreatment -- 0-->no hurt  -CS       Posttreatment Pain Rating -- 0-->no hurt  -CS          Oral Motor Structure and Function    Dentition Assessment -- natural, present and adequate  -CS       Secretion Management -- WNL/WFL  -CS       Mucosal Quality -- moist, healthy  -CS       Gag Response -- WFL  -CS       Volitional Swallow -- WFL  -CS       Volitional Cough -- WFL  -CS          Oral Musculature and Cranial Nerve Assessment    Oral Motor General Assessment -- oral labial or buccal impairment  -CS       Mandibular Impairment Detail, Cranial Nerve V (Trigeminal) -- reduced facial sensation on right  -CS       Oral Labial or Buccal Impairment, Detail, Cranial Nerve VII (Facial): -- CN7: Motor Impairment;right labial droop;CN7: Sensory Impairment  -CS          General Eating/Swallowing Observations    Respiratory Support Currently in Use -- room air  -CS       Positioning During Eating -- upright in bed  -CS       Utensils Used -- spoon;straw  -CS       Consistencies Trialed -- regular textures;honey-thick liquids;nectar/syrup-thick liquids;thin liquids;pureed  -CS          Clinical Swallow Eval    Oral Prep Phase -- WFL  -CS       Oral Transit -- WFL  -CS       Oral Residue -- WFL  -CS       Pharyngeal Phase -- WFL  -CS       Esophageal Phase -- unremarkable  -CS       Clinical Swallow Evaluation Summary -- See note  -CS          SLP Evaluation  Clinical Impression    SLP Swallowing Diagnosis -- oral dysphagia;R/O pharyngeal dysphagia  -CS       Functional Impact -- risk of aspiration/pneumonia  -CS       Rehab Potential/Prognosis, Swallowing -- good, to achieve stated therapy goals  -CS       Swallow Criteria for Skilled Therapeutic Interventions Met -- demonstrates skilled criteria  -CS          Recommendations    Therapy Frequency (Swallow) -- at least;3 days per week  -CS       Predicted Duration Therapy Intervention (Days) -- until discharge  -CS       SLP Diet Recommendation -- regular textures;thin liquids  -CS       Recommended Diagnostics -- SLE/Cog/Motor Speech Evaluation  -CS       Recommended Precautions and Strategies -- upright posture during/after eating;small bites of food and sips of liquid  -CS       Oral Care Recommendations -- Oral Care BID/PRN  -CS       SLP Rec. for Method of Medication Administration -- meds whole;with thin liquids;as tolerated  -CS       Monitor for Signs of Aspiration -- yes;notify SLP if any concerns  -CS       Anticipated Discharge Disposition (SLP) -- unknown  -CS          Swallow Goals (SLP)    Swallow LTGs -- Swallow Long Term Goal (free text)  -CS       Swallow STGs -- diet tolerance goal selection (SLP)  -CS       Diet Tolerance Goal Selection (SLP) -- Patient will tolerate trials of  -CS          (LTG) Swallow    (LTG) Swallow Pt will tolerate LRD w/o any overt s/s of aspiration.  -CS Pt will tolerate LRD w/o any overt s/s of aspiration.  -CS       [place] (Swallow Long Term Goal) independently (over 90% accuracy)  -CS independently (over 90% accuracy)  -CS       Time Frame (Swallow Long Term Goal) by discharge  -CS by discharge  -CS       Barriers (Swallow Long Term Goal) n/a  -CS n/a  -CS       Progress/Outcomes (Swallow Long Term Goal) new goal  -CS new goal  -CS          (STG) Patient will tolerate trials of    Consistencies Trialed (Tolerate trials) regular textures;thin liquids  -CS regular  textures;thin liquids  -CS       Desired Outcome (Tolerate trials) without signs/symptoms of aspiration;without signs of distress  -CS without signs/symptoms of aspiration;without signs of distress  -CS       [place] (Tolerate trials) independently (over 90% accuracy)  -CS independently (over 90% accuracy)  -CS       Time Frame (Tolerate trials) by discharge  -CS by discharge  -CS       Progress/Outcomes (Tolerate trials) new goal  -CS new goal  -CS                 User Key  (r) = Recorded By, (t) = Taken By, (c) = Cosigned By      Initials Name Effective Dates    CS Moody Slaughter, [facility]-SLP 05/07/24 -                     EDUCATION  The patient has been educated in the following areas:   Dysphagia (Swallowing Impairment).        SLP GOALS       Row Name 05/14/24 1219 05/13/24 1307          (LTG) Swallow    (LTG) Swallow Pt will tolerate LRD w/o any overt s/s of aspiration.  -CS Pt will tolerate LRD w/o any overt s/s of aspiration.  -CS     [place] (Swallow Long Term Goal) independently (over 90% accuracy)  -CS independently (over 90% accuracy)  -CS     Time Frame (Swallow Long Term Goal) by discharge  -CS by discharge  -CS     Barriers (Swallow Long Term Goal) n/a  -CS n/a  -CS     Progress/Outcomes (Swallow Long Term Goal) new goal  -CS new goal  -CS        (STG) Patient will tolerate trials of    Consistencies Trialed (Tolerate trials) regular textures;thin liquids  -CS regular textures;thin liquids  -CS     Desired Outcome (Tolerate trials) without signs/symptoms of aspiration;without signs of distress  -CS without signs/symptoms of aspiration;without signs of distress  -CS     [place] (Tolerate trials) independently (over 90% accuracy)  -CS independently (over 90% accuracy)  -CS     Time Frame (Tolerate trials) by discharge  -CS by discharge  -CS     Progress/Outcomes (Tolerate trials) new goal  -CS new goal  -CS        Patient will demonstrate functional speech skills for return to discharge  environment    [place] with minimal cues  -CS --     Time frame by discharge  -CS --     Progress/Outcomes new goal  -CS --        Articulation Goal 1 (SLP)    Improve Articulation Goal 1 (SLP) of specific sounds in phrases;of specific sounds in connected speech;90%;independently (over 90% accuracy)  -CS --     Time Frame (Articulation Goal 1, SLP) by discharge  -CS --     Barriers (Articulation Goal 1, SLP) n/a  -CS --     Progress/Outcomes (Articulation Goal 1, SLP) new goal  -CS --               User Key  (r) = Recorded By, (t) = Taken By, (c) = Cosigned By      Initials Name Provider Type    CS Moody Slaughter CCC-SLP Speech and Language Pathologist                         Time Calculation:    Time Calculation- SLP       Row Name 05/14/24 1511             Time Calculation- SLP    SLP Start Time 1219  -CS      SLP Stop Time 1345  -CS      SLP Time Calculation (min) 86 min  -CS      SLP Received On 05/14/24  -CS      SLP Goal Re-Cert Due Date 05/24/24  -CS         Untimed Charges    SLP Eval/Re-eval  ST Eval Speech and Production w/ Language - 23668  -CS      36659-FO Treatment/ST Modification Prosth Aug Alter  61  -CS      00055-RN Treatment Swallow Minutes 25  -CS         Total Minutes    Untimed Charges Total Minutes 86  -CS       Total Minutes 86  -CS                User Key  (r) = Recorded By, (t) = Taken By, (c) = Cosigned By      Initials Name Provider Type    CS Moody Slaughter CCC-SLP Speech and Language Pathologist                    Therapy Charges for Today       Code Description Service Date Service Provider Modifiers Qty    19712923227 HC ST EVAL ORAL PHARYNG SWALLOW 6 5/13/2024 Moody Slaughter CCC-SLP GN 1    [id number] HC ST TREATMENT SWALLOW 2 5/14/2024 Moody Slaughter CCC-SLP GN 1    [id number] HC ST EVAL SPEECH AND PROD W LANG  4 5/14/2024 Moody Slaughter CCC-SLP GN 1                 BRISEIDA Almeida  5/14/2024   and Acute Care - Speech Language Pathology Initial Evaluation   [place]      Patient Name: Agueda Tyler  : 1945  MRN: [id number]  Today's Date: 2024               Admit Date: 2024     Visit Dx:    ICD-10-CM ICD-9-CM   1. Cerebrovascular accident (CVA), unspecified mechanism  I63.9 434.91   2. Impaired mobility [Z74.09]  Z74.09 799.89   3. Dysphagia, unspecified type  R13.10 787.20   4. Motor speech disorder  R47.89 784.59     Patient Active Problem List   Diagnosis    Hx of colonic polyp    Gastroesophageal reflux disease without esophagitis    Iron deficiency anemia    BRBPR (bright red blood per rectum)    Acute ischemic stroke    Essential hypertension    Stroke-like symptom    Right upper lobe consolidation    SINDI (obstructive sleep apnea)    Restless legs    Impaired gait and mobility    Cerebral ventriculomegaly due to brain atrophy     Past Medical History:   Diagnosis Date    Anxiety     BPH (benign prostatic hyperplasia)     Colon polyp     Diverticulosis     Essential hypertension     GERD (gastroesophageal reflux disease)     Hypertriglyceridemia     Idiopathic peripheral neuropathy     Osteoarthritis      Past Surgical History:   Procedure Laterality Date    COLONOSCOPY  2016    diverticulosis, internal hemorrhoids,     COLONOSCOPY  2011    internal hemorrhoid, diverticulosis    COLONOSCOPY N/A 3/11/2021    Procedure: COLONOSCOPY WITH ANESTHESIA;  Surgeon: Ricyk House MD;  Location:  [facility];  Service: Gastroenterology;  Laterality: N/A;  pre: hx polyps  post: diverticulosis. hemorrhoid.   Keenan Perez MD    ENDOSCOPY N/A 3/11/2021    Procedure: ESOPHAGOGASTRODUODENOSCOPY WITH ANESTHESIA;  Surgeon: Ricky House MD;  Location:  [facility];  Service: Gastroenterology;  Laterality: N/A;  pre: GERD  post: hiatal hernia. gastric polyps.  Keenan ePrez MD    HERNIA REPAIR      inguinal    TURP / TRANSURETHRAL INCISION / DRAINAGE PROSTATE         SLP Recommendation and Plan  SLP Diagnosis: mild, dysarthria  (05/14/24 1219)                 Anticipated Discharge Disposition (SLP): unknown (05/14/24 1219)        Therapy Frequency (SLP SLC): at least, 2 days per week (05/14/24 1219)  Predicted Duration Therapy Intervention (Days): until discharge (05/14/24 1219)                             Plan of Care Reviewed With: patient (05/14/24 7258)  Progress: improving (05/14/24 1458)  Outcome Evaluation: See note (05/14/24 1458)      SLP EVALUATION (Last 72 Hours)       SLP SLC Evaluation       Row Name 05/14/24 1219                   Communication Assessment/Intervention    Document Type evaluation  -CS        Subjective Information complains of;numbness  -CS        Patient Observations alert;cooperative;agree to therapy  -CS        Patient/Family/Caregiver Comments/Observations Wife  -CS        Patient Effort good  -CS           General Information    Patient Profile Reviewed yes  -CS        Pertinent History Of Current Problem MRI- L sided CVA  CT of head- No acute intracranial abnormality. Instance of slurred speech.  -CS        Precautions/Limitations, Vision WFL with corrective lenses  -CS        Precautions/Limitations, Hearing WFL  -CS        Plans/Goals Discussed with patient;spouse/S.O.  -CS        Barriers to Rehab none identified  -CS        Patient's Goals for Discharge return to all previous roles/activities  -CS           Pain    Additional Documentation Pain Scale: Numbers Pre/Post-Treatment (Group)  -CS           Pain Scale: Numbers Pre/Post-Treatment    Pretreatment Pain Rating 0/10 - no pain  -CS        Posttreatment Pain Rating 0/10 - no pain  -CS           Comprehension Assessment/Intervention    Comprehension Assessment/Intervention Auditory Comprehension  -CS           Auditory Comprehension Assessment/Intervention    Auditory Comprehension (Communication) WFL  -CS        Able to Identify Objects/Pictures (Communication) body part;familiar objects;WNL  -CS        Answers Questions (Communication) yes/no;wh  questions;personal;WFL  -CS        Able to Follow Commands (Communication) 1-step;2-step;WFL  -CS           Expression Assessment/Intervention    Expression Assessment/Intervention verbal expression  -CS           Verbal Expression Assessment/Intervention    Verbal Expression WNL  -CS        Automatic Speech (Communication) counting 1-20;days of week;months of year;WNL  -CS        Repetition phrases;sentences;WNL  -CS        Phrase Completion automatic/predictable;unpredictable;WNL  -CS        Responsive Naming simple;WNL  -CS        Confrontational Naming WNL  -CS        Spontaneous/Functional Words WNL  -CS        Sentence Formulation complex;WNL  -CS        Conversational Discourse/Fluency WNL  -CS           Oral Motor Structure and Function    Oral Motor Structure and Function WNL  -CS        Dentition Assessment natural, present and adequate  -CS        Mucosal Quality moist, healthy  -CS           Oral Musculature and Cranial Nerve Assessment    Oral Motor General Assessment oral labial or buccal impairment  -CS        Mandibular Impairment Detail, Cranial Nerve V (Trigeminal) reduced facial sensation on right  -CS        Oral Labial or Buccal Impairment, Detail, Cranial Nerve VII (Facial): CN7: Motor Impairment;right labial droop;CN7: Sensory Impairment  -CS           Motor Speech Assessment/Intervention    Motor Speech Function mild impairment  -CS        Characteristics Consistent with Dysarthria decreased articulation;decreased intensity;slurred speech  -CS        Initiation of Phonation (Communication) voluntary;WFL  -CS        Automatic Speech (Communication) WNL  -CS        Verbal Repetition (Communication) monosyllabic words;polysyllabic words;moderate impairment  -CS        Phase Completion WNL  -CS        Conversational Speech (Communication) mild impairment  -CS        Speech intelligibility 80%;in connected speech  -CS           Cognitive Assessment Intervention- SLP    Cognitive Function  (Cognition) WFL  -CS        Orientation Status (Cognition) person;place;time;situation;WFL  -CS        Memory (Cognitive) simple;functional;WFL  -CS        Attention (Cognitive) selective;sustained;WFL  -CS        Thought Organization (Cognitive) concrete divergent;abstract divergent;WFL  -CS        Problem Solving (Cognitive) simple;divergent;WFL  -CS           SLP Evaluation Clinical Impressions    SLP Diagnosis mild;dysarthria  -CS        Rehab Potential/Prognosis good  -CS           Recommendations    Therapy Frequency (SLP SLC) at least;2 days per week  -CS        Predicted Duration Therapy Intervention (Days) until discharge  -CS        Anticipated Discharge Disposition (SLP) unknown  -CS           Communication Treatment Objective and Progress Goals (SLP)    SLC LTGs Patient will demonstrate functional speech skills for return to discharge environment  -CS        Motor Speech/Dysarthria Treatment Objectives Motor Speech/Dysarthria Treatment Objectives (Group)  -CS           Patient will demonstrate functional speech skills for return to discharge environment    [place] with minimal cues  -CS        Time frame by discharge  -CS        Progress/Outcomes new goal  -CS           Motor Speech/Dysarthria Treatment Objectives    Articulation Selection articulation, SLP goal 1  -CS           Articulation Goal 1 (SLP)    Improve Articulation Goal 1 (SLP) of specific sounds in phrases;of specific sounds in connected speech;90%;independently (over 90% accuracy)  -CS        Time Frame (Articulation Goal 1, SLP) by discharge  -CS        Barriers (Articulation Goal 1, SLP) n/a  -CS        Progress/Outcomes (Articulation Goal 1, SLP) new goal  -CS                  User Key  (r) = Recorded By, (t) = Taken By, (c) = Cosigned By      Initials Name Effective Dates    CS Moody Slaughter, [facility]-SLP 05/07/24 -                        EDUCATION  The patient has been educated in the following areas:     Communication  Impairment.           SLP GOALS       Row Name 05/14/24 1219 05/13/24 1307          (LTG) Swallow    (LTG) Swallow Pt will tolerate LRD w/o any overt s/s of aspiration.  -CS Pt will tolerate LRD w/o any overt s/s of aspiration.  -CS     [place] (Swallow Long Term Goal) independently (over 90% accuracy)  -CS independently (over 90% accuracy)  -CS     Time Frame (Swallow Long Term Goal) by discharge  -CS by discharge  -CS     Barriers (Swallow Long Term Goal) n/a  -CS n/a  -CS     Progress/Outcomes (Swallow Long Term Goal) new goal  -CS new goal  -CS        (STG) Patient will tolerate trials of    Consistencies Trialed (Tolerate trials) regular textures;thin liquids  -CS regular textures;thin liquids  -CS     Desired Outcome (Tolerate trials) without signs/symptoms of aspiration;without signs of distress  -CS without signs/symptoms of aspiration;without signs of distress  -CS     [place] (Tolerate trials) independently (over 90% accuracy)  -CS independently (over 90% accuracy)  -CS     Time Frame (Tolerate trials) by discharge  -CS by discharge  -CS     Progress/Outcomes (Tolerate trials) new goal  -CS new goal  -CS        Patient will demonstrate functional speech skills for return to discharge environment    [place] with minimal cues  -CS --     Time frame by discharge  -CS --     Progress/Outcomes new goal  -CS --        Articulation Goal 1 (SLP)    Improve Articulation Goal 1 (SLP) of specific sounds in phrases;of specific sounds in connected speech;90%;independently (over 90% accuracy)  -CS --     Time Frame (Articulation Goal 1, SLP) by discharge  -CS --     Barriers (Articulation Goal 1, SLP) n/a  -CS --     Progress/Outcomes (Articulation Goal 1, SLP) new goal  -CS --               User Key  (r) = Recorded By, (t) = Taken By, (c) = Cosigned By      Initials Name Provider Type    CS Moody Slaughter [facility]-SLP Speech and Language Pathologist                              Time Calculation:      Time  Calculation- SLP       Row Name 05/14/24 1511             Time Calculation- SLP    SLP Start Time 1219  -CS      SLP Stop Time 1345  -CS      SLP Time Calculation (min) 86 min  -CS      SLP Received On 05/14/24  -CS      SLP Goal Re-Cert Due Date 05/24/24  -CS         Untimed Charges    SLP Eval/Re-eval  ST Eval Speech and Production w/ Language - 22204  -CS      72986-SB Treatment/ST Modification Prosth Aug Alter  61  -CS      [id number]-RV Treatment Swallow Minutes 25  -CS         Total Minutes    Untimed Charges Total Minutes 86  -CS       Total Minutes 86  -CS                User Key  (r) = Recorded By, (t) = Taken By, (c) = Cosigned By      Initials Name Provider Type     Moody Slaughter, CCC-SLP Speech and Language Pathologist                    Therapy Charges for Today       Code Description Service Date Service Provider Modifiers Qty    57521825064 HC ST EVAL ORAL PHARYNG SWALLOW 6 5/13/2024 Moody Slaughter CCC-SLP GN 1    [id number] HC ST TREATMENT SWALLOW 2 5/14/2024 Moody Slaughter CCC-SLP GN 1    [id number] HC ST EVAL SPEECH AND PROD W LANG  4 5/14/2024 Moody Slaughter CCC-SLP GN 1                       BRISEIDA Almeida  5/14/2024

## 2024-05-14 NOTE — PLAN OF CARE
Goal Outcome Evaluation:  Plan of Care Reviewed With: patient, spouse        Progress: improving  Outcome Evaluation: pt trans to EOB sba, sit-stand cga-min, pt amb 40 feet min-mod assist rwx, pt very unsteady with turns, required assist to stay inside rwx, pt with ataxic gait, trans to chair cga-min, pt would benefit from acute inpt rehab

## 2024-05-14 NOTE — PLAN OF CARE
Goal Outcome Evaluation:           Progress: declining  Outcome Evaluation: Pt A&Ox4 W/ episodes of confusion. VSS on RA. PPP. LEMUS. NIH 4. Pt C/O right sided numbness. Up x 1 asisst W/ walker.Pt fell while ambulating to the bathroom unattended. No injuries noted. Pt was confused and disoriented during post fall asessment. Head CT performed. Family @ bedside. Call light within reach, safety maintained.

## 2024-05-14 NOTE — THERAPY TREATMENT NOTE
Acute Care - Physical Therapy Treatment Note  [facility]     Patient Name: Agueda Tyler  : 1945  MRN: [id number]  Today's Date: 2024      Visit Dx:     ICD-10-CM ICD-9-CM   1. Cerebrovascular accident (CVA), unspecified mechanism  I63.9 434.91   2. Impaired mobility [Z74.09]  Z74.09 799.89   3. Dysphagia, unspecified type  R13.10 787.20     Patient Active Problem List   Diagnosis    Hx of colonic polyp    Gastroesophageal reflux disease without esophagitis    Iron deficiency anemia    BRBPR (bright red blood per rectum)    Stroke    Essential hypertension    Stroke-like symptom    Right upper lobe consolidation     Past Medical History:   Diagnosis Date    Anxiety     BPH (benign prostatic hyperplasia)     Colon polyp     Diverticulosis     Essential hypertension     GERD (gastroesophageal reflux disease)     Hypertriglyceridemia     Idiopathic peripheral neuropathy     Osteoarthritis      Past Surgical History:   Procedure Laterality Date    COLONOSCOPY  2016    diverticulosis, internal hemorrhoids,     COLONOSCOPY  2011    internal hemorrhoid, diverticulosis    COLONOSCOPY N/A 3/11/2021    Procedure: COLONOSCOPY WITH ANESTHESIA;  Surgeon: Ricky House MD;  Location: [facility] ENDOSCOPY;  Service: Gastroenterology;  Laterality: N/A;  pre: hx polyps  post: diverticulosis. hemorrhoid.   Keenan Perez MD    ENDOSCOPY N/A 3/11/2021    Procedure: ESOPHAGOGASTRODUODENOSCOPY WITH ANESTHESIA;  Surgeon: Ricky House MD;  Location: [facility] ENDOSCOPY;  Service: Gastroenterology;  Laterality: N/A;  pre: GERD  post: hiatal hernia. gastric polyps.  Keenan Perez MD    HERNIA REPAIR      inguinal    TURP / TRANSURETHRAL INCISION / DRAINAGE PROSTATE       PT Assessment (Last 12 Hours)       PT Evaluation and Treatment       Row Name 24 1150 24 0913       Physical Therapy Time and Intention    Subjective Information complains of;dizziness;numbness  numbness [facility] --     Document Type therapy note (daily note)  - --    Mode of Treatment physical therapy  - --    Session Not Performed -- patient unavailable for treatment  -    Comment, Session Not Performed -- gone for ECHO  -[facility] Name 05/14/24 1150          General Information    Patient/Family/Caregiver Comments/Observations WIFE  -     Existing Precautions/Restrictions fall  -[facility] Name 05/14/24 1150          Pain    Pretreatment Pain Rating 0/10 - no pain  -     Posttreatment Pain Rating 0/10 - no pain  -[facility] Name 05/14/24 1150          Bed Mobility    Supine-Sit [place] (Bed Mobility) standby assist  -     Sit-Supine [place] (Bed Mobility) --  CHAIR  -[facility] Name 05/14/24 1150          Sit-Stand Transfer    Sit-Stand [place] (Transfers) minimum assist (75% patient effort);verbal cues  -[facility] Name 05/14/24 1150          Stand-Sit Transfer    Stand-Sit [place] (Transfers) contact guard;verbal cues  -AH       Row Name 05/14/24 1150          Gait/Stairs (Locomotion)    [place] Level (Gait) verbal cues;minimum assist (75% patient effort);moderate assist (50% patient effort)  -     Assistive Device (Gait) walker, front-wheeled  -     Distance in Feet (Gait) 40  40 X 2  [facility]     Deviations/Abnormal Patterns (Gait) ataxic  -     Comment, (Gait/Stairs) very unsteady with turns, assist to stay inside rwx  -[facility] Name 05/14/24 1150          Safety Issues, Functional Mobility    Impairments Affecting Function (Mobility) balance;coordination;motor control  -[facility] Name 05/14/24 1150          Plan of Care Review    Plan of Care Reviewed With patient;spouse  -     Progress improving  -     Outcome Evaluation pt trans to EOB sba, sit-stand cga-min, pt amb 40 feet min-mod assist rwx, pt very unsteady with turns, required assist to stay inside rwx, pt with ataxic gait, trans to chair cga-min, pt would benefit from acute inpt rehab  -[facility]  05/14/24 1150          Positioning and Restraints    Pre-Treatment Position in bed  -AH     Post Treatment Position chair  -AH     In Chair notified nsg;sitting;call light within reach;encouraged to call for assist;exit alarm on;with family/caregiver  -               User Key  (r) = Recorded By, (t) = Taken By, (c) = Cosigned By      Initials Name Provider Type     Gricel Medina, PTA Physical Therapist Assistant                    Physical Therapy Education       Title: PT OT SLP Therapies (In Progress)       Topic: Physical Therapy (Done)       Point: Mobility training (Done)       Learning Progress Summary             Patient Acceptance, E,D, DU,VU by  at 5/13/2024 1139    Comment: benefits of PT and POC, call for A OOB                         Point: Precautions (Done)       Learning Progress Summary             Patient Acceptance, E,D, DU,VU by  at 5/13/2024 1139    Comment: benefits of PT and POC, call for A OOB                                         User Key       Initials Effective Dates Name Provider Type [facility] 02/03/23 -  Oswaldo Marc, PT Physical Therapist PT                  PT Recommendation and Plan     Plan of Care Reviewed With: patient, spouse  Progress: improving  Outcome Evaluation: pt trans to EOB sba, sit-stand cga-min, pt amb 40 feet min-mod assist rwx, pt very unsteady with turns, required assist to stay inside rwx, pt with ataxic gait, trans to chair cga-min, pt would benefit from acute inpt rehab   Outcome Measures       Row Name 05/14/24 1200             How much help from another person do you currently need...    Turning from your back to your side while in flat bed without using bedrails? 4  -AH      Moving from lying on back to sitting on the side of a flat bed without bedrails? 3  -AH      Moving to and from a bed to a chair (including a wheelchair)? 3  -AH      Standing up from a chair using your arms (e.g., wheelchair, bedside chair)? 3  -AH      Climbing 3-5  steps with a railing? 2  -AH      To walk in hospital room? 3  -AH      AM-PAC 6 Clicks Score (PT) 18  -      Highest Level of Mobility Goal 6 --> Walk 10 steps or more  -         Functional Assessment    Outcome Measure Options AM-PAC 6 Clicks Basic Mobility (PT)  -                User Key  (r) = Recorded By, (t) = Taken By, (c) = Cosigned By      Initials Name Provider Type     Gricel Medina PTA Physical Therapist Assistant                     Time Calculation:    PT Charges       Row Name 05/14/24 1214             Time Calculation    Start Time 1150  -      Stop Time 1213  -      Time Calculation (min) 23 min  -      PT Received On 05/14/24  -         Time Calculation- PT    Total Timed Code Minutes- PT 23 minute(s)  -         Timed Charges    40248 - Gait Training Minutes  23  -         Total Minutes    Timed Charges Total Minutes 23  -       Total Minutes 23  -                User Key  (r) = Recorded By, (t) = Taken By, (c) = Cosigned By      Initials Name Provider Type     Gricel Medina PTA Physical Therapist Assistant                  Therapy Charges for Today       Code Description Service Date Service Provider Modifiers Qty    76436107810 HC PT THERAPEUTIC ACT EA 15 MIN 5/13/2024 Gricel Medina PTA GP 2    46396712966 HC GAIT TRAINING EA 15 MIN 5/14/2024 Gricel Medina PTA GP 2            PT G-Codes  Outcome Measure Options: AM-PAC 6 Clicks Basic Mobility (PT)  AM-PAC 6 Clicks Score (PT): 18  AM-PAC 6 Clicks Score (OT): 15  Modified Paul Scale: 4 - Moderately severe disability.  Unable to walk without assistance, and unable to attend to own bodily needs without assistance.    Gricel Medina PTA  5/14/2024

## 2024-05-14 NOTE — THERAPY TREATMENT NOTE
Acute Care - Physical Therapy Treatment Note  [facility]     Patient Name: Agueda Tyler  : 1945  MRN: [id number]  Today's Date: 2024      Visit Dx:     ICD-10-CM ICD-9-CM   1. Cerebrovascular accident (CVA), unspecified mechanism  I63.9 434.91   2. Impaired mobility [Z74.09]  Z74.09 799.89   3. Dysphagia, unspecified type  R13.10 787.20   4. Motor speech disorder  R47.89 784.59     Patient Active Problem List   Diagnosis    Hx of colonic polyp    Gastroesophageal reflux disease without esophagitis    Iron deficiency anemia    BRBPR (bright red blood per rectum)    Stroke    Essential hypertension    Stroke-like symptom    Right upper lobe consolidation     Past Medical History:   Diagnosis Date    Anxiety     BPH (benign prostatic hyperplasia)     Colon polyp     Diverticulosis     Essential hypertension     GERD (gastroesophageal reflux disease)     Hypertriglyceridemia     Idiopathic peripheral neuropathy     Osteoarthritis      Past Surgical History:   Procedure Laterality Date    COLONOSCOPY  2016    diverticulosis, internal hemorrhoids,     COLONOSCOPY  2011    internal hemorrhoid, diverticulosis    COLONOSCOPY N/A 3/11/2021    Procedure: COLONOSCOPY WITH ANESTHESIA;  Surgeon: Ricky House MD;  Location: [facility] ENDOSCOPY;  Service: Gastroenterology;  Laterality: N/A;  pre: hx polyps  post: diverticulosis. hemorrhoid.   Keenan Perez MD    ENDOSCOPY N/A 3/11/2021    Procedure: ESOPHAGOGASTRODUODENOSCOPY WITH ANESTHESIA;  Surgeon: Ricky House MD;  Location:  [facility] ENDOSCOPY;  Service: Gastroenterology;  Laterality: N/A;  pre: GERD  post: hiatal hernia. gastric polyps.  Keenan Perez MD    HERNIA REPAIR      inguinal    TURP / TRANSURETHRAL INCISION / DRAINAGE PROSTATE       PT Assessment (Last 12 Hours)       PT Evaluation and Treatment       Row Name 24 1322 24 1150       Physical Therapy Time and Intention    Subjective Information no complaints   - complains of;dizziness;numbness  numbness RUE  -    Document Type therapy note (daily note)  - therapy note (daily note)  -    Mode of Treatment physical therapy  - physical therapy  -[facility] Name 05/14/24 0913          Physical Therapy Time and Intention    Session Not Performed patient unavailable for treatment  -     Comment, Session Not Performed gone for ECHO  -[facility] Name 05/14/24 1322 05/14/24 1150       General Information    Patient/Family/Caregiver Comments/Observations wife  - WIFE  -    Existing Precautions/Restrictions fall  - fall  -[facility] Name 05/14/24 1322 05/14/24 1150       Pain    Pretreatment Pain Rating 0/10 - no pain  - 0/10 - no pain  -    Posttreatment Pain Rating 0/10 - no pain  - 0/10 - no pain  -[facility] Name 05/14/24 1322 05/14/24 1150       Bed Mobility    Supine-Sit [place] (Bed Mobility) --  bathroom  - standby assist  -    Sit-Supine [place] (Bed Mobility) --  CHAIR  - --  CHAIR  -[facility] Name 05/14/24 1322          Transfers    Transfers toilet transfer  -[facility] Name 05/14/24 1322 05/14/24 1150       Sit-Stand Transfer    Sit-Stand [place] (Transfers) minimum assist (75% patient effort);verbal cues  - minimum assist (75% patient effort);verbal cues  -[facility] Name 05/14/24 1322 05/14/24 1150       Stand-Sit Transfer    Stand-Sit [place] (Transfers) contact guard;verbal cues  [facility] contact guard;verbal cues  -[facility] Name 05/14/24 1322          Toilet Transfer    [place] Level (Toilet Transfer) minimum assist (75% patient effort);verbal cues  -     Assistive Device (Toilet Transfer) commode;walker, front-wheeled  -[facility] Name 05/14/24 1322 05/14/24 1150       Gait/Stairs (Locomotion)    [place] Level (Gait) verbal cues;minimum assist (75% patient effort);moderate assist (50% patient effort)  - verbal cues;minimum assist (75% patient effort);moderate assist (50% patient effort)   -    Assistive Device (Gait) walker, front-wheeled  - walker, front-wheeled  -    Distance in Feet (Gait) 15  -AH 40  40 X 2  -AH    Deviations/Abnormal Patterns (Gait) ataxic  - ataxic  -    Comment, (Gait/Stairs) VERY unsteady [place] with turns, 3 LOB with turning   - very unsteady with turns, assist to stay inside rwx  -      Row Name 05/14/24 1322 05/14/24 1150       Safety Issues, Functional Mobility    Impairments Affecting Function (Mobility) balance;coordination;motor control  - balance;coordination;motor control  -      Row Name 05/14/24 1150          Plan of Care Review    Plan of Care Reviewed With patient;spouse  -     Progress improving  -     Outcome Evaluation pt trans to EOB sba, sit-stand cga-min, pt amb 40 feet min-mod assist rwx, pt very unsteady with turns, required assist to stay inside rwx, pt with ataxic gait, trans to chair cga-min, pt would benefit from acute inpt rehab  -[facility] Name 05/14/24 1322 05/14/24 1150       Positioning and Restraints    Pre-Treatment Position bathroom  - in bed  -    Post Treatment Position chair  - chair  -    In Chair reclined;call light within reach;encouraged to call for assist;exit alarm on;with family/caregiver  - notified nsg;sitting;call light within reach;encouraged to call for assist;exit alarm on;with family/caregiver  -              User Key  (r) = Recorded By, (t) = Taken By, (c) = Cosigned By      Initials Name Provider Type     Gricel Medina, PTA Physical Therapist Assistant                    Physical Therapy Education       Title: PT OT SLP Therapies (In Progress)       Topic: Physical Therapy (Done)       Point: Mobility training (Done)       Learning Progress Summary             Patient Acceptance, E,D, JEF,VU by  at 5/13/2024 3499    Comment: benefits of PT and POC, call for A OOB                         Point: Precautions (Done)       Learning Progress Summary             Patient Acceptance, E,D, DU,VU  by  at 5/13/2024 2354    Comment: benefits of PT and POC, call for A OOB                                         User Key       Initials Effective Dates Name Provider Type Discipline     02/03/23 -  Oswaldo Marc, PT Physical Therapist PT                  PT Recommendation and Plan     Plan of Care Reviewed With: patient, spouse  Progress: improving  Outcome Evaluation: pt trans to EOB sba, sit-stand cga-min, pt amb 40 feet min-mod assist rwx, pt very unsteady with turns, required assist to stay inside rwx, pt with ataxic gait, trans to chair cga-min, pt would benefit from acute inpt rehab   Outcome Measures       Row Name 05/14/24 1200             How much help from another person do you currently need...    Turning from your back to your side while in flat bed without using bedrails? 4  -AH      Moving from lying on back to sitting on the side of a flat bed without bedrails? 3  -AH      Moving to and from a bed to a chair (including a wheelchair)? 3  -AH      Standing up from a chair using your arms (e.g., wheelchair, bedside chair)? 3  -AH      Climbing 3-5 steps with a railing? 2  -AH      To walk in hospital room? 3  -AH      AM-PAC 6 Clicks Score (PT) 18  -      Highest Level of Mobility Goal 6 --> Walk 10 steps or more  -         Functional Assessment    Outcome Measure Options AM-PAC 6 Clicks Basic Mobility (PT)  -                User Key  (r) = Recorded By, (t) = Taken By, (c) = Cosigned By      Initials Name Provider Type     Gricel Medina, PTA Physical Therapist Assistant                     Time Calculation:    PT Charges       Row Name 05/14/24 1337 05/14/24 1214          Time Calculation    Start Time 1322  - 1150  -     Stop Time 1337  - 1213  -     Time Calculation (min) 15 min  - 23 min  -     PT Received On -- 05/14/24  -        Time Calculation- PT    Total Timed Code Minutes- PT 15 minute(s)  - 23 minute(s)  -        Timed Charges    47993 - Gait Training Minutes   15  -AH 23  -AH        Total Minutes    Timed Charges Total Minutes 15  -AH 23  -AH      Total Minutes 15  -AH 23  -AH               User Key  (r) = Recorded By, (t) = Taken By, (c) = Cosigned By      Initials Name Provider Type    Gricel Major PTA Physical Therapist Assistant                  Therapy Charges for Today       Code Description Service Date Service Provider Modifiers Qty    63481850545 HC PT THERAPEUTIC ACT EA 15 MIN 5/13/2024 Gricel Medina PTA GP 2    61269369931 HC GAIT TRAINING EA 15 MIN 5/14/2024 Gricel Medina, SHARON GP 2    01843934551 HC GAIT TRAINING EA 15 MIN 5/14/2024 Gricel Medina, SHARON GP 1            PT G-Codes  Outcome Measure Options: AM-PAC 6 Clicks Basic Mobility (PT)  AM-PAC 6 Clicks Score (PT): 18  AM-PAC 6 Clicks Score (OT): 15  Modified Paul Scale: 4 - Moderately severe disability.  Unable to walk without assistance, and unable to attend to own bodily needs without assistance.    Gricel Medina PTA  5/14/2024

## 2024-05-14 NOTE — THERAPY TREATMENT NOTE
Patient Name: Agueda Tyler  : 1945    MRN: [id number]                              Today's Date: 2024       Admit Date: 2024    Visit Dx:     ICD-10-CM ICD-9-CM   1. Cerebrovascular accident (CVA), unspecified mechanism  I63.9 434.91   2. Impaired mobility [Z74.09]  Z74.09 799.89   3. Dysphagia, unspecified type  R13.10 787.20   4. Motor speech disorder  R47.89 784.59     Patient Active Problem List   Diagnosis    Hx of colonic polyp    Gastroesophageal reflux disease without esophagitis    Iron deficiency anemia    BRBPR (bright red blood per rectum)    Acute ischemic stroke    Essential hypertension    Stroke-like symptom    Right upper lobe consolidation    SINDI (obstructive sleep apnea)    Restless legs    Impaired gait and mobility    Cerebral ventriculomegaly due to brain atrophy     Past Medical History:   Diagnosis Date    Anxiety     BPH (benign prostatic hyperplasia)     Colon polyp     Diverticulosis     Essential hypertension     GERD (gastroesophageal reflux disease)     Hypertriglyceridemia     Idiopathic peripheral neuropathy     Osteoarthritis      Past Surgical History:   Procedure Laterality Date    COLONOSCOPY  2016    diverticulosis, internal hemorrhoids,     COLONOSCOPY  2011    internal hemorrhoid, diverticulosis    COLONOSCOPY N/A 3/11/2021    Procedure: COLONOSCOPY WITH ANESTHESIA;  Surgeon: Ricky House MD;  Location:  [facility];  Service: Gastroenterology;  Laterality: N/A;  pre: hx polyps  post: diverticulosis. hemorrhoid.   Keenan Perez MD    ENDOSCOPY N/A 3/11/2021    Procedure: ESOPHAGOGASTRODUODENOSCOPY WITH ANESTHESIA;  Surgeon: Ricky House MD;  Location:  [facility];  Service: Gastroenterology;  Laterality: N/A;  pre: GERD  post: hiatal hernia. gastric polyps.  Keenan Perez MD    HERNIA REPAIR      inguinal    TURP / TRANSURETHRAL INCISION / DRAINAGE PROSTATE        General Information       Row Name 24  1000          OT Time and Intention    Document Type therapy note (daily note)  -MM     Mode of Treatment occupational therapy  -MM       Row Name 05/14/24 1000          General Information    Patient Profile Reviewed yes  -MM     Existing Precautions/Restrictions fall  -MM     Barriers to Rehab medically complex  -MM       Row Name 05/14/24 1000          Cognition    Orientation Status (Cognition) oriented x 4  -MM       Row Name 05/14/24 1000          Safety Issues, Functional Mobility    Safety Issues Affecting Function (Mobility) ability to follow commands;insight into deficits/self-awareness;positioning of assistive device;safety precaution awareness;safety precautions follow-through/compliance  -MM     Impairments Affecting Function (Mobility) balance;coordination;motor control;sensation/sensory awareness;strength  -MM               User Key  (r) = Recorded By, (t) = Taken By, (c) = Cosigned By      Initials Name Provider Type    MM Chance Alvarado, OTR/L Occupational Therapist                     Mobility/ADL's       Row Name 05/14/24 1000          Bed Mobility    Bed Mobility supine-sit;sit-supine  -MM     Supine-Sit [place] (Bed Mobility) minimum assist (75% patient effort);verbal cues  -MM     Sit-Supine [place] (Bed Mobility) minimum assist (75% patient effort);verbal cues  -MM     Assistive Device (Bed Mobility) head of bed elevated;bed rails  -MM       Row Name 05/14/24 1000          Transfers    Transfers sit-stand transfer;stand-sit transfer;toilet transfer  -MM       Row Name 05/14/24 1000          Sit-Stand Transfer    Sit-Stand [place] (Transfers) minimum assist (75% patient effort);verbal cues  -MM       Row Name 05/14/24 1000          Stand-Sit Transfer    Stand-Sit [place] (Transfers) contact guard;verbal cues  -MM       Row Name 05/14/24 1000          Toilet Transfer    Type (Toilet Transfer) sit-stand;stand-sit  -MM     [place] Level (Toilet Transfer) minimum assist  (75% patient effort);verbal cues  -MM     Assistive Device (Toilet Transfer) commode;walker, front-wheeled  -MM       Row Name 05/14/24 1000          Functional Mobility    Functional Mobility- Ind. Level minimum assist (75% patient effort);verbal cues required  -MM     Functional Mobility- Device walker, front-wheeled  -MM     Functional Mobility- Comment Pt walked from bed to BR to bed. Verbal cues for RW placement and safety awareness.  -MM       Row Name 05/14/24 1000          Activities of Daily Living    BADL Assessment/Intervention feeding;grooming;toileting  -MM       Row Name 05/14/24 1000          Self-Feeding Assessment/Training    [place] Level (Feeding) feeding skills;maximum assist (25% patient effort)  spouse is assisting  -MM     Position (Self-Feeding) sitting up in bed  -MM       Row Name 05/14/24 1000          Grooming Assessment/Training    [place] Level (Grooming) wash face, hands;contact guard assist;verbal cues  -MM     Position (Grooming) sink side;supported standing  -MM       Row Name 05/14/24 1000          Toileting Assessment/Training    [place] Level (Toileting) toileting skills;standby assist;supervision;verbal cues;change pad/brief;minimum assist (75% patient effort)  -MM     Assistive Devices (Toileting) grab bar/safety frame  -MM     Position (Toileting) supported standing;supported sitting  -MM               User Key  (r) = Recorded By, (t) = Taken By, (c) = Cosigned By      Initials Name Provider Type    MM Chance Alvarado, OTR/L Occupational Therapist                   Obj/Interventions       Row Name 05/14/24 1000          Balance    Static Sitting Balance supervision;contact guard;verbal cues  -MM     Dynamic Sitting Balance contact guard;verbal cues  -MM     Position, Sitting Balance supported;unsupported;sitting in chair;sitting edge of bed  -MM     Static Standing Balance contact guard;verbal cues  -MM     Dynamic Standing Balance minimal assist;verbal cues   -MM     Position/Device Used, Standing Balance supported;walker, front-wheeled  -MM               User Key  (r) = Recorded By, (t) = Taken By, (c) = Cosigned By      Initials Name Provider Type    Chance Melgar, OTR/L Occupational Therapist                   Goals/Plan    No documentation.                  Clinical Impression       Row Name 05/14/24 1000          Pain Assessment    Pretreatment Pain Rating 0/10 - no pain  -MM     Posttreatment Pain Rating 0/10 - no pain  -MM       Row Name 05/14/24 1000          Plan of Care Review    Plan of Care Reviewed With patient;spouse  -MM     Progress no change  -MM     Outcome Evaluation OT tx completed. Pt is alert and oriented x4. Pt reports no pain. Pt reports numbness in RUE and chronic numbness B feet. Pt was min A for bed mobility. Pt was min A for sit to stand t/f. Pt was min A for functional mobility from bed to BR to bed. Pt utilized RW but requires verbal cues for RW use and safety awareness. Pt was supervision to SBA for toileting task, except for doffing/donning brief pt was min A. Pt was CGA for washing hands at sink. Continue OT POC.  -MM       Row Name 05/14/24 1000          Therapy Plan Review/Discharge Plan (OT)    Anticipated Discharge Disposition (OT) inpatient rehabilitation facility  -MM       Row Name 05/14/24 1000          Positioning and Restraints    Pre-Treatment Position in bed  -MM     Post Treatment Position bed  -MM     In Bed fowlers;call light within reach;encouraged to call for assist;exit alarm on;side rails up x2;with family/caregiver  -MM               User Key  (r) = Recorded By, (t) = Taken By, (c) = Cosigned By      Initials Name Provider Type    Chance Melgar, OTR/L Occupational Therapist                   Outcome Measures       Row Name 05/14/24 1000          How much help from another is currently needed...    Putting on and taking off regular lower body clothing? 2  -MM     Bathing (including washing, rinsing, and  drying) 2  -MM     Toileting (which includes using toilet bed pan or urinal) 3  -MM     Putting on and taking off regular upper body clothing 3  -MM     Taking care of personal grooming (such as brushing teeth) 3  -MM     Eating meals 2  -MM     AM-PAC 6 Clicks Score (OT) 15  -MM       Row Name 05/14/24 1200          How much help from another person do you currently need...    Turning from your back to your side while in flat bed without using bedrails? 4  -AH     Moving from lying on back to sitting on the side of a flat bed without bedrails? 3  -AH     Moving to and from a bed to a chair (including a wheelchair)? 3  -AH     Standing up from a chair using your arms (e.g., wheelchair, bedside chair)? 3  -AH     Climbing 3-5 steps with a railing? 2  -AH     To walk in hospital room? 3  -AH     AM-PAC 6 Clicks Score (PT) 18  -AH     Highest Level of Mobility Goal 6 --> Walk 10 steps or more  -AH       Row Name 05/14/24 1200 05/14/24 1000       Functional Assessment    Outcome Measure Options AM-PAC 6 Clicks Basic Mobility (PT)  -AH AM-PAC 6 Clicks Daily Activity (OT)  -MM              User Key  (r) = Recorded By, (t) = Taken By, (c) = Cosigned By      Initials Name Provider Type     Gricel Medina, PTA Physical Therapist Assistant    MM Chance Alvarado, OTR/L Occupational Therapist                    Occupational Therapy Education       Title: PT OT SLP Therapies (In Progress)       Topic: Occupational Therapy (In Progress)       Point: ADL training (Done)       Description:   Instruct learner(s) on proper safety adaptation and remediation techniques during self care or transfers.   Instruct in proper use of assistive devices.                  Learning Progress Summary             Patient Acceptance, E, VU,NR by MM at 5/14/2024 1544    Acceptance, E, NR by CS at 5/13/2024 1159    Acceptance, E, VU by CS at 5/13/2024 1049   Family Acceptance, E, VU by CS at 5/13/2024 1049                         Point: Home  exercise program (In Progress)       Description:   Instruct learner(s) on appropriate technique for monitoring, assisting and/or progressing therapeutic exercises/activities.                  Learning Progress Summary             Patient Acceptance, E, NR by CS at 5/13/2024 1159    Acceptance, E, VU by CS at 5/13/2024 1049   Family Acceptance, E, VU by CS at 5/13/2024 1049                         Point: Precautions (Done)       Description:   Instruct learner(s) on prescribed precautions during self-care and functional transfers.                  Learning Progress Summary             Patient Acceptance, E, VU,NR by MM at 5/14/2024 1544    Acceptance, E, NR by CS at 5/13/2024 1159    Acceptance, E, VU by CS at 5/13/2024 1049   Family Acceptance, E, VU by CS at 5/13/2024 1049                         Point: Body mechanics (Done)       Description:   Instruct learner(s) on proper positioning and spine alignment during self-care, functional mobility activities and/or exercises.                  Learning Progress Summary             Patient Acceptance, E, VU,NR by MM at 5/14/2024 1544    Acceptance, E, NR by CS at 5/13/2024 1159    Acceptance, E, VU by CS at 5/13/2024 1049   Family Acceptance, E, VU by CS at 5/13/2024 1049                                         User Key       Initials Effective Dates Name Provider Type Discipline     02/03/23 -  Abigail Collier S, OTR/L, CNT Occupational Therapist OT     07/11/23 -  Chance Alvarado E, OTR/L Occupational Therapist OT                  OT Recommendation and Plan     Plan of Care Review  Plan of Care Reviewed With: patient, spouse  Progress: no change  Outcome Evaluation: OT tx completed. Pt is alert and oriented x4. Pt reports no pain. Pt reports numbness in RUE and chronic numbness B feet. Pt was min A for bed mobility. Pt was min A for sit to stand t/f. Pt was min A for functional mobility from bed to BR to bed. Pt utilized RW but requires verbal cues for RW use and  safety awareness. Pt was supervision to SBA for toileting task, except for doffing/donning brief pt was min A. Pt was CGA for washing hands at sink. Continue OT POC.     Time Calculation:         Time Calculation- OT       Row Name 05/14/24 1337 05/14/24 1214 05/14/24 1000       Time Calculation- OT    OT Start Time -- -- 1000  -MM    OT Stop Time -- -- 1101  -MM    OT Time Calculation (min) -- -- 61 min  -MM    Total Timed Code Minutes- OT -- -- 61 minute(s)  -MM    OT Received On -- -- 05/14/24  -MM       Timed Charges    24570 - Gait Training Minutes  15  -AH 23  -AH --    78986 - OT Therapeutic Activity Minutes -- -- 31  -MM    91495 - OT Self Care/Mgmt Minutes -- -- 30  -MM       Total Minutes    Timed Charges Total Minutes 15  -AH 23  -AH 61  -MM     Total Minutes 15  -AH 23  -AH 61  -MM              User Key  (r) = Recorded By, (t) = Taken By, (c) = Cosigned By      Initials Name Provider Type     Gricel Medina, PTA Physical Therapist Assistant    MM Chance Alvarado, OTR/L Occupational Therapist                  Therapy Charges for Today       Code Description Service Date Service Provider Modifiers Qty    75229520248 HC OT THERAPEUTIC ACT EA 15 MIN 5/14/2024 Chance Alvarado, OTR/L GO 2    69659136826 HC OT SELF CARE/MGMT/TRAIN EA 15 MIN 5/14/2024 Chance Alvarado, OTR/L GO 2                 Chance Alvarado OTR/HOMERO  5/14/2024

## 2024-05-14 NOTE — PLAN OF CARE
Goal Outcome Evaluation:  Plan of Care Reviewed With: patient            Patient is alert and oriented x 4, slurred speech seems improved by little with time, weakness are improving. NIH 4 -no change since morning. Able to walk to BR or sit up in chair with 1  x person assist with walker. VSS. Room air . SCDs. Safety precautions maintained. Will continue to monitor.

## 2024-05-14 NOTE — PROGRESS NOTES
[facility]  INPATIENT PROGRESS NOTE    Patient Name: Agueda Tyler  Date of Admission: 5/12/2024  Today's Date: 05/14/24  Length of Stay: 2  Primary Care Physician: Keenan Perez MD    Subjective   Chief Complaint: Right sided weakness; dysarthria  HPI   Patient states that his speech is better today.  Continues to have some lingering symptoms involving his right upper extremity.  He did have an episode last night when he woke up, needed to go to the bathroom, got out of bed and began walking towards the bathroom, and fell.  His gait has been unsteady for quite some time.  He feels much better this afternoon.  He is visiting with his wife at bedside.  Denies any new neurodeficits.    Review of Systems   All pertinent negatives and positives are as above. All other systems have been reviewed and are negative unless otherwise stated.     Objective    Temp:  [97.4 °F (36.3 °C)-98 °F (36.7 °C)] 97.8 °F (36.6 °C)  Heart Rate:  [59-93] 77  Resp:  [16-18] 16  BP: (134-170)/(70-96) 146/78  Physical Exam  Vitals reviewed.   Constitutional:       General: He is not in acute distress.     Comments: Sitting up in bedside chair   HENT:      Head: Normocephalic.      Mouth/Throat:      Mouth: Mucous membranes are moist.   Pulmonary:      Effort: Pulmonary effort is normal. No respiratory distress.   Musculoskeletal:         General: No deformity.   Skin:     General: Skin is warm.      Capillary Refill: Capillary refill takes less than 2 seconds.   Neurological:      Mental Status: He is alert.      Cranial Nerves: No cranial nerve deficit.      Comments: Dysarthria improved compared to exam yesterday; motor weakness involving RUE       Results Review:  I have reviewed the labs, radiology results, and diagnostic studies.    Laboratory Data:   Results from last 7 days   Lab Units 05/12/24  1340   WBC 10*3/mm3 5.88   HEMOGLOBIN g/dL 15.5   HEMATOCRIT % 46.4   PLATELETS  "10*3/mm3 150        Results from last 7 days   Lab Units 05/12/24  1340   SODIUM mmol/L 135*   POTASSIUM mmol/L 4.1   CHLORIDE mmol/L 99   CO2 mmol/L 26.0   BUN mg/dL 14   CREATININE mg/dL 1.21   CALCIUM mg/dL 9.6   BILIRUBIN mg/dL 1.3*   ALK PHOS U/L 128*   ALT (SGPT) U/L 27   AST (SGOT) U/L 30   GLUCOSE mg/dL 99       Culture Data:   No results found for: \"BLOODCX\", \"URINECX\", \"WOUNDCX\", \"MRSACX\", \"RESPCX\", \"STOOLCX\"    Radiology Data:   Imaging Results (Last 24 Hours)       Procedure Component Value Units Date/Time    CT Head Without Contrast [[id number]] Collected: 05/14/24 0523     Updated: 05/14/24 0530    Narrative:      EXAMINATION: CT HEAD WO CONTRAST-      5/13/2024 11:50 PM     HISTORY: unwitnessed fall; I63.9-Cerebral infarction, unspecified;  Z74.09-Other reduced mobility; R13.10-Dysphagia, unspecified     In order to have a CT radiation dose as low as reasonably achievable  Automated Exposure Control was utilized for adjustment of the mA and/or  KV according to patient size.     Total DLP = 736.2 mGy.cm     The CT scan of the head is performed without intravenous contrast  enhancement.     The images are acquired in axial plane and subsequent reconstruction in  coronal and sagittal planes.     The comparison is made with the previous study dated 5/13/2024.     There is no evidence of intracranial hemorrhage or hematoma.     There is no evidence of a mass. No midline shift.     Moderately dilated ventricles, basal cisterns and the cortical sulci are  similar to the previous study representing a chronic volume loss.     Scattered areas of chronic white matter ischemia bilaterally are similar  to the previous study. The gray-white matter differentiation is  maintained.     The posterior fossa structures are unremarkable.     The images reviewed in bone window show no evidence of a skull fracture.  The limited visualized paranasal sinuses and mastoid air cells are  unremarkable.       Impression:      1. No " acute intracranial abnormality.  2. No skull fracture.     The above study was initially reviewed and reported by [facility]. I do not  find any discrepancies.                                      This report was signed and finalized on 5/14/2024 5:27 AM by Dr. David Lin MD.       MRI Brain Without Contrast [[id number]] Collected: 05/13/24 1732     Updated: 05/13/24 1742    Narrative:      EXAMINATION: MRI BRAIN WO CONTRAST-  5/13/2024 5:34 PM     HISTORY: Right-sided weakness with paresthesia and dysarthria.     FINDINGS: Today's exam is compared to previous study of 1 day earlier.     Multiplanar fast spin echo imaging sequences were obtained of the brain  on a high-field magnet without gadolinium enhancement.     There is now a new site of diffusion restriction involving the left  thalamus with associated ADC mapping signal alteration consistent with  an acute ischemic infarct within the left thalamus. This was not present  on yesterday's exam. No additional sites of diffusion restriction are  present. This infarct is nonhemorrhagic. There is diffuse atrophy of the  brain with moderate small vessel ischemic changes involving the  periventricular and higher white matter tracts. There is significant  white matter volume loss within the right frontal lobe likely related to  remote ischemia. There is associated ex vacuo enlargement of the frontal  horn and body of the right lateral ventricle.     There are normal flow voids within the [place].     The orbits are unremarkable.       Impression:      1. There is a new finding on today's exam of diffusion restriction  within the left thalamus with associated ADC mapping abnormality  consistent with an acute ischemic infarction within the left thalamus.  This was not present on yesterday's exam. This is nonhemorrhagic. No  mass effect or shift of the midline.     2. No additional sites of diffusion restriction are present. There is  again atrophy of the  brain with moderate small vessel ischemic disease  involving the periventricular and higher white matter tracts. There is  significant white matter volume loss within the right frontal lobe with  ex vacuo enlargement of the frontal horn and body of the right lateral  ventricle as demonstrated on yesterday's exam.     This report was signed and finalized on 5/13/2024 5:39 PM by Dr. Rusty Trujillo MD.               I have reviewed the patient's current medications.     Assessment/Plan   Assessment  Active Hospital Problems    Diagnosis     SINDI (obstructive sleep apnea)     Restless legs     Impaired gait and mobility     Cerebral ventriculomegaly due to brain atrophy     Essential hypertension     Right upper lobe consolidation     Acute ischemic stroke     Iron deficiency anemia     Gastroesophageal reflux disease without esophagitis        Treatment Plan  Discussed with Dr. Lopez today  Repeat MRI Brain results reviewed  Plan for dual antiplatelet treatment with ASA + Plavix for 21 days  LDL 60; currently on high intensity statin  Echo results as follows:  Results for orders placed during the hospital encounter of 05/12/24    Adult Transthoracic Echo Complete W/ Cont if Necessary Per Protocol (With Agitated Saline)    Interpretation Summary    Left ventricular systolic function is normal. Left ventricular ejection fraction appears to be 66 - 70%.    Left ventricular diastolic function was normal.    Normal right ventricular cavity size and systolic function noted.    There is no significant (greater than mild) valvular dysfunction.    Estimated right ventricular systolic pressure from tricuspid regurgitation is normal (<35 mmHg).    There is no evidence of right-to-left shunt on bubble study.    There is no evidence of intracardiac mass or thrombus.      Currently holding outpatient anti-HTN meds and likely can be reincorporating soon  PT/OT/ST  CTA Chest reviewed; no prominent or consolidating infiltrates  notes; patient with no respiratory symptoms at this time.  No fever.  WBC normal.  Clinical presentation is not consistent with an pneumonia and will hold antimicrobial treatment at this time; closely monitor  Dispo: looking into inpatient rehab placement  Plan for outpatient NPH workup; discussed with Dr. Lopez this morning    Medical Decision Making  Number and Complexity of problems: 1 acute; high complexity      Conditions and Status        Condition has improved from yesterday     MDM Data  External documents reviewed: none  Cardiac tracing (EKG, telemetry) interpretation: no new EKGs  Radiology interpretation: MRI Brain results reviewed and discussed with Dr. Lopez  Labs reviewed: as above  Any tests that were considered but not ordered: none     Decision rules/scores evaluated (example [id number], [place], etc): none     Discussed with: patient; spouse; Dr. Lopez     Care Planning  Shared decision making: Discussed with patient and spouse at bedside at length this afternoon with agreement to proceed with treatment plan as outlined  Code status and discussions: Full code    Disposition  Social Determinants of Health that impact treatment or disposition: None apparent at this time  I expect the patient to be discharged hopefully to inpatient rehab when bed available and insurance approved.    Electronically signed by Michael Balbuena MD, 05/14/24, 14:53 CDT.

## 2024-05-14 NOTE — PLAN OF CARE
Problem: Adult Inpatient Plan of Care  Goal: Plan of Care Review  Recent Flowsheet Documentation  Taken 5/14/2024 1000 by Chance Alvarado, OTR/L  Progress: no change  Plan of Care Reviewed With:   patient   spouse  Outcome Evaluation: OT tx completed. Pt is alert and oriented x4. Pt reports no pain. Pt reports numbness in RUE and chronic numbness B feet. Pt was min A for bed mobility. Pt was min A for sit to stand t/f. Pt was min A for functional mobility from bed to BR to bed. Pt utilized RW but requires verbal cues for RW use and safety awareness. Pt was supervision to SBA for toileting task, except for doffing/donning brief pt was min A. Pt was CGA for washing hands at sink. Continue OT POC.

## 2024-05-14 NOTE — CASE MANAGEMENT/SOCIAL WORK
Continued Stay Note   [place]     Patient Name: Agueda Tyler  MRN: [id number]  Today's Date: 5/14/2024    Admit Date: 5/12/2024        Discharge Plan       Row Name 05/14/24 1516       Plan    Plan Comments Pt/wife agreeable to [facility] rehab. Referral sent to admissions at [facility].                   Discharge Codes    No documentation.                       FRACISCO Stephen

## 2024-05-14 NOTE — PROGRESS NOTES
Neurology Progress Note      Chief Complaint:  f/u stroke  Length of Stay:  2   Subjective     Subjective:  Patient has returned from testing. He is sitting up in bed. OT at bedside. Patient recounts events of last PM.  He is AOX3.  H e tells me he has noticed some memory issues for some time. He has has impaired gait sine his fall in 2022 and does shuffle. He also has urinary incontinence.     Repeat MRI brain 5/13 did show left thalamic stroke.   Patient had TTE this AM and report pending. Wife at bedside. Wife stayed with patient last PM. She heard a noised and found the patient on the floor on his hands and knees. She reports he was disoriented and unsure of his location. He did take PM Valium and wife and nursing this seemed to make confusion worse. CT head done around midnight negative for acute process.   Patient does have SINDI with CPAP and wife reports compliance with this.     Wife reports he does have confusion at night at home as well. Son had taken over finances within the last year as patient would keep a detailed spreadsheet and now when trying to use this it causes him frustration. He does drive and she denies him getting lost. He misplaces items easily. She tells me he does fill his weekly pill box and appears to be taking his medications correctly.       Background:  Patient clarifies that when he arrived to ED right sided numbness and weakness had resolved. About 0200 this AM, he noted right sided numnbess of face, arm and leg as well as weakness of RUE as well as dysarthria and word finding. MRI brain w & w/o done this AM did not reveal acute stroke  but did show chronic small vessel disease and a tiny area in right pontine stroke that may have occurred about 2 weeks ago. Patient tells me about 2 weeks ago he had sudden onset of impaired speech and unilateral weakness.. CTA head and neck last PM negative for significant stenosis or occlusion. He was not taking ASA prior to this admission.  Patient did have iron infusion about 2 weeks ago.     At baseline, patient uses a walker for ambulation as he suffered a fall in 1/2022 with renal hematoma and ever since then has had ataxic gait. In regards to reported lower GI bleeding, he reports this as occasional smear on tissue paper. He has had colonoscopies in the past that showed hemorrhoids. He takes a nightly bowel regimen MOM and Miralax.     LDL 60  A1C 5.2  TSH 2.01        Medications:  Current Facility-Administered Medications   Medication Dose Route Frequency Provider Last Rate Last Admin    acetaminophen (TYLENOL) tablet 650 mg  650 mg Oral Q4H PRN Michael Balbuena MD        Or    acetaminophen (TYLENOL) 160 MG/5ML oral solution 650 mg  650 mg Oral Q4H PRN Michael Balbuena MD        Or    acetaminophen (TYLENOL) suppository 650 mg  650 mg Rectal Q4H PRN Michael Balbuena MD        aluminum-magnesium hydroxide-simethicone (MAALOX MAX) 400-400-40 MG/5ML suspension 15 mL  15 mL Oral Q6H PRN Michael Balbuena MD   15 mL at 05/13/24 2028    aspirin chewable tablet 81 mg  81 mg Oral Daily Michael Balbuena MD   81 mg at 05/13/24 0800    Or    aspirin suppository 300 mg  300 mg Rectal Daily Michael Balbuena MD        atorvastatin (LIPITOR) tablet 80 mg  80 mg Oral Nightly Michael Balbuena MD   80 mg at 05/13/24 2029    sennosides-docusate (PERICOLACE) 8.6-50 MG per tablet 2 tablet  2 tablet Oral BID Michael Balbuena MD   2 tablet at 05/13/24 2028    And    polyethylene glycol (MIRALAX) packet 17 g  17 g Oral Daily Michael Balbuena MD   17 g at 05/13/24 0800    And    bisacodyl (DULCOLAX) EC tablet 5 mg  5 mg Oral Daily PRN Michael Balbuena MD        And    bisacodyl (DULCOLAX) suppository 10 mg  10 mg Rectal Daily PRN Michael Balbuena MD        citalopram (CeleXA) tablet 20 mg  20 mg Oral Daily Michael Balbuena MD   20 mg at 05/13/24 0800    clopidogrel (PLAVIX) tablet 75 mg  75 mg Oral Daily Savana  MIO Jones        diazePAM (VALIUM) tablet 5 mg  5 mg Oral Q6H PRN Michael Balbuena MD   5 mg at 05/13/24 2029    labetalol (NORMODYNE,TRANDATE) injection 10 mg  10 mg Intravenous Q4H PRN Michael Balbuena MD        levothyroxine (SYNTHROID, LEVOTHROID) tablet 88 mcg  88 mcg Oral Q AM Michael Balbuena MD        magnesium hydroxide (MILK OF MAGNESIA) suspension 10 mL  10 mL Oral Daily Michael Balbuena MD   10 mL at 05/13/24 1801    melatonin tablet 3 mg  3 mg Oral Nightly PRN Michael Balbuena MD        ondansetron (ZOFRAN) injection 4 mg  4 mg Intravenous Q6H PRN Michael Balbuena MD   4 mg at 05/14/24 0808    pantoprazole (PROTONIX) EC tablet 40 mg  40 mg Oral Q AM Michael Balbuena MD   40 mg at 05/14/24 0704    rOPINIRole (REQUIP) tablet 2 mg  2 mg Oral Nightly Michael Balbuena MD        sodium chloride 0.9 % flush 10 mL  10 mL Intravenous PRN Marco Koo MD   10 mL at 05/12/24 2020    sodium chloride 0.9 % flush 10 mL  10 mL Intravenous Q12H Michael Balbuena MD   10 mL at 05/13/24 2038    sodium chloride 0.9 % flush 10 mL  10 mL Intravenous PRN Michael Balbuena MD        sodium chloride 0.9 % infusion 40 mL  40 mL Intravenous PRN Michael Balbuena MD        tamsulosin (FLOMAX) 24 hr capsule 0.4 mg  0.4 mg Oral Nightly Michael Balbuena MD   0.4 mg at 05/13/24 2030    traZODone (DESYREL) tablet 50 mg  50 mg Oral Nightly Michael Balbuena MD   50 mg at 05/13/24 2029             Objective      Vital Signs  Temp:  [97.3 °F (36.3 °C)-98 °F (36.7 °C)] 97.9 °F (36.6 °C)  Heart Rate:  [58-75] 63  Resp:  [16-18] 16  BP: (134-170)/(68-83) 149/80    Physical Exam:    Neurologic Exam:    Mental Status:    -Alert, Oriented X 3  -No word-finding difficulties  -No aphasia  -No dysarthria  -Follows simple and complex commands    CN II:  Visual fields full.  Pupils equally reactive to light  CN III, IV, VI:  Extraocular Muscles full with no signs of  nystagmus  CN V:  Facial sensory is symmetric with no asymmetries.  CN VII:  Facial motor symmetric  CN VIII:  Gross hearing intact bilaterally  CN IX:  Palate elevates symmetrically  CN X:  Palate elevates symmetrically  CN XI:  Shoulder shrug symmetric  CN XII:  Tongue protrudes to midline  Motor: (strength out of 5:  1= minimal movement, 2 = movement in plane of gravity, 3 = movement against gravity, 4 = movement against some resistance, 5 = full strength)    -Right Upper Ext: Proximal: 4 Distal: 3 with pronation  -Left Upper Ext: Proximal: 5 Distal: 5    -Right Lower Ext: Proximal: 5 Distal: 5  -Left Lower Ext: Proximal: 5 Distal: 5    DTR:  -Right   Bicep: 2+ Tricep: 2+ Brachoradialis: 2+   Patella: 2+ Ankle: 2+ Neg Babinski  -Left   Bicep: 2+ Tricep: 2+ Brachoradialis: 2+   Patella: 2+ Ankle: 2+ Neg Babinski    Sensory:  -Intact to light touch, with decreased sensation to light touch on right face, arm, and leg    Coordination:  -Finger to nose with ataxia on right  -Heel to shin with clumsiness bilaterally (chronic)      Gait  -ataxic gait documented per therapy  At baseline ataxic gait since 2022 and uses walker.       Pertinent Neuro Exam:    Last nurse assessment:  Interval:  (Handoff Silver ALVAREZ)  1a. Level of Consciousness: 0-->Alert, keenly responsive  1b. LOC Questions: 0-->Answers both questions correctly  1c. LOC Commands: 0-->Performs both tasks correctly  2. Best Gaze: 0-->Normal  3. Visual: 0-->No visual loss  4. Facial Palsy: 0-->Normal symmetrical movements  5a. Motor Arm, Left: 0-->No drift, limb holds 90 (or 45) degrees for full 10 secs  5b. Motor Arm, Right: 1-->Drift, limb holds 90 (or 45) degrees, but drifts down before full 10 secs, does not hit bed or other support  6a. Motor Leg, Left: 0-->No drift, leg holds 30 degree position for full 5 secs  6b. Motor Leg, Right: 0-->No drift, leg holds 30 degree position for full 5 secs  7. Limb Ataxia: 1-->Present in one limb  8. Sensory:  1-->Mild-to-moderate sensory loss, patient feels pinprick is less sharp or is dull on the affected side, or there is a loss of superficial pain with pinprick, but patient is aware of being touched  9. Best Language: 0-->No aphasia, normal  10. Dysarthria: 1-->Mild-to-moderate dysarthria, patient slurs at least some words and, at worst, can be understood with some difficulty  11. Extinction and Inattention (formerly Neglect): 0-->No abnormality    Total (NIH Stroke Scale): 4       Results Review:      Labs:  Result Review:  I have personally reviewed the results from the time of this admission to 5/14/2024 09:15 CDT and agree with these findings:  [x]  Laboratory list / accordion  [x]  Microbiology  []  Radiology  []  EKG/Telemetry   []  Cardiology/Vascular   []  Pathology  []  Old records  []  Other:  Most notable findings include: LDL 60  A1C 5.2  TSH 2.01   Telemetry SB - S 51-72    Imaging:  CT Head Without Contrast    Result Date: 5/14/2024  1. No acute intracranial abnormality. 2. No skull fracture.  The above study was initially reviewed and reported by [facility]. I do not find any discrepancies.             This report was signed and finalized on 5/14/2024 5:27 AM by Dr. David Lin MD.      MRI Brain Without Contrast    Result Date: 5/13/2024  1. There is a new finding on today's exam of diffusion restriction within the left thalamus with associated ADC mapping abnormality consistent with an acute ischemic infarction within the left thalamus. This was not present on yesterday's exam. This is nonhemorrhagic. No mass effect or shift of the midline.  2. No additional sites of diffusion restriction are present. There is again atrophy of the brain with moderate small vessel ischemic disease involving the periventricular and higher white matter tracts. There is significant white matter volume loss within the right frontal lobe with ex vacuo enlargement of the frontal horn and body of the right lateral  ventricle as demonstrated on yesterday's exam.  This report was signed and finalized on 5/13/2024 5:39 PM by Dr. Rusty Trujillo MD.      CT Chest Without Contrast Diagnostic    Result Date: 5/13/2024  1. Mild patchy groundglass opacity in the right upper lobe may represent pneumonia. 2. Small hiatal hernia and mild scarring in the lungs.  This report was signed and finalized on 5/13/2024 11:56 AM by Brent Smith.      CT Head Without Contrast    Result Date: 5/13/2024  1. Chronic changes and no acute intracranial findings.  This report was signed and finalized on 5/13/2024 11:53 AM by Brent Smith.      MRI Brain With & Without Contrast    Result Date: 5/13/2024   1.  No acute intracranial process. In particular, no acute ischemia. 2.  No intracranial mass lesion or pathologic enhancement. 3.  Advanced chronic small vessel ischemic changes. There is quite a bit of white matter volume loss, especially in the right frontal lobe which I suspect is related to some old cortical/subcortical ischemia.  This report was signed and finalized on 5/13/2024 8:55 AM by Dr Joshua Sin.      XR Chest 1 View    Result Date: 5/12/2024  1.  There appears to be a 4-5 cm right upper lobe consolidation. This could reflect an upper lobe pneumonia. Upper lobe neoplasm is also a consideration. Chest CT versus short interval follow-up is recommended for further evaluation.  This report was signed and finalized on 5/12/2024 2:32 PM by Dr Joshua Sin.      CT CEREBRAL PERFUSION WITH & WITHOUT CONTRAST    Result Date: 5/12/2024  Impression: 1. Normal, symmetric cerebral perfusion. No discrete infarct or at risk territory detected by the perfusion software.  This report was signed and finalized on 5/12/2024 2:29 PM by Dr Joshua Sin.      CT Angiogram Head w AI Analysis of LVO    Result Date: 5/12/2024   1. No arterial occlusion or flow-limiting stenosis in the neck. 2. No intracranial large vessel occlusion or flow-limiting  stenosis.  This report was signed and finalized on 5/12/2024 2:07 PM by Dr Joshua Sin.      CT Angiogram Neck    Result Date: 5/12/2024   1. No arterial occlusion or flow-limiting stenosis in the neck. 2. No intracranial large vessel occlusion or flow-limiting stenosis.  This report was signed and finalized on 5/12/2024 2:07 PM by Dr Joshua Sin.      CT Head Without Contrast Stroke Protocol    Result Date: 5/12/2024  1. No acute intracranial process. In particular, there is no acute hemorrhage identified. 2. Advanced chronic small vessel ischemic changes. 3. Ventriculomegaly is present, pattern suspicious for communicating hydrocephalus such as NPH.  Findings were discussed with Dr. Koo on 5/12/2024 2:02 PM.  This report was signed and finalized on 5/12/2024 2:02 PM by Dr Joshua Sin.          MRI brain personally reviewed by me showing acute left thalamic stroke.       Assessment/Plan   Agueda Tyler is 78 y.o. with PMH HTN, HLD, renal hematoma after a fall 2022 and also chronic impaired gait since then, RLS, chronic anemia. Patient presented on 5/12/2024 with acute onset of right sided paresthesia and weakness that resolved after arrival and then returned at 0200 this AM. He has had ataxia RUE since 0200 as well as dysarthria. MRI brain done last PM did not reveal an acute stroke but does show moderate small vessel disease and a tiny area in right pontine stroke that may have occurred about 2 weeks ago. Patient tells me about 2 weeks ago he had sudden onset of impaired speech and unilateral weakness.  CT head repeated this AM after return of symptoms and no acute process.   In regards to reported lower GI bleeding, he reports this as occasional smear on tissue paper. He has had colonoscopies in the past that showed hemorrhoids. He takes a nightly bowel regimen MOM and Miralax. Repeat MRI brain shows acute left thalamic stroke, Patient had worsening confusion/disorientation after receiving valium and wife  reports he does have some confusion at night at home as well as difficulty managing finances and handed this over to his son.       Hospital Problem List      Gastroesophageal reflux disease without esophagitis    Iron deficiency anemia    Essential hypertension    Stroke-like symptom    Right upper lobe consolidation    Impression:  Stuttering acute stroke like symptoms that returned at 0200 this AM and has not resolved. MRI brain 5/12 did not show acute stroke.  HLD. LDL at goal at 60.  HTN  Impaired gait since 2022 after fall  RLS managed by Dr. Freeman.  Abnormality of CXR and CT chest of RUL abnormality. Defer to attending. At this time did not think was consistent with PNA.   SINDI with CPAP.,   [facility]CI. Patient had worsening confusion last PM after receiving PRN Valium. Wife reports he does have some confusion/disorientation at home as well. Brain imaging does show atrophy with ventriculomegaly. Given patient history and exam of altered gait, impaired memory, and urinary incontinence, cannot rule out NPH. Will recommend further work up after about 2 months to allow the stroke to heal. May consider direct admitting patient for NPH work up at that time.       Plan:  Start DUAP with ASA 81 mg and Plavix 75 mg for 21 days and then ASA 81 mg monotherapy therarfter.   Lipitor 80 mg daily.  TTE  OT/PT/ST.  Continue home RLS treatment per primary team.  Avoid Valium. Consider melatonin for sleep.  Outpatient evaluation of MCI.  Encouraged wife to assist/monitor medications.   Additional work up for possible NPH vs dementia as outpatient as outlined above #8.   Patient has been following Dr. Freeman for RLS for some time and interested in transitioning neurology care to [facility] team. RLS well controlled on current regimen. Will arrange f/u with our clinic.   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         Medical Decision  Making    Number/Complexity of Problems  Moderate  1 undiagnosed new problem with uncertain prognosis -   1 acute illness with systemic symptoms -   High  1 acute or chronic illness that pose a threat to life/body function -   high     MDM Data  Moderate - 1/3 categories  Extensive - 2/3 categories    Category 1: 3 of the following  Review of external notes  Review of results  Ordering of each unique test  Independent historian  Category 2:  Independent interpretation of test (ex: imaging)  Category 3:  Discussion of management with another provider    extensive     Treatment Plan  Moderate - Prescription Drug management  High  Drug therapy requiring intensive monitoring for toxicity  Decision regarding hospitalization or escalation of care  De-escalate care/DNR decisions  high Mary Beth Sawant, APRN  05/14/24  09:15 CDT

## 2024-05-15 ENCOUNTER — HOSPITAL ENCOUNTER (INPATIENT)
Age: 79
LOS: 14 days | Discharge: HOME HEALTH | DRG: 057 | End: 2024-05-29
Attending: PSYCHIATRY & NEUROLOGY | Admitting: PSYCHIATRY & NEUROLOGY
Payer: MEDICARE

## 2024-05-15 VITALS
RESPIRATION RATE: 16 BRPM | SYSTOLIC BLOOD PRESSURE: 152 MMHG | WEIGHT: 200 LBS | OXYGEN SATURATION: 92 % | HEIGHT: 72 IN | TEMPERATURE: 98.6 F | DIASTOLIC BLOOD PRESSURE: 92 MMHG | BODY MASS INDEX: 27.09 KG/M2 | HEART RATE: 84 BPM

## 2024-05-15 DIAGNOSIS — G60.9 IDIOPATHIC PERIPHERAL NEUROPATHY: ICD-10-CM

## 2024-05-15 DIAGNOSIS — I63.9 ACUTE ISCHEMIC STROKE (HCC): Primary | ICD-10-CM

## 2024-05-15 DIAGNOSIS — F51.04 CHRONIC INSOMNIA: ICD-10-CM

## 2024-05-15 DIAGNOSIS — I10 ESSENTIAL HYPERTENSION: ICD-10-CM

## 2024-05-15 PROBLEM — D50.9 IRON DEFICIENCY ANEMIA: Status: ACTIVE | Noted: 2024-05-01

## 2024-05-15 PROBLEM — J18.1 RIGHT UPPER LOBE CONSOLIDATION (HCC): Status: ACTIVE | Noted: 2024-05-12

## 2024-05-15 PROBLEM — K62.5 BRBPR (BRIGHT RED BLOOD PER RECTUM): Status: ACTIVE | Noted: 2024-05-01

## 2024-05-15 LAB — SARS-COV-2 AG RESP QL IA.RAPID: NORMAL

## 2024-05-15 PROCEDURE — 97530 THERAPEUTIC ACTIVITIES: CPT

## 2024-05-15 PROCEDURE — 94150 VITAL CAPACITY TEST: CPT

## 2024-05-15 PROCEDURE — 87426 SARSCOV CORONAVIRUS AG IA: CPT | Performed by: INTERNAL MEDICINE

## 2024-05-15 PROCEDURE — 97535 SELF CARE MNGMENT TRAINING: CPT

## 2024-05-15 PROCEDURE — 97116 GAIT TRAINING THERAPY: CPT

## 2024-05-15 PROCEDURE — 1180000000 HC REHAB R&B

## 2024-05-15 PROCEDURE — 6370000000 HC RX 637 (ALT 250 FOR IP): Performed by: PSYCHIATRY & NEUROLOGY

## 2024-05-15 PROCEDURE — 94760 N-INVAS EAR/PLS OXIMETRY 1: CPT

## 2024-05-15 RX ORDER — BISACODYL 5 MG/1
5 TABLET, DELAYED RELEASE ORAL DAILY PRN
Status: DISCONTINUED | OUTPATIENT
Start: 2024-05-15 | End: 2024-05-29 | Stop reason: HOSPADM

## 2024-05-15 RX ORDER — LEVOTHYROXINE SODIUM 88 UG/1
88 TABLET ORAL DAILY
Status: DISCONTINUED | OUTPATIENT
Start: 2024-05-16 | End: 2024-05-29 | Stop reason: HOSPADM

## 2024-05-15 RX ORDER — CLOPIDOGREL BISULFATE 75 MG/1
75 TABLET ORAL DAILY
Status: ON HOLD | COMMUNITY
Start: 2024-05-16 | End: 2024-05-28

## 2024-05-15 RX ORDER — ACETAMINOPHEN 325 MG/1
650 TABLET ORAL EVERY 4 HOURS PRN
Status: DISCONTINUED | OUTPATIENT
Start: 2024-05-15 | End: 2024-05-29 | Stop reason: HOSPADM

## 2024-05-15 RX ORDER — PANTOPRAZOLE SODIUM 40 MG/1
40 TABLET, DELAYED RELEASE ORAL
Status: DISCONTINUED | OUTPATIENT
Start: 2024-05-16 | End: 2024-05-29 | Stop reason: HOSPADM

## 2024-05-15 RX ORDER — ATORVASTATIN CALCIUM 80 MG/1
80 TABLET, FILM COATED ORAL NIGHTLY
Start: 2024-05-15

## 2024-05-15 RX ORDER — BISACODYL 10 MG
10 SUPPOSITORY, RECTAL RECTAL DAILY PRN
Status: ON HOLD | COMMUNITY
End: 2024-05-28 | Stop reason: HOSPADM

## 2024-05-15 RX ORDER — PANTOPRAZOLE SODIUM 40 MG/1
40 TABLET, DELAYED RELEASE ORAL
Start: 2024-05-16

## 2024-05-15 RX ORDER — BISACODYL 5 MG/1
5 TABLET, DELAYED RELEASE ORAL DAILY PRN
Status: ON HOLD | COMMUNITY
End: 2024-05-28 | Stop reason: HOSPADM

## 2024-05-15 RX ORDER — ASPIRIN 81 MG/1
81 TABLET, CHEWABLE ORAL DAILY
Start: 2024-05-16

## 2024-05-15 RX ORDER — POLYETHYLENE GLYCOL 3350 17 G/17G
17 POWDER, FOR SOLUTION ORAL DAILY PRN
Status: ON HOLD | COMMUNITY
End: 2024-05-28 | Stop reason: HOSPADM

## 2024-05-15 RX ORDER — CLOPIDOGREL BISULFATE 75 MG/1
75 TABLET ORAL DAILY
Start: 2024-05-16

## 2024-05-15 RX ORDER — ACETAMINOPHEN 325 MG/1
650 TABLET ORAL EVERY 4 HOURS PRN
Start: 2024-05-15

## 2024-05-15 RX ORDER — ROPINIROLE 2 MG/1
2 TABLET, FILM COATED ORAL NIGHTLY
Status: DISCONTINUED | OUTPATIENT
Start: 2024-05-15 | End: 2024-05-18

## 2024-05-15 RX ORDER — BISACODYL 10 MG
10 SUPPOSITORY, RECTAL RECTAL DAILY PRN
Start: 2024-05-15

## 2024-05-15 RX ORDER — CITALOPRAM HYDROBROMIDE 10 MG/1
20 TABLET ORAL DAILY
Status: DISCONTINUED | OUTPATIENT
Start: 2024-05-16 | End: 2024-05-29 | Stop reason: HOSPADM

## 2024-05-15 RX ORDER — POLYETHYLENE GLYCOL 3350 17 G/17G
17 POWDER, FOR SOLUTION ORAL DAILY PRN
Start: 2024-05-15

## 2024-05-15 RX ORDER — ATORVASTATIN CALCIUM 80 MG/1
80 TABLET, FILM COATED ORAL NIGHTLY
Status: DISCONTINUED | OUTPATIENT
Start: 2024-05-15 | End: 2024-05-29 | Stop reason: HOSPADM

## 2024-05-15 RX ORDER — ASPIRIN 81 MG/1
81 TABLET, CHEWABLE ORAL DAILY
Status: DISCONTINUED | OUTPATIENT
Start: 2024-05-16 | End: 2024-05-29 | Stop reason: HOSPADM

## 2024-05-15 RX ORDER — DIAZEPAM 5 MG/1
5 TABLET ORAL EVERY 6 HOURS PRN
Status: DISCONTINUED | OUTPATIENT
Start: 2024-05-15 | End: 2024-05-29 | Stop reason: HOSPADM

## 2024-05-15 RX ORDER — POLYETHYLENE GLYCOL 3350 17 G/17G
17 POWDER, FOR SOLUTION ORAL DAILY PRN
Status: DISCONTINUED | OUTPATIENT
Start: 2024-05-15 | End: 2024-05-29 | Stop reason: HOSPADM

## 2024-05-15 RX ORDER — BISACODYL 10 MG
10 SUPPOSITORY, RECTAL RECTAL DAILY PRN
Status: DISCONTINUED | OUTPATIENT
Start: 2024-05-15 | End: 2024-05-29 | Stop reason: HOSPADM

## 2024-05-15 RX ORDER — AMOXICILLIN 250 MG
2 CAPSULE ORAL 2 TIMES DAILY
Status: ON HOLD | COMMUNITY
Start: 2024-05-15 | End: 2024-05-28 | Stop reason: HOSPADM

## 2024-05-15 RX ORDER — LANOLIN ALCOHOL/MO/W.PET/CERES
3 CREAM (GRAM) TOPICAL NIGHTLY PRN
Start: 2024-05-15

## 2024-05-15 RX ORDER — ATORVASTATIN CALCIUM 80 MG/1
80 TABLET, FILM COATED ORAL NIGHTLY
Status: ON HOLD | COMMUNITY
Start: 2024-05-15 | End: 2024-05-28

## 2024-05-15 RX ORDER — TAMSULOSIN HYDROCHLORIDE 0.4 MG/1
0.4 CAPSULE ORAL NIGHTLY
Status: ON HOLD | COMMUNITY
Start: 2024-05-15 | End: 2024-05-28 | Stop reason: HOSPADM

## 2024-05-15 RX ORDER — TAMSULOSIN HYDROCHLORIDE 0.4 MG/1
0.4 CAPSULE ORAL NIGHTLY
Start: 2024-05-15

## 2024-05-15 RX ORDER — CLOPIDOGREL BISULFATE 75 MG/1
75 TABLET ORAL DAILY
Status: DISCONTINUED | OUTPATIENT
Start: 2024-05-16 | End: 2024-05-29 | Stop reason: HOSPADM

## 2024-05-15 RX ORDER — ROPINIROLE 2 MG/1
2 TABLET, FILM COATED ORAL NIGHTLY
Status: ON HOLD | COMMUNITY
Start: 2024-05-15 | End: 2024-05-28 | Stop reason: HOSPADM

## 2024-05-15 RX ORDER — BISACODYL 5 MG/1
5 TABLET, DELAYED RELEASE ORAL DAILY PRN
Start: 2024-05-15

## 2024-05-15 RX ORDER — AMOXICILLIN 250 MG
2 CAPSULE ORAL 2 TIMES DAILY
Start: 2024-05-15

## 2024-05-15 RX ORDER — LANOLIN ALCOHOL/MO/W.PET/CERES
3 CREAM (GRAM) TOPICAL NIGHTLY PRN
Status: DISCONTINUED | OUTPATIENT
Start: 2024-05-15 | End: 2024-05-29 | Stop reason: HOSPADM

## 2024-05-15 RX ORDER — LISINOPRIL 20 MG/1
20 TABLET ORAL DAILY
Status: ON HOLD | COMMUNITY
Start: 2024-05-16 | End: 2024-05-28

## 2024-05-15 RX ORDER — LISINOPRIL 20 MG/1
20 TABLET ORAL DAILY
Status: DISCONTINUED | OUTPATIENT
Start: 2024-05-16 | End: 2024-05-29 | Stop reason: HOSPADM

## 2024-05-15 RX ORDER — PANTOPRAZOLE SODIUM 40 MG/1
40 TABLET, DELAYED RELEASE ORAL
Status: ON HOLD | COMMUNITY
Start: 2024-05-16 | End: 2024-05-28 | Stop reason: HOSPADM

## 2024-05-15 RX ORDER — SENNA AND DOCUSATE SODIUM 50; 8.6 MG/1; MG/1
2 TABLET, FILM COATED ORAL 2 TIMES DAILY
Status: DISCONTINUED | OUTPATIENT
Start: 2024-05-15 | End: 2024-05-18

## 2024-05-15 RX ORDER — ACETAMINOPHEN 325 MG/1
650 TABLET ORAL EVERY 4 HOURS PRN
Status: ON HOLD | COMMUNITY
End: 2024-05-28 | Stop reason: HOSPADM

## 2024-05-15 RX ORDER — LISINOPRIL 20 MG/1
20 TABLET ORAL DAILY
Start: 2024-05-15

## 2024-05-15 RX ORDER — ACETAMINOPHEN 325 MG/1
650 TABLET ORAL EVERY 4 HOURS PRN
Status: DISCONTINUED | OUTPATIENT
Start: 2024-05-15 | End: 2024-05-15

## 2024-05-15 RX ORDER — TAMSULOSIN HYDROCHLORIDE 0.4 MG/1
0.4 CAPSULE ORAL NIGHTLY
Status: DISCONTINUED | OUTPATIENT
Start: 2024-05-15 | End: 2024-05-29 | Stop reason: HOSPADM

## 2024-05-15 RX ORDER — DIAZEPAM 5 MG/1
5 TABLET ORAL EVERY 6 HOURS PRN
Status: ON HOLD | COMMUNITY
End: 2024-05-28

## 2024-05-15 RX ORDER — TRAZODONE HYDROCHLORIDE 50 MG/1
50 TABLET ORAL NIGHTLY
Status: DISCONTINUED | OUTPATIENT
Start: 2024-05-15 | End: 2024-05-29 | Stop reason: HOSPADM

## 2024-05-15 RX ORDER — ASPIRIN 81 MG/1
81 TABLET, CHEWABLE ORAL DAILY
Status: ON HOLD | COMMUNITY
Start: 2024-05-16 | End: 2024-05-28

## 2024-05-15 RX ORDER — LANOLIN ALCOHOL/MO/W.PET/CERES
3 CREAM (GRAM) TOPICAL NIGHTLY PRN
Status: ON HOLD | COMMUNITY
End: 2024-05-28

## 2024-05-15 RX ORDER — ENOXAPARIN SODIUM 100 MG/ML
40 INJECTION SUBCUTANEOUS EVERY 24 HOURS
Status: DISCONTINUED | OUTPATIENT
Start: 2024-05-15 | End: 2024-05-24

## 2024-05-15 RX ADMIN — CITALOPRAM 20 MG: 20 TABLET, FILM COATED ORAL at 08:53

## 2024-05-15 RX ADMIN — ASPIRIN 81 MG CHEWABLE TABLET 81 MG: 81 TABLET CHEWABLE at 08:53

## 2024-05-15 RX ADMIN — SENNOSIDES AND DOCUSATE SODIUM 2 TABLET: 50; 8.6 TABLET ORAL at 20:45

## 2024-05-15 RX ADMIN — PANTOPRAZOLE SODIUM 40 MG: 40 TABLET, DELAYED RELEASE ORAL at 06:21

## 2024-05-15 RX ADMIN — ROPINIROLE HYDROCHLORIDE 2 MG: 2 TABLET, FILM COATED ORAL at 20:46

## 2024-05-15 RX ADMIN — Medication 3 MG: at 20:46

## 2024-05-15 RX ADMIN — LEVOTHYROXINE SODIUM 88 MCG: 88 TABLET ORAL at 06:21

## 2024-05-15 RX ADMIN — SENNOSIDES AND DOCUSATE SODIUM 2 TABLET: 50; 8.6 TABLET ORAL at 08:53

## 2024-05-15 RX ADMIN — ATORVASTATIN CALCIUM 80 MG: 80 TABLET, FILM COATED ORAL at 20:45

## 2024-05-15 RX ADMIN — MAGNESIUM HYDROXIDE 60 ML: 400 SUSPENSION ORAL at 20:46

## 2024-05-15 RX ADMIN — CLOPIDOGREL BISULFATE 75 MG: 75 TABLET, FILM COATED ORAL at 08:53

## 2024-05-15 RX ADMIN — Medication 10 ML: at 08:53

## 2024-05-15 RX ADMIN — TAMSULOSIN HYDROCHLORIDE 0.4 MG: 0.4 CAPSULE ORAL at 20:46

## 2024-05-15 RX ADMIN — POLYETHYLENE GLYCOL 3350 17 G: 17 POWDER, FOR SOLUTION ORAL at 08:52

## 2024-05-15 RX ADMIN — TRAZODONE HYDROCHLORIDE 50 MG: 50 TABLET ORAL at 20:46

## 2024-05-15 ASSESSMENT — LIFESTYLE VARIABLES: HOW OFTEN DO YOU HAVE A DRINK CONTAINING ALCOHOL: NEVER

## 2024-05-15 NOTE — PLAN OF CARE
Goal Outcome Evaluation:  Plan of Care Reviewed With: patient        Progress: improving  Outcome Evaluation: Patient A/O x 4. VSS. On room air. NSR on telemetry. NIH = 4. Up x 1 with walker. Some right sides weakness and ataxia remain. Safety maintained. Call light within reach and bed alarm on.

## 2024-05-15 NOTE — DISCHARGE SUMMARY
[facility] Medicine Services  DISCHARGE SUMMARY       Date of Admission: 5/12/2024  Date of Discharge:  5/15/2024  Primary Care Physician: Keenan Perez MD    Presenting Problem/History of Present Illness:  Stroke symptoms    Final Discharge Diagnoses:  Active Hospital Problems    Diagnosis     SINDI (obstructive sleep apnea)     Restless legs     Impaired gait and mobility     Cerebral ventriculomegaly due to brain atrophy     Essential hypertension     Right upper lobe consolidation     Acute ischemic stroke     Iron deficiency anemia     Gastroesophageal reflux disease without esophagitis        Consults:   Dr. Lopez, neurology    Procedures Performed: none    Pertinent Test Results:   Results for orders placed during the hospital encounter of 05/12/24    Adult Transthoracic Echo Complete W/ Cont if Necessary Per Protocol (With Agitated Saline)    Interpretation Summary    Left ventricular systolic function is normal. Left ventricular ejection fraction appears to be 66 - 70%.    Left ventricular diastolic function was normal.    Normal right ventricular cavity size and systolic function noted.    There is no significant (greater than mild) valvular dysfunction.    Estimated right ventricular systolic pressure from tricuspid regurgitation is normal (<35 mmHg).    There is no evidence of right-to-left shunt on bubble study.    There is no evidence of intracardiac mass or thrombus.      Imaging Results (All)       Procedure Component Value Units Date/Time    CT Head Without Contrast [[id number]] Collected: 05/14/24 0523     Updated: 05/14/24 0530    Narrative:      EXAMINATION: CT HEAD WO CONTRAST-      5/13/2024 11:50 PM     HISTORY: unwitnessed fall; I63.9-Cerebral infarction, unspecified;  Z74.09-Other reduced mobility; R13.10-Dysphagia, unspecified     In order to have a CT radiation dose as low as reasonably achievable  Automated Exposure Control was utilized for  adjustment of the mA and/or  KV according to patient size.     Total DLP = 736.2 mGy.cm     The CT scan of the head is performed without intravenous contrast  enhancement.     The images are acquired in axial plane and subsequent reconstruction in  coronal and sagittal planes.     The comparison is made with the previous study dated 5/13/2024.     There is no evidence of intracranial hemorrhage or hematoma.     There is no evidence of a mass. No midline shift.     Moderately dilated ventricles, basal cisterns and the cortical sulci are  similar to the previous study representing a chronic volume loss.     Scattered areas of chronic white matter ischemia bilaterally are similar  to the previous study. The gray-white matter differentiation is  maintained.     The posterior fossa structures are unremarkable.     The images reviewed in bone window show no evidence of a skull fracture.  The limited visualized paranasal sinuses and mastoid air cells are  unremarkable.       Impression:      1. No acute intracranial abnormality.  2. No skull fracture.     The above study was initially reviewed and reported by [employer]. I do not  find any discrepancies.                                      This report was signed and finalized on 5/14/2024 5:27 AM by Dr. David Lin MD.       MRI Brain Without Contrast [[id number]] Collected: 05/13/24 1732     Updated: 05/13/24 1742    Narrative:      EXAMINATION: MRI BRAIN WO CONTRAST-  5/13/2024 5:34 PM     HISTORY: Right-sided weakness with paresthesia and dysarthria.     FINDINGS: Today's exam is compared to previous study of 1 day earlier.     Multiplanar fast spin echo imaging sequences were obtained of the brain  on a high-field magnet without gadolinium enhancement.     There is now a new site of diffusion restriction involving the left  thalamus with associated ADC mapping signal alteration consistent with  an acute ischemic infarct within the left thalamus. This was not  present  on yesterday's exam. No additional sites of diffusion restriction are  present. This infarct is nonhemorrhagic. There is diffuse atrophy of the  brain with moderate small vessel ischemic changes involving the  periventricular and higher white matter tracts. There is significant  white matter volume loss within the right frontal lobe likely related to  remote ischemia. There is associated ex vacuo enlargement of the frontal  horn and body of the right lateral ventricle.     There are normal flow voids within the [place].     The orbits are unremarkable.       Impression:      1. There is a new finding on today's exam of diffusion restriction  within the left thalamus with associated ADC mapping abnormality  consistent with an acute ischemic infarction within the left thalamus.  This was not present on yesterday's exam. This is nonhemorrhagic. No  mass effect or shift of the midline.     2. No additional sites of diffusion restriction are present. There is  again atrophy of the brain with moderate small vessel ischemic disease  involving the periventricular and higher white matter tracts. There is  significant white matter volume loss within the right frontal lobe with  ex vacuo enlargement of the frontal horn and body of the right lateral  ventricle as demonstrated on yesterday's exam.     This report was signed and finalized on 5/13/2024 5:39 PM by Dr. Rusty Trujillo MD.       CT Chest Without Contrast Diagnostic [[id number]] Collected: 05/13/24 1153     Updated: 05/13/24 1159    Narrative:      EXAM: CT CHEST WO CONTRAST DIAGNOSTIC-      DATE: 5/13/2024 9:15 AM     HISTORY: eval RUL consolidation on CXR; I63.9-Cerebral infarction,  unspecified       COMPARISON: Chest x-ray 5/12/2024.     DOSE LENGTH PRODUCT: 309.51 mGy.cm mGy cm. Automatic exposure control  was utilized to make radiation dose as low as reasonably achievable.     TECHNIQUE: Unenhanced CT images of the chest obtained with  multiplanar  reformats.     FINDINGS:     MEDIASTINUM/EXTRATHORACIC: There is mild calcification of the thoracic  aorta and mild coronary artery calcification. No pericardial or pleural  effusion is identified. There is a small hiatal hernia. No thoracic  lymphadenopathy is seen.     LUNGS/AIRWAYS: There is mild scarring at the lung apices. There is mild  scarring versus atelectasis at the lung bases. Subtle patchy areas of  groundglass opacity are noted in the right upper lobe in the region of  the chest x-ray abnormality. This may represent pneumonia. Airways are  unremarkable.     INCLUDED UPPER ABDOMEN: The visualized portions of the upper abdomen are  within normal limits.     SOFT TISSUES: The visualized soft tissues of the chest wall are  unremarkable.     BONES: No suspicious osseous lesion identified.       Impression:      1. Mild patchy groundglass opacity in the right upper lobe may represent  pneumonia.  2. Small hiatal hernia and mild scarring in the lungs.     This report was signed and finalized on 5/13/2024 11:56 AM by Brent Smith.       CT Head Without Contrast [[id number]] Collected: 05/13/24 1149     Updated: 05/13/24 1156    Narrative:      EXAM: CT HEAD WO CONTRAST-      DATE: 5/13/2024 9:15 AM     HISTORY: dysarthria and left sided weakness; I63.9-Cerebral infarction,  unspecified       COMPARISON: 5/12/2024.     DOSE LENGTH PRODUCT: 778.45 mGy.cm  Automated exposure control was also  utilized to decrease patient radiation dose.     TECHNIQUE: Unenhanced CT images obtained from vertex to skull base with  multiplanar reformats.     FINDINGS:  There is no acute intracranial hemorrhage, midline shift, mass effect,  or hydrocephalus. There is no CT evidence for acute infarct.     There are chronic changes with volume loss and chronic small vessel  ischemic change of the periventricular white matter.      SOFT TISSUES: The scalp soft tissues are unremarkable.        SINUS:The visualized  paranasal sinuses and mastoid air cells are clear.      ORBITS: The visualized orbits and globes are unremarkable. There is  bilateral lens extraction.          Impression:      1. Chronic changes and no acute intracranial findings.      This report was signed and finalized on 5/13/2024 11:53 AM by Brent Smith.       MRI Brain With & Without Contrast [[id number]] Collected: 05/13/24 0846     Updated: 05/13/24 0859    Narrative:      MRI BRAIN W WO CONTRAST- 5/12/2024 4:49 PM     HISTORY: Stroke, follow up     TECHNIQUE: Multisequence, multiplanar MRI of the brain with and without  contrast. 20 cc MultiHance IV contrast.     COMPARISON: CT scan dated 5/12/2024     FINDINGS:     No diffusion signal abnormality. Advanced chronic small vessel ischemic  changes. Likely old cortical/subcortical ischemia in the right frontal  lobe. No intra-axial or extra-axial hemorrhage. No intracranial mass  lesion or pathologic enhancement. There is some lateral  ventriculomegaly. Third and fourth ventricles are nondilated. Posterior  fossa structures are unremarkable. Pituitary gland and sella are  unremarkable. The major intracranial flow-voids are preserved. Orbital  contents are unremarkable. The paranasal sinuses are clear. Mastoid air  cells are clear. Normal marrow signal in the skull base and calvarium.       Impression:         1.  No acute intracranial process. In particular, no acute ischemia.  2.  No intracranial mass lesion or pathologic enhancement.  3.  Advanced chronic small vessel ischemic changes. There is quite a bit  of white matter volume loss, especially in the right frontal lobe which  I suspect is related to some old cortical/subcortical ischemia.     This report was signed and finalized on 5/13/2024 8:55 AM by Dr Joshua Sin.       CT Angiogram Head w AI Analysis of LVO [[id number]] Collected: 05/12/24 1403     Updated: 05/13/24 0854    Narrative:      EXAM: CT ANGIOGRAM HEAD W AI ANALYSIS OF LVO- -  5/12/2024 12:38 PM     HISTORY: Neuro Deficit, acute, Stroke suspected       COMPARISON: None.     DOSE LENGTH PRODUCT: 461.15 mGy.cm. Automated exposure control was also  utilized to decrease patient radiation dose.     TECHNIQUE: CTA head and neck performed with intravenous contrast. 3D  postprocessing, including MIPs, performed and images saved to PACS. AI  ANALYSIS OF LVO WAS UTILIZED.  Grading of carotid stenosis performed  with NASCET criteria.     FINDINGS:     There is a typical three-vessel branching pattern off the aortic arch  without significant ostial narrowing. Common carotid arteries are patent  and without flow-limiting stenosis. There is a normal course and caliber  of the internal and external carotid arteries without evidence of a  flow-limiting stenosis. The vertebral arteries originate from the  subclavian arteries without significant ostial narrowing. Lung apices  are clear. Homogeneous thyroid gland. No cervical adenopathy advanced  cervical spine osteoarthritis changes.     Distal ICAs are patent and without flow-limiting stenosis. No  intracranial large vessel occlusion. Anterior and middle cerebral  arteries are patent and without flow-limiting stenosis. Intracranial  vertebral arteries and basilar artery are normal in caliber. Posterior  cerebral arteries are patent and normal in caliber. Fetal origin of the  left posterior cerebral artery. No visualized aneurysm. No intracranial  hemorrhage or mass effect. Lateral ventriculomegaly.       Impression:         1. No arterial occlusion or flow-limiting stenosis in the neck.  2. No intracranial large vessel occlusion or flow-limiting stenosis.     This report was signed and finalized on 5/12/2024 2:07 PM by Dr Joshua Sin.       CT Angiogram Neck [[id number]] Collected: 05/12/24 1403     Updated: 05/13/24 0854    Narrative:      EXAM: CT ANGIOGRAM HEAD W AI ANALYSIS OF LVO- - 5/12/2024 12:38 PM     HISTORY: Neuro Deficit, acute, Stroke  suspected       COMPARISON: None.     DOSE LENGTH PRODUCT: 461.15 mGy.cm. Automated exposure control was also  utilized to decrease patient radiation dose.     TECHNIQUE: CTA head and neck performed with intravenous contrast. 3D  postprocessing, including MIPs, performed and images saved to PACS. AI  ANALYSIS OF LVO WAS UTILIZED.  Grading of carotid stenosis performed  with NASCET criteria.     FINDINGS:     There is a typical three-vessel branching pattern off the aortic arch  without significant ostial narrowing. Common carotid arteries are patent  and without flow-limiting stenosis. There is a normal course and caliber  of the internal and external carotid arteries without evidence of a  flow-limiting stenosis. The vertebral arteries originate from the  subclavian arteries without significant ostial narrowing. Lung apices  are clear. Homogeneous thyroid gland. No cervical adenopathy advanced  cervical spine osteoarthritis changes.     Distal ICAs are patent and without flow-limiting stenosis. No  intracranial large vessel occlusion. Anterior and middle cerebral  arteries are patent and without flow-limiting stenosis. Intracranial  vertebral arteries and basilar artery are normal in caliber. Posterior  cerebral arteries are patent and normal in caliber. Fetal origin of the  left posterior cerebral artery. No visualized aneurysm. No intracranial  hemorrhage or mass effect. Lateral ventriculomegaly.       Impression:         1. No arterial occlusion or flow-limiting stenosis in the neck.  2. No intracranial large vessel occlusion or flow-limiting stenosis.     This report was signed and finalized on 5/12/2024 2:07 PM by Dr Joshua Sin.       XR Chest 1 View [[id number]] Collected: 05/12/24 1429     Updated: 05/12/24 1435    Narrative:      XR CHEST 1 VW- 5/12/2024 1:10 PM     HISTORY: Acute Stroke Protocol (Onset < 12 hrs)       COMPARISON: Chest x-ray dated 9/12/2021     FINDINGS:  Upright frontal radiograph of  the chest was obtained     There appears to be a 4-5 cm focus of right upper lobe consolidation.  The lungs are otherwise clear. The cardiomediastinal silhouette and  pulmonary vascularity are within normal limits. The osseous structures  and surrounding soft tissues demonstrate no acute abnormality.       Impression:      1.  There appears to be a 4-5 cm right upper lobe consolidation. This  could reflect an upper lobe pneumonia. Upper lobe neoplasm is also a  consideration. Chest CT versus short interval follow-up is recommended  for further evaluation.     This report was signed and finalized on 5/12/2024 2:32 PM by Dr Joshua Sin.       CT CEREBRAL PERFUSION WITH & WITHOUT CONTRAST [[id number]] Collected: 05/12/24 1428     Updated: 05/12/24 1432    Narrative:      CT CEREBRAL PERFUSION W WO CONTRAST- 5/12/2024 12:39 PM     HISTORY: Neuro deficit, acute, stroke suspected     Technique:     1. Perfusion CT is performed to acquire images tracking the temporal  course of iodinated contrast material passing through the cerebral  circulation. Perfusion parameters, such as cerebral blood flow (CBF),  cerebral blood volume (CBV), mean transit time (MTT), etc. are  calculated by [employer] with additional provided perfusion maps and  estimated stroke volumes.  2. Automated exposure control (AEC) protocols are utilized on the  scanner to ensure dose lowered technique.     Comparison: Noncontrast CT brain and brain angiogram dated 5/12/2024  12:39 PM     CT dose: 1637.31 mGy.cm     Findings:     Normal, symmetric and MTT, TTP, CBV and CBF.      Tmax >6.0 seconds volume: 0 ml     CBF < 30% volume: 0 ml     Mismatch volume: 0 ml     Mismatch ratio: None       Impression:      Impression:  1. Normal, symmetric cerebral perfusion. No discrete infarct or at risk  territory detected by the perfusion software.     This report was signed and finalized on 5/12/2024 2:29 PM by Dr Joshua Sin.       CT Head Without Contrast Stroke  Protocol [[id number]] Collected: 05/12/24 1359     Updated: 05/12/24 1405    Narrative:      CT HEAD WO CONTRAST STROKE PROTOCOL- 5/12/2024 12:37 PM     HISTORY: Neuro deficit, acute, stroke suspected       DOSE LENGTH PRODUCT: 907.63 mGy.cm. Automated exposure control was also  utilized to decrease patient radiation dose.     TECHNIQUE:  Axial CT of the brain without IV contrast. Sagittal and coronal  reformations are also provided for review. Soft tissue and bone kernels  are available for interpretation.     COMPARISON: None.     FINDINGS:     There is no evidence of acute large vascular territory infarct. Chronic  small vessel ischemic changes. No intra-axial or extra-axial hemorrhage.  No visualized mass lesion or mass effect. Lateral ventriculomegaly.  Bowing of the corpus callosum with attenuation of the subarachnoid space  along the high convexities. Posterior fossa structures are unremarkable.  Visualized paranasal sinuses and mastoids are clear.       Impression:      1. No acute intracranial process. In particular, there is no acute  hemorrhage identified.  2. Advanced chronic small vessel ischemic changes.  3. Ventriculomegaly is present, pattern suspicious for communicating  hydrocephalus such as NPH.     Findings were discussed with Dr. Koo on 5/12/2024 2:02 PM.     This report was signed and finalized on 5/12/2024 2:02 PM by Dr Joshua Sin.             LAB RESULTS:      Lab 05/12/24  1340   WBC 5.88   HEMOGLOBIN 15.5   HEMATOCRIT 46.4   PLATELETS 150   NEUTROS ABS 3.90   IMMATURE GRANS (ABS) 0.02   LYMPHS ABS 1.25   MONOS ABS 0.51   EOS ABS 0.15   MCV 80.7   PROTIME 13.0   APTT 28.8         Lab 05/13/24  0531 05/12/24  1340   SODIUM  --  135*   POTASSIUM  --  4.1   CHLORIDE  --  99   CO2  --  26.0   ANION GAP  --  10.0   BUN  --  14   CREATININE  --  1.21   EGFR  --  60.9   GLUCOSE  --  99   CALCIUM  --  9.6   HEMOGLOBIN A1C 5.20  --          Lab 05/12/24  1340   TOTAL PROTEIN 7.4   ALBUMIN 4.7    GLOBULIN 2.7   ALT (SGPT) 27   AST (SGOT) 30   BILIRUBIN 1.3*   ALK PHOS 128*         Lab 05/12/24  1650 05/12/24  1340   HSTROP T 23* 25*   PROTIME  --  13.0   INR  --  0.94         Lab 05/13/24  0531   CHOLESTEROL 132   LDL CHOL 60   HDL CHOL 48   TRIGLYCERIDES 135         Lab 05/12/24  1340   ABO TYPING B   RH TYPING Positive   ANTIBODY SCREEN Negative         Brief Urine Lab Results  (Last result in the past 365 days)        Color   Clarity   Blood   Leuk Est   Nitrite   Protein   CREAT   Urine HCG        04/10/24 1442 Yellow   Clear   Trace   Negative   Negative   Negative                 Microbiology Results (last 10 days)       ** No results found for the last 240 hours. **            Hospital Course:   Patient is a 78-year-old male with a history of arthritis, GERD, hypertension who presented to [facility] ER on 5/12 with some strokelike symptoms.  Woke up and noticed tingling and numbness in his hand and has a progressive symptoms worsened/progressed to right hand and leg weakness with slurred speech and difficulty finding words.  He did have improvement in many of his symptoms by the time he had to our ER.  Initial head CT and perfusion scans not overtly abnormal from a stroke standpoint.  Some concern for possible NPH with ventriculomegaly.  Patient does have some fall and gait issues.  He was admitted to the hospital for care and further workup.  On 5/13 he had return of his symptoms and was reevaluated with imaging and found to have an acute stroke on imaging.  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  He is dysarthric speech has improved.  He still has some slight right upper extremity weakness.  Overall improving.  He is on high-dose statin.  Has been started on aspirin and Plavix.  Plan for dual antiplatelet therapy for 21 days and then aspirin alone.  Eventually a few months when improved and out of initial window for stroke recurrence will likely get NPH workup through  "neurology.  Otherwise he is medically stable.  Blood pressure has been starting to elevate some so we will resume back his lisinopril.  Resume further meds as necessary.  Discharge med rec below.  Patient to be discharged to acute rehab at [facility].      Physical Exam on Discharge:  /96 (BP Location: Right arm, Patient Position: Sitting)   Pulse 97   Temp 98.6 °F (37 °C) (Oral)   Resp 16   Ht 182.9 cm (72\")   Wt 90.7 kg (200 lb)   SpO2 96%   BMI 27.12 kg/m²   Physical Exam  GEN: Awake, alert, interactive, in NAD  HEENT:  PERRLA, EOMI, Anicteric, Trachea midline  Lungs:  no wheezing/rales/rhonchi  Heart:  +S1/s2, no rub  ABD: soft, nt/nd, +BS, no guarding/rebound  Extremities: atraumatic, no cyanosis, no pitting edema  Skin: no rashes or lesions  Neuro: AAOx3, LEMUS, gait not tested, still some dysarthric speech (improved per wife)  Psych: normal mood & affect      Condition on Discharge: improving, stable    Discharge Disposition:  Rehab Facility or Unit (DC - External)    Discharge Medications:     Discharge Medications        New Medications        Instructions Start Date   acetaminophen 325 MG tablet  Commonly known as: TYLENOL   650 mg, Oral, Every 4 Hours PRN      aspirin 81 MG chewable tablet   81 mg, Oral, Daily   Start Date: May 16, 2024     atorvastatin 80 MG tablet  Commonly known as: LIPITOR   80 mg, Oral, Nightly      bisacodyl 5 MG EC tablet  Commonly known as: DULCOLAX   5 mg, Oral, Daily PRN      bisacodyl 10 MG suppository  Commonly known as: DULCOLAX   10 mg, Rectal, Daily PRN      clopidogrel 75 MG tablet  Commonly known as: PLAVIX   75 mg, Oral, Daily   Start Date: May 16, 2024     melatonin 3 MG tablet   3 mg, Oral, Nightly PRN      pantoprazole 40 MG EC tablet  Commonly known as: PROTONIX  Replaces: omeprazole 20 MG capsule   40 mg, Oral, Every Early Morning   Start Date: May 16, 2024     polyethylene glycol 17 g packet  Commonly known as: MIRALAX   17 g, Oral, Daily PRN    "   sennosides-docusate 8.6-50 MG per tablet  Commonly known as: PERICOLACE   2 tablets, Oral, 2 Times Daily      tamsulosin 0.4 MG capsule 24 hr capsule  Commonly known as: FLOMAX  Replaces: alfuzosin 10 MG 24 hr tablet   0.4 mg, Oral, Nightly             Changes to Medications        Instructions Start Date   lisinopril 20 MG tablet  Commonly known as: PRINIVIL,ZESTRIL  What changed: when to take this   20 mg, Oral, Daily             Continue These Medications        Instructions Start Date   citalopram 20 MG tablet  Commonly known as: CeleXA   20 mg, Oral, Daily      diazePAM 5 MG tablet  Commonly known as: VALIUM   5 mg, Oral, Every 6 Hours PRN      levothyroxine 88 MCG tablet  Commonly known as: SYNTHROID, LEVOTHROID   88 mcg, Oral, Daily      MILK OF MAGNESIA PO   60 mL, Oral, Nightly      REQUIP PO   2 mg, Oral, Nightly      traZODone 50 MG tablet  Commonly known as: DESYREL   50 mg, Oral, Nightly             Stop These Medications      alfuzosin 10 MG 24 hr tablet  Commonly known as: UROXATRAL  Replaced by: tamsulosin 0.4 MG capsule 24 hr capsule     bisoprolol-hydrochlorothiazide 5-6.25 MG per tablet  Commonly known as: ZIAC     docusate calcium 240 MG capsule  Commonly known as: SURFAK     omeprazole 20 MG capsule  Commonly known as: priLOSEC  Replaced by: pantoprazole 40 MG EC tablet              Discharge Diet:   Dietary Orders (From admission, onward)       Start     Ordered    05/12/24 1734  Diet: Regular/House, Cardiac; Healthy Heart (2-3 Na+); Fluid Consistency: Thin (IDDSI 0)  Diet Effective Now        References:    Diet Order [place]   Question Answer Comment   Diets: Regular/House    Diets: Cardiac    Cardiac Diet: Healthy Heart (2-3 Na+)    Fluid Consistency: Thin (IDDSI 0)        05/12/24 1733                      Activity at Discharge:   Increase to baseline as tolerated    Follow-up Appointments:   Future Appointments   Date Time Provider Department Center   7/12/2024  1:10 PM Kishore Avery  S, PA [facility]       Test Results Pending at Discharge: none    Electronically signed by Rohan Phillips DO, 05/15/24, 11:48 CDT.    Time: 32 minutes.

## 2024-05-15 NOTE — CASE MANAGEMENT/SOCIAL WORK
Continued Stay Note  [facility]     Patient Name: Agueda Tyler  MRN: [id number]  Today's Date: 5/15/2024    Admit Date: 5/12/2024        Discharge Plan       Row Name 05/15/24 0936       Plan    Final Note Call report number for [facility] is [phone]. Fax number is [phone].  Pt will need a new covid test prior to dc.       Row Name 05/15/24 0935       Plan    Plan Comments [facility] can offer a bed and accept today if ready for dc. Message sent to Physician.    Final Discharge Disposition Code 62 - inpatient rehab facility                   Discharge Codes    No documentation.                       FRACISCO Stephen

## 2024-05-15 NOTE — PLAN OF CARE
Goal Outcome Evaluation:  Plan of Care Reviewed With: patient        Progress: no change     Pt alert and oriented x4. VSS. No c/o pain. LEMUS. PPP. SCDs and Plavix for VTE. , satting at 96% on room air. Q12 NIH. Q 4 neuro checks.Tele, NSR. Tolerating cardiac diet. Skin intact. Voiding via urinal. Up x 1 with walker. Last BM 05/15. Plans to d/c to [facility]. Bed alarm set. Call light within reach. Safety maintained. Plan of care ongoing. No neuro changes this shift.       Educated pt on stroke s/s and gave stroke education pamphlet to patient and spouse. Patient was able to voice s/s.

## 2024-05-15 NOTE — PLAN OF CARE
Goal Outcome Evaluation:  Plan of Care Reviewed With: patient, spouse        Progress: improving  Outcome Evaluation: OT tx completed. Pt. pleasant and willing to participate in therapy session. Pt. sitting in chair upon OT arrival. Pt. completed sit to stand transfer with CGA using FWW. Pt. required verbal cues for safety with use of FWW and proper body mechanics. Pt. completed oral hygiene and washed face with cloth at sink while standing supported with FWW. Pt. completed task with use of RUE, but required LUE to stabilize objects in . Pt. completed functional mobility from bathroom to chair with CGA and use of FWW. No LOB noted during fx transfers or mobility. Pt. completed grooming task with use of RUE and pt. was educated on strategies to promote I in grooming tasks. Pt. completed functional tasks to attend to/intiate use of RUE and demo'd good use with no verbal cues. However, pt. continues to present with deficits in grasp/fine motor movements.  Pt. completed gross/fine motor exercises to improve function and strength of RUE due to deficits. Pt. completed 10-15 reps of BUE flex, ext, abd, add, and internal/external rotation. Pt. educated on exercises and provided HEP and demonstrated competence. Pt. left in room seated in chair with call button and chair alarm.      Anticipated Discharge Disposition (OT): (P) inpatient rehabilitation facility

## 2024-05-15 NOTE — PLAN OF CARE
Goal Outcome Evaluation:  Plan of Care Reviewed With: patient, spouse        Progress: improving  Outcome Evaluation: pt sitting chair, worked on slow controlled movements with BLE LAQ, and tapping a target with heels/toes, constant cues for slow controlled movements, sit-stand cga-min, pt amb 40 feet rwx min assist, pt still with ataxic gait, very unsteady skyla with turns, cues for proper rwx placement, pt tends to get outside of rwx with turns, RUE did slip off rwx handle as we entered the room, trans to chair cga-min, pt would benefit from acute inpt rehab

## 2024-05-15 NOTE — THERAPY DISCHARGE NOTE
Acute Care - Occupational Therapy Discharge  [facility]    Patient Name: Agueda Tyler  : 1945    MRN: [id number]                              Today's Date: 5/15/2024       Admit Date: 2024    Visit Dx:     ICD-10-CM ICD-9-CM   1. Cerebrovascular accident (CVA), unspecified mechanism  I63.9 434.91   2. Impaired mobility [Z74.09]  Z74.09 799.89   3. Dysphagia, unspecified type  R13.10 787.20   4. Motor speech disorder  R47.89 784.59     Patient Active Problem List   Diagnosis    Hx of colonic polyp    Gastroesophageal reflux disease without esophagitis    Iron deficiency anemia    BRBPR (bright red blood per rectum)    Acute ischemic stroke    Essential hypertension    Stroke-like symptom    Right upper lobe consolidation    SINDI (obstructive sleep apnea)    Restless legs    Impaired gait and mobility    Cerebral ventriculomegaly due to brain atrophy     Past Medical History:   Diagnosis Date    Anxiety     BPH (benign prostatic hyperplasia)     Colon polyp     Diverticulosis     Essential hypertension     GERD (gastroesophageal reflux disease)     Hypertriglyceridemia     Idiopathic peripheral neuropathy     Osteoarthritis      Past Surgical History:   Procedure Laterality Date    COLONOSCOPY  2016    diverticulosis, internal hemorrhoids,     COLONOSCOPY  2011    internal hemorrhoid, diverticulosis    COLONOSCOPY N/A 3/11/2021    Procedure: COLONOSCOPY WITH ANESTHESIA;  Surgeon: Ricky House MD;  Location: [facility] ENDOSCOPY;  Service: Gastroenterology;  Laterality: N/A;  pre: hx polyps  post: diverticulosis. hemorrhoid.   Keenan Perez MD    ENDOSCOPY N/A 3/11/2021    Procedure: ESOPHAGOGASTRODUODENOSCOPY WITH ANESTHESIA;  Surgeon: Ricky House MD;  Location: [facility] ENDOSCOPY;  Service: Gastroenterology;  Laterality: N/A;  pre: GERD  post: hiatal hernia. gastric polyps.  Keenan Perez MD    HERNIA REPAIR      inguinal    TURP / TRANSURETHRAL INCISION / DRAINAGE  PROSTATE        General Information       Row Name 05/15/24 1209          OT Time and Intention    Document Type therapy note (daily note)  -CS (r) HH (t) CS (c)     Mode of Treatment occupational therapy  -CS (r) HH (t) CS (c)       Row Name 05/15/24 1209          General Information    Patient Profile Reviewed yes  -CS (r) HH (t) CS (c)     Existing Precautions/Restrictions fall  -CS (r) HH (t) CS (c)       Row Name 05/15/24 1209          Living Environment    People in Home spouse  -CS (r) HH (t) CS (c)       Row Name 05/15/24 1209          Cognition    Orientation Status (Cognition) oriented x 4  -CS (r) HH (t) CS (c)       Row Name 05/15/24 1209          Safety Issues, Functional Mobility    Safety Issues Affecting Function (Mobility) safety precaution awareness  -CS (r) HH (t) CS (c)     Impairments Affecting Function (Mobility) balance;cognition;coordination;endurance/activity tolerance;motor control;sensation/sensory awareness;strength;grasp  -CS (r) HH (t) CS (c)     Cognitive Impairments, Mobility Safety/Performance safety precaution awareness  -CS (r) HH (t) CS (c)               User Key  (r) = Recorded By, (t) = Taken By, (c) = Cosigned By      Initials Name Provider Type    CS Abigail Collier S, OTR/L, CNT Occupational Therapist     Nikki Acevedo, OT Student OT Student                   Mobility/ADL's       Row Name 05/15/24 1115          Sit-Stand Transfer    Sit-Stand [place] (Transfers) standby assist;verbal cues  -CS (r) HH (t) CS (c)     Comment, (Sit-Stand Transfer) required verbal cues for safety  -CS (r) HH (t) CS (c)       Row Name 05/15/24 1115          Functional Mobility    Functional Mobility- Ind. Level contact guard assist  -CS (r) HH (t) CS (c)     Functional Mobility- Device walker, front-wheeled  -CS (r) HH (t) CS (c)     Functional Mobility- Safety Issues steps too close front assistive device  -CS (r) HH (t) CS (c)     Functional Mobility- Comment required verbal cues for  proper walker placement  -CS (r) HH (t) CS (c)       Row Name 05/15/24 1115          Activities of Daily Living    BADL Assessment/Intervention grooming  -CS (r) HH (t) CS (c)       Row Name 05/15/24 [facility]5          Grooming Assessment/Training    [place] Level (Grooming) hair care, combing/brushing;oral care regimen;contact guard assist;wash face, hands  -CS (r) HH (t) CS (c)     Assistive Devices (Grooming) electric/power toothbrush  -CS (r) HH (t) CS (c)     Position (Grooming) sink side;supported standing  -CS (r) HH (t) CS (c)               User Key  (r) = Recorded By, (t) = Taken By, (c) = Cosigned By      Initials Name Provider Type    CS Aibgail Collier, OTR/L, CNT Occupational Therapist    Nikki Barber, OT Student OT Student                   Obj/Interventions       Row Name 05/15/24 [facility]          Motor Skills    Motor Skills coordination;motor control/coordination interventions  -CS (r) HH (t) CS (c)     Motor Control/Coordination Interventions gross motor coordination activities;occupation/activity based treatment;fine motor manipulation/dexterity activities  folded washcloths x 10 with SBA for safety while in standing with use of FWW, pt. completed UE gross/fine motor exercises x 10-15 reps to increase use/attention to RUE  -CS (r) HH (t) CS (c)     Therapeutic Exercise shoulder;elbow/forearm  -CS (r) HH (t) CS (c)       Row Name 05/15/24 1115          Balance    Balance Assessment sitting static balance;sitting dynamic balance;standing static balance;standing dynamic balance  -CS (r) HH (t) CS (c)     Static Sitting Balance independent  -CS (r) HH (t) CS (c)     Dynamic Sitting Balance independent  -CS (r) HH (t) CS (c)     Static Standing Balance standby assist  -CS (r) HH (t) CS (c)     Dynamic Standing Balance contact guard  -CS (r) HH (t) CS (c)     Position/Device Used, Standing Balance walker, front-wheeled  -CS (r) HH (t) CS (c)     Balance Interventions sitting;standing;sit to  stand;occupation based/functional task  -CS (r) HH (t) CS (c)               User Key  (r) = Recorded By, (t) = Taken By, (c) = Cosigned By      Initials Name Provider Type    CS Abigail Collier S, OTR/L, CNT Occupational Therapist    HH Nikki Acevedo, OT Student OT Student                   Goals/Plan       Row Name 05/15/24 1550          Transfer Goal 1 (OT)    Activity/Assistive Device (Transfer Goal 1, OT) toilet  -CS (r) HH (t) CS (c)     [place] Level/Cues Needed (Transfer Goal 1, OT) standby assist  -CS (r) HH (t) CS (c)     Time Frame (Transfer Goal 1, OT) long term goal (LTG)  -CS (r) HH (t) CS (c)     Progress/Outcome (Transfer Goal 1, OT) goal not met  -CS (r) HH (t) CS (c)       Row Name 05/15/24 1550          Self-Feeding Goal 1 (OT)    Activity/Device (Self-Feeding Goal 1, OT) self-feeding skills, all  -CS (r) HH (t) CS (c)     [place] Level/Cues Needed (Self-Feeding Goal 1, OT) modified independence  -CS (r) HH (t) CS (c)     Time Frame (Self-Feeding Goal 1, OT) long term goal (LTG)  -CS (r) HH (t) CS (c)     Strategies/Barriers (Self-Feeding Goal 1, OT) with RUE as primary  -CS (r) HH (t) CS (c)     Progress/Outcomes (Self-Feeding Goal 1, OT) goal not met  -CS (r) HH (t) CS (c)       Row Name 05/15/24 1550          Problem Specific Goal 1 (OT)    Problem Specific Goal 1 (OT) Pt will be I with RUE coordination and strengthening HEP.  -CS (r) HH (t) CS (c)     Time Frame (Problem Specific Goal 1, OT) long term goal (LTG)  -CS (r) HH (t) CS (c)     Progress/Outcome (Problem Specific Goal 1, OT) goal not met  -CS (r) HH (t) CS (c)               User Key  (r) = Recorded By, (t) = Taken By, (c) = Cosigned By      Initials Name Provider Type    Abigail Buenrostro S, OTR/L, CNT Occupational Therapist    HH Nikki Acevedo, OT Student OT Student                   Clinical Impression       Row Name 05/15/24 111          Pain Assessment    Pretreatment Pain Rating 0/10 - no pain  -CS (r) HH (t) CS (c)      Posttreatment Pain Rating 0/10 - no pain  -CS (r) HH (t) CS (c)       Row Name 05/15/24 111          Plan of Care Review    Plan of Care Reviewed With patient;spouse  -CS (r) HH (t) CS (c)     Progress improving  -CS (r) HH (t) CS (c)     Outcome Evaluation OT tx completed. Pt. pleasant and willing to participate in therapy session. Pt. sitting in chair upon OT arrival. Pt. completed sit to stand transfer with CGA using FWW. Pt. required verbal cues for safety with use of FWW and proper body mechanics. Pt. completed oral hygiene and washed face with cloth at sink while standing supported with FWW. Pt. completed task with use of RUE, but required LUE to stabilize objects in . Pt. completed functional mobility from bathroom to chair with CGA and use of FWW. No LOB noted during fx transfers or mobility. Pt. completed grooming task with use of RUE and pt. was educated on strategies to promote I in grooming tasks. Pt. completed functional tasks to attend to/intiate use of RUE and demo'd good use with no verbal cues. However, pt. continues to present with deficits in grasp/fine motor movements.  Pt. completed gross/fine motor exercises to improve function and strength of RUE due to deficits. Pt. completed 10-15 reps of BUE flex, ext, abd, add, and internal/external rotation. Pt. educated on exercises and provided HEP and demonstrated competence. Pt. left in room seated in chair with call button and chair alarm.  -CS (r) HH (t) CS (c)       Row Name 05/15/24 6737          Therapy Assessment/Plan (OT)    Rehab Potential (OT) good, to achieve stated therapy goals  -CS (r) HH (t) CS (c)     Therapy Frequency (OT) 5 times/wk  -CS (r) HH (t) CS (c)       Row Name 05/15/24 4054          Positioning and Restraints    Pre-Treatment Position sitting in chair/recliner  -CS (r) HH (t) CS (c)     Post Treatment Position chair  -CS (r) HH (t) CS (c)     In Chair sitting;call light within reach;encouraged to call for assist;exit  alarm on;with family/caregiver  -CS (r) HH (t) CS (c)               User Key  (r) = Recorded By, (t) = Taken By, (c) = Cosigned By      Initials Name Provider Type    CS Abigail Collier, OTR/L, CNT Occupational Therapist    Nikki Barber, OT Student OT Student                   Outcome Measures       Row Name 05/15/24 1115          How much help from another is currently needed...    Putting on and taking off regular lower body clothing? 3  -CS (r) HH (t) CS (c)     Bathing (including washing, rinsing, and drying) 3  -CS (r) HH (t) CS (c)     Toileting (which includes using toilet bed pan or urinal) 3  -CS (r) HH (t) CS (c)     Putting on and taking off regular upper body clothing 3  -CS (r) HH (t) CS (c)     Taking care of personal grooming (such as brushing teeth) 3  -CS (r) HH (t) CS (c)     Eating meals 3  -CS (r) HH (t) CS (c)     AM-PAC 6 Clicks Score (OT) 18  -CS (r) HH (t)       Row Name 05/15/24 1000 05/15/24 0855       How much help from another person do you currently need...    Turning from your back to your side while in flat bed without using bedrails? 4  -AH 4  -CB    Moving from lying on back to sitting on the side of a flat bed without bedrails? 3  -AH 3  -CB    Moving to and from a bed to a chair (including a wheelchair)? 3  -AH 3  -CB    Standing up from a chair using your arms (e.g., wheelchair, bedside chair)? 3  -AH 3  -CB    Climbing 3-5 steps with a railing? 2  -AH 2  -CB    To walk in hospital room? 3  -AH 3  -CB    AM-PAC 6 Clicks Score (PT) 18  -AH 18  -CB    Highest Level of Mobility Goal 6 --> Walk 10 steps or more  -AH 6 --> Walk 10 steps or more  -CB      Row Name 05/15/24 1115 05/15/24 1000       Functional Assessment    Outcome Measure Options AM-PAC 6 Clicks Daily Activity (OT)  -CS (r) HH (t) CS (c) AM-PAC 6 Clicks Basic Mobility (PT)  -              User Key  (r) = Recorded By, (t) = Taken By, (c) = Cosigned By      Initials Name Provider Type     Gricel Medina, PTA  Physical Therapist Assistant    CS Abigail Collier, OTR/L, CNT Occupational Therapist    Isabel Reyes LPN Licensed Nurse    Nikki Barber, OT Student OT Student                  Occupational Therapy Education       Title: PT OT SLP Therapies (In Progress)       Topic: Occupational Therapy (In Progress)       Point: ADL training (Done)       Description:   Instruct learner(s) on proper safety adaptation and remediation techniques during self care or transfers.   Instruct in proper use of assistive devices.                  Learning Progress Summary             Patient Acceptance, E, VU,NR by MM at 5/14/2024 1544    Acceptance, E, NR by CS at 5/13/2024 1159    Acceptance, E, VU by CS at 5/13/2024 1049   Family Acceptance, E, VU by CS at 5/13/2024 1049                         Point: Home exercise program (In Progress)       Description:   Instruct learner(s) on appropriate technique for monitoring, assisting and/or progressing therapeutic exercises/activities.                  Learning Progress Summary             Patient Acceptance, E, NR by CS at 5/13/2024 1159    Acceptance, E, VU by CS at 5/13/2024 1049   Family Acceptance, E, VU by CS at 5/13/2024 1049                         Point: Precautions (Done)       Description:   Instruct learner(s) on prescribed precautions during self-care and functional transfers.                  Learning Progress Summary             Patient Acceptance, E, VU,NR by MM at 5/14/2024 1544    Acceptance, E, NR by CS at 5/13/2024 1159    Acceptance, E, VU by CS at 5/13/2024 1049   Family Acceptance, E, VU by CS at 5/13/2024 1049                         Point: Body mechanics (Done)       Description:   Instruct learner(s) on proper positioning and spine alignment during self-care, functional mobility activities and/or exercises.                  Learning Progress Summary             Patient Acceptance, E, VU,NR by MM at 5/14/2024 1544    Acceptance, E, NR by CS at 5/13/2024  1159    Acceptance, E, VU by  at 5/13/2024 1049   Family Acceptance, E, VU by  at 5/13/2024 1049                                         User Key       Initials Effective Dates Name Provider Type Discipline     02/03/23 -  Abigail Collier, OTR/L, CNT Occupational Therapist OT     07/11/23 -  Chance Alvarado, OTR/L Occupational Therapist OT                  OT Recommendation and Plan  Therapy Frequency (OT): 5 times/wk  Plan of Care Review  Plan of Care Reviewed With: patient, spouse  Progress: improving  Outcome Evaluation: OT tx completed. Pt. pleasant and willing to participate in therapy session. Pt. sitting in chair upon OT arrival. Pt. completed sit to stand transfer with CGA using FWW. Pt. required verbal cues for safety with use of FWW and proper body mechanics. Pt. completed oral hygiene and washed face with cloth at sink while standing supported with FWW. Pt. completed task with use of RUE, but required LUE to stabilize objects in . Pt. completed functional mobility from bathroom to chair with CGA and use of FWW. No LOB noted during fx transfers or mobility. Pt. completed grooming task with use of RUE and pt. was educated on strategies to promote I in grooming tasks. Pt. completed functional tasks to attend to/intiate use of RUE and demo'd good use with no verbal cues. However, pt. continues to present with deficits in grasp/fine motor movements.  Pt. completed gross/fine motor exercises to improve function and strength of RUE due to deficits. Pt. completed 10-15 reps of BUE flex, ext, abd, add, and internal/external rotation. Pt. educated on exercises and provided HEP and demonstrated competence. Pt. left in room seated in chair with call button and chair alarm.  Plan of Care Reviewed With: patient, spouse  Outcome Evaluation: OT tx completed. Pt. pleasant and willing to participate in therapy session. Pt. sitting in chair upon OT arrival. Pt. completed sit to stand transfer with CGA using  FWW. Pt. required verbal cues for safety with use of FWW and proper body mechanics. Pt. completed oral hygiene and washed face with cloth at sink while standing supported with FWW. Pt. completed task with use of RUE, but required LUE to stabilize objects in . Pt. completed functional mobility from bathroom to chair with CGA and use of FWW. No LOB noted during fx transfers or mobility. Pt. completed grooming task with use of RUE and pt. was educated on strategies to promote I in grooming tasks. Pt. completed functional tasks to attend to/intiate use of RUE and demo'd good use with no verbal cues. However, pt. continues to present with deficits in grasp/fine motor movements.  Pt. completed gross/fine motor exercises to improve function and strength of RUE due to deficits. Pt. completed 10-15 reps of BUE flex, ext, abd, add, and internal/external rotation. Pt. educated on exercises and provided HEP and demonstrated competence. Pt. left in room seated in chair with call button and chair alarm.     Time Calculation:         Time Calculation- OT       Row Name 05/15/24 1115 05/15/24 1007          Time Calculation- OT    OT Start Time 1115  -CS (r) HH (t) CS (c) --     OT Stop Time 1204  -CS (r) HH (t) CS (c) --     OT Time Calculation (min) 49 min  -CS (r) HH (t) --     Total Timed Code Minutes- OT 49 minute(s)  -CS (r) HH (t) CS (c) --     OT Received On 05/15/24  -CS (r) HH (t) CS (c) --        Timed Charges    06127 - Gait Training Minutes  -- 21  -     38417 - OT Therapeutic Activity Minutes 15  -CS (r) HH (t) CS (c) --     63066 - OT Self Care/Mgmt Minutes 34  -CS (r) HH (t) CS (c) --        Total Minutes    Timed Charges Total Minutes 49  -CS (r) HH (t) 21  -AH      Total Minutes 49  -CS (r) HH (t) 21  -AH               User Key  (r) = Recorded By, (t) = Taken By, (c) = Cosigned By      Initials Name Provider Type     Gricel Medina, PTA Physical Therapist Assistant    Abigail Buenrostro, OTR/L, CNT  Occupational Therapist    Nikki Barber OT Student OT Student                         OT Discharge Summary  Anticipated Discharge Disposition (OT): inpatient rehabilitation facility  Reason for Discharge: Discharge from facility  Outcomes Achieved: Refer to plan of care for updates on goals achieved  Discharge Destination: Inpatient rehabilitation facility    Nikki Acevedo OT Student  5/15/2024

## 2024-05-15 NOTE — THERAPY TREATMENT NOTE
Acute Care - Physical Therapy Treatment Note  [facility]     Patient Name: Agueda Tyler  : 1945  MRN: [id number]  Today's Date: 5/15/2024      Visit Dx:     ICD-10-CM ICD-9-CM   1. Cerebrovascular accident (CVA), unspecified mechanism  I63.9 434.91   2. Impaired mobility [Z74.09]  Z74.09 799.89   3. Dysphagia, unspecified type  R13.10 787.20   4. Motor speech disorder  R47.89 784.59     Patient Active Problem List   Diagnosis    Hx of colonic polyp    Gastroesophageal reflux disease without esophagitis    Iron deficiency anemia    BRBPR (bright red blood per rectum)    Acute ischemic stroke    Essential hypertension    Stroke-like symptom    Right upper lobe consolidation    SINDI (obstructive sleep apnea)    Restless legs    Impaired gait and mobility    Cerebral ventriculomegaly due to brain atrophy     Past Medical History:   Diagnosis Date    Anxiety     BPH (benign prostatic hyperplasia)     Colon polyp     Diverticulosis     Essential hypertension     GERD (gastroesophageal reflux disease)     Hypertriglyceridemia     Idiopathic peripheral neuropathy     Osteoarthritis      Past Surgical History:   Procedure Laterality Date    COLONOSCOPY  2016    diverticulosis, internal hemorrhoids,     COLONOSCOPY  2011    internal hemorrhoid, diverticulosis    COLONOSCOPY N/A 3/11/2021    Procedure: COLONOSCOPY WITH ANESTHESIA;  Surgeon: Ricky House MD;  Location: [facility] ENDOSCOPY;  Service: Gastroenterology;  Laterality: N/A;  pre: hx polyps  post: diverticulosis. hemorrhoid.   Keenan Perze MD    ENDOSCOPY N/A 3/11/2021    Procedure: ESOPHAGOGASTRODUODENOSCOPY WITH ANESTHESIA;  Surgeon: Ricky House MD;  Location:  [facility] ENDOSCOPY;  Service: Gastroenterology;  Laterality: N/A;  pre: GERD  post: hiatal hernia. gastric polyps.  Keenan Perez MD    HERNIA REPAIR      inguinal    TURP / TRANSURETHRAL INCISION / DRAINAGE PROSTATE       PT Assessment (Last 12 Hours)       PT  Evaluation and Treatment       Row Name 05/15/24 0919          Physical Therapy Time and Intention    Subjective Information complains of;weakness;numbness  numbness RUE  -     Document Type therapy note (daily note)  -     Mode of Treatment physical therapy  -AH       Row Name 05/15/24 0919          General Information    Existing Precautions/Restrictions fall  -     Barriers to Rehab medically complex  -AH       Row Name 05/15/24 0919          Pain    Pretreatment Pain Rating 0/10 - no pain  -     Posttreatment Pain Rating 0/10 - no pain  -AH       Row Name 05/15/24 0919          Bed Mobility    Comment, (Bed Mobility) CHAIR  -[facility] 05/15/24 0919          Transfers    Transfers toilet transfer  -AH       Row Name 05/15/24 0919          Sit-Stand Transfer    Sit-Stand [place] (Transfers) minimum assist (75% patient effort);verbal cues  -AH       Row Name 05/15/24 0919          Stand-Sit Transfer    Stand-Sit [place] (Transfers) contact guard;verbal cues  -AH       Row Name 05/15/24 0919          Gait/Stairs (Locomotion)    [place] Level (Gait) verbal cues;minimum assist (75% patient effort)  -     Assistive Device (Gait) walker, front-wheeled  -     Distance in Feet (Gait) 40  -     Deviations/Abnormal Patterns (Gait) ataxic  -     Comment, (Gait/Stairs) very unsteady skyla with turns, cues for rwx placement, pt tends to get outside of rwx with turns  -[facility] Name 05/15/24 0919          Safety Issues, Functional Mobility    Impairments Affecting Function (Mobility) balance;coordination;motor control  -[facility] Name 05/15/24 0919          Motor Skills    Comments, Therapeutic Exercise worked on slow controlled movements BLE with tapping a target with toes, heels, constant cues for slow controlled movements  -     Additional Documentation Comments, Therapeutic Exercise (Row)  -[facility] Name 05/15/24 0919          Plan of Care Review    Plan of Care Reviewed  With patient;spouse  -     Progress improving  -     Outcome Evaluation pt sitting chair, worked on slow controlled movements with BLE LAQ, and tapping a target with heels/toes, constant cues for slow controlled movements, sit-stand cga-min, pt amb 40 feet rwx min assist, pt still with ataxic gait, very unsteady skyla with turns, cues for proper rwx placement, pt tends to get outside of rwx with turns, RUE did slip off rwx handle as we entered the room, trans to chair cga-min, pt would benefit from acute inpt rehab  -       Row Name 05/15/24 0919          Positioning and Restraints    Pre-Treatment Position sitting in chair/recliner  -     Post Treatment Position chair  -     In Chair reclined;call light within reach;encouraged to call for assist;exit alarm on;with family/caregiver  -               User Key  (r) = Recorded By, (t) = Taken By, (c) = Cosigned By      Initials Name Provider Type     Gricel Medina, PTA Physical Therapist Assistant                    Physical Therapy Education       Title: PT OT SLP Therapies (In Progress)       Topic: Physical Therapy (Done)       Point: Mobility training (Done)       Learning Progress Summary             Patient Acceptance, E,D, DU,VU by  at 5/13/2024 1139    Comment: benefits of PT and POC, call for A OOB                         Point: Precautions (Done)       Learning Progress Summary             Patient Acceptance, E,D, DU,VU by  at 5/13/2024 1139    Comment: benefits of PT and POC, call for A OOB                                         User Key       Initials Effective Dates Name Provider Type [facility] 02/03/23 -  Oswaldo Marc, PT Physical Therapist PT                  PT Recommendation and Plan     Plan of Care Reviewed With: patient, spouse  Progress: improving  Outcome Evaluation: pt sitting chair, worked on slow controlled movements with BLE LAQ, and tapping a target with heels/toes, constant cues for slow controlled movements,  sit-stand cga-min, pt amb 40 feet rwx min assist, pt still with ataxic gait, very unsteady skyla with turns, cues for proper rwx placement, pt tends to get outside of rwx with turns, RUE did slip off rwx handle as we entered the room, trans to chair cga-min, pt would benefit from acute inpt rehab   Outcome Measures       Row Name 05/15/24 1000 05/14/24 1200          How much help from another person do you currently need...    Turning from your back to your side while in flat bed without using bedrails? 4  -AH 4  -AH     Moving from lying on back to sitting on the side of a flat bed without bedrails? 3  -AH 3  -AH     Moving to and from a bed to a chair (including a wheelchair)? 3  -AH 3  -AH     Standing up from a chair using your arms (e.g., wheelchair, bedside chair)? 3  -AH 3  -AH     Climbing 3-5 steps with a railing? 2  -AH 2  -AH     To walk in hospital room? 3  -AH 3  -AH     AM-PAC 6 Clicks Score (PT) 18  - 18  -     Highest Level of Mobility Goal 6 --> Walk 10 steps or more  - 6 --> Walk 10 steps or more  -        Functional Assessment    Outcome Measure Options AM-PAC 6 Clicks Basic Mobility (PT)  - AM-PAC 6 Clicks Basic Mobility (PT)  -               User Key  (r) = Recorded By, (t) = Taken By, (c) = Cosigned By      Initials Name Provider Type     Gricel Medina PTA Physical Therapist Assistant                     Time Calculation:    PT Charges       Row Name 05/15/24 1007             Time Calculation    Start Time 0919  -      Stop Time 1000  -      Time Calculation (min) 41 min  -      PT Received On 05/15/24  -         Time Calculation- PT    Total Timed Code Minutes- PT 41 minute(s)  -         Timed Charges    30140 - Gait Training Minutes  21  -      84824 - PT Therapeutic Activity Minutes 20  -         Total Minutes    Timed Charges Total Minutes 41  -       Total Minutes 41  -                User Key  (r) = Recorded By, (t) = Taken By, (c) = Cosigned By       Initials Name Provider Type     Gricel Medina PTA Physical Therapist Assistant                  Therapy Charges for Today       Code Description Service Date Service Provider Modifiers Qty    50632734941 HC GAIT TRAINING EA 15 MIN 5/14/2024 Gricel Medina, PTA GP 2    16745153472 HC GAIT TRAINING EA 15 MIN 5/14/2024 Gricel Medina, PTA GP 1    33823246293 HC GAIT TRAINING EA 15 MIN 5/15/2024 Gricel Medina, SHARON GP 2    50536433567 HC PT THERAPEUTIC ACT EA 15 MIN 5/15/2024 Gricel Medina, PTA GP 1            PT G-Codes  Outcome Measure Options: AM-PAC 6 Clicks Basic Mobility (PT)  AM-PAC 6 Clicks Score (PT): 18  AM-PAC 6 Clicks Score (OT): 15  Modified [place] Scale: 4 - Moderately severe disability.  Unable to walk without assistance, and unable to attend to own bodily needs without assistance.    Gricel Medina PTA  5/15/2024

## 2024-05-16 PROBLEM — R26.9 GAIT ABNORMALITY: Status: ACTIVE | Noted: 2024-05-16

## 2024-05-16 PROBLEM — R20.0 NUMBNESS: Status: ACTIVE | Noted: 2024-05-16

## 2024-05-16 LAB
ALBUMIN SERPL-MCNC: 4.3 G/DL (ref 3.5–5.2)
ALP SERPL-CCNC: 144 U/L (ref 40–130)
ALT SERPL-CCNC: 26 U/L (ref 5–41)
ANION GAP SERPL CALCULATED.3IONS-SCNC: 12 MMOL/L (ref 7–19)
AST SERPL-CCNC: 29 U/L (ref 5–40)
BASOPHILS # BLD: 0.1 K/UL (ref 0–0.2)
BASOPHILS NFR BLD: 0.7 % (ref 0–1)
BILIRUB SERPL-MCNC: 1.6 MG/DL (ref 0.2–1.2)
BUN SERPL-MCNC: 14 MG/DL (ref 8–23)
CALCIUM SERPL-MCNC: 9.6 MG/DL (ref 8.8–10.2)
CHLORIDE SERPL-SCNC: 99 MMOL/L (ref 98–111)
CO2 SERPL-SCNC: 26 MMOL/L (ref 22–29)
CREAT SERPL-MCNC: 1.1 MG/DL (ref 0.5–1.2)
EOSINOPHIL # BLD: 0.3 K/UL (ref 0–0.6)
EOSINOPHIL NFR BLD: 3.9 % (ref 0–5)
ERYTHROCYTE [DISTWIDTH] IN BLOOD BY AUTOMATED COUNT: 18.7 % (ref 11.5–14.5)
GLUCOSE SERPL-MCNC: 116 MG/DL (ref 74–109)
HCT VFR BLD AUTO: 47.4 % (ref 42–52)
HGB BLD-MCNC: 15.7 G/DL (ref 14–18)
IMM GRANULOCYTES # BLD: 0 K/UL
LYMPHOCYTES # BLD: 0.8 K/UL (ref 1.1–4.5)
LYMPHOCYTES NFR BLD: 11.5 % (ref 20–40)
MCH RBC QN AUTO: 26.5 PG (ref 27–31)
MCHC RBC AUTO-ENTMCNC: 33.1 G/DL (ref 33–37)
MCV RBC AUTO: 79.9 FL (ref 80–94)
MONOCYTES # BLD: 0.6 K/UL (ref 0–0.9)
MONOCYTES NFR BLD: 8.2 % (ref 0–10)
NEUTROPHILS # BLD: 5.4 K/UL (ref 1.5–7.5)
NEUTS SEG NFR BLD: 75.3 % (ref 50–65)
PLATELET # BLD AUTO: 169 K/UL (ref 130–400)
PMV BLD AUTO: 9.6 FL (ref 9.4–12.4)
POTASSIUM SERPL-SCNC: 3.6 MMOL/L (ref 3.5–5)
PREALB SERPL-MCNC: 24 MG/DL (ref 20–40)
PROT SERPL-MCNC: 6.9 G/DL (ref 6.6–8.7)
RBC # BLD AUTO: 5.93 M/UL (ref 4.7–6.1)
SODIUM SERPL-SCNC: 137 MMOL/L (ref 136–145)
TSH SERPL DL<=0.005 MIU/L-ACNC: 2.23 UIU/ML (ref 0.27–4.2)
VIT B12 SERPL-MCNC: 609 PG/ML (ref 211–946)
WBC # BLD AUTO: 7.2 K/UL (ref 4.8–10.8)

## 2024-05-16 PROCEDURE — 97116 GAIT TRAINING THERAPY: CPT

## 2024-05-16 PROCEDURE — 84134 ASSAY OF PREALBUMIN: CPT

## 2024-05-16 PROCEDURE — 1180000000 HC REHAB R&B

## 2024-05-16 PROCEDURE — 85025 COMPLETE CBC W/AUTO DIFF WBC: CPT

## 2024-05-16 PROCEDURE — 97161 PT EVAL LOW COMPLEX 20 MIN: CPT

## 2024-05-16 PROCEDURE — 80053 COMPREHEN METABOLIC PANEL: CPT

## 2024-05-16 PROCEDURE — 6360000002 HC RX W HCPCS: Performed by: PSYCHIATRY & NEUROLOGY

## 2024-05-16 PROCEDURE — 84443 ASSAY THYROID STIM HORMONE: CPT

## 2024-05-16 PROCEDURE — 6370000000 HC RX 637 (ALT 250 FOR IP): Performed by: PSYCHIATRY & NEUROLOGY

## 2024-05-16 PROCEDURE — 92522 EVALUATE SPEECH PRODUCTION: CPT

## 2024-05-16 PROCEDURE — 97165 OT EVAL LOW COMPLEX 30 MIN: CPT

## 2024-05-16 PROCEDURE — 99222 1ST HOSP IP/OBS MODERATE 55: CPT | Performed by: PSYCHIATRY & NEUROLOGY

## 2024-05-16 PROCEDURE — 82607 VITAMIN B-12: CPT

## 2024-05-16 PROCEDURE — 97110 THERAPEUTIC EXERCISES: CPT

## 2024-05-16 PROCEDURE — 36415 COLL VENOUS BLD VENIPUNCTURE: CPT

## 2024-05-16 PROCEDURE — 97535 SELF CARE MNGMENT TRAINING: CPT

## 2024-05-16 PROCEDURE — 92610 EVALUATE SWALLOWING FUNCTION: CPT

## 2024-05-16 RX ADMIN — ASPIRIN 81 MG: 81 TABLET, CHEWABLE ORAL at 08:45

## 2024-05-16 RX ADMIN — MAGNESIUM HYDROXIDE 60 ML: 400 SUSPENSION ORAL at 20:31

## 2024-05-16 RX ADMIN — LISINOPRIL 20 MG: 20 TABLET ORAL at 08:44

## 2024-05-16 RX ADMIN — SENNOSIDES AND DOCUSATE SODIUM 2 TABLET: 50; 8.6 TABLET ORAL at 08:44

## 2024-05-16 RX ADMIN — ATORVASTATIN CALCIUM 80 MG: 80 TABLET, FILM COATED ORAL at 20:31

## 2024-05-16 RX ADMIN — LEVOTHYROXINE SODIUM 88 MCG: 0.09 TABLET ORAL at 05:44

## 2024-05-16 RX ADMIN — CITALOPRAM HYDROBROMIDE 20 MG: 10 TABLET ORAL at 08:45

## 2024-05-16 RX ADMIN — PANTOPRAZOLE SODIUM 40 MG: 40 TABLET, DELAYED RELEASE ORAL at 05:44

## 2024-05-16 RX ADMIN — TAMSULOSIN HYDROCHLORIDE 0.4 MG: 0.4 CAPSULE ORAL at 20:31

## 2024-05-16 RX ADMIN — ROPINIROLE HYDROCHLORIDE 2 MG: 2 TABLET, FILM COATED ORAL at 20:31

## 2024-05-16 RX ADMIN — SENNOSIDES AND DOCUSATE SODIUM 2 TABLET: 50; 8.6 TABLET ORAL at 20:31

## 2024-05-16 RX ADMIN — ENOXAPARIN SODIUM 40 MG: 100 INJECTION SUBCUTANEOUS at 20:30

## 2024-05-16 RX ADMIN — TRAZODONE HYDROCHLORIDE 50 MG: 50 TABLET ORAL at 20:31

## 2024-05-16 RX ADMIN — CLOPIDOGREL BISULFATE 75 MG: 75 TABLET ORAL at 08:45

## 2024-05-16 NOTE — THERAPY DISCHARGE NOTE
Acute Care - Physical Therapy Discharge Summary  [facility]       Patient Name: Agueda Tyler  : 1945  MRN: [id number]    Today's Date: 2024                 Admit Date: 2024      PT Recommendation and Plan    Visit Dx:    ICD-10-CM ICD-9-CM   1. Cerebrovascular accident (CVA), unspecified mechanism  I63.9 434.91   2. Impaired mobility [Z74.09]  Z74.09 799.89   3. Dysphagia, unspecified type  R13.10 787.20   4. Motor speech disorder  R47.89 784.59        Outcome Measures       Row Name 05/15/24 1000 24 1200          How much help from another person do you currently need...    Turning from your back to your side while in flat bed without using bedrails? 4  -AH 4  -AH     Moving from lying on back to sitting on the side of a flat bed without bedrails? 3  -AH 3  -AH     Moving to and from a bed to a chair (including a wheelchair)? 3  -AH 3  -AH     Standing up from a chair using your arms (e.g., wheelchair, bedside chair)? 3  -AH 3  -AH     Climbing 3-5 steps with a railing? 2  -AH 2  -AH     To walk in hospital room? 3  -AH 3  -AH     AM-PAC 6 Clicks Score (PT) 18  - 18  -AH     Highest Level of Mobility Goal 6 --> Walk 10 steps or more  -AH 6 --> Walk 10 steps or more  -        Functional Assessment    Outcome Measure Options AM-PAC 6 Clicks Basic Mobility (PT)  - AM-PAC 6 Clicks Basic Mobility (PT)  -               User Key  (r) = Recorded By, (t) = Taken By, (c) = Cosigned By      Initials Name Provider Type     Gricel Medina PTA Physical Therapist Assistant                         PT Rehab Goals       Row Name 24 0751             Bed Mobility Goal 1 (PT)    Activity/Assistive Device (Bed Mobility Goal 1, PT) bed mobility activities, all  -      [place] Level/Cues Needed (Bed Mobility Goal 1, PT) independent  -      Time Frame (Bed Mobility Goal 1, PT) 10 days  -      Progress/Outcomes (Bed Mobility Goal 1, PT) goal not met  -         Transfer Goal 1 (PT)     Activity/Assistive Device (Transfer Goal 1, PT) sit-to-stand/stand-to-sit  -      [place] Level/Cues Needed (Transfer Goal 1, PT) standby assist  -AH      Time Frame (Transfer Goal 1, PT) 10 days  -AH      Progress/Outcome (Transfer Goal 1, PT) goal not met  -AH         Gait Training Goal 1 (PT)    Activity/Assistive Device (Gait Training Goal 1, PT) gait (walking locomotion);decrease fall risk;diminish gait deviation  -AH      [place] Level (Gait Training Goal 1, PT) contact guard required  -AH      Distance (Gait Training Goal 1, PT) 40ft  -AH      Time Frame (Gait Training Goal 1, PT) 10 days  -AH      Progress/Outcome (Gait Training Goal 1, PT) goal not met  -AH         Stairs Goal 1 (PT)    Activity/Assistive Device (Stairs Goal 1, PT) ascending stairs;descending stairs;using handrail, left;using handrail, right  -      [place] Level/Cues Needed (Stairs Goal 1, PT) contact guard required  -AH      Number of Stairs (Stairs Goal 1, PT) 3  -AH      Time Frame (Stairs Goal 1, PT) 10 days  -AH      Progress/Outcome (Stairs Goal 1, PT) goal not met  -AH                User Key  (r) = Recorded By, (t) = Taken By, (c) = Cosigned By      Initials Name Provider Type [facility] Gricel Medina PTA Physical Therapist Assistant PT                    Therapy Charges for Today       Code Description Service Date Service Provider Modifiers Qty    60865731236  GAIT TRAINING EA 15 MIN 5/15/2024 Gricel Medina PTA GP 2    [id number]  PT THERAPEUTIC ACT EA 15 MIN 5/15/2024 Gricel Medina PTA GP 1            PT Discharge Summary  Reason for Discharge: Discharge from facility  Outcomes Achieved: Refer to plan of care for updates on goals achieved  Discharge Destination: Inpatient rehabilitation facility      Gricel Medina PTA   5/16/2024

## 2024-05-16 NOTE — H&P
monitor  7.  GI/GERD-bowel regimen/PPI  8.  Restless leg syndrome-Requip/Valium as needed  9.  BPH-on meds monitor  10.  Insomnia-on meds monitor  11.  Peripheral neuropathy-monitor  12.  Concern for NPH-outpatient evaluation  13.  PT/OT/speech        Patient's functional assessment  Prior to hospitalization the patient was continent of bowel and stress incontinence of bladder    Current therapy  Requires PT, OT and/or speech therapy    Anticipated discharge approximately 16 days    Functional assessment  All notes from reehab data were reviewed regarding the patient's functional status.        Anticipated discharge setting  Home with home care    No clear barriers to discharge    The patient appears to be an appropriate candidate for inpatient rehabilitation    Sufficiently stable: Patient's condition is sufficiently stable at the time of admission to allow the patient to actively participate in an intensive rehabilitation program    Close medical supervision: A rehabilitation physician, or other licensed treating physician with specialized training and experience in inpatient rehabilitation, will conduct face-to-face visits with the patient a minimum of at least 3 days per week throughout the patient's stay    This patient requires close medical supervision for the active management of the ongoing conditions and potential complications stated in the admission note    Intensive rehabilitation nursing: The patient demonstrates the need for 24-hour rehabilitation nursing care for active management of the multiple medical issues documented in the admission note    Appropriate therapy needed: The patient requires the active and ongoing therapeutic intervention of at least 2 therapeutic disciplines, one of which must be physical or occupational therapy and/or speech therapy    Intensive therapy: The patient requires and is reasonably expected to actively participate in at least 3 hours of therapy per day at least 5 days

## 2024-05-16 NOTE — PROGRESS NOTES
Enter Query Response Below      Query Response: TIA on admission.  Acute ischemic stroke during admission             If applicable, please update the problem list.     Patient: Agueda Tyler        : 1945  Account: [id number]           Admit Date: 2024        How to Respond to this query:       a. Click New Note     b. Answer query within the yellow box.                c. Update the Problem List, if applicable.      If you have any questions about this query contact me at: [email or web address]     , or Ms. Chowdhuryorah Savana:    78 year old male admitted with Stroke symptoms.  Clinical indicators:  CT of the head: No arterial occlusion or flow-limiting stenosis in the neck. No intracranial large vessel occlusion or flow-limiting stenosis.   H&P physical exam-General: No focal deficit present.  Mental Status: He is alert and oriented to person, place, and time. No cranial nerve deficit. No weakness. Mood and Affect: Mood normal.    Neurology progress note:  Stuttering acute stroke like symptoms that returned at 0200 this AM and has not resolved. MRI brain  did not show acute stroke. Repeat MRI brain  did show left thalamic stroke.   Discharge summary diagnoses include Cerebral ventriculomegaly due to brain atrophy, acute ischemic stroke.  Treatments/monitoring : aspirin, lipitor, plavix, CT of the head, MRI of the brain, Neuro checks.     Please clarify the following:     Acute ischemic stroke present on admission  Acute ischemic stroke developed during admission  Other- specify______  Unable to determine       By submitting this query, we are merely seeking further clarification of documentation to accurately reflect all conditions that you are monitoring, evaluating, treating or that extend the hospitalization or utilize additional resources of care. Please utilize your independent clinical judgment when addressing the question(s) above.     This query and your response, once  completed, will be entered into the legal medical record.    Sincerely,  Chiquis Leigh RN  Clinical Documentation Integrity Program

## 2024-05-16 NOTE — THERAPY EVALUATION
Physical Therapy Evaluation Note  DATE:  2024  NAME:  Echo Brown  :  1945  (78 y.o.,male)  MRN:  [id number]    HEIGHT:  Height: 182.9 cm (6')  WEIGHT:  Weight - Scale: 89 kg (196 lb 4 oz)    PATIENT DIAGNOSIS(ES):    Diagnosis: Cerebral infarction, Left thalamus; NPH    Additional Pertinent Hx: Monoplegia of RUE; Dysarthia; Obstructive sleep apnea; Restless leg syndrome; Degenerative disease of nervous system; Primary hypertension; Lobar pneumonia; GERD w/o esophagitis; Hyperglyceridemia; Dysphagia; Generalized anxiety  RESTRICTIONS/PRECAUTIONS:    Restrictions/Precautions  Restrictions/Precautions:  (WBAT)     OVERALL  ORIENTATION STATUS:  Overall Orientation Status: Within Normal Limits  PAIN:                       GROSS ASSESSMENT        POSTURE/BALANCE  Balance  Posture: Good  Sitting - Static: Good  Sitting - Dynamic: Good  Standing - Static: Good  Standing - Dynamic: Fair       ACTIVITY TOLERANCE         BED MOBILITY  Bed mobility  Rolling to Left: Stand by assistance, Supervision  Rolling to Right: Supervision, Stand by assistance  Supine to Sit: Supervision, Stand by assistance  Sit to Supine: Supervision, Stand by assistance        TRANSFERS  Transfers  Sit to Stand: Contact guard assistance  Stand to Sit: Stand by assistance  Bed to Chair: Contact guard assistance, Minimal assistance  Stand Pivot Transfers: Contact guard assistance, Minimal Assistance  Car Transfer: Contact guard assistance, Minimal Assistance  Comment: Pt educated on how to perform car transfer safety; Pt needed cueing for hand placement when performing sit to stand tranfer       AMBULATION 1  Ambulation  Surface: Level tile  Device: Parallel Bars  Assistance: Contact guard assistance  Quality of Gait: R foot turned out  Distance: 15ft  Comments: side stepping; Pt needed cueing to keep R foot straight  AMBULATION 2  Ambulation 2  Surface - 2: level tile  Device 2: Rolling Walker  Assistance 2: Contact guard

## 2024-05-17 LAB
QT INTERVAL: 408 MS
QTC INTERVAL: 449 MS

## 2024-05-17 PROCEDURE — 97116 GAIT TRAINING THERAPY: CPT

## 2024-05-17 PROCEDURE — 97530 THERAPEUTIC ACTIVITIES: CPT

## 2024-05-17 PROCEDURE — 99232 SBSQ HOSP IP/OBS MODERATE 35: CPT | Performed by: PSYCHIATRY & NEUROLOGY

## 2024-05-17 PROCEDURE — 97130 THER IVNTJ EA ADDL 15 MIN: CPT

## 2024-05-17 PROCEDURE — 6370000000 HC RX 637 (ALT 250 FOR IP): Performed by: PSYCHIATRY & NEUROLOGY

## 2024-05-17 PROCEDURE — 97129 THER IVNTJ 1ST 15 MIN: CPT

## 2024-05-17 PROCEDURE — 1180000000 HC REHAB R&B

## 2024-05-17 PROCEDURE — 6360000002 HC RX W HCPCS: Performed by: PSYCHIATRY & NEUROLOGY

## 2024-05-17 PROCEDURE — 97110 THERAPEUTIC EXERCISES: CPT

## 2024-05-17 RX ORDER — POLYETHYLENE GLYCOL 3350 17 G/17G
17 POWDER, FOR SOLUTION ORAL NIGHTLY
Status: DISCONTINUED | OUTPATIENT
Start: 2024-05-17 | End: 2024-05-18

## 2024-05-17 RX ADMIN — SENNOSIDES AND DOCUSATE SODIUM 2 TABLET: 50; 8.6 TABLET ORAL at 08:18

## 2024-05-17 RX ADMIN — ASPIRIN 81 MG: 81 TABLET, CHEWABLE ORAL at 08:18

## 2024-05-17 RX ADMIN — LEVOTHYROXINE SODIUM 88 MCG: 0.09 TABLET ORAL at 05:44

## 2024-05-17 RX ADMIN — CITALOPRAM HYDROBROMIDE 20 MG: 10 TABLET ORAL at 08:18

## 2024-05-17 RX ADMIN — ENOXAPARIN SODIUM 40 MG: 100 INJECTION SUBCUTANEOUS at 21:00

## 2024-05-17 RX ADMIN — MAGNESIUM HYDROXIDE 60 ML: 400 SUSPENSION ORAL at 21:00

## 2024-05-17 RX ADMIN — ATORVASTATIN CALCIUM 80 MG: 80 TABLET, FILM COATED ORAL at 21:00

## 2024-05-17 RX ADMIN — PANTOPRAZOLE SODIUM 40 MG: 40 TABLET, DELAYED RELEASE ORAL at 05:44

## 2024-05-17 RX ADMIN — CLOPIDOGREL BISULFATE 75 MG: 75 TABLET ORAL at 08:18

## 2024-05-17 RX ADMIN — LISINOPRIL 20 MG: 20 TABLET ORAL at 08:18

## 2024-05-17 RX ADMIN — TAMSULOSIN HYDROCHLORIDE 0.4 MG: 0.4 CAPSULE ORAL at 21:00

## 2024-05-17 RX ADMIN — POLYETHYLENE GLYCOL 3350 17 G: 17 POWDER, FOR SOLUTION ORAL at 21:00

## 2024-05-17 RX ADMIN — TRAZODONE HYDROCHLORIDE 50 MG: 50 TABLET ORAL at 21:00

## 2024-05-17 RX ADMIN — ROPINIROLE HYDROCHLORIDE 2 MG: 2 TABLET, FILM COATED ORAL at 21:00

## 2024-05-17 ASSESSMENT — 9 HOLE PEG TEST
TESTTIME_SECONDS: 68
TEST_RESULT: IMPAIRED
TESTTIME_SECONDS: 32
TEST_RESULT: FUNCTIONAL

## 2024-05-17 NOTE — THERAPY DISCHARGE NOTE
Acute Care - Speech Language Pathology Discharge Summary  [facility]       Patient Name: Agueda Tyler  : 1945  MRN: [id number]    Today's Date: 2024                   Admit Date: 2024      SLP Recommendation and Plan  Regular solids and thin liquids    Visit Dx:    ICD-10-CM ICD-9-CM   1. Cerebrovascular accident (CVA), unspecified mechanism  I63.9 434.91   2. Impaired mobility [Z74.09]  Z74.09 799.89   3. Dysphagia, unspecified type  R13.10 787.20   4. Motor speech disorder  R47.89 784.59                SLP GOALS       Row Name 24 1500             (LTG) Swallow    (LTG) Swallow Pt will tolerate LRD w/o any overt s/s of aspiration.  -MB      [place] (Swallow Long Term Goal) independently (over 90% accuracy)  -MB      Time Frame (Swallow Long Term Goal) by discharge  -MB      Barriers (Swallow Long Term Goal) n/a  -MB      Progress/Outcomes (Swallow Long Term Goal) goal not met  -MB         (STG) Patient will tolerate trials of    Consistencies Trialed (Tolerate trials) regular textures;thin liquids  -MB      Desired Outcome (Tolerate trials) without signs/symptoms of aspiration;without signs of distress  -MB      [place] (Tolerate trials) independently (over 90% accuracy)  -MB      Time Frame (Tolerate trials) by discharge  -MB      Progress/Outcomes (Tolerate trials) goal not met  -MB         Patient will demonstrate functional speech skills for return to discharge environment    [place] with minimal cues  -MB      Time frame by discharge  -MB      Progress/Outcomes goal not met  -MB         Articulation Goal 1 (SLP)    Improve Articulation Goal 1 (SLP) of specific sounds in phrases;of specific sounds in connected speech;90%;independently (over 90% accuracy)  -MB      Time Frame (Articulation Goal 1, SLP) by discharge  -MB      Barriers (Articulation Goal 1, SLP) n/a  -MB      Progress/Outcomes (Articulation Goal 1, SLP) goal not met  -MB                User Key  (r) = Recorded  By, (t) = Taken By, (c) = Cosigned By      Initials Name Provider Type    Deejay Philip CCC-SLP Speech and Language Pathologist                    [email or web address]      SLP Discharge Summary  Anticipated Discharge Disposition (SLP): unknown  Reason for Discharge: discharge from this facility  Progress Toward Achieving Short/long Term Goals: goals not met within established timelines  Discharge Destination: [facility] CCC-SLP  5/17/2024

## 2024-05-18 PROCEDURE — 97530 THERAPEUTIC ACTIVITIES: CPT

## 2024-05-18 PROCEDURE — 6370000000 HC RX 637 (ALT 250 FOR IP): Performed by: PSYCHIATRY & NEUROLOGY

## 2024-05-18 PROCEDURE — 97129 THER IVNTJ 1ST 15 MIN: CPT

## 2024-05-18 PROCEDURE — 1180000000 HC REHAB R&B

## 2024-05-18 PROCEDURE — 94760 N-INVAS EAR/PLS OXIMETRY 1: CPT

## 2024-05-18 PROCEDURE — 97116 GAIT TRAINING THERAPY: CPT

## 2024-05-18 PROCEDURE — 97110 THERAPEUTIC EXERCISES: CPT

## 2024-05-18 PROCEDURE — 97130 THER IVNTJ EA ADDL 15 MIN: CPT

## 2024-05-18 PROCEDURE — 99232 SBSQ HOSP IP/OBS MODERATE 35: CPT | Performed by: PSYCHIATRY & NEUROLOGY

## 2024-05-18 RX ORDER — DIAZEPAM 5 MG/1
5 TABLET ORAL NIGHTLY
Status: DISCONTINUED | OUTPATIENT
Start: 2024-05-18 | End: 2024-05-29 | Stop reason: HOSPADM

## 2024-05-18 RX ORDER — ROPINIROLE 2 MG/1
2 TABLET, FILM COATED ORAL EVERY 24 HOURS
Status: DISCONTINUED | OUTPATIENT
Start: 2024-05-18 | End: 2024-05-29 | Stop reason: HOSPADM

## 2024-05-18 RX ORDER — PREDNISOLONE ACETATE 10 MG/ML
1 SUSPENSION/ DROPS OPHTHALMIC
Status: DISCONTINUED | OUTPATIENT
Start: 2024-05-18 | End: 2024-05-29 | Stop reason: HOSPADM

## 2024-05-18 RX ORDER — POLYETHYLENE GLYCOL 3350 17 G/17G
17 POWDER, FOR SOLUTION ORAL EVERY 24 HOURS
Status: DISCONTINUED | OUTPATIENT
Start: 2024-05-18 | End: 2024-05-29 | Stop reason: HOSPADM

## 2024-05-18 RX ADMIN — ATORVASTATIN CALCIUM 80 MG: 80 TABLET, FILM COATED ORAL at 20:45

## 2024-05-18 RX ADMIN — DIAZEPAM 5 MG: 5 TABLET ORAL at 20:45

## 2024-05-18 RX ADMIN — PREDNISOLONE ACETATE 1 DROP: 10 SUSPENSION/ DROPS OPHTHALMIC at 20:45

## 2024-05-18 RX ADMIN — ASPIRIN 81 MG: 81 TABLET, CHEWABLE ORAL at 09:52

## 2024-05-18 RX ADMIN — ROPINIROLE HYDROCHLORIDE 2 MG: 2 TABLET, FILM COATED ORAL at 17:23

## 2024-05-18 RX ADMIN — MAGNESIUM HYDROXIDE 60 ML: 400 SUSPENSION ORAL at 17:23

## 2024-05-18 RX ADMIN — CITALOPRAM HYDROBROMIDE 20 MG: 10 TABLET ORAL at 09:42

## 2024-05-18 RX ADMIN — ACETAMINOPHEN 325 MG: 325 TABLET ORAL at 13:04

## 2024-05-18 RX ADMIN — PANTOPRAZOLE SODIUM 40 MG: 40 TABLET, DELAYED RELEASE ORAL at 05:49

## 2024-05-18 RX ADMIN — TAMSULOSIN HYDROCHLORIDE 0.4 MG: 0.4 CAPSULE ORAL at 20:45

## 2024-05-18 RX ADMIN — LISINOPRIL 20 MG: 20 TABLET ORAL at 09:43

## 2024-05-18 RX ADMIN — LEVOTHYROXINE SODIUM 88 MCG: 0.09 TABLET ORAL at 05:50

## 2024-05-18 RX ADMIN — CLOPIDOGREL BISULFATE 75 MG: 75 TABLET ORAL at 09:52

## 2024-05-18 RX ADMIN — TRAZODONE HYDROCHLORIDE 50 MG: 50 TABLET ORAL at 20:45

## 2024-05-18 RX ADMIN — POLYETHYLENE GLYCOL 3350 17 G: 17 POWDER, FOR SOLUTION ORAL at 17:23

## 2024-05-18 ASSESSMENT — PAIN DESCRIPTION - ORIENTATION: ORIENTATION: RIGHT

## 2024-05-18 ASSESSMENT — PAIN SCALES - GENERAL: PAINLEVEL_OUTOF10: 5

## 2024-05-18 ASSESSMENT — PAIN DESCRIPTION - LOCATION: LOCATION: EYE

## 2024-05-18 ASSESSMENT — PAIN DESCRIPTION - DESCRIPTORS: DESCRIPTORS: DISCOMFORT

## 2024-05-19 PROCEDURE — 6370000000 HC RX 637 (ALT 250 FOR IP): Performed by: PSYCHIATRY & NEUROLOGY

## 2024-05-19 PROCEDURE — 6360000002 HC RX W HCPCS: Performed by: PSYCHIATRY & NEUROLOGY

## 2024-05-19 PROCEDURE — 1180000000 HC REHAB R&B

## 2024-05-19 PROCEDURE — 99232 SBSQ HOSP IP/OBS MODERATE 35: CPT | Performed by: PSYCHIATRY & NEUROLOGY

## 2024-05-19 RX ADMIN — ROPINIROLE HYDROCHLORIDE 2 MG: 2 TABLET, FILM COATED ORAL at 16:30

## 2024-05-19 RX ADMIN — ATORVASTATIN CALCIUM 80 MG: 80 TABLET, FILM COATED ORAL at 21:00

## 2024-05-19 RX ADMIN — POLYETHYLENE GLYCOL 3350 17 G: 17 POWDER, FOR SOLUTION ORAL at 16:31

## 2024-05-19 RX ADMIN — LEVOTHYROXINE SODIUM 88 MCG: 0.09 TABLET ORAL at 05:40

## 2024-05-19 RX ADMIN — CLOPIDOGREL BISULFATE 75 MG: 75 TABLET ORAL at 09:31

## 2024-05-19 RX ADMIN — PREDNISOLONE ACETATE 1 DROP: 10 SUSPENSION/ DROPS OPHTHALMIC at 16:30

## 2024-05-19 RX ADMIN — DIAZEPAM 5 MG: 5 TABLET ORAL at 21:00

## 2024-05-19 RX ADMIN — PANTOPRAZOLE SODIUM 40 MG: 40 TABLET, DELAYED RELEASE ORAL at 05:40

## 2024-05-19 RX ADMIN — PREDNISOLONE ACETATE 1 DROP: 10 SUSPENSION/ DROPS OPHTHALMIC at 12:13

## 2024-05-19 RX ADMIN — TAMSULOSIN HYDROCHLORIDE 0.4 MG: 0.4 CAPSULE ORAL at 21:00

## 2024-05-19 RX ADMIN — PREDNISOLONE ACETATE 1 DROP: 10 SUSPENSION/ DROPS OPHTHALMIC at 05:40

## 2024-05-19 RX ADMIN — LISINOPRIL 20 MG: 20 TABLET ORAL at 09:31

## 2024-05-19 RX ADMIN — CITALOPRAM HYDROBROMIDE 20 MG: 10 TABLET ORAL at 09:31

## 2024-05-19 RX ADMIN — PREDNISOLONE ACETATE 1 DROP: 10 SUSPENSION/ DROPS OPHTHALMIC at 09:31

## 2024-05-19 RX ADMIN — PREDNISOLONE ACETATE 1 DROP: 10 SUSPENSION/ DROPS OPHTHALMIC at 21:00

## 2024-05-19 RX ADMIN — PREDNISOLONE ACETATE 1 DROP: 10 SUSPENSION/ DROPS OPHTHALMIC at 02:14

## 2024-05-19 RX ADMIN — MAGNESIUM HYDROXIDE 60 ML: 400 SUSPENSION ORAL at 16:31

## 2024-05-19 RX ADMIN — ENOXAPARIN SODIUM 40 MG: 100 INJECTION SUBCUTANEOUS at 21:00

## 2024-05-19 RX ADMIN — TRAZODONE HYDROCHLORIDE 50 MG: 50 TABLET ORAL at 21:00

## 2024-05-19 RX ADMIN — ASPIRIN 81 MG: 81 TABLET, CHEWABLE ORAL at 09:31

## 2024-05-20 ENCOUNTER — APPOINTMENT (OUTPATIENT)
Dept: GENERAL RADIOLOGY | Age: 79
DRG: 057 | End: 2024-05-20
Attending: PSYCHIATRY & NEUROLOGY
Payer: MEDICARE

## 2024-05-20 LAB
ALBUMIN SERPL-MCNC: 3.9 G/DL (ref 3.5–5.2)
ALP SERPL-CCNC: 132 U/L (ref 40–130)
ALT SERPL-CCNC: 27 U/L (ref 5–41)
ANION GAP SERPL CALCULATED.3IONS-SCNC: 13 MMOL/L (ref 7–19)
AST SERPL-CCNC: 31 U/L (ref 5–40)
B PARAP IS1001 DNA NPH QL NAA+NON-PROBE: NOT DETECTED
B PERT.PT PRMT NPH QL NAA+NON-PROBE: NOT DETECTED
BASOPHILS # BLD: 0 K/UL (ref 0–0.2)
BASOPHILS NFR BLD: 0.4 % (ref 0–1)
BILIRUB SERPL-MCNC: 1.4 MG/DL (ref 0.2–1.2)
BUN SERPL-MCNC: 14 MG/DL (ref 8–23)
C PNEUM DNA NPH QL NAA+NON-PROBE: NOT DETECTED
CALCIUM SERPL-MCNC: 8.8 MG/DL (ref 8.8–10.2)
CHLORIDE SERPL-SCNC: 100 MMOL/L (ref 98–111)
CO2 SERPL-SCNC: 22 MMOL/L (ref 22–29)
CREAT SERPL-MCNC: 1 MG/DL (ref 0.5–1.2)
EOSINOPHIL # BLD: 0.3 K/UL (ref 0–0.6)
EOSINOPHIL NFR BLD: 2.5 % (ref 0–5)
ERYTHROCYTE [DISTWIDTH] IN BLOOD BY AUTOMATED COUNT: 18.7 % (ref 11.5–14.5)
FLUAV RNA NPH QL NAA+NON-PROBE: NOT DETECTED
FLUBV RNA NPH QL NAA+NON-PROBE: NOT DETECTED
GLUCOSE SERPL-MCNC: 101 MG/DL (ref 74–109)
HADV DNA NPH QL NAA+NON-PROBE: NOT DETECTED
HCOV 229E RNA NPH QL NAA+NON-PROBE: NOT DETECTED
HCOV HKU1 RNA NPH QL NAA+NON-PROBE: NOT DETECTED
HCOV NL63 RNA NPH QL NAA+NON-PROBE: NOT DETECTED
HCOV OC43 RNA NPH QL NAA+NON-PROBE: NOT DETECTED
HCT VFR BLD AUTO: 42.6 % (ref 42–52)
HGB BLD-MCNC: 14.6 G/DL (ref 14–18)
HMPV RNA NPH QL NAA+NON-PROBE: NOT DETECTED
HPIV1 RNA NPH QL NAA+NON-PROBE: NOT DETECTED
HPIV2 RNA NPH QL NAA+NON-PROBE: NOT DETECTED
HPIV3 RNA NPH QL NAA+NON-PROBE: NOT DETECTED
HPIV4 RNA NPH QL NAA+NON-PROBE: NOT DETECTED
IMM GRANULOCYTES # BLD: 0 K/UL
LYMPHOCYTES # BLD: 0.8 K/UL (ref 1.1–4.5)
LYMPHOCYTES NFR BLD: 7.2 % (ref 20–40)
M PNEUMO DNA NPH QL NAA+NON-PROBE: NOT DETECTED
MCH RBC QN AUTO: 27.6 PG (ref 27–31)
MCHC RBC AUTO-ENTMCNC: 34.3 G/DL (ref 33–37)
MCV RBC AUTO: 80.5 FL (ref 80–94)
MONOCYTES # BLD: 0.9 K/UL (ref 0–0.9)
MONOCYTES NFR BLD: 8.1 % (ref 0–10)
NEUTROPHILS # BLD: 8.8 K/UL (ref 1.5–7.5)
NEUTS SEG NFR BLD: 81.5 % (ref 50–65)
PLATELET # BLD AUTO: 142 K/UL (ref 130–400)
PMV BLD AUTO: 10.7 FL (ref 9.4–12.4)
POTASSIUM SERPL-SCNC: 4.5 MMOL/L (ref 3.5–5)
PROT SERPL-MCNC: 6.4 G/DL (ref 6.6–8.7)
RBC # BLD AUTO: 5.29 M/UL (ref 4.7–6.1)
RSV RNA NPH QL NAA+NON-PROBE: NOT DETECTED
RV+EV RNA NPH QL NAA+NON-PROBE: NOT DETECTED
SARS-COV-2 RNA NPH QL NAA+NON-PROBE: NOT DETECTED
SODIUM SERPL-SCNC: 135 MMOL/L (ref 136–145)
WBC # BLD AUTO: 10.8 K/UL (ref 4.8–10.8)

## 2024-05-20 PROCEDURE — 99232 SBSQ HOSP IP/OBS MODERATE 35: CPT | Performed by: PSYCHIATRY & NEUROLOGY

## 2024-05-20 PROCEDURE — 71046 X-RAY EXAM CHEST 2 VIEWS: CPT

## 2024-05-20 PROCEDURE — 6370000000 HC RX 637 (ALT 250 FOR IP): Performed by: PSYCHIATRY & NEUROLOGY

## 2024-05-20 PROCEDURE — 94760 N-INVAS EAR/PLS OXIMETRY 1: CPT

## 2024-05-20 PROCEDURE — 36415 COLL VENOUS BLD VENIPUNCTURE: CPT

## 2024-05-20 PROCEDURE — 6360000002 HC RX W HCPCS: Performed by: PSYCHIATRY & NEUROLOGY

## 2024-05-20 PROCEDURE — 85025 COMPLETE CBC W/AUTO DIFF WBC: CPT

## 2024-05-20 PROCEDURE — 0202U NFCT DS 22 TRGT SARS-COV-2: CPT

## 2024-05-20 PROCEDURE — 80053 COMPREHEN METABOLIC PANEL: CPT

## 2024-05-20 PROCEDURE — 1180000000 HC REHAB R&B

## 2024-05-20 RX ORDER — GUAIFENESIN 600 MG/1
1200 TABLET, EXTENDED RELEASE ORAL 2 TIMES DAILY
Status: DISCONTINUED | OUTPATIENT
Start: 2024-05-20 | End: 2024-05-29 | Stop reason: HOSPADM

## 2024-05-20 RX ORDER — AZITHROMYCIN 250 MG/1
250 TABLET, FILM COATED ORAL DAILY
Status: COMPLETED | OUTPATIENT
Start: 2024-05-21 | End: 2024-05-24

## 2024-05-20 RX ORDER — AZITHROMYCIN 250 MG/1
500 TABLET, FILM COATED ORAL ONCE
Status: COMPLETED | OUTPATIENT
Start: 2024-05-20 | End: 2024-05-20

## 2024-05-20 RX ADMIN — LISINOPRIL 20 MG: 20 TABLET ORAL at 08:49

## 2024-05-20 RX ADMIN — TAMSULOSIN HYDROCHLORIDE 0.4 MG: 0.4 CAPSULE ORAL at 21:05

## 2024-05-20 RX ADMIN — ROPINIROLE HYDROCHLORIDE 2 MG: 2 TABLET, FILM COATED ORAL at 16:26

## 2024-05-20 RX ADMIN — AZITHROMYCIN DIHYDRATE 500 MG: 250 TABLET ORAL at 18:11

## 2024-05-20 RX ADMIN — PREDNISOLONE ACETATE 1 DROP: 10 SUSPENSION/ DROPS OPHTHALMIC at 02:44

## 2024-05-20 RX ADMIN — PREDNISOLONE ACETATE 1 DROP: 10 SUSPENSION/ DROPS OPHTHALMIC at 08:50

## 2024-05-20 RX ADMIN — TRAZODONE HYDROCHLORIDE 50 MG: 50 TABLET ORAL at 21:06

## 2024-05-20 RX ADMIN — ENOXAPARIN SODIUM 40 MG: 100 INJECTION SUBCUTANEOUS at 21:05

## 2024-05-20 RX ADMIN — ATORVASTATIN CALCIUM 80 MG: 80 TABLET, FILM COATED ORAL at 21:06

## 2024-05-20 RX ADMIN — PREDNISOLONE ACETATE 1 DROP: 10 SUSPENSION/ DROPS OPHTHALMIC at 16:26

## 2024-05-20 RX ADMIN — CLOPIDOGREL BISULFATE 75 MG: 75 TABLET ORAL at 08:50

## 2024-05-20 RX ADMIN — ASPIRIN 81 MG: 81 TABLET, CHEWABLE ORAL at 08:53

## 2024-05-20 RX ADMIN — DIAZEPAM 5 MG: 5 TABLET ORAL at 21:05

## 2024-05-20 RX ADMIN — POLYETHYLENE GLYCOL 3350 17 G: 17 POWDER, FOR SOLUTION ORAL at 16:26

## 2024-05-20 RX ADMIN — PREDNISOLONE ACETATE 1 DROP: 10 SUSPENSION/ DROPS OPHTHALMIC at 12:08

## 2024-05-20 RX ADMIN — LEVOTHYROXINE SODIUM 88 MCG: 0.09 TABLET ORAL at 06:01

## 2024-05-20 RX ADMIN — PREDNISOLONE ACETATE 1 DROP: 10 SUSPENSION/ DROPS OPHTHALMIC at 21:06

## 2024-05-20 RX ADMIN — PREDNISOLONE ACETATE 1 DROP: 10 SUSPENSION/ DROPS OPHTHALMIC at 23:36

## 2024-05-20 RX ADMIN — GUAIFENESIN 1200 MG: 600 TABLET ORAL at 21:05

## 2024-05-20 RX ADMIN — GUAIFENESIN 1200 MG: 600 TABLET ORAL at 08:50

## 2024-05-20 RX ADMIN — ACETAMINOPHEN 325 MG: 325 TABLET ORAL at 08:49

## 2024-05-20 RX ADMIN — CITALOPRAM HYDROBROMIDE 20 MG: 10 TABLET ORAL at 08:49

## 2024-05-20 RX ADMIN — PREDNISOLONE ACETATE 1 DROP: 10 SUSPENSION/ DROPS OPHTHALMIC at 06:01

## 2024-05-20 RX ADMIN — MAGNESIUM HYDROXIDE 60 ML: 400 SUSPENSION ORAL at 16:25

## 2024-05-20 RX ADMIN — PANTOPRAZOLE SODIUM 40 MG: 40 TABLET, DELAYED RELEASE ORAL at 06:01

## 2024-05-20 ASSESSMENT — PAIN SCALES - GENERAL: PAINLEVEL_OUTOF10: 2

## 2024-05-20 ASSESSMENT — PAIN DESCRIPTION - LOCATION: LOCATION: GENERALIZED

## 2024-05-21 PROCEDURE — 99232 SBSQ HOSP IP/OBS MODERATE 35: CPT | Performed by: PSYCHIATRY & NEUROLOGY

## 2024-05-21 PROCEDURE — 97110 THERAPEUTIC EXERCISES: CPT

## 2024-05-21 PROCEDURE — 1180000000 HC REHAB R&B

## 2024-05-21 PROCEDURE — 6360000002 HC RX W HCPCS: Performed by: PSYCHIATRY & NEUROLOGY

## 2024-05-21 PROCEDURE — 97530 THERAPEUTIC ACTIVITIES: CPT

## 2024-05-21 PROCEDURE — 97129 THER IVNTJ 1ST 15 MIN: CPT

## 2024-05-21 PROCEDURE — 6370000000 HC RX 637 (ALT 250 FOR IP): Performed by: PSYCHIATRY & NEUROLOGY

## 2024-05-21 PROCEDURE — 97535 SELF CARE MNGMENT TRAINING: CPT

## 2024-05-21 PROCEDURE — 97130 THER IVNTJ EA ADDL 15 MIN: CPT

## 2024-05-21 PROCEDURE — 97116 GAIT TRAINING THERAPY: CPT

## 2024-05-21 RX ADMIN — ENOXAPARIN SODIUM 40 MG: 100 INJECTION SUBCUTANEOUS at 20:22

## 2024-05-21 RX ADMIN — MAGNESIUM HYDROXIDE 60 ML: 400 SUSPENSION ORAL at 17:10

## 2024-05-21 RX ADMIN — LISINOPRIL 20 MG: 20 TABLET ORAL at 08:27

## 2024-05-21 RX ADMIN — AZITHROMYCIN DIHYDRATE 250 MG: 250 TABLET ORAL at 08:28

## 2024-05-21 RX ADMIN — PREDNISOLONE ACETATE 1 DROP: 10 SUSPENSION/ DROPS OPHTHALMIC at 17:11

## 2024-05-21 RX ADMIN — ACETAMINOPHEN 650 MG: 325 TABLET ORAL at 05:39

## 2024-05-21 RX ADMIN — PREDNISOLONE ACETATE 1 DROP: 10 SUSPENSION/ DROPS OPHTHALMIC at 08:28

## 2024-05-21 RX ADMIN — LEVOTHYROXINE SODIUM 88 MCG: 0.09 TABLET ORAL at 05:37

## 2024-05-21 RX ADMIN — DIAZEPAM 5 MG: 5 TABLET ORAL at 20:21

## 2024-05-21 RX ADMIN — ROPINIROLE HYDROCHLORIDE 2 MG: 2 TABLET, FILM COATED ORAL at 17:10

## 2024-05-21 RX ADMIN — CITALOPRAM HYDROBROMIDE 20 MG: 10 TABLET ORAL at 08:27

## 2024-05-21 RX ADMIN — GUAIFENESIN 1200 MG: 600 TABLET ORAL at 20:22

## 2024-05-21 RX ADMIN — ASPIRIN 81 MG: 81 TABLET, CHEWABLE ORAL at 08:27

## 2024-05-21 RX ADMIN — CLOPIDOGREL BISULFATE 75 MG: 75 TABLET ORAL at 08:27

## 2024-05-21 RX ADMIN — PREDNISOLONE ACETATE 1 DROP: 10 SUSPENSION/ DROPS OPHTHALMIC at 20:22

## 2024-05-21 RX ADMIN — TRAZODONE HYDROCHLORIDE 50 MG: 50 TABLET ORAL at 20:21

## 2024-05-21 RX ADMIN — TAMSULOSIN HYDROCHLORIDE 0.4 MG: 0.4 CAPSULE ORAL at 20:24

## 2024-05-21 RX ADMIN — PANTOPRAZOLE SODIUM 40 MG: 40 TABLET, DELAYED RELEASE ORAL at 05:38

## 2024-05-21 RX ADMIN — POLYETHYLENE GLYCOL 3350 17 G: 17 POWDER, FOR SOLUTION ORAL at 17:10

## 2024-05-21 RX ADMIN — GUAIFENESIN 1200 MG: 600 TABLET ORAL at 08:27

## 2024-05-21 RX ADMIN — PREDNISOLONE ACETATE 1 DROP: 10 SUSPENSION/ DROPS OPHTHALMIC at 12:52

## 2024-05-21 RX ADMIN — ATORVASTATIN CALCIUM 80 MG: 80 TABLET, FILM COATED ORAL at 20:21

## 2024-05-21 RX ADMIN — ACETAMINOPHEN 650 MG: 325 TABLET ORAL at 17:10

## 2024-05-21 RX ADMIN — PREDNISOLONE ACETATE 1 DROP: 10 SUSPENSION/ DROPS OPHTHALMIC at 05:38

## 2024-05-21 ASSESSMENT — PAIN SCALES - GENERAL
PAINLEVEL_OUTOF10: 3
PAINLEVEL_OUTOF10: 0
PAINLEVEL_OUTOF10: 0
PAINLEVEL_OUTOF10: 3

## 2024-05-21 ASSESSMENT — PAIN DESCRIPTION - DESCRIPTORS
DESCRIPTORS: ACHING
DESCRIPTORS: ACHING

## 2024-05-21 ASSESSMENT — PAIN DESCRIPTION - LOCATION
LOCATION: GENERALIZED
LOCATION: GENERALIZED

## 2024-05-21 ASSESSMENT — PAIN - FUNCTIONAL ASSESSMENT: PAIN_FUNCTIONAL_ASSESSMENT: ACTIVITIES ARE NOT PREVENTED

## 2024-05-22 PROCEDURE — 6360000002 HC RX W HCPCS: Performed by: PSYCHIATRY & NEUROLOGY

## 2024-05-22 PROCEDURE — 97130 THER IVNTJ EA ADDL 15 MIN: CPT

## 2024-05-22 PROCEDURE — 1180000000 HC REHAB R&B

## 2024-05-22 PROCEDURE — 97110 THERAPEUTIC EXERCISES: CPT

## 2024-05-22 PROCEDURE — 99232 SBSQ HOSP IP/OBS MODERATE 35: CPT | Performed by: PSYCHIATRY & NEUROLOGY

## 2024-05-22 PROCEDURE — 97129 THER IVNTJ 1ST 15 MIN: CPT

## 2024-05-22 PROCEDURE — 97116 GAIT TRAINING THERAPY: CPT

## 2024-05-22 PROCEDURE — 97530 THERAPEUTIC ACTIVITIES: CPT

## 2024-05-22 PROCEDURE — 6370000000 HC RX 637 (ALT 250 FOR IP): Performed by: PSYCHIATRY & NEUROLOGY

## 2024-05-22 RX ADMIN — ACETAMINOPHEN 650 MG: 325 TABLET ORAL at 05:36

## 2024-05-22 RX ADMIN — TAMSULOSIN HYDROCHLORIDE 0.4 MG: 0.4 CAPSULE ORAL at 19:38

## 2024-05-22 RX ADMIN — PREDNISOLONE ACETATE 1 DROP: 10 SUSPENSION/ DROPS OPHTHALMIC at 05:36

## 2024-05-22 RX ADMIN — GUAIFENESIN 1200 MG: 600 TABLET ORAL at 09:21

## 2024-05-22 RX ADMIN — MAGNESIUM HYDROXIDE 60 ML: 400 SUSPENSION ORAL at 17:22

## 2024-05-22 RX ADMIN — DIAZEPAM 5 MG: 5 TABLET ORAL at 19:38

## 2024-05-22 RX ADMIN — AZITHROMYCIN DIHYDRATE 250 MG: 250 TABLET ORAL at 09:21

## 2024-05-22 RX ADMIN — CLOPIDOGREL BISULFATE 75 MG: 75 TABLET ORAL at 09:21

## 2024-05-22 RX ADMIN — ACETAMINOPHEN 650 MG: 325 TABLET ORAL at 19:38

## 2024-05-22 RX ADMIN — PREDNISOLONE ACETATE 1 DROP: 10 SUSPENSION/ DROPS OPHTHALMIC at 19:39

## 2024-05-22 RX ADMIN — LEVOTHYROXINE SODIUM 88 MCG: 0.09 TABLET ORAL at 05:36

## 2024-05-22 RX ADMIN — GUAIFENESIN 1200 MG: 600 TABLET ORAL at 19:38

## 2024-05-22 RX ADMIN — ASPIRIN 81 MG: 81 TABLET, CHEWABLE ORAL at 09:21

## 2024-05-22 RX ADMIN — ROPINIROLE HYDROCHLORIDE 2 MG: 2 TABLET, FILM COATED ORAL at 17:22

## 2024-05-22 RX ADMIN — PREDNISOLONE ACETATE 1 DROP: 10 SUSPENSION/ DROPS OPHTHALMIC at 09:22

## 2024-05-22 RX ADMIN — PREDNISOLONE ACETATE 1 DROP: 10 SUSPENSION/ DROPS OPHTHALMIC at 12:33

## 2024-05-22 RX ADMIN — LISINOPRIL 20 MG: 20 TABLET ORAL at 09:21

## 2024-05-22 RX ADMIN — ATORVASTATIN CALCIUM 80 MG: 80 TABLET, FILM COATED ORAL at 19:38

## 2024-05-22 RX ADMIN — PANTOPRAZOLE SODIUM 40 MG: 40 TABLET, DELAYED RELEASE ORAL at 05:36

## 2024-05-22 RX ADMIN — TRAZODONE HYDROCHLORIDE 50 MG: 50 TABLET ORAL at 19:38

## 2024-05-22 RX ADMIN — POLYETHYLENE GLYCOL 3350 17 G: 17 POWDER, FOR SOLUTION ORAL at 17:22

## 2024-05-22 RX ADMIN — PREDNISOLONE ACETATE 1 DROP: 10 SUSPENSION/ DROPS OPHTHALMIC at 00:37

## 2024-05-22 RX ADMIN — CITALOPRAM HYDROBROMIDE 20 MG: 10 TABLET ORAL at 09:21

## 2024-05-22 RX ADMIN — PREDNISOLONE ACETATE 1 DROP: 10 SUSPENSION/ DROPS OPHTHALMIC at 17:22

## 2024-05-22 RX ADMIN — ENOXAPARIN SODIUM 40 MG: 100 INJECTION SUBCUTANEOUS at 19:38

## 2024-05-22 ASSESSMENT — PAIN SCALES - GENERAL
PAINLEVEL_OUTOF10: 3
PAINLEVEL_OUTOF10: 1
PAINLEVEL_OUTOF10: 0
PAINLEVEL_OUTOF10: 0

## 2024-05-22 ASSESSMENT — PAIN DESCRIPTION - LOCATION
LOCATION: GENERALIZED
LOCATION: GENERALIZED

## 2024-05-22 ASSESSMENT — PAIN SCALES - WONG BAKER: WONGBAKER_NUMERICALRESPONSE: NO HURT

## 2024-05-22 ASSESSMENT — PAIN DESCRIPTION - DESCRIPTORS: DESCRIPTORS: ACHING

## 2024-05-23 LAB
ALBUMIN SERPL-MCNC: 3.8 G/DL (ref 3.5–5.2)
ALP SERPL-CCNC: 168 U/L (ref 40–130)
ALT SERPL-CCNC: 45 U/L (ref 5–41)
ANION GAP SERPL CALCULATED.3IONS-SCNC: 15 MMOL/L (ref 7–19)
AST SERPL-CCNC: 34 U/L (ref 5–40)
BASOPHILS # BLD: 0 K/UL (ref 0–0.2)
BASOPHILS NFR BLD: 0.4 % (ref 0–1)
BILIRUB SERPL-MCNC: 1.3 MG/DL (ref 0.2–1.2)
BUN SERPL-MCNC: 19 MG/DL (ref 8–23)
CALCIUM SERPL-MCNC: 8.8 MG/DL (ref 8.8–10.2)
CHLORIDE SERPL-SCNC: 99 MMOL/L (ref 98–111)
CO2 SERPL-SCNC: 21 MMOL/L (ref 22–29)
CREAT SERPL-MCNC: 1.1 MG/DL (ref 0.5–1.2)
EOSINOPHIL # BLD: 0.3 K/UL (ref 0–0.6)
EOSINOPHIL NFR BLD: 3.4 % (ref 0–5)
ERYTHROCYTE [DISTWIDTH] IN BLOOD BY AUTOMATED COUNT: 18.1 % (ref 11.5–14.5)
GLUCOSE SERPL-MCNC: 95 MG/DL (ref 74–109)
HCT VFR BLD AUTO: 42.9 % (ref 42–52)
HGB BLD-MCNC: 14 G/DL (ref 14–18)
IMM GRANULOCYTES # BLD: 0 K/UL
LYMPHOCYTES # BLD: 0.6 K/UL (ref 1.1–4.5)
LYMPHOCYTES NFR BLD: 6.6 % (ref 20–40)
MCH RBC QN AUTO: 26.8 PG (ref 27–31)
MCHC RBC AUTO-ENTMCNC: 32.6 G/DL (ref 33–37)
MCV RBC AUTO: 82 FL (ref 80–94)
MONOCYTES # BLD: 0.8 K/UL (ref 0–0.9)
MONOCYTES NFR BLD: 8.4 % (ref 0–10)
NEUTROPHILS # BLD: 7.7 K/UL (ref 1.5–7.5)
NEUTS SEG NFR BLD: 80.8 % (ref 50–65)
PLATELET # BLD AUTO: 158 K/UL (ref 130–400)
PMV BLD AUTO: 10 FL (ref 9.4–12.4)
POTASSIUM SERPL-SCNC: 3.8 MMOL/L (ref 3.5–5)
PROT SERPL-MCNC: 6.4 G/DL (ref 6.6–8.7)
RBC # BLD AUTO: 5.23 M/UL (ref 4.7–6.1)
SODIUM SERPL-SCNC: 135 MMOL/L (ref 136–145)
WBC # BLD AUTO: 9.5 K/UL (ref 4.8–10.8)

## 2024-05-23 PROCEDURE — 36415 COLL VENOUS BLD VENIPUNCTURE: CPT

## 2024-05-23 PROCEDURE — 1180000000 HC REHAB R&B

## 2024-05-23 PROCEDURE — 97130 THER IVNTJ EA ADDL 15 MIN: CPT

## 2024-05-23 PROCEDURE — 85025 COMPLETE CBC W/AUTO DIFF WBC: CPT

## 2024-05-23 PROCEDURE — 97129 THER IVNTJ 1ST 15 MIN: CPT

## 2024-05-23 PROCEDURE — 99232 SBSQ HOSP IP/OBS MODERATE 35: CPT | Performed by: PSYCHIATRY & NEUROLOGY

## 2024-05-23 PROCEDURE — 97110 THERAPEUTIC EXERCISES: CPT

## 2024-05-23 PROCEDURE — 97535 SELF CARE MNGMENT TRAINING: CPT

## 2024-05-23 PROCEDURE — 94760 N-INVAS EAR/PLS OXIMETRY 1: CPT

## 2024-05-23 PROCEDURE — 97530 THERAPEUTIC ACTIVITIES: CPT

## 2024-05-23 PROCEDURE — 6360000002 HC RX W HCPCS: Performed by: PSYCHIATRY & NEUROLOGY

## 2024-05-23 PROCEDURE — 97116 GAIT TRAINING THERAPY: CPT

## 2024-05-23 PROCEDURE — 80053 COMPREHEN METABOLIC PANEL: CPT

## 2024-05-23 PROCEDURE — 6370000000 HC RX 637 (ALT 250 FOR IP): Performed by: PSYCHIATRY & NEUROLOGY

## 2024-05-23 RX ORDER — METHYLPREDNISOLONE 4 MG/1
4 TABLET ORAL NIGHTLY
Status: COMPLETED | OUTPATIENT
Start: 2024-05-25 | End: 2024-05-27

## 2024-05-23 RX ORDER — METHYLPREDNISOLONE 4 MG/1
24 TABLET ORAL ONCE
Status: COMPLETED | OUTPATIENT
Start: 2024-05-23 | End: 2024-05-23

## 2024-05-23 RX ORDER — METHYLPREDNISOLONE 4 MG/1
4 TABLET ORAL
Status: COMPLETED | OUTPATIENT
Start: 2024-05-24 | End: 2024-05-28

## 2024-05-23 RX ORDER — METHYLPREDNISOLONE 4 MG/1
4 TABLET ORAL
Status: COMPLETED | OUTPATIENT
Start: 2024-05-24 | End: 2024-05-25

## 2024-05-23 RX ORDER — METHYLPREDNISOLONE 4 MG/1
4 TABLET ORAL SEE ADMIN INSTRUCTIONS
Status: DISCONTINUED | OUTPATIENT
Start: 2024-05-23 | End: 2024-05-23 | Stop reason: SDUPTHER

## 2024-05-23 RX ORDER — METHYLPREDNISOLONE 4 MG/1
8 TABLET ORAL NIGHTLY
Status: COMPLETED | OUTPATIENT
Start: 2024-05-24 | End: 2024-05-24

## 2024-05-23 RX ORDER — METHYLPREDNISOLONE 4 MG/1
4 TABLET ORAL
Status: COMPLETED | OUTPATIENT
Start: 2024-05-24 | End: 2024-05-26

## 2024-05-23 RX ADMIN — LISINOPRIL 20 MG: 20 TABLET ORAL at 09:50

## 2024-05-23 RX ADMIN — ACETAMINOPHEN 650 MG: 325 TABLET ORAL at 05:21

## 2024-05-23 RX ADMIN — MAGNESIUM HYDROXIDE 60 ML: 400 SUSPENSION ORAL at 17:05

## 2024-05-23 RX ADMIN — POLYETHYLENE GLYCOL 3350 17 G: 17 POWDER, FOR SOLUTION ORAL at 17:05

## 2024-05-23 RX ADMIN — TRAZODONE HYDROCHLORIDE 50 MG: 50 TABLET ORAL at 20:52

## 2024-05-23 RX ADMIN — BISACODYL 5 MG: 5 TABLET, COATED ORAL at 12:34

## 2024-05-23 RX ADMIN — PREDNISOLONE ACETATE 1 DROP: 10 SUSPENSION/ DROPS OPHTHALMIC at 12:23

## 2024-05-23 RX ADMIN — PANTOPRAZOLE SODIUM 40 MG: 40 TABLET, DELAYED RELEASE ORAL at 05:21

## 2024-05-23 RX ADMIN — CITALOPRAM HYDROBROMIDE 20 MG: 10 TABLET ORAL at 09:51

## 2024-05-23 RX ADMIN — PREDNISOLONE ACETATE 1 DROP: 10 SUSPENSION/ DROPS OPHTHALMIC at 20:52

## 2024-05-23 RX ADMIN — TAMSULOSIN HYDROCHLORIDE 0.4 MG: 0.4 CAPSULE ORAL at 20:52

## 2024-05-23 RX ADMIN — ROPINIROLE HYDROCHLORIDE 2 MG: 2 TABLET, FILM COATED ORAL at 17:05

## 2024-05-23 RX ADMIN — AZITHROMYCIN DIHYDRATE 250 MG: 250 TABLET ORAL at 09:50

## 2024-05-23 RX ADMIN — LEVOTHYROXINE SODIUM 88 MCG: 0.09 TABLET ORAL at 05:21

## 2024-05-23 RX ADMIN — METHYLPREDNISOLONE 24 MG: 4 TABLET ORAL at 17:31

## 2024-05-23 RX ADMIN — PREDNISOLONE ACETATE 1 DROP: 10 SUSPENSION/ DROPS OPHTHALMIC at 09:51

## 2024-05-23 RX ADMIN — DIAZEPAM 5 MG: 5 TABLET ORAL at 20:52

## 2024-05-23 RX ADMIN — ASPIRIN 81 MG: 81 TABLET, CHEWABLE ORAL at 09:50

## 2024-05-23 RX ADMIN — ENOXAPARIN SODIUM 40 MG: 100 INJECTION SUBCUTANEOUS at 20:52

## 2024-05-23 RX ADMIN — PREDNISOLONE ACETATE 1 DROP: 10 SUSPENSION/ DROPS OPHTHALMIC at 03:26

## 2024-05-23 RX ADMIN — GUAIFENESIN 1200 MG: 600 TABLET ORAL at 20:52

## 2024-05-23 RX ADMIN — CLOPIDOGREL BISULFATE 75 MG: 75 TABLET ORAL at 09:51

## 2024-05-23 RX ADMIN — GUAIFENESIN 1200 MG: 600 TABLET ORAL at 09:51

## 2024-05-23 RX ADMIN — ATORVASTATIN CALCIUM 80 MG: 80 TABLET, FILM COATED ORAL at 20:52

## 2024-05-23 ASSESSMENT — PAIN SCALES - GENERAL
PAINLEVEL_OUTOF10: 3
PAINLEVEL_OUTOF10: 0

## 2024-05-23 ASSESSMENT — PAIN DESCRIPTION - LOCATION: LOCATION: GENERALIZED

## 2024-05-23 ASSESSMENT — PAIN DESCRIPTION - DESCRIPTORS: DESCRIPTORS: ACHING

## 2024-05-23 ASSESSMENT — PAIN SCALES - WONG BAKER: WONGBAKER_NUMERICALRESPONSE: NO HURT

## 2024-05-24 PROCEDURE — 97110 THERAPEUTIC EXERCISES: CPT

## 2024-05-24 PROCEDURE — 6370000000 HC RX 637 (ALT 250 FOR IP): Performed by: PSYCHIATRY & NEUROLOGY

## 2024-05-24 PROCEDURE — 94760 N-INVAS EAR/PLS OXIMETRY 1: CPT

## 2024-05-24 PROCEDURE — 1180000000 HC REHAB R&B

## 2024-05-24 PROCEDURE — 6360000002 HC RX W HCPCS: Performed by: PSYCHIATRY & NEUROLOGY

## 2024-05-24 PROCEDURE — 92526 ORAL FUNCTION THERAPY: CPT

## 2024-05-24 PROCEDURE — 97535 SELF CARE MNGMENT TRAINING: CPT

## 2024-05-24 PROCEDURE — 97530 THERAPEUTIC ACTIVITIES: CPT

## 2024-05-24 PROCEDURE — 97130 THER IVNTJ EA ADDL 15 MIN: CPT

## 2024-05-24 PROCEDURE — 99232 SBSQ HOSP IP/OBS MODERATE 35: CPT | Performed by: PSYCHIATRY & NEUROLOGY

## 2024-05-24 PROCEDURE — 97129 THER IVNTJ 1ST 15 MIN: CPT

## 2024-05-24 PROCEDURE — 97116 GAIT TRAINING THERAPY: CPT

## 2024-05-24 RX ADMIN — CITALOPRAM HYDROBROMIDE 20 MG: 10 TABLET ORAL at 07:43

## 2024-05-24 RX ADMIN — GUAIFENESIN 1200 MG: 600 TABLET ORAL at 20:52

## 2024-05-24 RX ADMIN — AZITHROMYCIN DIHYDRATE 250 MG: 250 TABLET ORAL at 07:43

## 2024-05-24 RX ADMIN — TRAZODONE HYDROCHLORIDE 50 MG: 50 TABLET ORAL at 20:51

## 2024-05-24 RX ADMIN — DIAZEPAM 5 MG: 5 TABLET ORAL at 20:51

## 2024-05-24 RX ADMIN — METHYLPREDNISOLONE 4 MG: 4 TABLET ORAL at 17:29

## 2024-05-24 RX ADMIN — PREDNISOLONE ACETATE 1 DROP: 10 SUSPENSION/ DROPS OPHTHALMIC at 20:52

## 2024-05-24 RX ADMIN — GUAIFENESIN 1200 MG: 600 TABLET ORAL at 07:43

## 2024-05-24 RX ADMIN — ATORVASTATIN CALCIUM 80 MG: 80 TABLET, FILM COATED ORAL at 20:51

## 2024-05-24 RX ADMIN — LEVOTHYROXINE SODIUM 88 MCG: 0.09 TABLET ORAL at 05:26

## 2024-05-24 RX ADMIN — METHYLPREDNISOLONE 4 MG: 4 TABLET ORAL at 12:14

## 2024-05-24 RX ADMIN — METHYLPREDNISOLONE 4 MG: 4 TABLET ORAL at 05:26

## 2024-05-24 RX ADMIN — PREDNISOLONE ACETATE 1 DROP: 10 SUSPENSION/ DROPS OPHTHALMIC at 05:26

## 2024-05-24 RX ADMIN — POLYETHYLENE GLYCOL 3350 17 G: 17 POWDER, FOR SOLUTION ORAL at 17:28

## 2024-05-24 RX ADMIN — CLOPIDOGREL BISULFATE 75 MG: 75 TABLET ORAL at 07:43

## 2024-05-24 RX ADMIN — PANTOPRAZOLE SODIUM 40 MG: 40 TABLET, DELAYED RELEASE ORAL at 05:28

## 2024-05-24 RX ADMIN — ACETAMINOPHEN 650 MG: 325 TABLET ORAL at 05:28

## 2024-05-24 RX ADMIN — MAGNESIUM HYDROXIDE 60 ML: 400 SUSPENSION ORAL at 17:28

## 2024-05-24 RX ADMIN — ASPIRIN 81 MG: 81 TABLET, CHEWABLE ORAL at 07:43

## 2024-05-24 RX ADMIN — LISINOPRIL 20 MG: 20 TABLET ORAL at 07:43

## 2024-05-24 RX ADMIN — TAMSULOSIN HYDROCHLORIDE 0.4 MG: 0.4 CAPSULE ORAL at 20:52

## 2024-05-24 RX ADMIN — ROPINIROLE HYDROCHLORIDE 2 MG: 2 TABLET, FILM COATED ORAL at 17:29

## 2024-05-24 RX ADMIN — METHYLPREDNISOLONE 8 MG: 4 TABLET ORAL at 20:52

## 2024-05-24 ASSESSMENT — PAIN SCALES - GENERAL
PAINLEVEL_OUTOF10: 4
PAINLEVEL_OUTOF10: 0

## 2024-05-24 ASSESSMENT — 9 HOLE PEG TEST: TESTTIME_SECONDS: 45

## 2024-05-24 ASSESSMENT — PAIN DESCRIPTION - LOCATION: LOCATION: EYE

## 2024-05-24 ASSESSMENT — PAIN DESCRIPTION - ORIENTATION: ORIENTATION: RIGHT

## 2024-05-24 ASSESSMENT — PAIN DESCRIPTION - DESCRIPTORS: DESCRIPTORS: SORE

## 2024-05-25 PROCEDURE — 6370000000 HC RX 637 (ALT 250 FOR IP): Performed by: PSYCHIATRY & NEUROLOGY

## 2024-05-25 PROCEDURE — 6360000002 HC RX W HCPCS: Performed by: PSYCHIATRY & NEUROLOGY

## 2024-05-25 PROCEDURE — 94760 N-INVAS EAR/PLS OXIMETRY 1: CPT

## 2024-05-25 PROCEDURE — 1180000000 HC REHAB R&B

## 2024-05-25 PROCEDURE — 99232 SBSQ HOSP IP/OBS MODERATE 35: CPT | Performed by: PSYCHIATRY & NEUROLOGY

## 2024-05-25 RX ADMIN — CLOPIDOGREL BISULFATE 75 MG: 75 TABLET ORAL at 07:53

## 2024-05-25 RX ADMIN — POLYETHYLENE GLYCOL 3350 17 G: 17 POWDER, FOR SOLUTION ORAL at 17:10

## 2024-05-25 RX ADMIN — PREDNISOLONE ACETATE 1 DROP: 10 SUSPENSION/ DROPS OPHTHALMIC at 20:46

## 2024-05-25 RX ADMIN — LEVOTHYROXINE SODIUM 88 MCG: 0.09 TABLET ORAL at 05:47

## 2024-05-25 RX ADMIN — PANTOPRAZOLE SODIUM 40 MG: 40 TABLET, DELAYED RELEASE ORAL at 05:48

## 2024-05-25 RX ADMIN — METHYLPREDNISOLONE 4 MG: 4 TABLET ORAL at 17:10

## 2024-05-25 RX ADMIN — TRAZODONE HYDROCHLORIDE 50 MG: 50 TABLET ORAL at 20:47

## 2024-05-25 RX ADMIN — METHYLPREDNISOLONE 4 MG: 4 TABLET ORAL at 20:47

## 2024-05-25 RX ADMIN — DIAZEPAM 5 MG: 5 TABLET ORAL at 20:47

## 2024-05-25 RX ADMIN — TAMSULOSIN HYDROCHLORIDE 0.4 MG: 0.4 CAPSULE ORAL at 20:47

## 2024-05-25 RX ADMIN — GUAIFENESIN 1200 MG: 600 TABLET ORAL at 20:47

## 2024-05-25 RX ADMIN — LISINOPRIL 20 MG: 20 TABLET ORAL at 07:53

## 2024-05-25 RX ADMIN — MAGNESIUM HYDROXIDE 60 ML: 400 SUSPENSION ORAL at 17:10

## 2024-05-25 RX ADMIN — CITALOPRAM HYDROBROMIDE 20 MG: 10 TABLET ORAL at 07:53

## 2024-05-25 RX ADMIN — ASPIRIN 81 MG: 81 TABLET, CHEWABLE ORAL at 07:53

## 2024-05-25 RX ADMIN — METHYLPREDNISOLONE 4 MG: 4 TABLET ORAL at 11:55

## 2024-05-25 RX ADMIN — ATORVASTATIN CALCIUM 80 MG: 80 TABLET, FILM COATED ORAL at 20:47

## 2024-05-25 RX ADMIN — GUAIFENESIN 1200 MG: 600 TABLET ORAL at 07:53

## 2024-05-25 RX ADMIN — ROPINIROLE HYDROCHLORIDE 2 MG: 2 TABLET, FILM COATED ORAL at 17:10

## 2024-05-25 RX ADMIN — METHYLPREDNISOLONE 4 MG: 4 TABLET ORAL at 05:47

## 2024-05-26 PROCEDURE — 1180000000 HC REHAB R&B

## 2024-05-26 PROCEDURE — 6370000000 HC RX 637 (ALT 250 FOR IP): Performed by: PSYCHIATRY & NEUROLOGY

## 2024-05-26 PROCEDURE — 6360000002 HC RX W HCPCS: Performed by: PSYCHIATRY & NEUROLOGY

## 2024-05-26 PROCEDURE — 94760 N-INVAS EAR/PLS OXIMETRY 1: CPT

## 2024-05-26 PROCEDURE — 99232 SBSQ HOSP IP/OBS MODERATE 35: CPT | Performed by: PSYCHIATRY & NEUROLOGY

## 2024-05-26 RX ADMIN — METHYLPREDNISOLONE 4 MG: 4 TABLET ORAL at 20:48

## 2024-05-26 RX ADMIN — CLOPIDOGREL BISULFATE 75 MG: 75 TABLET ORAL at 07:40

## 2024-05-26 RX ADMIN — POLYETHYLENE GLYCOL 3350 17 G: 17 POWDER, FOR SOLUTION ORAL at 17:00

## 2024-05-26 RX ADMIN — ASPIRIN 81 MG: 81 TABLET, CHEWABLE ORAL at 07:40

## 2024-05-26 RX ADMIN — PANTOPRAZOLE SODIUM 40 MG: 40 TABLET, DELAYED RELEASE ORAL at 06:28

## 2024-05-26 RX ADMIN — GUAIFENESIN 1200 MG: 600 TABLET ORAL at 07:40

## 2024-05-26 RX ADMIN — ATORVASTATIN CALCIUM 80 MG: 80 TABLET, FILM COATED ORAL at 20:48

## 2024-05-26 RX ADMIN — TAMSULOSIN HYDROCHLORIDE 0.4 MG: 0.4 CAPSULE ORAL at 20:48

## 2024-05-26 RX ADMIN — MAGNESIUM HYDROXIDE 60 ML: 400 SUSPENSION ORAL at 17:00

## 2024-05-26 RX ADMIN — METHYLPREDNISOLONE 4 MG: 4 TABLET ORAL at 06:28

## 2024-05-26 RX ADMIN — LISINOPRIL 20 MG: 20 TABLET ORAL at 07:41

## 2024-05-26 RX ADMIN — PREDNISOLONE ACETATE 1 DROP: 10 SUSPENSION/ DROPS OPHTHALMIC at 20:48

## 2024-05-26 RX ADMIN — TRAZODONE HYDROCHLORIDE 50 MG: 50 TABLET ORAL at 20:48

## 2024-05-26 RX ADMIN — CITALOPRAM HYDROBROMIDE 20 MG: 10 TABLET ORAL at 07:40

## 2024-05-26 RX ADMIN — PREDNISOLONE ACETATE 1 DROP: 10 SUSPENSION/ DROPS OPHTHALMIC at 07:40

## 2024-05-26 RX ADMIN — GUAIFENESIN 1200 MG: 600 TABLET ORAL at 20:48

## 2024-05-26 RX ADMIN — PREDNISOLONE ACETATE 1 DROP: 10 SUSPENSION/ DROPS OPHTHALMIC at 11:54

## 2024-05-26 RX ADMIN — METHYLPREDNISOLONE 4 MG: 4 TABLET ORAL at 11:54

## 2024-05-26 RX ADMIN — LEVOTHYROXINE SODIUM 88 MCG: 0.09 TABLET ORAL at 06:28

## 2024-05-26 RX ADMIN — DIAZEPAM 5 MG: 5 TABLET ORAL at 20:48

## 2024-05-26 RX ADMIN — ROPINIROLE HYDROCHLORIDE 2 MG: 2 TABLET, FILM COATED ORAL at 17:00

## 2024-05-27 LAB
ALBUMIN SERPL-MCNC: 3.8 G/DL (ref 3.5–5.2)
ALP SERPL-CCNC: 196 U/L (ref 40–130)
ALT SERPL-CCNC: 124 U/L (ref 5–41)
ANION GAP SERPL CALCULATED.3IONS-SCNC: 12 MMOL/L (ref 7–19)
AST SERPL-CCNC: 80 U/L (ref 5–40)
BASOPHILS # BLD: 0.1 K/UL (ref 0–0.2)
BASOPHILS NFR BLD: 1.2 % (ref 0–1)
BILIRUB SERPL-MCNC: 0.7 MG/DL (ref 0.2–1.2)
BUN SERPL-MCNC: 26 MG/DL (ref 8–23)
CALCIUM SERPL-MCNC: 9 MG/DL (ref 8.8–10.2)
CHLORIDE SERPL-SCNC: 102 MMOL/L (ref 98–111)
CO2 SERPL-SCNC: 24 MMOL/L (ref 22–29)
CREAT SERPL-MCNC: 1.1 MG/DL (ref 0.5–1.2)
EOSINOPHIL # BLD: 0.4 K/UL (ref 0–0.6)
EOSINOPHIL NFR BLD: 4.4 % (ref 0–5)
ERYTHROCYTE [DISTWIDTH] IN BLOOD BY AUTOMATED COUNT: 18.1 % (ref 11.5–14.5)
GLUCOSE SERPL-MCNC: 100 MG/DL (ref 74–109)
HCT VFR BLD AUTO: 44.9 % (ref 42–52)
HGB BLD-MCNC: 14.6 G/DL (ref 14–18)
IMM GRANULOCYTES # BLD: 0.1 K/UL
LYMPHOCYTES # BLD: 1.1 K/UL (ref 1.1–4.5)
LYMPHOCYTES NFR BLD: 13.4 % (ref 20–40)
MCH RBC QN AUTO: 27.1 PG (ref 27–31)
MCHC RBC AUTO-ENTMCNC: 32.5 G/DL (ref 33–37)
MCV RBC AUTO: 83.5 FL (ref 80–94)
MONOCYTES # BLD: 0.7 K/UL (ref 0–0.9)
MONOCYTES NFR BLD: 8.8 % (ref 0–10)
NEUTROPHILS # BLD: 5.7 K/UL (ref 1.5–7.5)
NEUTS SEG NFR BLD: 70.8 % (ref 50–65)
PLATELET # BLD AUTO: 220 K/UL (ref 130–400)
PMV BLD AUTO: 9.8 FL (ref 9.4–12.4)
POTASSIUM SERPL-SCNC: 4.7 MMOL/L (ref 3.5–5)
PROT SERPL-MCNC: 6.2 G/DL (ref 6.6–8.7)
RBC # BLD AUTO: 5.38 M/UL (ref 4.7–6.1)
SODIUM SERPL-SCNC: 138 MMOL/L (ref 136–145)
WBC # BLD AUTO: 8 K/UL (ref 4.8–10.8)

## 2024-05-27 PROCEDURE — 94760 N-INVAS EAR/PLS OXIMETRY 1: CPT

## 2024-05-27 PROCEDURE — 97116 GAIT TRAINING THERAPY: CPT

## 2024-05-27 PROCEDURE — 97530 THERAPEUTIC ACTIVITIES: CPT

## 2024-05-27 PROCEDURE — 6370000000 HC RX 637 (ALT 250 FOR IP): Performed by: PSYCHIATRY & NEUROLOGY

## 2024-05-27 PROCEDURE — 92526 ORAL FUNCTION THERAPY: CPT

## 2024-05-27 PROCEDURE — 97110 THERAPEUTIC EXERCISES: CPT

## 2024-05-27 PROCEDURE — 85025 COMPLETE CBC W/AUTO DIFF WBC: CPT

## 2024-05-27 PROCEDURE — 99232 SBSQ HOSP IP/OBS MODERATE 35: CPT | Performed by: PSYCHIATRY & NEUROLOGY

## 2024-05-27 PROCEDURE — 1180000000 HC REHAB R&B

## 2024-05-27 PROCEDURE — 80053 COMPREHEN METABOLIC PANEL: CPT

## 2024-05-27 PROCEDURE — 36415 COLL VENOUS BLD VENIPUNCTURE: CPT

## 2024-05-27 PROCEDURE — 6360000002 HC RX W HCPCS: Performed by: PSYCHIATRY & NEUROLOGY

## 2024-05-27 PROCEDURE — 92507 TX SP LANG VOICE COMM INDIV: CPT

## 2024-05-27 RX ADMIN — METHYLPREDNISOLONE 4 MG: 4 TABLET ORAL at 20:05

## 2024-05-27 RX ADMIN — CITALOPRAM HYDROBROMIDE 20 MG: 10 TABLET ORAL at 08:28

## 2024-05-27 RX ADMIN — TAMSULOSIN HYDROCHLORIDE 0.4 MG: 0.4 CAPSULE ORAL at 20:05

## 2024-05-27 RX ADMIN — PREDNISOLONE ACETATE 1 DROP: 10 SUSPENSION/ DROPS OPHTHALMIC at 05:45

## 2024-05-27 RX ADMIN — CLOPIDOGREL BISULFATE 75 MG: 75 TABLET ORAL at 08:28

## 2024-05-27 RX ADMIN — PREDNISOLONE ACETATE 1 DROP: 10 SUSPENSION/ DROPS OPHTHALMIC at 08:28

## 2024-05-27 RX ADMIN — POLYETHYLENE GLYCOL 3350 17 G: 17 POWDER, FOR SOLUTION ORAL at 16:41

## 2024-05-27 RX ADMIN — PREDNISOLONE ACETATE 1 DROP: 10 SUSPENSION/ DROPS OPHTHALMIC at 20:05

## 2024-05-27 RX ADMIN — METHYLPREDNISOLONE 4 MG: 4 TABLET ORAL at 05:46

## 2024-05-27 RX ADMIN — GUAIFENESIN 1200 MG: 600 TABLET ORAL at 20:05

## 2024-05-27 RX ADMIN — LISINOPRIL 20 MG: 20 TABLET ORAL at 08:28

## 2024-05-27 RX ADMIN — LEVOTHYROXINE SODIUM 88 MCG: 0.09 TABLET ORAL at 05:46

## 2024-05-27 RX ADMIN — TRAZODONE HYDROCHLORIDE 50 MG: 50 TABLET ORAL at 20:04

## 2024-05-27 RX ADMIN — ROPINIROLE HYDROCHLORIDE 2 MG: 2 TABLET, FILM COATED ORAL at 16:41

## 2024-05-27 RX ADMIN — ASPIRIN 81 MG: 81 TABLET, CHEWABLE ORAL at 08:30

## 2024-05-27 RX ADMIN — MAGNESIUM HYDROXIDE 60 ML: 400 SUSPENSION ORAL at 16:41

## 2024-05-27 RX ADMIN — ATORVASTATIN CALCIUM 80 MG: 80 TABLET, FILM COATED ORAL at 20:05

## 2024-05-27 RX ADMIN — DIAZEPAM 5 MG: 5 TABLET ORAL at 20:04

## 2024-05-27 RX ADMIN — PREDNISOLONE ACETATE 1 DROP: 10 SUSPENSION/ DROPS OPHTHALMIC at 11:57

## 2024-05-27 RX ADMIN — GUAIFENESIN 1200 MG: 600 TABLET ORAL at 08:28

## 2024-05-27 RX ADMIN — PANTOPRAZOLE SODIUM 40 MG: 40 TABLET, DELAYED RELEASE ORAL at 05:46

## 2024-05-27 NOTE — PATIENT CARE CONFERENCE
[facility] ACUTE INPATIENT REHABILITATION  TEAM CONFERENCE NOTE    Date: 2024  Patient Name: Echo Brown        MRN: [id number]    : 1945  (78 y.o.)  Gender: male      Diagnosis: Cerebral infarction, Left thalamus; NPH      PHYSICAL THERAPY  GROSS ASSESSMENT       BED MOBILITY  Bed mobility  Rolling to Left: Stand by assistance, Supervision  Rolling to Right: Supervision, Stand by assistance  Supine to Sit: Supervision  Sit to Supine: Modified independent  Scooting: Modified independent  Bed Mobility Comments: On R side of bed- intermittent use of bedrail       TRANSFERS  Transfers  Sit to Stand: Modified independent  Stand to Sit: Modified independent  Bed to Chair: Modified independent  Stand Pivot Transfers: Modified independent (w/rw)  Car Transfer: Contact guard assistance, Stand by assistance (Pt enters w/ LLE leading- home setup includes car door not opening enough to allow pt to sit first)  Comment: pt with inconsistent hand placement with STS transitions requiring cues/reminders  WHEELCHAIR PROPULSION  Propulsion 1  Propulsion: Manual  Level: Level Tile  Method: RLE, LLE  Level of Assistance: Supervision  Description/ Details: pt cued to slow pace; slight veering L/R  Distance: 75' x2  AMBULATION  Ambulation  Surface: Level tile  Device: Rolling Walker  Assistance: Modified Independent, Stand by assistance  Quality of Gait: reciprocal pattern; intermittent down gaze, shoulders elevated  Gait Deviations: Decreased step height, Slow Loren  Distance: 150' x 2  Comments: Incorporated turns.  STAIRS  Stairs  # Steps : 3  Stairs Height: 6\"  Rails: Right ascending  Assistance: Stand by assistance  Comment: started with step to pattern but switched to reciprocal and performed in this manner without instability or lob  GOALS:  Short Term Goals  Time Frame for Short Term Goals: 1 week  Short Term Goal 1: Pt will ambulate 100 ft using RW w/CGA  Short Term Goal 2: perform STS transfer w/ supervision 
2017    Restless legs syndrome     Sacral fracture (HCC) 2022    Last Assessment & Plan:  Formatting of this note might be different from the original. Ortho consulted, non -op, appreciate recs    Seasonal allergic rhinitis due to pollen 2017       Vitals:    24 0644 24 0845 24 1529 24 0449   BP:  118/70 137/80 131/78   Pulse:  80 71 88   Resp:   20 18   Temp:   99.5 °F (37.5 °C) 99.9 °F (37.7 °C)   TempSrc:   Temporal Temporal   SpO2: 94%  95% 97%   Weight:       Height:            SpO2: 97 %    On Room Air    Wounds/Incisions/Ulcers: No skin issues identified     Karan Scale Score: 20    Pain: No pain concerns to address    Consultations/Labs/X-rays:     Family Education: Family available and participating in education    Fall Risk:  Olson Olson Total Score: 80    Fall in the last week? No    Sleep Concerns: \"No concerns to address    Last BM: Last BM (including prior to admit): 24    Other Nursing Issues: Cpap HS. Pills whole. Right eye redness. Prednisone eye gtts. Fell at . Incont bladder x's. Lovenox. Up x1 assist walker. R arm numbness/tingling. Reg diet.         SOCIAL WORK/CASE MANAGEMENT  Assessment:    Discharge Plan   Estimated Length of Stay:CMG date 24  Destination: {Settin}    Pass: No    Services at Discharge: {FOLLOW-UP SERVICES:}    Equipment at Discharge: Determine closer to discharge.     Progress made in the prior week:  Toilet transfers CGA.   2.    SBA transfers  3.  4.  5.      Goals for following week:  CGA with LB dressing.   2.    Indep transfers  3.   4.   5.     Factors facilitating achievement of predicted outcomes: Family support, Motivated, Cooperative, and Pleasant    Barriers to the achievement of predicted outcomes: Decreased sensation, Decreased proprioception, Upper extremity weakness, and Lower extremity weakness    Team Members Present at Conference:  Medical Director (Team Conference Leader): [place]

## 2024-05-28 ENCOUNTER — TELEPHONE (OUTPATIENT)
Dept: INTERNAL MEDICINE CLINIC | Age: 79
End: 2024-05-28

## 2024-05-28 PROCEDURE — 94760 N-INVAS EAR/PLS OXIMETRY 1: CPT

## 2024-05-28 PROCEDURE — 92526 ORAL FUNCTION THERAPY: CPT

## 2024-05-28 PROCEDURE — 99232 SBSQ HOSP IP/OBS MODERATE 35: CPT | Performed by: PSYCHIATRY & NEUROLOGY

## 2024-05-28 PROCEDURE — 6370000000 HC RX 637 (ALT 250 FOR IP): Performed by: PSYCHIATRY & NEUROLOGY

## 2024-05-28 PROCEDURE — 1180000000 HC REHAB R&B

## 2024-05-28 PROCEDURE — 97110 THERAPEUTIC EXERCISES: CPT

## 2024-05-28 PROCEDURE — 97130 THER IVNTJ EA ADDL 15 MIN: CPT

## 2024-05-28 PROCEDURE — 97535 SELF CARE MNGMENT TRAINING: CPT

## 2024-05-28 PROCEDURE — 97129 THER IVNTJ 1ST 15 MIN: CPT

## 2024-05-28 PROCEDURE — 97530 THERAPEUTIC ACTIVITIES: CPT

## 2024-05-28 PROCEDURE — 97116 GAIT TRAINING THERAPY: CPT

## 2024-05-28 PROCEDURE — 6360000002 HC RX W HCPCS: Performed by: PSYCHIATRY & NEUROLOGY

## 2024-05-28 RX ORDER — POLYETHYLENE GLYCOL 3350 17 G/17G
17 POWDER, FOR SOLUTION ORAL EVERY 24 HOURS
Qty: 527 G | Refills: 0 | Status: SHIPPED | OUTPATIENT
Start: 2024-05-28 | End: 2024-06-27

## 2024-05-28 RX ORDER — ATORVASTATIN CALCIUM 80 MG/1
80 TABLET, FILM COATED ORAL NIGHTLY
Qty: 30 TABLET | Refills: 0 | Status: SHIPPED | OUTPATIENT
Start: 2024-05-28

## 2024-05-28 RX ORDER — ASPIRIN 81 MG/1
81 TABLET, CHEWABLE ORAL DAILY
Qty: 30 TABLET | Refills: 0 | Status: SHIPPED | OUTPATIENT
Start: 2024-05-28

## 2024-05-28 RX ORDER — LEVOTHYROXINE SODIUM 88 UG/1
88 TABLET ORAL DAILY
Qty: 30 TABLET | Refills: 0 | Status: SHIPPED | OUTPATIENT
Start: 2024-05-29

## 2024-05-28 RX ORDER — CITALOPRAM HYDROBROMIDE 10 MG/1
20 TABLET ORAL DAILY
Qty: 60 TABLET | Refills: 0 | Status: SHIPPED | OUTPATIENT
Start: 2024-05-28

## 2024-05-28 RX ORDER — GUAIFENESIN 600 MG/1
1200 TABLET, EXTENDED RELEASE ORAL 2 TIMES DAILY
Qty: 120 TABLET | Refills: 0 | Status: SHIPPED | OUTPATIENT
Start: 2024-05-28

## 2024-05-28 RX ORDER — TRAZODONE HYDROCHLORIDE 50 MG/1
50 TABLET ORAL NIGHTLY
Qty: 30 TABLET | Refills: 0 | Status: SHIPPED | OUTPATIENT
Start: 2024-05-28

## 2024-05-28 RX ORDER — DIAZEPAM 5 MG/1
5 TABLET ORAL EVERY 6 HOURS PRN
Qty: 120 TABLET | Refills: 0 | Status: SHIPPED | OUTPATIENT
Start: 2024-05-28 | End: 2024-06-27

## 2024-05-28 RX ORDER — PREDNISOLONE ACETATE 10 MG/ML
1 SUSPENSION/ DROPS OPHTHALMIC
Qty: 1 EACH | Refills: 0 | Status: SHIPPED | OUTPATIENT
Start: 2024-05-28

## 2024-05-28 RX ORDER — CLOPIDOGREL BISULFATE 75 MG/1
75 TABLET ORAL DAILY
Qty: 30 TABLET | Refills: 0 | Status: SHIPPED | OUTPATIENT
Start: 2024-05-28

## 2024-05-28 RX ORDER — LISINOPRIL 20 MG/1
20 TABLET ORAL DAILY
Qty: 30 TABLET | Refills: 0 | Status: SHIPPED | OUTPATIENT
Start: 2024-05-28

## 2024-05-28 RX ORDER — ROPINIROLE 2 MG/1
2 TABLET, FILM COATED ORAL EVERY 24 HOURS
Qty: 30 TABLET | Refills: 0 | Status: SHIPPED | OUTPATIENT
Start: 2024-05-28

## 2024-05-28 RX ORDER — PANTOPRAZOLE SODIUM 40 MG/1
40 TABLET, DELAYED RELEASE ORAL
Qty: 30 TABLET | Refills: 0 | Status: SHIPPED | OUTPATIENT
Start: 2024-05-29

## 2024-05-28 RX ORDER — TAMSULOSIN HYDROCHLORIDE 0.4 MG/1
0.4 CAPSULE ORAL NIGHTLY
Qty: 30 CAPSULE | Refills: 0 | Status: SHIPPED | OUTPATIENT
Start: 2024-05-28

## 2024-05-28 RX ORDER — LANOLIN ALCOHOL/MO/W.PET/CERES
3 CREAM (GRAM) TOPICAL NIGHTLY PRN
Qty: 30 TABLET | Refills: 0 | Status: SHIPPED | OUTPATIENT
Start: 2024-05-28

## 2024-05-28 RX ADMIN — ASPIRIN 81 MG: 81 TABLET, CHEWABLE ORAL at 08:18

## 2024-05-28 RX ADMIN — ATORVASTATIN CALCIUM 80 MG: 80 TABLET, FILM COATED ORAL at 19:56

## 2024-05-28 RX ADMIN — GUAIFENESIN 1200 MG: 600 TABLET ORAL at 19:56

## 2024-05-28 RX ADMIN — LEVOTHYROXINE SODIUM 88 MCG: 0.09 TABLET ORAL at 05:57

## 2024-05-28 RX ADMIN — PREDNISOLONE ACETATE 1 DROP: 10 SUSPENSION/ DROPS OPHTHALMIC at 08:18

## 2024-05-28 RX ADMIN — ROPINIROLE HYDROCHLORIDE 2 MG: 2 TABLET, FILM COATED ORAL at 16:47

## 2024-05-28 RX ADMIN — LISINOPRIL 20 MG: 20 TABLET ORAL at 08:18

## 2024-05-28 RX ADMIN — GUAIFENESIN 1200 MG: 600 TABLET ORAL at 08:17

## 2024-05-28 RX ADMIN — CLOPIDOGREL BISULFATE 75 MG: 75 TABLET ORAL at 08:18

## 2024-05-28 RX ADMIN — PANTOPRAZOLE SODIUM 40 MG: 40 TABLET, DELAYED RELEASE ORAL at 05:57

## 2024-05-28 RX ADMIN — POLYETHYLENE GLYCOL 3350 17 G: 17 POWDER, FOR SOLUTION ORAL at 16:47

## 2024-05-28 RX ADMIN — DIAZEPAM 5 MG: 5 TABLET ORAL at 19:55

## 2024-05-28 RX ADMIN — TAMSULOSIN HYDROCHLORIDE 0.4 MG: 0.4 CAPSULE ORAL at 19:56

## 2024-05-28 RX ADMIN — METHYLPREDNISOLONE 4 MG: 4 TABLET ORAL at 05:57

## 2024-05-28 RX ADMIN — MAGNESIUM HYDROXIDE 60 ML: 400 SUSPENSION ORAL at 16:47

## 2024-05-28 RX ADMIN — PREDNISOLONE ACETATE 1 DROP: 10 SUSPENSION/ DROPS OPHTHALMIC at 05:57

## 2024-05-28 RX ADMIN — TRAZODONE HYDROCHLORIDE 50 MG: 50 TABLET ORAL at 19:55

## 2024-05-28 RX ADMIN — CITALOPRAM HYDROBROMIDE 20 MG: 10 TABLET ORAL at 08:17

## 2024-05-28 NOTE — TELEPHONE ENCOUNTER
[facility] has referral from JANINA Vora in pt rehab floor     Will Dr Daily follow pt for HH ?

## 2024-05-29 VITALS
HEART RATE: 68 BPM | SYSTOLIC BLOOD PRESSURE: 131 MMHG | WEIGHT: 181.13 LBS | BODY MASS INDEX: 24.53 KG/M2 | RESPIRATION RATE: 20 BRPM | OXYGEN SATURATION: 92 % | TEMPERATURE: 97.7 F | HEIGHT: 72 IN | DIASTOLIC BLOOD PRESSURE: 83 MMHG

## 2024-05-29 PROCEDURE — 99239 HOSP IP/OBS DSCHRG MGMT >30: CPT | Performed by: PSYCHIATRY & NEUROLOGY

## 2024-05-29 PROCEDURE — 6370000000 HC RX 637 (ALT 250 FOR IP): Performed by: PSYCHIATRY & NEUROLOGY

## 2024-05-29 RX ADMIN — LEVOTHYROXINE SODIUM 88 MCG: 0.09 TABLET ORAL at 06:01

## 2024-05-29 RX ADMIN — LISINOPRIL 20 MG: 20 TABLET ORAL at 08:29

## 2024-05-29 RX ADMIN — GUAIFENESIN 1200 MG: 600 TABLET ORAL at 08:29

## 2024-05-29 RX ADMIN — ASPIRIN 81 MG: 81 TABLET, CHEWABLE ORAL at 08:29

## 2024-05-29 RX ADMIN — CLOPIDOGREL BISULFATE 75 MG: 75 TABLET ORAL at 08:29

## 2024-05-29 RX ADMIN — PANTOPRAZOLE SODIUM 40 MG: 40 TABLET, DELAYED RELEASE ORAL at 06:01

## 2024-05-29 RX ADMIN — CITALOPRAM HYDROBROMIDE 20 MG: 10 TABLET ORAL at 08:29

## 2024-05-29 NOTE — DISCHARGE SUMMARY
Occupational Therapy Discharge Summary         Date: 2024  Patient Name: Echo Brown        MRN: [id number]    : 1945  (78 y.o.)  Gender: male      Diagnosis: Cerebral infarction, Left thalamus; NPH  Restrictions/Precautions  Restrictions/Precautions: Up Ad Kassandra      Discharge Date: 2024    UE Functioning:  BUE AROM WNL     Home Management:  Functional Mobility  Functional - Mobility Device: Rolling Walker  Activity: To/from bathroom, Other  Assist Level: Modified independent   Functional Mobility Comments: improved technique and safety in tight spaces    Adaptive Equipment/DME Status:  Bathroom Equipment: Shower chair, 3-in-1 commode  Home Equipment: Cane, Walker - Rolling, Rollator, Wheelchair - Manual  Ordered Rolling walker     Pain Assessment:   No pain        Remaining Problems:  Decreased functional mobility ; Decreased endurance; Decreased ADL status; Decreased sensation; Decreased high-level IADLs     STGs:  Short Term Goals  Time Frame for Short Term Goals: 1 week  Short Term Goal 1: patient will complete LB dressing with supervision  Short Term Goal 2: patient will complete overall toileting with supervision  Short Term Goal 3: patient will complete R UE FM/GM activities x 5 occasions to improve coordination for ADLs/IADLs  Short Term Goal 4: Patient will complete ambulatory home making task with supervision  Short Term Goal 5: patient will complete overall bathing with supervision  Short Term Goal 6: Patient will complete 1-2 handed standing level tasks for 3 mins with supervision  MET  Short Term Goals    LTGs:  Long Term Goals  Time Frame for Long Term Goals : 3 weeks  Long Term Goal 1: patient will complete overall toileting with modified independence  Long Term Goal 2: patient will complete overall dressing with modified independence  Long Term Goal 3: patient will complete overall bathing with modified independence  Long Term Goal 4: patient will complete simple ambulatory 
  Physical Therapy Discharge Note  DATE:  2024  NAME:  Echo Brown  :  1945  (78 y.o.,male)  MRN:  [id number]    HEIGHT:  Height: 182.9 cm (6')  WEIGHT:  Weight - Scale: 82.2 kg (181 lb 2 oz)    PATIENT DIAGNOSIS(ES):    Diagnosis: Cerebral infarction, Left thalamus; NPH    Additional Pertinent Hx: Monoplegia of RUE; Dysarthia; Obstructive sleep apnea; Restless leg syndrome; Degenerative disease of nervous system; Primary hypertension; Lobar pneumonia; GERD w/o esophagitis; Hyperglyceridemia; Dysphagia; Generalized anxiety  RESTRICTIONS/PRECAUTIONS:    Restrictions/Precautions  Restrictions/Precautions: Up Ad Kassandra     OVERALL  ORIENTATION STATUS:  Overall Orientation Status: Within Normal Limits  PAIN:  Pain Level: 0       Pain Location: Eye     Pain Orientation: Right      GROSS ASSESSMENT        POSTURE/BALANCE  Balance  Posture: Good  Sitting - Static: Good  Sitting - Dynamic: Good  Standing - Static: Good  Standing - Dynamic: Fair       ACTIVITY TOLERANCE  Activity Tolerance  Activity Tolerance: Patient tolerated treatment well      BED MOBILITY  Bed mobility  Rolling to Left: Stand by assistance, Supervision  Rolling to Right: Supervision, Stand by assistance  Supine to Sit: Independent, Modified independent  Sit to Supine: Independent, Modified independent  Scooting: Independent, Modified independent  Bed Mobility Comments: On L side of mat table w/o bedrails        TRANSFERS  Transfers  Sit to Stand: Modified independent  Stand to Sit: Modified independent  Bed to Chair: Modified independent (W/ RW)  Stand Pivot Transfers: Modified independent (w/rw)  Car Transfer: Stand by assistance (Use of RW- pt enters leading w/ LLE: home setup includes not opening enough to allow pt to sit first)  Comment: pt with inconsistent hand placement with STS transitions requiring cues/reminders       AMBULATION 1  Ambulation  Surface: Level tile  Device: Rolling Walker (Washcloth on R handle of RW)  Assistance: 
tablet by mouth daily  Qty: 30 tablet, Refills: 0      rOPINIRole (REQUIP) 2 MG tablet Take 1 tablet by mouth every 24 hours  Qty: 30 tablet, Refills: 0      magnesium hydroxide (MILK OF MAGNESIA) 400 MG/5ML suspension Take 60 mLs by mouth every 24 hours  Qty: 1 each, Refills: 0      polyethylene glycol (GLYCOLAX) 17 g packet Take 1 packet by mouth every 24 hours  Qty: 527 g, Refills: 0      melatonin 3 MG TABS tablet Take 1 tablet by mouth nightly as needed (sleep)  Qty: 30 tablet, Refills: 0      tamsulosin (FLOMAX) 0.4 MG capsule Take 1 capsule by mouth at bedtime  Qty: 30 capsule, Refills: 0      clopidogrel (PLAVIX) 75 MG tablet Take 1 tablet by mouth daily  Qty: 30 tablet, Refills: 0      levothyroxine (SYNTHROID) 88 MCG tablet Take 1 tablet by mouth daily  Qty: 30 tablet, Refills: 0      pantoprazole (PROTONIX) 40 MG tablet Take 1 tablet by mouth every morning (before breakfast)  Qty: 30 tablet, Refills: 0           Current Discharge Medication List        STOP taking these medications       acetaminophen (TYLENOL) 325 MG tablet Comments:   Reason for Stopping:         bisacodyl (DULCOLAX) 5 MG EC tablet Comments:   Reason for Stopping:         bisacodyl (DULCOLAX) 10 MG suppository Comments:   Reason for Stopping:         senna-docusate (PERICOLACE) 8.6-50 MG per tablet Comments:   Reason for Stopping:                 Consults:   none    Hospital Course:This 78 y.o. male  with a history of arthritis, GERD, hypertension who presented to [facility] ER on 5/12/24 with some stroke like symptoms. Patient woke up and noticed tingling and numbness in his hand and has a progressive symptom worsened/progressed to right hand and leg weakness with slurred speech and difficulty finding words.  He did have improvement in many of his symptoms by the time he had to ER.  Initial head CT and perfusion scans not overtly abnormal from a stroke standpoint.  Some concern for possible NPH with ventriculomegaly.  Patient does have

## 2024-05-29 NOTE — PROGRESS NOTES
05/16/24 1300   Restrictions/Precautions   Restrictions/Precautions Weight Bearing  (WBAT)   General   Chart Reviewed Yes   Additional Pertinent Hx Monoplegia of RUE; Dysarthia; Obstructive sleep apnea; Restless leg syndrome; Degenerative disease of nervous system; Primary hypertension; Lobar pneumonia; GERD w/o esophagitis; Hyperglyceridemia; Dysphagia; Generalized anxiety   Diagnosis Cerebral infarction, Left thalamus; NPH   Transfers   Sit to Stand Contact guard assistance   Stand to Sit Stand by assistance   Stand Pivot Transfers Contact guard assistance;Minimal Assistance   Ambulation   WB Status WBAT   Ambulation   Surface Level tile   Device Parallel Bars   Assistance Contact guard assistance   Quality of Gait R foot turned out   Distance 15ft   Comments side stepping; Pt needed cueing to keep R foot straight   Ambulation 2   Surface - 2 level tile   Device 2 Rolling Walker   Assistance 2 Contact guard assistance   Distance 100ft   Comments Pt initially needed cueing for proper foot placement and step length during ambulation but was able to continue with minimal cues; continues to have dysmetria of RLE throughout gait   PT Exercises   Exercise Treatment foot taps on dots in the // bars to challenge balance; Hip ABD w/ red band; Hip ADD with yellow ball; Hip flex w/ 2lb weight; Knee ext w/ 2lb weight; Ankle PF/DF x15 for all   Assessment   Assessment pt continues needing cues for safety during ambulation for foot placement and hand placement during transfers; tolerated therapy well; needed encouragement throughout session;   Safety Devices   Type of Devices Call light within reach;Chair alarm in place;Left in chair   PT Individual Minutes   Time In 1300   Time Out 1400   Minutes 60       
   05/17/24 1000   General   Chart Reviewed Yes   Additional Pertinent Hx Monoplegia of RUE; Dysarthia; Obstructive sleep apnea; Restless leg syndrome; Degenerative disease of nervous system; Primary hypertension; Lobar pneumonia; GERD w/o esophagitis; Hyperglyceridemia; Dysphagia; Generalized anxiety   Diagnosis Cerebral infarction, Left thalamus; NPH   Bed mobility   Supine to Sit Supervision   Sit to Supine Supervision   Scooting Supervision   Bed Mobility Comments bed mobility triplanar to/from left   Transfers   Sit to Stand Stand by assistance   Stand to Sit Stand by assistance   Bed to Chair Contact guard assistance   Stand Pivot Transfers Contact guard assistance;Minimal Assistance   Comment pt shows improvement in transfer safety with mininmal cues   Ambulation   WB Status WBAT   Ambulation   Surface Level tile   Device Rolling Walker   Assistance Contact guard assistance;Minimal assistance   Quality of Gait R hip ER; increased RLE step length; excessive propulsion of rw; dysmetria of RLE   Distance 75ft   Comments Pt educated on 3pt gait w/ rw, decresed step length, and slowing pace for safety; Pt showed improvement in quality of gait compared to yesterday;   Ambulation 2   Surface - 2 level tile   Device 2 Parallel Bars   Assistance 2 Contact guard assistance   Quality of Gait 2 Increased side step length; R hip slight ER   Distance 15ft x 2   Comments side stepping; pt cued on hand placement while side stepping to increase stability;   Propulsion 1   Propulsion Manual   Level Level Tile   Level of Assistance Supervision   Description/ Details pt cued to slow pace; slight veering L/R   Distance 75 ft   Propulsion 2   Propulsion 2 Manual   Level 2 Level Tile   Level of Assistance 2 Supervision   Distance 2 75 ft   PT Exercises   Exercise Treatment Hip ABD w/ red band; Hip ADD w/ yellow ball; resisted hip flex/ext, knee ext   Assessment   Assessment pt tolerated therapy well and was eager to learn 
   05/17/24 1400   Restrictions/Precautions   Restrictions/Precautions Fall Risk;Weight Bearing   Lower Extremity Weight Bearing Restrictions   Right Lower Extremity Weight Bearing Weight Bearing As Tolerated   Left Lower Extremity Weight Bearing Weight Bearing As Tolerated   General   Chart Reviewed Yes   Additional Pertinent Hx Monoplegia of RUE; Dysarthia; Obstructive sleep apnea; Restless leg syndrome; Degenerative disease of nervous system; Primary hypertension; Lobar pneumonia; GERD w/o esophagitis; Hyperglyceridemia; Dysphagia; Generalized anxiety   Diagnosis Cerebral infarction, Left thalamus; NPH   Transfers   Sit to Stand Stand by assistance   Stand to Sit Stand by assistance   Stand Pivot Transfers Contact guard assistance   Ambulation   WB Status WBAT   Ambulation 2   Surface - 2 level tile   Device 2 Parallel Bars   Assistance 2 Contact guard assistance   Quality of Gait 2 Side stepping; Increased side step length; R hip slight ER   Distance 15ft x 3   Comments pt needed cueing on neutral foot position and decreasing step length for stability; pt self corrected many times throughout   Stairs   # Steps  12   Stairs Height 4\"   Rails Bilateral   Assistance Minimal assistance   Comment pt needs cueing for correct 3 point sequencing and hand placement w/ ascending and descending   Propulsion 1   Propulsion Manual   Level Level Tile   Level of Assistance Supervision   Description/ Details pt cued to slow pace; slight veering L/R   Distance 75 ft   PT Exercises   Exercise Treatment Bilateral dot taps in // bars   Assessment   Assessment pt tolerated therapy well; needed initial cues for hand placement and correct 3 point sequencing ambulating on stairs but was able to complete w/ min cues; needed cues for foot and hand placement during side steps in parallel bars; pt cued on importance of having adequate MATILDA during activities;   History Monoplegia of RUE; Dysarthia; Obstructive sleep apnea; Restless leg 
   05/22/24 1000   Restrictions/Precautions   Restrictions/Precautions Fall Risk;Weight Bearing   Lower Extremity Weight Bearing Restrictions   Right Lower Extremity Weight Bearing Weight Bearing As Tolerated   Left Lower Extremity Weight Bearing Weight Bearing As Tolerated   General   Chart Reviewed Yes   Additional Pertinent Hx Monoplegia of RUE; Dysarthia; Obstructive sleep apnea; Restless leg syndrome; Degenerative disease of nervous system; Primary hypertension; Lobar pneumonia; GERD w/o esophagitis; Hyperglyceridemia; Dysphagia; Generalized anxiety   Diagnosis Cerebral infarction, Left thalamus; NPH   Ambulation   WB Status WBAT   Ambulation   Surface Level tile   Device Rolling Walker   Assistance Contact guard assistance   Quality of Gait 3 point to 2 point pattern w/ RLE leading; intermittent down gaze   Distance 200' x2   Comments Multiple L/R turns; w/c follow; improved gait pattern and sequencing;   Ambulation 2   Surface - 2 level tile   Device 2 Rolling Walker   Assistance 2 Stand by assistance;Contact guard assistance   Distance 200'   Comments see ambulation 1   Propulsion 1   Propulsion Manual   Level Level Tile   Method RLE;LLE   Level of Assistance Supervision   Distance 75' x2   PT Exercises   Exercise Treatment seated bilat hip flex/ABD/ADD, knee ext, PF/DF w/ manual resistance; x10   Assessment   Assessment pt continues to show improvement w/ 3 point gait and turn technique; pt ambulated w/ 2 point gait using rw w/ cga and min cues; shows increased confidence and stability ; expressed that he is eager to increse walking distance   Safety Devices   Type of Devices Chair alarm in place;Call light within reach;Left in chair   PT Individual Minutes   Time In 1000   Time Out 1100   Minutes 60       
   05/24/24 1000   Restrictions/Precautions   Restrictions/Precautions Fall Risk;Weight Bearing   Lower Extremity Weight Bearing Restrictions   Right Lower Extremity Weight Bearing Weight Bearing As Tolerated   Left Lower Extremity Weight Bearing Weight Bearing As Tolerated   General   Chart Reviewed Yes   Additional Pertinent Hx Monoplegia of RUE; Dysarthia; Obstructive sleep apnea; Restless leg syndrome; Degenerative disease of nervous system; Primary hypertension; Lobar pneumonia; GERD w/o esophagitis; Hyperglyceridemia; Dysphagia; Generalized anxiety   Diagnosis Cerebral infarction, Left thalamus; NPH   Transfers   Sit to Stand Modified independent   Stand to Sit Modified independent   Bed to Chair Modified independent   Stand Pivot Transfers Modified independent  (w/rw)   Ambulation   Surface Level tile   Device Rolling Walker   Assistance Modified Independent   Quality of Gait reciprocal pattern; intermittent down gaze   Distance 50'   Ambulation 2   Surface - 2 level tile   Device 2 Rolling Walker   Assistance 2 Modified Independent   Quality of Gait 2 reciprocal pattern; intermittent down gaze   Distance 200'   Comments multiple L/R turns   Stairs   # Steps  8   Stairs Height 4\"   Rails Right ascending   Assistance Contact guard assistance   Comment ambulated up 6\" steps x2; step to pattern; LLE leading ascending, RLE leading descending   PT Exercises   Exercise Treatment seated bilat hip flex/ABD/ADD, knee ext, PF/DF w/ manual resistance; x10   Assessment   Assessment pt continues to show improvement with ambulation quality and balance; increasing walking distance and amount of stairs; pt evaluated for up ad marta w/ rw, okay from therapy standpoint pending nursing and OT approval   PT Individual Minutes   Time In 1000   Time Out 1100   Minutes 60       
  Echo Brown  [id number]       05/24/24 1359 05/24/24 1400 05/24/24 1425   Restrictions/Precautions   Restrictions/Precautions Weight Bearing;Up Ad Kassandra  --   --    Lower Extremity Weight Bearing Restrictions   Right Lower Extremity Weight Bearing Weight Bearing As Tolerated  --   --    Left Lower Extremity Weight Bearing Weight Bearing As Tolerated  --   --    General   Additional Pertinent Hx Monoplegia of RUE; Dysarthia; Obstructive sleep apnea; Restless leg syndrome; Degenerative disease of nervous system; Primary hypertension; Lobar pneumonia; GERD w/o esophagitis; Hyperglyceridemia; Dysphagia; Generalized anxiety  --   --    Diagnosis Cerebral infarction, Left thalamus; NPH  --   --    Subjective   Subjective Pt. agreeable to therapy.  --   --    Subjective   Pain  --  denies  --    Transfers   Sit to Stand  --   --  Modified independent   Stand to Sit  --   --  Modified independent   Ambulation   WB Status  --   --  WBAT   Ambulation   Surface  --   --  Level tile   Device  --   --  Rolling Walker   Assistance  --   --  Modified Independent;Stand by assistance   Quality of Gait  --   --  reciprocal pattern; intermittent down gaze   Gait Deviations  --   --  Slow Loren;Decreased step height   Distance  --   --  150'   Comments  --   --  Incorporated turns.   PT Exercises   Exercise Treatment  --   --   --    Activity Tolerance   Activity Tolerance  --   --   --    Assessment   Assessment  --   --   --    Safety Devices   Type of Devices  --   --   --    PT Individual Minutes   Time In  --   --   --    Time Out  --   --   --    Minutes  --   --   --       05/24/24 1426 05/24/24 1430   Restrictions/Precautions   Restrictions/Precautions  --   --    Lower Extremity Weight Bearing Restrictions   Right Lower Extremity Weight Bearing  --   --    Left Lower Extremity Weight Bearing  --   --    General   Additional Pertinent Hx  --   --    Diagnosis  --   --    Subjective   Subjective  --   --    Subjective   Pain  -- 
  Facility/Department: [facility] 8 REHAB UNIT  Rehabilitation Occupational Therapy Daily Treatment Note    Date: 24  Patient Name: Echo Brown       Room: 0815/815-02  MRN: [id number]  Account: [id number]   : 1945  (78 y.o.) Gender: male            Past Medical History:  has a past medical history of Cerebrovascular accident (HCC), Closed fracture of multiple ribs, Closed injury of left kidney, Fracture of ramus of left pubis (HCC), Generalized anxiety disorder, Generalized osteoarthritis of multiple sites, GERD (gastroesophageal reflux disease), Hypertension, Hypertriglyceridemia, Idiopathic peripheral neuropathy, Irritable bowel syndrome with constipation, Restless legs syndrome, Sacral fracture ([facility]), and Seasonal allergic rhinitis due to pollen.  Past Surgical History:   has a past surgical history that includes TURP (); hernia repair (); Kidney surgery (2022); Upper gastrointestinal endoscopy (2021); Upper gastrointestinal endoscopy (2016); Colonoscopy (2006); Upper gastrointestinal endoscopy (2006); Colonoscopy (2016); and Colonoscopy (2011).     24 0900   OT Individual Minutes   Time In 0900   Time Out 0900   Minutes 0   Minute Variance   Variance 60  (med hold d/t resp illness)   Reason Med hold   Time Code Minutes    Timed Code Treatment Minutes 0 Minutes     
  Facility/Department: 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 Mild patchy groundglass opacity in the right upper lobe may represent  pneumonia.  2. Small hiatal hernia and mild scarring in the lungs.    This report was signed and finalized on 2024 11:56 AM by Oscar Garner.      Current Diet level:  Current Diet : Regular  Current Liquid Diet : Thin        Pain:  Pain Assessment  Pain Assessment: None - Denies Pain  Patient's Stated Pain Goal: 0 - No pain    Reason for Referral  Echo Brown was referred for a bedside swallow evaluation to assess the efficiency of his swallow function, identify signs and symptoms of aspiration and make recommendations regarding safe dietary consistencies, effective compensatory strategies, and safe eating environment.    Impression  Mild oral phase dysphagia is noted. Will continue to assess for pharyngeal dysphagia. No overt s/s of aspiration observed with thin liquids via straw (consecutive sips). Good hyolaryngeal elevation and timely pharyngeal swallow responses are noted. Clear voicing was noted after all sips and bites. Therapist was present for 
  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,Dysarthria following cerebral infarction,Obstructive sleep apnea (adult) (pediatric),Restless legs syndrome,Degenerative disease of nervous system, unspecified,Essential (primary) hypertension,Lobar pneumonia, unspecified organism,Iron deficiency anemia, unspecified,Gastro-esophageal reflux disease without esophagitis,Pure hyperglyceridemia,Dysphagia following cerebral infarction,Generalized anxiety disorder     CHIEF COMPLAINT:  Acute ischemic stroke         Pain:  Pain Assessment  Pain Assessment: None - Denies Pain  Patient's Stated Pain Goal: 0 - No pain        Assessment:  Staff has observed decreased problem solving and safety awareness. Will complete the Cognitive Linguistic Quick Test or CLQT at a later date. He does exhibit mild dysarthria characterized by decreased rate and blended word boundaries. He is recommended to receive speech therapy to address his current deficits.    Recommendations:  Recommendations  Requires SLP Intervention: Yes  Recommendations: Dysphagia treatment  Patient Education Response: Verbalizes understanding    Plan:  Goals:  Long Term Goals  Goal 1: The patient will demonstrate functional problem solving skills and provide appropriate solutions to problems in order to improve safety and awareness in functional living environment.  Short Term Goals  Goal 1: The patient will complete the Cognitive Linguistic Quick Test or CLQT.  Goal 2: The patient will utilize dysarthria strategies with minimal cues to increase speech intelligibility.  Goal 3: The patient will complete tasks 
  Name: Echo Borwn  MRN: [id number]  Date of Service:  5/23/2024 05/23/24 1345   Restrictions/Precautions   Restrictions/Precautions Fall Risk;Weight Bearing   Lower Extremity Weight Bearing Restrictions   Right Lower Extremity Weight Bearing Weight Bearing As Tolerated   Left Lower Extremity Weight Bearing Weight Bearing As Tolerated   General   Chart Reviewed Yes   Patient assessed for rehabilitation services? Yes   Additional Pertinent Hx Monoplegia of RUE; Dysarthia; Obstructive sleep apnea; Restless leg syndrome; Degenerative disease of nervous system; Primary hypertension; Lobar pneumonia; GERD w/o esophagitis; Hyperglyceridemia; Dysphagia; Generalized anxiety   Diagnosis Cerebral infarction, Left thalamus; NPH   Subjective   Subjective Pt brought into gym by OT, agrees to participate in therapy.   Subjective   Subjective Pt reports arthritic discomfort/pain all over   Pain Faces: 1/10   Transfers   Sit to Stand Supervision;Modified independent   Stand to Sit Supervision;Modified independent   Ambulation   WB Status WBAT   Ambulation   Surface Level tile   Device Rolling Walker   Assistance Contact guard assistance;Stand by assistance   Quality of Gait 3 point to 2 point pattern w/ RLE leading; intermittent down gaze   Gait Deviations Slow Loren;Decreased step height   Distance 15' X 2, 150', 10'   Comments Multiple L/R turns, Focus of amb. on pt performing obstacle course in which he manuv. around cones and stepped over strips in hallway   PT Exercises   Dynamic Sitting Balance Exercises Pt able to  front of mat table w/o AD and bounce ball back and forth to PTA and then underhand toss for ~2 min. while maintaining dynamic balance   Activity Tolerance   Activity Tolerance Patient tolerated treatment well   Assessment   Assessment Pt has no LOB w/ dynamic balance ther-ex or during obstacle course. Pt required SBA manuv. around cones and CGA stepping over strips, in which he performed mostly step 
  Name: Echo Brown  MRN: [id number]  Date of Service:  5/23/2024 05/23/24 0900   Restrictions/Precautions   Restrictions/Precautions Fall Risk;Weight Bearing   Lower Extremity Weight Bearing Restrictions   Right Lower Extremity Weight Bearing Weight Bearing As Tolerated   Left Lower Extremity Weight Bearing Weight Bearing As Tolerated   General   Chart Reviewed Yes   Patient assessed for rehabilitation services? Yes   Additional Pertinent Hx Monoplegia of RUE; Dysarthia; Obstructive sleep apnea; Restless leg syndrome; Degenerative disease of nervous system; Primary hypertension; Lobar pneumonia; GERD w/o esophagitis; Hyperglyceridemia; Dysphagia; Generalized anxiety   Diagnosis Cerebral infarction, Left thalamus; NPH   Subjective   Subjective Pt brought into gym by OT, agrees to participate in therapy.   Subjective   Subjective Pt reports arthritic discomfort in hands   Pain Verbal: 0/10   Bed mobility   Sit to Supine Modified independent   Scooting Modified independent   Bed Mobility Comments On R side of bed- intermittent use of bedrail   Transfers   Sit to Stand Stand by assistance;Supervision   Stand to Sit Stand by assistance;Supervision   Car Transfer Contact guard assistance;Stand by assistance  (Pt enters w/ LLE leading- home setup includes car door not opening enough to allow pt to sit first)   Ambulation   WB Status WBAT   Ambulation   Surface Level tile   Device Rolling Walker   Assistance Contact guard assistance   Quality of Gait 3 point to 2 point pattern w/ RLE leading; intermittent down gaze   Gait Deviations Slow Loren;Decreased step height   Distance 15', 10'   Comments Multiple L/R turns   Stairs   # Steps  8   Stairs Height 4\"   Rails Right ascending   Assistance Contact guard assistance   Comment mostly step to pattern- 1X reciprocal, mostly leading w/ RLE and leading w/ LLE during desc   PT Exercises   Dynamic Sitting Balance Exercises Pt able to stand at RW in front of mirror and perform 
  Name: Echo Brown  MRN: [id number]  Date of Service:  5/21/2024 05/21/24 1345   Restrictions/Precautions   Restrictions/Precautions Fall Risk;Weight Bearing   Lower Extremity Weight Bearing Restrictions   Right Lower Extremity Weight Bearing Weight Bearing As Tolerated   Left Lower Extremity Weight Bearing Weight Bearing As Tolerated   General   Chart Reviewed Yes   Patient assessed for rehabilitation services? Yes   Additional Pertinent Hx Monoplegia of RUE; Dysarthia; Obstructive sleep apnea; Restless leg syndrome; Degenerative disease of nervous system; Primary hypertension; Lobar pneumonia; GERD w/o esophagitis; Hyperglyceridemia; Dysphagia; Generalized anxiety   Diagnosis Cerebral infarction, Left thalamus; NPH   General Comment   Comments Pt is able to stand at toilet w/ RW in front of him and urinate requiring SBA   Subjective   Subjective Pt brought into gym by OT, agrees to participate in therapy.   Subjective   Pain Verbal: 0/10   Transfers   Sit to Stand Stand by assistance   Stand to Sit Stand by assistance   Ambulation   WB Status WBAT   Ambulation   Surface Level tile   Device Rolling Walker   Assistance Contact guard assistance   Quality of Gait 2 point to 3 point w/ RLE leading, wide MATILDA, intermittent downward gaze   Gait Deviations Slow Loren;Decreased step height   Distance 110', 15' X 2   Comments Multiple L/R turns, destination amb. of w/c<>w/c   PT Exercises   Exercise Treatment BLE omnicycle X 10 min on neuro setting aganist 2 degroot of resistance w/ total activity of 97% for 1,8 k,m   Activity Tolerance   Activity Tolerance Patient tolerated treatment well;Patient limited by endurance   Assessment   Assessment Pt is able to tolerate tx w/o c/o of increased pain and none-min fatigue. Pt is able to increase overall amb. up to 110' w/ RW requiring CGA including multiple L/R turns.   Discharge Recommendations Continue to assess pending progress;Home with Home health PT;Home with assist PRN 
  Name: Echo Brown  MRN: [id number]  Date of Service:  5/28/2024 05/28/24 0900   Restrictions/Precautions   Restrictions/Precautions Up Ad Kassandra   Lower Extremity Weight Bearing Restrictions   Right Lower Extremity Weight Bearing Weight Bearing As Tolerated   Left Lower Extremity Weight Bearing Weight Bearing As Tolerated   General   Chart Reviewed Yes   Patient assessed for rehabilitation services? Yes   Additional Pertinent Hx Monoplegia of RUE; Dysarthia; Obstructive sleep apnea; Restless leg syndrome; Degenerative disease of nervous system; Primary hypertension; Lobar pneumonia; GERD w/o esophagitis; Hyperglyceridemia; Dysphagia; Generalized anxiety   Diagnosis Cerebral infarction, Left thalamus; NPH   General Comment   Comments Wife present for FTD.   Subjective   Subjective Pt sitting in w/c in room, agrees to participate in therapy.   Bed mobility   Supine to Sit Independent;Modified independent   Sit to Supine Independent;Modified independent   Scooting Independent;Modified independent   Bed Mobility Comments On L side of mat table w/o bedrails   Transfers   Sit to Stand Modified independent   Stand to Sit Modified independent   Bed to Chair Modified independent  (W/ RW)   Car Transfer Stand by assistance  (Use of RW- pt enters leading w/ LLE: home setup includes not opening enough to allow pt to sit first)   Ambulation   WB Status WBAT   Ambulation   Surface Level tile   Device Rolling Walker  (Washcloth on R handle of RW)   Assistance Modified Independent   Quality of Gait reciprocal pattern; intermittent down gaze, shoulders elevated   Gait Deviations Decreased step height   Distance 200'   Comments Multiple L/R turns- destination amb. from w/c<>w/c   Stairs   # Steps  8   Stairs Height 4\"   Rails Right ascending  (Use of BUE support on R asc handrail)   Assistance Stand by assistance   Comment step to step pattern, no v/c's required for correct BLE sequencing- leading w/ RLE during asc and leading w/ 
4 Eyes Skin Assessment     NAME:  Echo Brown  YOB: 1945  MEDICAL RECORD NUMBER:  [id number]    The patient is being assessed for  Admission    I agree that at least one RN has performed a thorough Head to Toe Skin Assessment on the patient. ALL assessment sites listed below have been assessed.      Areas assessed by both nurses:    Head, Face, Ears, Shoulders, Back, Chest, Arms, Elbows, Hands, Sacrum. Buttock, Coccyx, Ischium, and Legs. Feet and Heels        Does the Patient have a Wound? No noted wound(s)       Karan Prevention initiated by RN: No  Wound Care Orders initiated by RN: No    Pressure Injury (Stage 3,4, Unstageable, DTI, NWPT, and Complex wounds) if present, place Wound referral order by RN under : No    New Ostomies, if present place, Ostomy referral order under : No     Nurse 1 eSignature: Electronically signed by Johanne Brown RN on 5/15/24 at 5:52 PM CDT      Nurse 2 eSignature: Electronically signed by Alessia Lindquist RN on 5/15/2024 at 5:56 PM    
Comprehensive Nutrition Assessment    Type and Reason for Visit:  Initial    Nutrition Recommendations/Plan:   Continue POC     Malnutrition Assessment:  Malnutrition Status:  No malnutrition (05/16/24 1210)    Context:  Acute Illness     Findings of the 6 clinical characteristics of malnutrition:  Energy Intake:  No significant decrease in energy intake  Weight Loss:  No significant weight loss     Body Fat Loss:  No significant body fat loss     Muscle Mass Loss:  No significant muscle mass loss    Fluid Accumulation:  No significant fluid accumulation     Strength:  Not Performed    Nutrition Assessment:    Pt is adequately nourished with good PO intake of meals. He currently tolerates a Regular diet. Continue POC.    Current Nutrition Intake & Therapies:    Average Meal Intake: %  ADULT DIET; Regular    Anthropometric Measures:  Height: 182.9 cm (6')  Ideal Body Weight (IBW): 178 lbs (81 kg)    Current Body Weight: 89 kg (196 lb 3.4 oz)  Current BMI (kg/m2): 26.6  BMI Categories: Overweight (BMI 25.0-29.9)    Nutrition Diagnosis:   No nutrition diagnosis at this time     Nutrition Interventions:   Food and/or Nutrient Delivery: Continue Current Diet  Coordination of Nutrition Care: Continue to monitor while inpatient    Goals:  Goals: PO intake 75% or greater, Meet at least 75% of estimated needs    Nutrition Monitoring and Evaluation:   Food/Nutrient Intake Outcomes: Food and Nutrient Intake  Physical Signs/Symptoms Outcomes: Biochemical Data, Nutrition Focused Physical Findings, Weight    Magy Palumbo MS, RD, LD  Contact: [phone]    
Echo Brown arrived to room # 815.   Presented with: CVA  Mental Status: Patient is oriented and alert.   There were no vitals filed for this visit.  Patient safety contract and falls prevention contract reviewed with patient Yes.  Oriented Patient and Family to room.  Call light within reach. Yes.  Needs, issues or concerns expressed at this time: no.      Electronically signed by Johanne Brown RN on 5/15/2024 at 4:49 PM  
Echo Brown arrived to room # 815.   Presented with: CVA  Mental Status: Patient is oriented and alert.   Vitals:    05/15/24 1654   BP: (!) 162/91   Pulse: 80   Resp: 20   Temp: 98.9 °F (37.2 °C)   SpO2: 95%     Patient safety contract and falls prevention contract reviewed with patient Yes.  Oriented Patient and Family to room.  Call light within reach. Yes.  Needs, issues or concerns expressed at this time: no.      Electronically signed by Johanne Brown RN on 5/15/2024 at 5:53 PM   
Facility/Department: [facility] 8 REHAB UNIT  Occupational Therapy     Name: Echo Brown  : 1945  MRN: [id number]  Date of Service: 2024    Discharge Recommendations:  Home with Home health OT, Home with assist PRN  OT Equipment Recommendations  Equipment Needed: Yes  Mobility Devices: Walker  Walker: Rolling  Other: DME Rx sent to [facility], pt. spouse will pickup order.       Patient Diagnosis(es): The primary encounter diagnosis was Acute ischemic stroke (HCC). Diagnoses of Chronic insomnia, Essential hypertension, and Idiopathic peripheral neuropathy were also pertinent to this visit.  Past Medical History:  has a past medical history of Cerebrovascular accident (HCC), Closed fracture of multiple ribs, Closed injury of left kidney, Fracture of ramus of left pubis (HCC), Generalized anxiety disorder, Generalized osteoarthritis of multiple sites, GERD (gastroesophageal reflux disease), Hypertension, Hypertriglyceridemia, Idiopathic peripheral neuropathy, Irritable bowel syndrome with constipation, Restless legs syndrome, Sacral fracture ([facility]), and Seasonal allergic rhinitis due to pollen.  Past Surgical History:  has a past surgical history that includes TURP (); hernia repair (); Kidney surgery (2022); Upper gastrointestinal endoscopy (2021); Upper gastrointestinal endoscopy (2016); Colonoscopy (2006); Upper gastrointestinal endoscopy (2006); Colonoscopy (2016); and Colonoscopy (2011).    Treatment Diagnosis: CVA with right hemiparesis      Assessment   Performance deficits / Impairments: Decreased functional mobility ;Decreased endurance;Decreased ADL status;Decreased sensation;Decreased high-level IADLs  Assessment: Pt. spouse present for FTD. Educated on bathroom transfers, modifications/adaptive recommendations within the home, and FM HEP. Pt. and spouse demonstrated good understanding of education.  Treatment Diagnosis: CVA with right 
Facility/Department: [facility] 8 REHAB UNIT  Occupational Therapy     Name: Echo Brown  : 1945  MRN: [id number]  Date of Service: 2024    Discharge Recommendations:  Home with Home health OT, Home with assist PRN    Patient Diagnosis(es): There were no encounter diagnoses.  Past Medical History:  has a past medical history of Cerebrovascular accident (HCC), Closed fracture of multiple ribs, Closed injury of left kidney, Fracture of ramus of left pubis (HCC), Generalized anxiety disorder, Generalized osteoarthritis of multiple sites, GERD (gastroesophageal reflux disease), Hypertension, Hypertriglyceridemia, Idiopathic peripheral neuropathy, Irritable bowel syndrome with constipation, Restless legs syndrome, Sacral fracture (HCC), and Seasonal allergic rhinitis due to pollen.  Past Surgical History:  has a past surgical history that includes TURP (); hernia repair (); Kidney surgery (2022); Upper gastrointestinal endoscopy (2021); Upper gastrointestinal endoscopy (2016); Colonoscopy (2006); Upper gastrointestinal endoscopy (2006); Colonoscopy (2016); and Colonoscopy (2011).    Treatment Diagnosis: CVA with right hemiparesis      Assessment   Performance deficits / Impairments: Decreased functional mobility ;Decreased endurance;Decreased coordination;Decreased ADL status;Decreased sensation;Decreased balance;Decreased high-level IADLs;Decreased safe awareness;Decreased fine motor control;Decreased cognition  Treatment Diagnosis: CVA with right hemiparesis  Prognosis: Good  History: arthritis, GERD, HTN  Activity Tolerance  Activity Tolerance: Patient Tolerated treatment well         Plan   Occupational Therapy Plan  Specific Instructions for Next Treatment: 9 hole peg test, FM/GM coordination, PNF HEP, nerve/muscle stimulation with vibrator and use of different textures.  Current Treatment Recommendations: Balance training, Functional mobility training, 
Facility/Department: [facility] 8 REHAB UNIT  Occupational Therapy Treatment Note    Name: Echo Brown  : 1945  MRN: [id number]  Date of Service: 2024    Discharge Recommendations:  Home with Home health OT, Home with assist PRN          Patient Diagnosis(es): There were no encounter diagnoses.  Past Medical History:  has a past medical history of Cerebrovascular accident (HCC), Closed fracture of multiple ribs, Closed injury of left kidney, Fracture of ramus of left pubis (HCC), Generalized anxiety disorder, Generalized osteoarthritis of multiple sites, GERD (gastroesophageal reflux disease), Hypertension, Hypertriglyceridemia, Idiopathic peripheral neuropathy, Irritable bowel syndrome with constipation, Restless legs syndrome, Sacral fracture (HCC), and Seasonal allergic rhinitis due to pollen.  Past Surgical History:  has a past surgical history that includes TURP (); hernia repair (); Kidney surgery (2022); Upper gastrointestinal endoscopy (2021); Upper gastrointestinal endoscopy (2016); Colonoscopy (2006); Upper gastrointestinal endoscopy (2006); Colonoscopy (2016); and Colonoscopy (2011).    Treatment Diagnosis: CVA with right hemiparesis    Assessment   Performance deficits / Impairments: Decreased functional mobility ;Decreased endurance;Decreased coordination;Decreased ADL status;Decreased sensation;Decreased balance;Decreased high-level IADLs;Decreased safe awareness;Decreased fine motor control;Decreased cognition  Treatment Diagnosis: CVA with right hemiparesis  Prognosis: Good  History: arthritis, GERD, HTN  Activity Tolerance  Activity Tolerance: Patient Tolerated treatment well              Plan   Occupational Therapy Plan  Specific Instructions for Next Treatment: 9 hole peg test, FM/GM coordination, PNF HEP, nerve/muscle stimulation with vibrator and use of different textures.  Current Treatment Recommendations: Balance training, Functional 
Nutrition Assessment     Type and Reason for Visit: Reassess    Malnutrition Assessment:  Malnutrition Status: At risk for malnutrition (Comment)    Nutrition Assessment:  PO intake remains adequate at >75%. Noted 15lb wt loss documented from admit wt. Pt attributes wt loss to not eating as much \"junk food\" here and not eating snacks between meals. He is pleased with wt loss and feels it is good for him. He states he is ordering healthful meals and plans to continue eating better. He likes Gelatein ONS.    Current Nutrition Therapies:    ADULT DIET; Regular  ADULT ORAL NUTRITION SUPPLEMENT; Lunch, Dinner; Fortified Gelatin Oral Supplement    Anthropometric Measures:  Height: 182.9 cm (6')  Current Body Wt: 82.2 kg (181 lb 2 oz)   BMI: 24.6    Nutrition Diagnosis:   No nutrition diagnosis at this time     Nutrition Interventions:   Food and/or Nutrient Delivery: Continue Current Diet     Coordination of Nutrition Care: Continue to monitor while inpatient       Goals:  Previous Goal Met: Goal(s) Achieved  Goals: PO intake 75% or greater, Meet at least 75% of estimated needs       Nutrition Monitoring and Evaluation:      Food/Nutrient Intake Outcomes: Food and Nutrient Intake  Physical Signs/Symptoms Outcomes: Biochemical Data, Nutrition Focused Physical Findings, Weight    Discharge Planning:    No discharge needs at this time     Sandra Almendarez, MS, RD, LD, CDCES  Contact: [phone]    
Occupational Therapy     05/22/24 0206   General   Additional Pertinent Hx arthritis, GERD, HTN   Diagnosis CVA with Right hemiparesis   Social/Functional History   Lives With Spouse   Type of Home House   Home Layout One level   Home Access Stairs to enter with rails   Entrance Stairs - Number of Steps 3   Bathroom Shower/Tub Walk-in shower;Shower chair without back   Bathroom Toilet Standard   Bathroom Equipment Shower chair;3-in-1 commode   Home Equipment Cane;Walker - Rolling;Rollator;Wheelchair - Manual   Has the patient had two or more falls in the past year or any fall with injury in the past year? No   ADL Assistance Independent   Homemaking Assistance Independent   Homemaking Responsibilities Yes   Other (Comment) Lawn care.   Ambulation Assistance Independent  (Uses rollator during community mobility.)   Transfer Assistance Independent   Active  Yes   Mode of Transportation Car   Occupation Retired   Balance   Sitting Balance Independent   Standing Balance Supervision   Functional Mobility   Functional - Mobility Device Rolling Walker   Activity Other;Retrieve items;Transport items   Assist Level Stand by assistance   Functional Mobility Comments Simple laundry task in ADL apartments, utilized reacher to  objects from floor demonstrated good safety during task.   Transfers   Sit to stand Supervision   Stand to sit Supervision   Transfer Comments Demonstrated improved safety this tx session.   Activity Tolerance   Activity Tolerance Patient Tolerated treatment well   Assessment   Performance deficits / Impairments Decreased functional mobility ;Decreased endurance;Decreased coordination;Decreased ADL status;Decreased sensation;Decreased balance;Decreased high-level IADLs;Decreased safe awareness;Decreased fine motor control;Decreased cognition   Treatment Diagnosis CVA with right hemiparesis   Prognosis Good   History arthritis, GERD, HTN   Occupational Therapy Plan   Current Treatment 
Occupational Therapy  Facility/Department: [facility] 8 REHAB UNIT  Rehabilitation Occupational Therapy Daily Treatment Note    Date: 24  Patient Name: Echo Brown       Room: 0815/815-02  MRN: [id number]  Account: [id number]   : 1945  (78 y.o.) Gender: male        Diagnosis: (P) CVA with Right hemiparesis  Additional Pertinent Hx: (P) arthritis, GERD, HTN    Treatment Diagnosis: (P) CVA with right hemiparesis   Past Medical History:  has a past medical history of Cerebrovascular accident (HCC), Closed fracture of multiple ribs, Closed injury of left kidney, Fracture of ramus of left pubis (HCC), Generalized anxiety disorder, Generalized osteoarthritis of multiple sites, GERD (gastroesophageal reflux disease), Hypertension, Hypertriglyceridemia, Idiopathic peripheral neuropathy, Irritable bowel syndrome with constipation, Restless legs syndrome, Sacral fracture (HCC), and Seasonal allergic rhinitis due to pollen.  Past Surgical History:   has a past surgical history that includes TURP (); hernia repair (); Kidney surgery (2022); Upper gastrointestinal endoscopy (2021); Upper gastrointestinal endoscopy (2016); Colonoscopy (2006); Upper gastrointestinal endoscopy (2006); Colonoscopy (2016); and Colonoscopy (2011).     24 1300   Restrictions/Precautions   Restrictions/Precautions Fall Risk;Weight Bearing   General   Additional Pertinent Hx arthritis, GERD, HTN   Diagnosis CVA with Right hemiparesis   Subjective   Subjective pt up in bathroom at beginning of session, alarm attached but removed from holster, w/c brakes not locked. Reviewed safety precautions and need for assist.   ADL   Toileting Supervision   Balance   Standing Balance Supervision   Standing Balance   Activity LB clothing management   Functional Mobility   Functional - Mobility Device Rolling Walker   Activity To/from bathroom   Assist Level Stand by assistance   Functional Mobility 
Occupational Therapy  Facility/Department: [facility] 8 REHAB UNIT  Daily Treatment Note  NAME: Echo Brown  : 1945  MRN: [id number]    Date of Service: 2024    Discharge Recommendations:  Home with Home health OT, Home with assist PRN       Assessment   Performance deficits / Impairments: Decreased functional mobility ;Decreased endurance;Decreased coordination;Decreased ADL status;Decreased sensation;Decreased balance;Decreased high-level IADLs;Decreased safe awareness;Decreased fine motor control;Decreased cognition  Assessment: Patient participated well during OT session. Worked on RUE nerve/muscle stimulation using vibrator and dry washcloth and weight bearing.  Pt participated with FM and GM coordination acts while sitting in high perch on mat table in OT room. Patient benefits from continued skilled therapy to address ADLs, functional mobility, coordination, home management, and sensory limitations to improve independence prior to discharge home. Patient has potential to make great gains with continued skilled therapy.  Treatment Diagnosis: CVA with right hemiparesis  Prognosis: Good  History: arthritis, GERD, HTN  Activity Tolerance  Activity Tolerance: Patient Tolerated treatment well         Patient Diagnosis(es): There were no encounter diagnoses.      has a past medical history of Cerebrovascular accident (HCC), Closed fracture of multiple ribs, Closed injury of left kidney, Fracture of ramus of left pubis ([facility]), Generalized anxiety disorder, Generalized osteoarthritis of multiple sites, GERD (gastroesophageal reflux disease), Hypertension, Hypertriglyceridemia, Idiopathic peripheral neuropathy, Irritable bowel syndrome with constipation, Restless legs syndrome, Sacral fracture ([facility]), and Seasonal allergic rhinitis due to pollen.   has a past surgical history that includes TURP (); hernia repair (); Kidney surgery (2022); Upper gastrointestinal endoscopy (2021); Upper 
Occupational Therapy  Facility/Department: [facility] 8 REHAB UNIT  Rehabilitation Occupational Therapy Daily Treatment Note    Date: 24  Patient Name: Echo Brown       Room: 0815/815-02  MRN: [id number]  Account: [id number]   : 1945  (78 y.o.) Gender: male        Diagnosis: (P) CVA with Right hemiparesis  Additional Pertinent Hx: (P) arthritis, GERD, HTN    Treatment Diagnosis: (P) CVA with right hemiparesis   Past Medical History:  has a past medical history of Cerebrovascular accident (HCC), Closed fracture of multiple ribs, Closed injury of left kidney, Fracture of ramus of left pubis (HCC), Generalized anxiety disorder, Generalized osteoarthritis of multiple sites, GERD (gastroesophageal reflux disease), Hypertension, Hypertriglyceridemia, Idiopathic peripheral neuropathy, Irritable bowel syndrome with constipation, Restless legs syndrome, Sacral fracture (HCC), and Seasonal allergic rhinitis due to pollen.  Past Surgical History:   has a past surgical history that includes TURP (); hernia repair (); Kidney surgery (2022); Upper gastrointestinal endoscopy (2021); Upper gastrointestinal endoscopy (2016); Colonoscopy (2006); Upper gastrointestinal endoscopy (2006); Colonoscopy (2016); and Colonoscopy (2011).     24 0815   Restrictions/Precautions   Restrictions/Precautions Fall Risk   General   Additional Pertinent Hx arthritis, GERD, HTN   Diagnosis CVA with Right hemiparesis   Balance   Sitting Balance Independent   Standing Balance Supervision   Functional Mobility   Functional - Mobility Device Rolling Walker   Activity Other;To/from bathroom   Assist Level Stand by assistance   Transfers   Sit to stand Supervision   Stand to sit Supervision   Toilet Transfers   Toilet - Technique Ambulating   Toilet Transfer Stand by assistance   OT Exercises   Resistive Exercises medium resistant Theraputty ex's with FM components   Motor Control/Coordination 
Occupational Therapy  Facility/Department: [facility] 8 REHAB UNIT  Rehabilitation Occupational Therapy Daily Treatment Note    Date: 24  Patient Name: Echo Brown       Room: 0815/815-02  MRN: [id number]  Account: [id number]   : 1945  (78 y.o.) Gender: male        Diagnosis: (P) CVA with Right hemiparesis  Additional Pertinent Hx: (P) arthritis, GERD, HTN    Treatment Diagnosis: (P) CVA with right hemiparesis   Past Medical History:  has a past medical history of Cerebrovascular accident (HCC), Closed fracture of multiple ribs, Closed injury of left kidney, Fracture of ramus of left pubis (HCC), Generalized anxiety disorder, Generalized osteoarthritis of multiple sites, GERD (gastroesophageal reflux disease), Hypertension, Hypertriglyceridemia, Idiopathic peripheral neuropathy, Irritable bowel syndrome with constipation, Restless legs syndrome, Sacral fracture (HCC), and Seasonal allergic rhinitis due to pollen.  Past Surgical History:   has a past surgical history that includes TURP (); hernia repair (); Kidney surgery (2022); Upper gastrointestinal endoscopy (2021); Upper gastrointestinal endoscopy (2016); Colonoscopy (2006); Upper gastrointestinal endoscopy (2006); Colonoscopy (2016); and Colonoscopy (2011).     24 1345   Restrictions/Precautions   Restrictions/Precautions Up Ad Kassandra   General   Additional Pertinent Hx arthritis, GERD, HTN   Diagnosis CVA with Right hemiparesis   Subjective   Subjective Pt states he did well with up ad kassandra privileges over the weekend. He completed his HEP with [employer] as well.   Balance   Sitting Balance Independent   Standing Balance Independent   Functional Mobility   Functional - Mobility Device Rolling Walker   Assist Level Modified independent    Functional Mobility Comments improved technique and safety in tight spaces   Transfers   Sit to stand Modified independent   Stand to sit Modified independent   OT 
Occupational Therapy  Facility/Department: [facility] 8 REHAB UNIT  Rehabilitation Occupational Therapy Daily Treatment Note    Date: 24  Patient Name: Echo Brown       Room: 0815/815-02  MRN: [id number]  Account: [id number]   : 1945  (78 y.o.) Gender: male                    Past Medical History:  has a past medical history of Cerebrovascular accident (HCC), Closed fracture of multiple ribs, Closed injury of left kidney, Fracture of ramus of left pubis (HCC), Generalized anxiety disorder, Generalized osteoarthritis of multiple sites, GERD (gastroesophageal reflux disease), Hypertension, Hypertriglyceridemia, Idiopathic peripheral neuropathy, Irritable bowel syndrome with constipation, Restless legs syndrome, Sacral fracture (HCC), and Seasonal allergic rhinitis due to pollen.  Past Surgical History:   has a past surgical history that includes TURP (); hernia repair (); Kidney surgery (2022); Upper gastrointestinal endoscopy (2021); Upper gastrointestinal endoscopy (2016); Colonoscopy (2006); Upper gastrointestinal endoscopy (2006); Colonoscopy (2016); and Colonoscopy (2011).     24 0830   Restrictions/Precautions   Restrictions/Precautions Fall Risk;Weight Bearing   General   Additional Pertinent Hx arthritis, GERD, HTN   Diagnosis CVA with Right hemiparesis   Subjective   Subjective Checked up ad marta.   ADL   Grooming Independent   Balance   Standing Balance Independent   Functional Mobility   Functional - Mobility Device Rolling Walker   Activity To/from bathroom   Assist Level Modified independent    Transfers   Sit to stand Modified independent   Stand to sit Modified independent   Toilet Transfers   Toilet - Technique Ambulating   Toilet Transfer Modified independent   Fine Motor Skills   Right 9 Hole Peg Test Time (secs) 45   Short Term Goals   Short Term Goal 1 MET   Short Term Goal 2 MET   Short Term Goal 3 MET   Short Term Goal 4 MET   Short 
Occupational Therapy  Facility/Department: [facility] 8 REHAB UNIT  Rehabilitation Occupational Therapy Daily Treatment Note    Date: 24  Patient Name: Echo Brown       Room: 0815/815-02  MRN: [id number]  Account: [id number]   : 1945  (78 y.o.) Gender: male        Diagnosis: (P) CVA with Right hemiparesis  Additional Pertinent Hx: (P) arthritis, GERD, HTN    Treatment Diagnosis: (P) CVA with right hemiparesis   Past Medical History:  has a past medical history of Cerebrovascular accident (HCC), Closed fracture of multiple ribs, Closed injury of left kidney, Fracture of ramus of left pubis (HCC), Generalized anxiety disorder, Generalized osteoarthritis of multiple sites, GERD (gastroesophageal reflux disease), Hypertension, Hypertriglyceridemia, Idiopathic peripheral neuropathy, Irritable bowel syndrome with constipation, Restless legs syndrome, Sacral fracture (HCC), and Seasonal allergic rhinitis due to pollen.  Past Surgical History:   has a past surgical history that includes TURP (); hernia repair (); Kidney surgery (2022); Upper gastrointestinal endoscopy (2021); Upper gastrointestinal endoscopy (2016); Colonoscopy (2006); Upper gastrointestinal endoscopy (2006); Colonoscopy (2016); and Colonoscopy (2011).     24 1300   Restrictions/Precautions   Restrictions/Precautions Fall Risk;Weight Bearing   General   Additional Pertinent Hx arthritis, GERD, HTN   Diagnosis CVA with Right hemiparesis   Balance   Sitting Balance Independent   Standing Balance Contact guard assistance   Functional Mobility   Functional - Mobility Device Rolling Walker   Activity Other  (in pt room and hallway)   Assist Level Contact guard assistance   Functional Mobility Comments good balance, occ cues for R step length   Transfers   Sit to stand Contact guard assistance   Stand to sit Contact guard assistance   Transfer Comments recliner, w/c   Sensation   Overall Sensation 
Occupational Therapy  Facility/Department: [facility] 8 REHAB UNIT  Rehabilitation Occupational Therapy Daily Treatment Note    Date: 24  Patient Name: Echo Brown       Room: 0815/815-02  MRN: [id number]  Account: [id number]   : 1945  (78 y.o.) Gender: male                    Past Medical History:  has a past medical history of Cerebrovascular accident (HCC), Closed fracture of multiple ribs, Closed injury of left kidney, Fracture of ramus of left pubis (HCC), Generalized anxiety disorder, Generalized osteoarthritis of multiple sites, GERD (gastroesophageal reflux disease), Hypertension, Hypertriglyceridemia, Idiopathic peripheral neuropathy, Irritable bowel syndrome with constipation, Restless legs syndrome, Sacral fracture (HCC), and Seasonal allergic rhinitis due to pollen.  Past Surgical History:   has a past surgical history that includes TURP (); hernia repair (); Kidney surgery (2022); Upper gastrointestinal endoscopy (2021); Upper gastrointestinal endoscopy (2016); Colonoscopy (2006); Upper gastrointestinal endoscopy (2006); Colonoscopy (2016); and Colonoscopy (2011).     24 1300   Restrictions/Precautions   Restrictions/Precautions Weight Bearing;Up Ad Kassandra   General   Additional Pertinent Hx arthritis, GERD, HTN   Diagnosis CVA with Right hemiparesis   Functional Mobility   Functional - Mobility Device Rolling Walker   Activity Other;To/From therapy gym   Assist Level Modified independent    Transfers   Sit to stand Modified independent   Stand to sit Modified independent   OT Exercises   Resistive Exercises Medium resistant Tband   Motor Control/Coordination FM GM coordination exs   Activity Tolerance   Activity Tolerance Patient Tolerated treatment well   Assessment   Performance deficits / Impairments Decreased functional mobility ;Decreased endurance;Decreased coordination;Decreased ADL status;Decreased sensation;Decreased high-level 
[facility] Inpatient Rehabilitation Unit  Test for Patient [place] in the Areas of Transfers/Ambulation    Ambulation/Transfers   Independent ambulation in room with assistive device:  Yes     -Device Type:  RW  Independent transfers from wheelchair to surface in room:  No     Bathroom Transfers  Independent transfers to toilet: Yes   Independent transfers for shower:  No     Ambulation in hallway  Patient able to ambulate in hallway with device:   No          Inpatient Rehabilitation Nursing and Therapies feel as though the patient qualifies for an acute rehabilitation test for independence in the areas of transfers and ambulation prior to discharging from Inpatient Rehabilitation Unit at [facility].  This test for independence in the areas of transfers and ambulation must be agreed upon by the patient's physician, nurse, and therapists.        Nurse Approval:  Electronically signed by Shanae Haines RN on 5/24/2024 at 11:41 AM     Physical Therapist Approval:  Electronically signed by Satish Conte PT on 5/24/24 at 11:12 AM CDT      Occupational Therapist Approval: Electronically signed by MURALI Grande on 5/24/2024 at 8:52 AM      
Patient:   Echo Brown  MR#:    [id number]   Room:    15/815-02   YOB: 1945  Date of Progress Note: 5/27/2024  Time of Note                           8:19 AM  Consulting Physician:   Lake Jordan M.D.  Attending Physician:  Lake Jordan MD     Chief complaint Acute ischemic stroke     S:This 78 y.o. male  with a history of arthritis, GERD, hypertension who presented to [facility] ER on 5/12/24 with some stroke like symptoms. Patient woke up and noticed tingling and numbness in his hand and has a progressive symptom worsened/progressed to right hand and leg weakness with slurred speech and difficulty finding words.  He did have improvement in many of his symptoms by the time he had to ER.  Initial head CT and perfusion scans not overtly abnormal from a stroke standpoint.  Some concern for possible NPH with ventriculomegaly.  Patient does have some fall and gait issues.  He was admitted to the hospital for care and further workup.  On 5/13 he had return of his symptoms and was reevaluated with imaging and found to have an acute stroke on imaging.  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  He is dysarthric speech has improved.  He still has some slight right upper extremity weakness.  Overall improving.  He is on high-dose statin.  Has been started on aspirin and Plavix.  Plan for dual antiplatelet therapy for 21 days and then aspirin alone.  Eventually a few months when improved and out of initial window for stroke recurrence will likely get NPH workup through neurology.  Blood pressure has been starting to elevate some will resume his lisinopril.    He is now medically stable and is participating with therapy. He is ready to begin rehab with goal of returning home after rehab dc with support from his wife and family.  Right-sided ptosis improved after Medrol Dosepak.  No new complaints.  Feeling well this morning      REVIEW OF SYSTEMS:  Constitutional: No fevers No 
Patient:   Echo Brown  MR#:    [id number]   Room:    15/815-02   YOB: 1945  Date of Progress Note: 5/19/2024  Time of Note                           8:26 AM  Consulting Physician:   Lake Jordan M.D.  Attending Physician:  Lake Jordan MD     Chief complaint Acute ischemic stroke     S:This 78 y.o. male  with a history of arthritis, GERD, hypertension who presented to [facility] ER on 5/12/24 with some stroke like symptoms. Patient woke up and noticed tingling and numbness in his hand and has a progressive symptom worsened/progressed to right hand and leg weakness with slurred speech and difficulty finding words.  He did have improvement in many of his symptoms by the time he had to ER.  Initial head CT and perfusion scans not overtly abnormal from a stroke standpoint.  Some concern for possible NPH with ventriculomegaly.  Patient does have some fall and gait issues.  He was admitted to the hospital for care and further workup.  On 5/13 he had return of his symptoms and was reevaluated with imaging and found to have an acute stroke on imaging.  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  He is dysarthric speech has improved.  He still has some slight right upper extremity weakness.  Overall improving.  He is on high-dose statin.  Has been started on aspirin and Plavix.  Plan for dual antiplatelet therapy for 21 days and then aspirin alone.  Eventually a few months when improved and out of initial window for stroke recurrence will likely get NPH workup through neurology.  Blood pressure has been starting to elevate some will resume his lisinopril.    He is now medically stable and is participating with therapy. He is ready to begin rehab with goal of returning home after rehab dc with support from his wife and family.  Some increased cough and congestion but doing better this am after Z jackeline started.     REVIEW OF SYSTEMS:  Constitutional: No fevers No chills  Neck:No 
Patient:   Echo Brown  MR#:    [id number]   Room:    15/815-02   YOB: 1945  Date of Progress Note: 5/25/2024  Time of Note                           8:23 AM  Consulting Physician:   Lake Jordan M.D.  Attending Physician:  Lake Jordan MD     Chief complaint Acute ischemic stroke     S:This 78 y.o. male  with a history of arthritis, GERD, hypertension who presented to [facility] ER on 5/12/24 with some stroke like symptoms. Patient woke up and noticed tingling and numbness in his hand and has a progressive symptom worsened/progressed to right hand and leg weakness with slurred speech and difficulty finding words.  He did have improvement in many of his symptoms by the time he had to ER.  Initial head CT and perfusion scans not overtly abnormal from a stroke standpoint.  Some concern for possible NPH with ventriculomegaly.  Patient does have some fall and gait issues.  He was admitted to the hospital for care and further workup.  On 5/13 he had return of his symptoms and was reevaluated with imaging and found to have an acute stroke on imaging.  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  He is dysarthric speech has improved.  He still has some slight right upper extremity weakness.  Overall improving.  He is on high-dose statin.  Has been started on aspirin and Plavix.  Plan for dual antiplatelet therapy for 21 days and then aspirin alone.  Eventually a few months when improved and out of initial window for stroke recurrence will likely get NPH workup through neurology.  Blood pressure has been starting to elevate some will resume his lisinopril.    He is now medically stable and is participating with therapy. He is ready to begin rehab with goal of returning home after rehab dc with support from his wife and family.  Right-sided ptosis improved after Medrol Dosepak.  No new complaints.  Feeling well this morning      REVIEW OF SYSTEMS:  Constitutional: No fevers No 
Patient:   Echo Brown  MR#:    [id number]   Room:    15/815-02   YOB: 1945  Date of Progress Note: 5/27/2024  Time of Note                           8:19 AM  Consulting Physician:   Lake Jordan M.D.  Attending Physician:  Lake Jordan MD     Chief complaint Acute ischemic stroke     S:This 78 y.o. male  with a history of arthritis, GERD, hypertension who presented to [facility] ER on 5/12/24 with some stroke like symptoms. Patient woke up and noticed tingling and numbness in his hand and has a progressive symptom worsened/progressed to right hand and leg weakness with slurred speech and difficulty finding words.  He did have improvement in many of his symptoms by the time he had to ER.  Initial head CT and perfusion scans not overtly abnormal from a stroke standpoint.  Some concern for possible NPH with ventriculomegaly.  Patient does have some fall and gait issues.  He was admitted to the hospital for care and further workup.  On 5/13 he had return of his symptoms and was reevaluated with imaging and found to have an acute stroke on imaging.  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  He is dysarthric speech has improved.  He still has some slight right upper extremity weakness.  Overall improving.  He is on high-dose statin.  Has been started on aspirin and Plavix.  Plan for dual antiplatelet therapy for 21 days and then aspirin alone.  Eventually a few months when improved and out of initial window for stroke recurrence will likely get NPH workup through neurology.  Blood pressure has been starting to elevate some will resume his lisinopril.    He is now medically stable and is participating with therapy. He is ready to begin rehab with goal of returning home after rehab dc with support from his wife and family.  Right-sided ptosis improved after Medrol Dosepak.  Feels well. No new issues.       REVIEW OF SYSTEMS:  Constitutional: No fevers No chills  Neck:No 
Patient:   Echo Brown  MR#:    [id number]   Room:    15/815-02   YOB: 1945  Date of Progress Note: 5/23/2024  Time of Note                           7:26 AM  Consulting Physician:   Lake Jordan M.D.  Attending Physician:  Lake Jordan MD     Chief complaint Acute ischemic stroke     S:This 78 y.o. male  with a history of arthritis, GERD, hypertension who presented to [facility] ER on 5/12/24 with some stroke like symptoms. Patient woke up and noticed tingling and numbness in his hand and has a progressive symptom worsened/progressed to right hand and leg weakness with slurred speech and difficulty finding words.  He did have improvement in many of his symptoms by the time he had to ER.  Initial head CT and perfusion scans not overtly abnormal from a stroke standpoint.  Some concern for possible NPH with ventriculomegaly.  Patient does have some fall and gait issues.  He was admitted to the hospital for care and further workup.  On 5/13 he had return of his symptoms and was reevaluated with imaging and found to have an acute stroke on imaging.  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  He is dysarthric speech has improved.  He still has some slight right upper extremity weakness.  Overall improving.  He is on high-dose statin.  Has been started on aspirin and Plavix.  Plan for dual antiplatelet therapy for 21 days and then aspirin alone.  Eventually a few months when improved and out of initial window for stroke recurrence will likely get NPH workup through neurology.  Blood pressure has been starting to elevate some will resume his lisinopril.    He is now medically stable and is participating with therapy. He is ready to begin rehab with goal of returning home after rehab dc with support from his wife and family.  Continues to feel better.  Slept well except for coughing fit around 1 AM.  Some constipation      REVIEW OF SYSTEMS:  Constitutional: No fevers No 
Patient:   Echo Brown  MR#:    [id number]   Room:    15/815-02   YOB: 1945  Date of Progress Note: 5/22/2024  Time of Note                           7:03 AM  Consulting Physician:   Lake Jordan M.D.  Attending Physician:  Lake Jordan MD     Chief complaint Acute ischemic stroke     S:This 78 y.o. male  with a history of arthritis, GERD, hypertension who presented to [facility] ER on 5/12/24 with some stroke like symptoms. Patient woke up and noticed tingling and numbness in his hand and has a progressive symptom worsened/progressed to right hand and leg weakness with slurred speech and difficulty finding words.  He did have improvement in many of his symptoms by the time he had to ER.  Initial head CT and perfusion scans not overtly abnormal from a stroke standpoint.  Some concern for possible NPH with ventriculomegaly.  Patient does have some fall and gait issues.  He was admitted to the hospital for care and further workup.  On 5/13 he had return of his symptoms and was reevaluated with imaging and found to have an acute stroke on imaging.  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  He is dysarthric speech has improved.  He still has some slight right upper extremity weakness.  Overall improving.  He is on high-dose statin.  Has been started on aspirin and Plavix.  Plan for dual antiplatelet therapy for 21 days and then aspirin alone.  Eventually a few months when improved and out of initial window for stroke recurrence will likely get NPH workup through neurology.  Blood pressure has been starting to elevate some will resume his lisinopril.    He is now medically stable and is participating with therapy. He is ready to begin rehab with goal of returning home after rehab dc with support from his wife and family.  Feeling much better this morning.      REVIEW OF SYSTEMS:  Constitutional: No fevers No chills  Neck:No stiffness  Respiratory: No shortness of 
Patient:   Echo Brown  MR#:    [id number]   Room:    15/815-02   YOB: 1945  Date of Progress Note: 5/19/2024  Time of Note                           8:26 AM  Consulting Physician:   Lake Jordan M.D.  Attending Physician:  Lake Jordan MD     Chief complaint Acute ischemic stroke     S:This 78 y.o. male  with a history of arthritis, GERD, hypertension who presented to [facility] ER on 5/12/24 with some stroke like symptoms. Patient woke up and noticed tingling and numbness in his hand and has a progressive symptom worsened/progressed to right hand and leg weakness with slurred speech and difficulty finding words.  He did have improvement in many of his symptoms by the time he had to ER.  Initial head CT and perfusion scans not overtly abnormal from a stroke standpoint.  Some concern for possible NPH with ventriculomegaly.  Patient does have some fall and gait issues.  He was admitted to the hospital for care and further workup.  On 5/13 he had return of his symptoms and was reevaluated with imaging and found to have an acute stroke on imaging.  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  He is dysarthric speech has improved.  He still has some slight right upper extremity weakness.  Overall improving.  He is on high-dose statin.  Has been started on aspirin and Plavix.  Plan for dual antiplatelet therapy for 21 days and then aspirin alone.  Eventually a few months when improved and out of initial window for stroke recurrence will likely get NPH workup through neurology.  Blood pressure has been starting to elevate some will resume his lisinopril.    He is now medically stable and is participating with therapy. He is ready to begin rehab with goal of returning home after rehab dc with support from his wife and family.  Some increased cough and congestion this morning.    REVIEW OF SYSTEMS:  Constitutional: No fevers No chills  Neck:No stiffness  Respiratory: No shortness 
Patient:   Echo Brown  MR#:    [id number]   Room:    15/815-02   YOB: 1945  Date of Progress Note: 5/24/2024  Time of Note                           7:55 AM  Consulting Physician:   Lake Jordan M.D.  Attending Physician:  Lake Jordan MD     Chief complaint Acute ischemic stroke     S:This 78 y.o. male  with a history of arthritis, GERD, hypertension who presented to [facility] ER on 5/12/24 with some stroke like symptoms. Patient woke up and noticed tingling and numbness in his hand and has a progressive symptom worsened/progressed to right hand and leg weakness with slurred speech and difficulty finding words.  He did have improvement in many of his symptoms by the time he had to ER.  Initial head CT and perfusion scans not overtly abnormal from a stroke standpoint.  Some concern for possible NPH with ventriculomegaly.  Patient does have some fall and gait issues.  He was admitted to the hospital for care and further workup.  On 5/13 he had return of his symptoms and was reevaluated with imaging and found to have an acute stroke on imaging.  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  He is dysarthric speech has improved.  He still has some slight right upper extremity weakness.  Overall improving.  He is on high-dose statin.  Has been started on aspirin and Plavix.  Plan for dual antiplatelet therapy for 21 days and then aspirin alone.  Eventually a few months when improved and out of initial window for stroke recurrence will likely get NPH workup through neurology.  Blood pressure has been starting to elevate some will resume his lisinopril.    He is now medically stable and is participating with therapy. He is ready to begin rehab with goal of returning home after rehab dc with support from his wife and family.  Right-sided ptosis improved after Medrol Dosepak.  Some blood on wiping after bowel movement.      REVIEW OF SYSTEMS:  Constitutional: No fevers No 
Patient:   Echo Brown  MR#:    [id number]   Room:    15/815-02   YOB: 1945  Date of Progress Note: 5/27/2024  Time of Note                           8:19 AM  Consulting Physician:   Lake Jordan M.D.  Attending Physician:  Lake Jordan MD     Chief complaint Acute ischemic stroke     S:This 78 y.o. male  with a history of arthritis, GERD, hypertension who presented to [facility] ER on 5/12/24 with some stroke like symptoms. Patient woke up and noticed tingling and numbness in his hand and has a progressive symptom worsened/progressed to right hand and leg weakness with slurred speech and difficulty finding words.  He did have improvement in many of his symptoms by the time he had to ER.  Initial head CT and perfusion scans not overtly abnormal from a stroke standpoint.  Some concern for possible NPH with ventriculomegaly.  Patient does have some fall and gait issues.  He was admitted to the hospital for care and further workup.  On 5/13 he had return of his symptoms and was reevaluated with imaging and found to have an acute stroke on imaging.  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  He is dysarthric speech has improved.  He still has some slight right upper extremity weakness.  Overall improving.  He is on high-dose statin.  Has been started on aspirin and Plavix.  Plan for dual antiplatelet therapy for 21 days and then aspirin alone.  Eventually a few months when improved and out of initial window for stroke recurrence will likely get NPH workup through neurology.  Blood pressure has been starting to elevate some will resume his lisinopril.    He is now medically stable and is participating with therapy. He is ready to begin rehab with goal of returning home after rehab dc with support from his wife and family.  Right-sided ptosis improved after Medrol Dosepak. No new complaints. Feels well.       REVIEW OF SYSTEMS:  Constitutional: No fevers No chills  Neck:No 
Patient:   Echo Brown  MR#:    [id number]   Room:    15/815-02   YOB: 1945  Date of Progress Note: 5/18/2024  Time of Note                           9:24 AM  Consulting Physician:   Lake Jordan M.D.  Attending Physician:  Lake Jordan MD     Chief complaint Acute ischemic stroke     S:This 78 y.o. male  with a history of arthritis, GERD, hypertension who presented to [facility] [place], operated by [facility] ER on 5/12/24 with some stroke like symptoms. Patient woke up and noticed tingling and numbness in his hand and has a progressive symptom worsened/progressed to right hand and leg weakness with slurred speech and difficulty finding words.  He did have improvement in many of his symptoms by the time he had to ER.  Initial head CT and perfusion scans not overtly abnormal from a stroke standpoint.  Some concern for possible NPH with ventriculomegaly.  Patient does have some fall and gait issues.  He was admitted to the hospital for care and further workup.  On 5/13 he had return of his symptoms and was reevaluated with imaging and found to have an acute stroke on imaging.  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  He is dysarthric speech has improved.  He still has some slight right upper extremity weakness.  Overall improving.  He is on high-dose statin.  Has been started on aspirin and Plavix.  Plan for dual antiplatelet therapy for 21 days and then aspirin alone.  Eventually a few months when improved and out of initial window for stroke recurrence will likely get NPH workup through neurology.  Blood pressure has been starting to elevate some will resume his lisinopril.    He is now medically stable and is participating with therapy. He is ready to begin rehab with goal of returning home after rehab dc with support from his wife and family.  No new issues.  Asking for restless leg medications and bowel regimen earlier in the evening.      REVIEW OF SYSTEMS:  Constitutional: No fevers No 
Physical Therapy  Name: Echo Brown  MRN:  [id number]  Date of service:  5/27/2024 05/27/24 1300   Restrictions/Precautions   Restrictions/Precautions Weight Bearing;Up Ad Kassandra   Lower Extremity Weight Bearing Restrictions   Right Lower Extremity Weight Bearing Weight Bearing As Tolerated   Left Lower Extremity Weight Bearing Weight Bearing As Tolerated   General   Additional Pertinent Hx Monoplegia of RUE; Dysarthia; Obstructive sleep apnea; Restless leg syndrome; Degenerative disease of nervous system; Primary hypertension; Lobar pneumonia; GERD w/o esophagitis; Hyperglyceridemia; Dysphagia; Generalized anxiety   Diagnosis Cerebral infarction, Left thalamus; NPH   General Comment   Comments pt sitting eob upon arrival   Subjective   Subjective A little \"numb and tired\" but ready for PT again.   Subjective   Subjective denies any pain   Transfers   Sit to Stand Modified independent   Stand to Sit Modified independent   Bed to Chair Modified independent   Ambulation   WB Status WBAT   Ambulation   Surface Level tile   Device Rolling Walker   Assistance Modified Independent;Stand by assistance   Quality of Gait reciprocal pattern; intermittent down gaze, shoulders elevated   Gait Deviations Decreased step height;Slow Loren   Distance 150' x 2   Ambulation 2   Surface - 2 level tile   Device 2 Rolling Walker   Assistance 2 Stand by assistance   Quality of Gait 2 reciprocal pattern; intermittent down gaze   Distance 200'   Comments cues to relax shoulders with pt self correcting at times   PT Exercises   Resistive Exercises B LE man resist x 15: knee ext, knee flex, hip abd, hip add, hip ext  (accepts moderate resistance)   Dynamic Standing Balance Exercises 360* turns x 2 ea direction with 1-2 handed support and need to segmentalize   Standing Open/Closed Kinetic Chain Exercises B standing at rail x 20: heel raises, toe raises, marching, hip abd, hip ext   Assessment   Assessment Pt with some fatigue this 
Physical Therapy  Name: Echo Brown  MRN:  [id number]  Date of service:  5/27/2024 05/27/24 0900   Restrictions/Precautions   Restrictions/Precautions Weight Bearing;Up Ad Kassandra   Lower Extremity Weight Bearing Restrictions   Right Lower Extremity Weight Bearing Weight Bearing As Tolerated   Left Lower Extremity Weight Bearing Weight Bearing As Tolerated   General   Additional Pertinent Hx Monoplegia of RUE; Dysarthia; Obstructive sleep apnea; Restless leg syndrome; Degenerative disease of nervous system; Primary hypertension; Lobar pneumonia; GERD w/o esophagitis; Hyperglyceridemia; Dysphagia; Generalized anxiety   Diagnosis Cerebral infarction, Left thalamus; NPH   General Comment   Comments pt sitting eob upon arrival   Subjective   Subjective Pt agreeable with optimism ad reports of feeling better confidence. Denies any pain or issue.   Subjective   Subjective denies any pain   Transfers   Sit to Stand Modified independent   Stand to Sit Modified independent   Bed to Chair Modified independent   Ambulation   WB Status WBAT   Ambulation   Surface Level tile   Device Rolling Walker   Assistance Modified Independent;Stand by assistance   Quality of Gait reciprocal pattern; intermittent down gaze, shoulders elevated   Gait Deviations Decreased step height;Slow Loren   Distance 150' x 2   Ambulation 2   Surface - 2 level tile   Device 2 Rolling Walker   Assistance 2 Stand by assistance   Quality of Gait 2 reciprocal pattern; intermittent down gaze   Distance 200'   Comments cues to relax shoulders with pt self correcting at times   Stairs   # Steps  3   Stairs Height 6\"   Rails Right ascending   Assistance Stand by assistance   Comment started with step to pattern but switched to reciprocal and performed in this manner without instability or lob   PT Exercises   Static Standing Balance Exercises standing on foam wedge without UE support given minimal forces for balance perterbations at random all directions w/o 
Physical Therapy  Name: Echo Brown  MRN:  [id number]  Date of service:  5/18/2024 05/18/24 0900   Restrictions/Precautions   Restrictions/Precautions Fall Risk;Weight Bearing   Lower Extremity Weight Bearing Restrictions   Right Lower Extremity Weight Bearing Weight Bearing As Tolerated   Left Lower Extremity Weight Bearing Weight Bearing As Tolerated   General   Additional Pertinent Hx Monoplegia of RUE; Dysarthia; Obstructive sleep apnea; Restless leg syndrome; Degenerative disease of nervous system; Primary hypertension; Lobar pneumonia; GERD w/o esophagitis; Hyperglyceridemia; Dysphagia; Generalized anxiety   Diagnosis Cerebral infarction, Left thalamus; NPH   General Comment   Comments sitting upright in recliner, visiting with staff/family   Subjective   Subjective Agreeable and seemingly in good spirits. Reports a little HA and eye inflammation particularly R eye painful.   Subjective   Subjective R eye   Pain 5/10 (reports better than after he first woke up this am)   Transfers   Sit to Stand Stand by assistance   Stand to Sit Stand by assistance   Bed to Chair Stand by assistance  (stand step with RW)   Comment pt with inconsistent hand placement with STS transitions requiring cues/reminders   Ambulation   WB Status WBAT   Ambulation   Surface Level tile   Device Rolling Walker   Assistance Contact guard assistance   Quality of Gait 3 point gt reviewed at start of amb and he was able to apply consistently 90% of gt only errors with turns and distractions with initial tendency to step excessively; one instance towards end of gt losing R hand  on walker but able to reposition; ER R LE though mild   Gait Deviations Slow Loren   Distance 150'   Comments turns incoporated   PT Exercises   Motor Control/Coordination standing at bottom of stairs B UE support on rails: toe taps on 4\" step x 20 alternating L/R, lateral taps on floor x 15 alternating, retro taps on floor x 15 alternating   Standing 
[facility]  INPATIENT SPEECH THERAPY  [facility] 8 REHAB UNIT  TIME   1100  1200    [x]Daily Note  []Progress Note    Date: 2024  Patient Name: Echo rBown        MRN: [id number]    Account #: [id number]  : 1945  (78 y.o.)  Gender: male   Primary Provider: Lake Jordan MD  Liquid Consistency Recommendation: Thin  Diet Solids Recommendation: Regular       PATIENT DIAGNOSIS(ES):    Diagnosis: Cerebral infarction, Left thalamus; NPH     Additional Pertinent Hx: Monoplegia of RUE; Dysarthia; Obstructive sleep apnea; Restless leg syndrome; Degenerative disease of nervous system; Primary hypertension; Lobar pneumonia; GERD w/o esophagitis; Hyperglyceridemia; Dysphagia; Generalized anxiety     RESTRICTIONS/PRECAUTIONS:    Restrictions/Precautions  Restrictions/Precautions:  (WBAT)        CXR 24  Findings: Heart size normal. No pleural effusion or pneumothorax. No focal airspace opacification.     IMPRESSION:  Impression:  No acute findings     Electronically signed by: JUAN CARLOS CARDENAS D.O.  Date:     2024      Subjective:   He is highly motivated to improve.     Objective:  His discharge date is 24.     Mildly hoarse vocal quality is observed today.    Mild dysarthria is noted at times. Blended word boundaries are noted.    He reports increased oral sensation today.  He reports he is not biting the inside of his cheek as often.    The patient has been completing his oral motor exercises independently.    Oral motor exercises and tongue twisters were completed today.     Mildly decreased secretion management is noted this date.    One episode of coughing with ground meat was observed due to incomplete mastication.    He reported he used to feel he was \"out of gas at 4:00 pm\" before this hospitalization.    He was asked to complete higher level recall tasks and scored at 75%.    He was asked to complete higher level word completion tasks. Mild difficulty was observed with word retrieval.    He was 
[facility]  [facility]PATIENT SPEECH [facility]ERAPY  [facility] 8 REHAB UNIT  TIME   07:00  07:30  30 minutes     [x]Daily Note  []Progress Note     Date: 24  Patient Name: Echo Brown        MRN: [id number]    Account #: [id number]  : [birth year]  (78 y.o.)  Gender: Male   Primary Provider: Julian KEARNS  Liquid Consistency Recommendation: Thin  Diet Solids Recommendation: Regular     Subjective:   Patient alert, in bed, upon entry. No pain reported during treatment session.     Objective:  Completed the following oral motor exercises:   Have patient open mouth wide, gently but firming tap finger/instrument along the side of the tongue (towards midline) 3 x’s and quickly drop finger/instrument along gum line or just outside of lip.  Tongue should follow pressure.  If not cue patient to find finger/instrument.  Repeat same exercise in reps of 3 at lower gum line, middle of cheek and upper gum on both sides of mouth.  Have patient extend tongue out, run instrument down middle of tongue with gentle but firm pressure.  Tongue should stabilize with minimal rolling/tremors noted.  As you reach the tongue tip, you should see the tongue extend and point.  Repeat 3 x’s.  Same as before, have patient extend tongue out, starting at tongue tip brush up towards midline of tongue blade.  Tongue tip should curl in response and you should see the natural “bowl” reflex appear.  Press firmly down on the center of tongue.  Release and move towards instrument to hard palate.  Tongue should follow pressure.  (Like a spring).  Brush instrument along the alveolar ridge 3x’s and remove.  Tongue should extend to ridge.  If not cue patient to extend tongue up along ridge.  Repeat same along the lower central gum line.  Tongue should retract to give you room naturally.    Targeted tools/strategies to utilize to promote increased clarity of speech. Reviewed slow speech rate with over-articulation/over-exaggeration. In structured activity, patient demonstrated 
[facility]  [facility]PAT[facility] SPE[facility]CH [facility]ERAPY  [facility] 8 REHAB UNIT  TIME   Time In: 1300  Time Out: 1330  Minutes: 30       [x]Daily Note  []Progress Note    Date: 2024  Patient Name: Echo Brown        MRN: [id number]    Account #: [id number]  : 1945  (78 y.o.)  Gender: male   Primary Provider: Lake Jordan MD  Swallowing Status/Diet:  Liquid Consistency Recommendation: Thin  Diet Solids Recommendation: Regular      PATIENT DIAGNOSIS(ES):    Diagnosis: Cerebral infarction, Left thalamus; NPH     Additional Pertinent Hx: Monoplegia of RUE; Dysarthia; Obstructive sleep apnea; Restless leg syndrome; Degenerative disease of nervous system; Primary hypertension; Lobar pneumonia; GERD w/o esophagitis; Hyperglyceridemia; Dysphagia; Generalized anxiety    RESTRICTIONS/PRECAUTIONS:    Restrictions/Precautions  Restrictions/Precautions:  (WBAT)        Subjective:   He states he is tired this afternoon. He states his appetite has been good. He is pleasant and cooperative with all therapy tasks.      Objective:  Reviewed the results of the CLQT again today. The CLQT results were: He scored mild in attention. He scored within normal limits in memory, executive function, language, and visuospatial skills. His overall score was 3.8 out of 4.0 possible points which is considered within normal limits.     He feels he loses his attention easily.He states he used to look at his checking account every day and pay his bills immediately. He feels he is spending more time looking at his account now and feels he doesn't have more time for reading and other activities. His children are now managing his finances but this is still concerning to him.       Will assess his medication management skills next week. Will continue to target language and speech (word finding deficits, hesitations in speech, difficulty with articulation etc).    He enjoys reading medical journals and finding out about new medications.     Mental manipulation 
[facility]  INPATIENT SPEECH THERAPY  [facility] 8 REHAB UNIT  TIME   1100  1200        [x]Daily Note  []Progress Note    Date: 2024  Patient Name: Echo Brown        MRN: [id number]    Account #: [id number]  : 1945  (78 y.o.)  Gender: male   Primary Provider: Lake Jordan MD  Swallowing Status/Diet:  Liquid Consistency Recommendation: Thin  Diet Solids Recommendation: Regular      PATIENT DIAGNOSIS(ES):    Diagnosis: Cerebral infarction, Left thalamus; NPH     Additional Pertinent Hx: Monoplegia of RUE; Dysarthia; Obstructive sleep apnea; Restless leg syndrome; Degenerative disease of nervous system; Primary hypertension; Lobar pneumonia; GERD w/o esophagitis; Hyperglyceridemia; Dysphagia; Generalized anxiety    RESTRICTIONS/PRECAUTIONS:    Restrictions/Precautions  Restrictions/Precautions:  (WBAT)    OVERALL  ORIENTATION STATUS:  Overall Orientation Status: Within Normal Limits               Subjective:  The patient was cooperative and pleasant.    Objective:  He did express concerns regarding his immediate and short term memory. He states his family has been managing his finances for him due to difficulty with processing, recall, and attention.    The Cognitive Linguistic Quick Test (CLQT) was completed this date. The CLQT was developed for use with adults with acquired neurological dysfunction. This individually administered test is designed to gain information about cognitive-linguistic domains, a total composite severity rating and a clock drawing severity rating. The CLQT consists of ten tasks: Personal facts, Symbol cancellation, Confrontation naming, Clock drawing, Story retelling, Symbol trails, Generative naming, Design memory, Mazes, and Design generation.     CLQT  Cognitive Domain Scoring Range Score Severity   Attention 159-100 131 Mild   Memory 185-141 159 WNL   Executive Function 40-19 19 WNL   Language 37-28 31 WNL   Visuospatial 105-62 64 WNL   Composite 4.0-3.5  3.8 WNL     He scored 
[facility]  INPATIENT SPEECH THERAPY  [facility] 8 REHAB UNIT          TIME   1100  1200        [x]Daily Note  []Progress Note    Date: 2024  Patient Name: Echo Brown        MRN: [id number]    Account #: [id number]  : 1945  (78 y.o.)  Gender: male   Primary Provider: Lake Jordan MD  Liquid Consistency Recommendation: Thin  Diet Solids Recommendation: Regular       PATIENT DIAGNOSIS(ES):    Diagnosis: Cerebral infarction, Left thalamus; NPH     Additional Pertinent Hx: Monoplegia of RUE; Dysarthia; Obstructive sleep apnea; Restless leg syndrome; Degenerative disease of nervous system; Primary hypertension; Lobar pneumonia; GERD w/o esophagitis; Hyperglyceridemia; Dysphagia; Generalized anxiety     RESTRICTIONS/PRECAUTIONS:    Restrictions/Precautions  Restrictions/Precautions:  (WBAT)        CXR 24  Findings: Heart size normal. No pleural effusion or pneumothorax. No focal airspace opacification.     IMPRESSION:  Impression:  No acute findings     Electronically signed by: JUAN CARLOS CARDENAS D.O.  Date:     2024               Subjective:   He denies pain this morning. He appears stronger today.       Objective:  He wants to be able to move independently in his room. This was communicated to his PT.    He reports hx of hematomas on his kidney after a fall years ago and has had difficulty with his left leg ever since. This was reported to PT.    He completed oral motor exercises this date. He has been encouraged to complete these independently in his room  He reports he has bitten his tongue and right inner cheek. He now masticates on the left side of his mouth. He reports food getting stuck in the right lower sulcus.     He reports right sided facial and oral numbness.  He has right sided labial asymmetry.     He completed complex temporal orientation tasks and scored at 100%.     He completed a medication management task without any cueing or assistance. He scored at 100%.    Will complete 
[facility]  INPATIENT SPEECH THERAPY  [facility] 8 REHAB UNIT  TIME         [x]Daily Note  []Progress Note    Date: 2024  Patient Name: Echo Brown        MRN: [id number]    Account #: [id number]  : 1945  (78 y.o.)  Gender: male   Primary Provider: Lake Jordan MD  Liquid Consistency Recommendation: Thin  Diet Solids Recommendation: Regular       PATIENT DIAGNOSIS(ES):    Diagnosis: Cerebral infarction, Left thalamus; NPH     Additional Pertinent Hx: Monoplegia of RUE; Dysarthia; Obstructive sleep apnea; Restless leg syndrome; Degenerative disease of nervous system; Primary hypertension; Lobar pneumonia; GERD w/o esophagitis; Hyperglyceridemia; Dysphagia; Generalized anxiety     RESTRICTIONS/PRECAUTIONS:    Restrictions/Precautions  Restrictions/Precautions:  (WBAT)        CXR 24  Findings: Heart size normal. No pleural effusion or pneumothorax. No focal airspace opacification.     IMPRESSION:  Impression:  No acute findings    Electronically signed by: JUAN CARLOS CARDENAS D.O.  Date:     2024              Patient stated he did not feel well this morning. A CXR and respiratory panel has been ordered.    Will hold therapy this morning. Will attempt again this afternoon.    A medical hold was placed for therapy today. Will resume therapy tomorrow if he is feeling better and is able to participate.            He has reported difficulty with sustained and selective attention, overall processing, word retrieval and recall. He is recommended to continue speech therapy services.        Diet Solids Recommendation:  Diet Solids Recommendation: Regular  Diet Liquid Recommendation:  Liquid Consistency Recommendation: Thin  Compensatory Swallowing Strategies:  Compensatory Swallowing Strategies : Upright as possible for all oral intake, Remain upright for 30-45 minutes after meals, Eat/Feed slowly, Small bites/sips                    SHORT TERM GOAL #1:  Goal 1: The patient will complete the Cognitive 
[facility]  INPATIENT SPEECH THERAPY  [facility] 8 REHAB UNIT  TIME   1100  1200        [x]Daily Note  []Progress Note    Date: 2024  Patient Name: Echo Brown        MRN: [id number]    Account #: [id number]  : 1945  (78 y.o.)  Gender: male   Primary Provider: Lake Jordan MD  Liquid Consistency Recommendation: Thin  Diet Solids Recommendation: Regular       PATIENT DIAGNOSIS(ES):    Diagnosis: Cerebral infarction, Left thalamus; NPH     Additional Pertinent Hx: Monoplegia of RUE; Dysarthia; Obstructive sleep apnea; Restless leg syndrome; Degenerative disease of nervous system; Primary hypertension; Lobar pneumonia; GERD w/o esophagitis; Hyperglyceridemia; Dysphagia; Generalized anxiety     RESTRICTIONS/PRECAUTIONS:    Restrictions/Precautions  Restrictions/Precautions:  (WBAT)        CXR 24  Findings: Heart size normal. No pleural effusion or pneumothorax. No focal airspace opacification.     IMPRESSION:  Impression:  No acute findings     Electronically signed by: JUAN CARLOS CARDENAS D.O.  Date:     2024              Subjective:   He reports a good appetite.He states he slept well. He appears to be feeling better today.        Objective:  No overt s/s of aspiration observed with thin liquids via straw. Clear voicing was noted after all sips. Hoarse vocal quality. He still has some congestion but states he feels better. He reports congested cough with expectoration of yellowish sputum.    He completed a task for reading a medication label and he scored at 100%.    An abstract divergent naming task was completed and he did not require any cueing.    Unscrambling sentences was at 100%    He was asked to recall sixteen words which were divided into four categories.  He was able to recall 14 words without cues.  With semantic cues he was able to recall the remaining words.    He is using his spirometer and reaching 1500ml.    He had increased difficulty with managing secretions in the right side of 
[facility]  [facility]  [facility] 8 REHAB UNIT  TIME   1100  1200        [x]Daily Note  []Progress Note    Date: 2024  Patient Name: Echo Brown        MRN: [id number]    Account #: [id number]  : 1945  (78 y.o.)  Gender: male   Primary Provider: Lake Jordan MD  Swallowing Status/Diet:  Liquid Consistency Recommendation: Thin  Diet Solids Recommendation: Regular      PATIENT DIAGNOSIS(ES):    Diagnosis: Cerebral infarction, Left thalamus; NPH     Additional Pertinent Hx: Monoplegia of RUE; Dysarthia; Obstructive sleep apnea; Restless leg syndrome; Degenerative disease of nervous system; Primary hypertension; Lobar pneumonia; GERD w/o esophagitis; Hyperglyceridemia; Dysphagia; Generalized anxiety     RESTRICTIONS/PRECAUTIONS:    Restrictions/Precautions  Restrictions/Precautions:  (WBAT)        CXR 24  Findings: Heart size normal. No pleural effusion or pneumothorax. No focal airspace opacification.     IMPRESSION:  Impression:  No acute findings     Electronically signed by: JUAN CARLOS CARDENAS D.O.  Date:     2024        Subjective:   The patient's wife was present for today's session.     Objective:  His discharge date is 24. He is looking forward to returning home.     Mild dysarthria is noted at times.     He reports increased oral sensation. The patient has been completing his oral motor exercises independently.    He denies any difficulty swallowing foods, liquids or medications.    He does report increased difficulty with speech intelligibility when he is tired.    He was able to recall and demonstrate his dysarthria strategies this date.     He is feeding himself with his right hand. Swallowing precautions were reviewed this date. He was encouraged to eat and drink slowly when at home. He was encouraged to take small bites and masticate all foods thoroughly. He was encouraged to use a lingual search/sweep during and after meals. He was encouraged to use a lingual 
place;Call light within reach         Electronically signed by Jessica Boyd PTA on 5/21/2024 at 11:03 AM   
stiffness  Respiratory: No shortness of breath  Cardiovascular: No chest pain No palpitations  Gastrointestinal: No abdominal pain    Genitourinary: No Dysuria  Neurological: No headache, no confusion    Past Medical History:      Diagnosis Date    Cerebrovascular accident ([facility]) 5/12/2024    Closed fracture of multiple ribs 2/1/2022    Last Assessment & Plan:  Formatting of this note might be different from the original. Multimodal pain regimen, Pulm toilet, IS    Closed injury of left kidney 2/1/2022    Fracture of ramus of left pubis ([facility]) 2/1/2022    Last Assessment & Plan:  Formatting of this note might be different from the original. Ortho consulted, non -op, appreciate recs    Generalized anxiety disorder     Generalized osteoarthritis of multiple sites 8/28/2017    GERD (gastroesophageal reflux disease)     Hypertension     Hypertriglyceridemia 8/28/2017    Idiopathic peripheral neuropathy 8/28/2017    Irritable bowel syndrome with constipation 8/28/2017    Restless legs syndrome     Sacral fracture ([facility]) 2/1/2022    Last Assessment & Plan:  Formatting of this note might be different from the original. Ortho consulted, non -op, appreciate recs    Seasonal allergic rhinitis due to pollen 8/28/2017       Past Surgical History:      Procedure Laterality Date    COLONOSCOPY  07/31/2006    Dr Elder-BCM    COLONOSCOPY  04/18/2016    Dr Castellon-Sigmoid diverticulosis, internal hemorrhoids    COLONOSCOPY  03/30/2011    Internal hemorrhoid, diverticulosis    HERNIA REPAIR  1999/2001    KIDNEY SURGERY  02/2022    Dr Ariza-Tommy MIRZA  1994    UPPER GASTROINTESTINAL ENDOSCOPY  03/11/2021    Dr Castellon-HP gastric polyp, esophastritis    UPPER GASTROINTESTINAL ENDOSCOPY  04/18/2016    Dr Castellon-Esophagitis,  HH, Zline variabel at the [facility], no Braga's    UPPER GASTROINTESTINAL ENDOSCOPY  06/09/2006    Dr Elder-Benign gastric polyp       Medications in Hospital:      Current Facility-Administered Medications:     
Treatment:   [x]Tolerated well []Tolerated fair []Required rest breaks []Fatigued    Education:  Learner:  [x]Patient          []Significant Other          []Other  Education provided regarding:  [x]Goals and POC   []Diet and swallowing precautions    []Home Exercise Program  []Progress and/or discharge information  Method of Education:  [x]Discussion          []Demonstration          []Handout          []Other  Evaluation of Education:   [x]Verbalized understanding   []Demonstrates without assistance  []Demonstrates with assistance  []Needs further instruction     []No evidence of learning                  []No family present      Plan: [x]Continue with current plan of care    []Modify current plan of care as follows:    []Discharge patient    Discharge Location:    Services/Supervision Recommended:      [x]Patient continues to require treatment by a licensed therapist to address functional deficits as outlined in the established plan of care.      Electronically Signed By:  Nicole Botello M.S., CCC-SLP  5/23/2024,7:27 AM.   
assistance  Sit to stand: Minimal assistance  Stand to sit: Minimal assistance  Vision  Vision: Within Functional Limits  Hearing  Hearing: Within functional limits  Cognition  Cognition Comment: awake, alert, follows simple commands,  Orientation  Overall Orientation Status: Within Normal Limits    LUE AROM (degrees)  LUE AROM : WNL  RUE AROM (degrees)  RUE AROM : WNL  LUE Strength  Gross LUE Strength: WNL  RUE Strength  Gross RUE Strength: WFL    Education  Education Given To: Patient  Education Provided: Role of Therapy, Plan of Care, Safety  Education Method: Demonstration, Verbal  Education Outcome: Verbalized understanding, Continued education needed       Toileting Hygiene  Assistance needed: Partial/moderate assistance  Comment: min A to manage pants fully  CARE Score: 3  Discharge Goal: Independent      Toilet Transfer  Assistance needed: Partial/moderate assistance  Comment: with RW  CARE Score: 3  Discharge Goal: Independent    Balance  Sitting Balance: Supervision  Standing Balance: Minimal assistance           Eating  Assistance Needed: Supervision or touching assistance  Comment: cues to utilize unaffected hand, tries to use R hand for task but limited due to ataxia and decreased sensation  CARE Score: 4  Discharge Goal: Independent    Oral Hygiene  Assistance Needed: Supervision or touching assistance  Comment: cues  CARE Score: 4  Discharge Goal: Independent      Shower/Bathe Self  Assistance Needed: Partial/moderate assistance  Comment: shower, min A  CARE Score: 3  Discharge Goal: Independent    Upper Body Dressing  Assistance Needed: Supervision or touching assistance  Comment: cues  CARE Score: 4  Discharge Goal: Independent      Lower Body Dressing  Assistance Needed: Partial/moderate assistance  Comment: min A  CARE Score: 3  Discharge Goal: Independent    Putting On/Taking Off Footwear  Assistance Needed: Partial/moderate assistance  Comment: assistance for shoes  CARE Score: 3  Discharge 
[facility] [place]    Allergies:  Patient has no known allergies.    Social History:   TOBACCO:   reports that he has never smoked. He has never used smokeless tobacco.  ETOH:   reports no history of alcohol use.    Family History:       Problem Relation Age of Onset    Dementia Mother     Breast Cancer Mother     Heart Disease Father     Colon Cancer Father     Dementia Father     High Blood Pressure Father     Heart Attack Father     Prostate Cancer Father     Esophageal Cancer Neg Hx     Liver Cancer Neg Hx     Liver Disease Neg Hx     Rectal Cancer Neg Hx     Stomach Cancer Neg Hx          PHYSICAL EXAM:  BP (!) 146/81   Pulse 70   Temp 98.2 °F (36.8 °C) (Temporal)   Resp 16   Ht 1.829 m (6')   Wt 89 kg (196 lb 4 oz)   SpO2 97%   BMI 26.62 kg/m²     Constitutional - well developed, well nourished.   Eyes - conjunctiva normal.   Ear, nose, throat - No scars, masses, or lesions over external nose or ears, no atrophy of tongue  Neck-symmetric, no masses noted, no jugular vein distension  Respiration- chest wall appears symmetric, good expansion,   normal effort without use of accessory muscles  Musculoskeletal - no significant wasting of muscles noted, no bony deformities  Extremities-no clubbing, cyanosis or edema  Skin - warm, dry, and intact. No rash, erythema, or pallor.  Psychiatric - mood, affect, and behavior appear normal.      Neurological exam  Awake, alert, fluent oriented appropriate affect  Attention and concentration appear appropriate  Recent and remote memory appears unremarkable  Speech normal without dysarthria  No clear issues with language of fund of knowledge     Cranial Nerve Exam     CN III, IV,VI-EOMI, No nystagmus, conjugate eye movements, no ptosis    CN VII-no facial assymetry       Motor Exam  antigravity throughout upper and lower extremities bilaterally      Tremors- no tremors in hands or head noted     Gait  Not tested     Nursing/pcp notes, imaging,labs and vitals reviewed. 
ambulatory home making task with modified independence  Long Term Goal 5: patient will complete HEP with independence  Additional Goals?: Yes  Long Term Goal 6: patient/fam verbalize DME for home    Therapy Time   Individual Concurrent Group Co-treatment   Time In 0900         Time Out 1000         Minutes 60         Timed Code Treatment Minutes: 60 Minutes    Electronically signed by MURALI Jose on 5/21/2024 at 10:39 AM  
and stress incontinence of bladder     Current therapy  Requires PT, OT and/or speech therapy     Anticipated discharge approximately 16 days     Functional assessment  All notes from reehab data were reviewed regarding the patient's functional status.           Anticipated discharge setting  Home with home care     No clear barriers to discharge     The patient appears to be an appropriate candidate for inpatient rehabilitation     Sufficiently stable: Patient's condition is sufficiently stable at the time of admission to allow the patient to actively participate in an intensive rehabilitation program     Close medical supervision: A rehabilitation physician, or other licensed treating physician with specialized training and experience in inpatient rehabilitation, will conduct face-to-face visits with the patient a minimum of at least 3 days per week throughout the patient's stay     This patient requires close medical supervision for the active management of the ongoing conditions and potential complications stated in the admission note     Intensive rehabilitation nursing: The patient demonstrates the need for 24-hour rehabilitation nursing care for active management of the multiple medical issues documented in the admission note     Appropriate therapy needed: The patient requires the active and ongoing therapeutic intervention of at least 2 therapeutic disciplines, one of which must be physical or occupational therapy and/or speech therapy     Intensive therapy: The patient requires and is reasonably expected to actively participate in at least 3 hours of therapy per day at least 5 days per week, and expected to make measurable improvements that will be of practical value to improve the patient's functional capacity or adaptation to impairments. In addition, therapy treatments will begin within 36 hours from midnight of the day of the patient's admission to the inpatient rehabilitation facility     Expected 
apnea; Restless leg syndrome; Degenerative disease of nervous system; Primary hypertension; Lobar pneumonia; GERD w/o esophagitis; Hyperglyceridemia; Dysphagia; Generalized anxiety   Diagnosis Cerebral infarction, Left thalamus; NPH   Subjective   Subjective Pt brought into gym by OT, agrees to participate in therapy.   Subjective   Subjective Pt reports arthritic discomfort/pain all over   Pain Faces: 1/10   Transfers   Sit to Stand Supervision;Modified independent   Stand to Sit Supervision;Modified independent   Ambulation   WB Status WBAT   Ambulation   Surface Level tile   Device Rolling Walker   Assistance Contact guard assistance;Stand by assistance   Quality of Gait 3 point to 2 point pattern w/ RLE leading; intermittent down gaze   Gait Deviations Slow Loren;Decreased step height   Distance 15' X 2, 150', 10'   Comments Multiple L/R turns, Focus of amb. on pt performing obstacle course in which he manuv. around cones and stepped over strips in hallway   PT Exercises   Dynamic Sitting Balance Exercises Pt able to  front of mat table w/o AD and bounce ball back and forth to PTA and then underhand toss for ~2 min. while maintaining dynamic balance   Activity Tolerance   Activity Tolerance Patient tolerated treatment well   Assessment   Assessment Pt has no LOB w/ dynamic balance ther-ex or during obstacle course. Pt required SBA manuv. around cones and CGA stepping over strips, in which he performed mostly step to pattern w/ RLE leading.   Discharge Recommendations Continue to assess pending progress;Home with Home health PT;Home with assist PRN   Safety Devices   Type of Devices Patient at risk for falls;Gait belt;Left in bed;Bed alarm in place;Call light within reach               Electronically signed by Jessica Boyd PTA on 5/23/2024 at 5:14 PM                         RECORD REVIEW: Previous medical records, medications were reviewed at today's visit    IMPRESSION:   1.  Acute ischemic

## 2024-05-29 NOTE — PLAN OF CARE
Problem: Chronic Conditions and Co-morbidities  Goal: Patient's chronic conditions and co-morbidity symptoms are monitored and maintained or improved  5/23/2024 1104 by Lauren Sanders RN  Outcome: Progressing  5/23/2024 1102 by Lauren Sanders, RN  Outcome: Progressing  5/22/2024 2106 by Angelique Mccabe RN  Outcome: Progressing     Problem: Pain  Goal: Verbalizes/displays adequate comfort level or baseline comfort level  5/23/2024 1104 by Lauren Sanders, RN  Outcome: Progressing  5/23/2024 1102 by Lauren Sanders RN  Outcome: Progressing  5/22/2024 2106 by Angelique Mccabe RN  Outcome: Progressing     
  Problem: Discharge Planning  Goal: Discharge to home or other facility with appropriate resources  5/16/2024 1406 by Lauren Sanders RN  Outcome: Progressing  5/16/2024 0018 by Diana Dean LPN  Outcome: Progressing     Problem: Neurosensory - Adult  Goal: Achieves stable or improved neurological status  5/16/2024 1406 by Lauren Sanders RN  Outcome: Progressing  5/16/2024 0018 by Diana Dean LPN  Outcome: Progressing  Goal: Achieves maximal functionality and self care  5/16/2024 1406 by Lauren Sanders, RN  Outcome: Progressing  5/16/2024 0018 by Diana Dean LPN  Outcome: Progressing     Problem: Respiratory - Adult  Goal: Achieves optimal ventilation and oxygenation  5/16/2024 1406 by Lauren Sanders RN  Outcome: Progressing  5/16/2024 0018 by Diana Dean LPN  Outcome: Progressing     Problem: Cardiovascular - Adult  Goal: Maintains optimal cardiac output and hemodynamic stability  5/16/2024 1406 by Lauren Sanders RN  Outcome: Progressing  5/16/2024 0018 by Diana Dean LPN  Outcome: Progressing     Problem: Skin/Tissue Integrity - Adult  Goal: Skin integrity remains intact  5/16/2024 1406 by Lauren Sanders RN  Outcome: Progressing  Flowsheets (Taken 5/16/2024 1405)  Skin Integrity Remains Intact: Monitor for areas of redness and/or skin breakdown  5/16/2024 0018 by Diana Dean LPN  Outcome: Progressing     Problem: Musculoskeletal - Adult  Goal: Return mobility to safest level of function  5/16/2024 1406 by Lauren Sanders RN  Outcome: Progressing  Flowsheets (Taken 5/16/2024 1406)  Return Mobility to Safest Level of Function: Ensure adequate protection for wounds/incisions during mobilization  5/16/2024 0018 by Diana Dean LPN  Outcome: Progressing  Goal: Maintain proper alignment of affected body part  5/16/2024 1406 by Lauren Sanders RN  Outcome: Progressing  5/16/2024 0018 by Diana Dean LPN  Outcome: Progressing  Goal: Return ADL status to a safe level of 
  Problem: Discharge Planning  Goal: Discharge to home or other facility with appropriate resources  5/26/2024 2144 by Sana Mercer RN  Outcome: Progressing  Flowsheets (Taken 5/26/2024 2045)  Discharge to home or other facility with appropriate resources: Refer to discharge planning if patient needs post-hospital services based on physician order or complex needs related to functional status, cognitive ability or social support system  5/26/2024 0906 by Shanae Haines RN  Outcome: Progressing     Problem: Neurosensory - Adult  Goal: Achieves stable or improved neurological status  5/26/2024 2144 by Sana Mercer RN  Outcome: Progressing  Flowsheets (Taken 5/26/2024 2045)  Achieves stable or improved neurological status: Assess for and report changes in neurological status  5/26/2024 0906 by Shanae Haines RN  Outcome: Progressing  Goal: Achieves maximal functionality and self care  5/26/2024 2144 by Sana Mercer RN  Outcome: Progressing  5/26/2024 0906 by Shanae Haines RN  Outcome: Progressing     Problem: Respiratory - Adult  Goal: Achieves optimal ventilation and oxygenation  5/26/2024 2144 by Sana Mercer RN  Outcome: Progressing  Flowsheets (Taken 5/26/2024 2045)  Achieves optimal ventilation and oxygenation: Assess for changes in respiratory status  5/26/2024 0906 by Shanae Haines RN  Outcome: Progressing     Problem: Cardiovascular - Adult  Goal: Maintains optimal cardiac output and hemodynamic stability  5/26/2024 2144 by Sana Mercer RN  Outcome: Progressing  Flowsheets (Taken 5/26/2024 2045)  Maintains optimal cardiac output and hemodynamic stability: Monitor blood pressure and heart rate  5/26/2024 0906 by Shanae Haines RN  Outcome: Progressing     Problem: Skin/Tissue Integrity - Adult  Goal: Skin integrity remains intact  5/26/2024 2144 by Sana Mercer RN  Outcome: Progressing  Flowsheets (Taken 5/26/2024 2045)  Skin Integrity Remains Intact: Monitor for areas of redness and/or skin 
  Problem: Discharge Planning  Goal: Discharge to home or other facility with appropriate resources  5/27/2024 2239 by Diana Dean LPN  Outcome: Progressing  Flowsheets (Taken 5/27/2024 2234)  Discharge to home or other facility with appropriate resources: Refer to discharge planning if patient needs post-hospital services based on physician order or complex needs related to functional status, cognitive ability or social support system  5/27/2024 1211 by Lauren Sanders, RN  Outcome: Progressing  Flowsheets (Taken 5/27/2024 0815)  Discharge to home or other facility with appropriate resources: Refer to discharge planning if patient needs post-hospital services based on physician order or complex needs related to functional status, cognitive ability or social support system     Problem: Neurosensory - Adult  Goal: Achieves stable or improved neurological status  5/27/2024 2239 by Diana Dean LPN  Outcome: Progressing  5/27/2024 1211 by Lauren Sanders, RN  Outcome: Progressing  Goal: Achieves maximal functionality and self care  5/27/2024 2239 by Diana Dean LPN  Outcome: Progressing  5/27/2024 1211 by Lauren Sanders, RN  Outcome: Progressing     Problem: Respiratory - Adult  Goal: Achieves optimal ventilation and oxygenation  5/27/2024 2239 by Diana Dean LPN  Outcome: Progressing  5/27/2024 1211 by Lauren Sanders, RN  Outcome: Progressing     Problem: Cardiovascular - Adult  Goal: Maintains optimal cardiac output and hemodynamic stability  5/27/2024 2239 by Diana Dean LPN  Outcome: Progressing  5/27/2024 1211 by Lauren Sanders, RN  Outcome: Progressing     Problem: Skin/Tissue Integrity - Adult  Goal: Skin integrity remains intact  5/27/2024 2239 by Diana Dean LPN  Outcome: Progressing  5/27/2024 1211 by Lauren Sanders, RN  Outcome: Progressing  Flowsheets (Taken 5/27/2024 1210)  Skin Integrity Remains Intact: Monitor for areas of redness and/or skin breakdown     Problem: 
  Problem: Discharge Planning  Goal: Discharge to home or other facility with appropriate resources  Outcome: Adequate for Discharge     Problem: Neurosensory - Adult  Goal: Achieves stable or improved neurological status  Outcome: Adequate for Discharge  Goal: Achieves maximal functionality and self care  Outcome: Adequate for Discharge     Problem: Respiratory - Adult  Goal: Achieves optimal ventilation and oxygenation  Outcome: Adequate for Discharge     Problem: Cardiovascular - Adult  Goal: Maintains optimal cardiac output and hemodynamic stability  Outcome: Adequate for Discharge     Problem: Skin/Tissue Integrity - Adult  Goal: Skin integrity remains intact  Outcome: Adequate for Discharge     Problem: Musculoskeletal - Adult  Goal: Return mobility to safest level of function  Outcome: Adequate for Discharge  Goal: Maintain proper alignment of affected body part  Outcome: Adequate for Discharge  Goal: Return ADL status to a safe level of function  Outcome: Adequate for Discharge     Problem: Gastrointestinal - Adult  Goal: Minimal or absence of nausea and vomiting  Outcome: Adequate for Discharge  Goal: Maintains or returns to baseline bowel function  Outcome: Adequate for Discharge  Goal: Maintains adequate nutritional intake  Outcome: Adequate for Discharge     Problem: Genitourinary - Adult  Goal: Absence of urinary retention  Outcome: Adequate for Discharge     Problem: Infection - Adult  Goal: Absence of infection at discharge  Outcome: Adequate for Discharge     Problem: Metabolic/Fluid and Electrolytes - Adult  Goal: Electrolytes maintained within normal limits  Outcome: Adequate for Discharge  Goal: Hemodynamic stability and optimal renal function maintained  Outcome: Adequate for Discharge  Goal: Glucose maintained within prescribed range  Outcome: Adequate for Discharge     Problem: Hematologic - Adult  Goal: Maintains hematologic stability  Outcome: Adequate for Discharge     Problem: Safety - 
  Problem: Discharge Planning  Goal: Discharge to home or other facility with appropriate resources  Outcome: Progressing     Problem: Neurosensory - Adult  Goal: Achieves stable or improved neurological status  Outcome: Progressing  Goal: Achieves maximal functionality and self care  Outcome: Progressing     Problem: Respiratory - Adult  Goal: Achieves optimal ventilation and oxygenation  Outcome: Progressing     Problem: Cardiovascular - Adult  Goal: Maintains optimal cardiac output and hemodynamic stability  Outcome: Progressing     Problem: Skin/Tissue Integrity - Adult  Goal: Skin integrity remains intact  Outcome: Progressing     Problem: Musculoskeletal - Adult  Goal: Return mobility to safest level of function  Outcome: Progressing  Goal: Maintain proper alignment of affected body part  Outcome: Progressing  Goal: Return ADL status to a safe level of function  Outcome: Progressing     Problem: Gastrointestinal - Adult  Goal: Minimal or absence of nausea and vomiting  Outcome: Progressing  Goal: Maintains or returns to baseline bowel function  Outcome: Progressing  Goal: Maintains adequate nutritional intake  Outcome: Progressing     Problem: Genitourinary - Adult  Goal: Absence of urinary retention  Outcome: Progressing     Problem: Infection - Adult  Goal: Absence of infection at discharge  Outcome: Progressing     Problem: Metabolic/Fluid and Electrolytes - Adult  Goal: Electrolytes maintained within normal limits  Outcome: Progressing  Goal: Hemodynamic stability and optimal renal function maintained  Outcome: Progressing  Goal: Glucose maintained within prescribed range  Outcome: Progressing     Problem: Hematologic - Adult  Goal: Maintains hematologic stability  Outcome: Progressing     Problem: Safety - Adult  Goal: Free from fall injury  Outcome: Progressing     Problem: ABCDS Injury Assessment  Goal: Absence of physical injury  Outcome: Progressing     
  Problem: Discharge Planning  Goal: Discharge to home or other facility with appropriate resources  Outcome: Progressing     Problem: Neurosensory - Adult  Goal: Achieves stable or improved neurological status  Outcome: Progressing  Goal: Achieves maximal functionality and self care  Outcome: Progressing     Problem: Respiratory - Adult  Goal: Achieves optimal ventilation and oxygenation  Outcome: Progressing     Problem: Cardiovascular - Adult  Goal: Maintains optimal cardiac output and hemodynamic stability  Outcome: Progressing     Problem: Skin/Tissue Integrity - Adult  Goal: Skin integrity remains intact  Outcome: Progressing  Flowsheets (Taken 5/18/2024 1153)  Skin Integrity Remains Intact: Monitor for areas of redness and/or skin breakdown     Problem: Musculoskeletal - Adult  Goal: Return mobility to safest level of function  Outcome: Progressing  Goal: Maintain proper alignment of affected body part  Outcome: Progressing  Goal: Return ADL status to a safe level of function  Outcome: Progressing     Problem: Gastrointestinal - Adult  Goal: Minimal or absence of nausea and vomiting  Outcome: Progressing  Goal: Maintains or returns to baseline bowel function  Outcome: Progressing  Goal: Maintains adequate nutritional intake  Outcome: Progressing     Problem: Genitourinary - Adult  Goal: Absence of urinary retention  Outcome: Progressing     Problem: Infection - Adult  Goal: Absence of infection at discharge  Outcome: Progressing     Problem: Metabolic/Fluid and Electrolytes - Adult  Goal: Electrolytes maintained within normal limits  Outcome: Progressing  Goal: Hemodynamic stability and optimal renal function maintained  Outcome: Progressing  Goal: Glucose maintained within prescribed range  Outcome: Progressing     Problem: Hematologic - Adult  Goal: Maintains hematologic stability  Outcome: Progressing     Problem: Safety - Adult  Goal: Free from fall injury  Outcome: Progressing     Problem: ABCDS Injury 
  Problem: Discharge Planning  Goal: Discharge to home or other facility with appropriate resources  Outcome: Progressing     Problem: Neurosensory - Adult  Goal: Achieves stable or improved neurological status  Outcome: Progressing  Goal: Achieves maximal functionality and self care  Outcome: Progressing     Problem: Respiratory - Adult  Goal: Achieves optimal ventilation and oxygenation  Outcome: Progressing     Problem: Cardiovascular - Adult  Goal: Maintains optimal cardiac output and hemodynamic stability  Outcome: Progressing     Problem: Skin/Tissue Integrity - Adult  Goal: Skin integrity remains intact  Outcome: Progressing  Flowsheets (Taken 5/19/2024 1106)  Skin Integrity Remains Intact: Monitor for areas of redness and/or skin breakdown     Problem: Musculoskeletal - Adult  Goal: Return mobility to safest level of function  Outcome: Progressing  Goal: Maintain proper alignment of affected body part  Outcome: Progressing  Goal: Return ADL status to a safe level of function  Outcome: Progressing     Problem: Gastrointestinal - Adult  Goal: Minimal or absence of nausea and vomiting  Outcome: Progressing  Goal: Maintains or returns to baseline bowel function  Outcome: Progressing  Goal: Maintains adequate nutritional intake  Outcome: Progressing     Problem: Genitourinary - Adult  Goal: Absence of urinary retention  Outcome: Progressing     Problem: Infection - Adult  Goal: Absence of infection at discharge  Outcome: Progressing     Problem: Metabolic/Fluid and Electrolytes - Adult  Goal: Electrolytes maintained within normal limits  Outcome: Progressing  Goal: Hemodynamic stability and optimal renal function maintained  Outcome: Progressing  Goal: Glucose maintained within prescribed range  Outcome: Progressing     Problem: Hematologic - Adult  Goal: Maintains hematologic stability  Outcome: Progressing     Problem: Safety - Adult  Goal: Free from fall injury  Outcome: Progressing     Problem: ABCDS Injury 
  Problem: Discharge Planning  Goal: Discharge to home or other facility with appropriate resources  Outcome: Progressing     Problem: Neurosensory - Adult  Goal: Achieves stable or improved neurological status  Outcome: Progressing  Goal: Achieves maximal functionality and self care  Outcome: Progressing     Problem: Respiratory - Adult  Goal: Achieves optimal ventilation and oxygenation  Outcome: Progressing     Problem: Cardiovascular - Adult  Goal: Maintains optimal cardiac output and hemodynamic stability  Outcome: Progressing     Problem: Skin/Tissue Integrity - Adult  Goal: Skin integrity remains intact  Outcome: Progressing  Flowsheets (Taken 5/20/2024 1233)  Skin Integrity Remains Intact: Monitor for areas of redness and/or skin breakdown     Problem: Musculoskeletal - Adult  Goal: Return mobility to safest level of function  Outcome: Progressing  Goal: Maintain proper alignment of affected body part  Outcome: Progressing  Goal: Return ADL status to a safe level of function  Outcome: Progressing     Problem: Gastrointestinal - Adult  Goal: Minimal or absence of nausea and vomiting  Outcome: Progressing  Goal: Maintains or returns to baseline bowel function  Outcome: Progressing  Goal: Maintains adequate nutritional intake  Outcome: Progressing     Problem: Genitourinary - Adult  Goal: Absence of urinary retention  Outcome: Progressing     Problem: Infection - Adult  Goal: Absence of infection at discharge  Outcome: Progressing     Problem: Metabolic/Fluid and Electrolytes - Adult  Goal: Electrolytes maintained within normal limits  Outcome: Progressing  Goal: Hemodynamic stability and optimal renal function maintained  Outcome: Progressing  Goal: Glucose maintained within prescribed range  Outcome: Progressing     Problem: Hematologic - Adult  Goal: Maintains hematologic stability  Outcome: Progressing     Problem: Safety - Adult  Goal: Free from fall injury  Outcome: Progressing     Problem: ABCDS Injury 
  Problem: Discharge Planning  Goal: Discharge to home or other facility with appropriate resources  Outcome: Progressing  Flowsheets (Taken 5/24/2024 2055)  Discharge to home or other facility with appropriate resources: Refer to discharge planning if patient needs post-hospital services based on physician order or complex needs related to functional status, cognitive ability or social support system     Problem: Neurosensory - Adult  Goal: Achieves stable or improved neurological status  Outcome: Progressing  Flowsheets (Taken 5/24/2024 2055)  Achieves stable or improved neurological status: Assess for and report changes in neurological status  Goal: Achieves maximal functionality and self care  Outcome: Progressing  Flowsheets (Taken 5/24/2024 2055)  Achieves maximal functionality and self care: Monitor swallowing and airway patency with patient fatigue and changes in neurological status     Problem: Respiratory - Adult  Goal: Achieves optimal ventilation and oxygenation  Outcome: Progressing  Flowsheets (Taken 5/24/2024 2055)  Achieves optimal ventilation and oxygenation:   Assess for changes in respiratory status   Assess for changes in mentation and behavior     Problem: Cardiovascular - Adult  Goal: Maintains optimal cardiac output and hemodynamic stability  Outcome: Progressing  Flowsheets (Taken 5/24/2024 2055)  Maintains optimal cardiac output and hemodynamic stability: Monitor blood pressure and heart rate     Problem: Skin/Tissue Integrity - Adult  Goal: Skin integrity remains intact  Outcome: Progressing  Flowsheets (Taken 5/24/2024 2055)  Skin Integrity Remains Intact: Monitor for areas of redness and/or skin breakdown     Problem: Musculoskeletal - Adult  Goal: Return mobility to safest level of function  Outcome: Progressing  Goal: Maintain proper alignment of affected body part  Outcome: Progressing  Goal: Return ADL status to a safe level of function  Outcome: Progressing  Flowsheets (Taken 
  Problem: Discharge Planning  Goal: Discharge to home or other facility with appropriate resources  Outcome: Progressing  Flowsheets (Taken 5/25/2024 2050)  Discharge to home or other facility with appropriate resources: Refer to discharge planning if patient needs post-hospital services based on physician order or complex needs related to functional status, cognitive ability or social support system     Problem: Neurosensory - Adult  Goal: Achieves stable or improved neurological status  Outcome: Progressing  Flowsheets (Taken 5/25/2024 2050)  Achieves stable or improved neurological status: Assess for and report changes in neurological status  Goal: Achieves maximal functionality and self care  Outcome: Progressing  Flowsheets (Taken 5/25/2024 2050)  Achieves maximal functionality and self care: Monitor swallowing and airway patency with patient fatigue and changes in neurological status     Problem: Respiratory - Adult  Goal: Achieves optimal ventilation and oxygenation  Outcome: Progressing  Flowsheets (Taken 5/25/2024 2050)  Achieves optimal ventilation and oxygenation:   Assess for changes in respiratory status   Assess for changes in mentation and behavior     Problem: Cardiovascular - Adult  Goal: Maintains optimal cardiac output and hemodynamic stability  Outcome: Progressing  Flowsheets (Taken 5/25/2024 2050)  Maintains optimal cardiac output and hemodynamic stability: Monitor blood pressure and heart rate     Problem: Skin/Tissue Integrity - Adult  Goal: Skin integrity remains intact  Outcome: Progressing  Flowsheets (Taken 5/25/2024 2050)  Skin Integrity Remains Intact: Monitor for areas of redness and/or skin breakdown     Problem: Musculoskeletal - Adult  Goal: Return mobility to safest level of function  Outcome: Progressing  Goal: Maintain proper alignment of affected body part  Outcome: Progressing  Goal: Return ADL status to a safe level of function  Outcome: Progressing  Flowsheets (Taken 
  Problem: Discharge Planning  Goal: Discharge to home or other facility with appropriate resources  Outcome: Progressing  Flowsheets (Taken 5/27/2024 0815)  Discharge to home or other facility with appropriate resources: Refer to discharge planning if patient needs post-hospital services based on physician order or complex needs related to functional status, cognitive ability or social support system     Problem: Neurosensory - Adult  Goal: Achieves stable or improved neurological status  Outcome: Progressing  Goal: Achieves maximal functionality and self care  Outcome: Progressing     Problem: Respiratory - Adult  Goal: Achieves optimal ventilation and oxygenation  Outcome: Progressing     Problem: Cardiovascular - Adult  Goal: Maintains optimal cardiac output and hemodynamic stability  Outcome: Progressing     Problem: Skin/Tissue Integrity - Adult  Goal: Skin integrity remains intact  Outcome: Progressing  Flowsheets (Taken 5/27/2024 1210)  Skin Integrity Remains Intact: Monitor for areas of redness and/or skin breakdown     Problem: Musculoskeletal - Adult  Goal: Return mobility to safest level of function  Outcome: Progressing  Goal: Maintain proper alignment of affected body part  Outcome: Progressing  Goal: Return ADL status to a safe level of function  Outcome: Progressing  Flowsheets (Taken 5/27/2024 0815)  Return ADL Status to a Safe Level of Function: Administer medication as ordered     Problem: Gastrointestinal - Adult  Goal: Minimal or absence of nausea and vomiting  Outcome: Progressing  Goal: Maintains or returns to baseline bowel function  Outcome: Progressing  Flowsheets (Taken 5/27/2024 0815)  Maintains or returns to baseline bowel function: Assess bowel function  Goal: Maintains adequate nutritional intake  Outcome: Progressing     Problem: Genitourinary - Adult  Goal: Absence of urinary retention  Outcome: Progressing     Problem: Infection - Adult  Goal: Absence of infection at 
  Problem: Neurosensory - Adult  Goal: Achieves stable or improved neurological status  5/23/2024 1947 by Angelique Mccabe RN  Outcome: Progressing  Flowsheets (Taken 5/23/2024 1940)  Achieves stable or improved neurological status: Assess for and report changes in neurological status  5/23/2024 1104 by Lauren Sanders RN  Outcome: Progressing  5/23/2024 1102 by Lauren Sanders RN  Outcome: Progressing  Goal: Achieves maximal functionality and self care  5/23/2024 1947 by Angelique Mccabe RN  Outcome: Progressing  5/23/2024 1104 by Lauren Sanders RN  Outcome: Progressing  5/23/2024 1102 by Lauren Sanders RN  Outcome: Progressing     Problem: Discharge Planning  Goal: Discharge to home or other facility with appropriate resources  5/23/2024 1947 by Angelique Mccabe RN  Outcome: Progressing  5/23/2024 1104 by Lauren Sanders RN  Outcome: Progressing  5/23/2024 1102 by Lauren Sanders RN  Outcome: Progressing     Problem: Respiratory - Adult  Goal: Achieves optimal ventilation and oxygenation  5/23/2024 1947 by Angelique Mccabe RN  Outcome: Progressing  Flowsheets (Taken 5/23/2024 1940)  Achieves optimal ventilation and oxygenation:   Assess for changes in respiratory status   Assess for changes in mentation and behavior   Position to facilitate oxygenation and minimize respiratory effort  5/23/2024 1104 by Lauren Sanders RN  Outcome: Progressing  5/23/2024 1102 by Lauren Sanders RN  Outcome: Progressing     Problem: Cardiovascular - Adult  Goal: Maintains optimal cardiac output and hemodynamic stability  5/23/2024 1947 by Angelique Mccabe RN  Outcome: Progressing  Flowsheets (Taken 5/23/2024 1940)  Maintains optimal cardiac output and hemodynamic stability: Monitor blood pressure and heart rate  5/23/2024 1104 by Lauren Sanders, RN  Outcome: Progressing  5/23/2024 1102 by Lauren Sanders RN  Outcome: Progressing     Problem: Skin/Tissue Integrity - Adult  Goal: Skin integrity remains intact  5/23/2024 
  Problem: Pain  Goal: Verbalizes/displays adequate comfort level or baseline comfort level  5/22/2024 2106 by Angelique Mccabe RN  Outcome: Progressing  5/22/2024 1124 by Johanne Brown RN  Outcome: Progressing     Problem: Chronic Conditions and Co-morbidities  Goal: Patient's chronic conditions and co-morbidity symptoms are monitored and maintained or improved  5/22/2024 2106 by Angelique Mccabe RN  Outcome: Progressing  5/22/2024 1124 by Johanne Brown RN  Outcome: Progressing     Problem: ABCDS Injury Assessment  Goal: Absence of physical injury  5/22/2024 2106 by Angelique Mccabe RN  Outcome: Progressing  5/22/2024 1124 by Johanne Brown RN  Outcome: Progressing     Problem: Safety - Adult  Goal: Free from fall injury  5/22/2024 2106 by Angelique Mccabe RN  Outcome: Progressing  5/22/2024 1124 by Johanne Brown RN  Outcome: Progressing     Problem: Hematologic - Adult  Goal: Maintains hematologic stability  5/22/2024 2106 by Angelique Mccabe RN  Outcome: Progressing  Flowsheets (Taken 5/22/2024 1925)  Maintains hematologic stability: Assess for signs and symptoms of bleeding or hemorrhage  5/22/2024 1124 by Johanne Brown RN  Outcome: Progressing     Problem: Metabolic/Fluid and Electrolytes - Adult  Goal: Electrolytes maintained within normal limits  5/22/2024 2106 by Angelique Mccabe RN  Outcome: Progressing  5/22/2024 1124 by Johanne Brown RN  Outcome: Progressing  Goal: Hemodynamic stability and optimal renal function maintained  5/22/2024 2106 by Angelique Mccabe RN  Outcome: Progressing  Flowsheets (Taken 5/22/2024 1925)  Hemodynamic stability and optimal renal function maintained: Encourage oral intake as appropriate  5/22/2024 1124 by Johanne Brown RN  Outcome: Progressing  Goal: Glucose maintained within prescribed range  5/22/2024 2106 by Angelique Mccabe RN  Outcome: Progressing  5/22/2024 1124 by Johanne Brown RN  Outcome: Progressing     Problem: Infection - Adult  Goal: Absence of infection at 
  Problem: Safety - Adult  Goal: Free from fall injury  5/16/2024 1933 by Kennedy Hays, RN  Outcome: Progressing  5/16/2024 1406 by Lauren Sanders, RN  Outcome: Progressing     
  Problem: Safety - Adult  Goal: Free from fall injury  5/17/2024 1944 by Kennedy Hays, RN  Outcome: Progressing  5/17/2024 0825 by Lisa Espinoza, RN  Outcome: Progressing     
  Problem: Safety - Adult  Goal: Free from fall injury  5/18/2024 1903 by Kennedy Hays, RN  Outcome: Progressing  5/18/2024 1200 by Lauren Sanders, RN  Outcome: Progressing     
  Problem: Safety - Adult  Goal: Free from fall injury  5/19/2024 1842 by Kennedy Hays, RN  Outcome: Progressing  5/19/2024 1106 by Lauren Sanders, RN  Outcome: Progressing     
  Problem: Safety - Adult  Goal: Free from fall injury  5/20/2024 1910 by Kennedy Hays, RN  Outcome: Progressing  5/20/2024 1237 by Lauren Sanders, RN  Outcome: Progressing     
  Problem: Safety - Adult  Goal: Free from fall injury  5/21/2024 1859 by Kennedy Hays, RN  Outcome: Progressing  5/21/2024 1308 by Johanne Brown, RN  Outcome: Progressing     
alignment of affected body part  Outcome: Progressing  Flowsheets (Taken 5/17/2024 0822)  Maintain proper alignment of affected body part: Support and protect limb and body alignment per provider's orders  Goal: Return ADL status to a safe level of function  Outcome: Progressing  Flowsheets (Taken 5/17/2024 0822)  Return ADL Status to a Safe Level of Function: Administer medication as ordered     Problem: Gastrointestinal - Adult  Goal: Minimal or absence of nausea and vomiting  Outcome: Progressing  Flowsheets (Taken 5/17/2024 0822)  Minimal or absence of nausea and vomiting: Administer ordered antiemetic medications as needed  Goal: Maintains or returns to baseline bowel function  Outcome: Progressing  Flowsheets (Taken 5/17/2024 0822)  Maintains or returns to baseline bowel function: Assess bowel function  Goal: Maintains adequate nutritional intake  Outcome: Progressing  Flowsheets (Taken 5/17/2024 0822)  Maintains adequate nutritional intake: Assist with meals as needed     Problem: Genitourinary - Adult  Goal: Absence of urinary retention  Outcome: Progressing  Flowsheets (Taken 5/17/2024 0822)  Absence of urinary retention: Assess patient’s ability to void and empty bladder     Problem: Infection - Adult  Goal: Absence of infection at discharge  Outcome: Progressing  Flowsheets (Taken 5/17/2024 0822)  Absence of infection at discharge: Assess and monitor for signs and symptoms of infection     Problem: Metabolic/Fluid and Electrolytes - Adult  Goal: Electrolytes maintained within normal limits  Outcome: Progressing  Flowsheets (Taken 5/17/2024 0822)  Electrolytes maintained within normal limits: Monitor labs and assess patient for signs and symptoms of electrolyte imbalances  Goal: Hemodynamic stability and optimal renal function maintained  Outcome: Progressing  Flowsheets (Taken 5/17/2024 0822)  Hemodynamic stability and optimal renal function maintained: Monitor labs and assess for signs and symptoms of 
level  5/28/2024 2133 by Diana Dean LPN  Outcome: Progressing  5/28/2024 1332 by Alessia Lindquist, RN  Outcome: Adequate for Discharge     
Progressing  5/22/2024 2106 by Angelique Mccabe RN  Outcome: Progressing  Flowsheets (Taken 5/22/2024 1925)  Absence of Physical Injury: Implement safety measures based on patient assessment     Problem: Chronic Conditions and Co-morbidities  Goal: Patient's chronic conditions and co-morbidity symptoms are monitored and maintained or improved  5/23/2024 1102 by Lauren Sanders, RN  Outcome: Progressing  5/22/2024 2106 by Angelique Mccabe RN  Outcome: Progressing     Problem: Pain  Goal: Verbalizes/displays adequate comfort level or baseline comfort level  5/23/2024 1102 by Lauren Sanders, RN  Outcome: Progressing  5/22/2024 2106 by Angelique Mccabe RN  Outcome: Progressing     
to insure safety with oral intake.  Goal 5: The patient will complete oral motor and pharyngeal exercises with minimal cues.                  [facility]  Hearing, Speech, and Vision  Expression of Ideas and Wants: Without difficulty  Understanding Verbal and Non-Verbal Content: Understands  Ability to Hear: Adequate  Ability to See in Adequate Light: Adequate  Cognitive Patterns  Cognitive Pattern Assessment Used: BIMS  Repetition of Three Words (First Attempt): 3  Temporal Orientation: Year: Correct  Temporal Orientation: Month: Accurate within 5 days  Temporal Orientation: Day: Correct  Able to recall \"sock”: Yes, no cue required  Able to recall \"blue\": Yes, no cue required  Able to recall \"bed\": Yes, no cue required  BIMS Summary Score: 15          Plan of Care:  Frequency:   [x] 5 days per week, 60 minutes per day                            [] Not appropriate for Speech Therapy  Treatments may include speech/language/communication therapy, cognitive training, group therapy, education, and/or dysphagia therapy based on the above goals.           These goals were reviewed with this patient at the time of assessment and Echo Brown is in agreement.    CASE MANAGEMENT:  Goals:   Assist patient/family with discharge planning, patient/family counseling,  and coordination with insurance during ARU stay.  Patient Goals: Regain abilities at least as prior, reduce falls and no complications               Activities Prior to Admit:                         Echo Brown will be seen a minimum of 3 hours of therapy per day/a minimum of 5 out of 7 days per week.    [] In this rare instance due to the nature of this patient's medical involvement, this patient will be seen 15 hours per week (900 minutes within a 7 day period).    Treatments may include therapeutic exercises, gait training, neuromuscular re-ed, transfer training, community reintegration, bed mobility, w/c mobility and training, self care, home mgmt, cognitive

## 2024-05-30 ENCOUNTER — CARE COORDINATION (OUTPATIENT)
Dept: CASE MANAGEMENT | Age: 79
End: 2024-05-30

## 2024-05-30 ENCOUNTER — TELEPHONE (OUTPATIENT)
Dept: INTERNAL MEDICINE CLINIC | Age: 79
End: 2024-05-30

## 2024-05-30 DIAGNOSIS — I63.9 ACUTE ISCHEMIC STROKE (HCC): Primary | ICD-10-CM

## 2024-05-30 PROCEDURE — 1111F DSCHRG MED/CURRENT MED MERGE: CPT | Performed by: FAMILY MEDICINE

## 2024-05-30 RX ORDER — ALFUZOSIN HYDROCHLORIDE 10 MG/1
10 TABLET, EXTENDED RELEASE ORAL DAILY
Qty: 30 TABLET | Refills: 5 | Status: SHIPPED | OUTPATIENT
Start: 2024-05-30

## 2024-05-30 NOTE — CARE COORDINATION
my call as well as verifying name, date of birth of patient.      Spoke with patient regarding hospitalization, discharge and status thus far.  He reported he is doing well.  He said home health has already been out this morning and gotten things started for him.  He will have SN, PT, OT, ST.  He said his speech has improved quite a bit with what therapy he had while on the rehab unit.  He said when his symptoms first started, he was able to recognize them right away and got to the hospital quickly.  He said he lost about 10 lbs while in the hospital, but hopes that this has enable him to change some of his eating habits and he can continue to lose some weight as he feels that he needs to do this.  He said he is eating better, more healthy, more appropriately.  He said he has all of his medications, including new discharge meds.  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  He is aware of his follow up appointments, etc.  He is up and about and said he does well as long as he has a walker.  He denied any swallowing issues, etc.  He is sleeping well.  He denied any bowel or bladder issues.  He is not aware of any needs at this time.      Discussed the purpose and intent of Care Transitions Coordination calls and offered encouragement toward self care, management of diagnoses and avoiding re-hospitalizations.  Patient voiced understanding and is in agreement with future calls.  Encouraged to call for new/ongoing issues, questions, concerns, etc.  Encouraged to make contact with PCP as indicated for any further needs as well.  Given the opportunity to ask questions and answered these appropriately.  CTN to follow up as indicated in a few days based on the severity of symptoms and risk factors with assessment of new/ongoing symptoms, risk for readmission, management of self care needs in the home environment, teaching needs as indicated related to disease process, management of diet, medications,

## 2024-05-30 NOTE — TELEPHONE ENCOUNTER
[facility] PT eval completed with PT recommending visits 1w1, 2w3 for deep breathing, trunk/core, and LE exercises; bed mobility, transfer, gait, and stair training; balance activities; home safety instruction.     Please advise if PT orders are approved

## 2024-05-31 SDOH — ECONOMIC STABILITY: FOOD INSECURITY: WITHIN THE PAST 12 MONTHS, YOU WORRIED THAT YOUR FOOD WOULD RUN OUT BEFORE YOU GOT MONEY TO BUY MORE.: NEVER TRUE

## 2024-05-31 SDOH — ECONOMIC STABILITY: INCOME INSECURITY: HOW HARD IS IT FOR YOU TO PAY FOR THE VERY BASICS LIKE FOOD, HOUSING, MEDICAL CARE, AND HEATING?: NOT HARD AT ALL

## 2024-05-31 SDOH — ECONOMIC STABILITY: FOOD INSECURITY: WITHIN THE PAST 12 MONTHS, THE FOOD YOU BOUGHT JUST DIDN'T LAST AND YOU DIDN'T HAVE MONEY TO GET MORE.: NEVER TRUE

## 2024-06-03 ENCOUNTER — OFFICE VISIT (OUTPATIENT)
Dept: PRIMARY CARE CLINIC | Age: 79
End: 2024-06-03

## 2024-06-03 ENCOUNTER — TELEPHONE (OUTPATIENT)
Dept: INTERNAL MEDICINE CLINIC | Age: 79
End: 2024-06-03

## 2024-06-03 ENCOUNTER — TRANSCRIBE ORDERS (OUTPATIENT)
Dept: ADMINISTRATIVE | Facility: HOSPITAL | Age: 79
End: 2024-06-03
Payer: MEDICARE

## 2024-06-03 VITALS
HEART RATE: 103 BPM | OXYGEN SATURATION: 96 % | TEMPERATURE: 98.1 F | SYSTOLIC BLOOD PRESSURE: 132 MMHG | BODY MASS INDEX: 26.18 KG/M2 | WEIGHT: 193 LBS | DIASTOLIC BLOOD PRESSURE: 88 MMHG

## 2024-06-03 DIAGNOSIS — I63.9 ACUTE ISCHEMIC STROKE (HCC): Primary | ICD-10-CM

## 2024-06-03 DIAGNOSIS — J20.9 ACUTE BRONCHITIS, UNSPECIFIED ORGANISM: ICD-10-CM

## 2024-06-03 DIAGNOSIS — K21.9 GASTROESOPHAGEAL REFLUX DISEASE WITHOUT ESOPHAGITIS: ICD-10-CM

## 2024-06-03 DIAGNOSIS — F41.8 DEPRESSION WITH ANXIETY: ICD-10-CM

## 2024-06-03 DIAGNOSIS — M15.9 GENERALIZED OSTEOARTHRITIS OF MULTIPLE SITES: ICD-10-CM

## 2024-06-03 DIAGNOSIS — J18.1 RIGHT UPPER LOBE CONSOLIDATION (HCC): ICD-10-CM

## 2024-06-03 DIAGNOSIS — I63.9 ACUTE ISCHEMIC STROKE: Primary | ICD-10-CM

## 2024-06-03 DIAGNOSIS — G25.81 RESTLESS LEG SYNDROME: ICD-10-CM

## 2024-06-03 DIAGNOSIS — I10 ESSENTIAL HYPERTENSION: ICD-10-CM

## 2024-06-03 DIAGNOSIS — E78.00 HYPERCHOLESTEROLEMIA: ICD-10-CM

## 2024-06-03 PROBLEM — K62.5 BRBPR (BRIGHT RED BLOOD PER RECTUM): Status: RESOLVED | Noted: 2024-05-01 | Resolved: 2024-06-03

## 2024-06-03 PROBLEM — B30.9 VIRAL CONJUNCTIVITIS, UNSPECIFIED: Status: RESOLVED | Noted: 2022-02-24 | Resolved: 2024-06-03

## 2024-06-03 PROBLEM — E78.1 HYPERTRIGLYCERIDEMIA: Status: RESOLVED | Noted: 2017-08-28 | Resolved: 2024-06-03

## 2024-06-03 PROBLEM — D69.6 THROMBOCYTOPENIA, UNSPECIFIED (HCC): Status: RESOLVED | Noted: 2023-04-27 | Resolved: 2024-06-03

## 2024-06-03 RX ORDER — OMEPRAZOLE 20 MG/1
20 CAPSULE, DELAYED RELEASE ORAL DAILY
COMMUNITY

## 2024-06-03 RX ORDER — CEFDINIR 300 MG/1
300 CAPSULE ORAL 2 TIMES DAILY
Qty: 20 CAPSULE | Refills: 0 | Status: SHIPPED | OUTPATIENT
Start: 2024-06-03 | End: 2024-06-13

## 2024-06-03 ASSESSMENT — ENCOUNTER SYMPTOMS
CONSTIPATION: 0
ABDOMINAL PAIN: 0
ANAL BLEEDING: 0
COUGH: 0
SHORTNESS OF BREATH: 0
NAUSEA: 0
CHEST TIGHTNESS: 0
DIARRHEA: 0

## 2024-06-03 NOTE — TELEPHONE ENCOUNTER
[facility] OT eval completed and OT requesting visits OT 2w3, 1w1 to address RUE/hand GMC, FMC, strength    Please advise if OT visits are approved    Also ST called pt to schedule ST eval.  pt declining eval, stating that he had no need for ST at this time.

## 2024-06-03 NOTE — PROGRESS NOTES
mouth daily 30 tablet 0    melatonin 3 MG TABS tablet Take 1 tablet by mouth nightly as needed (sleep) 30 tablet 0    tamsulosin (FLOMAX) 0.4 MG capsule Take 1 capsule by mouth at bedtime 30 capsule 0    pantoprazole (PROTONIX) 40 MG tablet Take 1 tablet by mouth every morning (before breakfast) 30 tablet 0     No current facility-administered medications for this visit.               History:     Past Medical History:   Diagnosis Date    Cerebrovascular accident (HCC) 5/12/2024    Closed fracture of multiple ribs 2/1/2022    Last Assessment & Plan:  Formatting of this note might be different from the original. Multimodal pain regimen, Pulm toilet, IS    Closed injury of left kidney 2/1/2022    Fracture of ramus of left pubis (HCC) 2/1/2022    Last Assessment & Plan:  Formatting of this note might be different from the original. Ortho consulted, non -op, appreciate recs    Generalized anxiety disorder     Generalized osteoarthritis of multiple sites 8/28/2017    GERD (gastroesophageal reflux disease)     Hypertension     Hypertriglyceridemia 8/28/2017    Idiopathic peripheral neuropathy 8/28/2017    Irritable bowel syndrome with constipation 8/28/2017    Restless legs syndrome     Sacral fracture (HCC) 2/1/2022    Last Assessment & Plan:  Formatting of this note might be different from the original. Ortho consulted, non -op, appreciate recs    Seasonal allergic rhinitis due to pollen 8/28/2017     Past Surgical History:   Procedure Laterality Date    COLONOSCOPY  07/31/2006    Dr Elder-[facility]    COLONOSCOPY  04/18/2016    Dr Castellon-Sigmoid diverticulosis, internal hemorrhoids    COLONOSCOPY  03/30/2011    Internal hemorrhoid, diverticulosis    HERNIA REPAIR  1999/2001    KIDNEY SURGERY  02/2022    Dr Ariza-Tommy JIMENEZ  1994    UPPER GASTROINTESTINAL ENDOSCOPY  03/11/2021    Dr Castellon-[facility] gastric polyp, esophastritis    UPPER GASTROINTESTINAL ENDOSCOPY  04/18/2016    Dr Castellon-Esophagitis,  HH, Zline variabel at the

## 2024-06-05 ENCOUNTER — TELEPHONE (OUTPATIENT)
Dept: INTERNAL MEDICINE CLINIC | Age: 79
End: 2024-06-05

## 2024-06-05 NOTE — TELEPHONE ENCOUNTER
[facility] Care nurse reporting that pt has requested to be DNR for HH   Is DNR ok , and if so is it ok for  ignacia to assist pt with DNR paperwork  ?

## 2024-06-06 ENCOUNTER — HOSPITAL ENCOUNTER (OUTPATIENT)
Dept: CARDIOLOGY | Facility: HOSPITAL | Age: 79
Discharge: HOME | End: 2024-06-06
Admitting: FAMILY MEDICINE
Payer: MEDICARE

## 2024-06-06 ENCOUNTER — CARE COORDINATION (OUTPATIENT)
Dept: CARE COORDINATION | Age: 79
End: 2024-06-06

## 2024-06-06 PROCEDURE — 93246 EXT ECG>7D<15D RECORDING: CPT

## 2024-06-06 NOTE — CARE COORDINATION
Care Transitions Note    Follow Up Call      Patient Current Location:  [place]    Today I   contacted the patient by telephone. Verified name and  as identifiers.    Additional needs identified to be addressed with provider   No needs identified                 Method of communication with provider: none.    Care Summary Note: Spoke with patient this date for care coordination follow up. Patient was doing really well. He is wearing heart monitor. Got this put on today and will wear for approximately 2 weeks. He has been working with [employer]. Seems pleased with that. Denies any new concerning symptoms. Eating and drinking well. Denies any issues with bowel or bladder. Getting up and around OK. Made it to follow-up appointment. Reviewed that. Taking medications as prescribed. No changes. Denies any needs for support or assistance further at this time.     Plan of care updates since last contact:  Reviewed follow-up appointment.        Advance Care Planning:   Does patient have an Advance Directive: reviewed and current.    Medication Review:  No changes since last call.     Assessments:  No changes since last call    Follow Up Appointment:   MENDEL appointment attended as scheduled   Future Appointments         Provider Specialty Dept Phone    7/10/2024 9:45 AM Marisol Corona, DESIREE Hematology and Oncology [phone]    7/10/2024 10:00 AM SCHEDULE, [facility] MED ONC MA Infusion Therapy [phone]    2024 2:00 PM Tanner Daily MD Primary Care [phone]            Today I   provided contact information.  Plan for follow-up call in 6-10 days based on severity of symptoms and risk factors.  Plan for next call: self management-progress with homecare; has heart monitor on; numbness      Viki Cadena

## 2024-06-07 ENCOUNTER — TELEPHONE (OUTPATIENT)
Dept: INTERNAL MEDICINE CLINIC | Age: 79
End: 2024-06-07

## 2024-06-07 ENCOUNTER — TELEPHONE (OUTPATIENT)
Dept: HEMATOLOGY | Age: 79
End: 2024-06-07

## 2024-06-07 RX ORDER — METHYLPREDNISOLONE 4 MG/1
TABLET ORAL
Qty: 1 KIT | Refills: 0 | Status: SHIPPED | OUTPATIENT
Start: 2024-06-07

## 2024-06-07 RX ORDER — ALBUTEROL SULFATE 90 UG/1
2 AEROSOL, METERED RESPIRATORY (INHALATION) 4 TIMES DAILY PRN
Qty: 18 G | Refills: 5 | Status: SHIPPED | OUTPATIENT
Start: 2024-06-07

## 2024-06-07 NOTE — TELEPHONE ENCOUNTER
Paula GAUTAM MSW reporting     sw completed and juan daniel eval, intervention and discharge yesterday.       FYI

## 2024-06-07 NOTE — TELEPHONE ENCOUNTER
10:10 am:  Pt's spouse called to cx appt with Marisol Corona scheduled for 7/10/24.  Pt and spouse stated he does not want to reschedule, and does not wish to return to the office.

## 2024-06-11 ENCOUNTER — TELEPHONE (OUTPATIENT)
Dept: INTERNAL MEDICINE CLINIC | Age: 79
End: 2024-06-11

## 2024-06-11 NOTE — TELEPHONE ENCOUNTER
[facility] Ot is requesting the ok to add  ST eval for this pt , per OT pt is having increased drooling and numbness to face and would like to be evaluated by ST      Is it Ok for  to add ST eval ?

## 2024-06-13 ENCOUNTER — CARE COORDINATION (OUTPATIENT)
Dept: CASE MANAGEMENT | Age: 79
End: 2024-06-13

## 2024-06-13 NOTE — CARE COORDINATION
Care Transitions Note  Follow Up Call     Attempted to reach patient for transitions of care follow up.  Unable to reach patient.      Outreach Attempts:   HIPAA compliant voicemail left for patient.     Follow Up Appointment:   Future Appointments         Provider Specialty Dept Phone    7/10/2024 9:45 AM Marisol Corona APRN Hematology and Oncology [phone]    7/10/2024 10:00 AM SCHEDULE, [facility] MED ONC MA Infusion Therapy [phone]    9/23/2024 2:00 PM Tanner Daily MD Primary Care [phone]            Plan for follow-up call in 2-5 days based on severity of symptoms and risk factors. Plan for next call: symptom management-   self management-     Yessenia Loomis RN

## 2024-06-18 ENCOUNTER — TELEPHONE (OUTPATIENT)
Dept: INTERNAL MEDICINE CLINIC | Age: 79
End: 2024-06-18

## 2024-06-18 NOTE — TELEPHONE ENCOUNTER
[facility] PT is requesting to continue PT visits another 2 X week for 3 weeks  to work towards goals - is this ok  ?

## 2024-06-19 ENCOUNTER — CARE COORDINATION (OUTPATIENT)
Dept: CARE COORDINATION | Age: 79
End: 2024-06-19

## 2024-06-19 NOTE — CARE COORDINATION
Care Transitions Follow Up Call    Patient Current Location:  [place]    Today I   contacted the patient by telephone to follow up after admission   Verified name and  with patient as identifiers.    Patient: Echo Brown  Patient : 1945   MRN: [id number]   Discharge Date: 24 RARS: Readmission Risk Score: 13.2      Needs to be reviewed by the provider   Additional needs identified to be addressed with provider: No  none             Method of communication with provider: none.    Spoke with patient this week for care transitions follow up. He was doing well. Homecare continues. PT is asking for more visits so he is hoping to have PT for a little while longer. He goes to [facility] on the . He has heart monitor on for last day today and then will send that back. Having the saem numbness in hand and face but has been told that might be for some time. Feels like he is making good progress with homecare. Denies any needs or concerns. Feels like he has everything he needs to stay well at this time. Reviewed upcoming appointments.     Addressed changes since last contact:  none  Discussed follow-up appointments. If no appointment was previously scheduled, appointment scheduling offered: Yes.   Is follow up appointment scheduled within 7 days of discharge? Yes.    Follow Up  Future Appointments   Date Time Provider Department Center   7/10/2024  9:45 AM [facility]   7/10/2024 10:00 AM SCHEDULE, [facility] MED ONC MA [facility] MED ONC [place]   2024  2:00 PM Abimbola, Tanner B, MD [facility]     External follow up appointment(s): [facility] 24    Today   reviewed medical action plan and red flags with patient and discussed any barriers to care and/or understanding of plan of care after discharge. Discussed appropriate site of care based on symptoms and resources available to patient including: PCP  Specialist  Home health  MyChart Messaging. The

## 2024-06-20 ENCOUNTER — TELEPHONE (OUTPATIENT)
Dept: INTERNAL MEDICINE CLINIC | Age: 79
End: 2024-06-20

## 2024-06-20 NOTE — TELEPHONE ENCOUNTER
[facility] Care OT requesting additional 1x/wk for 2 weeks following visit next week, to begin w/o 6/30/2024     Please advise if approved

## 2024-06-26 ENCOUNTER — CARE COORDINATION (OUTPATIENT)
Dept: CASE MANAGEMENT | Age: 79
End: 2024-06-26

## 2024-06-26 NOTE — CARE COORDINATION
Care Transitions Note    Follow Up Call     Attempted to reach patient for transitions of care follow up.  Unable to reach patient.  Call went immediately to voice mail.  Left a message for call back.    Follow Up Appointment:   Future Appointments         Provider Specialty Dept Phone    7/10/2024 9:45 AM Marisol Corona APRN Hematology and Oncology [phone]    7/10/2024 10:00 AM SCHEDULE, [facility] MED ONC MA Infusion Therapy [phone]    9/23/2024 2:00 PM Tanner Daily MD Primary Care [phone]            Plan for follow-up call in 6-10 days based on severity of symptoms and risk factors. Plan for next call: self management-     Yessenia Loomis RN

## 2024-06-27 ENCOUNTER — OFFICE VISIT (OUTPATIENT)
Dept: NEUROLOGY | Facility: CLINIC | Age: 79
End: 2024-06-27
Payer: MEDICARE

## 2024-06-27 VITALS
HEIGHT: 72 IN | BODY MASS INDEX: 26.28 KG/M2 | RESPIRATION RATE: 18 BRPM | DIASTOLIC BLOOD PRESSURE: 72 MMHG | WEIGHT: 194 LBS | SYSTOLIC BLOOD PRESSURE: 132 MMHG | HEART RATE: 84 BPM

## 2024-06-27 DIAGNOSIS — R26.89 IMPAIRED GAIT AND MOBILITY: ICD-10-CM

## 2024-06-27 DIAGNOSIS — G25.81 RESTLESS LEGS: ICD-10-CM

## 2024-06-27 DIAGNOSIS — R32 URINARY INCONTINENCE, UNSPECIFIED TYPE: ICD-10-CM

## 2024-06-27 DIAGNOSIS — R41.89 COGNITIVE IMPAIRMENT: ICD-10-CM

## 2024-06-27 DIAGNOSIS — E78.5 HYPERLIPIDEMIA LDL GOAL <70: ICD-10-CM

## 2024-06-27 DIAGNOSIS — I69.30 HISTORY OF STROKE WITH CURRENT RESIDUAL EFFECTS: Primary | ICD-10-CM

## 2024-06-27 DIAGNOSIS — I10 ESSENTIAL HYPERTENSION: ICD-10-CM

## 2024-06-27 RX ORDER — ALFUZOSIN HYDROCHLORIDE 10 MG/1
10 TABLET, EXTENDED RELEASE ORAL DAILY
COMMUNITY

## 2024-06-27 RX ORDER — CLOPIDOGREL BISULFATE 75 MG/1
75 TABLET ORAL DAILY
Qty: 30 TABLET | Refills: 2 | Status: SHIPPED | OUTPATIENT
Start: 2024-06-27

## 2024-06-27 RX ORDER — ATORVASTATIN CALCIUM 80 MG/1
80 TABLET, FILM COATED ORAL NIGHTLY
Qty: 30 TABLET | Refills: 2 | Status: SHIPPED | OUTPATIENT
Start: 2024-06-27

## 2024-06-27 RX ORDER — ASPIRIN 81 MG/1
81 TABLET ORAL DAILY
Qty: 30 TABLET | Refills: 2 | Status: SHIPPED | OUTPATIENT
Start: 2024-06-27

## 2024-06-27 NOTE — PROGRESS NOTES
Neurology Consult Note    [facility]  [address], [address]  [place] [address]  Phone: [phone]  Fax: [phone]    Referring Provider:   No ref. provider found  Primary Care Provider:  Keenan Perez MD    Reason for Consult:  Hospital follow-up for stroke  Subjective      Agueda Tyler presents to [facility]    History of Present Illness  78-year-old male presenting for hospital follow-up of stroke.  Patient presented to emergency room on 5/12/2024 with complaints of slurred speech, right arm and leg weakness with numbness.  Last known well was approximately 10 PM the night before presentation.  Patient had awoken this morning and noticed numbness in the fingers of his right hand.  Approximately 45 minutes prior to arrival he developed sudden onset of slurred speech, right arm and leg weakness worsening numbness.  Initial NIH 2 of arrival.  Patient was not a thrombolytic candidate due to last known well greater than 4.5 hours.  Additionally not a candidate for thrombectomy due to large vessel occlusion.    Initial workup was unremarkable.  Did have initial MRI that was negative.  However he had right-sided numbness of his face, arm and leg as well as weakness of his right arm with dysarthria everything.  A repeat MRI of his brain was performed which showed a left thalamic stroke.  Patient was recommend to continue dual antiplatelets for approximately 3 months from his stroke.    Additionally there was concern for NPH based off his imaging.  Initial plan was to have patient follow-up in approximately 3 months for a large-volume tap with direct admission for monitoring.    Today patient presents with his spouse.  He is amatory with assistance of a rollator.  Reports may continue clumsiness of his right hand.  He has no further residual symptoms.  Additionally he does note cognitive decline over the last several months.  Additionally he has had gait  abnormalities with frequent falls.  He also has been having urinary incontinence.  Unclear specific type of incontinence.  He is on Flomax.  He has remained compliant with all medications.  He did spend approximately 2 weeks at [facility] for rehab.  He has since returned home.  He is doing home health for additional rehab.  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  Patient Active Problem List   Diagnosis    Hx of colonic polyp    Gastroesophageal reflux disease without esophagitis    Iron deficiency anemia    BRBPR (bright red blood per rectum)    Acute ischemic stroke    Essential hypertension    Stroke-like symptom    Right upper lobe consolidation    SINDI (obstructive sleep apnea)    Restless legs    Impaired gait and mobility    Cerebral ventriculomegaly due to brain atrophy        Past Medical History:   Diagnosis Date    Anxiety     BPH (benign prostatic hyperplasia)     Colon polyp     Diverticulosis     Essential hypertension     GERD (gastroesophageal reflux disease)     Hypertriglyceridemia     Idiopathic peripheral neuropathy     Osteoarthritis         Social History     Socioeconomic History    Marital status:    Tobacco Use    Smoking status: Never    Smokeless tobacco: Never   Vaping Use    Vaping status: Never Used   Substance and Sexual Activity    Alcohol use: Not Currently    Drug use: Not Currently    Sexual activity: Defer        Allergies   Allergen Reactions    Sulfa Antibiotics Rash          Current Outpatient Medications:     acetaminophen (TYLENOL) 325 MG tablet, Take 2 tablets by mouth Every 4 (Four) Hours As Needed for Mild Pain., Disp: , Rfl:     alfuzosin (UROXATRAL) 10 MG 24 hr tablet, Take 1 tablet by mouth Daily., Disp: , Rfl:     atorvastatin (LIPITOR) 80 MG tablet, Take 1 tablet by mouth Every Night., Disp: 30 tablet, Rfl: 2    citalopram (CeleXA) 20 MG tablet, Take 1 tablet by mouth Daily., Disp: , Rfl:     clopidogrel (PLAVIX) 75 MG tablet, Take 1 tablet by mouth  "Daily., Disp: 30 tablet, Rfl: 2    diazePAM (VALIUM) 5 MG tablet, Take 1 tablet by mouth Every 6 (Six) Hours As Needed for Anxiety., Disp: , Rfl:     levothyroxine (SYNTHROID, LEVOTHROID) 88 MCG tablet, Take 1 tablet by mouth Daily., Disp: , Rfl:     lisinopril (PRINIVIL,ZESTRIL) 20 MG tablet, Take 1 tablet by mouth Daily., Disp: , Rfl:     Magnesium Hydroxide (MILK OF MAGNESIA PO), Take 60 mL by mouth Every Night., Disp: , Rfl:     polyethylene glycol (MIRALAX) 17 g packet, Take 17 g by mouth Daily As Needed (constipation)., Disp: , Rfl:     rOPINIRole HCl (REQUIP PO), Take 2 mg by mouth Every Night., Disp: , Rfl:     traZODone (DESYREL) 50 MG tablet, Take 1 tablet by mouth Every Night., Disp: , Rfl:     aspirin 81 MG EC tablet, Take 1 tablet by mouth Daily., Disp: 30 tablet, Rfl: 2    pantoprazole (PROTONIX) 40 MG EC tablet, Take 1 tablet by mouth Every Morning., Disp: , Rfl:     tamsulosin (FLOMAX) 0.4 MG capsule 24 hr capsule, Take 1 capsule by mouth Every Night., Disp: , Rfl:        Objective   Vital Signs:   /72 (BP Location: Left arm, Patient Position: Sitting)   Pulse 84   Resp 18   Ht 182.9 cm (72\")   Wt 88 kg (194 lb)   BMI 26.31 kg/m²       Physical Exam  Vitals and nursing note reviewed.   Constitutional:       Appearance: Normal appearance.   HENT:      Head: Normocephalic.   Eyes:      General: Lids are normal.      Extraocular Movements: Extraocular movements intact.   Pulmonary:      Effort: Pulmonary effort is normal. No respiratory distress.   Skin:     General: Skin is warm and dry.   Neurological:      Mental Status: He is alert.      Motor: Motor strength is normal.     Deep Tendon Reflexes: Reflexes are normal and symmetric.   Psychiatric:         Speech: Speech normal.        Neurological Exam  Mental Status  Alert. Oriented to person, place, time and situation. Speech is normal. Language is fluent with no aphasia.    Cranial Nerves  CN II: Visual fields full to confrontation.  CN " III, IV, VI: Extraocular movements intact bilaterally. Normal lids and orbits bilaterally.  CN V: Facial sensation is normal.  CN VII: Full and symmetric facial movement.  CN IX, X: Palate elevates symmetrically. Normal gag reflex.  CN XII: Tongue midline without atrophy or fasciculations.    Motor  Normal muscle bulk throughout. No fasciculations present. Normal muscle tone. No abnormal involuntary movements. Strength is 5/5 throughout all four extremities.    Sensory  Light touch is normal in upper and lower extremities.     Reflexes  Deep tendon reflexes are 2+ and symmetric in all four extremities.    Gait  Casual gait: Normal stance. Reduced stride length. Shuffling gait. Reduced right arm swing. Reduced left arm swing.      Result Review :   The following data was reviewed by: MIO Arredondo on 06/27/2024:  CMP          5/12/2024    13:40   CMP   Glucose 99    BUN 14    Creatinine 1.21    EGFR 60.9    Sodium 135    Potassium 4.1    Chloride 99    Calcium 9.6    Total Protein 7.4    Albumin 4.7    Globulin 2.7    Total Bilirubin 1.3    Alkaline Phosphatase 128    AST (SGOT) 30    ALT (SGPT) 27    Albumin/Globulin Ratio 1.7    BUN/Creatinine Ratio 11.6    Anion Gap 10.0      CBC w/diff          5/20/2024    04:36 5/23/2024    06:22 5/27/2024    03:32   CBC w/Diff   WBC 10.8     9.5     8.0       RBC 5.29     5.23     5.38       Hemoglobin 14.6     14.0     14.6       Hematocrit 42.6     42.9     44.9       MCV 80.5     82.0     83.5       MCH 27.6     26.8     27.1       MCHC 34.3     32.6     32.5       RDW 18.7     18.1     18.1       Platelets 142     158     220       Neutrophil Rel % 81.5     80.8     70.8       Lymphocyte Rel % 7.2     6.6     13.4       Monocyte Rel % 8.1     8.4     8.8       Eosinophil Rel % 2.5     3.4     4.4       Basophil Rel % 0.4     0.4     1.2          Details          This result is from an external source.             Lipid Panel          3/18/2024    13:32 5/13/2024     05:31   Lipid Panel   Total Cholesterol  132    Total Cholesterol 145        Triglycerides 231     135    HDL Cholesterol 48     48    VLDL Cholesterol  24    LDL Cholesterol  51     60    LDL/HDL Ratio  1.19       Details          This result is from an external source.             TSH          9/12/2023    06:51 10/30/2023    06:45 3/18/2024    13:32   TSH   TSH 5.420     3.550     2.010          Details          This result is from an external source.             Most Recent A1C          5/13/2024    05:31   HGBA1C Most Recent   Hemoglobin A1C 5.20      CT Head Without Contrast Stroke Protocol (05/12/2024 13:41)    Study Result    Narrative & Impression   CT HEAD WO CONTRAST STROKE PROTOCOL- 5/12/2024 12:37 PM     HISTORY: Neuro deficit, acute, stroke suspected       DOSE LENGTH PRODUCT: 907.63 mGy.cm. Automated exposure control was also  utilized to decrease patient radiation dose.     TECHNIQUE:  Axial CT of the brain without IV contrast. Sagittal and coronal  reformations are also provided for review. Soft tissue and bone kernels  are available for interpretation.     COMPARISON: None.     FINDINGS:     There is no evidence of acute large vascular territory infarct. Chronic  small vessel ischemic changes. No intra-axial or extra-axial hemorrhage.  No visualized mass lesion or mass effect. Lateral ventriculomegaly.  Bowing of the corpus callosum with attenuation of the subarachnoid space  along the high convexities. Posterior fossa structures are unremarkable.  Visualized paranasal sinuses and mastoids are clear.     IMPRESSION:  1. No acute intracranial process. In particular, there is no acute  hemorrhage identified.  2. Advanced chronic small vessel ischemic changes.  3. Ventriculomegaly is present, pattern suspicious for communicating  hydrocephalus such as NPH.     Findings were discussed with Dr. Koo on 5/12/2024 2:02 PM.     This report was signed and finalized on 5/12/2024 2:02 PM by   Joshua Sin.     CT Angiogram Head w AI Analysis of LVO (05/12/2024 13:48)  Study Result    Narrative & Impression   EXAM: CT ANGIOGRAM HEAD W AI ANALYSIS OF LVO- - 5/12/2024 12:38 PM     HISTORY: Neuro Deficit, acute, Stroke suspected       COMPARISON: None.     DOSE LENGTH PRODUCT: 461.15 mGy.cm. Automated exposure control was also  utilized to decrease patient radiation dose.     TECHNIQUE: CTA head and neck performed with intravenous contrast. 3D  postprocessing, including MIPs, performed and images saved to PACS. AI  ANALYSIS OF LVO WAS UTILIZED.  Grading of carotid stenosis performed  with NASCET criteria.     FINDINGS:     There is a typical three-vessel branching pattern off the aortic arch  without significant ostial narrowing. Common carotid arteries are patent  and without flow-limiting stenosis. There is a normal course and caliber  of the internal and external carotid arteries without evidence of a  flow-limiting stenosis. The vertebral arteries originate from the  subclavian arteries without significant ostial narrowing. Lung apices  are clear. Homogeneous thyroid gland. No cervical adenopathy advanced  cervical spine osteoarthritis changes.     Distal ICAs are patent and without flow-limiting stenosis. No  intracranial large vessel occlusion. Anterior and middle cerebral  arteries are patent and without flow-limiting stenosis. Intracranial  vertebral arteries and basilar artery are normal in caliber. Posterior  cerebral arteries are patent and normal in caliber. Fetal origin of the  left posterior cerebral artery. No visualized aneurysm. No intracranial  hemorrhage or mass effect. Lateral ventriculomegaly.     IMPRESSION:     1. No arterial occlusion or flow-limiting stenosis in the neck.  2. No intracranial large vessel occlusion or flow-limiting stenosis.     This report was signed and finalized on 5/12/2024 2:07 PM by Dr Joshua Sin.     CT Angiogram Neck (05/12/2024 13:48)    Study  Result    Narrative & Impression   EXAM: CT ANGIOGRAM HEAD W AI ANALYSIS OF LVO- - 5/12/2024 12:38 PM     HISTORY: Neuro Deficit, acute, Stroke suspected       COMPARISON: None.     DOSE LENGTH PRODUCT: 461.15 mGy.cm. Automated exposure control was also  utilized to decrease patient radiation dose.     TECHNIQUE: CTA head and neck performed with intravenous contrast. 3D  postprocessing, including MIPs, performed and images saved to PACS. AI  ANALYSIS OF LVO WAS UTILIZED.  Grading of carotid stenosis performed  with NASCET criteria.     FINDINGS:     There is a typical three-vessel branching pattern off the aortic arch  without significant ostial narrowing. Common carotid arteries are patent  and without flow-limiting stenosis. There is a normal course and caliber  of the internal and external carotid arteries without evidence of a  flow-limiting stenosis. The vertebral arteries originate from the  subclavian arteries without significant ostial narrowing. Lung apices  are clear. Homogeneous thyroid gland. No cervical adenopathy advanced  cervical spine osteoarthritis changes.     Distal ICAs are patent and without flow-limiting stenosis. No  intracranial large vessel occlusion. Anterior and middle cerebral  arteries are patent and without flow-limiting stenosis. Intracranial  vertebral arteries and basilar artery are normal in caliber. Posterior  cerebral arteries are patent and normal in caliber. Fetal origin of the  left posterior cerebral artery. No visualized aneurysm. No intracranial  hemorrhage or mass effect. Lateral ventriculomegaly.     IMPRESSION:     1. No arterial occlusion or flow-limiting stenosis in the neck.  2. No intracranial large vessel occlusion or flow-limiting stenosis.     This report was signed and finalized on 5/12/2024 2:07 PM by Dr Joshua iSn.     CT CEREBRAL PERFUSION WITH & WITHOUT CONTRAST (05/12/2024 13:52)  Study Result    Narrative & Impression   CT CEREBRAL PERFUSION W WO  CONTRAST- 5/12/2024 12:39 PM     HISTORY: Neuro deficit, acute, stroke suspected     Technique:     1. Perfusion CT is performed to acquire images tracking the temporal  course of iodinated contrast material passing through the cerebral  circulation. Perfusion parameters, such as cerebral blood flow (CBF),  cerebral blood volume (CBV), mean transit time (MTT), etc. are  calculated by [employer] with additional provided perfusion maps and  estimated stroke volumes.  2. Automated exposure control (AEC) protocols are utilized on the  scanner to ensure dose lowered technique.     Comparison: Noncontrast CT brain and brain angiogram dated 5/12/2024  12:39 PM     CT dose: 1637.31 mGy.cm     Findings:     Normal, symmetric and MTT, TTP, CBV and CBF.      Tmax >6.0 seconds volume: 0 ml     CBF < 30% volume: 0 ml     Mismatch volume: 0 ml     Mismatch ratio: None     IMPRESSION:  Impression:  1. Normal, symmetric cerebral perfusion. No discrete infarct or at risk  territory detected by the perfusion software.     This report was signed and finalized on 5/12/2024 2:29 PM by Dr Joshua Sin.     MRI Brain With & Without Contrast (05/12/2024 18:22)  Study Result    Narrative & Impression   MRI BRAIN W WO CONTRAST- 5/12/2024 4:49 PM     HISTORY: Stroke, follow up     TECHNIQUE: Multisequence, multiplanar MRI of the brain with and without  contrast. 20 cc MultiHance IV contrast.     COMPARISON: CT scan dated 5/12/2024     FINDINGS:     No diffusion signal abnormality. Advanced chronic small vessel ischemic  changes. Likely old cortical/subcortical ischemia in the right frontal  lobe. No intra-axial or extra-axial hemorrhage. No intracranial mass  lesion or pathologic enhancement. There is some lateral  ventriculomegaly. Third and fourth ventricles are nondilated. Posterior  fossa structures are unremarkable. Pituitary gland and sella are  unremarkable. The major intracranial flow-voids are preserved. Orbital  contents are  unremarkable. The paranasal sinuses are clear. Mastoid air  cells are clear. Normal marrow signal in the skull base and calvarium.     IMPRESSION:     1.  No acute intracranial process. In particular, no acute ischemia.  2.  No intracranial mass lesion or pathologic enhancement.  3.  Advanced chronic small vessel ischemic changes. There is quite a bit  of white matter volume loss, especially in the right frontal lobe which  I suspect is related to some old cortical/subcortical ischemia.     This report was signed and finalized on 5/13/2024 8:55 AM by Dr Joshua Sin.     MRI Brain Without Contrast (05/13/2024 17:28)    Study Result    Narrative & Impression   EXAMINATION: MRI BRAIN WO CONTRAST-  5/13/2024 5:34 PM     HISTORY: Right-sided weakness with paresthesia and dysarthria.     FINDINGS: Today's exam is compared to previous study of 1 day earlier.     Multiplanar fast spin echo imaging sequences were obtained of the brain  on a high-field magnet without gadolinium enhancement.     There is now a new site of diffusion restriction involving the left  thalamus with associated ADC mapping signal alteration consistent with  an acute ischemic infarct within the left thalamus. This was not present  on yesterday's exam. No additional sites of diffusion restriction are  present. This infarct is nonhemorrhagic. There is diffuse atrophy of the  brain with moderate small vessel ischemic changes involving the  periventricular and higher white matter tracts. There is significant  white matter volume loss within the right frontal lobe likely related to  remote ischemia. There is associated ex vacuo enlargement of the frontal  horn and body of the right lateral ventricle.     There are normal flow voids within the [place].     The orbits are unremarkable.     IMPRESSION:  1. There is a new finding on today's exam of diffusion restriction  within the left thalamus with associated ADC mapping abnormality  consistent  with an acute ischemic infarction within the left thalamus.  This was not present on yesterday's exam. This is nonhemorrhagic. No  mass effect or shift of the midline.     2. No additional sites of diffusion restriction are present. There is  again atrophy of the brain with moderate small vessel ischemic disease  involving the periventricular and higher white matter tracts. There is  significant white matter volume loss within the right frontal lobe with  ex vacuo enlargement of the frontal horn and body of the right lateral  ventricle as demonstrated on yesterday's exam.     This report was signed and finalized on 5/13/2024 5:39 PM by Dr. Rusty Trujillo MD.           I did review patient's MRI was performed on 5/13/2024.  This was reviewed independently interpreted by me as showing a acute ischemic infarction involving the left thalamus.  Additionally patient does have ventriculomegaly.  His Clark index does measure at 0.41.     Adult Transthoracic Echo Complete W/ Cont if Necessary Per Protocol (With Agitated Saline) (05/14/2024 08:59)  Holter Monitor - 72 Hour Up To 15 Days (06/06/2024 09:41)                      Impression:  Agueda Tyler is a 78 y.o. male who presents for follow-up of stroke.  Etiology likely small vessel disease.  Would recommend continuing strict secondary stroke preventative strategies.  We will continue his dual antiplatelets for approximately 3 months.  Additionally we will continue his atorvastatin.  Will likely decrease this as well as stop his Plavix at next visit.  In regards to his NPH, he does have the classic triad of symptoms.  Additionally based off his CT head, his Clark index was greater than 3.  Again in approximately 3 months we will proceed with workup for NPH.  Additionally with cognitive complaints, we will reassess MMSE at next visit.  We did have time constraints on today's visit.    Diagnoses and all orders for this visit:    1. History of stroke with current residual  effects (Primary)  -     atorvastatin (LIPITOR) 80 MG tablet; Take 1 tablet by mouth Every Night.  Dispense: 30 tablet; Refill: 2  -     clopidogrel (PLAVIX) 75 MG tablet; Take 1 tablet by mouth Daily.  Dispense: 30 tablet; Refill: 2  -     aspirin 81 MG EC tablet; Take 1 tablet by mouth Daily.  Dispense: 30 tablet; Refill: 2    2. Hyperlipidemia LDL goal <70  -     atorvastatin (LIPITOR) 80 MG tablet; Take 1 tablet by mouth Every Night.  Dispense: 30 tablet; Refill: 2  -     clopidogrel (PLAVIX) 75 MG tablet; Take 1 tablet by mouth Daily.  Dispense: 30 tablet; Refill: 2  -     aspirin 81 MG EC tablet; Take 1 tablet by mouth Daily.  Dispense: 30 tablet; Refill: 2    3. Essential hypertension    4. Restless legs    5. Impaired gait and mobility    6. Cognitive impairment    7. Urinary incontinence, unspecified type        Plan:  Continue dual antiplatelet therapy for approximately 3 months with plans to revert to aspirin monotherapy  Continue atorvastatin 80 mg  Continue Requip 2 mg nightly  LDL goal less than 70  A1c goal less than 6.5  BP goal less than 130/80  Return to emergency room for any new stroke symptoms  Fall precautions  Recommend no working  NPH workup at next visit  MMSE at next visit  Follow-up with PCP as scheduled  Follow-up in our clinic in approximately 3 months    The patient and I have discussed the plan of care and he is in full agreement at this time.   I did spend a total of 55 minutes for this encounter.  This includes reviewing prior records, obtaining a thorough HPI, assessment of patient, developing a plan of care with the patient and his spouse, extensive patient and spouse discussion, patient spouse education as well as documentation.  Follow Up   Return in about 2 months (around 9/2/2024) for Stroke.            Scottie Clayton, MIO  06/27/24  10:52 CDT

## 2024-07-08 ENCOUNTER — TELEPHONE (OUTPATIENT)
Dept: HEMATOLOGY | Age: 79
End: 2024-07-08

## 2024-07-08 NOTE — TELEPHONE ENCOUNTER
Pt's spouse called to cancel 7/10/24 appointment with Marisol Corona  and stated he does not want to reschedule at this time.  When asked, pt did not want to give a reason for not wanting to reschedule.  Above shared with Marisol Corona and her staff.

## 2024-07-09 ENCOUNTER — TELEPHONE (OUTPATIENT)
Dept: INTERNAL MEDICINE CLINIC | Age: 79
End: 2024-07-09

## 2024-07-09 DIAGNOSIS — R53.1 STRENGTH LOSS OF: ICD-10-CM

## 2024-07-09 DIAGNOSIS — R26.89 BALANCE DISORDER: Primary | ICD-10-CM

## 2024-07-09 DIAGNOSIS — R26.9 GAIT ABNORMALITY: ICD-10-CM

## 2024-07-09 NOTE — TELEPHONE ENCOUNTER
[facility]  PT reporting     PT is discharging this week. The pt has progressed well and no longer needs to remain homebound.   However, he does need continued PT services in the out pt PT setting to further improve his balance, strength and gait.         Please send pt PT orders to go to [facility] here in [place].

## 2024-07-11 ENCOUNTER — TELEPHONE (OUTPATIENT)
Dept: INTERNAL MEDICINE CLINIC | Age: 79
End: 2024-07-11

## 2024-07-12 DIAGNOSIS — I63.9 ACUTE ISCHEMIC STROKE (HCC): ICD-10-CM

## 2024-07-12 NOTE — PROGRESS NOTES
Subjective    Mr. Tyler is 78 y.o. male    Chief Complaint: BPH    History of Present Illness  Patient here for 3-month follow-up patient has a history of BPH.  Patient was having worsening symptoms he underwent cystoscopy with Dr. Clark showed mild prostatic regrowth after previous TURP but was acceptable uroflow parameters not consistent with recurrent bladder outlet obstruction.  Dr. Clark added finasteride to his alfuzosin.  Patient states at this time he is voiding well has no symptom complaints and would like to continue on the current dosage.      The following portions of the patient's history were reviewed and updated as appropriate: allergies, current medications, past family history, past medical history, past social history, past surgical history and problem list.    Review of Systems   Genitourinary: Negative.          Current Outpatient Medications:     acetaminophen (TYLENOL) 325 MG tablet, Take 2 tablets by mouth Every 4 (Four) Hours As Needed for Mild Pain., Disp: , Rfl:     alfuzosin (UROXATRAL) 10 MG 24 hr tablet, Take 1 tablet by mouth Daily., Disp: , Rfl:     aspirin 81 MG EC tablet, Take 1 tablet by mouth Daily., Disp: 30 tablet, Rfl: 2    atorvastatin (LIPITOR) 80 MG tablet, Take 1 tablet by mouth Every Night., Disp: 30 tablet, Rfl: 2    citalopram (CeleXA) 20 MG tablet, Take 1 tablet by mouth Daily., Disp: , Rfl:     clopidogrel (PLAVIX) 75 MG tablet, Take 1 tablet by mouth Daily., Disp: 30 tablet, Rfl: 2    diazePAM (VALIUM) 5 MG tablet, Take 1 tablet by mouth Every 6 (Six) Hours As Needed for Anxiety., Disp: , Rfl:     levothyroxine (SYNTHROID, LEVOTHROID) 88 MCG tablet, Take 1 tablet by mouth Daily., Disp: , Rfl:     lisinopril (PRINIVIL,ZESTRIL) 20 MG tablet, Take 1 tablet by mouth Daily., Disp: , Rfl:     Magnesium Hydroxide (MILK OF MAGNESIA PO), Take 60 mL by mouth Every Night., Disp: , Rfl:     polyethylene glycol (MIRALAX) 17 g packet, Take 17 g by mouth Daily As Needed  "(constipation)., Disp: , Rfl:     rOPINIRole HCl (REQUIP PO), Take 2 mg by mouth Every Night., Disp: , Rfl:     traZODone (DESYREL) 50 MG tablet, Take 1 tablet by mouth Every Night., Disp: , Rfl:     Past Medical History:   Diagnosis Date    Anxiety     BPH (benign prostatic hyperplasia)     Colon polyp     Diverticulosis     Erectile dysfunction     Essential hypertension     GERD (gastroesophageal reflux disease)     Hypertriglyceridemia     Idiopathic peripheral neuropathy     Osteoarthritis     Urinary incontinence     Inknown       Past Surgical History:   Procedure Laterality Date    COLONOSCOPY  04/18/2016    diverticulosis, internal hemorrhoids,     COLONOSCOPY  03/30/2011    internal hemorrhoid, diverticulosis    COLONOSCOPY N/A 03/11/2021    Procedure: COLONOSCOPY WITH ANESTHESIA;  Surgeon: Ricky House MD;  Location:  [facility];  Service: Gastroenterology;  Laterality: N/A;  pre: hx polyps  post: diverticulosis. hemorrhoid.   Keenan Perez MD    CYSTOSCOPY  1994    Before TURP    ENDOSCOPY N/A 03/11/2021    Procedure: ESOPHAGOGASTRODUODENOSCOPY WITH ANESTHESIA;  Surgeon: Ricky House MD;  Location:  [facility];  Service: Gastroenterology;  Laterality: N/A;  pre: GERD  post: hiatal hernia. gastric polyps.  Keenan Perez MD    HERNIA REPAIR      inguinal    PROSTATE SURGERY  1994    TURP    TURP / TRANSURETHRAL INCISION / DRAINAGE PROSTATE         Social History     Socioeconomic History    Marital status:    Tobacco Use    Smoking status: Never    Smokeless tobacco: Never    Tobacco comments:     Never used   Vaping Use    Vaping status: Never Used   Substance and Sexual Activity    Alcohol use: Never    Drug use: Never    Sexual activity: Not Currently       Family History   Problem Relation Age of Onset    Hypertension Father     Prostate cancer Father     Colon cancer Neg Hx        Objective    Temp 98.2 °F (36.8 °C)   Ht 182.9 cm (72\")   Wt 87.5 kg (193 " lb)   BMI 26.18 kg/m²     Physical Exam  Constitutional:       Appearance: Normal appearance.   HENT:      Head: Normocephalic and atraumatic.   Pulmonary:      Effort: Pulmonary effort is normal.   Skin:     Coloration: Skin is not pale.   Neurological:      Mental Status: He is alert.   Psychiatric:         Mood and Affect: Mood normal.         Behavior: Behavior normal.             Results for orders placed or performed in visit on 07/15/24   POC Urinalysis Dipstick, Multipro    Specimen: Urine   Result Value Ref Range    Color Yellow Yellow, Straw, Dark Yellow, Phyllis    Clarity, UA Clear Clear    Glucose, UA Negative Negative mg/dL    Bilirubin Negative Negative    Ketones, UA Negative Negative    Specific Gravity  1.100 (A) 1.005 - 1.030    Blood, UA Small (A) Negative    pH, Urine 7.5 5.0 - 8.0    Protein, POC Negative Negative mg/dL    Urobilinogen, UA 0.2 E.U./dL Normal, 0.2 E.U./dL    Nitrite, UA Negative Negative    Leukocytes Negative Negative     Estimation of residual urine via abdominal ultrasound  Residual Urine: 219 ml  Indication: BPH  Position: Supine  Examination: Incremental scanning of the suprapubic area using 3 MHz transducer using copious amounts of acoustic gel.   Findings: An anechoic area was demonstrated which represented the bladder, with measurement of residual urine as noted. I inspected this myself. In that the residual urine was stable or insignificant, no treatment will be necessary at this time.       Assessment and Plan    Diagnoses and all orders for this visit:    1. Benign prostatic hyperplasia with incomplete bladder emptying (Primary)  -     POC Urinalysis Dipstick, Multipro    Scan was elevated to 19 but patient states he finished prematurely.  Overall he is had no complaints and feels like the combination alfuzosin and finasteride has worked well which he would like to continue.    Follow-up in 1 year.

## 2024-07-15 ENCOUNTER — OFFICE VISIT (OUTPATIENT)
Dept: UROLOGY | Facility: CLINIC | Age: 79
End: 2024-07-15
Payer: MEDICARE

## 2024-07-15 VITALS — HEIGHT: 72 IN | BODY MASS INDEX: 26.14 KG/M2 | TEMPERATURE: 98.2 F | WEIGHT: 193 LBS

## 2024-07-15 DIAGNOSIS — N40.1 BENIGN PROSTATIC HYPERPLASIA WITH INCOMPLETE BLADDER EMPTYING: Primary | ICD-10-CM

## 2024-07-15 DIAGNOSIS — R39.14 BENIGN PROSTATIC HYPERPLASIA WITH INCOMPLETE BLADDER EMPTYING: Primary | ICD-10-CM

## 2024-07-15 LAB
BILIRUB BLD-MCNC: NEGATIVE MG/DL
CLARITY, POC: CLEAR
COLOR UR: YELLOW
GLUCOSE UR STRIP-MCNC: NEGATIVE MG/DL
KETONES UR QL: NEGATIVE
LEUKOCYTE EST, POC: NEGATIVE
NITRITE UR-MCNC: NEGATIVE MG/ML
PH UR: 7.5 [PH] (ref 5–8)
PROT UR STRIP-MCNC: NEGATIVE MG/DL
RBC # UR STRIP: ABNORMAL /UL
SP GR UR: 1.1 (ref 1–1.03)
UROBILINOGEN UR QL: ABNORMAL

## 2024-08-22 DIAGNOSIS — E03.9 PRIMARY HYPOTHYROIDISM: ICD-10-CM

## 2024-08-22 DIAGNOSIS — I10 ESSENTIAL HYPERTENSION: ICD-10-CM

## 2024-08-22 DIAGNOSIS — K90.9 IRON MALABSORPTION: ICD-10-CM

## 2024-08-22 DIAGNOSIS — I10 ESSENTIAL HYPERTENSION: Primary | ICD-10-CM

## 2024-08-22 DIAGNOSIS — D50.8 IRON DEFICIENCY ANEMIA SECONDARY TO INADEQUATE DIETARY IRON INTAKE: ICD-10-CM

## 2024-08-22 DIAGNOSIS — E78.00 HYPERCHOLESTEROLEMIA: ICD-10-CM

## 2024-08-22 DIAGNOSIS — D50.9 IRON DEFICIENCY ANEMIA, UNSPECIFIED IRON DEFICIENCY ANEMIA TYPE: ICD-10-CM

## 2024-08-22 LAB
ALBUMIN SERPL-MCNC: 4.2 G/DL (ref 3.5–5.2)
ALP SERPL-CCNC: 196 U/L (ref 40–130)
ALT SERPL-CCNC: 34 U/L (ref 5–41)
ANION GAP SERPL CALCULATED.3IONS-SCNC: 9 MMOL/L (ref 7–19)
AST SERPL-CCNC: 33 U/L (ref 5–40)
BASOPHILS # BLD: 0 K/UL (ref 0–0.2)
BASOPHILS NFR BLD: 0.8 % (ref 0–1)
BILIRUB SERPL-MCNC: 2.3 MG/DL (ref 0.2–1.2)
BUN SERPL-MCNC: 9 MG/DL (ref 8–23)
CALCIUM SERPL-MCNC: 8.9 MG/DL (ref 8.8–10.2)
CHLORIDE SERPL-SCNC: 99 MMOL/L (ref 98–111)
CHOLEST SERPL-MCNC: 101 MG/DL (ref 160–199)
CO2 SERPL-SCNC: 28 MMOL/L (ref 22–29)
CREAT SERPL-MCNC: 1.2 MG/DL (ref 0.5–1.2)
EOSINOPHIL # BLD: 0.3 K/UL (ref 0–0.6)
EOSINOPHIL NFR BLD: 6.1 % (ref 0–5)
ERYTHROCYTE [DISTWIDTH] IN BLOOD BY AUTOMATED COUNT: 15 % (ref 11.5–14.5)
FOLATE SERPL-MCNC: 6.9 NG/ML (ref 4.5–32.2)
GLUCOSE SERPL-MCNC: 87 MG/DL (ref 74–109)
HCT VFR BLD AUTO: 50.9 % (ref 42–52)
HDLC SERPL-MCNC: 61 MG/DL (ref 55–121)
HGB BLD-MCNC: 16.2 G/DL (ref 14–18)
IMM GRANULOCYTES # BLD: 0 K/UL
IRON SATN MFR SERPL: 29 % (ref 14–50)
IRON SERPL-MCNC: 80 UG/DL (ref 59–158)
LDLC SERPL CALC-MCNC: 22 MG/DL
LYMPHOCYTES # BLD: 0.9 K/UL (ref 1.1–4.5)
LYMPHOCYTES NFR BLD: 18.5 % (ref 20–40)
MCH RBC QN AUTO: 28.1 PG (ref 27–31)
MCHC RBC AUTO-ENTMCNC: 31.8 G/DL (ref 33–37)
MCV RBC AUTO: 88.4 FL (ref 80–94)
MONOCYTES # BLD: 0.4 K/UL (ref 0–0.9)
MONOCYTES NFR BLD: 7.9 % (ref 0–10)
NEUTROPHILS # BLD: 3.4 K/UL (ref 1.5–7.5)
NEUTS SEG NFR BLD: 66.1 % (ref 50–65)
PLATELET # BLD AUTO: 145 K/UL (ref 130–400)
PMV BLD AUTO: 10 FL (ref 9.4–12.4)
POTASSIUM SERPL-SCNC: 4.9 MMOL/L (ref 3.5–5)
PROT SERPL-MCNC: 6.5 G/DL (ref 6.6–8.7)
RBC # BLD AUTO: 5.76 M/UL (ref 4.7–6.1)
SODIUM SERPL-SCNC: 136 MMOL/L (ref 136–145)
T4 FREE SERPL-MCNC: 1.51 NG/DL (ref 0.93–1.7)
TIBC SERPL-MCNC: 277 UG/DL (ref 250–400)
TRIGL SERPL-MCNC: 88 MG/DL (ref 0–149)
TSH SERPL DL<=0.005 MIU/L-ACNC: 0.67 UIU/ML (ref 0.27–4.2)
VIT B12 SERPL-MCNC: 557 PG/ML (ref 211–946)
WBC # BLD AUTO: 5.1 K/UL (ref 4.8–10.8)

## 2024-08-23 SDOH — HEALTH STABILITY: PHYSICAL HEALTH: ON AVERAGE, HOW MANY DAYS PER WEEK DO YOU ENGAGE IN MODERATE TO STRENUOUS EXERCISE (LIKE A BRISK WALK)?: 5 DAYS

## 2024-08-23 SDOH — HEALTH STABILITY: PHYSICAL HEALTH: ON AVERAGE, HOW MANY MINUTES DO YOU ENGAGE IN EXERCISE AT THIS LEVEL?: 40 MIN

## 2024-08-23 ASSESSMENT — PATIENT HEALTH QUESTIONNAIRE - PHQ9
SUM OF ALL RESPONSES TO PHQ QUESTIONS 1-9: 2
9. THOUGHTS THAT YOU WOULD BE BETTER OFF DEAD, OR OF HURTING YOURSELF: NOT AT ALL
4. FEELING TIRED OR HAVING LITTLE ENERGY: SEVERAL DAYS
SUM OF ALL RESPONSES TO PHQ9 QUESTIONS 1 & 2: 1
SUM OF ALL RESPONSES TO PHQ QUESTIONS 1-9: 2
7. TROUBLE CONCENTRATING ON THINGS, SUCH AS READING THE NEWSPAPER OR WATCHING TELEVISION: NOT AT ALL
10. IF YOU CHECKED OFF ANY PROBLEMS, HOW DIFFICULT HAVE THESE PROBLEMS MADE IT FOR YOU TO DO YOUR WORK, TAKE CARE OF THINGS AT HOME, OR GET ALONG WITH OTHER PEOPLE: SOMEWHAT DIFFICULT
3. TROUBLE FALLING OR STAYING ASLEEP: NOT AT ALL
2. FEELING DOWN, DEPRESSED OR HOPELESS: SEVERAL DAYS
SUM OF ALL RESPONSES TO PHQ QUESTIONS 1-9: 2
6. FEELING BAD ABOUT YOURSELF - OR THAT YOU ARE A FAILURE OR HAVE LET YOURSELF OR YOUR FAMILY DOWN: NOT AT ALL
1. LITTLE INTEREST OR PLEASURE IN DOING THINGS: NOT AT ALL
8. MOVING OR SPEAKING SO SLOWLY THAT OTHER PEOPLE COULD HAVE NOTICED. OR THE OPPOSITE, BEING SO FIGETY OR RESTLESS THAT YOU HAVE BEEN MOVING AROUND A LOT MORE THAN USUAL: NOT AT ALL
5. POOR APPETITE OR OVEREATING: NOT AT ALL
SUM OF ALL RESPONSES TO PHQ QUESTIONS 1-9: 2

## 2024-08-23 ASSESSMENT — LIFESTYLE VARIABLES
HOW OFTEN DO YOU HAVE A DRINK CONTAINING ALCOHOL: NEVER
HOW OFTEN DO YOU HAVE SIX OR MORE DRINKS ON ONE OCCASION: 1
HOW MANY STANDARD DRINKS CONTAINING ALCOHOL DO YOU HAVE ON A TYPICAL DAY: PATIENT DOES NOT DRINK
HOW MANY STANDARD DRINKS CONTAINING ALCOHOL DO YOU HAVE ON A TYPICAL DAY: 0
HOW OFTEN DO YOU HAVE A DRINK CONTAINING ALCOHOL: 1

## 2024-08-26 ENCOUNTER — OFFICE VISIT (OUTPATIENT)
Dept: PRIMARY CARE CLINIC | Age: 79
End: 2024-08-26

## 2024-08-26 VITALS
TEMPERATURE: 98.3 F | OXYGEN SATURATION: 98 % | HEART RATE: 99 BPM | BODY MASS INDEX: 26.01 KG/M2 | SYSTOLIC BLOOD PRESSURE: 120 MMHG | HEIGHT: 72 IN | DIASTOLIC BLOOD PRESSURE: 80 MMHG | WEIGHT: 192 LBS

## 2024-08-26 DIAGNOSIS — I10 ESSENTIAL HYPERTENSION: ICD-10-CM

## 2024-08-26 DIAGNOSIS — N40.0 BENIGN PROSTATIC HYPERPLASIA WITHOUT LOWER URINARY TRACT SYMPTOMS: ICD-10-CM

## 2024-08-26 DIAGNOSIS — K21.9 GASTROESOPHAGEAL REFLUX DISEASE WITHOUT ESOPHAGITIS: ICD-10-CM

## 2024-08-26 DIAGNOSIS — H04.123 DRY EYES: ICD-10-CM

## 2024-08-26 DIAGNOSIS — R06.02 SHORTNESS OF BREATH: ICD-10-CM

## 2024-08-26 DIAGNOSIS — Z00.00 ANNUAL PHYSICAL EXAM: Primary | ICD-10-CM

## 2024-08-26 DIAGNOSIS — R17 ELEVATED BILIRUBIN: ICD-10-CM

## 2024-08-26 DIAGNOSIS — R74.8 ELEVATED ALKALINE PHOSPHATASE LEVEL: ICD-10-CM

## 2024-08-26 DIAGNOSIS — M15.9 GENERALIZED OSTEOARTHRITIS OF MULTIPLE SITES: ICD-10-CM

## 2024-08-26 DIAGNOSIS — G25.81 RESTLESS LEG SYNDROME: ICD-10-CM

## 2024-08-26 DIAGNOSIS — E03.9 PRIMARY HYPOTHYROIDISM: ICD-10-CM

## 2024-08-26 DIAGNOSIS — F41.8 DEPRESSION WITH ANXIETY: ICD-10-CM

## 2024-08-26 DIAGNOSIS — F51.01 PRIMARY INSOMNIA: ICD-10-CM

## 2024-08-26 DIAGNOSIS — R53.83 OTHER FATIGUE: ICD-10-CM

## 2024-08-26 DIAGNOSIS — E78.00 HYPERCHOLESTEROLEMIA: ICD-10-CM

## 2024-08-26 DIAGNOSIS — Z86.73 HISTORY OF CVA (CEREBROVASCULAR ACCIDENT): ICD-10-CM

## 2024-08-26 PROBLEM — I63.9 CEREBROVASCULAR ACCIDENT (HCC): Status: RESOLVED | Noted: 2024-05-12 | Resolved: 2024-08-26

## 2024-08-26 PROBLEM — I63.9 ACUTE ISCHEMIC STROKE (HCC): Status: RESOLVED | Noted: 2024-05-15 | Resolved: 2024-08-26

## 2024-08-26 LAB
BILIRUB DIRECT SERPL-MCNC: 0.3 MG/DL (ref 0–0.3)
BILIRUB INDIRECT SERPL-MCNC: 1.7 MG/DL (ref 0–1)
BILIRUB SERPL-MCNC: 2 MG/DL (ref 0.2–1.2)
BNP BLD-MCNC: 42 PG/ML (ref 0–449)

## 2024-08-26 RX ORDER — POLYETHYLENE GLYCOL 3350 17 G/17G
17 POWDER, FOR SOLUTION ORAL DAILY
COMMUNITY

## 2024-08-26 RX ORDER — LEVOTHYROXINE SODIUM 88 UG/1
88 TABLET ORAL DAILY
Qty: 90 TABLET | Refills: 1 | Status: SHIPPED | OUTPATIENT
Start: 2024-08-26

## 2024-08-26 NOTE — PROGRESS NOTES
[facility]  [address]  [address]  [place] [address]  Dept: [phone]  Dept Fax: [phone]  Loc: [phone]    Echo Brown is a 79 y.o. male who presents today for his medical conditions/complaints as noted below.  Echo Brown is here for Medicare AWV         Patient History:      Restless leg syndrome  -Neurology: Dr. Jordan     Pancreatic cyst  -[facility] gastroenterology:  - November 2023: MRI abdomen with and without: 0.9 x 1.8 cm: Unchanged  Pthx     Chronic kidney disease stage III:  - Sustained a 4 foot fall in January 2022 requiring transfer to [facility].  -Sustained a closed injury of the left kidney.  -Developed reduction in GFR which has never returned back to baseline     Left thalamic ischemic infarction  -May 2024  May 2024 workup:  - TTE: Ejection fraction 66 to 70%, no evidence of right to left shunt, no significant valvular disease  -CT cerebral perfusion: Normal  -CT angiogram head neck: No arterial occlusion or flow-limiting stenosis.  No intracranial large vessel occlusion or flow-limiting stenosis.  July 2024 workup:  -72-hour Holter monitor: Relatively benign.  No atrial fibrillation.      Elevated alkaline phosphatase  -November 22, 2023: MRI abdomen with and without contrast:  Liver: Normal contour.  No hepatic steatosis.  No suspicious hepatic lesions.   Gallbladder: Possible gallbladder sludge.  No surrounding inflammatory changes.   Bile ducts: No intra or extrahepatic biliary ductal dilatation.  No visible choledocholithiasis.  Pancreas: There is decreased T1 signal in the pancreatic tail suggestive of chronic pancreatitis.  There is persistent focal segmental dilatation of the main pancreatic duct in the pancreatic body measuring 0.9 cm in diameter and along the span of 2.8 cm   (series nine, image 4).  No associated enhancement.                   Subjective:   CC:  Here today to discuss the following:    CVA  -He  08/22/2024    HCT 50.9 08/22/2024    MCV 88.4 08/22/2024     08/22/2024     No results found for: \"LABA1C\"  Lab Results   Component Value Date    CREATININE 1.2 08/22/2024      Lab Results   Component Value Date     08/22/2024    K 4.9 08/22/2024    CL 99 08/22/2024    CO2 28 08/22/2024    BUN 9 08/22/2024    CREATININE 1.2 08/22/2024    GLUCOSE 87 08/22/2024    CALCIUM 8.9 08/22/2024    BILITOT 2.3 (H) 08/22/2024    ALKPHOS 196 (H) 08/22/2024    AST 33 08/22/2024    ALT 34 08/22/2024    LABGLOM 61 08/22/2024    GFRAA >59 08/17/2022      Hemoglobin hematocrit stable.  Platelets normal  Kidney function normal  Alkaline phosphatase: 196.  Total bilirubin 2.3  Total cholesterol 101  LDL 22  TSH and T4 within normal limits  Iron normal  B12 and folic acid normal       EKG: Normal sinus rhythm     Assessment Plan:   1. Annual physical exam    2. History of CVA (cerebrovascular accident)    3. Essential hypertension    4. Hypercholesterolemia    5. Restless leg syndrome    6. Depression with anxiety    7. Gastroesophageal reflux disease without esophagitis    8. Generalized osteoarthritis of multiple sites    9. Benign prostatic hyperplasia without lower urinary tract symptoms    10. Primary hypothyroidism    11. Primary insomnia    12. Elevated bilirubin    13. Other fatigue    14. Dry eyes    15. Shortness of breath    16. Elevated alkaline phosphatase level       1.  Completed annual wellness  2.  Aspirin 81 mg  Plavix  Await further input from neurology  3.  Lisinopril  Blood pressure stable  4.  Atorvastatin  Significant drop in his LDL with an increase of atorvastatin to 80 mg.  -Will follow-up on his liver studies.  -Depending on the evaluation for his elevated LFTs, may need to reduce his atorvastatin to 40 mg.  He is not having any myalgias.  5.  Requip stable  6.  Celexa stable  7.  Over-the-counter omeprazole  8.  Continue to hold NSAIDs while on aspirin and Plavix  9.  Uroxatrol  10.

## 2024-08-26 NOTE — PROGRESS NOTES
Medicare Annual Wellness Visit - Subsequent    Name: Echo Brown Today’s Date: 2024   MRN: [id number] Sex: Male   Age: 79 y.o. Ethnicity: Non- / Non    : 1945 Race: White (non-)      Echo Brown is here for   Chief Complaint   Patient presents with    Medicare AWV        Screenings for behavioral, psychosocial and functional/safety risks, and cognitive dysfunction are all negative except as indicated below. These results, as well as other patient data from the Health Risk Assessment form, are documented in Flowsheets linked to this Encounter.    No Known Allergies    Prior to Visit Medications    Medication Sig Taking? Authorizing Provider   polyethylene glycol (GLYCOLAX) 17 GM/SCOOP powder Take 17 g by mouth daily Yes Becky Joaquin MD   omeprazole (PRILOSEC) 20 MG delayed release capsule Take 1 capsule by mouth daily Yes Becky Joaquin MD   alfuzosin (UROXATRAL) 10 MG extended release tablet TAKE 1 TABLET BY MOUTH ONCE DAILY Yes Tanner Daily MD   aspirin 81 MG chewable tablet Take 1 tablet by mouth daily Yes Lake Jordan MD   citalopram (CELEXA) 10 MG tablet Take 2 tablets by mouth daily Yes Lake Jordan MD   traZODone (DESYREL) 50 MG tablet Take 1 tablet by mouth nightly Yes Lake Jordan MD   atorvastatin (LIPITOR) 80 MG tablet Take 1 tablet by mouth at bedtime Yes Lake Jordan MD   lisinopril (PRINIVIL;ZESTRIL) 20 MG tablet Take 1 tablet by mouth daily Yes Lake Jordan MD   rOPINIRole (REQUIP) 2 MG tablet Take 1 tablet by mouth every 24 hours Yes Lake Jordan MD   magnesium hydroxide (MILK OF MAGNESIA) 400 MG/5ML suspension Take 60 mLs by mouth every 24 hours Yes Lake Jordan MD   clopidogrel (PLAVIX) 75 MG tablet Take 1 tablet by mouth daily Yes Lake Jordan MD   prednisoLONE acetate (PRED FORTE) 1 % ophthalmic suspension Place 1 drop into the right eye every 6-8 hours as needed (red eye) Yes Lake Jordan MD   levothyroxine  Stress or Anger?: No  General Health Risk Interventions:  Not indicated  -     Health Habits/Nutrition  On average, how many days per week do you engage in moderate to strenuous exercise (like a brisk walk)?: 5 days  On average, how many minutes do you engage in exercise at this level?: 40 min  Do you eat balanced/healthy meals regularly?: Yes  Have you seen the dentist within the past year?: Yes  Body mass index is 26.04 kg/m².  Health Habits/Nutrition Interventions:  Not indicated  -     Hearing/Vision  Have you had an eye exam within the past year?: Yes  Do you have difficulty driving, watching TV, or doing any of your daily activities because of your eyesight?: No  Do you or your family notice any trouble with your hearing that hasn't been managed with hearing aids?: No  Hearing/Vision Interventions:  Not indicated  -     Safety  Do you have working smoke detectors?: Yes  Do you have any tripping hazards - loose or unsecured carpets or rugs?: No  Do you have non-slip mats or non-slip surfaces or shower bars or grab bars in your shower or bathtub?: Yes  Do you always fasten your seatbelt when you are in a car?: Yes  Safety Interventions:  Not indicated  -     ADLs  In the past 7 days, did you need help from others to perform any of the following everyday activities: Eating, dressing, grooming, bathing, toileting, or walking/balance?: (!) Yes  Select all that apply: (!) Walking/Balance  In the past 7 days, did you need help from others to take care of any of the following: Laundry, housekeeping, banking/finances, shopping, telephone use, food preparation, transportation, or taking medications?: (!) Yes  Select all that apply: (!) Banking/Finances  ADL Interventions:  Home safety tips provided    Personalized Preventive Plan   Current Health Maintenance Status  Immunization History   Administered Date(s) Administered    COVID-19, MODERNA BLUE border, Primary or Immunocompromised, (age 12y+), IM, 100 mcg/0.5mL

## 2024-08-26 NOTE — PATIENT INSTRUCTIONS
wishes even if you have a form from a different state.  You don't need an  to complete a living will. But legal advice can be helpful if your state's laws are unclear, your health history is complicated, or your family can't agree on what should be in your living will.  You can change your living will at any time. Some people find that their wishes about end-of-life care change as their health changes.  In addition to making a living will, think about completing a medical power of  form. This form lets you name the person you want to make end-of-life treatment decisions for you (your \"health care agent\") if you're not able to. Many hospitals and nursing homes will give you the forms you need to complete a living will and a medical power of .  Your living will is used only if you can't make or communicate decisions for yourself anymore. If you become able to make decisions again, you can accept or refuse any treatment, no matter what you wrote in your living will.  Your state may offer an online registry. This is a place where you can store your living will online so the doctors and nurses who need to treat you can find it right away.  What should you think about when creating a living will?  Talk about your end-of-life wishes with your family members and your doctor. Let them know what you want. That way the people making decisions for you won't be surprised by your choices.  Think about these questions as you make your living will:  Do you know enough about life support methods that might be used? If not, talk to your doctor so you know what might be done if you can't breathe on your own, your heart stops, or you're unable to swallow.  What things would you still want to be able to do after you receive life-support methods? Would you want to be able to walk? To speak? To eat on your own? To live without the help of machines?  If you have a choice, where do you want to be cared for? In your  sign the form and then he or she signs the form. Some states also require that two or more witnesses sign the form.  Be sure to tell your family members and doctors who your health care agent is.  Keep your forms in a safe place. But make sure that your loved ones know where the forms are. This could be in your desk where you keep other important papers. Make sure your doctor has a copy of your forms.  Where can you learn more?  Go to [email or web address] and sign in to your [email or web address] account. Enter P737 in the Search Health Information box to learn more about \"Learning About Durable Power of  for Health Care.\"     If you do not have an account, please click on the \"Sign Up Now\" link.  Current as of: 2016  Content Version: 11.5  © 0641-3618 [employer]. Care instructions adapted under license by [employer]. If you have questions about a medical condition or this instruction, always ask your healthcare professional. [employer] disclaims any warranty or liability for your use of this information.  _______________________________________________________________    Home Safety Tips    Each year, thousands of older Americans fall at home. Many of them are seriously injured, and some are disabled. In , more than 8,500 people over age 65  because of falls. Falls are often due to hazards that are easy to overlook but easy to fix. This checklist will help you find and fix those hazards in your home. The checklist asks about hazards found in each room of your home. For each hazard, the checklist tells you how to fix the problem. At the end of the checklist, you will find other tips for preventing falls.     Look at the floor in each room  When he walks through room do you have to walk around furniture?  Ask someone to move the furniture to clear your past  You have throw rugs on the floor?  Remove the rugs or use double-sided tape or nonslip backing

## 2024-08-28 LAB
ALP BONE SERPL-CCNC: 80 U/L (ref 0–55)
ALP ISOS SERPL HS-CCNC: 0 U/L
ALP LIVER SERPL-CCNC: 131 U/L (ref 0–94)
ALP SERPL-CCNC: 211 U/L (ref 40–120)
NUCLEAR IGG SER QL IA: NORMAL

## 2024-09-03 ENCOUNTER — OFFICE VISIT (OUTPATIENT)
Dept: NEUROLOGY | Facility: CLINIC | Age: 79
End: 2024-09-03
Payer: MEDICARE

## 2024-09-03 VITALS
HEIGHT: 72 IN | DIASTOLIC BLOOD PRESSURE: 72 MMHG | HEART RATE: 80 BPM | SYSTOLIC BLOOD PRESSURE: 134 MMHG | WEIGHT: 194 LBS | BODY MASS INDEX: 26.28 KG/M2 | RESPIRATION RATE: 17 BRPM

## 2024-09-03 DIAGNOSIS — R20.0 RIGHT SIDED NUMBNESS: ICD-10-CM

## 2024-09-03 DIAGNOSIS — G93.89 CEREBRAL VENTRICULOMEGALY: ICD-10-CM

## 2024-09-03 DIAGNOSIS — I69.30 HISTORY OF STROKE WITH CURRENT RESIDUAL EFFECTS: Primary | ICD-10-CM

## 2024-09-03 DIAGNOSIS — R41.89 COGNITIVE IMPAIRMENT: ICD-10-CM

## 2024-09-03 DIAGNOSIS — I10 ESSENTIAL HYPERTENSION: ICD-10-CM

## 2024-09-03 DIAGNOSIS — G25.81 RESTLESS LEGS: ICD-10-CM

## 2024-09-03 DIAGNOSIS — R26.89 IMPAIRED GAIT AND MOBILITY: ICD-10-CM

## 2024-09-03 DIAGNOSIS — E78.5 HYPERLIPIDEMIA LDL GOAL <70: ICD-10-CM

## 2024-09-03 NOTE — PROGRESS NOTES
Neurology Consult Note    [facility]  [address], [address]  [place] [address]  Phone: [phone]  Fax: [phone]    Referring Provider:   No ref. provider found  Primary Care Provider:  Keenan Perez MD    Reason for Consult:  History of stroke  Subjective      Agueda Tyler presents to [facility]    History of Present Illness  79-year-old male who I been following for stroke.  Last seen in clinic on 6/27/2024.  Initial stroke symptoms included right sided numbness, right arm weakness and dysarthria.  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  There was concern for NPH based off his imaging.  Initial plan was for patient to follow-up after his stroke for NPH workup.    Today patient presents with his spouse.  Reports me no new stroke symptoms since last being seen.  He has continued his aspirin and Plavix.  He is also continued on atorvastatin 80 mg nightly.  Reports to me recently his bilirubin was elevated as well as some hepatic enzymes.  These were checked through his primary care's office.  He also notes he has had some eye pain recently.  He has a history of neuritis and usually is treated with steroids.  He is seeing ophthalmology this week.  With this he has experienced painful eye movements, redness and tearing.  He had these episodes off and on for 20 years.    Regards to his memory, he does note some issues with remembering details.  This has not worsened acutely since last being seen.    He also notes his gait is off.  He notes this is been off for approximately 3 years.    He is also experienced some Roberto stress incontinence    He also notes a lot of bruising recently.  He is also noticed some blood in his stool with straining.    Patient Active Problem List   Diagnosis    Hx of colonic polyp    Gastroesophageal reflux disease without esophagitis    Iron deficiency anemia    BRBPR (bright red blood per rectum)    Acute  ischemic stroke    Essential hypertension    Stroke-like symptom    Right upper lobe consolidation    SINDI (obstructive sleep apnea)    Restless legs    Impaired gait and mobility    Cerebral ventriculomegaly due to brain atrophy        Past Medical History:   Diagnosis Date    Anxiety     BPH (benign prostatic hyperplasia)     Colon polyp     Diverticulosis     Erectile dysfunction     Essential hypertension     GERD (gastroesophageal reflux disease)     Hypertriglyceridemia     Idiopathic peripheral neuropathy     Osteoarthritis     Urinary incontinence     Inknown        Social History     Socioeconomic History    Marital status:    Tobacco Use    Smoking status: Never    Smokeless tobacco: Never    Tobacco comments:     Never used   Vaping Use    Vaping status: Never Used   Substance and Sexual Activity    Alcohol use: Never    Drug use: Never    Sexual activity: Not Currently        Allergies   Allergen Reactions    Sulfa Antibiotics Rash          Current Outpatient Medications:     acetaminophen (TYLENOL) 325 MG tablet, Take 2 tablets by mouth Every 4 (Four) Hours As Needed for Mild Pain., Disp: , Rfl:     alfuzosin (UROXATRAL) 10 MG 24 hr tablet, Take 1 tablet by mouth Daily., Disp: , Rfl:     aspirin 81 MG EC tablet, Take 1 tablet by mouth Daily., Disp: 30 tablet, Rfl: 2    atorvastatin (LIPITOR) 80 MG tablet, Take 1 tablet by mouth Every Night., Disp: 30 tablet, Rfl: 2    citalopram (CeleXA) 20 MG tablet, Take 1 tablet by mouth Daily., Disp: , Rfl:     clopidogrel (PLAVIX) 75 MG tablet, Take 1 tablet by mouth Daily., Disp: 30 tablet, Rfl: 2    diazePAM (VALIUM) 5 MG tablet, Take 1 tablet by mouth Every 6 (Six) Hours As Needed for Anxiety., Disp: , Rfl:     levothyroxine (SYNTHROID, LEVOTHROID) 88 MCG tablet, Take 1 tablet by mouth Daily., Disp: , Rfl:     lisinopril (PRINIVIL,ZESTRIL) 20 MG tablet, Take 1 tablet by mouth Daily., Disp: , Rfl:     Magnesium Hydroxide (MILK OF MAGNESIA PO), Take 60 mL by  "mouth Every Night., Disp: , Rfl:     polyethylene glycol (MIRALAX) 17 g packet, Take 17 g by mouth Daily As Needed (constipation)., Disp: , Rfl:     rOPINIRole HCl (REQUIP PO), Take 2 mg by mouth Every Night., Disp: , Rfl:     traZODone (DESYREL) 50 MG tablet, Take 1 tablet by mouth Every Night., Disp: , Rfl:        Objective   Vital Signs:   /72 (BP Location: Left arm, Patient Position: Sitting)   Pulse 80   Resp 17   Ht 182.9 cm (72\")   Wt 88 kg (194 lb)   BMI 26.31 kg/m²       Physical Exam  Vitals and nursing note reviewed.   Constitutional:       Appearance: Normal appearance.   HENT:      Head: Normocephalic.   Eyes:      General: Lids are normal.      Extraocular Movements: Extraocular movements intact.      Pupils: Pupils are equal, round, and reactive to light.   Pulmonary:      Effort: Pulmonary effort is normal. No respiratory distress.   Skin:     General: Skin is warm and dry.   Neurological:      Mental Status: He is alert.      Motor: Motor strength is normal.     Deep Tendon Reflexes: Reflexes are normal and symmetric.   Psychiatric:         Speech: Speech normal.        Neurological Exam  Mental Status  Alert. Oriented to person, place, time and situation. Recent and remote memory are intact. Able to copy figure. Speech is normal. Language is fluent with no aphasia. Attention and concentration are normal. Fund of knowledge is appropriate for level of education. MMSE score: 30.    Cranial Nerves  CN II: Visual fields full to confrontation.  CN III, IV, VI: Extraocular movements intact bilaterally. Normal lids and orbits bilaterally. Pupils equal round and reactive to light bilaterally.  CN V: Facial sensation is normal.  CN VII: Full and symmetric facial movement.  CN IX, X: Palate elevates symmetrically. Normal gag reflex.  CN XI: Shoulder shrug strength is normal.  CN XII: Tongue midline without atrophy or fasciculations.    Motor  Normal muscle bulk throughout. No fasciculations " present. Normal muscle tone. No abnormal involuntary movements. Strength is 5/5 throughout all four extremities.    Sensory  Light touch abnormality: Decreased right arm.     Reflexes  Deep tendon reflexes are 2+ and symmetric in all four extremities.    Gait  Casual gait: Normal stance. Normal stride length. Antalgic gait. Normal right arm swing. Normal left arm swing.  Gait appears antalgic.  No apparent ataxia..      Result Review :   The following data was reviewed by: MIO Arredondo on 09/03/2024:         MRI Brain Without Contrast (05/13/2024 17:28)     Clark Index = 0.45    Progress Notes by Scottie Clayton APRN (06/27/2024 09:30)                  Impression:  Agueda Tyler is a 79 y.o. male who presents for follow-up of stroke.  Regards to his stroke he continues to recover.  He does have some residual numbness on the right side of his body.  This has been bothersome to him.  In regards to concerns for NPH, patient notes his gait is still off however has been this way for approximately 3 years.  Denies any falls recently.  He also has some cognitive issues.  This is issues with remembering details.  This has not affected his overall function.  Denies urinary incontinence, this sounds like he has stress incontinence.  However with the triad of symptoms as well as an Clark index of greater than 0.3, would recommend evaluation by neurosurgery for potential NPH.  I am unclear if patient truly meets the criteria so I recommend an opinion by them.  Will continue secondary Surprenant strategies as is.    Diagnoses and all orders for this visit:    1. History of stroke with current residual effects (Primary)    2. Cerebral ventriculomegaly  -     Ambulatory Referral to Neurosurgery    3. Impaired gait and mobility  -     Ambulatory Referral to Neurosurgery    4. Cognitive impairment  -     Ambulatory Referral to Neurosurgery    5. Hyperlipidemia LDL goal <70    6. Restless legs    7. Essential  hypertension    8. Right sided numbness        Plan:  Referral to neurosurgery for evaluation of NPH  Stop clopidogrel  Continue aspirin 81 mg daily  Decrease atorvastatin to 40 mg nightly  LDL goal less than 70.  A1c goal less than 6.5.  BP goal less than 130/80  Return to the emergency room for any new stroke symptoms.  Mediterranean-style diet.  Increase activities as tolerated.  Follow-up with primary care as scheduled  Follow-up with our clinic in 6 months or sooner if needed    The patient and I have discussed the plan of care and he is in full agreement at this time.   I spent a total of 45 minutes for this encounter.  This includes reviewing prior records, obtaining a thorough HPI, assessment of patient, developing a plan of care with the patient his spouse, patient spouse discussion, patient spouse education as well as documentation.  Follow Up   Return in about 6 months (around 3/3/2025) for Stroke/TIA.            Scottie Clayton, MIO  09/03/24  15:12 CDT

## 2024-09-04 ENCOUNTER — TELEPHONE (OUTPATIENT)
Dept: NEUROLOGY | Facility: CLINIC | Age: 79
End: 2024-09-04
Payer: MEDICARE

## 2024-09-04 DIAGNOSIS — E78.5 HYPERLIPIDEMIA LDL GOAL <70: Primary | ICD-10-CM

## 2024-09-04 DIAGNOSIS — I69.30 HISTORY OF STROKE WITH CURRENT RESIDUAL EFFECTS: ICD-10-CM

## 2024-09-04 RX ORDER — ATORVASTATIN CALCIUM 40 MG/1
40 TABLET, FILM COATED ORAL DAILY
Qty: 30 TABLET | Refills: 6 | Status: SHIPPED | OUTPATIENT
Start: 2024-09-04 | End: 2025-09-04

## 2024-09-04 NOTE — TELEPHONE ENCOUNTER
Provider: LEIGHA HICKS APRN     Caller: JEB    Relationship to Patient: SELF    Pharmacy: [facility] [place]     Phone Number: [phone]    Reason for Call: STATED HE NEEDS REFILLS ON THE ATORVASTATIN.  STATED THE PROVIDER CHANGED THE PRESCRIPTION TO 40 MG.       STATED HE WOULD ALSO LIKE A PRESCRIPTION FOR DIAZEPAM 5 MG EVERY 6 HOURS AS NEEDED FOR ANXIETY.      STATED HE DOES NOT HAVE A 3 DAY SUPPLY.    When was the patient last seen: 9-3-24    PLEASE ADVISE

## 2024-09-04 NOTE — TELEPHONE ENCOUNTER
I returned the patient's call in regards to his request for Rubin to prescribe his Lipitor 40 mg and Valium.  I did send his message to Scottie Clayton NP and Scottie states he will refill the Lipitor but does not prescribe the Valium. Patient states he no longer see the provider that prescribes the Valium and is still requesting to have that refilled.  He is requesting to speak with Scottie about the above.  I did send his message to Scottie Clayton NP.

## 2024-09-09 ENCOUNTER — HOSPITAL ENCOUNTER (OUTPATIENT)
Age: 79
Discharge: HOME | End: 2024-09-11
Attending: FAMILY MEDICINE
Payer: MEDICARE

## 2024-09-09 ENCOUNTER — HOSPITAL ENCOUNTER (OUTPATIENT)
Dept: ULTRASOUND IMAGING | Age: 79
Discharge: HOME | End: 2024-09-09
Attending: FAMILY MEDICINE
Payer: MEDICARE

## 2024-09-09 VITALS
BODY MASS INDEX: 26.01 KG/M2 | SYSTOLIC BLOOD PRESSURE: 122 MMHG | DIASTOLIC BLOOD PRESSURE: 89 MMHG | HEIGHT: 72 IN | WEIGHT: 192 LBS

## 2024-09-09 DIAGNOSIS — R06.02 SHORTNESS OF BREATH: ICD-10-CM

## 2024-09-09 DIAGNOSIS — R17 ELEVATED BILIRUBIN: ICD-10-CM

## 2024-09-09 LAB
ECHO BSA: 2.1 M2
STRESS BASELINE DIAS BP: 89 MMHG
STRESS BASELINE HR: 67 BPM
STRESS BASELINE SYS BP: 122 MMHG
STRESS EXERCISE DUR MIN: 8 MIN
STRESS EXERCISE DUR SEC: 0 SEC
STRESS O2 SAT PEAK: 94 %
STRESS O2 SAT REST: 100 %
STRESS PEAK DIAS BP: 90 MMHG
STRESS PEAK SYS BP: 172 MMHG
STRESS PERCENT HR ACHIEVED: 90 %
STRESS POST PEAK HR: 126 BPM
STRESS RATE PRESSURE PRODUCT: NORMAL BPM*MMHG
STRESS ST DEPRESSION: 0 MM
STRESS STAGE 1 BP: NORMAL MMHG
STRESS STAGE 1 DURATION: 2 MIN:SEC
STRESS STAGE 1 HR: 71 BPM
STRESS STAGE 2 BP: NORMAL MMHG
STRESS STAGE 2 DURATION: 2 MIN:SEC
STRESS STAGE 2 HR: 93 BPM
STRESS STAGE 3 BP: NORMAL MMHG
STRESS STAGE 3 DURATION: 2 MIN:SEC
STRESS STAGE 3 HR: 97 BPM
STRESS STAGE 4 DURATION: 2 MIN:SEC
STRESS STAGE 4 HR: 114 BPM
STRESS STAGE RECOVERY 1 BP: NORMAL MMHG
STRESS STAGE RECOVERY 1 DURATION: 2 MIN:SEC
STRESS STAGE RECOVERY 1 HR: 97 BPM
STRESS TARGET HR: 140 BPM

## 2024-09-09 PROCEDURE — 6360000002 HC RX W HCPCS: Performed by: INTERNAL MEDICINE

## 2024-09-09 PROCEDURE — 93018 CV STRESS TEST I&R ONLY: CPT | Performed by: INTERNAL MEDICINE

## 2024-09-09 PROCEDURE — 93016 CV STRESS TEST SUPVJ ONLY: CPT | Performed by: INTERNAL MEDICINE

## 2024-09-09 PROCEDURE — 93352 ADMIN ECG CONTRAST AGENT: CPT | Performed by: INTERNAL MEDICINE

## 2024-09-09 PROCEDURE — 76705 ECHO EXAM OF ABDOMEN: CPT

## 2024-09-09 PROCEDURE — 93350 STRESS TTE ONLY: CPT | Performed by: INTERNAL MEDICINE

## 2024-09-09 PROCEDURE — 2580000003 HC RX 258: Performed by: INTERNAL MEDICINE

## 2024-09-09 PROCEDURE — 93017 CV STRESS TEST TRACING ONLY: CPT

## 2024-09-09 PROCEDURE — 6360000004 HC RX CONTRAST MEDICATION: Performed by: INTERNAL MEDICINE

## 2024-09-09 RX ORDER — ATROPINE SULFATE 0.1 MG/ML
1 INJECTION INTRAVENOUS PRN
Status: DISPENSED | OUTPATIENT
Start: 2024-09-09 | End: 2024-09-10

## 2024-09-09 RX ORDER — METOPROLOL TARTRATE 1 MG/ML
5 INJECTION, SOLUTION INTRAVENOUS PRN
Status: DISPENSED | OUTPATIENT
Start: 2024-09-09 | End: 2024-09-10

## 2024-09-09 RX ORDER — SODIUM CHLORIDE 9 MG/ML
INJECTION, SOLUTION INTRAVENOUS
Status: COMPLETED | OUTPATIENT
Start: 2024-09-09 | End: 2024-09-09

## 2024-09-09 RX ORDER — DOBUTAMINE HYDROCHLORIDE 200 MG/100ML
10-50 INJECTION INTRAVENOUS CONTINUOUS PRN
Status: DISPENSED | OUTPATIENT
Start: 2024-09-09 | End: 2024-09-10

## 2024-09-09 RX ADMIN — PERFLUTREN 1.5 ML: 6.52 INJECTION, SUSPENSION INTRAVENOUS at 11:14

## 2024-09-09 RX ADMIN — DOBUTAMINE HYDROCHLORIDE 10 MCG/KG/MIN: 200 INJECTION INTRAVENOUS at 11:10

## 2024-09-09 RX ADMIN — SODIUM CHLORIDE: 9 INJECTION, SOLUTION INTRAVENOUS at 11:10

## 2024-09-13 ENCOUNTER — TELEMEDICINE (OUTPATIENT)
Dept: PRIMARY CARE CLINIC | Age: 79
End: 2024-09-13

## 2024-09-13 DIAGNOSIS — N40.0 BENIGN PROSTATIC HYPERPLASIA WITHOUT LOWER URINARY TRACT SYMPTOMS: ICD-10-CM

## 2024-09-13 DIAGNOSIS — E78.00 HYPERCHOLESTEROLEMIA: ICD-10-CM

## 2024-09-13 DIAGNOSIS — G25.81 RESTLESS LEG SYNDROME: ICD-10-CM

## 2024-09-13 DIAGNOSIS — R74.8 ELEVATED ALKALINE PHOSPHATASE LEVEL: ICD-10-CM

## 2024-09-13 DIAGNOSIS — G25.81 RESTLESS LEG SYNDROME: Primary | ICD-10-CM

## 2024-09-13 DIAGNOSIS — R17 ELEVATED BILIRUBIN: ICD-10-CM

## 2024-09-13 DIAGNOSIS — K21.9 GASTROESOPHAGEAL REFLUX DISEASE WITHOUT ESOPHAGITIS: ICD-10-CM

## 2024-09-13 DIAGNOSIS — Z51.81 MEDICATION MONITORING ENCOUNTER: ICD-10-CM

## 2024-09-13 DIAGNOSIS — Z86.73 HISTORY OF CVA (CEREBROVASCULAR ACCIDENT): Primary | ICD-10-CM

## 2024-09-13 DIAGNOSIS — I10 ESSENTIAL HYPERTENSION: ICD-10-CM

## 2024-09-13 DIAGNOSIS — E03.9 PRIMARY HYPOTHYROIDISM: ICD-10-CM

## 2024-09-13 DIAGNOSIS — F51.01 PRIMARY INSOMNIA: ICD-10-CM

## 2024-09-13 DIAGNOSIS — F41.8 DEPRESSION WITH ANXIETY: ICD-10-CM

## 2024-09-13 DIAGNOSIS — M15.9 GENERALIZED OSTEOARTHRITIS OF MULTIPLE SITES: ICD-10-CM

## 2024-09-13 LAB
ALCOHOL URINE: NORMAL
AMPHETAMINE SCREEN URINE: NORMAL
BARBITURATE SCREEN URINE: NORMAL
BENZODIAZEPINE SCREEN, URINE: NORMAL
BUPRENORPHINE URINE: NORMAL
COCAINE METABOLITE SCREEN URINE: NORMAL
FENTANYL SCREEN, URINE: NORMAL
GABAPENTIN SCREEN, URINE: NORMAL
MDMA, URINE: NORMAL
METHADONE SCREEN, URINE: NORMAL
METHAMPHETAMINE, URINE: NORMAL
OPIATE SCREEN URINE: NORMAL
OXYCODONE SCREEN URINE: NORMAL
PHENCYCLIDINE SCREEN URINE: NORMAL
PROPOXYPHENE SCREEN, URINE: NORMAL
SYNTHETIC CANNABINOIDS(K2) SCREEN, URINE: NORMAL
THC SCREEN, URINE: NORMAL
TRAMADOL SCREEN URINE: NORMAL
TRICYCLIC ANTIDEPRESSANTS, UR: NORMAL

## 2024-09-13 RX ORDER — DIAZEPAM 5 MG
5 TABLET ORAL NIGHTLY PRN
Qty: 90 TABLET | Refills: 1 | Status: SHIPPED | OUTPATIENT
Start: 2024-09-13 | End: 2024-12-12

## 2024-09-23 DIAGNOSIS — K86.2 PANCREATIC CYST: Primary | ICD-10-CM

## 2024-09-26 ENCOUNTER — OFFICE VISIT (OUTPATIENT)
Dept: NEUROSURGERY | Facility: CLINIC | Age: 79
End: 2024-09-26
Payer: MEDICARE

## 2024-09-26 VITALS — BODY MASS INDEX: 26.28 KG/M2 | HEIGHT: 72 IN | WEIGHT: 194 LBS

## 2024-09-26 DIAGNOSIS — Z91.81 AT RISK FOR FALLS: ICD-10-CM

## 2024-09-26 DIAGNOSIS — E66.3 OVERWEIGHT (BMI 25.0-29.9): ICD-10-CM

## 2024-09-26 DIAGNOSIS — G93.89 CEREBRAL VENTRICULOMEGALY: Primary | ICD-10-CM

## 2024-09-26 DIAGNOSIS — I63.00 CEREBRAL INFARCTION DUE TO THROMBOSIS OF PRECEREBRAL ARTERY: ICD-10-CM

## 2024-10-22 ENCOUNTER — HOSPITAL ENCOUNTER (OUTPATIENT)
Dept: PHYSICAL THERAPY | Facility: HOSPITAL | Age: 79
Setting detail: THERAPIES SERIES
Discharge: HOME | End: 2024-10-22
Payer: MEDICARE

## 2024-10-22 ENCOUNTER — HOSPITAL ENCOUNTER (OUTPATIENT)
Facility: HOSPITAL | Age: 79
Setting detail: THERAPIES SERIES
Discharge: HOME | End: 2024-10-22
Payer: MEDICARE

## 2024-10-22 DIAGNOSIS — G93.89 CEREBRAL VENTRICULOMEGALY: Primary | ICD-10-CM

## 2024-10-22 DIAGNOSIS — R41.89 OTHER SYMPTOMS AND SIGNS INVOLVING COGNITIVE FUNCTIONS AND AWARENESS: Primary | ICD-10-CM

## 2024-10-22 PROCEDURE — 97161 PT EVAL LOW COMPLEX 20 MIN: CPT

## 2024-10-22 PROCEDURE — 92523 SPEECH SOUND LANG COMPREHEN: CPT | Performed by: SPEECH-LANGUAGE PATHOLOGIST

## 2024-10-22 NOTE — PROGRESS NOTES
Physical Therapy Normal Pressure Hydrocephalus Screening    Patient: Agueda Tyler   : 1945  Referring practitioner: MIO Denson  Date of Initial Visit: 10/22/2024  Today's Date: 10/22/2024  Patient seen for 1 sessions    Visit Diagnoses:    ICD-10-CM ICD-9-CM   1. Cerebral ventriculomegaly  G93.89 348.89     Past Medical History:   Diagnosis Date    Anxiety     BPH (benign prostatic hyperplasia)     Colon polyp     Diverticulosis     Erectile dysfunction     Essential hypertension     GERD (gastroesophageal reflux disease)     Hypertriglyceridemia     Idiopathic peripheral neuropathy     Osteoarthritis     Urinary incontinence     Inknown     Past Surgical History:   Procedure Laterality Date    COLONOSCOPY  2016    diverticulosis, internal hemorrhoids,     COLONOSCOPY  2011    internal hemorrhoid, diverticulosis    COLONOSCOPY N/A 2021    Procedure: COLONOSCOPY WITH ANESTHESIA;  Surgeon: Ricky House MD;  Location:  [facility];  Service: Gastroenterology;  Laterality: N/A;  pre: hx polyps  post: diverticulosis. hemorrhoid.   Keenan Perez MD    CYSTOSCOPY      Before TURP    ENDOSCOPY N/A 2021    Procedure: ESOPHAGOGASTRODUODENOSCOPY WITH ANESTHESIA;  Surgeon: Ricky House MD;  Location:  [facility];  Service: Gastroenterology;  Laterality: N/A;  pre: GERD  post: hiatal hernia. gastric polyps.  Keenan Perez MD    HERNIA REPAIR      inguinal    PROSTATE SURGERY      TURP    TURP / TRANSURETHRAL INCISION / DRAINAGE PROSTATE           SUBJECTIVE     Subjective: He fell out of his garage loft ~2 years ago and he hasn't been the same since. He does not use an AD consistently and does not present with one today.    Per chart review, pt experienced a L thalamic CVA in May 2024.    Pain: 0/10    OBJECTIVE     Test Day 1 Day 2 Day 3   100' walk (secs) 34.21 secs     TUG (secs)  Trial 1: 13.92 secs  Trial 2: 13.72 secs  Av.82 secs Trial 1:   Trial 2:   Avg:  Trial 1:   Trial 2:   Avg:    Comments:  Mild instability noted during turn w/ TUG, intermittent shuffling gait, decreased LLE stance time         Total Timed Treatment:     0   mins  Total Time of Visit:            30   mins    ASSESSMENT/PLAN     Assmentment: NPH screen performed with no difficulties. His original lumbar puncture was not scheduled until Friday, 2024. His day 2 and day 3 testing will be rescheduled to correspond with the reschduled lumbar puncture.     Plan: Reassess day of LP and day after and chart tests.   Frequency: 1x/day  Duration: 3 days  Anticipated CPT Codes: Therapeutic Activity 48601    SIGNATURE: KIERA Eli DPT License #: [id number]  Electronically Signed on 10/22/2024      Initial Certification  Certification Period: 2025  I certify that the therapy services are furnished while this patient is under my care.  The services outlined above are required by this patient, and will be reviewed every 90 days.     PHYSICIAN:   Marin Lopes, MIO__________________________________DATE: _________    Please sign and return via fax to [phone].   Thank you so much for letting us work with Agueda. I appreciate your letting us work with your patients. If you have any questions or concerns, please don't hesitate to contact me.          [address] [address]  [phone]

## 2024-10-22 NOTE — PROGRESS NOTES
Speech/Language Pathology Initial Evaluation and Plan of Care - NPH Workup    Patient: Agueda Tyler               : 1945  Visit Date: 10/22/2024  Referring practitioner: MIO Denson  Date of Initial Visit: 10/22/2024  Patient seen for 1 sessions    Visit Diagnoses:    ICD-10-CM ICD-9-CM   1. Other symptoms and signs involving cognitive functions and awareness  R41.89 799.59     Past Medical History:   Diagnosis Date    Anxiety     BPH (benign prostatic hyperplasia)     Colon polyp     Diverticulosis     Erectile dysfunction     Essential hypertension     GERD (gastroesophageal reflux disease)     Hypertriglyceridemia     Idiopathic peripheral neuropathy     Osteoarthritis     Urinary incontinence     Inknown     Past Surgical History:   Procedure Laterality Date    COLONOSCOPY  2016    diverticulosis, internal hemorrhoids,     COLONOSCOPY  2011    internal hemorrhoid, diverticulosis    COLONOSCOPY N/A 2021    Procedure: COLONOSCOPY WITH ANESTHESIA;  Surgeon: Ricky House MD;  Location:  [facility];  Service: Gastroenterology;  Laterality: N/A;  pre: hx polyps  post: diverticulosis. hemorrhoid.   Keenan Perez MD    CYSTOSCOPY      Before TURP    ENDOSCOPY N/A 2021    Procedure: ESOPHAGOGASTRODUODENOSCOPY WITH ANESTHESIA;  Surgeon: Ricky House MD;  Location:  [facility];  Service: Gastroenterology;  Laterality: N/A;  pre: GERD  post: hiatal hernia. gastric polyps.  Keenan Perez MD    HERNIA REPAIR      inguinal    PROSTATE SURGERY      TURP    TURP / TRANSURETHRAL INCISION / DRAINAGE PROSTATE         SUBJECTIVE       NPH PROTOCOL: The patient was seen on this date for a standardized cognitive evaluation per normal pressure hydrocephalus protocol.  Per protocol, the patient will be given three cognitive evaluations; pre lumbar puncture, post lumbar puncture, and one day post lumbar puncture.  This is the pre-test evaluation.   Patient was alert and cooperative.    The patient's history is significant for fall from second story window onto concrete 1/22, left thalamic stroke 5/24, . Prior level of function/education: word finding difficulty from stroke, right side numbness  Patient was alert and fully able to participate.     OBJECTIVE     Objective: Patient was given the MOCA 8.1 in order to test cognition pre lumbar puncture procedure.     Assessment: Pt scored 20 out of 30.  Difficulties were noted with visuospatial, attention, language, abstraction, and delayed recall:  characterized by difficulty with figure copy, clock drawing, reverse digit span, perseveration, divergent naming, abstraction, and word recall.   Clinical Impression: moderate cognitive impairment    Impact on Function: Difficulty learning and remembering new information.   Prognosis Comment: Pending completion of NPH workup    Goals                                             STG  Comments Status   Patient will return for reassessment post LP and one day post LP.   20/30  New   LTG       Patient will complete NPH workup  To be completed  New     Therapy Education/Self Care    Details: Evaluation results and POC   Given     Progress: New   Education provided to:  Patient   Level of understanding Verbalized           Total Time of Visit:            30   mins    ASSESSMENT/PLAN         ASSESSMENT:   Patient is indicated for skilled care by a Speech/Language Pathologist for continued NPH assessment.     Summary of Assessment:   SLP Findings:  Patient presents with moderate cognitive deficit.  Recommendations: SLP services/follow up is warranted for continued standardized testing as stated above.    Comments: Patient to return for continued assessment per NPH protocol.      Therapy Recommendations: continue to assess  Education Comment: Patient demonstrated understanding of evaluation results and plan of care.     PLAN:  Details of Plan of Care: Patient will return for  reassessment post LP and one day post LP per NPH workup protocol.     Frequency: 2x   Duration: 3 visits  Estimated Length of Session: 15 minutes      Thank you for this referral and for allowing us to participate in the care of your patient    SPEECH/LANGUAGE PATHOLOGIST SIGNATURE: Monique Verduzco, CCC-SLP License #: [id number]  Electronically Signed on 10/22/2024      Initial Certification  Certification Period: 1/19/2025  I certify that the therapy services are furnished while this patient is under my care.  The services outlined above are required by this patient, and will be reviewed every 90 days.     PHYSICIAN:     Marin Lopes, APRN______________________________________DATE: _________     Please sign and return via fax to [phone].   Thank you so much for letting us work with Agueda. I appreciate your letting us work with your patients. If you have any questions or concerns, please don't hesitate to contact me.

## 2024-10-23 ENCOUNTER — APPOINTMENT (OUTPATIENT)
Dept: PHYSICAL THERAPY | Facility: HOSPITAL | Age: 79
End: 2024-10-23
Payer: MEDICARE

## 2024-10-23 ENCOUNTER — APPOINTMENT (OUTPATIENT)
Facility: HOSPITAL | Age: 79
End: 2024-10-23
Payer: MEDICARE

## 2024-10-24 ENCOUNTER — APPOINTMENT (OUTPATIENT)
Dept: PHYSICAL THERAPY | Facility: HOSPITAL | Age: 79
End: 2024-10-24
Payer: MEDICARE

## 2024-10-24 ENCOUNTER — APPOINTMENT (OUTPATIENT)
Facility: HOSPITAL | Age: 79
End: 2024-10-24
Payer: MEDICARE

## 2024-10-30 ENCOUNTER — APPOINTMENT (OUTPATIENT)
Dept: OCCUPATIONAL THERAPY | Facility: HOSPITAL | Age: 79
End: 2024-10-30
Payer: MEDICARE

## 2024-10-30 ENCOUNTER — APPOINTMENT (OUTPATIENT)
Dept: PHYSICAL THERAPY | Facility: HOSPITAL | Age: 79
End: 2024-10-30
Payer: MEDICARE

## 2024-11-06 ENCOUNTER — HOSPITAL ENCOUNTER (OUTPATIENT)
Facility: HOSPITAL | Age: 79
Discharge: HOME | End: 2024-11-06
Payer: MEDICARE

## 2024-11-06 ENCOUNTER — HOSPITAL ENCOUNTER (OUTPATIENT)
Dept: PHYSICAL THERAPY | Facility: HOSPITAL | Age: 79
Setting detail: THERAPIES SERIES
Discharge: HOME | End: 2024-11-06
Payer: MEDICARE

## 2024-11-06 ENCOUNTER — HOSPITAL ENCOUNTER (OUTPATIENT)
Dept: GENERAL RADIOLOGY | Facility: HOSPITAL | Age: 79
Discharge: HOME | End: 2024-11-06
Payer: MEDICARE

## 2024-11-06 VITALS
BODY MASS INDEX: 26.67 KG/M2 | SYSTOLIC BLOOD PRESSURE: 134 MMHG | DIASTOLIC BLOOD PRESSURE: 86 MMHG | OXYGEN SATURATION: 94 % | HEART RATE: 60 BPM | WEIGHT: 190.48 LBS | HEIGHT: 71 IN | TEMPERATURE: 98 F | RESPIRATION RATE: 16 BRPM

## 2024-11-06 DIAGNOSIS — G93.89 CEREBRAL VENTRICULOMEGALY: Primary | ICD-10-CM

## 2024-11-06 DIAGNOSIS — G93.89 CEREBRAL VENTRICULOMEGALY: ICD-10-CM

## 2024-11-06 DIAGNOSIS — R41.89 OTHER SYMPTOMS AND SIGNS INVOLVING COGNITIVE FUNCTIONS AND AWARENESS: Primary | ICD-10-CM

## 2024-11-06 LAB
APPEARANCE CSF: CLEAR
APPEARANCE CSF: CLEAR
APTT PPP: 26.8 SECONDS (ref 24.5–36)
BASOPHILS # BLD AUTO: 0.04 10*3/MM3 (ref 0–0.2)
BASOPHILS NFR BLD AUTO: 0.7 % (ref 0–1.5)
COLOR CSF: COLORLESS
COLOR CSF: COLORLESS
COLOR SPUN CSF: COLORLESS
COLOR SPUN CSF: COLORLESS
DEPRECATED RDW RBC AUTO: 44.1 FL (ref 37–54)
EOSINOPHIL # BLD AUTO: 0.17 10*3/MM3 (ref 0–0.4)
EOSINOPHIL NFR BLD AUTO: 3.1 % (ref 0.3–6.2)
ERYTHROCYTE [DISTWIDTH] IN BLOOD BY AUTOMATED COUNT: 14.6 % (ref 12.3–15.4)
GLUCOSE CSF-MCNC: 59 MG/DL (ref 40–70)
HCT VFR BLD AUTO: 48.5 % (ref 37.5–51)
HGB BLD-MCNC: 16.5 G/DL (ref 13–17.7)
HOLD SPECIMEN: NORMAL
IMM GRANULOCYTES # BLD AUTO: 0.02 10*3/MM3 (ref 0–0.05)
IMM GRANULOCYTES NFR BLD AUTO: 0.4 % (ref 0–0.5)
INR PPP: 1.02 (ref 0.91–1.09)
LYMPHOCYTES # BLD AUTO: 0.97 10*3/MM3 (ref 0.7–3.1)
LYMPHOCYTES NFR BLD AUTO: 17.6 % (ref 19.6–45.3)
MCH RBC QN AUTO: 28.7 PG (ref 26.6–33)
MCHC RBC AUTO-ENTMCNC: 34 G/DL (ref 31.5–35.7)
MCV RBC AUTO: 84.3 FL (ref 79–97)
METHOD: ABNORMAL
METHOD: ABNORMAL
MONOCYTES # BLD AUTO: 0.42 10*3/MM3 (ref 0.1–0.9)
MONOCYTES NFR BLD AUTO: 7.6 % (ref 5–12)
NEUTROPHILS NFR BLD AUTO: 3.89 10*3/MM3 (ref 1.7–7)
NEUTROPHILS NFR BLD AUTO: 70.6 % (ref 42.7–76)
NRBC BLD AUTO-RTO: 0 /100 WBC (ref 0–0.2)
NUC CELL # CSF MANUAL: 1 /MM3 (ref 0–5)
NUC CELL # CSF MANUAL: 1 /MM3 (ref 0–5)
PLATELET # BLD AUTO: 147 10*3/MM3 (ref 140–450)
PMV BLD AUTO: 10.1 FL (ref 6–12)
PROT CSF-MCNC: 34.5 MG/DL (ref 15–45)
PROTHROMBIN TIME: 13.8 SECONDS (ref 11.8–14.8)
RBC # BLD AUTO: 5.75 10*6/MM3 (ref 4.14–5.8)
RBC # CSF MANUAL: 21 /MM3 (ref 0–0)
RBC # CSF MANUAL: 98 /MM3 (ref 0–0)
SPECIMEN VOL CSF: 31.5 ML
SPECIMEN VOL CSF: 31.5 ML
TUBE # CSF: 1
TUBE # CSF: 3
WBC NRBC COR # BLD AUTO: 5.51 10*3/MM3 (ref 3.4–10.8)

## 2024-11-06 PROCEDURE — 87015 SPECIMEN INFECT AGNT CONCNTJ: CPT | Performed by: NURSE PRACTITIONER

## 2024-11-06 PROCEDURE — 25010000002 LIDOCAINE 1 % SOLUTION: Performed by: NURSE PRACTITIONER

## 2024-11-06 PROCEDURE — 85730 THROMBOPLASTIN TIME PARTIAL: CPT | Performed by: RADIOLOGY

## 2024-11-06 PROCEDURE — 87075 CULTR BACTERIA EXCEPT BLOOD: CPT | Performed by: NURSE PRACTITIONER

## 2024-11-06 PROCEDURE — 87070 CULTURE OTHR SPECIMN AEROBIC: CPT | Performed by: NURSE PRACTITIONER

## 2024-11-06 PROCEDURE — 85025 COMPLETE CBC W/AUTO DIFF WBC: CPT | Performed by: RADIOLOGY

## 2024-11-06 PROCEDURE — 87205 SMEAR GRAM STAIN: CPT | Performed by: NURSE PRACTITIONER

## 2024-11-06 PROCEDURE — 89050 BODY FLUID CELL COUNT: CPT | Performed by: NURSE PRACTITIONER

## 2024-11-06 PROCEDURE — 97129 THER IVNTJ 1ST 15 MIN: CPT | Performed by: SPEECH-LANGUAGE PATHOLOGIST

## 2024-11-06 PROCEDURE — 82945 GLUCOSE OTHER FLUID: CPT | Performed by: NURSE PRACTITIONER

## 2024-11-06 PROCEDURE — 85610 PROTHROMBIN TIME: CPT | Performed by: RADIOLOGY

## 2024-11-06 PROCEDURE — 84157 ASSAY OF PROTEIN OTHER: CPT | Performed by: NURSE PRACTITIONER

## 2024-11-06 RX ORDER — LIDOCAINE HYDROCHLORIDE 10 MG/ML
5 INJECTION, SOLUTION INFILTRATION; PERINEURAL ONCE
Status: COMPLETED | OUTPATIENT
Start: 2024-11-06 | End: 2024-11-06

## 2024-11-06 RX ADMIN — LIDOCAINE HYDROCHLORIDE 5 ML: 10 INJECTION, SOLUTION INFILTRATION; PERINEURAL at 09:20

## 2024-11-06 NOTE — PROGRESS NOTES
Speech/Language Pathology NPH Workup     Patient: Agueda Tyler               : 1945  Visit Date: 2024  Referring practitioner: MIO Denson  Date of Initial Visit: 2024  Patient seen for 2 sessions    Visit Diagnoses:    ICD-10-CM ICD-9-CM   1. Other symptoms and signs involving cognitive functions and awareness  R41.89 799.59     Past Medical History:   Diagnosis Date    Anxiety     BPH (benign prostatic hyperplasia)     Colon polyp     CVA (cerebral vascular accident)     Diverticulosis     Erectile dysfunction     Essential hypertension     GERD (gastroesophageal reflux disease)     Hypertriglyceridemia     Idiopathic peripheral neuropathy     Osteoarthritis     Urinary incontinence     Inknown     Past Surgical History:   Procedure Laterality Date    COLONOSCOPY  2016    diverticulosis, internal hemorrhoids,     COLONOSCOPY  2011    internal hemorrhoid, diverticulosis    COLONOSCOPY N/A 2021    Procedure: COLONOSCOPY WITH ANESTHESIA;  Surgeon: Ricky House MD;  Location:  [facility];  Service: Gastroenterology;  Laterality: N/A;  pre: hx polyps  post: diverticulosis. hemorrhoid.   Keenan Perez MD    CYSTOSCOPY      Before TURP    ENDOSCOPY N/A 2021    Procedure: ESOPHAGOGASTRODUODENOSCOPY WITH ANESTHESIA;  Surgeon: Ricky House MD;  Location:  [facility];  Service: Gastroenterology;  Laterality: N/A;  pre: GERD  post: hiatal hernia. gastric polyps.  Keenan Perez MD    HERNIA REPAIR      inguinal    PROSTATE SURGERY      TURP    TURP / TRANSURETHRAL INCISION / DRAINAGE PROSTATE         SUBJECTIVE       NPH PROTOCOL: The patient was seen on this date for continuation of standardized cognitive evaluation per normal pressure hydrocephalus protocol.  Per protocol, the patient will be given three cognitive evaluations; pre lumbar puncture, post lumbar puncture, and one day post lumbar puncture.  This is the post-test  evaluation.  Patient was alert and cooperative.    Patient was seen for initial evaluation on 10/22/24, however LP had to be rescheduled to today.     OBJECTIVE     Objective: Patient was given the MOCA 8.2 in order to test cognition post  lumbar puncture procedure.     Assessment: Pt scored 23 out of 30.  Difficulties were noted with figure copy, serial subtraction,  divergent naming, and word recall.   Clinical Impression: This is a 3 point increase from previous score. Score is within the range of Mild/Moderate cognitive impairment.       Goals                                             STG  Comments Status   Patient will return for reassessment post LP and one day post LP.  23/30 today Ongoing   LTG       Patient will complete NPH workup  Assessment in progress Ongoing     Therapy Education/Self Care    Details: POC   Given     Progress: Reinforced   Education provided to:  Patient   Level of understanding Verbalized           Total Time of Visit:            15   mins    ASSESSMENT/PLAN         ASSESSMENT:   Patient showed a 3 in score from previous assessment    PreTest: 20/30  Post Test: 23/30  One day post test:  to be completed tomorrow    Summary of Assessment:   SLP Findings:  Patient presents with mild to moderate cognitive deficit.  Recommendations: SLP services/follow up is warranted for continued standardized testing as stated above.  Comments:       Therapy Recommendations: continue to assess    PLAN:  Details of Plan of Care: Patient will return for reassessment one day post LP     Thank you for this referral and for allowing us to participate in the care of your patient    SPEECH/LANGUAGE PATHOLOGIST SIGNATURE: Monique Verduzco, CCC-SLP  License #[id number]  Electronically signed on  11/6/2024

## 2024-11-06 NOTE — THERAPY TREATMENT NOTE
Physical Therapy Treatment Note  [facility]  A department of [address] [address]    Patient: Agueda Tyler                                                 Visit Date: 2024  :     1945    Referring practitioner:    MIO Denson  Date of Initial Visit:          Type: THERAPY  Episode: NPH  Number of visits this episode: 2    Visit Diagnoses:    ICD-10-CM ICD-9-CM   1. Cerebral ventriculomegaly  G93.89 348.89     SUBJECTIVE     Subjective: Denies pain, headaches, falls. Reports that he felt confident and steady.      PAIN: 0/10         OBJECTIVE     Objective       Test Day 1 Day 2 Day 3   100' walk (secs) 34.21 secs  26.05 secs     TUG (secs) Trial 1: 13.92 secs  Trial 2: 13.72 secs  Av.82 secs Trial 1: 12.55  Trial 2: 12.52  Av.53 secs Trial 1:   Trial 2:   Avg:    Comments:  Mild instability noted during turn w/ TUG, intermittent shuffling gait, decreased LLE stance time  On seconds TUG try patient had a little less control with eccentric sitting back down.        Therapy Education/Self Care 33349   Education offered today    [employer] Code    Ongoing HEP      Timed Minutes        Total Timed Treatment:     0   mins  Total Time of Visit:             6   mins         ASSESSMENT/PLAN     GOALS    Anticipated CPT codes: Other:        Assessment/Plan     ASSESSMENT:   Exhibited decreased BL ankle mobility with decreased heel strike and toe off. Nearly absent LE arm swing during gait.     PLAN:   Perform TUG trials 1 and 2 and another 100' walk test and report results to referring MD.    SIGNATURE: [facility], [place] License #: [id number]  Electronically Signed on 2024        [address] [place] [address]. [id number]  [phone]

## 2024-11-07 ENCOUNTER — HOSPITAL ENCOUNTER (OUTPATIENT)
Dept: PHYSICAL THERAPY | Facility: HOSPITAL | Age: 79
Setting detail: THERAPIES SERIES
Discharge: HOME | End: 2024-11-07
Payer: MEDICARE

## 2024-11-07 ENCOUNTER — HOSPITAL ENCOUNTER (OUTPATIENT)
Facility: HOSPITAL | Age: 79
Discharge: HOME | End: 2024-11-07
Admitting: NURSE PRACTITIONER
Payer: MEDICARE

## 2024-11-07 DIAGNOSIS — R41.89 OTHER SYMPTOMS AND SIGNS INVOLVING COGNITIVE FUNCTIONS AND AWARENESS: Primary | ICD-10-CM

## 2024-11-07 DIAGNOSIS — G93.89 CEREBRAL VENTRICULOMEGALY: Primary | ICD-10-CM

## 2024-11-07 PROCEDURE — 97129 THER IVNTJ 1ST 15 MIN: CPT

## 2024-11-07 NOTE — THERAPY TREATMENT NOTE
Physical Therapy Treatment Note  [facility]  A department of [address] [address]    Patient: Agueda Tyler                                                 Visit Date: 2024  :     1945    Referring practitioner:    MIO Denson  Date of Initial Visit:          Type: THERAPY  Episode: NPH  Number of visits this episode: 3    Visit Diagnoses:    ICD-10-CM ICD-9-CM   1. Cerebral ventriculomegaly  G93.89 348.89     SUBJECTIVE     Subjective:No new complaints, denies pain      PAIN: 0/10         OBJECTIVE     Objective       Test Day 1 Day 2 Day 3   100' walk (secs) 34.21 secs  26.05 secs  28.34 sec   TUG (secs) Trial 1: 13.92 secs  Trial 2: 13.72 secs  Av.82 secs Trial 1: 12.55  Trial 2: 12.52  Av.53 secs Trial 1: 11.59  Trial 2: 16.01  Avg:    Comments:  Mild instability noted during turn w/ TUG, intermittent shuffling gait, decreased LLE stance time  On second TUG try patient had a little less control with eccentric sitting back down.   better sit<>stand performance today     Therapy Education/Self Care [id number]   Education offered today NPH testing rationale    [employer] Code    Ongoing HEP      Timed Minutes        Total Timed Treatment:     0   mins  Total Time of Visit:             10   mins         ASSESSMENT/PLAN     GOALS    Anticipated CPT codes:       Assessment/Plan     ASSESSMENT:   He exhibited improved L UE arm swing as compared to yesterday and improved B foot /toe clearance. He did have a little B lateral trunk sway and one instance of scissoring. He would likely benefit from a PT POC based on his presentation to reduce his fall risk.    PLAN:   Await POC guidance from the referring physician.    SIGNATURE: Sammy Anton PTA, [place] License #: [id number]  Electronically Signed on 2024        [address] [place] [address]. [id number]  [phone]

## 2024-11-07 NOTE — PROGRESS NOTES
Speech/Language Pathology NPH Workup     Patient: Agueda Tyler               : 1945  Visit Date: 2024  Referring practitioner: MIO Denson  Date of Initial Visit: 2024  Patient seen for 3 sessions    Visit Diagnoses:    ICD-10-CM ICD-9-CM   1. Other symptoms and signs involving cognitive functions and awareness  R41.89 799.59     Past Medical History:   Diagnosis Date    Anxiety     BPH (benign prostatic hyperplasia)     Colon polyp     CVA (cerebral vascular accident)     Diverticulosis     Erectile dysfunction     Essential hypertension     GERD (gastroesophageal reflux disease)     Hypertriglyceridemia     Idiopathic peripheral neuropathy     Osteoarthritis     Urinary incontinence     Inknown     Past Surgical History:   Procedure Laterality Date    COLONOSCOPY  2016    diverticulosis, internal hemorrhoids,     COLONOSCOPY  2011    internal hemorrhoid, diverticulosis    COLONOSCOPY N/A 2021    Procedure: COLONOSCOPY WITH ANESTHESIA;  Surgeon: Ricky House MD;  Location:  [facility];  Service: Gastroenterology;  Laterality: N/A;  pre: hx polyps  post: diverticulosis. hemorrhoid.   Keenan Perez MD    CYSTOSCOPY      Before TURP    ENDOSCOPY N/A 2021    Procedure: ESOPHAGOGASTRODUODENOSCOPY WITH ANESTHESIA;  Surgeon: Ricky House MD;  Location:  [facility];  Service: Gastroenterology;  Laterality: N/A;  pre: GERD  post: hiatal hernia. gastric polyps.  Keenan Perez MD    HERNIA REPAIR      inguinal    PROSTATE SURGERY      TURP    TURP / TRANSURETHRAL INCISION / DRAINAGE PROSTATE         SUBJECTIVE       NPH PROTOCOL: The patient was seen on this date for continuation of standardized cognitive evaluation per normal pressure hydrocephalus protocol.  Per protocol, the patient will be given three cognitive evaluations; pre lumbar puncture, post lumbar puncture, and one day post lumbar puncture.  This is the one day post  test evaluation.  Patient was alert and cooperative.      OBJECTIVE     Objective: Patient was given the MOCA 8.3 in order to test cognition post one day post lumbar puncture procedure.     Assessment: Pt scored 25 out of 30.  Difficulties were noted with alternating trail making, divergent naming, and word recall.  Clinical Impression: This is a 2 point increase from previous score. Score is within the range of mild cognitive impairment.      Goals                                             STG  Comments Status   Patient will return for reassessment post LP and one day post LP.   25/30 today ongoing   LTG       Patient will complete NPH workup Last test today ongoing     Therapy Education/Self Care    Details: POC   Given    Progress: Progressed   Education provided to:  Patient   Level of understanding Verbalized           Total Time of Visit:            15   mins    ASSESSMENT/PLAN         ASSESSMENT:   Patient showed a 2 point increase in score from previous assessment    PreTest: 20/30  Post Test: 23/30  One day post test: 25/30     Summary of Assessment:   SLP Findings:  Patient presents with mild cognitive deficit.  Recommendations: SLP services/follow up Patient to follow up with referring physician.    Therapy Recommendations: discharge and follow up with MD      PLAN:  Details of Plan of Care: follow up with MD, per NPH workup protocol.       Thank you for this referral and for allowing us to participate in the care of your patient    SPEECH/LANGUAGE PATHOLOGIST SIGNATURE: Deb LIU, CCC-SLP  License #[id number]  Electronically signed on  11/7/2024

## 2024-11-09 LAB
BACTERIA SPEC AEROBE CULT: NORMAL
GRAM STN SPEC: NORMAL

## 2024-11-11 LAB — BACTERIA SPEC ANAEROBE CULT: NORMAL

## 2024-11-14 ENCOUNTER — DOCUMENTATION (OUTPATIENT)
Dept: NEUROSURGERY | Facility: CLINIC | Age: 79
End: 2024-11-14
Payer: MEDICARE

## 2024-11-20 ENCOUNTER — OFFICE VISIT (OUTPATIENT)
Dept: NEUROSURGERY | Facility: CLINIC | Age: 79
End: 2024-11-20
Payer: MEDICARE

## 2024-11-20 VITALS — WEIGHT: 190 LBS | HEIGHT: 71 IN | BODY MASS INDEX: 26.6 KG/M2

## 2024-11-20 DIAGNOSIS — Z78.9 NON-SMOKER: ICD-10-CM

## 2024-11-20 DIAGNOSIS — E66.3 OVERWEIGHT WITH BODY MASS INDEX (BMI) OF 26 TO 26.9 IN ADULT: ICD-10-CM

## 2024-11-20 DIAGNOSIS — G91.2 NORMAL PRESSURE HYDROCEPHALUS: Primary | ICD-10-CM

## 2024-11-20 PROCEDURE — 99214 OFFICE O/P EST MOD 30 MIN: CPT | Performed by: NEUROLOGICAL SURGERY

## 2024-11-20 PROCEDURE — 1159F MED LIST DOCD IN RCRD: CPT | Performed by: NEUROLOGICAL SURGERY

## 2024-11-20 PROCEDURE — 1160F RVW MEDS BY RX/DR IN RCRD: CPT | Performed by: NEUROLOGICAL SURGERY

## 2024-11-20 NOTE — PROGRESS NOTES
Primary Care Provider: Keenan Perez MD    Chief Complaint:   Chief Complaint   Patient presents with    Follow-up     Follow up after LP     History of Present Illness    ABDULLAHI Tyler is a 79 y.o. male whom presents today for evaluation.  He is a retired pharmacist.    In January 2022, Mr. Tyler fell from a second story loft window onto concrete.  He sustained multiple injuries to include, but not limited to a grade III LEFT renal injury with moderate subcapsular hematoma, pulmonary contusion, minimally displaced left inferior ramus fracture, and a nondisplaced left superior ramus fracture and left sacral ala fracture requiring surgical intervention.    In May 2024, he suffered a left thalamic stroke resulting in right facial and upper extremity numbness and tingling.  Following his CVA he was discharged from [facility] to [facility] inpatient rehab and has since been discharged home, as well as completed physical therapy and Occupational Therapy per [facility].  His residual symptoms include right facial and upper extremity tingling dysesthesias without complaints of numbness or weakness.  Than an 81 mg aspirin, he is no longer taking or prescribed anticoagulant or antiplatelet medications    Mr. Tyler presents today with his wife for evaluation of ventriculomegaly.      History of Present Illness  The patient presents for evaluation of memory issues. He is accompanied by his son.    His memory issues began approximately 3 years ago following a fall in his garage, which resulted in two kidney hematomas and four broken ribs. Despite being airlifted to [place], he has not fully recovered, particularly in terms of his gait and balance. He walks without a cane or walker, although slower than usual. He experiences occasional urinary incontinence and has noticed a decline in his cognitive abilities, such as difficulty recalling names and transposing figures. He suffered a stroke on 05/11/2024,  after which he stopped working and attending [employer]. He also reports numbness in his right hand and face. His son notes a significant cognitive decline since the fall. He underwent a lumbar puncture, which improved his physical therapy performance and overall well-being for about a day and a half. He is currently taking aspirin 81 mg and Valium once daily at night.    He has been seeing Dr. Lemus for approximately 25 years for restless leg syndrome. Dr. Lemus determined that the condition is idiopathic, likely due to standing on concrete for 10 to 12 hours a day, which has damaged the nerves in his feet. He was prescribed Requip, which has significantly improved his symptoms.         ROS  Review of Systems   Constitutional: Negative.  Positive for activity change and fatigue.   HENT: Negative.     Eyes: Negative.  Positive for redness.   Respiratory: Negative.     Cardiovascular: Negative.    Gastrointestinal: Negative.  Positive for constipation.   Endocrine: Negative.    Genitourinary: Negative.    Musculoskeletal: Negative.    Skin: Negative.    Allergic/Immunologic: Negative.    Neurological: Negative.  Positive for numbness.   Hematological: Negative.    Psychiatric/Behavioral: Negative.  The patient is hyperactive.    All other systems reviewed and are negative.    Past Medical History:   Diagnosis Date    Anxiety     BPH (benign prostatic hyperplasia)     Colon polyp     CVA (cerebral vascular accident)     Diverticulosis     Erectile dysfunction     Essential hypertension     GERD (gastroesophageal reflux disease)     Hypertriglyceridemia     Idiopathic peripheral neuropathy     Osteoarthritis     Urinary incontinence     Inknown     Past Surgical History:   Procedure Laterality Date    COLONOSCOPY  04/18/2016    diverticulosis, internal hemorrhoids,     COLONOSCOPY  03/30/2011    internal hemorrhoid, diverticulosis    COLONOSCOPY N/A 03/11/2021    Procedure: COLONOSCOPY WITH ANESTHESIA;  Surgeon: Lacy  Ricky MICHEL MD;  Location:  [facility];  Service: Gastroenterology;  Laterality: N/A;  pre: hx polyps  post: diverticulosis. hemorrhoid.   Keenan Perez MD    CYSTOSCOPY  1994    Before TURP    ENDOSCOPY N/A 03/11/2021    Procedure: ESOPHAGOGASTRODUODENOSCOPY WITH ANESTHESIA;  Surgeon: Ricky House MD;  Location:  [facility];  Service: Gastroenterology;  Laterality: N/A;  pre: GERD  post: hiatal hernia. gastric polyps.  Keenan Perez MD    HERNIA REPAIR      inguinal    PROSTATE SURGERY  1994    TURP    TURP / TRANSURETHRAL INCISION / DRAINAGE PROSTATE       Family History: family history includes Hypertension in his father; Prostate cancer in his father.    Social History:  reports that he has never smoked. He has never used smokeless tobacco. He reports that he does not drink alcohol and does not use drugs.    Medications:    Current Outpatient Medications:     acetaminophen (TYLENOL) 325 MG tablet, Take 2 tablets by mouth Every 4 (Four) Hours As Needed for Mild Pain., Disp: , Rfl:     alfuzosin (UROXATRAL) 10 MG 24 hr tablet, Take 1 tablet by mouth Daily., Disp: , Rfl:     aspirin 81 MG EC tablet, Take 1 tablet by mouth Daily., Disp: 30 tablet, Rfl: 2    atorvastatin (Lipitor) 40 MG tablet, Take 1 tablet by mouth Daily., Disp: 30 tablet, Rfl: 6    citalopram (CeleXA) 20 MG tablet, Take 1 tablet by mouth Daily., Disp: , Rfl:     levothyroxine (SYNTHROID, LEVOTHROID) 88 MCG tablet, Take 1 tablet by mouth Daily., Disp: , Rfl:     lisinopril (PRINIVIL,ZESTRIL) 20 MG tablet, Take 1 tablet by mouth Daily., Disp: , Rfl:     Magnesium Hydroxide (MILK OF MAGNESIA PO), Take 60 mL by mouth Every Night., Disp: , Rfl:     omeprazole (priLOSEC) 20 MG capsule, Take 1 capsule by mouth Daily., Disp: , Rfl:     polyethylene glycol (MIRALAX) 17 g packet, Take 17 g by mouth Daily As Needed (constipation)., Disp: , Rfl:     rOPINIRole HCl (REQUIP PO), Take 2 mg by mouth Every Night., Disp: , Rfl:      "traZODone (DESYREL) 50 MG tablet, Take 1 tablet by mouth Every Night., Disp: , Rfl:     Chlorhexidine Gluconate 4 % solution, Shower each day with solution for 5 days beginning 5 days before surgery., Disp: 120 mL, Rfl: 0    diazePAM (VALIUM) 5 MG tablet, Take 1 tablet by mouth At Night As Needed for Anxiety., Disp: , Rfl:     Allergies:  Sulfa antibiotics    Objective   Ht 180 cm (70.87\")   Wt 86.2 kg (190 lb)   BMI 26.60 kg/m²   Physical Exam  Vitals and nursing note reviewed.   Constitutional:       General: He is not in acute distress.     Appearance: Normal appearance. He is well-developed, well-groomed and overweight. He is not ill-appearing, toxic-appearing or diaphoretic.      Comments: BMI 26.31   HENT:      Head: Normocephalic and atraumatic.      Right Ear: Hearing normal.      Left Ear: Hearing normal.   Eyes:      General: Lids are normal.      Extraocular Movements: Extraocular movements intact.      Conjunctiva/sclera: Conjunctivae normal.      Pupils: Pupils are equal, round, and reactive to light.   Neck:      Trachea: Trachea normal.   Cardiovascular:      Rate and Rhythm: Normal rate and regular rhythm.   Pulmonary:      Effort: Pulmonary effort is normal. No tachypnea, bradypnea, accessory muscle usage or respiratory distress.   Abdominal:      Palpations: Abdomen is soft.   Musculoskeletal:      Cervical back: Full passive range of motion without pain and neck supple.   Skin:     General: Skin is warm and dry.   Neurological:      GCS: GCS eye subscore is 4. GCS verbal subscore is 5. GCS motor subscore is 6.      Coordination: Coordination is intact.      Deep Tendon Reflexes:      Reflex Scores:       Tricep reflexes are 2+ on the right side and 2+ on the left side.       Bicep reflexes are 2+ on the right side and 2+ on the left side.       Brachioradialis reflexes are 2+ on the right side and 2+ on the left side.       Patellar reflexes are 4+ on the right side and 4+ on the left side.      "  Achilles reflexes are 3+ on the right side and 3+ on the left side.  Psychiatric:         Speech: Speech normal.         Behavior: Behavior normal. Behavior is cooperative.       Neurological Exam  Mental Status  Awake, alert and oriented to person, place and time. Speech is normal. Able to name objects. Attention and concentration are normal.    Cranial Nerves  CN I: Sense of smell is normal.  CN II: Visual acuity is normal.  CN III, IV, VI: Extraocular movements intact bilaterally. Normal lids and orbits bilaterally. Pupils equal round and reactive to light bilaterally.  CN V: Facial sensation is normal.  CN VII: Full and symmetric facial movement.  CN IX, X: Palate elevates symmetrically  CN XI: Shoulder shrug strength is normal.  CN XII: Tongue midline without atrophy or fasciculations.    Motor  Normal muscle bulk throughout. Normal muscle tone.                                               Right                     Left  Toe extension                        4-                          3+                                             Right                     Left  Deltoid                                   5                          5   Biceps                                   5                          5   Triceps                                  5                          5   Wrist extensor                       5                          5   Iliopsoas                               5                          5   Quadriceps                           5                          5   Anterior tibialis                      5                          5   Posterior tibialis                    5                          5    Sensory  Sensation is intact to light touch, pinprick, vibration and proprioception in all four extremities.    Reflexes                                            Right                      Left  Brachioradialis                    2+                         2+  Biceps                                  2+                         2+  Triceps                                2+                         2+  Patellar                                4+                         4+  Achilles                                3+                         3+  Right Plantar: downgoing  Left Plantar: downgoing    Right pathological reflexes: Paula's absent. Ankle clonus absent.  Left pathological reflexes: Paula's absent. Ankle clonus absent.    Coordination    Finger-to-nose, rapid alternating movements and heel-to-shin normal bilaterally without dysmetria.    Gait    Magnetic gait with en bloc turning.    Imaging: (independent review and interpretation)  5/13/2024.  MRI of the brain shows ventriculomegaly with surrounding FLAIR signal changes, as well is an acute ischemic infarct within the left thalamus.          5/14/2024.  Ventriculomegaly            Results  Imaging  MRI shows areas in the brain that are larger than they should be, indicating possible normal pressure hydrocephalus.    Testing  Lumbar puncture results showed a decrease in time to walk 100 feet from 34 seconds to 26 seconds, and then to 28 seconds. MMSE score improved from 20/30 to 23/30, and then to 25/30.      Results  Imaging  MRI shows areas in the brain that are larger than they should be, indicating possible normal pressure hydrocephalus.    Testing  Lumbar puncture results showed a decrease in time to walk 100 feet from 34 seconds to 26 seconds, and then to 28 seconds. MMSE score improved from 20/30 to 23/30, and then to 25/30.    ASSESSMENT and PLAN  Agueda Tyler is a 79 y.o. male with significant medical comorbidities to include left thalamus CVA (May 2024), hypertension, hyperlipidemia, BPH, anxiety, GERD, and he is overweight.  He presents today for evaluation of ventriculomegaly with reports of intermittent forgetfulness and a progressive decline in his gait and balance.  Physical exam findings of bright and awake, oriented x 3, names objects,  "bilateral EHL weakness, lower extremity hyperreflexia, and a magnetic gait with en bloc turning.  His imaging shows ventriculomegaly with surrounding FLAIR signal changes concerning for NPH.    Recommendations  Ventriculomegaly concerning for NPH    Normal Pressure Hydrocephalus (NPH)  Differential diagnoses include normal pressure hydrocephalus, Alzheimer's disease, Parkinson's disease, and other forms of neurocognitive degeneration.    NPH is clinically important because it is 1 of the few treatable forms of dementia.  Although most forms are idiopathic they can occur post subarachnoid hemorrhage, trauma, meningitis, tumor resection.    Clinical presentation includes the common triad of gait disturbance, dementia, and urinary incontinence.  Gait disturbance usually proceeds other symptoms.  Gait tends to be wide-based with short, shuffling steps and unsteadiness when turning.  Often it will be described as a \"magnetic gait\".  Dementia is primarily memory impairment with bradycardia for anemia and bradykinesia.  Urinary incontinence is usually unwitting.  Differentiation from Alzheimer's disease can be challenging but often times NPH patients do not show the deficiencies in orientation, writing, learning and emotional lability that is seen with Alzheimer's disease.    Clinical assessment.  While the tap test has not undergone rigorous perspective evaluation.  A positive response on speech therapy and physical therapy evaluation following lumbar puncture with withdrawal of 40-50 mL of CSF has a positive predictive value of % that shunting will result in improved function.     Treatment is via CSF diversion through programmable ventriculoperitoneal shunt.  For most, use of a medium pressure valve with a closing pressure between 65 and 90 mmHg is used to minimize the likelihood of subdural hematomas.  However I prefer using a programmable valve.  Patients are typically followed monthly for 6-12 months to titrate " the valve to optimize efficiency.    Potential complications are as high as 35% due to the generalized frailty of the elderly patients.  Complications include subdural hematomas or hygromas, shunt infection, intracerebral hemorrhage, seizures, and delayed complications including shunt obstruction.    Outcomes: The most likely symptom to improve after shunting is incontinence, then gait disturbance, and lastly dementia.  Predictors of good outcome include: Presence of the classical triad, gait disturbance as the primary symptom, opening pressure greater than 10 cm of water, large ventricles on CT or MRI with flattening of the sulci and little cortical atrophy, and presence of symptoms for short time.  Patients with cocominant Alzheimer disease and normal pressure hydrocephalus may still improve with shunting and should not be excluded.  In general most responders eventually relapse after 5-7 years of good response.    I discussed the risks of the procedure, including but not limited to infection, bleeding, damage to local structures, CSF leak, seizures, hydrocephalus, weakness, numbness, paralysis, or loss of bowel, and bladder function, stroke, coma, MI, or death.     Assessment & Plan  1. Normal pressure hydrocephalus.  The patient's symptoms, including cognitive decline, unsteady gait, and urinary urgency, along with test results, suggest a diagnosis of normal pressure hydrocephalus. The lumbar puncture showed significant improvement in his symptoms, confirming the diagnosis. A ventriculoperitoneal shunt placement is recommended to manage the condition. The procedure, including its risks (such as infection, bleeding, and shunt malfunction) and benefits, was thoroughly explained to him and his son. A handout on normal pressure hydrocephalus was provided for further understanding. He is advised to stop taking Plavix, which he has already discontinued. The surgery is planned to be scheduled within 4 to 6 weeks,  depending on insurance considerations.    2. Stroke.  The patient experienced a stroke in May 2024, which has resulted in some residual numbness and cognitive difficulties. He is advised to continue his current management and follow-up with his healthcare providers as needed. The stroke is expected to heal as much as physically possible over time.    3. Restless leg syndrome.  He has been managing his restless leg syndrome with Requip and Valium. He takes Valium once daily at night to help relax before sleep. There are no changes to this medication regimen.        Overweight (BMI 25.0-29.9)  Body mass index is 26.6 kg/m²..  Information on the DASH diet provided in the AVS.  We will continue to provided diet and exercise information with the goal of weight loss at each scheduled appointment.     Fall risk  [facility] Fall Risk Assessment was completed, and patient is at LOW risk for falls.Assessment completed on:9/3/2024  Fall risk information provided in AVS.    Diagnoses and all orders for this visit:    1. Normal pressure hydrocephalus (Primary)  -     Case Request; Standing  -     CBC (No Diff); Future  -     Comprehensive Metabolic Panel; Future  -     Urinalysis without microscopic (no culture) - Urine, Clean Catch; Future  -     MRSA Screen Culture (Outpatient) - Swab, Nares; Future  -     Type & Screen; Future  -     ECG 12 Lead; Future  -     XR Chest 1 View; Future  -     ceFAZolin (ANCEF) 2 g in sodium chloride 0.9 % 100 mL IVPB  -     Case Request  -     Ambulatory Referral to General Surgery    2. Overweight with body mass index (BMI) of 26 to 26.9 in adult    3. Non-smoker    Other orders  -     Inpatient Admission; Standing  -     Follow Anesthesia Guidelines / Protocol; Future  -     Follow Anesthesia Guidelines / Protocol; Standing  -     Verify NPO Status; Standing  -     SCD (Sequential Compression Device) - Place on Patient in Pre-Op; Standing  -     Notify Provider (Specify); Standing  -     Verify /  Perform Chlorhexidine Skin Prep; Standing  -     Provide NPO Instructions to Patient; Future  -     Chlorhexidine Skin Prep; Future  -     Provide Patient with Enhanced Recovery Booklet(s) or Handout; Future  -     Chlorhexidine Gluconate 4 % solution; Shower each day with solution for 5 days beginning 5 days before surgery.  Dispense: 120 mL; Refill: 0        Return for POSTOPERATIVELY.    Thank you for this Consultation and the opportunity to participate in Agueda's care.    I spent 33 minutes caring for Agueda on this date of service. This time includes time spent by me in the following activities: preparing for the visit, reviewing tests, obtaining and/or reviewing a separately obtained history, performing a medically appropriate examination and/or evaluation, counseling and educating the patient/family/caregiver, ordering medications, tests, or procedures, referring and communicating with other health care professionals, documenting information in the medical record, independently interpreting results and communicating that information with the patient/family/caregiver, and/or care coordination.       Sincerely,  Harley Lombardo MD

## 2024-11-20 NOTE — H&P (VIEW-ONLY)
Primary Care Provider: Keenan Perez MD    Chief Complaint:   Chief Complaint   Patient presents with    Follow-up     Follow up after LP     History of Present Illness    ABDULLAHI Tyler is a 79 y.o. male whom presents today for evaluation.  He is a retired pharmacist.    In January 2022, Mr. Tyler fell from a second story loft window onto concrete.  He sustained multiple injuries to include, but not limited to a grade III LEFT renal injury with moderate subcapsular hematoma, pulmonary contusion, minimally displaced left inferior ramus fracture, and a nondisplaced left superior ramus fracture and left sacral ala fracture requiring surgical intervention.    In May 2024, he suffered a left thalamic stroke resulting in right facial and upper extremity numbness and tingling.  Following his CVA he was discharged from [facility] to [facility] inpatient rehab and has since been discharged home, as well as completed physical therapy and Occupational Therapy per [facility].  His residual symptoms include right facial and upper extremity tingling dysesthesias without complaints of numbness or weakness.  Than an 81 mg aspirin, he is no longer taking or prescribed anticoagulant or antiplatelet medications    Mr. Tyler presents today with his wife for evaluation of ventriculomegaly.      History of Present Illness  The patient presents for evaluation of memory issues. He is accompanied by his son.    His memory issues began approximately 3 years ago following a fall in his garage, which resulted in two kidney hematomas and four broken ribs. Despite being airlifted to [place], he has not fully recovered, particularly in terms of his gait and balance. He walks without a cane or walker, although slower than usual. He experiences occasional urinary incontinence and has noticed a decline in his cognitive abilities, such as difficulty recalling names and transposing figures. He suffered a stroke on 05/11/2024,  after which he stopped working and attending [employer]. He also reports numbness in his right hand and face. His son notes a significant cognitive decline since the fall. He underwent a lumbar puncture, which improved his physical therapy performance and overall well-being for about a day and a half. He is currently taking aspirin 81 mg and Valium once daily at night.    He has been seeing Dr. Lemus for approximately 25 years for restless leg syndrome. Dr. Lemus determined that the condition is idiopathic, likely due to standing on concrete for 10 to 12 hours a day, which has damaged the nerves in his feet. He was prescribed Requip, which has significantly improved his symptoms.         ROS  Review of Systems   Constitutional: Negative.  Positive for activity change and fatigue.   HENT: Negative.     Eyes: Negative.  Positive for redness.   Respiratory: Negative.     Cardiovascular: Negative.    Gastrointestinal: Negative.  Positive for constipation.   Endocrine: Negative.    Genitourinary: Negative.    Musculoskeletal: Negative.    Skin: Negative.    Allergic/Immunologic: Negative.    Neurological: Negative.  Positive for numbness.   Hematological: Negative.    Psychiatric/Behavioral: Negative.  The patient is hyperactive.    All other systems reviewed and are negative.    Past Medical History:   Diagnosis Date    Anxiety     BPH (benign prostatic hyperplasia)     Colon polyp     CVA (cerebral vascular accident)     Diverticulosis     Erectile dysfunction     Essential hypertension     GERD (gastroesophageal reflux disease)     Hypertriglyceridemia     Idiopathic peripheral neuropathy     Osteoarthritis     Urinary incontinence     Inknown     Past Surgical History:   Procedure Laterality Date    COLONOSCOPY  04/18/2016    diverticulosis, internal hemorrhoids,     COLONOSCOPY  03/30/2011    internal hemorrhoid, diverticulosis    COLONOSCOPY N/A 03/11/2021    Procedure: COLONOSCOPY WITH ANESTHESIA;  Surgeon: Lacy  Ricky MICHEL MD;  Location:  [facility];  Service: Gastroenterology;  Laterality: N/A;  pre: hx polyps  post: diverticulosis. hemorrhoid.   Keenan Perez MD    CYSTOSCOPY  1994    Before TURP    ENDOSCOPY N/A 03/11/2021    Procedure: ESOPHAGOGASTRODUODENOSCOPY WITH ANESTHESIA;  Surgeon: Ricky House MD;  Location:  [facility];  Service: Gastroenterology;  Laterality: N/A;  pre: GERD  post: hiatal hernia. gastric polyps.  Keenan Perez MD    HERNIA REPAIR      inguinal    PROSTATE SURGERY  1994    TURP    TURP / TRANSURETHRAL INCISION / DRAINAGE PROSTATE       Family History: family history includes Hypertension in his father; Prostate cancer in his father.    Social History:  reports that he has never smoked. He has never used smokeless tobacco. He reports that he does not drink alcohol and does not use drugs.    Medications:    Current Outpatient Medications:     acetaminophen (TYLENOL) 325 MG tablet, Take 2 tablets by mouth Every 4 (Four) Hours As Needed for Mild Pain., Disp: , Rfl:     alfuzosin (UROXATRAL) 10 MG 24 hr tablet, Take 1 tablet by mouth Daily., Disp: , Rfl:     aspirin 81 MG EC tablet, Take 1 tablet by mouth Daily., Disp: 30 tablet, Rfl: 2    atorvastatin (Lipitor) 40 MG tablet, Take 1 tablet by mouth Daily., Disp: 30 tablet, Rfl: 6    citalopram (CeleXA) 20 MG tablet, Take 1 tablet by mouth Daily., Disp: , Rfl:     levothyroxine (SYNTHROID, LEVOTHROID) 88 MCG tablet, Take 1 tablet by mouth Daily., Disp: , Rfl:     lisinopril (PRINIVIL,ZESTRIL) 20 MG tablet, Take 1 tablet by mouth Daily., Disp: , Rfl:     Magnesium Hydroxide (MILK OF MAGNESIA PO), Take 60 mL by mouth Every Night., Disp: , Rfl:     omeprazole (priLOSEC) 20 MG capsule, Take 1 capsule by mouth Daily., Disp: , Rfl:     polyethylene glycol (MIRALAX) 17 g packet, Take 17 g by mouth Daily As Needed (constipation)., Disp: , Rfl:     rOPINIRole HCl (REQUIP PO), Take 2 mg by mouth Every Night., Disp: , Rfl:      "traZODone (DESYREL) 50 MG tablet, Take 1 tablet by mouth Every Night., Disp: , Rfl:     Chlorhexidine Gluconate 4 % solution, Shower each day with solution for 5 days beginning 5 days before surgery., Disp: 120 mL, Rfl: 0    diazePAM (VALIUM) 5 MG tablet, Take 1 tablet by mouth At Night As Needed for Anxiety., Disp: , Rfl:     Allergies:  Sulfa antibiotics    Objective   Ht 180 cm (70.87\")   Wt 86.2 kg (190 lb)   BMI 26.60 kg/m²   Physical Exam  Vitals and nursing note reviewed.   Constitutional:       General: He is not in acute distress.     Appearance: Normal appearance. He is well-developed, well-groomed and overweight. He is not ill-appearing, toxic-appearing or diaphoretic.      Comments: BMI 26.31   HENT:      Head: Normocephalic and atraumatic.      Right Ear: Hearing normal.      Left Ear: Hearing normal.   Eyes:      General: Lids are normal.      Extraocular Movements: Extraocular movements intact.      Conjunctiva/sclera: Conjunctivae normal.      Pupils: Pupils are equal, round, and reactive to light.   Neck:      Trachea: Trachea normal.   Cardiovascular:      Rate and Rhythm: Normal rate and regular rhythm.   Pulmonary:      Effort: Pulmonary effort is normal. No tachypnea, bradypnea, accessory muscle usage or respiratory distress.   Abdominal:      Palpations: Abdomen is soft.   Musculoskeletal:      Cervical back: Full passive range of motion without pain and neck supple.   Skin:     General: Skin is warm and dry.   Neurological:      GCS: GCS eye subscore is 4. GCS verbal subscore is 5. GCS motor subscore is 6.      Coordination: Coordination is intact.      Deep Tendon Reflexes:      Reflex Scores:       Tricep reflexes are 2+ on the right side and 2+ on the left side.       Bicep reflexes are 2+ on the right side and 2+ on the left side.       Brachioradialis reflexes are 2+ on the right side and 2+ on the left side.       Patellar reflexes are 4+ on the right side and 4+ on the left side.      "  Achilles reflexes are 3+ on the right side and 3+ on the left side.  Psychiatric:         Speech: Speech normal.         Behavior: Behavior normal. Behavior is cooperative.       Neurological Exam  Mental Status  Awake, alert and oriented to person, place and time. Speech is normal. Able to name objects. Attention and concentration are normal.    Cranial Nerves  CN I: Sense of smell is normal.  CN II: Visual acuity is normal.  CN III, IV, VI: Extraocular movements intact bilaterally. Normal lids and orbits bilaterally. Pupils equal round and reactive to light bilaterally.  CN V: Facial sensation is normal.  CN VII: Full and symmetric facial movement.  CN IX, X: Palate elevates symmetrically  CN XI: Shoulder shrug strength is normal.  CN XII: Tongue midline without atrophy or fasciculations.    Motor  Normal muscle bulk throughout. Normal muscle tone.                                               Right                     Left  Toe extension                        4-                          3+                                             Right                     Left  Deltoid                                   5                          5   Biceps                                   5                          5   Triceps                                  5                          5   Wrist extensor                       5                          5   Iliopsoas                               5                          5   Quadriceps                           5                          5   Anterior tibialis                      5                          5   Posterior tibialis                    5                          5    Sensory  Sensation is intact to light touch, pinprick, vibration and proprioception in all four extremities.    Reflexes                                            Right                      Left  Brachioradialis                    2+                         2+  Biceps                                  2+                         2+  Triceps                                2+                         2+  Patellar                                4+                         4+  Achilles                                3+                         3+  Right Plantar: downgoing  Left Plantar: downgoing    Right pathological reflexes: Paula's absent. Ankle clonus absent.  Left pathological reflexes: Paula's absent. Ankle clonus absent.    Coordination    Finger-to-nose, rapid alternating movements and heel-to-shin normal bilaterally without dysmetria.    Gait    Magnetic gait with en bloc turning.    Imaging: (independent review and interpretation)  5/13/2024.  MRI of the brain shows ventriculomegaly with surrounding FLAIR signal changes, as well is an acute ischemic infarct within the left thalamus.          5/14/2024.  Ventriculomegaly            Results  Imaging  MRI shows areas in the brain that are larger than they should be, indicating possible normal pressure hydrocephalus.    Testing  Lumbar puncture results showed a decrease in time to walk 100 feet from 34 seconds to 26 seconds, and then to 28 seconds. MMSE score improved from 20/30 to 23/30, and then to 25/30.      Results  Imaging  MRI shows areas in the brain that are larger than they should be, indicating possible normal pressure hydrocephalus.    Testing  Lumbar puncture results showed a decrease in time to walk 100 feet from 34 seconds to 26 seconds, and then to 28 seconds. MMSE score improved from 20/30 to 23/30, and then to 25/30.    ASSESSMENT and PLAN  Agueda Tyler is a 79 y.o. male with significant medical comorbidities to include left thalamus CVA (May 2024), hypertension, hyperlipidemia, BPH, anxiety, GERD, and he is overweight.  He presents today for evaluation of ventriculomegaly with reports of intermittent forgetfulness and a progressive decline in his gait and balance.  Physical exam findings of bright and awake, oriented x 3, names objects,  "bilateral EHL weakness, lower extremity hyperreflexia, and a magnetic gait with en bloc turning.  His imaging shows ventriculomegaly with surrounding FLAIR signal changes concerning for NPH.    Recommendations  Ventriculomegaly concerning for NPH    Normal Pressure Hydrocephalus (NPH)  Differential diagnoses include normal pressure hydrocephalus, Alzheimer's disease, Parkinson's disease, and other forms of neurocognitive degeneration.    NPH is clinically important because it is 1 of the few treatable forms of dementia.  Although most forms are idiopathic they can occur post subarachnoid hemorrhage, trauma, meningitis, tumor resection.    Clinical presentation includes the common triad of gait disturbance, dementia, and urinary incontinence.  Gait disturbance usually proceeds other symptoms.  Gait tends to be wide-based with short, shuffling steps and unsteadiness when turning.  Often it will be described as a \"magnetic gait\".  Dementia is primarily memory impairment with bradycardia for anemia and bradykinesia.  Urinary incontinence is usually unwitting.  Differentiation from Alzheimer's disease can be challenging but often times NPH patients do not show the deficiencies in orientation, writing, learning and emotional lability that is seen with Alzheimer's disease.    Clinical assessment.  While the tap test has not undergone rigorous perspective evaluation.  A positive response on speech therapy and physical therapy evaluation following lumbar puncture with withdrawal of 40-50 mL of CSF has a positive predictive value of % that shunting will result in improved function.     Treatment is via CSF diversion through programmable ventriculoperitoneal shunt.  For most, use of a medium pressure valve with a closing pressure between 65 and 90 mmHg is used to minimize the likelihood of subdural hematomas.  However I prefer using a programmable valve.  Patients are typically followed monthly for 6-12 months to titrate " the valve to optimize efficiency.    Potential complications are as high as 35% due to the generalized frailty of the elderly patients.  Complications include subdural hematomas or hygromas, shunt infection, intracerebral hemorrhage, seizures, and delayed complications including shunt obstruction.    Outcomes: The most likely symptom to improve after shunting is incontinence, then gait disturbance, and lastly dementia.  Predictors of good outcome include: Presence of the classical triad, gait disturbance as the primary symptom, opening pressure greater than 10 cm of water, large ventricles on CT or MRI with flattening of the sulci and little cortical atrophy, and presence of symptoms for short time.  Patients with cocominant Alzheimer disease and normal pressure hydrocephalus may still improve with shunting and should not be excluded.  In general most responders eventually relapse after 5-7 years of good response.    I discussed the risks of the procedure, including but not limited to infection, bleeding, damage to local structures, CSF leak, seizures, hydrocephalus, weakness, numbness, paralysis, or loss of bowel, and bladder function, stroke, coma, MI, or death.     Assessment & Plan  1. Normal pressure hydrocephalus.  The patient's symptoms, including cognitive decline, unsteady gait, and urinary urgency, along with test results, suggest a diagnosis of normal pressure hydrocephalus. The lumbar puncture showed significant improvement in his symptoms, confirming the diagnosis. A ventriculoperitoneal shunt placement is recommended to manage the condition. The procedure, including its risks (such as infection, bleeding, and shunt malfunction) and benefits, was thoroughly explained to him and his son. A handout on normal pressure hydrocephalus was provided for further understanding. He is advised to stop taking Plavix, which he has already discontinued. The surgery is planned to be scheduled within 4 to 6 weeks,  depending on insurance considerations.    2. Stroke.  The patient experienced a stroke in May 2024, which has resulted in some residual numbness and cognitive difficulties. He is advised to continue his current management and follow-up with his healthcare providers as needed. The stroke is expected to heal as much as physically possible over time.    3. Restless leg syndrome.  He has been managing his restless leg syndrome with Requip and Valium. He takes Valium once daily at night to help relax before sleep. There are no changes to this medication regimen.        Overweight (BMI 25.0-29.9)  Body mass index is 26.6 kg/m²..  Information on the DASH diet provided in the AVS.  We will continue to provided diet and exercise information with the goal of weight loss at each scheduled appointment.     Fall risk  [facility] Fall Risk Assessment was completed, and patient is at LOW risk for falls.Assessment completed on:9/3/2024  Fall risk information provided in AVS.    Diagnoses and all orders for this visit:    1. Normal pressure hydrocephalus (Primary)  -     Case Request; Standing  -     CBC (No Diff); Future  -     Comprehensive Metabolic Panel; Future  -     Urinalysis without microscopic (no culture) - Urine, Clean Catch; Future  -     MRSA Screen Culture (Outpatient) - Swab, Nares; Future  -     Type & Screen; Future  -     ECG 12 Lead; Future  -     XR Chest 1 View; Future  -     ceFAZolin (ANCEF) 2 g in sodium chloride 0.9 % 100 mL IVPB  -     Case Request  -     Ambulatory Referral to General Surgery    2. Overweight with body mass index (BMI) of 26 to 26.9 in adult    3. Non-smoker    Other orders  -     Inpatient Admission; Standing  -     Follow Anesthesia Guidelines / Protocol; Future  -     Follow Anesthesia Guidelines / Protocol; Standing  -     Verify NPO Status; Standing  -     SCD (Sequential Compression Device) - Place on Patient in Pre-Op; Standing  -     Notify Provider (Specify); Standing  -     Verify /  Perform Chlorhexidine Skin Prep; Standing  -     Provide NPO Instructions to Patient; Future  -     Chlorhexidine Skin Prep; Future  -     Provide Patient with Enhanced Recovery Booklet(s) or Handout; Future  -     Chlorhexidine Gluconate 4 % solution; Shower each day with solution for 5 days beginning 5 days before surgery.  Dispense: 120 mL; Refill: 0        Return for POSTOPERATIVELY.    Thank you for this Consultation and the opportunity to participate in Agueda's care.    I spent 33 minutes caring for Agueda on this date of service. This time includes time spent by me in the following activities: preparing for the visit, reviewing tests, obtaining and/or reviewing a separately obtained history, performing a medically appropriate examination and/or evaluation, counseling and educating the patient/family/caregiver, ordering medications, tests, or procedures, referring and communicating with other health care professionals, documenting information in the medical record, independently interpreting results and communicating that information with the patient/family/caregiver, and/or care coordination.       Sincerely,  Harley Lombardo MD

## 2024-11-20 NOTE — PATIENT INSTRUCTIONS
"PATIENT TO CONTINUE TO FOLLOW UP WITH HIS PRIMARY CARE PROVIDER FOR YEARLY PHYSICAL EXAMS TO ENSURE COMPLETE HEALTH MAINTENANCE      BMI for Adults  Body mass index (BMI) is a number found using a person's weight and height. BMI can help tell how much of a person's weight is made up of fat. BMI does not measure body fat directly. It is used instead of tests that directly measure body fat, which can be difficult and expensive.  What are BMI measurements used for?  BMI is useful to:  Find out if your weight puts you at higher risk for medical problems.  Help recommend changes, such as in diet and exercise. This can help you reach a healthy weight. BMI screening can be done again to see if these changes are working.  How is BMI calculated?  Your height and weight are measured. The BMI is found from those numbers. This can be done with [place] or metric measurements. Note that charts and online BMI calculators are available to help you find your BMI quickly and easily without doing these calculations.  To calculate your BMI in [place] measurements:  Measure your weight in pounds (lb).  Multiply the number of pounds by 703.  So, for an adult who weighs 150 lb, multiply that number by 703: 150 x 703, which equals 105,450.  Measure your height in inches. Then multiply that number by itself to get a measurement called \"inches squared.\"  So, for an adult who is 70 inches tall, the \"inches squared\" measurement is 70 inches x 70 inches, which equals 4,900 inches squared.  Divide the total from step 2 (number of lb x 703) by the total from step 3 (inches squared): 105,450 ÷ 4,900 = 21.5. This is your BMI.  To calculate your BMI in metric measurements:    Measure your weight in kilograms (kg).  For this example, the weight is 70 kg.  Measure your height in meters (m). Then multiply that number by itself to get a measurement called \"meters squared.\"  So, for an adult who is 1.75 m tall, the \"meters squared\" measurement is 1.75 m x 1.75 " m, which equals 3.1 meters squared.  Divide the number of kilograms (your weight) by the meters squared number. In this example: 70 ÷ 3.1 = 22.6. This is your BMI.  What do the results mean?  BMI charts are used to see if you are underweight, normal weight, overweight, or obese. The following guidelines will be used:  Underweight: BMI less than 18.5.  Normal weight: BMI between 18.5 and 24.9.  Overweight: BMI between 25 and 29.9.  Obese: BMI of 30 or above.  BMI is a tool and cannot diagnose a condition. Talk with your health care provider about what your BMI means for you. Keep these notes in mind:  Weight includes fat and muscle. Someone with a muscular build, such as an athlete, may have a BMI that is higher than 24.9. In cases like these, BMI is not a correct measure of body fat.  If you have a BMI of 25 or higher, your provider may need to do more testing to find out if excess body fat is the cause.  BMI is measured the same way for males and females. Females usually have more body fat than males of the same height and weight.  Where to find more information  For more information about BMI, including tools to quickly find your BMI, go to:  Centers for Disease Control and Prevention: cdc.gov  American Heart Association: heart.org  National Heart, Lung, and Blood [place]: nhlbi.nih.gov  This information is not intended to replace advice given to you by your health care provider. Make sure you discuss any questions you have with your health care provider.  Document Revised: 09/07/2023 Document Reviewed: 08/31/2023  [employer] Patient Education © 2024 [employer]  DASH Eating Plan  DASH stands for Dietary Approaches to Stop Hypertension. The DASH eating plan is a healthy eating plan that has been shown to:  Lower high blood pressure (hypertension).  Reduce your risk for type 2 diabetes, heart disease, and stroke.  Help with weight loss.  What are tips for following this plan?  Reading food labels  Check food  "labels for the amount of salt (sodium) per serving. Choose foods with less than 5 percent of the Daily Value (DV) of sodium. In general, foods with less than 300 milligrams (mg) of sodium per serving fit into this eating plan.  To find whole grains, look for the word \"whole\" as the first word in the ingredient list.  Shopping  Buy products labeled as \"low-sodium\" or \"no salt added.\"  Buy fresh foods. Avoid canned foods and pre-made or frozen meals.  Cooking  Try not to add salt when you cook. Use salt-free seasonings or herbs instead of table salt or sea salt. Check with your health care provider or pharmacist before using salt substitutes.  Do not fischer foods. Cook foods in healthy ways, such as baking, boiling, grilling, roasting, or broiling.  Cook using oils that are good for your heart. These include olive, canola, avocado, soybean, and sunflower oil.  Meal planning    Eat a balanced diet. This should include:  4 or more servings of fruits and 4 or more servings of vegetables each day. Try to fill half of your plate with fruits and vegetables.  6-8 servings of whole grains each day.  6 or less servings of lean meat, poultry, or fish each day. 1 oz is 1 serving. A 3 oz (85 g) serving of meat is about the same size as the palm of your hand. One egg is 1 oz (28 g).  2-3 servings of low-fat dairy each day. One serving is 1 cup (237 mL).  1 serving of nuts, seeds, or beans 5 times each week.  2-3 servings of heart-healthy fats. Healthy fats called omega-3 fatty acids are found in foods such as walnuts, flaxseeds, fortified milks, and eggs. These fats are also found in cold-water fish, such as sardines, salmon, and mackerel.  Limit how much you eat of:  Canned or prepackaged foods.  Food that is high in trans fat, such as fried foods.  Food that is high in saturated fat, such as fatty meat.  Desserts and other sweets, sugary drinks, and other foods with added sugar.  Full-fat dairy products.  Do not salt foods before " eating.  Do not eat more than 4 egg yolks a week.  Try to eat at least 2 vegetarian meals a week.  Eat more home-cooked food and less restaurant, buffet, and fast food.  Lifestyle  When eating at a restaurant, ask if your food can be made with less salt or no salt.  If you drink alcohol:  Limit how much you have to:  0-1 drink a day if you are female.  0-2 drinks a day if you are male.  Know how much alcohol is in your drink. In the [place] one drink is one 12 oz bottle of beer (355 mL), one 5 oz glass of wine (148 mL), or one 1½ oz glass of hard liquor (44 mL).  General information  Avoid eating more than 2,300 mg of salt a day. If you have hypertension, you may need to reduce your sodium intake to 1,500 mg a day.  Work with your provider to stay at a healthy body weight or lose weight. Ask what the best weight range is for you.  On most days of the week, get at least 30 minutes of exercise that causes your heart to beat faster. This may include walking, swimming, or biking.  Work with your provider or dietitian to adjust your eating plan to meet your specific calorie needs.  What foods should I eat?  Fruits  All fresh, dried, or frozen fruit. Canned fruits that are in their natural juice and do not have sugar added to them.  Vegetables  Fresh or frozen vegetables that are raw, steamed, roasted, or grilled. Low-sodium or reduced-sodium tomato and vegetable juice. Low-sodium or reduced-sodium tomato sauce and tomato paste. Low-sodium or reduced-sodium canned vegetables.  Grains  Whole-grain or whole-wheat bread. Whole-grain or whole-wheat pasta. Brown rice. Oatmeal. Quinoa. Bulgur. Whole-grain and low-sodium cereals. Riri bread. Low-fat, low-sodium crackers. Whole-wheat flour tortillas.  Meats and other proteins  Skinless chicken or turkey. Ground chicken or turkey. Pork with fat trimmed off. Fish and seafood. Egg whites. Dried beans, peas, or lentils. Unsalted nuts, nut butters, and seeds. Unsalted canned beans. Lean  cuts of beef with fat trimmed off. Low-sodium, lean precooked or cured meat, such as sausages or meat loaves.  Dairy  Low-fat (1%) or fat-free (skim) milk. Reduced-fat, low-fat, or fat-free cheeses. Nonfat, low-sodium ricotta or cottage cheese. Low-fat or nonfat yogurt. Low-fat, low-sodium cheese.  Fats and oils  Soft margarine without trans fats. Vegetable oil. Reduced-fat, low-fat, or light mayonnaise and salad dressings (reduced-sodium). Canola, safflower, olive, avocado, soybean, and sunflower oils. Avocado.  Seasonings and condiments  Herbs. Spices. Seasoning mixes without salt.  Other foods  Unsalted popcorn and pretzels. Fat-free sweets.  The items listed above may not be all the foods and drinks you can have. Talk to a dietitian to learn more.  What foods should I avoid?  Fruits  Canned fruit in a light or heavy syrup. Fried fruit. Fruit in cream or butter sauce.  Vegetables  Creamed or fried vegetables. Vegetables in a cheese sauce. Regular canned vegetables that are not marked as low-sodium or reduced-sodium. Regular canned tomato sauce and paste that are not marked as low-sodium or reduced-sodium. Regular tomato and vegetable juices that are not marked as low-sodium or reduced-sodium. Pickles. Olives.  Grains  Baked goods made with fat, such as croissants, muffins, or some breads. Dry pasta or rice meal packs.  Meats and other proteins  Fatty cuts of meat. Ribs. Fried meat. [place]. Bologna, salami, and other precooked or cured meats, such as sausages or meat loaves, that are not lean and low in sodium. Fat from the back of a pig (fatback). Bratwurst. Salted nuts and seeds. Canned beans with added salt. Canned or smoked fish. Whole eggs or egg yolks. Chicken or turkey with skin.  Dairy  Whole or 2% milk, cream, and half-and-half. Whole or full-fat cream cheese. Whole-fat or sweetened yogurt. Full-fat cheese. Nondairy creamers. Whipped toppings. Processed cheese and cheese spreads.  Fats and oils  Butter.  Stick margarine. Lard. Shortening. Ghee. [place]o fat. Tropical oils, such as coconut, palm kernel, or palm oil.  Seasonings and condiments  Onion salt, garlic salt, seasoned salt, table salt, and sea salt. Worcestershire sauce. Tartar sauce. Barbecue sauce. Teriyaki sauce. Soy sauce, including reduced-sodium soy sauce. Steak sauce. Canned and packaged gravies. Fish sauce. Oyster sauce. Cocktail sauce. Store-bought horseradish. Ketchup. Mustard. Meat flavorings and tenderizers. Bouillon cubes. Hot sauces. Pre-made or packaged marinades. Pre-made or packaged taco seasonings. Relishes. Regular salad dressings.  Other foods  Salted popcorn and pretzels.  The items listed above may not be all the foods and drinks you should avoid. Talk to a dietitian to learn more.  Where to find more information  National Heart, Lung, and Blood [place] (NHLBI): nhlbi.nih.gov  American Heart Association (AHA): heart.org  Academy of Nutrition and Dietetics: eatright.org  National Kidney Foundation (NKF): kidney.org  This information is not intended to replace advice given to you by your health care provider. Make sure you discuss any questions you have with your health care provider.  Document Revised: 01/04/2024 Document Reviewed: 01/04/2024  [employer] Patient Education © 2024 [employer]

## 2024-11-22 PROBLEM — G91.2 NORMAL PRESSURE HYDROCEPHALUS: Status: ACTIVE | Noted: 2024-11-20

## 2024-11-22 PROBLEM — G91.2 NPH (NORMAL PRESSURE HYDROCEPHALUS): Status: ACTIVE | Noted: 2024-11-22

## 2024-11-22 RX ORDER — CHLORHEXIDINE GLUCONATE 40 MG/ML
SOLUTION TOPICAL
Qty: 120 ML | Refills: 0 | Status: SHIPPED | OUTPATIENT
Start: 2024-11-22

## 2024-11-26 ENCOUNTER — OFFICE VISIT (OUTPATIENT)
Dept: PRIMARY CARE CLINIC | Age: 79
End: 2024-11-26

## 2024-11-26 ENCOUNTER — HOSPITAL ENCOUNTER (OUTPATIENT)
Dept: MRI IMAGING | Age: 79
Discharge: HOME | End: 2024-11-26
Attending: INTERNAL MEDICINE
Payer: MEDICARE

## 2024-11-26 VITALS
SYSTOLIC BLOOD PRESSURE: 132 MMHG | DIASTOLIC BLOOD PRESSURE: 88 MMHG | BODY MASS INDEX: 25.77 KG/M2 | OXYGEN SATURATION: 98 % | HEART RATE: 99 BPM | WEIGHT: 190 LBS | TEMPERATURE: 97.8 F

## 2024-11-26 DIAGNOSIS — E03.9 PRIMARY HYPOTHYROIDISM: ICD-10-CM

## 2024-11-26 DIAGNOSIS — F51.01 PRIMARY INSOMNIA: ICD-10-CM

## 2024-11-26 DIAGNOSIS — G91.2 NPH (NORMAL PRESSURE HYDROCEPHALUS) (HCC): ICD-10-CM

## 2024-11-26 DIAGNOSIS — N40.0 BENIGN PROSTATIC HYPERPLASIA WITHOUT LOWER URINARY TRACT SYMPTOMS: ICD-10-CM

## 2024-11-26 DIAGNOSIS — F41.8 DEPRESSION WITH ANXIETY: ICD-10-CM

## 2024-11-26 DIAGNOSIS — I10 ESSENTIAL HYPERTENSION: ICD-10-CM

## 2024-11-26 DIAGNOSIS — G25.81 RESTLESS LEG SYNDROME: ICD-10-CM

## 2024-11-26 DIAGNOSIS — E78.00 HYPERCHOLESTEROLEMIA: ICD-10-CM

## 2024-11-26 DIAGNOSIS — K86.2 PANCREATIC CYST: ICD-10-CM

## 2024-11-26 DIAGNOSIS — K21.9 GASTROESOPHAGEAL REFLUX DISEASE WITHOUT ESOPHAGITIS: ICD-10-CM

## 2024-11-26 DIAGNOSIS — Z86.73 HISTORY OF CVA (CEREBROVASCULAR ACCIDENT): Primary | ICD-10-CM

## 2024-11-26 DIAGNOSIS — M15.9 GENERALIZED OSTEOARTHRITIS OF MULTIPLE SITES: ICD-10-CM

## 2024-11-26 LAB
CREAT SERPL-MCNC: 1.1 MG/DL (ref 0.3–1.3)
PERFORMED ON: NORMAL

## 2024-11-26 PROCEDURE — 82565 ASSAY OF CREATININE: CPT

## 2024-11-26 PROCEDURE — 74183 MRI ABD W/O CNTR FLWD CNTR: CPT

## 2024-11-26 PROCEDURE — 6360000004 HC RX CONTRAST MEDICATION: Performed by: INTERNAL MEDICINE

## 2024-11-26 PROCEDURE — A9577 INJ MULTIHANCE: HCPCS | Performed by: INTERNAL MEDICINE

## 2024-11-26 RX ADMIN — GADOBENATE DIMEGLUMINE 15 ML: 529 INJECTION, SOLUTION INTRAVENOUS at 09:14

## 2024-11-26 ASSESSMENT — ENCOUNTER SYMPTOMS
SHORTNESS OF BREATH: 0
ANAL BLEEDING: 0
COUGH: 0
CONSTIPATION: 0
NAUSEA: 0
DIARRHEA: 0
ABDOMINAL PAIN: 0
CHEST TIGHTNESS: 0

## 2024-11-26 NOTE — PROGRESS NOTES
[facility]  [address]  [address]  [place] [address]  Dept: [phone]  Dept Fax: [phone]  Loc: [phone]    Echo Brown is a 79 y.o. male who presents today for his medical conditions/complaints as noted below.  Echo Brown is here for orthotic order and 3 Month Follow-Up      Patient History:      Restless leg syndrome  -Neurology: Dr. Jordan     Pancreatic cyst  -[facility] gastroenterology:  - November 2023: MRI abdomen with and without: 0.9 x 1.8 cm: Unchanged  Pthx     Chronic kidney disease stage III:  - Sustained a 4 foot fall in January 2022 requiring transfer to [facility].  -Sustained a closed injury of the left kidney.  -Developed reduction in GFR which has never returned back to baseline     Left thalamic ischemic infarction  -May 2024  May 2024 workup:  - TTE: Ejection fraction 66 to 70%, no evidence of right to left shunt, no significant valvular disease  -CT cerebral perfusion: Normal  -CT angiogram head neck: No arterial occlusion or flow-limiting stenosis.  No intracranial large vessel occlusion or flow-limiting stenosis.  July 2024 workup:  -72-hour Holter monitor: Relatively benign.  No atrial fibrillation.      Elevated alkaline phosphatase  -November 22, 2023: MRI abdomen with and without contrast:  Liver: Normal contour.  No hepatic steatosis.  No suspicious hepatic lesions.   Gallbladder: Possible gallbladder sludge.  No surrounding inflammatory changes.   Bile ducts: No intra or extrahepatic biliary ductal dilatation.  No visible choledocholithiasis.  Pancreas: There is decreased T1 signal in the pancreatic tail suggestive of chronic pancreatitis.  There is persistent focal segmental dilatation of the main pancreatic duct in the pancreatic body measuring 0.9 cm in diameter and along the span of 2.8 cm   (series nine, image 4).  No associated enhancement.                     Subjective:   CC:  Here today to discuss the  Please inform patient that I have consulted with Nephrology and they recommend to start Lokelma 5g daily to help lower the potassium. We should repeat the K about 1-2 weeks after he starts taking it to ensure that it has normalized.

## 2024-12-06 ENCOUNTER — HOSPITAL ENCOUNTER (OUTPATIENT)
Dept: GENERAL RADIOLOGY | Facility: HOSPITAL | Age: 79
Discharge: HOME | End: 2024-12-06
Payer: MEDICARE

## 2024-12-06 ENCOUNTER — PRE-ADMISSION TESTING (OUTPATIENT)
Dept: PREADMISSION TESTING | Facility: HOSPITAL | Age: 79
End: 2024-12-06
Payer: MEDICARE

## 2024-12-06 VITALS
WEIGHT: 190.7 LBS | SYSTOLIC BLOOD PRESSURE: 145 MMHG | HEIGHT: 70 IN | BODY MASS INDEX: 27.3 KG/M2 | RESPIRATION RATE: 18 BRPM | HEART RATE: 63 BPM | DIASTOLIC BLOOD PRESSURE: 84 MMHG | OXYGEN SATURATION: 99 %

## 2024-12-06 DIAGNOSIS — G91.2 NORMAL PRESSURE HYDROCEPHALUS: ICD-10-CM

## 2024-12-06 LAB
ALBUMIN SERPL-MCNC: 4.3 G/DL (ref 3.5–5.2)
ALBUMIN/GLOB SERPL: 1.8 G/DL
ALP SERPL-CCNC: 216 U/L (ref 39–117)
ALT SERPL W P-5'-P-CCNC: 35 U/L (ref 1–41)
ANION GAP SERPL CALCULATED.3IONS-SCNC: 9 MMOL/L (ref 5–15)
AST SERPL-CCNC: 26 U/L (ref 1–40)
BILIRUB SERPL-MCNC: 2.1 MG/DL (ref 0–1.2)
BILIRUB UR QL STRIP: NEGATIVE
BUN SERPL-MCNC: 15 MG/DL (ref 8–23)
BUN/CREAT SERPL: 11.9 (ref 7–25)
CALCIUM SPEC-SCNC: 9.2 MG/DL (ref 8.6–10.5)
CHLORIDE SERPL-SCNC: 99 MMOL/L (ref 98–107)
CLARITY UR: CLEAR
CO2 SERPL-SCNC: 29 MMOL/L (ref 22–29)
COLOR UR: ABNORMAL
CREAT SERPL-MCNC: 1.26 MG/DL (ref 0.76–1.27)
DEPRECATED RDW RBC AUTO: 45 FL (ref 37–54)
EGFRCR SERPLBLD CKD-EPI 2021: 57.7 ML/MIN/1.73
ERYTHROCYTE [DISTWIDTH] IN BLOOD BY AUTOMATED COUNT: 14.3 % (ref 12.3–15.4)
GLOBULIN UR ELPH-MCNC: 2.4 GM/DL
GLUCOSE SERPL-MCNC: 93 MG/DL (ref 65–99)
GLUCOSE UR STRIP-MCNC: NEGATIVE MG/DL
HCT VFR BLD AUTO: 49.7 % (ref 37.5–51)
HGB BLD-MCNC: 16.5 G/DL (ref 13–17.7)
HGB UR QL STRIP.AUTO: NEGATIVE
KETONES UR QL STRIP: NEGATIVE
LEUKOCYTE ESTERASE UR QL STRIP.AUTO: NEGATIVE
MCH RBC QN AUTO: 28.5 PG (ref 26.6–33)
MCHC RBC AUTO-ENTMCNC: 33.2 G/DL (ref 31.5–35.7)
MCV RBC AUTO: 86 FL (ref 79–97)
NITRITE UR QL STRIP: NEGATIVE
PH UR STRIP.AUTO: 6.5 [PH] (ref 5–8)
PLATELET # BLD AUTO: 155 10*3/MM3 (ref 140–450)
PMV BLD AUTO: 10.1 FL (ref 6–12)
POTASSIUM SERPL-SCNC: 4.1 MMOL/L (ref 3.5–5.2)
PROT SERPL-MCNC: 6.7 G/DL (ref 6–8.5)
PROT UR QL STRIP: NEGATIVE
RBC # BLD AUTO: 5.78 10*6/MM3 (ref 4.14–5.8)
SODIUM SERPL-SCNC: 137 MMOL/L (ref 136–145)
SP GR UR STRIP: 1.01 (ref 1–1.03)
UROBILINOGEN UR QL STRIP: ABNORMAL
WBC NRBC COR # BLD AUTO: 9.91 10*3/MM3 (ref 3.4–10.8)

## 2024-12-06 PROCEDURE — 71045 X-RAY EXAM CHEST 1 VIEW: CPT

## 2024-12-06 PROCEDURE — 87081 CULTURE SCREEN ONLY: CPT

## 2024-12-06 PROCEDURE — 81003 URINALYSIS AUTO W/O SCOPE: CPT

## 2024-12-06 PROCEDURE — 85027 COMPLETE CBC AUTOMATED: CPT

## 2024-12-06 PROCEDURE — 93010 ELECTROCARDIOGRAM REPORT: CPT | Performed by: EMERGENCY MEDICINE

## 2024-12-06 PROCEDURE — 80053 COMPREHEN METABOLIC PANEL: CPT

## 2024-12-06 PROCEDURE — 93005 ELECTROCARDIOGRAM TRACING: CPT

## 2024-12-06 PROCEDURE — 36415 COLL VENOUS BLD VENIPUNCTURE: CPT

## 2024-12-06 NOTE — DISCHARGE INSTRUCTIONS
Preparing for Surgery  Follow these instructions before the procedure:  Several days or weeks before your procedure  Ask your health care provider about:  Changing or stopping your regular medicines. This is especially important if you are taking diabetes medicines or blood thinners.  Taking medicines such as aspirin and ibuprofen. These medicines can thin your blood. Do not take these medicines unless your health care provider tells you to take them.  Taking over-the-counter medicines, vitamins, herbs, and supplements.    Contact your surgeon if you:  Develop a fever of more than 100.4°F (38°C) or other feelings of illness during the 48 hours before your surgery.  Have symptoms that get worse.  Have questions or concerns about your surgery.  If you are going home the same day of your surgery you will need to arrange for a responsible adult, age 18 years old or older, to drive you home from the hospital and stay with you for 24 hours. Verification of the  will be made prior to any procedure requiring sedation. You may not go home in a taxi or any form of public transportation by yourself.     Day before your procedure  Medication(s) you need to stop the day before your surgery:LISINOPRIL    24 hours before your procedure DO NOT drink alcoholic beverages or smoke.  24 hours before your procedure STOP taking Erectile Dysfunction medication (i.e.,Cialis, Viagra)   You may be asked to shower with a germ-killing soap.  Day of your procedure   You may take the following medication(s) the morning of surgery with a sip of water: VALIUM, OMEPRAZOLE      8 hours before your scheduled arrival time, STOP all food, any dairy products, and full liquids. This includes hard candy, chewing gum or mints. This is extremely important to prevent serious complications.     Up to 2 hours before your scheduled arrival time, you may have clear liquids no cream, powder, or pulp of any kind. Safe options are water, black coffee, plain  tea, soda, Gatorade/Powerade, clear broth, apple juice.    2 hours before your scheduled arrival time, STOP drinking clear liquids.    You may need to take another shower with a germ-killing soap before you leave home in the morning. Do not use perfumes, colognes, or body lotions.  Wear comfortable loose-fitting clothing.  Remove all jewelry including body piercing and rings, dark colored nail polish, and make up prior to arrival at the hospital. Leave all valuables at home.   Bring your hearing aids if you rely on them.  Do not wear contact lenses. If you wear eyeglasses remember to bring a case to store them in while you are in surgery.  Do not use denture adhesives since you will be asked to remove them during your surgery.    You do not need to bring your home medications into the hospital.   Bring your sleep apnea device with you on the day of your surgery (if this applies to you).  If you have an Inspire implant for sleep apnea, please bring the remote with you on the day of surgery.  If you wear portable oxygen, bring it with you.   If you are staying overnight, you may bring a bag of items you may need such as slippers, robe and a change of clothes for your discharge. You may want to leave these items in the car until you are ready for them since your family will take your belongings when you leave the pre-operative area.  Arrive at the hospital as scheduled by the office. You will be asked to arrive 2 hours prior to your surgery time in order to prepare for your procedure.  When you arrive at the hospital  Go to the registration desk located at the main entrance of the hospital.  After registration is completed, you will be given a beeper and a sticker sheet. Take the stickers to Outpatient Surgery and place in the tray at the end of the desk to notify the staff that you have arrived and registered.   Return to the lobby to wait. You are not always called back according to the time of arrival but rather the  time your doctor will be ready.  When your beeper lights up and vibrates proceed through the double doors, under the stairs, and a member of the Outpatient Surgery staff will escort you to your preoperative room.   How to Use Chlorhexidine Before Surgery  Chlorhexidine gluconate (CHG) is a germ-killing (antiseptic) solution that is used to clean the skin. It can get rid of the bacteria that normally live on the skin and can keep them away for about 24 hours. To clean your skin with CHG, you may be given:  A CHG solution to use in the shower or as part of a sponge bath.  A prepackaged cloth that contains CHG.  Cleaning your skin with CHG may help lower the risk for infection:  While you are staying in the intensive care unit of the hospital.  If you have a vascular access, such as a central line, to provide short-term or long-term access to your veins.  If you have a catheter to drain urine from your bladder.  If you are on a ventilator. A ventilator is a machine that helps you breathe by moving air in and out of your lungs.  After surgery.  What are the risks?  Risks of using CHG include:  A skin reaction.  Hearing loss, if CHG gets in your ears and you have a perforated eardrum.  Eye injury, if CHG gets in your eyes and is not rinsed out.  The CHG product catching fire.  Make sure that you avoid smoking and flames after applying CHG to your skin.  Do not use CHG:  If you have a chlorhexidine allergy or have previously reacted to chlorhexidine.  On babies younger than 2 months of age.  How to use CHG solution  Use CHG only as told by your health care provider, and follow the instructions on the label.  Use the full amount of CHG as directed. Usually, this is one bottle.  During a shower    Follow these steps when using CHG solution during a shower (unless your health care provider gives you different instructions):  Start the shower.  Use your normal soap and shampoo to wash your face and hair.  Turn off the shower  or move out of the shower stream.  Pour the CHG onto a clean washcloth. Do not use any type of brush or rough-edged sponge.  Starting at your neck, lather your body down to your toes. Make sure you follow these instructions:  If you will be having surgery, pay special attention to the part of your body where you will be having surgery. Scrub this area for at least 1 minute.  Do not use CHG on your head or face. If the solution gets into your ears or eyes, rinse them well with water.  Avoid your genital area.  Avoid any areas of skin that have broken skin, cuts, or scrapes.  Scrub your back and under your arms. Make sure to wash skin folds.  Let the lather sit on your skin for 1-2 minutes or as long as told by your health care provider.  Thoroughly rinse your entire body in the shower. Make sure that all body creases and crevices are rinsed well.  Dry off with a clean towel. Do not put any substances on your body afterward--such as powder, lotion, or perfume--unless you are told to do so by your health care provider. Only use lotions that are recommended by the .  Put on clean clothes or pajamas.  If it is the night before your surgery, sleep in clean sheets.     During a sponge bath  Follow these steps when using CHG solution during a sponge bath (unless your health care provider gives you different instructions):  Use your normal soap and shampoo to wash your face and hair.  Pour the CHG onto a clean washcloth.  Starting at your neck, lather your body down to your toes. Make sure you follow these instructions:  If you will be having surgery, pay special attention to the part of your body where you will be having surgery. Scrub this area for at least 1 minute.  Do not use CHG on your head or face. If the solution gets into your ears or eyes, rinse them well with water.  Avoid your genital area.  Avoid any areas of skin that have broken skin, cuts, or scrapes.  Scrub your back and under your arms. Make  sure to wash skin folds.  Let the lather sit on your skin for 1-2 minutes or as long as told by your health care provider.  Using a different clean, wet washcloth, thoroughly rinse your entire body. Make sure that all body creases and crevices are rinsed well.  Dry off with a clean towel. Do not put any substances on your body afterward--such as powder, lotion, or perfume--unless you are told to do so by your health care provider. Only use lotions that are recommended by the .  Put on clean clothes or pajamas.  If it is the night before your surgery, sleep in clean sheets.  How to use CHG prepackaged cloths  Only use CHG cloths as told by your health care provider, and follow the instructions on the label.  Use the CHG cloth on clean, dry skin.  Do not use the CHG cloth on your head or face unless your health care provider tells you to.  When washing with the CHG cloth:  Avoid your genital area.  Avoid any areas of skin that have broken skin, cuts, or scrapes.  Before surgery    Follow these steps when using a CHG cloth to clean before surgery (unless your health care provider gives you different instructions):  Using the CHG cloth, vigorously scrub the part of your body where you will be having surgery. Scrub using a back-and-forth motion for 3 minutes. The area on your body should be completely wet with CHG when you are done scrubbing.  Do not rinse. Discard the cloth and let the area air-dry. Do not put any substances on the area afterward, such as powder, lotion, or perfume.  Put on clean clothes or pajamas.  If it is the night before your surgery, sleep in clean sheets.     For general bathing  Follow these steps when using CHG cloths for general bathing (unless your health care provider gives you different instructions).  Use a separate CHG cloth for each area of your body. Make sure you wash between any folds of skin and between your fingers and toes. Wash your body in the following order,  switching to a new cloth after each step:  The front of your neck, shoulders, and chest.  Both of your arms, under your arms, and your hands.  Your stomach and groin area, avoiding the genitals.  Your right leg and foot.  Your left leg and foot.  The back of your neck, your back, and your buttocks.  Do not rinse. Discard the cloth and let the area air-dry. Do not put any substances on your body afterward--such as powder, lotion, or perfume--unless you are told to do so by your health care provider. Only use lotions that are recommended by the .  Put on clean clothes or pajamas.  Contact a health care provider if:  Your skin gets irritated after scrubbing.  You have questions about using your solution or cloth.  You swallow any chlorhexidine. Call your local poison control center (1-890.341.1762 in the [place]  Get help right away if:  Your eyes itch badly, or they become very red or swollen.  Your skin itches badly and is red or swollen.  Your hearing changes.  You have trouble seeing.  You have swelling or tingling in your mouth or throat.  You have trouble breathing.  These symptoms may represent a serious problem that is an emergency. Do not wait to see if the symptoms will go away. Get medical help right away. Call your local emergency services (856 in the [place] Do not drive yourself to the hospital.  Summary  Chlorhexidine gluconate (CHG) is a germ-killing (antiseptic) solution that is used to clean the skin. Cleaning your skin with CHG may help to lower your risk for infection.  You may be given CHG to use for bathing. It may be in a bottle or in a prepackaged cloth to use on your skin. Carefully follow your health care provider's instructions and the instructions on the product label.  Do not use CHG if you have a chlorhexidine allergy.  Contact your health care provider if your skin gets irritated after scrubbing.  This information is not intended to replace advice given to you by your health  care provider. Make sure you discuss any questions you have with your health care provider.  Document Revised: 04/17/2023 Document Reviewed: 02/28/2022  [employer]vier Patient Education © 2023 [employer]

## 2024-12-07 ENCOUNTER — PATIENT MESSAGE (OUTPATIENT)
Dept: PRIMARY CARE CLINIC | Age: 79
End: 2024-12-07

## 2024-12-07 LAB
MRSA SPEC QL CULT: NORMAL
QT INTERVAL: 408 MS
QTC INTERVAL: 424 MS

## 2024-12-09 ENCOUNTER — CLINICAL DOCUMENTATION (OUTPATIENT)
Dept: PRIMARY CARE CLINIC | Age: 79
End: 2024-12-09

## 2024-12-09 NOTE — PROGRESS NOTES
Seen in office on November 26, 2024  -Does not demonstrating any signs or symptoms of chest pain shortness of breath or otherwise.  -He states he is able to climb 2-3 flights of stairs without limitations associated with shortness of breath, chest pain or other cardiovascular symptoms.    Recent studies  -September 9, 2024: Dobutamine stress echocardiogram negative for ischemia  -July 12, 2024: Extended cardiac monitor with no significant dysrhythmia such as atrial fibrillation    Elevated alkaline phosphatase  -November 22, 2023: MRI abdomen with and without contrast:  - -Persistently elevated since a 4 foot fall in January 2022  Liver: Normal contour.  No hepatic steatosis.  No suspicious hepatic lesions.   Gallbladder: Possible gallbladder sludge.  No surrounding inflammatory changes.   Bile ducts: No intra or extrahepatic biliary ductal dilatation.  No visible choledocholithiasis.  Pancreas: There is decreased T1 signal in the pancreatic tail suggestive of chronic pancreatitis.  There is persistent focal segmental dilatation of the main pancreatic duct in the pancreatic body measuring 0.9 cm in diameter and along the span of 2.8 cm   (series nine, image 4).  No associated enhancement.        December 6, 2024  -Hemoglobin hematocrit within normal limits  -GFR 57.7  - Glucose 93  -Urinalysis: Negative blood negative nitrite negative leukocyte esterase  -MRSA screen negative  -EKG shows change from previous EKG from May-  12, 2024 (negative stress test in September)  -Chest x-ray no acute findings    Based on recent studies, lack of current cardiovascular symptoms, and negative recent stress test cleared for surgery.

## 2024-12-09 NOTE — TELEPHONE ENCOUNTER
I have a clinical documentation note in his chart for clearance for surgery.  Can you fax it to the following physician:    Satish Ballesteros MD   [address] [facility]   [address] [place] [id number]   Phone: tel:[phone]   fax:[phone]

## 2024-12-13 ENCOUNTER — HOSPITAL ENCOUNTER (INPATIENT)
Facility: HOSPITAL | Age: 79
LOS: 1 days | Discharge: HOME | End: 2024-12-14
Attending: NEUROLOGICAL SURGERY | Admitting: NEUROLOGICAL SURGERY
Payer: MEDICARE

## 2024-12-13 ENCOUNTER — ANESTHESIA (OUTPATIENT)
Dept: PERIOP | Facility: HOSPITAL | Age: 79
End: 2024-12-13
Payer: MEDICARE

## 2024-12-13 ENCOUNTER — ANESTHESIA EVENT (OUTPATIENT)
Dept: PERIOP | Facility: HOSPITAL | Age: 79
End: 2024-12-13
Payer: MEDICARE

## 2024-12-13 ENCOUNTER — APPOINTMENT (OUTPATIENT)
Dept: CT IMAGING | Facility: HOSPITAL | Age: 79
End: 2024-12-13
Payer: MEDICARE

## 2024-12-13 DIAGNOSIS — G91.2 NORMAL PRESSURE HYDROCEPHALUS: ICD-10-CM

## 2024-12-13 DIAGNOSIS — Z98.2 STATUS POST VENTRICULOPERITONEAL SHUNT: Primary | ICD-10-CM

## 2024-12-13 DIAGNOSIS — Z74.09 IMPAIRED MOBILITY: ICD-10-CM

## 2024-12-13 LAB
ABO GROUP BLD: NORMAL
BLD GP AB SCN SERPL QL: NEGATIVE
RH BLD: POSITIVE
T&S EXPIRATION DATE: NORMAL

## 2024-12-13 PROCEDURE — C1729 CATH, DRAINAGE: HCPCS | Performed by: NEUROLOGICAL SURGERY

## 2024-12-13 PROCEDURE — 25010000002 CEFAZOLIN PER 500 MG: Performed by: NEUROLOGICAL SURGERY

## 2024-12-13 PROCEDURE — 8E09XBZ COMPUTER ASSISTED PROCEDURE OF HEAD AND NECK REGION: ICD-10-PCS | Performed by: NEUROLOGICAL SURGERY

## 2024-12-13 PROCEDURE — 25010000002 DEXAMETHASONE PER 1 MG: Performed by: NURSE ANESTHETIST, CERTIFIED REGISTERED

## 2024-12-13 PROCEDURE — 25810000003 LACTATED RINGERS PER 1000 ML: Performed by: NEUROLOGICAL SURGERY

## 2024-12-13 PROCEDURE — 62223 ESTABLISH BRAIN CAVITY SHUNT: CPT | Performed by: NEUROLOGICAL SURGERY

## 2024-12-13 PROCEDURE — C1889 IMPLANT/INSERT DEVICE, NOC: HCPCS | Performed by: NEUROLOGICAL SURGERY

## 2024-12-13 PROCEDURE — 97162 PT EVAL MOD COMPLEX 30 MIN: CPT

## 2024-12-13 PROCEDURE — 25010000002 HEPARIN (PORCINE) PER 1000 UNITS: Performed by: NURSE PRACTITIONER

## 2024-12-13 PROCEDURE — 25010000002 ONDANSETRON PER 1 MG: Performed by: NURSE ANESTHETIST, CERTIFIED REGISTERED

## 2024-12-13 PROCEDURE — 25010000002 GLYCOPYRROLATE 0.4 MG/2ML SOLUTION: Performed by: NURSE ANESTHETIST, CERTIFIED REGISTERED

## 2024-12-13 PROCEDURE — 99024 POSTOP FOLLOW-UP VISIT: CPT | Performed by: NURSE PRACTITIONER

## 2024-12-13 PROCEDURE — C1894 INTRO/SHEATH, NON-LASER: HCPCS | Performed by: NEUROLOGICAL SURGERY

## 2024-12-13 PROCEDURE — 97166 OT EVAL MOD COMPLEX 45 MIN: CPT

## 2024-12-13 PROCEDURE — 25010000002 SUGAMMADEX 200 MG/2ML SOLUTION: Performed by: NURSE ANESTHETIST, CERTIFIED REGISTERED

## 2024-12-13 PROCEDURE — 86901 BLOOD TYPING SEROLOGIC RH(D): CPT | Performed by: NEUROLOGICAL SURGERY

## 2024-12-13 PROCEDURE — 25010000002 PROPOFOL 200 MG/20ML EMULSION: Performed by: NURSE ANESTHETIST, CERTIFIED REGISTERED

## 2024-12-13 PROCEDURE — 25010000002 CEFAZOLIN PER 500 MG: Performed by: NURSE PRACTITIONER

## 2024-12-13 PROCEDURE — 70450 CT HEAD/BRAIN W/O DYE: CPT

## 2024-12-13 PROCEDURE — 86900 BLOOD TYPING SEROLOGIC ABO: CPT | Performed by: NEUROLOGICAL SURGERY

## 2024-12-13 PROCEDURE — 62223 ESTABLISH BRAIN CAVITY SHUNT: CPT | Performed by: SURGERY

## 2024-12-13 PROCEDURE — 86850 RBC ANTIBODY SCREEN: CPT | Performed by: NEUROLOGICAL SURGERY

## 2024-12-13 PROCEDURE — 25010000002 FENTANYL CITRATE (PF) 100 MCG/2ML SOLUTION: Performed by: NURSE ANESTHETIST, CERTIFIED REGISTERED

## 2024-12-13 PROCEDURE — 61781 SCAN PROC CRANIAL INTRA: CPT | Performed by: NEUROLOGICAL SURGERY

## 2024-12-13 PROCEDURE — 25010000002 LIDOCAINE PF 2% 2 % SOLUTION: Performed by: NURSE ANESTHETIST, CERTIFIED REGISTERED

## 2024-12-13 PROCEDURE — 00164J6 BYPASS CEREBRAL VENTRICLE TO PERITONEAL CAVITY WITH SYNTHETIC SUBSTITUTE, PERCUTANEOUS ENDOSCOPIC APPROACH: ICD-10-PCS | Performed by: NEUROLOGICAL SURGERY

## 2024-12-13 DEVICE — CATHETER 95001 KIT ANTIMICROBIAL
Type: IMPLANTABLE DEVICE | Site: CRANIAL | Status: FUNCTIONAL
Brand: ARES™

## 2024-12-13 DEVICE — KT HEMOST ABS SURGIFOAM PORCN 1GRAM: Type: IMPLANTABLE DEVICE | Site: CRANIAL | Status: FUNCTIONAL

## 2024-12-13 DEVICE — HEMOST ABS SURGIFOAM SZ100 8X12 10MM: Type: IMPLANTABLE DEVICE | Site: CRANIAL | Status: FUNCTIONAL

## 2024-12-13 DEVICE — CODMAN® CERTAS® PLUS PROGRAMMABLE VALVE INLINE VALVE ONLY
Type: IMPLANTABLE DEVICE | Site: CRANIAL | Status: FUNCTIONAL
Brand: CODMAN CERTAS® PLUS

## 2024-12-13 RX ORDER — ONDANSETRON 4 MG/1
4 TABLET, ORALLY DISINTEGRATING ORAL EVERY 6 HOURS PRN
Status: DISCONTINUED | OUTPATIENT
Start: 2024-12-13 | End: 2024-12-14 | Stop reason: HOSPADM

## 2024-12-13 RX ORDER — ASPIRIN 81 MG/1
81 TABLET ORAL DAILY
Status: DISCONTINUED | OUTPATIENT
Start: 2024-12-13 | End: 2024-12-14 | Stop reason: HOSPADM

## 2024-12-13 RX ORDER — LIDOCAINE HYDROCHLORIDE 20 MG/ML
INJECTION, SOLUTION EPIDURAL; INFILTRATION; INTRACAUDAL; PERINEURAL AS NEEDED
Status: DISCONTINUED | OUTPATIENT
Start: 2024-12-13 | End: 2024-12-13 | Stop reason: SURG

## 2024-12-13 RX ORDER — LABETALOL HYDROCHLORIDE 5 MG/ML
5 INJECTION, SOLUTION INTRAVENOUS
Status: DISCONTINUED | OUTPATIENT
Start: 2024-12-13 | End: 2024-12-13 | Stop reason: HOSPADM

## 2024-12-13 RX ORDER — AMOXICILLIN 250 MG
2 CAPSULE ORAL 2 TIMES DAILY
Status: DISCONTINUED | OUTPATIENT
Start: 2024-12-13 | End: 2024-12-14 | Stop reason: HOSPADM

## 2024-12-13 RX ORDER — FENTANYL CITRATE 50 UG/ML
INJECTION, SOLUTION INTRAMUSCULAR; INTRAVENOUS AS NEEDED
Status: DISCONTINUED | OUTPATIENT
Start: 2024-12-13 | End: 2024-12-13 | Stop reason: SURG

## 2024-12-13 RX ORDER — ROPINIROLE 1 MG/1
2 TABLET, FILM COATED ORAL NIGHTLY
Status: DISCONTINUED | OUTPATIENT
Start: 2024-12-13 | End: 2024-12-14 | Stop reason: HOSPADM

## 2024-12-13 RX ORDER — SODIUM CHLORIDE 9 MG/ML
40 INJECTION, SOLUTION INTRAVENOUS AS NEEDED
Status: DISCONTINUED | OUTPATIENT
Start: 2024-12-13 | End: 2024-12-13 | Stop reason: HOSPADM

## 2024-12-13 RX ORDER — LIDOCAINE HYDROCHLORIDE 40 MG/ML
SOLUTION TOPICAL AS NEEDED
Status: DISCONTINUED | OUTPATIENT
Start: 2024-12-13 | End: 2024-12-13 | Stop reason: SURG

## 2024-12-13 RX ORDER — SODIUM CHLORIDE 0.9 % (FLUSH) 0.9 %
10 SYRINGE (ML) INJECTION EVERY 12 HOURS SCHEDULED
Status: DISCONTINUED | OUTPATIENT
Start: 2024-12-13 | End: 2024-12-14 | Stop reason: HOSPADM

## 2024-12-13 RX ORDER — SODIUM CHLORIDE 0.9 % (FLUSH) 0.9 %
10 SYRINGE (ML) INJECTION AS NEEDED
Status: DISCONTINUED | OUTPATIENT
Start: 2024-12-13 | End: 2024-12-14 | Stop reason: HOSPADM

## 2024-12-13 RX ORDER — PHENYLEPHRINE HCL IN 0.9% NACL 1 MG/10 ML
SYRINGE (ML) INTRAVENOUS AS NEEDED
Status: DISCONTINUED | OUTPATIENT
Start: 2024-12-13 | End: 2024-12-13 | Stop reason: SURG

## 2024-12-13 RX ORDER — ROCURONIUM BROMIDE 10 MG/ML
INJECTION, SOLUTION INTRAVENOUS AS NEEDED
Status: DISCONTINUED | OUTPATIENT
Start: 2024-12-13 | End: 2024-12-13 | Stop reason: SURG

## 2024-12-13 RX ORDER — PROPOFOL 10 MG/ML
INJECTION, EMULSION INTRAVENOUS AS NEEDED
Status: DISCONTINUED | OUTPATIENT
Start: 2024-12-13 | End: 2024-12-13 | Stop reason: SURG

## 2024-12-13 RX ORDER — HYDROCODONE BITARTRATE AND ACETAMINOPHEN 7.5; 325 MG/1; MG/1
1 TABLET ORAL EVERY 4 HOURS PRN
Status: DISCONTINUED | OUTPATIENT
Start: 2024-12-13 | End: 2024-12-14 | Stop reason: HOSPADM

## 2024-12-13 RX ORDER — MAGNESIUM HYDROXIDE 1200 MG/15ML
LIQUID ORAL AS NEEDED
Status: DISCONTINUED | OUTPATIENT
Start: 2024-12-13 | End: 2024-12-13 | Stop reason: HOSPADM

## 2024-12-13 RX ORDER — PANTOPRAZOLE SODIUM 40 MG/1
40 TABLET, DELAYED RELEASE ORAL
Status: DISCONTINUED | OUTPATIENT
Start: 2024-12-14 | End: 2024-12-14 | Stop reason: HOSPADM

## 2024-12-13 RX ORDER — HYDROCODONE BITARTRATE AND ACETAMINOPHEN 5; 325 MG/1; MG/1
1 TABLET ORAL EVERY 4 HOURS PRN
Status: DISCONTINUED | OUTPATIENT
Start: 2024-12-13 | End: 2024-12-13 | Stop reason: HOSPADM

## 2024-12-13 RX ORDER — ONDANSETRON 2 MG/ML
4 INJECTION INTRAMUSCULAR; INTRAVENOUS ONCE AS NEEDED
Status: DISCONTINUED | OUTPATIENT
Start: 2024-12-13 | End: 2024-12-13 | Stop reason: HOSPADM

## 2024-12-13 RX ORDER — GLYCOPYRROLATE 0.2 MG/ML
INJECTION INTRAMUSCULAR; INTRAVENOUS AS NEEDED
Status: DISCONTINUED | OUTPATIENT
Start: 2024-12-13 | End: 2024-12-13 | Stop reason: SURG

## 2024-12-13 RX ORDER — HEPARIN SODIUM 5000 [USP'U]/ML
5000 INJECTION, SOLUTION INTRAVENOUS; SUBCUTANEOUS EVERY 12 HOURS SCHEDULED
Status: DISCONTINUED | OUTPATIENT
Start: 2024-12-13 | End: 2024-12-14 | Stop reason: HOSPADM

## 2024-12-13 RX ORDER — SODIUM CHLORIDE 0.9 % (FLUSH) 0.9 %
3-10 SYRINGE (ML) INJECTION AS NEEDED
Status: DISCONTINUED | OUTPATIENT
Start: 2024-12-13 | End: 2024-12-13 | Stop reason: HOSPADM

## 2024-12-13 RX ORDER — ATORVASTATIN CALCIUM 40 MG/1
40 TABLET, FILM COATED ORAL DAILY
Status: DISCONTINUED | OUTPATIENT
Start: 2024-12-13 | End: 2024-12-14 | Stop reason: HOSPADM

## 2024-12-13 RX ORDER — SODIUM CHLORIDE 0.9 % (FLUSH) 0.9 %
3 SYRINGE (ML) INJECTION AS NEEDED
Status: DISCONTINUED | OUTPATIENT
Start: 2024-12-13 | End: 2024-12-13 | Stop reason: HOSPADM

## 2024-12-13 RX ORDER — LEVOTHYROXINE SODIUM 88 UG/1
88 TABLET ORAL DAILY
Status: DISCONTINUED | OUTPATIENT
Start: 2024-12-14 | End: 2024-12-14 | Stop reason: HOSPADM

## 2024-12-13 RX ORDER — LIDOCAINE HYDROCHLORIDE 10 MG/ML
0.5 INJECTION, SOLUTION EPIDURAL; INFILTRATION; INTRACAUDAL; PERINEURAL ONCE AS NEEDED
Status: DISCONTINUED | OUTPATIENT
Start: 2024-12-13 | End: 2024-12-13 | Stop reason: HOSPADM

## 2024-12-13 RX ORDER — FLUMAZENIL 0.1 MG/ML
0.2 INJECTION INTRAVENOUS AS NEEDED
Status: DISCONTINUED | OUTPATIENT
Start: 2024-12-13 | End: 2024-12-13 | Stop reason: HOSPADM

## 2024-12-13 RX ORDER — SODIUM CHLORIDE 9 MG/ML
40 INJECTION, SOLUTION INTRAVENOUS AS NEEDED
Status: DISCONTINUED | OUTPATIENT
Start: 2024-12-13 | End: 2024-12-14 | Stop reason: HOSPADM

## 2024-12-13 RX ORDER — HYDROCODONE BITARTRATE AND ACETAMINOPHEN 10; 325 MG/1; MG/1
1 TABLET ORAL EVERY 4 HOURS PRN
Status: DISCONTINUED | OUTPATIENT
Start: 2024-12-13 | End: 2024-12-13 | Stop reason: HOSPADM

## 2024-12-13 RX ORDER — SODIUM CHLORIDE, SODIUM LACTATE, POTASSIUM CHLORIDE, CALCIUM CHLORIDE 600; 310; 30; 20 MG/100ML; MG/100ML; MG/100ML; MG/100ML
100 INJECTION, SOLUTION INTRAVENOUS CONTINUOUS
Status: DISCONTINUED | OUTPATIENT
Start: 2024-12-13 | End: 2024-12-13

## 2024-12-13 RX ORDER — MIDAZOLAM HYDROCHLORIDE 2 MG/2ML
0.5 INJECTION, SOLUTION INTRAMUSCULAR; INTRAVENOUS
Status: DISCONTINUED | OUTPATIENT
Start: 2024-12-13 | End: 2024-12-13 | Stop reason: HOSPADM

## 2024-12-13 RX ORDER — BUPIVACAINE HYDROCHLORIDE AND EPINEPHRINE 2.5; 5 MG/ML; UG/ML
INJECTION, SOLUTION INFILTRATION; PERINEURAL AS NEEDED
Status: DISCONTINUED | OUTPATIENT
Start: 2024-12-13 | End: 2024-12-13 | Stop reason: HOSPADM

## 2024-12-13 RX ORDER — TAMSULOSIN HYDROCHLORIDE 0.4 MG/1
0.4 CAPSULE ORAL NIGHTLY
Status: DISCONTINUED | OUTPATIENT
Start: 2024-12-13 | End: 2024-12-14 | Stop reason: HOSPADM

## 2024-12-13 RX ORDER — FENTANYL CITRATE 50 UG/ML
50 INJECTION, SOLUTION INTRAMUSCULAR; INTRAVENOUS
Status: DISCONTINUED | OUTPATIENT
Start: 2024-12-13 | End: 2024-12-13 | Stop reason: HOSPADM

## 2024-12-13 RX ORDER — HYDROCODONE BITARTRATE AND ACETAMINOPHEN 10; 325 MG/1; MG/1
1 TABLET ORAL EVERY 4 HOURS PRN
Status: DISCONTINUED | OUTPATIENT
Start: 2024-12-13 | End: 2024-12-14 | Stop reason: HOSPADM

## 2024-12-13 RX ORDER — ONDANSETRON 2 MG/ML
INJECTION INTRAMUSCULAR; INTRAVENOUS AS NEEDED
Status: DISCONTINUED | OUTPATIENT
Start: 2024-12-13 | End: 2024-12-13 | Stop reason: SURG

## 2024-12-13 RX ORDER — ONDANSETRON 2 MG/ML
4 INJECTION INTRAMUSCULAR; INTRAVENOUS EVERY 6 HOURS PRN
Status: DISCONTINUED | OUTPATIENT
Start: 2024-12-13 | End: 2024-12-14 | Stop reason: HOSPADM

## 2024-12-13 RX ORDER — DEXAMETHASONE SODIUM PHOSPHATE 4 MG/ML
INJECTION, SOLUTION INTRA-ARTICULAR; INTRALESIONAL; INTRAMUSCULAR; INTRAVENOUS; SOFT TISSUE AS NEEDED
Status: DISCONTINUED | OUTPATIENT
Start: 2024-12-13 | End: 2024-12-13 | Stop reason: SURG

## 2024-12-13 RX ORDER — CITALOPRAM HYDROBROMIDE 20 MG/1
20 TABLET ORAL DAILY
Status: DISCONTINUED | OUTPATIENT
Start: 2024-12-13 | End: 2024-12-14 | Stop reason: HOSPADM

## 2024-12-13 RX ORDER — NALOXONE HCL 0.4 MG/ML
0.4 VIAL (ML) INJECTION AS NEEDED
Status: DISCONTINUED | OUTPATIENT
Start: 2024-12-13 | End: 2024-12-13 | Stop reason: HOSPADM

## 2024-12-13 RX ORDER — TRAZODONE HYDROCHLORIDE 50 MG/1
50 TABLET, FILM COATED ORAL NIGHTLY
Status: DISCONTINUED | OUTPATIENT
Start: 2024-12-13 | End: 2024-12-14 | Stop reason: HOSPADM

## 2024-12-13 RX ORDER — LISINOPRIL 20 MG/1
20 TABLET ORAL DAILY
Status: DISCONTINUED | OUTPATIENT
Start: 2024-12-13 | End: 2024-12-14 | Stop reason: HOSPADM

## 2024-12-13 RX ORDER — POLYETHYLENE GLYCOL 3350 17 G/17G
17 POWDER, FOR SOLUTION ORAL DAILY
Status: DISCONTINUED | OUTPATIENT
Start: 2024-12-13 | End: 2024-12-14 | Stop reason: HOSPADM

## 2024-12-13 RX ORDER — ACETAMINOPHEN 500 MG
1000 TABLET ORAL ONCE
Status: COMPLETED | OUTPATIENT
Start: 2024-12-13 | End: 2024-12-13

## 2024-12-13 RX ORDER — SODIUM CHLORIDE, SODIUM LACTATE, POTASSIUM CHLORIDE, CALCIUM CHLORIDE 600; 310; 30; 20 MG/100ML; MG/100ML; MG/100ML; MG/100ML
1000 INJECTION, SOLUTION INTRAVENOUS CONTINUOUS
Status: DISCONTINUED | OUTPATIENT
Start: 2024-12-13 | End: 2024-12-13

## 2024-12-13 RX ORDER — IBUPROFEN 600 MG/1
600 TABLET, FILM COATED ORAL EVERY 6 HOURS PRN
Status: DISCONTINUED | OUTPATIENT
Start: 2024-12-13 | End: 2024-12-13 | Stop reason: HOSPADM

## 2024-12-13 RX ORDER — SODIUM CHLORIDE 0.9 % (FLUSH) 0.9 %
3 SYRINGE (ML) INJECTION EVERY 12 HOURS SCHEDULED
Status: DISCONTINUED | OUTPATIENT
Start: 2024-12-13 | End: 2024-12-13 | Stop reason: HOSPADM

## 2024-12-13 RX ADMIN — ROCURONIUM BROMIDE 20 MG: 10 INJECTION, SOLUTION INTRAVENOUS at 07:58

## 2024-12-13 RX ADMIN — TAMSULOSIN HYDROCHLORIDE 0.4 MG: 0.4 CAPSULE ORAL at 20:50

## 2024-12-13 RX ADMIN — HEPARIN SODIUM 5000 UNITS: 5000 INJECTION, SOLUTION INTRAVENOUS; SUBCUTANEOUS at 14:02

## 2024-12-13 RX ADMIN — PROPOFOL 50 MG: 10 INJECTION, EMULSION INTRAVENOUS at 08:06

## 2024-12-13 RX ADMIN — DOCUSATE SODIUM 50 MG AND SENNOSIDES 8.6 MG 2 TABLET: 8.6; 5 TABLET, FILM COATED ORAL at 14:02

## 2024-12-13 RX ADMIN — LIDOCAINE HYDROCHLORIDE 1 EACH: 40 SOLUTION TOPICAL at 07:19

## 2024-12-13 RX ADMIN — CEFAZOLIN 2 G: 2 INJECTION, POWDER, FOR SOLUTION INTRAMUSCULAR; INTRAVENOUS at 22:53

## 2024-12-13 RX ADMIN — CITALOPRAM HYDROBROMIDE 20 MG: 20 TABLET ORAL at 14:02

## 2024-12-13 RX ADMIN — ATORVASTATIN CALCIUM 40 MG: 40 TABLET, FILM COATED ORAL at 14:02

## 2024-12-13 RX ADMIN — GLYCOPYRROLATE 0.1 MG: 0.2 INJECTION INTRAMUSCULAR; INTRAVENOUS at 07:29

## 2024-12-13 RX ADMIN — FENTANYL CITRATE 50 MCG: 50 INJECTION, SOLUTION INTRAMUSCULAR; INTRAVENOUS at 07:54

## 2024-12-13 RX ADMIN — ROPINIROLE 2 MG: 1 TABLET, FILM COATED ORAL at 20:50

## 2024-12-13 RX ADMIN — FENTANYL CITRATE 50 MCG: 50 INJECTION, SOLUTION INTRAMUSCULAR; INTRAVENOUS at 07:34

## 2024-12-13 RX ADMIN — CEFAZOLIN 2 G: 2 INJECTION, POWDER, FOR SOLUTION INTRAMUSCULAR; INTRAVENOUS at 16:07

## 2024-12-13 RX ADMIN — LISINOPRIL 20 MG: 20 TABLET ORAL at 14:02

## 2024-12-13 RX ADMIN — DOCUSATE SODIUM 50 MG AND SENNOSIDES 8.6 MG 2 TABLET: 8.6; 5 TABLET, FILM COATED ORAL at 20:52

## 2024-12-13 RX ADMIN — ACETAMINOPHEN TAB 500 MG 1000 MG: 500 TAB at 06:49

## 2024-12-13 RX ADMIN — ONDANSETRON 4 MG: 2 INJECTION INTRAMUSCULAR; INTRAVENOUS at 08:21

## 2024-12-13 RX ADMIN — LIDOCAINE HYDROCHLORIDE 100 MG: 20 INJECTION, SOLUTION EPIDURAL; INFILTRATION; INTRACAUDAL; PERINEURAL at 07:17

## 2024-12-13 RX ADMIN — SODIUM CHLORIDE, POTASSIUM CHLORIDE, SODIUM LACTATE AND CALCIUM CHLORIDE 1000 ML: 600; 310; 30; 20 INJECTION, SOLUTION INTRAVENOUS at 06:18

## 2024-12-13 RX ADMIN — ASPIRIN 81 MG: 81 TABLET, COATED ORAL at 14:02

## 2024-12-13 RX ADMIN — HEPARIN SODIUM 5000 UNITS: 5000 INJECTION, SOLUTION INTRAVENOUS; SUBCUTANEOUS at 20:50

## 2024-12-13 RX ADMIN — ROCURONIUM BROMIDE 50 MG: 10 INJECTION, SOLUTION INTRAVENOUS at 07:18

## 2024-12-13 RX ADMIN — CEFAZOLIN 2 G: 2 INJECTION, POWDER, FOR SOLUTION INTRAMUSCULAR; INTRAVENOUS at 07:29

## 2024-12-13 RX ADMIN — Medication 100 MCG: at 07:41

## 2024-12-13 RX ADMIN — Medication 10 ML: at 20:54

## 2024-12-13 RX ADMIN — PROPOFOL 120 MG: 10 INJECTION, EMULSION INTRAVENOUS at 07:17

## 2024-12-13 RX ADMIN — DEXAMETHASONE SODIUM PHOSPHATE 4 MG: 4 INJECTION, SOLUTION INTRA-ARTICULAR; INTRALESIONAL; INTRAMUSCULAR; INTRAVENOUS; SOFT TISSUE at 07:43

## 2024-12-13 RX ADMIN — SUGAMMADEX 200 MG: 100 INJECTION, SOLUTION INTRAVENOUS at 08:28

## 2024-12-13 RX ADMIN — TRAZODONE HYDROCHLORIDE 50 MG: 50 TABLET ORAL at 20:50

## 2024-12-13 RX ADMIN — Medication 200 MCG: at 08:14

## 2024-12-13 NOTE — ANESTHESIA PROCEDURE NOTES
Airway  Urgency: elective    Date/Time: 12/13/2024 7:19 AM  Airway not difficult    General Information and Staff    Patient location during procedure: OR  CRNA/CAA: Jessica Myers CRNA    Indications and Patient Condition  Indications for airway management: airway protection    Preoxygenated: yes  Mask difficulty assessment: 1 - vent by mask    Final Airway Details  Final airway type: endotracheal airway      Successful airway: ETT  Cuffed: yes   Successful intubation technique: direct laryngoscopy and video laryngoscopy  Facilitating devices/methods: intubating stylet  Endotracheal tube insertion site: oral  Blade: Graf  Blade size: 4  ETT size (mm): 7.5  Cormack-Lehane Classification: grade I - full view of glottis  Placement verified by: chest auscultation and capnometry   Cuff volume (mL): 8  Measured from: lips  ETT/EBT  to lips (cm): 22  Number of attempts at approach: 1  Assessment: lips, teeth, and gum same as pre-op and atraumatic intubation

## 2024-12-13 NOTE — THERAPY EVALUATION
Patient Name: Agueda Tyler  : 1945    MRN: [id number]                              Today's Date: 2024       Admit Date: 2024    Visit Dx:     ICD-10-CM ICD-9-CM   1. Status post ventriculoperitoneal shunt  Z98.2 V45.2   2. Normal pressure hydrocephalus  G91.2 331.5   3. Impaired mobility [Z74.09]  Z74.09 799.89     Patient Active Problem List   Diagnosis    Hx of colonic polyp    Gastroesophageal reflux disease without esophagitis    Iron deficiency anemia    BRBPR (bright red blood per rectum)    Acute ischemic stroke    Essential hypertension    Stroke-like symptom    Right upper lobe consolidation    SINDI (obstructive sleep apnea)    Restless legs    Impaired gait and mobility    Cerebral ventriculomegaly due to brain atrophy    Normal pressure hydrocephalus     Past Medical History:   Diagnosis Date    Anxiety     BPH (benign prostatic hyperplasia)     Colon polyp     CVA (cerebral vascular accident)     Disease of thyroid gland     Diverticulosis     Elevated cholesterol     Erectile dysfunction     Essential hypertension     GERD (gastroesophageal reflux disease)     Hypertriglyceridemia     Idiopathic peripheral neuropathy     Osteoarthritis     Sleep apnea     Urinary incontinence     Inknown     Past Surgical History:   Procedure Laterality Date    COLONOSCOPY  2016    diverticulosis, internal hemorrhoids,     COLONOSCOPY  2011    internal hemorrhoid, diverticulosis    COLONOSCOPY N/A 2021    Procedure: COLONOSCOPY WITH ANESTHESIA;  Surgeon: Ricky House MD;  Location:  [facility];  Service: Gastroenterology;  Laterality: N/A;  pre: hx polyps  post: diverticulosis. hemorrhoid.   Keenan Perez MD    CYSTOSCOPY      Before TURP    ENDOSCOPY N/A 2021    Procedure: ESOPHAGOGASTRODUODENOSCOPY WITH ANESTHESIA;  Surgeon: Ricky House MD;  Location:  [facility];  Service: Gastroenterology;  Laterality: N/A;  pre: GERD  post: hiatal hernia.  gastric polyps.  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  -     Mode of Treatment physical therapy  -       Row Name 12/13/24 1525          General Information    Patient Profile Reviewed yes  -AJ     Prior Level of Function independent:;all household mobility;community mobility;home management;gait;bed mobility;ADL's  -     Existing Precautions/Restrictions fall  -     Barriers to Rehab medically complex  -       Row Name 12/13/24 1525          Living Environment    People in Home spouse  -       Row Name 12/13/24 1525          Home Main Entrance    Number of Stairs, Main Entrance three  -     Stair Railings, Main Entrance railings on both sides of stairs  -       Row Name 12/13/24 1525          Stairs Within Home, Primary    Number of Stairs, Within Home, Primary none  -       Row Name 12/13/24 1525          Cognition    Orientation Status (Cognition) oriented x 4  -       Row Name 12/13/24 1525          Safety Issues/Impairments Affecting Functional Mobility    Impairments Affecting Function (Mobility) balance;strength  -               User Key  (r) = Recorded By, (t) = Taken By, (c) = Cosigned By      Initials Name Provider Type    AJ Steven Lee PT DPT Physical Therapist                   Mobility       Row Name 12/13/24 1525          Bed Mobility    Bed Mobility supine-sit;sit-supine  -AJ     Supine-Sit [place] (Bed Mobility) standby assist  -AJ     Sit-Supine [place] (Bed Mobility) standby assist  -     Assistive Device (Bed Mobility) bed rails  -       Row Name 12/13/24 1525          Bed-Chair Transfer    Bed-Chair [place] (Transfers) contact  guard;minimum assist (75% patient effort);1 person assist  -AJ     Comment, (Bed-Chair Transfer) pt had Mod LOB while turning to sit in chair, required Min A to correct balance  -       Row Name 12/13/24 1525          Sit-Stand Transfer    Sit-Stand [place] (Transfers) verbal cues;contact guard  -     Assistive Device (Sit-Stand Transfers) walker, front-wheeled  -AJ       Row Name 12/13/24 1525          Gait/Stairs (Locomotion)    [place] Level (Gait) contact guard  -     Assistive Device (Gait) walker, front-wheeled  -AJ     Distance in Feet (Gait) 120  -               User Key  (r) = Recorded By, (t) = Taken By, (c) = Cosigned By      Initials Name Provider Type    Steven Morrow PT DPT Physical Therapist                   Obj/Interventions       Row Name 12/13/24 1525          Range of Motion Comprehensive    General Range of Motion bilateral lower extremity ROM WFL  -[facility] Name 12/13/24 1525          Strength Comprehensive (MMT)    General Manual Muscle Testing (MMT) Assessment no strength deficits identified  -[facility] Name 12/13/24 1525          Balance    Balance Assessment sitting static balance;sitting dynamic balance;standing static balance;standing dynamic balance  -AJ     Static Sitting Balance standby assist  -     Dynamic Sitting Balance standby assist  -AJ     Position, Sitting Balance unsupported;sitting edge of bed  -AJ     Static Standing Balance contact guard  -AJ     Dynamic Standing Balance contact guard  -AJ     Position/Device Used, Standing Balance supported;walker, rolling  -AJ     Balance Interventions sitting;standing;sit to stand;supported;static;dynamic  -[facility] Name 12/13/24 1525          Sensory Assessment (Somatosensory)    Sensory Assessment (Somatosensory) sensation intact  -               User Key  (r) = Recorded By, (t) = Taken By, (c) = Cosigned By      Initials Name Provider Type    Steven Morrow PT DPT Physical Therapist                    Goals/Plan       Row Name 12/13/24 1525          Bed Mobility Goal 1 (PT)    Activity/Assistive Device (Bed Mobility Goal 1, PT) rolling to left;rolling to right;sit to supine;supine to sit  -AJ     [place] Level/Cues Needed (Bed Mobility Goal 1, PT) independent  -AJ     Time Frame (Bed Mobility Goal 1, PT) long term goal (LTG);10 days  -AJ     Strategies/Barriers (Bed Mobility Goal 1, PT) flat bed  -AJ     Progress/Outcomes (Bed Mobility Goal 1, PT) new goal  -AJ       Row Name 12/13/24 1525          Transfer Goal 1 (PT)    Activity/Assistive Device (Transfer Goal 1, PT) sit-to-stand/stand-to-sit;bed-to-chair/chair-to-bed  -AJ     [place] Level/Cues Needed (Transfer Goal 1, PT) independent  -AJ     Time Frame (Transfer Goal 1, PT) long term goal (LTG);10 days  -AJ     Progress/Outcome (Transfer Goal 1, PT) new goal  -AJ       Row Name 12/13/24 1525          Gait Training Goal 1 (PT)    Activity/Assistive Device (Gait Training Goal 1, PT) gait (walking locomotion);decrease asymmetrical patterns;decrease fall risk;diminish gait deviation;forward stepping;improve balance and speed;increase endurance/gait distance;increase energy conservation;assistive device use;walker, rolling  -AJ     [place] Level (Gait Training Goal 1, PT) modified independence  -AJ     Distance (Gait Training Goal 1, PT) 200' with no LOB and FWW as needed  -AJ     Time Frame (Gait Training Goal 1, PT) long term goal (LTG);10 days  -AJ     Progress/Outcome (Gait Training Goal 1, PT) new goal  -AJ       Row Name 12/13/24 1525          Stairs Goal 1 (PT)    Activity/Assistive Device (Stairs Goal 1, PT) stairs, all skills;ascending stairs;descending stairs;step-to-step;decrease fall risk;improve balance and speed  -AJ     [place] Level/Cues Needed (Stairs Goal 1, PT) independent  -AJ     Number of Stairs (Stairs Goal 1, PT) 3 for home safety  -AJ     Time Frame (Stairs Goal 1, PT) long term goal (LTG);10 days  -AJ      Progress/Outcome (Stairs Goal 1, PT) new goal  -       Row Name 12/13/24 1525          Therapy Assessment/Plan (PT)    Planned Therapy Interventions (PT) balance training;bed mobility training;gait training;home exercise program;postural re-education;patient/family education;transfer training;strengthening;neuromuscular re-education;stair training;ROM (range of motion)  -               User Key  (r) = Recorded By, (t) = Taken By, (c) = Cosigned By      Initials Name Provider Type    Steven Morrow, KIERA DPT Physical Therapist                   Clinical Impression       Row Name 12/13/24 1525          Pain    Pretreatment Pain Rating 1/10  -     Posttreatment Pain Rating 1/10  -     Pain Location head  -     Pain Side/Orientation generalized;right  -     Pain Management Interventions nursing notified;exercise or physical activity utilized  -     Response to Pain Interventions no change per patient report  -     Pre/Posttreatment Pain Comment Pt reported more soreness than pain  -       Row Name 12/13/24 1525          Plan of Care Review    Plan of Care Reviewed With patient;spouse  -     Outcome Evaluation PT eval completed. Pt in fowlers position, AOx4 and agreeable to session. Pt reports he has been Indep and hopes to return to indep soon pending stability s/p shunt placement. Pt brought self to EOB with SBA and stood upon coming to EOB. No formal AROM or strength testing performed but remained WFL globally. Pt t/f to chair and upon turning to sit had LOB and required Min A to correct. Due to pt LOB, pt also became more fearful and demo more slow gait and movements than he feels are his normal but  tolerated walking 100' in [place] with CGA and FWW and 20' in room at attempted TUG. TUG results inconclusive, due to pt reporting extreme fear of falling after LOB at completion of session, and returned to fowlers position. Pt will benefit from skilled PT services to improve activity tolerance, decrease  fall risk and continued safety awareness. Recommend home with assist and possible OPPT pending progress towards goals.  -       Row Name 12/13/24 1525          Therapy Assessment/Plan (PT)    Rehab Potential (PT) good  -     Criteria for Skilled Interventions Met (PT) meets criteria;skilled treatment is necessary;yes  -     Therapy Frequency (PT) 2 times/day  -AJ     Predicted Duration of Therapy Intervention (PT) until d/c  -       Row Name 12/13/24 1525          Vital Signs    Pre Patient Position Supine  -AJ     Intra Patient Position Standing  -AJ     Post Patient Position Supine  -AJ       Row Name 12/13/24 1525          Positioning and Restraints    Pre-Treatment Position in bed  -AJ     Post Treatment Position bed  -AJ     In Bed notified nsg;fowlers;call light within reach;encouraged to call for assist;exit alarm on;side rails up x2;with family/caregiver  -               User Key  (r) = Recorded By, (t) = Taken By, (c) = Cosigned By      Initials Name Provider Type    Steven Morrow, PT DPT Physical Therapist                   Outcome Measures       Row Name 12/13/24 1525 12/13/24 1212       How much help from another person do you currently need...    Turning from your back to your side while in flat bed without using bedrails? 4  - 3  -CP    Moving from lying on back to sitting on the side of a flat bed without bedrails? 4  - 3  -CP    Moving to and from a bed to a chair (including a wheelchair)? 3  -AJ 3  -CP    Standing up from a chair using your arms (e.g., wheelchair, bedside chair)? 4  - 3  -CP    Climbing 3-5 steps with a railing? 3  -AJ 3  -CP    To walk in hospital room? 4  -AJ 3  -CP    AM-PAC 6 Clicks Score (PT) 22  - 18  -CP      Row Name 12/13/24 1535 12/13/24 1403       Functional Assessment    Outcome Measure Options Timed Up and Go (TUG)  - AM-PAC 6 Clicks Daily Activity (OT)  -              User Key  (r) = Recorded By, (t) = Taken By, (c) = Cosigned By      Initials  Name Provider Type    CP Micheline Maria, RN Registered Nurse    Steven Morrow, PT DPT Physical Therapist    Debra Zavala OTR/L Occupational Therapist                                 Physical Therapy Education       Title: PT OT SLP Therapies (Done)       Topic: Physical Therapy (Done)       Point: Mobility training (Done)       Learning Progress Summary            Patient Acceptance, E, VU by SHIVANI at 12/13/2024 1606    Comment: role of PT, slowing down and continued confidence, TUG and 100' walk data                      Point: Home exercise program (Done)       Learning Progress Summary            Patient Acceptance, E, VU by SHIVANI at 12/13/2024 1606    Comment: role of PT, slowing down and continued confidence, TUG and 100' walk data                      Point: Body mechanics (Done)       Learning Progress Summary            Patient Acceptance, E, VU by SHIVANI at 12/13/2024 1606    Comment: role of PT, slowing down and continued confidence, TUG and 100' walk data                      Point: Precautions (Done)       Learning Progress Summary            Patient Acceptance, E, VU by SHIVANI at 12/13/2024 1606    Comment: role of PT, slowing down and continued confidence, TUG and 100' walk data                                      User Key       Initials Effective Dates Name Provider Type Discipline     08/15/24 -  Steven Lee, PT DPT Physical Therapist PT                  PT Recommendation and Plan  Planned Therapy Interventions (PT): balance training, bed mobility training, gait training, home exercise program, postural re-education, patient/family education, transfer training, strengthening, neuromuscular re-education, stair training, ROM (range of motion)  Outcome Evaluation: PT eval completed. Pt in fowlers position, AOx4 and agreeable to session. Pt reports he has been Indep and hopes to return to indep soon pending stability s/p shunt placement. Pt brought self to EOB with SBA and stood upon coming to EOB. No  formal AROM or strength testing performed but remained WFL globally. Pt t/f to chair and upon turning to sit had LOB and required Min A to correct. Due to pt LOB, pt also became more fearful and demo more slow gait and movements than he feels are his normal but  tolerated walking 100' in [place] with CGA and FWW and 20' in room at attempted TUG. TUG results inconclusive, due to pt reporting extreme fear of falling after LOB at completion of session, and returned to fowlers position. Pt will benefit from skilled PT services to improve activity tolerance, decrease fall risk and continued safety awareness. Recommend home with assist and possible OPPT pending progress towards goals.     Time Calculation:         PT Charges       Row Name 12/13/24 1525             Time Calculation    Start Time 1525  -AJ      Stop Time 1554  -AJ      Time Calculation (min) 29 min  -AJ      PT Received On 12/13/24  -AJ      PT Goal Re-Cert Due Date 12/23/24  -AJ         Untimed Charges    PT Eval/Re-eval Minutes 29  -AJ         Total Minutes    Untimed Charges Total Minutes 29  -AJ       Total Minutes 29  -AJ                User Key  (r) = Recorded By, (t) = Taken By, (c) = Cosigned By      Initials Name Provider Type    AJ Steven Lee PT DPT Physical Therapist                  Therapy Charges for Today       Code Description Service Date Service Provider Modifiers Qty    [id number] HC PT EVAL MOD COMPLEXITY 2 12/13/2024 Steven Lee PT DPT GP 1            PT G-Codes  Outcome Measure Options: Timed Up and Go (TUG)  AM-PAC 6 Clicks Score (PT): 22  AM-PAC 6 Clicks Score (OT): 22  PT Discharge Summary  Anticipated Discharge Disposition (PT): home with assist, home with outpatient therapy services    Steven Lee PT DPT  12/13/2024

## 2024-12-13 NOTE — PLAN OF CARE
Goal Outcome Evaluation:  Plan of Care Reviewed With: patient, spouse, son           Outcome Evaluation: OT eval completed. A&O x4, pt pleasant. S/p shunt placement for hydracephalus. Pt has hx of stroke in may 2024 that affected R side and he completed HH therapy course. Pt completes bed mobiltiy w/ SBA. Completed sit<>stand w/ CGA using FWW. Pt completes func mob bed<>door. Pt BUE ROM/strength WNL. CSome lasting tingling through RUE secondary to may 2024 stroke. Pt able to complete don/doff socks w/ mod I. Pt left in bed fowlers with call light, educated not to get up. OT to continue to see pt for safety with ADLs. D/c recommendation home with assist and HH.    Anticipated Discharge Disposition (OT): home with assist, home with home health

## 2024-12-13 NOTE — PROGRESS NOTES
NEUROSURGERY DAILY PROGRESS NOTE    ASSESSMENT:   Agueda Tyler is a 79 y.o. male with significant medical comorbidities to include left thalamus CVA (May 2024), hypertension, hyperlipidemia, BPH, anxiety, GERD, and he is overweight.  He presents today for evaluation of ventriculomegaly with reports of intermittent forgetfulness and a progressive decline in his gait and balance.  Physical exam findings of bright and awake, oriented x 3, names objects, bilateral EHL weakness, lower extremity hyperreflexia, and a magnetic gait with en bloc turning.  His imaging shows ventriculomegaly with surrounding FLAIR signal changes concerning for NPH.     Past Medical History:   Diagnosis Date    Anxiety     BPH (benign prostatic hyperplasia)     Colon polyp     CVA (cerebral vascular accident)     Disease of thyroid gland     Diverticulosis     Elevated cholesterol     Erectile dysfunction     Essential hypertension     GERD (gastroesophageal reflux disease)     Hypertriglyceridemia     Idiopathic peripheral neuropathy     Osteoarthritis     Sleep apnea     Urinary incontinence     Inknown     Active Hospital Problems    Diagnosis     **Normal pressure hydrocephalus      PLAN:   Neuro: Stable.  Exam at baseline   Day of Surgery (12/13/2024) right ventriculoperitoneal shunt placement for NPH   Current performance setting at 5 cm H2O   PO CT reviewed, stable   Neurochecks every 4 hours.  Call for decline     CV: No acute issues.   Pulm: Maintaining O2 sat.  Continuous pulse oximetry.  Incentive spirometry.   : Remove [place] ASAP, bladder scans and I/O cath per policy.   FEN: Regular diet.  Advance as tolerated.   GI: No acute issues.    ID: 23-hour postoperative prophylactic antibiotics.    Heme:  DVT prophylaxis; SCD's  Pain: Tolerable at present with oral meds.     Dispo: PT/OT.       CHIEF COMPLAINT:   Intermittent forgetfulness and a progressive decline in gait and balance    Subjective  Symptoms stable.  Doing well    Temp:   [96.7 °F (35.9 °C)-97.9 °F (36.6 °C)] 97.9 °F (36.6 °C)  Heart Rate:  [75-86] 85  Resp:  [12-18] 14  BP: (117-152)/(83-98) 152/98    Objective:  Vital signs: (most recent): Blood pressure 152/98, pulse 85, temperature 97.9 °F (36.6 °C), temperature source Temporal, resp. rate 14, weight 83.9 kg (184 lb 15.5 oz), SpO2 98%.        Neurological Exam  Mental Status  Awake, alert and oriented to person, place and time. Speech is normal. Language is fluent with no aphasia. Attention and concentration are normal.    Cranial Nerves  CN II: Visual acuity is normal.  CN III, IV, VI: Extraocular movements intact bilaterally. Normal lids and orbits bilaterally. Pupils equal round and reactive to light bilaterally.  CN V: Facial sensation is normal.  CN VII: Full and symmetric facial movement.  CN IX, X: Palate elevates symmetrically  CN XI: Shoulder shrug strength is normal.    Motor  Normal muscle bulk throughout. Normal muscle tone.                                               Right                     Left  Toe extension                        5                          5                                             Right                     Left  Deltoid                                   5                          5   Biceps                                   5                          5   Triceps                                  5                          5   Wrist extensor                       5                          5   Iliopsoas                               5                          5   Quadriceps                           5                          5   Gastrocnemius                     5                           5   Anterior tibialis                      5                          5    Sensory  Light touch is normal in upper and lower extremities.     Coordination    Finger-to-nose, rapid alternating movements and heel-to-shin normal bilaterally without dysmetria.    Gait    Did not assess.  Defer to PT for  now.    Drains: * No LDAs found *    Output by Drain (mL) 12/12/24 0701 - 12/12/24 1900 12/12/24 1901 - 12/13/24 0700 12/13/24 0701 - 12/13/24 1223 Range Total   Patient has no LDAs of requested type attached.     Imaging Results (Last 24 Hours)       Procedure Component Value Units Date/Time    CT Head Without Contrast [[id number]] Collected: 12/13/24 0948     Updated: 12/13/24 0954    Narrative:      EXAM: CT HEAD WO CONTRAST-      DATE: 12/13/2024 8:36 AM     HISTORY: Evaluation status post  shunt placement for normal pressure  hydrocephalus; Z98.2-Presence of cerebrospinal fluid drainage device;  G91.2-(Idiopathic) normal pressure hydrocephalus       COMPARISON: 5/14/2024.     DOSE LENGTH PRODUCT: 866.98 mGy.cm  Automated exposure control was also  utilized to decrease patient radiation dose.     TECHNIQUE: Unenhanced CT images obtained from vertex to skull base with  multiplanar reformats.     FINDINGS:  Patient is status post right frontal approach  shunt catheter  placement. The tip is in the right lateral ventricle/third ventricle at  the right of midline. Ventricular size is stable. Small amount of air in  the anterior horn of the right lateral ventricle is postprocedural  related. There is also mild pneumocephalus. No acute hemorrhage  identified. There are chronic changes with probable ischemic change of  the periventricular white matter and chronic lacunar infarct in the left  thalamus. There is no mass effect or midline shift. There is no evidence  for acute infarct.     There is mucosal thickening of the ethmoid air cells and right maxillary  sinus. Mastoid air cells are clear.     Postprocedural changes are noted with gas in the right scalp soft  tissues.     The visualized orbits and globes are unremarkable. There is bilateral  lens extraction.       Impression:         1. Status post interval right frontal approach  shunt catheter  placement tip to the right of midline with expected  postprocedural  changes and stable ventricular size.  2. Chronic ischemic changes as above.  3. Paranasal sinus mucosal thickening.     This report was signed and finalized on 12/13/2024 9:51 AM by Brent Smith.             Lab Results (last 24 hours)       ** No results found for the last 24 hours. **          [id number]    Marin Lopes, APRN

## 2024-12-13 NOTE — DISCHARGE INSTRUCTIONS
[facility]    Postoperative care following brain surgery  Dear Patient,  You have recently undergone brain surgery (ventriculoperitoneal shunt placement for normal pressure hydrocephalus) and are now ready to go home. These written instructions are intended to help you to recover quickly.  If you have ANY QUESTIONS about your condition prior to discharge please ask Dr. Lombardo. In particular, if you have concerns about going home discuss them now. We do not want you to go until you are completely comfortable leaving the hospital.   If you have ANY QUESTIONS about your condition after you go home call your doctor. The number is [phone] which is answered 24 hours a day. During regular working hours a  will connect you to your doctor, one of his partners, or one of our nurses. At night or on weekends the answering service will connect you with the physicianon call. DO NOT HESITATE to call. We want to help you with any problems.     Seizures  Anyone who has brain surgery has a small risk of seizures postoperatively. Seizures may involve involuntary shaking of the arms and legs, loss of consciousness, loss of urinary or bowel control. They typically last a few minutes, then the patient returns to normal. Seizures are frightening to watch, but usually resolve without long-term problems. Your doctor will often attempt to prevent seizures after surgery by putting you on anti-seizure medicine like Dilantin or Keppra. Sometimes seizures occur even when these medicines are used  What to do if a seizure occurs:  If a seizure occurs and the patient does not return to normal in 10 minutes, call your Dr. Lombardo or go to the local emergency room.   If multiple seizures occur, call 911.     Neurological Deficit  Neurological deficits are problems with brain function like speech difficulty, weakness, numbness, imbalance, etc. These deficits may be present before or after brain surgery. Prior to  discharge Dr. Lombardo will make sure that all treatment needed to help you recover from such deficits has been instituted. He will also make sure that these deficits are stable or improving. After you go home, if you think any of your brain problems are getting worse, not better, it may be a sign of bleeding, infection, or other problems. Call Dr. Lombardo. He will order tests and prescribe treatment as needed.    Deep vein thrombosis/ pulmonary embolus  Some patients who undergo surgery develop blood clots in the veins of the legs. These clots can cause pain or swelling in the legs, or may cause no obvious problem. They can break free from the legs and travel to the lungs causing shortness of breath and/or chest pain. If you develop pain or swelling in your legs after surgery, call your doctor. If you develop breathing problems or chest pain after surgery, call 911.    Medication  It is important to take your medication EXACTLY as prescribed. Some patients are reluctant to take pain medication. It is perfectly fine to take pain medication for several weeks after surgery. We want to eliminate pain whenever possible. Many pain medications can cause nausea (sick to your stomach), constipation (inability to poop), or itching. Nausea may be minimized by taking the medication with food. Constipation can be relieved by taking stool softeners and/ or laxatives that you can purchase over the counter as needed.    It is important to realize that no pain after surgery is an unrealistic expectation.  Pain medication will never reduce your pain score to zero.  The goal of pain medicine is to reduce your pain to the point you can move, take care of yourself, and participate in therapy.  Make sure to work with your caregiver to determine what is an adequate level of pain control to promote healthy movement and then take your medication to reach this goal.      You have undergone a   shunt placement  surgery which you are  expected to recover from over several weeks.     Use of opioids immediately following surgery is often necessary.  Pain after surgery results in decreased quality of life, surgical complications, and prolonged rehabilitation.  Thus in certain situations, the benefits of a limited course of opioids may outweigh the risk.      However, multiple studies have found that patients of all ages frequently take fewer opioid pills than the amount prescribed after common surgeries.  In some cases they do not take any of the prescribed medications at all.  This results in excess opioid pills that are accessible to others, raise a concern for misuse, can be stolen by family members, can result in later addiction, or can often be used in overdose.  Therefore we are going to make every effort to only prescribe as much pain medication as necessary to limit the amount of pills that are left over after you recover.      First-line treatment should include nonopioid analgesics.  Examples of these would be Tylenol or nonsteroidal anti-inflammatories such as ibuprofen or Aleve.  - Tylenol 1000 mg every six hours as needed    Second-line treatment. If the above first-line treatments are insufficient to control your pain you have also been provided a small dose of opioids.  In order to avoid addiction you should take the lowest amount possible.    Type I: Opioid prescription for 3 days or less (every 6 hours).  May consider an additional 3-day course (every 8 hours)    EXAMPLE IF APPLICABLE:  Please taper your pain medications to avoid long term addiction.  Week 1: Take your pain medication no more than ever 6 hours as needed for severe pain.  Week 2: Take your pain medication no more than every 8 hours as needed for severe pain.  Week 3: Take your pain medication no more than every 12 hours as needed for unresolved pain.    These recommendations are based on ...  CDC guidelines for control and prevention of postsurgical pain,   [place]  opioid prescribing engagement network (OPEN)[place] in [facility] medical directors group prescribing opioids for postoperative pain-supplemental guidance (2018)    Wound Care  Your incision is held together with either staples that need to be removed in 2 weeks or dissolvable sutures that do not need to be removed.     Seventy-two hours after surgery it is OK to get the wound wet, so you can take a shower or bath.     You do not need to put any medication (like Neosporin or Vitamin E) on the wound. Scrubbing the wound should be avoided until the staples nondissolvable sutures come out.     No nicotine products, including second-hand smoke, gum or patches. Nicotine will delay healing and increase the likelihood of a surgical complication. For help quitting, call the Quitline: 1-513.173.6132     Potential wound problems include the following:  Infection--If the wound becomes red, tender, swollen, or warm it may be infected. Infection is often accompanied by fever. If you think your wound might be infected you should call your doctor. Often you can send us a picture of the wound so we can better evaluate it.   Drainage--Fluid should not drain from your wound. If it does, call your doctor. Colored fluid may indicate infection. Clear fluid may indicate leakage of spinal fluid.   Dehiscence--If the wound does not heal properly it may open up along the staple line. This is called dehiscence. Call us immediately.   Sutures--Occasionally, one of the buried threads (sutures) may work through the skin. If you think this has happened call your doctor.   Swelling--Spinal fluid or blood may collect under the skin. This is usually harmless, but needs to be evaluated. Call your doctor.     Hydrocephalus  Your brain manufactures about one quart of clear fluid (called cerebrospinal fluid or CSF) every day. Normally this fluid is reabsorbed into the blood stream. After brain surgery the flow of fluid and  the reabsorption process may be impaired. This leads to a buildup of water on the brain that is called hydrocephalus. Hydrocephalus can cause headache, somnolence, difficulty thinking, imbalance and loss of urinary control. If you think that the patient may have hydrocephalus, call your doctor. She/He will order a brain scan. If it shows water buildup a simple operation called a shunt may be needed to fix the problem.    How to contact your doctor  The neurosurgeon, Dr. Lombarod, and her/his team did your surgery and, therefore, are likely to know more about your condition than any other physicians. We are immediately available to help you with any problems after surgery. Please call us for any concerns at the following numbers:   Doctor’s office [phone] (answered 24 hours a day)   [facility]'s  [phone] (alternative emergency number for on-call neurosurgeon)     Specific instructions related to your surgery  Diet: no restrictions, eat a heart healthy diet.   Activity: as tolerated, no heavy lifting or strenuous exercise for at least 2 weeks.  ?  Follow up:   Follow up with Marin LIEBERMAN on 12/23/2024 for post op wound check and with Dr. Lombardo in the neurosurgery clinic ([phone]) in 6-8 weeks.  We have scheduled your initial appointment(s) for you.

## 2024-12-13 NOTE — ANESTHESIA POSTPROCEDURE EVALUATION
Patient: Agueda Tyler    Procedure Summary       Date: 12/13/24 Room / Location: [facility] OR  /  [facility] OR    Anesthesia Start: 0712 Anesthesia Stop: 0850    Procedure: VENTRICULAR PERITONEAL SHUNT INSERTION STERIOTACTIC, right, with Darwin (Right) Diagnosis:       Normal pressure hydrocephalus      (Normal pressure hydrocephalus [G91.2])    Surgeons: Harley Lombardo MD Provider: Jessica Myers CRNA    Anesthesia Type: general ASA Status: 3            Anesthesia Type: general    Vitals  Vitals Value Taken Time   /94 12/13/24 1139   Temp 97.9 °F (36.6 °C) 12/13/24 1145   Pulse 83 12/13/24 1146   Resp 14 12/13/24 1145   SpO2 96 % 12/13/24 1146   Vitals shown include unfiled device data.        Post Anesthesia Care and Evaluation    Patient location during evaluation: PACU  Patient participation: complete - patient participated  Level of consciousness: awake and alert  Pain management: adequate    Airway patency: patent  Anesthetic complications: No anesthetic complications    Cardiovascular status: acceptable  Respiratory status: acceptable  Hydration status: acceptable    Comments: Blood pressure 131/98, pulse 77, temperature 97.9 °F (36.6 °C), temperature source Temporal, resp. rate 14, weight 83.9 kg (184 lb 15.5 oz), SpO2 96%.    Pt discharged from PACU based on mary score >8  No anesthesia care post op

## 2024-12-13 NOTE — ANESTHESIA PREPROCEDURE EVALUATION
Anesthesia Evaluation     NPO Solid Status: > 8 hours  NPO Liquid Status: > 8 hours           Airway   Mallampati: I  TM distance: >3 FB  No difficulty expected  Dental      Pulmonary    (+) ,sleep apnea on CPAP  (-) not a smoker  Cardiovascular   Exercise tolerance: poor (<4 METS)    (+) hypertension, hyperlipidemia  (-) CAD      Neuro/Psych  (+) CVA (5/2024)    ROS Comment: Normal pressure hydrocephalus  GI/Hepatic/Renal/Endo    (+) GERD, thyroid problem hypothyroidism  (-) liver disease, no renal disease, diabetes    Musculoskeletal         ROS comment: Fall from [place] [email or web address] [place], prolonged hospitalization  Pelvis fractures  Kidney injury  Pulmonary hematoma  Abdominal    Substance History      OB/GYN          Other   arthritis,                   Anesthesia Plan    ASA 3     general     intravenous induction     Anesthetic plan, risks, benefits, and alternatives have been provided, discussed and informed consent has been obtained with: patient.    CODE STATUS:

## 2024-12-13 NOTE — THERAPY EVALUATION
Patient Name: Agueda Tyler  : 1945    MRN: [id number]                              Today's Date: 2024       Admit Date: 2024    Visit Dx:     ICD-10-CM ICD-9-CM   1. Status post ventriculoperitoneal shunt  Z98.2 V45.2   2. Normal pressure hydrocephalus  G91.2 331.5     Patient Active Problem List   Diagnosis    Hx of colonic polyp    Gastroesophageal reflux disease without esophagitis    Iron deficiency anemia    BRBPR (bright red blood per rectum)    Acute ischemic stroke    Essential hypertension    Stroke-like symptom    Right upper lobe consolidation    SINDI (obstructive sleep apnea)    Restless legs    Impaired gait and mobility    Cerebral ventriculomegaly due to brain atrophy    Normal pressure hydrocephalus     Past Medical History:   Diagnosis Date    Anxiety     BPH (benign prostatic hyperplasia)     Colon polyp     CVA (cerebral vascular accident)     Disease of thyroid gland     Diverticulosis     Elevated cholesterol     Erectile dysfunction     Essential hypertension     GERD (gastroesophageal reflux disease)     Hypertriglyceridemia     Idiopathic peripheral neuropathy     Osteoarthritis     Sleep apnea     Urinary incontinence     Inknown     Past Surgical History:   Procedure Laterality Date    COLONOSCOPY  2016    diverticulosis, internal hemorrhoids,     COLONOSCOPY  2011    internal hemorrhoid, diverticulosis    COLONOSCOPY N/A 2021    Procedure: COLONOSCOPY WITH ANESTHESIA;  Surgeon: Ricky House MD;  Location:  [facility];  Service: Gastroenterology;  Laterality: N/A;  pre: hx polyps  post: diverticulosis. hemorrhoid.   Keenan Perez MD    CYSTOSCOPY      Before TURP    ENDOSCOPY N/A 2021    Procedure: ESOPHAGOGASTRODUODENOSCOPY WITH ANESTHESIA;  Surgeon: Ricky House MD;  Location:  [facility];  Service: Gastroenterology;  Laterality: N/A;  pre: GERD  post: hiatal hernia. gastric polyps.  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  -     Mode of Treatment occupational therapy  -     Patient Effort good  -KP     Symptoms Noted During/After Treatment dizziness  -       Row Name 12/13/24 1300          General Information    Patient Profile Reviewed yes  -KP     Prior Level of Function independent:;all household mobility;transfer;bed mobility;ADL's;min assist:;home management;yard work  -     Existing Precautions/Restrictions fall  -     Barriers to Rehab medically complex  -       Row Name 12/13/24 1300          Living Environment    People in Home spouse  -       Row Name 12/13/24 1300          Home Main Entrance    Number of Stairs, Main Entrance three  -KP     Stair Railings, Main Entrance railings on both sides of stairs  -       Row Name 12/13/24 1300          Stairs Within Home, Primary    Number of Stairs, Within Home, Primary none  -       Row Name 12/13/24 1300          Cognition    Orientation Status (Cognition) oriented x 4  -       Row Name 12/13/24 1300          Safety Issues/Impairments Affecting Functional Mobility    Impairments Affecting Function (Mobility) balance;strength  -               User Key  (r) = Recorded By, (t) = Taken By, (c) = Cosigned By      Initials Name Provider Type    Debra Zavala, OTR/L Occupational Therapist                     Mobility/ADL's       Row Name 12/13/24 1300          Bed Mobility    Bed Mobility supine-sit;sit-supine  -     Supine-Sit [place] (Bed Mobility) standby assist  -     Sit-Supine [place] (Bed Mobility) standby assist  -     Assistive Device (Bed Mobility) bed rails  -       Row Name  12/13/24 1300          Transfers    Transfers sit-stand transfer;stand-sit transfer  -[facility] Name 12/13/24 1300          Sit-Stand Transfer    Sit-Stand [place] (Transfers) verbal cues;contact guard  -     Assistive Device (Sit-Stand Transfers) walker, front-wheeled  -KP       Row Name 12/13/24 1300          Stand-Sit Transfer    Stand-Sit [place] (Transfers) verbal cues;contact guard  -     Assistive Device (Stand-Sit Transfers) walker, front-wheeled  -       Row Name 12/13/24 1300          Functional Mobility    Functional Mobility- Ind. Level contact guard assist;verbal cues required  -     Functional Mobility- Comment walked bed>door and back  -[facility] Name 12/13/24 1300          Activities of Daily Living    BADL Assessment/Intervention lower body dressing  -[facility] Name 12/13/24 1300          Lower Body Dressing Assessment/Training    [place] Level (Lower Body Dressing) don;doff;socks;set up;supervision  -     Position (Lower Body Dressing) edge of bed sitting  -               User Key  (r) = Recorded By, (t) = Taken By, (c) = Cosigned By      Initials Name Provider Type     Debra Banuelos, OTR/L Occupational Therapist                   Obj/Interventions       Row Name 12/13/24 1300          Sensory Assessment (Somatosensory)    Sensory Assessment (Somatosensory) UE sensation intact  -     Sensory Assessment some lingering tingling through RUE from stroke in may 2024  -[facility] Name 12/13/24 1300          Vision Assessment/Intervention    Visual Impairment/Limitations WNL  -[facility] Name 12/13/24 1300          Range of Motion Comprehensive    General Range of Motion bilateral upper extremity ROM WNL  -[facility] Name 12/13/24 1300          Strength Comprehensive (MMT)    General Manual Muscle Testing (MMT) Assessment no strength deficits identified  -[facility] Name 12/13/24 1300          Balance    Balance Assessment sitting static balance;sitting  dynamic balance;standing static balance;standing dynamic balance  -KP     Static Sitting Balance standby assist  -KP     Dynamic Sitting Balance standby assist  -KP     Position, Sitting Balance unsupported;sitting edge of bed  -KP     Static Standing Balance contact guard  -KP     Dynamic Standing Balance contact guard  -KP     Position/Device Used, Standing Balance supported;walker, front-wheeled  -KP               User Key  (r) = Recorded By, (t) = Taken By, (c) = Cosigned By      Initials Name Provider Type    Debra Zavala, OTR/L Occupational Therapist                   Goals/Plan       Row Name 12/13/24 1402          Transfer Goal 1 (OT)    Activity/Assistive Device (Transfer Goal 1, OT) toilet;shower chair  -KP     [place] Level/Cues Needed (Transfer Goal 1, OT) modified independence  -KP     Time Frame (Transfer Goal 1, OT) long term goal (LTG);10 days  -KP     Progress/Outcome (Transfer Goal 1, OT) new goal  -       Row Name 12/13/24 1402          Dressing Goal 1 (OT)    Activity/Device (Dressing Goal 1, OT) dressing skills, all  -KP     [place]/Cues Needed (Dressing Goal 1, OT) modified independence  -KP     Time Frame (Dressing Goal 1, OT) long term goal (LTG);10 days  -KP     Progress/Outcome (Dressing Goal 1, OT) new [facility]  -       Row Name 12/13/24 1402          Toileting Goal 1 (OT)    Activity/Device (Toileting Goal 1, OT) toileting skills, all  -KP     [place] Level/Cues Needed (Toileting Goal 1, OT) modified independence  -KP     Time Frame (Toileting Goal 1, OT) long term goal (LTG);10 days  -KP     Progress/Outcome (Toileting Goal 1, OT) new [facility]  -       Row Name 12/13/24 1402          Therapy Assessment/Plan (OT)    Planned Therapy Interventions (OT) activity tolerance training;adaptive equipment training;BADL retraining;functional balance retraining;occupation/activity based interventions;patient/caregiver education/training;strengthening exercise;transfer/mobility  retraining  -               User Key  (r) = Recorded By, (t) = Taken By, (c) = Cosigned By      Initials Name Provider Type    Debra Zavala OTR/L Occupational Therapist                   Clinical Impression       Row Name 12/13/24 1300          Pain Assessment    Pretreatment Pain Rating 0/10 - no pain  -     Posttreatment Pain Rating 0/10 - no pain  -       Row Name 12/13/24 1300          Plan of Care Review    Plan of Care Reviewed With patient;spouse;son  -     Outcome Evaluation OT eval completed. A&O x4, pt pleasant. S/p shunt placement for hydracephalus. Pt has hx of stroke in may 2024 that affected R side and he completed HH therapy course. Pt completes bed mobiltiy w/ SBA. Completed sit<>stand w/ CGA using FWW. Pt completes func mob bed<>door. Pt BUE ROM/strength WNL. CSome lasting tingling through RUE secondary to may 2024 stroke. Pt able to complete don/doff socks w/ mod I. Pt left in bed fowlers with call light, educated not to get up. OT to continue to see pt for safety with ADLs. D/c recommendation home with assist and HH.  -       Row Name 12/13/24 1300          Therapy Assessment/Plan (OT)    Rehab Potential (OT) good  -     Criteria for Skilled Therapeutic Interventions Met (OT) yes;meets criteria  -     Therapy Frequency (OT) 5 times/wk  -     Predicted Duration of Therapy Intervention (OT) 10 days  -       Row Name 12/13/24 1300          Therapy Plan Review/Discharge Plan (OT)    Anticipated Discharge Disposition (OT) home with assist;home with home health  -       Row Name 12/13/24 1300          Positioning and Restraints    Pre-Treatment Position in bed  -     Post Treatment Position bed  -KP     In Bed notified nsg;fowlers;call light within reach;encouraged to call for assist  -               User Key  (r) = Recorded By, (t) = Taken By, (c) = Cosigned By      Initials Name Provider Type    Debra Zavala OTR/L Occupational Therapist                   Outcome  Measures       Row Name 12/13/24 1403          How much help from another is currently needed...    Putting on and taking off regular lower body clothing? 3  -KP     Bathing (including washing, rinsing, and drying) 3  -KP     Toileting (which includes using toilet bed pan or urinal) 4  -KP     Putting on and taking off regular upper body clothing 4  -KP     Taking care of personal grooming (such as brushing teeth) 4  -KP     Eating meals 4  -KP     AM-PAC 6 Clicks Score (OT) 22  -KP       Row Name 12/13/24 1403          Functional Assessment    Outcome Measure Options AM-PAC 6 Clicks Daily Activity (OT)  -               User Key  (r) = Recorded By, (t) = Taken By, (c) = Cosigned By      Initials Name Provider Type    Debra Zavala OTR/L Occupational Therapist                    Occupational Therapy Education       Title: PT OT SLP Therapies (Done)       Topic: Occupational Therapy (Done)       Point: ADL training (Done)       Description:   Instruct learner(s) on proper safety adaptation and remediation techniques during self care or transfers.   Instruct in proper use of assistive devices.                  Learning Progress Summary            Patient Acceptance, E, VU by  at 12/13/2024 1404   Family Acceptance, E, VU by  at 12/13/2024 1404   Significant Other Acceptance, E, VU by  at 12/13/2024 1404                      Point: Precautions (Done)       Description:   Instruct learner(s) on prescribed precautions during self-care and functional transfers.                  Learning Progress Summary            Patient Acceptance, E, VU by  at 12/13/2024 1404   Family Acceptance, E, VU by  at 12/13/2024 1404   Significant Other Acceptance, E, VU by  at 12/13/2024 1404                      Point: Body mechanics (Done)       Description:   Instruct learner(s) on proper positioning and spine alignment during self-care, functional mobility activities and/or exercises.                  Learning Progress  Summary            Patient Acceptance, E, VU by  at 12/13/2024 1404   Family Acceptance, E, VU by  at 12/13/2024 1404   Significant Other Acceptance, E, VU by  at 12/13/2024 1404                                      User Key       Initials Effective Dates Name Provider Type [facility] 10/08/24 -  Debra Banuelos OTR/L Occupational Therapist OT                  OT Recommendation and Plan  Planned Therapy Interventions (OT): activity tolerance training, adaptive equipment training, BADL retraining, functional balance retraining, occupation/activity based interventions, patient/caregiver education/training, strengthening exercise, transfer/mobility retraining  Therapy Frequency (OT): 5 times/wk  Plan of Care Review  Plan of Care Reviewed With: patient, spouse, son  Outcome Evaluation: OT eval completed. A&O x4, pt pleasant. S/p shunt placement for hydracephalus. Pt has hx of stroke in may 2024 that affected R side and he completed HH therapy course. Pt completes bed mobiltiy w/ SBA. Completed sit<>stand w/ CGA using FWW. Pt completes func mob bed<>door. Pt BUE ROM/strength WNL. CSome lasting tingling through RUE secondary to may 2024 stroke. Pt able to complete don/doff socks w/ mod I. Pt left in bed fowlers with call light, educated not to get up. OT to continue to see pt for safety with ADLs. D/c recommendation home with assist and HH.     Time Calculation:         Time Calculation- OT       Row Name 12/13/24 1404             Time Calculation- OT    OT Start Time 1300  -      OT Stop Time 1354  -KP      OT Time Calculation (min) 54 min  -KP      OT Received On 12/13/24  -      OT Goal Re-Cert Due Date 12/23/24  -         Untimed Charges    OT Eval/Re-eval Minutes 54  -KP         Total Minutes    Untimed Charges Total Minutes 54  -KP       Total Minutes 54  -KP                User Key  (r) = Recorded By, (t) = Taken By, (c) = Cosigned By      Initials Name Provider Type    Debra Zavala, OTR/L  Occupational Therapist                  Therapy Charges for Today       Code Description Service Date Service Provider Modifiers Qty    91390050923 HC OT EVAL MOD COMPLEXITY 4 12/13/2024 Debra Banuelos OTR/L GO 1                 AGUS Howard/HOMERO  12/13/2024

## 2024-12-13 NOTE — PLAN OF CARE
Goal Outcome Evaluation:  Plan of Care Reviewed With: patient, spouse        Progress: improving  Outcome Evaluation: Pt A&Ox4. Due to void. Inc x3 to abd and inc to head CDI. Open to air. Spouse at BS. VSS. Call light in reach. Safety maintained.

## 2024-12-13 NOTE — OP NOTE
Placement of V-P shunt laparoscopic approach    Agueda Tyler  OR Date: 12/13/2024     Pre-op Diagnosis:   Normal pressure hydrocephalus [G91.2]     Post-op Diagnosis:     Post-Op Diagnosis Codes:     * Normal pressure hydrocephalus [G91.2]     Indications: Please refer to H&P       Surgeons: Celso Lombardo MD, Meño Granados MD     Anesthesia: General endotracheal anesthesia      Procedure Details     After the patient was explained the alternatives, benefits, complications, and risks of a laparoscopic possible open approach for placement of a ventricular peritoneal shunt informed consent was provided.  The patient was brought to the or suite after receiving preoperative antibiotics medications.  The patient was placed under general anesthesia.  The patient was then prepped and draped in standard fashion.  Orogastric tube was placed and set to suction.  We performed a surgical Timeout.   After the shunt was placed in the ventricle and tunneled to the right subcostal location I then proceeded to locally anesthetize a left subcostal incision site for placement of a Veress Needle.  I placed saline to confirm position and then connected the tubing to create pneumoperitoneum.  Once the pressure reached 15 mmHg I proceeded placement of my left upper quadrant 5 mm trocar with camera assist into the abdominal cavity.      A 5 mm incision in the right upper quadrant was made for placement of a 5 mm trocar under direct visualization.   I visualized the Veress needle and removed this under direct visualization noting that it was not involved in any intra-abdominal organ structures.    The catheter was assessed for patency and CSF was flowing through the catheters distal.  An incision was with an 11 blade into the abdominal cavity made at the distal location of the tunneling device.  Patient's shunt/catheter was then introduced into the peritoneal cavity under direct visualization and placed in the right upper quadrant above  the right lobe of the liver.    The pneumoperitoneum was then evacuated after removal of the trochars using a suction/irrigation device.  The two 5 mm incision sites were reapproximated utilizing 0 Monocryl suture in a sub-cutaneous fashion note the wounds were locally anesthetized with Marcaine/lidocaine solution.  All lap pads and instruments were accounted for prior to closing wound sites.      Estimated Blood Loss:  minimal  Implants:   Implant Name Type Inv. Item Serial No.  Lot No. LRB No. Used Action   CATH VNT SONNY ANTIBIOTIC IMPR [id number] - [id number] Implant CATH VNT SONNY ANTIBIOTIC IMPR [id number]  [employer] [id number] Right 1 Implanted   HEMOST ABS SURGIFOAM  8X12 10MM - [id number] Implant HEMOST ABS SURGIFOAM  8X12 10MM  [employer]  DIV [employer] J [employer] J [id number] Right 1 Implanted   KT HEMOST ABS SURGIFOAM PORCN 1GRAM - [id number] Implant KT HEMOST ABS SURGIFOAM PORCN 1GRAM  [employer]  DIV OF J AND J [id number] Right 1 Implanted   VLV CSF CERTAS/PLS INLINE PROGR W/ADAPT - [id number] Implant VLV CSF CERTAS/PLS INLINE PROGR W/ADAPT  [employer] [id number] Right 1 Implanted              Complications: None           Disposition: PACU - hemodynamically stable.           Condition: stable

## 2024-12-13 NOTE — OP NOTE
NEUROSURGERY OPERATIVE NOTE    Agueda Tyler  OR Date: 12/13/2024    Pre-op Diagnosis:   Normal pressure hydrocephalus [G91.2]    Post-op Diagnosis:     Post-Op Diagnosis Codes:     * Normal pressure hydrocephalus [G91.2]          Surgeon(s):  Meño Granados MD [place], William Lee, MD    Anesthesia: General    Staff:   Circulator: Govind Moore RN  Scrub Person: Lauryn Anderson; Isacc Leblanc  Vendor Representative: Dereje Anderson    Procedure(s):  VENTRICULAR PERITONEAL SHUNT INSERTION STERIOTACTIC, right, with Darwin    INDICATIONS: Agueda Tyler is a 79 y.o. male with significant medical comorbidities to include left thalamus CVA (May 2024), hypertension, hyperlipidemia, BPH, anxiety, GERD, and he is overweight.  He presents today for evaluation of ventriculomegaly with reports of intermittent forgetfulness and a progressive decline in his gait and balance.  Physical exam findings of bright and awake, oriented x 3, names objects, bilateral EHL weakness, lower extremity hyperreflexia, and a magnetic gait with en bloc turning.  His imaging shows ventriculomegaly with surrounding FLAIR signal changes concerning for NPH.  After lumbar puncture he had significant improvement in both speech therapy assessment as well as physical therapy assessment.    The risks and benefits of the procedure were discussed. The patient accepted and understood all potential risks and complications including 4% rate of infection, 50% rate of shunt failure at 2 years, intracranial hematoma, neck injury, vascular injury, damage to the brain, stroke, seizure, failure of shunt, need to return to the operating room.    PROCEDURE: He was brought to the operating room.  With the patient awake and the imaging permanently displayed a WHO timeout was performed.  Following this the patient underwent induction of general endotracheal airway, positioned supine with a bump under the  right shoulder, head turned towards contralateral side.      Next he was placed on a horseshoe.  The preoperatively obtained the imaging was then co-registered using EM navigation.    Accuracy of preparing was then confirmed with the external auditory meatus as well as eyes.  From this a 3-D projection of the optimal catheter placement was produced.  The right side of the scalp was prepped and draped in the usual sterile fashion using DuraPrep and allowed to dry for 5 minutes.     Using the cranial navigation the previously planned shunt insertion site was marked on the right scalp.  This was at approximately Kocher's point in the mid pupillary line.  A 2 cm curvilinear incision was made through the skin and Bovie was used to carry this down to the periosteal plane.  Small flap was elevated and then retracted with a suture.  Metzenbaum scissors were placed into the cranial incision to create a pocket for the valve.  A rainbow pituitary was then placed into the pocket and approximately 2 cm behind the external auditory canal a slight incision was made. Next a shunt passer was inserted from a cranial to caudal position.  The shunt passer was passed over the clavicle and into the abdominal incision and the subcutaneous fat.  The catheter was then passed from an inferior to superior aspect and the shunt passer was removed.  A tendon passer was then inserted into the cranial incision out of the passing incision and the proximal aspect of the distal catheter was brought into the cranial incision.  This was then connected to the Certas plus valve set to 5.    Next the intracranial catheter was placed over a shunt guide.  This was then navigated using 3-D stereotactic navigation into the ventricle.  Brisk flow CSF was obtained on the first pass. CSF was not sent for analysis.  The proximal catheter was then attached to the valve, the valve was settled into the pocket, and the distal catheter was pulled tight.  Brisk flow CSF and free drop of fluid was noted from the distal  catheter.  This was then placed into the abdomen.    Next attention was turned to the abdomen where Dr. Granados obtained direct visualization through port placement.  Next the peel-away shunt catheter was passed through the abdominal incision into the peritoneal space.  Details of this portion of the procedure will be dictated separately.    The right frontal incision was then closed with 3-0 Vicryl and 4-0 Monocryl.  The abdominal incision was closed with 0 Vicryl and 3-0 Vicryl on the skin.  This was then closed with demabond.  The passing incision was closed with one 3-0 Vicryl and staples.    All counts were noted to be correct.  There were no complications.  The patient will create his/her baseline.  Postoperative CT and shunt series was ordered.      Estimated Blood Loss: minimal    Complications: None    Implants:   Implant Name Type Inv. Item Serial No.  Lot No. LRB No. Used Action   CATH VNT SONNY ANTIBIOTIC IMPR [id number] - [id number] Implant CATH VNT SONNY ANTIBIOTIC IMPR [id number]  [employer] [id number] Right 1 Implanted   HEMOST ABS SURGIFOAM  8X12 10MM - [id number] Implant HEMOST ABS SURGIFOAM  8X12 10MM  [employer]  DIV OF J AND J [id number] Right 1 Implanted   KT HEMOST ABS SURGIFOAM PORCN 1GRAM - [id number] Implant KT HEMOST ABS SURGIFOAM PORCN 1GRAM  [employer]  DIV [employer] J AND J [id number] Right 1 Implanted   VLV CSF CERTAS/PLS INLINE PROGR W/ADAPT - [id number] Implant VLV CSF CERTAS/PLS INLINE PROGR W/ADAPT  [employer] [id number] Right 1 Implanted       Specimens:                None      Drains: * No LDAs found *       No

## 2024-12-14 VITALS
DIASTOLIC BLOOD PRESSURE: 83 MMHG | SYSTOLIC BLOOD PRESSURE: 120 MMHG | HEART RATE: 86 BPM | OXYGEN SATURATION: 96 % | WEIGHT: 184.97 LBS | HEIGHT: 70 IN | BODY MASS INDEX: 26.48 KG/M2 | TEMPERATURE: 97.4 F | RESPIRATION RATE: 18 BRPM

## 2024-12-14 LAB
ALBUMIN SERPL-MCNC: 3.5 G/DL (ref 3.5–5.2)
ALBUMIN/GLOB SERPL: 1.7 G/DL
ALP SERPL-CCNC: 176 U/L (ref 39–117)
ALT SERPL W P-5'-P-CCNC: 30 U/L (ref 1–41)
ANION GAP SERPL CALCULATED.3IONS-SCNC: 10 MMOL/L (ref 5–15)
AST SERPL-CCNC: 35 U/L (ref 1–40)
BASOPHILS # BLD AUTO: 0.03 10*3/MM3 (ref 0–0.2)
BASOPHILS NFR BLD AUTO: 0.2 % (ref 0–1.5)
BILIRUB SERPL-MCNC: 1.8 MG/DL (ref 0–1.2)
BUN SERPL-MCNC: 18 MG/DL (ref 8–23)
BUN/CREAT SERPL: 14 (ref 7–25)
CALCIUM SPEC-SCNC: 8.7 MG/DL (ref 8.6–10.5)
CHLORIDE SERPL-SCNC: 103 MMOL/L (ref 98–107)
CO2 SERPL-SCNC: 24 MMOL/L (ref 22–29)
CREAT SERPL-MCNC: 1.29 MG/DL (ref 0.76–1.27)
DEPRECATED RDW RBC AUTO: 43.8 FL (ref 37–54)
EGFRCR SERPLBLD CKD-EPI 2021: 56.1 ML/MIN/1.73
EOSINOPHIL # BLD AUTO: 0.07 10*3/MM3 (ref 0–0.4)
EOSINOPHIL NFR BLD AUTO: 0.5 % (ref 0.3–6.2)
ERYTHROCYTE [DISTWIDTH] IN BLOOD BY AUTOMATED COUNT: 14 % (ref 12.3–15.4)
GLOBULIN UR ELPH-MCNC: 2.1 GM/DL
GLUCOSE SERPL-MCNC: 96 MG/DL (ref 65–99)
HCT VFR BLD AUTO: 45.4 % (ref 37.5–51)
HGB BLD-MCNC: 14.9 G/DL (ref 13–17.7)
IMM GRANULOCYTES # BLD AUTO: 0.07 10*3/MM3 (ref 0–0.05)
IMM GRANULOCYTES NFR BLD AUTO: 0.5 % (ref 0–0.5)
LYMPHOCYTES # BLD AUTO: 1.12 10*3/MM3 (ref 0.7–3.1)
LYMPHOCYTES NFR BLD AUTO: 7.7 % (ref 19.6–45.3)
MCH RBC QN AUTO: 28.1 PG (ref 26.6–33)
MCHC RBC AUTO-ENTMCNC: 32.8 G/DL (ref 31.5–35.7)
MCV RBC AUTO: 85.5 FL (ref 79–97)
MONOCYTES # BLD AUTO: 0.87 10*3/MM3 (ref 0.1–0.9)
MONOCYTES NFR BLD AUTO: 6 % (ref 5–12)
NEUTROPHILS NFR BLD AUTO: 12.44 10*3/MM3 (ref 1.7–7)
NEUTROPHILS NFR BLD AUTO: 85.1 % (ref 42.7–76)
NRBC BLD AUTO-RTO: 0 /100 WBC (ref 0–0.2)
PLATELET # BLD AUTO: 156 10*3/MM3 (ref 140–450)
PMV BLD AUTO: 10.5 FL (ref 6–12)
POTASSIUM SERPL-SCNC: 4 MMOL/L (ref 3.5–5.2)
PROT SERPL-MCNC: 5.6 G/DL (ref 6–8.5)
RBC # BLD AUTO: 5.31 10*6/MM3 (ref 4.14–5.8)
SODIUM SERPL-SCNC: 137 MMOL/L (ref 136–145)
WBC NRBC COR # BLD AUTO: 14.6 10*3/MM3 (ref 3.4–10.8)

## 2024-12-14 PROCEDURE — 80053 COMPREHEN METABOLIC PANEL: CPT | Performed by: NURSE PRACTITIONER

## 2024-12-14 PROCEDURE — 85025 COMPLETE CBC W/AUTO DIFF WBC: CPT | Performed by: NURSE PRACTITIONER

## 2024-12-14 PROCEDURE — 25010000002 CEFAZOLIN PER 500 MG: Performed by: NURSE PRACTITIONER

## 2024-12-14 PROCEDURE — 99024 POSTOP FOLLOW-UP VISIT: CPT | Performed by: NEUROLOGICAL SURGERY

## 2024-12-14 PROCEDURE — 25010000002 HEPARIN (PORCINE) PER 1000 UNITS: Performed by: NURSE PRACTITIONER

## 2024-12-14 PROCEDURE — 97535 SELF CARE MNGMENT TRAINING: CPT

## 2024-12-14 RX ORDER — CHLORPROMAZINE HYDROCHLORIDE 25 MG/1
25 TABLET, FILM COATED ORAL 3 TIMES DAILY PRN
Qty: 3 TABLET | Refills: 0 | Status: SHIPPED | OUTPATIENT
Start: 2024-12-14

## 2024-12-14 RX ORDER — HYDROCODONE BITARTRATE AND ACETAMINOPHEN 5; 325 MG/1; MG/1
1 TABLET ORAL EVERY 6 HOURS PRN
Qty: 24 TABLET | Refills: 0 | Status: SHIPPED | OUTPATIENT
Start: 2024-12-14 | End: 2024-12-20

## 2024-12-14 RX ADMIN — POLYETHYLENE GLYCOL 3350 17 G: 17 POWDER, FOR SOLUTION ORAL at 08:46

## 2024-12-14 RX ADMIN — ASPIRIN 81 MG: 81 TABLET, COATED ORAL at 08:46

## 2024-12-14 RX ADMIN — CITALOPRAM HYDROBROMIDE 20 MG: 20 TABLET ORAL at 08:46

## 2024-12-14 RX ADMIN — ATORVASTATIN CALCIUM 40 MG: 40 TABLET, FILM COATED ORAL at 08:46

## 2024-12-14 RX ADMIN — LEVOTHYROXINE SODIUM 88 MCG: 88 TABLET ORAL at 08:46

## 2024-12-14 RX ADMIN — LISINOPRIL 20 MG: 20 TABLET ORAL at 08:47

## 2024-12-14 RX ADMIN — PANTOPRAZOLE SODIUM 40 MG: 40 TABLET, DELAYED RELEASE ORAL at 06:54

## 2024-12-14 RX ADMIN — CEFAZOLIN 2 G: 2 INJECTION, POWDER, FOR SOLUTION INTRAMUSCULAR; INTRAVENOUS at 06:54

## 2024-12-14 RX ADMIN — Medication 10 ML: at 08:47

## 2024-12-14 RX ADMIN — DOCUSATE SODIUM 50 MG AND SENNOSIDES 8.6 MG 2 TABLET: 8.6; 5 TABLET, FILM COATED ORAL at 08:46

## 2024-12-14 RX ADMIN — HEPARIN SODIUM 5000 UNITS: 5000 INJECTION, SOLUTION INTRAVENOUS; SUBCUTANEOUS at 08:47

## 2024-12-14 NOTE — THERAPY TREATMENT NOTE
Acute Care - Occupational Therapy Treatment Note  [facility]     Patient Name: Agueda Tyler  : 1945  MRN: [id number]  Today's Date: 2024             Admit Date: 2024       ICD-10-CM ICD-9-CM   1. Status post ventriculoperitoneal shunt  Z98.2 V45.2   2. Normal pressure hydrocephalus  G91.2 331.5   3. Impaired mobility [Z74.09]  Z74.09 799.89     Patient Active Problem List   Diagnosis    Hx of colonic polyp    Gastroesophageal reflux disease without esophagitis    Iron deficiency anemia    BRBPR (bright red blood per rectum)    Acute ischemic stroke    Essential hypertension    Stroke-like symptom    Right upper lobe consolidation    SINDI (obstructive sleep apnea)    Restless legs    Impaired gait and mobility    Cerebral ventriculomegaly due to brain atrophy    Normal pressure hydrocephalus     Past Medical History:   Diagnosis Date    Anxiety     BPH (benign prostatic hyperplasia)     Colon polyp     CVA (cerebral vascular accident)     Disease of thyroid gland     Diverticulosis     Elevated cholesterol     Erectile dysfunction     Essential hypertension     GERD (gastroesophageal reflux disease)     Hypertriglyceridemia     Idiopathic peripheral neuropathy     Osteoarthritis     Sleep apnea     Urinary incontinence     Inknown     Past Surgical History:   Procedure Laterality Date    COLONOSCOPY  2016    diverticulosis, internal hemorrhoids,     COLONOSCOPY  2011    internal hemorrhoid, diverticulosis    COLONOSCOPY N/A 2021    Procedure: COLONOSCOPY WITH ANESTHESIA;  Surgeon: Ricky House MD;  Location: [facility] ENDOSCOPY;  Service: Gastroenterology;  Laterality: N/A;  pre: hx polyps  post: diverticulosis. hemorrhoid.   Keenan Perez MD    CYSTOSCOPY      Before TURP    ENDOSCOPY N/A 2021    Procedure: ESOPHAGOGASTRODUODENOSCOPY WITH ANESTHESIA;  Surgeon: Ricky House MD;  Location: [facility] ENDOSCOPY;  Service: Gastroenterology;  Laterality: N/A;   pre: GERD  post: hiatal hernia. gastric polyps.  Keenan Perez MD    HERNIA REPAIR      inguinal    PROSTATE SURGERY  1994    TURP    TURP / TRANSURETHRAL INCISION / DRAINAGE PROSTATE           OT ASSESSMENT FLOWSHEET (Last 12 Hours)       OT Evaluation and Treatment       Row Name 12/14/24 0815                   OT Time and Intention    Subjective Information no complaints  -TS        Document Type therapy note (daily note)  -TS        Mode of Treatment occupational therapy  -TS        Patient Effort good  -TS           General Information    Existing Precautions/Restrictions fall  -TS           Pain Assessment    Pretreatment Pain Rating 0/10 - no pain  -TS        Posttreatment Pain Rating 0/10 - no pain  -TS           Cognition    Orientation Status (Cognition) oriented x 4  -TS           Activities of Daily Living    BADL Assessment/Intervention bathing;upper body dressing;lower body dressing;feeding  -TS           Bathing Assessment/Intervention    [place] Level (Bathing) upper body;lower body;set up;standby assist;supervision  -TS        Assistive Devices (Bathing) hand-held shower spray hose;tub bench;grab bar, tub/shower  -TS        Position (Bathing) unsupported sitting;supported standing  -TS           Upper Body Dressing Assessment/Training    Stephens Level (Upper Body Dressing) don;pull-over garment;front opening garment;set up  -TS        Position (Upper Body Dressing) unsupported sitting  -TS           Lower Body Dressing Assessment/Training    Stephens Level (Lower Body Dressing) don;pants/bottoms;socks;doff;set up;standby assist  -TS        Position (Lower Body Dressing) unsupported sitting;unsupported standing  -TS           Self-Feeding Assessment/Training    Stephens Level (Feeding) feeding skills;set up  -TS        Position (Feeding) unsupported sitting  -TS           Bed Mobility    Sit-Supine Stephens (Bed Mobility) independent  -TS           Functional Mobility     Functional Mobility- Ind. Level standby assist  -TS        Functional Mobility- Device walker, front-wheeled  -TS           Transfer Assessment/Treatment    Transfers sit-stand transfer;stand-sit transfer;shower transfer  -TS           Sit-Stand Transfer    Sit-Stand [place] (Transfers) standby assist  -TS        Assistive Device (Sit-Stand Transfers) walker, front-wheeled  -TS           Stand-Sit Transfer    Stand-Sit [place] (Transfers) standby assist  -TS        Assistive Device (Stand-Sit Transfers) walker, front-wheeled  -TS           Shower Transfer    Type (Shower Transfer) sit-stand;stand-sit  -TS        [place] Level (Shower Transfer) stand by assist;supervision  -TS        Assistive Device (Shower Transfer) grab bar, tub/shower;tub bench  -TS           Wound 12/13/24 0802 Right scalp    Wound - Properties Group Placement Date: 12/13/24  -KIZZY Placement Time: 0802  -KIZZY Side: Right  -KIZZY Location: scalp  -KIZZY Primary Wound Type: Incision  -KIZZY    Retired Wound - Properties Group Placement Date: 12/13/24  -KIZZY Placement Time: 0802  -KIZZY Side: Right  -KIZZY Location: scalp  -KIZZY Primary Wound Type: Incision  -KIZZY    Retired Wound - Properties Group Placement Date: 12/13/24  -KIZZY Placement Time: 0802  -KIZZY Side: Right  -KIZZY Location: scalp  -KIZZY Primary Wound Type: Incision  -KIZZY    Retired Wound - Properties Group Date first assessed: 12/13/24  -KIZZY Time first assessed: 0802  -KIZZY Side: Right  -KIZZY Location: scalp  -KIZZY Primary Wound Type: Incision  -KIZZY       Wound 12/13/24 0802 abdomen    Wound - Properties Group Placement Date: 12/13/24  -KIZZY Placement Time: 0802  -KIZZY Location: abdomen  -KIZZY Primary Wound Type: Incision  -KIZZY    Retired Wound - Properties Group Placement Date: 12/13/24  -KIZZY Placement Time: 0802  -KIZZY Location: abdomen  -KIZZY Primary Wound Type: Incision  -KIZZY    Retired Wound - Properties Group Placement Date: 12/13/24  -KIZZY Placement Time: 0802  -KIZZY Location: abdomen  -KIZZY Primary Wound Type: Incision   -KIZZY    Retired Wound - Properties Group Date first assessed: 12/13/24  -KIZZY Time first assessed: 0802 -KIZZY Location: abdomen  -KIZZY Primary Wound Type: Incision  -KIZZY       Plan of Care Review    Plan of Care Reviewed With patient  -TS        Progress improving  -TS           Positioning and Restraints    Pre-Treatment Position sitting in chair/recliner  -TS        Post Treatment Position bed  -TS        In Bed fowlers;call light within reach;encouraged to call for assist;with family/caregiver;side rails up x2  -TS           Therapy Plan Review/Discharge Plan (OT)    Anticipated Discharge Disposition (OT) home with assist  -TS           Transfer Goal 1 (OT)    Activity/Assistive Device (Transfer Goal 1, OT) toilet;shower chair  -TS        [place] Level/Cues Needed (Transfer Goal 1, OT) modified independence  -TS        Time Frame (Transfer Goal 1, OT) long term goal (LTG);10 days  -TS        Progress/Outcome (Transfer Goal 1, OT) goal not met  -TS           Dressing Goal 1 (OT)    Activity/Device (Dressing Goal 1, OT) dressing skills, all  -TS        [place]/Cues Needed (Dressing Goal 1, OT) modified independence  -TS        Time Frame (Dressing Goal 1, OT) long term goal (LTG);10 days  -TS        Progress/Outcome (Dressing Goal 1, OT) goal not met  -TS           Toileting Goal 1 (OT)    Activity/Device (Toileting Goal 1, OT) toileting skills, all  -TS        [place] Level/Cues Needed (Toileting Goal 1, OT) modified independence  -TS        Time Frame (Toileting Goal 1, OT) long term goal (LTG);10 days  -TS        Progress/Outcome (Toileting Goal 1, OT) goal not met  -TS                  User Key  (r) = Recorded By, (t) = Taken By, (c) = Cosigned By      Initials Name Effective Dates    TS Nubia Cabrales COTA 02/03/23 -     Govind Cordoba, RN 02/17/22 -                      Occupational Therapy Education       Title: PT OT SLP Therapies (Resolved)       Topic: Occupational Therapy (Resolved)        Point: ADL training (Resolved)       Description:   Instruct learner(s) on proper safety adaptation and remediation techniques during self care or transfers.   Instruct in proper use of assistive devices.                  Learning Progress Summary            Patient Acceptance, E,TB, VU by AN at 12/14/2024 1125    Acceptance, E, VU by  at 12/13/2024 1404   Family Acceptance, E, VU by  at 12/13/2024 1404   Significant Other Acceptance, E, VU by  at 12/13/2024 1404                      Point: Home exercise program (Resolved)       Description:   Instruct learner(s) on appropriate technique for monitoring, assisting and/or progressing therapeutic exercises/activities.                  Learner Progress:  Not documented in this visit.              Point: Precautions (Resolved)       Description:   Instruct learner(s) on prescribed precautions during self-care and functional transfers.                  Learning Progress Summary            Patient Acceptance, E,TB, VU by AN at 12/14/2024 1125    Acceptance, E, VU by  at 12/13/2024 1404   Family Acceptance, E, VU by  at 12/13/2024 1404   Significant Other Acceptance, E, VU by  at 12/13/2024 1404                      Point: Body mechanics (Resolved)       Description:   Instruct learner(s) on proper positioning and spine alignment during self-care, functional mobility activities and/or exercises.                  Learning Progress Summary            Patient Acceptance, E,TB, VU by  at 12/14/2024 1125    Acceptance, E, VU by  at 12/13/2024 1404   Family Acceptance, E, VU by  at 12/13/2024 1404   Significant Other Acceptance, E, VU by  at 12/13/2024 1404                                      User Key       Initials Effective Dates Name Provider Type Discipline     03/15/24 -  Afsaneh Ahn, RN Registered Nurse Nurse     10/08/24 -  Debra Banuelos OTR/L Occupational Therapist OT                      OT Recommendation and Plan     Plan of Care  Review  Plan of Care Reviewed With: patient  Progress: improving  Plan of Care Reviewed With: patient     Outcome Measures       Row Name 12/14/24 1300             How much help from another is currently needed...    Putting on and taking off regular lower body clothing? 3  -TS      Bathing (including washing, rinsing, and drying) 3  -TS      Toileting (which includes using toilet bed pan or urinal) 4  -TS      Putting on and taking off regular upper body clothing 4  -TS      Taking care of personal grooming (such as brushing teeth) 4  -TS      Eating meals 4  -TS      AM-PAC 6 Clicks Score (OT) 22  -TS                User Key  (r) = Recorded By, (t) = Taken By, (c) = Cosigned By      Initials Name Provider Type    Nubia Schuler COTA Occupational Therapist Assistant                    Time Calculation:    Time Calculation- OT       Row Name 12/14/24 1323             Time Calculation- OT    OT Start Time 0810  -TS      OT Stop Time 0945  -TS      OT Time Calculation (min) 95 min  -TS      Total Timed Code Minutes- OT 95 minute(s)  -TS      OT Received On 12/14/24  -TS         Timed Charges    08931 - OT Self Care/Mgmt Minutes 95  -TS         Total Minutes    Timed Charges Total Minutes 95  -TS       Total Minutes 95  -TS                User Key  (r) = Recorded By, (t) = Taken By, (c) = Cosigned By      Initials Name Provider Type    Nubia Schuler COTA Occupational Therapist Assistant                  Therapy Charges for Today       Code Description Service Date Service Provider Modifiers Qty    [id number] HC OT SELF CARE/MGMT/TRAIN EA 15 MIN 12/14/2024 Nubia Cabrales COTA GO 6                 IAN Null  12/14/2024

## 2024-12-14 NOTE — PLAN OF CARE
Goal Outcome Evaluation:      Patient A/O x4 this shift. Patient had no complaints of pain except very slight pain at incision, but declined Norco as ordered. Patient rec'v IV ABX this shift. Will watch a.m. labs for lytes replacement as necessary. Spouse stayed overnight at bedside. No unaddressed needs in the room overnight. Will update oncoming dayshift nurse at bedside shift report in the a.m. as appropriate. IPASS updated.    No tele.

## 2024-12-14 NOTE — PROGRESS NOTES
NEUROSURGERY DAILY PROGRESS NOTE    ASSESSMENT:   Agueda Tyler is a 79 y.o. male with significant medical comorbidities to include left thalamus CVA (May 2024), hypertension, hyperlipidemia, BPH, anxiety, GERD, and he is overweight.  He presents today for evaluation of ventriculomegaly with reports of intermittent forgetfulness and a progressive decline in his gait and balance.  Physical exam findings of bright and awake, oriented x 3, names objects, bilateral EHL weakness, lower extremity hyperreflexia, and a magnetic gait with en bloc turning.  His imaging shows ventriculomegaly with surrounding FLAIR signal changes concerning for NPH.     Past Medical History:   Diagnosis Date    Anxiety     BPH (benign prostatic hyperplasia)     Colon polyp     CVA (cerebral vascular accident)     Disease of thyroid gland     Diverticulosis     Elevated cholesterol     Erectile dysfunction     Essential hypertension     GERD (gastroesophageal reflux disease)     Hypertriglyceridemia     Idiopathic peripheral neuropathy     Osteoarthritis     Sleep apnea     Urinary incontinence     Inknown     Active Hospital Problems    Diagnosis     **Normal pressure hydrocephalus      PLAN:   Neuro: Stable.  Exam at baseline   1 Day Post-Op (12/13/2024) right ventriculoperitoneal shunt placement for NPH   Current performance setting at 5    PO CT reviewed, stable   Neurochecks every 4 hours.  Call for decline     CV: No acute issues.   Pulm: Maintaining O2 sat.  Continuous pulse oximetry.  Incentive spirometry.   : Remove Whalen ASAP, bladder scans and I/O cath per policy.   FEN: Regular diet.  Advance as tolerated.   GI: No acute issues.    ID: 23-hour postoperative prophylactic antibiotics.    Heme:  DVT prophylaxis; SCD's  Pain: Tolerable at present with oral meds.     Dispo: PT/OT.      Home today.     CHIEF COMPLAINT:   Intermittent forgetfulness and a progressive decline in gait and balance    Subjective  Symptoms stable.  Doing  "well    Temp:  [97.4 °F (36.3 °C)-98 °F (36.7 °C)] 97.4 °F (36.3 °C)  Heart Rate:  [70-96] 86  Resp:  [12-18] 18  BP: (116-152)/(71-98) 120/83    Objective:  Vital signs: (most recent): Blood pressure 120/83, pulse 86, temperature 97.4 °F (36.3 °C), temperature source Oral, resp. rate 18, height 179 cm (70.47\"), weight 83.9 kg (184 lb 15.5 oz), SpO2 96%.        Neurological Exam  Mental Status  Awake, alert and oriented to person, place and time. Speech is normal. Language is fluent with no aphasia. Attention and concentration are normal.    Cranial Nerves  CN II: Visual acuity is normal.  CN III, IV, VI: Extraocular movements intact bilaterally. Normal lids and orbits bilaterally. Pupils equal round and reactive to light bilaterally.  CN V: Facial sensation is normal.  CN VII: Full and symmetric facial movement.  CN IX, X: Palate elevates symmetrically  CN XI: Shoulder shrug strength is normal.    Motor  Normal muscle bulk throughout. Normal muscle tone.                                               Right                     Left  Toe extension                        5                          5                                             Right                     Left  Deltoid                                   5                          5   Biceps                                   5                          5   Triceps                                  5                          5   Wrist extensor                       5                          5   Iliopsoas                               5                          5   Quadriceps                           5                          5   Gastrocnemius                     5                           5   Anterior tibialis                      5                          5    Sensory  Light touch is normal in upper and lower extremities.     Coordination    Finger-to-nose, rapid alternating movements and heel-to-shin normal bilaterally without dysmetria.    Gait    Did " not assess.  Defer to PT for now.    Drains: * No LDAs found *    Output by Drain (mL) 12/13/24 0701 - 12/13/24 1900 12/13/24 1901 - 12/14/24 0700 12/14/24 0701 - 12/14/24 1057 Range Total   Patient has no LDAs of requested type attached.     Imaging Results (Last 24 Hours)       ** No results found for the last 24 hours. **          Lab Results (last 24 hours)       Procedure Component Value Units Date/Time    Comprehensive Metabolic Panel [[id number]]  (Abnormal) Collected: 12/14/24 0700    Specimen: Blood Updated: 12/14/24 0757     Glucose 96 mg/dL      BUN 18 mg/dL      Creatinine 1.29 mg/dL      Sodium 137 mmol/L      Potassium 4.0 mmol/L      Chloride 103 mmol/L      CO2 24.0 mmol/L      Calcium 8.7 mg/dL      Total Protein 5.6 g/dL      Albumin 3.5 g/dL      ALT (SGPT) 30 U/L      AST (SGOT) 35 U/L      Alkaline Phosphatase 176 U/L      Total Bilirubin 1.8 mg/dL      Globulin 2.1 gm/dL      A/G Ratio 1.7 g/dL      BUN/Creatinine Ratio 14.0     Anion Gap 10.0 mmol/L      eGFR 56.1 mL/min/1.73     Narrative:      GFR Categories in Chronic Kidney Disease (CKD)      GFR Category          GFR (mL/min/1.73)    Interpretation  G1                     90 or greater         Normal or high (1)  G2                      60-89                Mild decrease (1)  G3a                   45-59                Mild to moderate decrease  G3b                   30-44                Moderate to severe decrease  G4                    15-29                Severe decrease  G5                    14 or less           Kidney failure          (1)In the absence of evidence of kidney disease, neither GFR category G1 or G2 fulfill the criteria for CKD.    eGFR calculation 2021 CKD-EPI creatinine equation, which does not include race as a factor    CBC Auto Differential [[id number]]  (Abnormal) Collected: 12/14/24 0700    Specimen: Blood Updated: 12/14/24 0738     WBC 14.60 10*3/mm3      RBC 5.31 10*6/mm3      Hemoglobin 14.9 g/dL      Hematocrit  45.4 %      MCV 85.5 fL      MCH 28.1 pg      MCHC 32.8 g/dL      RDW 14.0 %      RDW-SD 43.8 fl      MPV 10.5 fL      Platelets 156 10*3/mm3      Neutrophil % 85.1 %      Lymphocyte % 7.7 %      Monocyte % 6.0 %      Eosinophil % 0.5 %      Basophil % 0.2 %      Immature Grans % 0.5 %      Neutrophils, Absolute 12.44 10*3/mm3      Lymphocytes, Absolute 1.12 10*3/mm3      Monocytes, Absolute 0.87 10*3/mm3      Eosinophils, Absolute 0.07 10*3/mm3      Basophils, Absolute 0.03 10*3/mm3      Immature Grans, Absolute 0.07 10*3/mm3      nRBC 0.0 /100 WBC           [id number]    Harley Lombardo MD

## 2024-12-14 NOTE — PLAN OF CARE
Goal Outcome Evaluation:  Plan of Care Reviewed With: patient        Progress: improving     Pt a/o x4. Room air. No c/o pain. Up x1 assist. Working with PT/OT. Voiding. BM 12/12/2024. SCD and lovenox for VTE prevention. Pt is to d/c home today with spouse. PPP. VSS. LEMUS. Call light within reach. Safety maintained

## 2024-12-14 NOTE — DISCHARGE SUMMARY
Date of Discharge:  12/14/2024    Discharge Diagnosis:   1. Status post ventriculoperitoneal shunt    2. Normal pressure hydrocephalus    3. Impaired mobility [Z74.09]        Presenting Problem/History of Present Illness  Normal pressure hydrocephalus [G91.2]  NPH (normal pressure hydrocephalus) [G91.2]   1. Status post ventriculoperitoneal shunt    2. Normal pressure hydrocephalus    3. Impaired mobility [Z74.09]        Hospital Course  Agueda Tyler is a 79 y.o. male with significant medical comorbidities to include left thalamus CVA (May 2024), hypertension, hyperlipidemia, BPH, anxiety, GERD, and he is overweight.  He presents today for evaluation of ventriculomegaly with reports of intermittent forgetfulness and a progressive decline in his gait and balance.  Physical exam findings of bright and awake, oriented x 3, names objects, bilateral EHL weakness, lower extremity hyperreflexia, and a magnetic gait with en bloc turning.  His imaging shows ventriculomegaly with surrounding FLAIR signal changes concerning for NPH.     Patient was initially evaluated in clinic and after discussing the risk benefits and possible complications of surgery Agueda was brought to the main operating room for placement of right ventriculoperitoneal shunt with the assistance of Dr. Granados on 12/13/2024.  Postoperatively he did well.  His neuro exam remained stable.  His pain was controlled on oral medication, they were evaluated by Physical Therapy and Occupational Therapy and deemed appropriate for  discharge home.  At discharge the patient was able to ambulate independently in the [place].  Postoperative education was performed at bedside by myself which included wound care instructions, maximal physical activity, use of postoperative pain medication including tapering, and indications for contacting my office or the emergency room.  Arrangements for postoperative follow-up in my clinic with a wound check in 2 weeks with my nurse  practitioner and at 6 weeks by myself.      Procedures Performed  Procedure(s):  VENTRICULAR PERITONEAL SHUNT INSERTION [place], right, with [place]       Consults:   Consults       No orders found for last 30 day(s).            Pertinent Test Results:   CT Head Without Contrast    Result Date: 12/13/2024  EXAM: CT HEAD WO CONTRAST-  DATE: 12/13/2024 8:36 AM  HISTORY: Evaluation status post  shunt placement for normal pressure hydrocephalus; Z98.2-Presence of cerebrospinal fluid drainage device; G91.2-(Idiopathic) normal pressure hydrocephalus   COMPARISON: 5/14/2024.  DOSE LENGTH PRODUCT: 866.98 mGy.cm  Automated exposure control was also utilized to decrease patient radiation dose.  TECHNIQUE: Unenhanced CT images obtained from vertex to skull base with multiplanar reformats.  FINDINGS: Patient is status post right frontal approach  shunt catheter placement. The tip is in the right lateral ventricle/third ventricle at the right of midline. Ventricular size is stable. Small amount of air in the anterior horn of the right lateral ventricle is postprocedural related. There is also mild pneumocephalus. No acute hemorrhage identified. There are chronic changes with probable ischemic change of the periventricular white matter and chronic lacunar infarct in the left thalamus. There is no mass effect or midline shift. There is no evidence for acute infarct.  There is mucosal thickening of the ethmoid air cells and right maxillary sinus. Mastoid air cells are clear.  Postprocedural changes are noted with gas in the right scalp soft tissues.  The visualized orbits and globes are unremarkable. There is bilateral lens extraction.      Impression:  1. Status post interval right frontal approach  shunt catheter placement tip to the right of midline with expected postprocedural changes and stable ventricular size. 2. Chronic ischemic changes as above. 3. Paranasal sinus mucosal thickening.  This report was signed and  finalized on 12/13/2024 9:51 AM by Brent Smith.                Lab Results (last 24 hours)       Procedure Component Value Units Date/Time    Comprehensive Metabolic Panel [[id number]]  (Abnormal) Collected: 12/14/24 0700    Specimen: Blood Updated: 12/14/24 0757     Glucose 96 mg/dL      BUN 18 mg/dL      Creatinine 1.29 mg/dL      Sodium 137 mmol/L      Potassium 4.0 mmol/L      Chloride 103 mmol/L      CO2 24.0 mmol/L      Calcium 8.7 mg/dL      Total Protein 5.6 g/dL      Albumin 3.5 g/dL      ALT (SGPT) 30 U/L      AST (SGOT) 35 U/L      Alkaline Phosphatase 176 U/L      Total Bilirubin 1.8 mg/dL      Globulin 2.1 gm/dL      A/G Ratio 1.7 g/dL      BUN/Creatinine Ratio 14.0     Anion Gap 10.0 mmol/L      eGFR 56.1 mL/min/1.73     Narrative:      GFR Categories in Chronic Kidney Disease (CKD)      GFR Category          GFR (mL/min/1.73)    Interpretation  G1                     90 or greater         Normal or high (1)  G2                      60-89                Mild decrease (1)  G3a                   45-59                Mild to moderate decrease  G3b                   30-44                Moderate to severe decrease  G4                    15-29                Severe decrease  G5                    14 or less           Kidney failure          (1)In the absence of evidence of kidney disease, neither GFR category G1 or G2 fulfill the criteria for CKD.    eGFR calculation 2021 CKD-EPI creatinine equation, which does not include race as a factor    CBC Auto Differential [[id number]]  (Abnormal) Collected: 12/14/24 0700    Specimen: Blood Updated: 12/14/24 0738     WBC 14.60 10*3/mm3      RBC 5.31 10*6/mm3      Hemoglobin 14.9 g/dL      Hematocrit 45.4 %      MCV 85.5 fL      MCH 28.1 pg      MCHC 32.8 g/dL      RDW 14.0 %      RDW-SD 43.8 fl      MPV 10.5 fL      Platelets 156 10*3/mm3      Neutrophil % 85.1 %      Lymphocyte % 7.7 %      Monocyte % 6.0 %      Eosinophil % 0.5 %      Basophil % 0.2 %       Immature Grans % 0.5 %      Neutrophils, Absolute 12.44 10*3/mm3      Lymphocytes, Absolute 1.12 10*3/mm3      Monocytes, Absolute 0.87 10*3/mm3      Eosinophils, Absolute 0.07 10*3/mm3      Basophils, Absolute 0.03 10*3/mm3      Immature Grans, Absolute 0.07 10*3/mm3      nRBC 0.0 /100 WBC             Condition on Discharge: Stable    Vital Signs  Temp:  [97.4 °F (36.3 °C)-98 °F (36.7 °C)] 97.4 °F (36.3 °C)  Heart Rate:  [70-96] 86  Resp:  [14-18] 18  BP: (116-152)/(71-98) 120/83    Physical Exam:   Physical Exam  Constitutional:       General: He is awake.   Eyes:      Extraocular Movements: Extraocular movements intact.      Pupils: Pupils are equal, round, and reactive to light.   Neurological:      Motor: Motor strength is normal.  Psychiatric:         Speech: Speech normal.          Neurological Exam  Mental Status  Awake. Oriented to person, place and time. Speech is normal. Language is fluent with no aphasia. Attention and concentration are normal.  Mild confusion.    Cranial Nerves  CN II: Visual acuity is normal.  CN III, IV, VI: Extraocular movements intact bilaterally. Pupils equal round and reactive to light bilaterally.  CN VII: Full and symmetric facial movement.    Motor  Normal muscle bulk throughout. Normal muscle tone. Strength is 5/5 throughout all four extremities.    Sensory  Light touch is normal in upper and lower extremities.     Gait  Casual gait is normal including stance, stride, and arm swing.      Discharge Disposition  Home or Self Care    Discharge Medications     Discharge Medications        New Medications        Instructions Start Date   chlorproMAZINE 25 MG tablet  Commonly known as: THORAZINE   25 mg, Oral, 3 Times Daily PRN      HYDROcodone-acetaminophen 5-325 MG per tablet  Commonly known as: Norco   1 tablet, Oral, Every 6 Hours PRN      naloxone 4 MG/0.1ML nasal spray  Commonly known as: NARCAN   Call 911. Don't prime. [place] in 1 nostril for overdose. Repeat in 2-3 minutes in  other nostril if no or minimal breathing/responsiveness.             Changes to Medications        Instructions Start Date   diazePAM 5 MG tablet  Commonly known as: VALIUM  What changed: when to take this   5 mg, Oral, Nightly PRN             Continue These Medications        Instructions Start Date   alfuzosin 10 MG 24 hr tablet  Commonly known as: UROXATRAL   10 mg, Oral, Daily      aspirin 81 MG EC tablet   81 mg, Oral, Daily      atorvastatin 40 MG tablet  Commonly known as: Lipitor   40 mg, Oral, Daily      citalopram 20 MG tablet  Commonly known as: CeleXA   20 mg, Oral, Daily      levothyroxine 88 MCG tablet  Commonly known as: SYNTHROID, LEVOTHROID   88 mcg, Oral, Daily      lisinopril 20 MG tablet  Commonly known as: PRINIVIL,ZESTRIL   20 mg, Oral, Daily      MILK OF MAGNESIA PO   60 mL, Oral, Nightly      omeprazole 20 MG capsule  Commonly known as: priLOSEC   1 capsule, Oral, Daily      REQUIP PO   2 mg, Oral, Nightly      traZODone 50 MG tablet  Commonly known as: DESYREL   50 mg, Oral, Nightly             Stop These Medications      acetaminophen 325 MG tablet  Commonly known as: TYLENOL              Discharge Diet:  As tolerated    Activity at Discharge:  No bending lifting and twisting    Follow-up Appointments  Future Appointments   Date Time Provider Department Center   12/23/2024  9:00 AM Marin Lopes APRN [facility]   12/23/2024 10:30 AM Meño Granados MD [facility] None   1/22/2025  8:00 AM Harley Lombardo MD [facility]   3/4/2025  1:00 PM Scottie Clayton APRN [facility]     Additional Instructions for the Follow-ups that You Need to Schedule       CT Head Without Contrast   Jan 20, 2025      STEREOTACTIC GUIDANCE PROTOCOL?: No   Will fiducial markers be needed for this procedure?: No   Exam reason: Continue evaluation status post  shunt placement for NPH   Release to patient: Routine Release                Test Results Pending at Discharge       Harley Houston  MD [facility]  12/14/24  11:03 CST    Time: Discharge 15 min

## 2024-12-14 NOTE — THERAPY DISCHARGE NOTE
Acute Care - Occupational Therapy Discharge Summary  [facility]     Patient Name: Agueda Tyler  : 1945  MRN: [id number]    Today's Date: 2024                 Admit Date: 2024        OT Recommendation and Plan    Visit Dx:    ICD-10-CM ICD-9-CM   1. Status post ventriculoperitoneal shunt  Z98.2 V45.2   2. Normal pressure hydrocephalus  G91.2 331.5   3. Impaired mobility [Z74.09]  Z74.09 799.89          Time Calculation- OT       Row Name 24 1323             Time Calculation- OT    OT Start Time 0810  -TS      OT Stop Time 0945  -TS      OT Time Calculation (min) 95 min  -TS      Total Timed Code Minutes- OT 95 minute(s)  -TS      OT Received On 24  -TS         Timed Charges    86562 - OT Self Care/Mgmt Minutes 95  -TS         Total Minutes    Timed Charges Total Minutes 95  -TS       Total Minutes 95  -TS                User Key  (r) = Recorded By, (t) = Taken By, (c) = Cosigned By      Initials Name Provider Type    TS Nubia Cabrales COTA Occupational Therapist Assistant                       OT Rehab Goals       Row Name 24 0815             Transfer Goal 1 (OT)    Activity/Assistive Device (Transfer Goal 1, OT) toilet;shower chair  -TS      [place] Level/Cues Needed (Transfer Goal 1, OT) modified independence  -TS      Time Frame (Transfer Goal 1, OT) long term goal (LTG);10 days  -TS      Progress/Outcome (Transfer Goal 1, OT) goal not met  -TS         Dressing Goal 1 (OT)    Activity/Device (Dressing Goal 1, OT) dressing skills, all  -TS      [place]/Cues Needed (Dressing Goal 1, OT) modified independence  -TS      Time Frame (Dressing Goal 1, OT) long term goal (LTG);10 days  -TS      Progress/Outcome (Dressing Goal 1, OT) goal not met  -TS         Toileting Goal 1 (OT)    Activity/Device (Toileting Goal 1, OT) toileting skills, all  -TS      [place] Level/Cues Needed (Toileting Goal 1, OT) modified independence  -TS      Time Frame (Toileting Goal 1,  OT) long term goal (LTG);10 days  -TS      Progress/Outcome (Toileting Goal 1, OT) goal not met  -TS                User Key  (r) = Recorded By, (t) = Taken By, (c) = Cosigned By      Initials Name Provider Type Discipline    Nubia Schuler COTA Occupational Therapist Assistant OT                     Outcome Measures       Row Name 12/14/24 1300             How much help from another is currently needed...    Putting on and taking off regular lower body clothing? 3  -TS      Bathing (including washing, rinsing, and drying) 3  -TS      Toileting (which includes using toilet bed pan or urinal) 4  -TS      Putting on and taking off regular upper body clothing 4  -TS      Taking care of personal grooming (such as brushing teeth) 4  -TS      Eating meals 4  -TS      AM-PAC 6 Clicks Score (OT) 22  -TS                User Key  (r) = Recorded By, (t) = Taken By, (c) = Cosigned By      Initials Name Provider Type    Nubia Schuler COTA Occupational Therapist Assistant                    Timed Therapy Charges  Total Units: 6      Suggested Charges  Total Units: 6      Procedure Name Documented Minutes Units Code    HC OT SELF CARE/MGMT/TRAIN EA 15 MIN 95 6   90071 (CPT®)                 Documented Minutes  Total Minutes: 95      Therapy Provided Minutes    08250 - OT Self Care/Mgmt Minutes 95                    Therapy Charges for Today       Code Description Service Date Service Provider Modifiers Qty    74149405701 HC OT SELF CARE/MGMT/TRAIN EA 15 MIN 12/14/2024 Nubia Cabrales COTA GO 6            OT Discharge Summary  Anticipated Discharge Disposition (OT): home with assist  Reason for Discharge: Discharge from facility  Outcomes Achieved: Refer to plan of care for updates on goals achieved  Discharge Destination: Home with assist      IAN Null  12/14/2024

## 2024-12-15 NOTE — THERAPY DISCHARGE NOTE
Acute Care - Physical Therapy Discharge Summary  [facility]       Patient Name: Agueda Tyler  : 1945  MRN: [id number]    Today's Date: 12/15/2024                 Admit Date: 2024      PT Recommendation and Plan    Visit Dx:    ICD-10-CM ICD-9-CM   1. Status post ventriculoperitoneal shunt  Z98.2 V45.2   2. Normal pressure hydrocephalus  G91.2 331.5   3. Impaired mobility [Z74.09]  Z74.09 799.89        Outcome Measures       Row Name 24 1300             How much help from another is currently needed...    Putting on and taking off regular lower body clothing? 3  -TS      Bathing (including washing, rinsing, and drying) 3  -TS      Toileting (which includes using toilet bed pan or urinal) 4  -TS      Putting on and taking off regular upper body clothing 4  -TS      Taking care of personal grooming (such as brushing teeth) 4  -TS      Eating meals 4  -TS      AM-PAC 6 Clicks Score (OT) 22  -TS                User Key  (r) = Recorded By, (t) = Taken By, (c) = Cosigned By      Initials Name Provider Type    TS Nubia Cabrales COTA Occupational Therapist Assistant                         PT Rehab Goals       Row Name 12/15/24 0811             Bed Mobility Goal 1 (PT)    Activity/Assistive Device (Bed Mobility Goal 1, PT) rolling to left;rolling to right;sit to supine;supine to sit  -KJ      [place] Level/Cues Needed (Bed Mobility Goal 1, PT) independent  -KJ      Time Frame (Bed Mobility Goal 1, PT) long term goal (LTG);10 days  -KJ      Strategies/Barriers (Bed Mobility Goal 1, PT) flat bed  -KJ      Progress/Outcomes (Bed Mobility Goal 1, PT) goal not met  -KJ         Transfer Goal 1 (PT)    Activity/Assistive Device (Transfer Goal 1, PT) sit-to-stand/stand-to-sit;bed-to-chair/chair-to-bed  -KJ      [place] Level/Cues Needed (Transfer Goal 1, PT) independent  -KJ      Time Frame (Transfer Goal 1, PT) long term goal (LTG);10 days  -KJ      Progress/Outcome (Transfer Goal 1, PT) goal not  met  -KJ         Gait Training Goal 1 (PT)    Activity/Assistive Device (Gait Training Goal 1, PT) gait (walking locomotion);decrease asymmetrical patterns;decrease fall risk;diminish gait deviation;forward stepping;improve balance and speed;increase endurance/gait distance;increase energy conservation;assistive device use;walker, rolling  -KJ      [place] Level (Gait Training Goal 1, PT) modified independence  -KJ      Distance (Gait Training Goal 1, PT) 200' with no LOB and FWW as needed  -KJ      Time Frame (Gait Training Goal 1, PT) long term goal (LTG);10 days  -KJ      Progress/Outcome (Gait Training Goal 1, PT) goal not met  -KJ         Stairs Goal 1 (PT)    Activity/Assistive Device (Stairs Goal 1, PT) stairs, all skills;ascending stairs;descending stairs;step-to-step;decrease fall risk;improve balance and speed  -KJ      [place] Level/Cues Needed (Stairs Goal 1, PT) independent  -KJ      Number of Stairs (Stairs Goal 1, PT) 3 for home safety  -KJ      Time Frame (Stairs Goal 1, PT) long term goal (LTG);10 days  -KJ      Progress/Outcome (Stairs Goal 1, PT) goal not met  -KJ                User Key  (r) = Recorded By, (t) = Taken By, (c) = Cosigned By      Initials Name Provider Type Discipline    Yoli Nielsen PTA Physical Therapist Assistant PT                        PT Discharge Summary  Anticipated Discharge Disposition (PT): home  Reason for Discharge: Discharge from facility  Outcomes Achieved: Refer to plan of care for updates on goals achieved  Discharge Destination: Home      Yoli Barnhart PTA   12/15/2024

## 2024-12-16 ENCOUNTER — CARE COORDINATION (OUTPATIENT)
Dept: CASE MANAGEMENT | Age: 79
End: 2024-12-16

## 2024-12-16 NOTE — CARE COORDINATION
Care Transitions Note    Initial Call - Call within 2 business days of discharge: Yes    Attempted to reach patient for transitions of care follow up. Unable to reach patient.    Outreach Attempts:   HIPAA compliant voicemail left for patient.     Patient: Echo Brown    Patient : 1945   MRN: [id number]    Reason for Admission: S/P  Shunt Placement  Discharge Date: 24  RURS: Readmission Risk Score: 13.2    Was this an external facility discharge? Yes. Discharge Date: 2024. Facility Name: [facility]    Follow Up Appointment:   Patient has hospital follow up appointment scheduled within 7 days of discharge.    Future Appointments         Provider Specialty Dept Phone    2024 9:30 AM Madeleine Easton, APRN - CNP Primary Care [phone]    3/3/2025 9:20 AM Tanner Daily MD Primary Care [phone]          Plan for follow-up on next business day.      Yessenia Loomis RN

## 2024-12-17 ENCOUNTER — CARE COORDINATION (OUTPATIENT)
Dept: CASE MANAGEMENT | Age: 79
End: 2024-12-17

## 2024-12-17 NOTE — CARE COORDINATION
Care Transitions Note    Initial Call - Call within 2 business days of discharge: Yes    Attempted to reach patient for transitions of care follow up. Unable to reach patient.    Outreach Attempts:   Multiple attempts to contact patient at phone numbers on file.   HIPAA compliant voicemail left for patient.     Patient: Echo Borwn    Patient : 1945   MRN: [id number]    Reason for Admission: S/P  shunt placement  Discharge Date: 24  RURS: Readmission Risk Score: 13.2    Last Discharge Facility       Date Complaint Diagnosis Description Type Department Provider    5/15/24  Acute ischemic stroke (HCC) ... Admission (Discharged) [facility] 8 REHAB [place] MD          Was this an external facility discharge? Yes. Discharge Date: 2024. Facility Name: [facility]    Follow Up Appointment:   Patient has hospital follow up appointment scheduled within 7 days of discharge.    Future Appointments         Provider Specialty Dept Phone    2024 9:30 AM Madeleine Easton, APRN - CNP Primary Care [phone]    3/3/2025 9:20 AM Tanner Daily MD Primary Care [phone]          No further follow-up call indicated     Yessenia Loomis RN

## 2024-12-20 ENCOUNTER — OFFICE VISIT (OUTPATIENT)
Dept: PRIMARY CARE CLINIC | Age: 79
End: 2024-12-20

## 2024-12-20 VITALS
HEART RATE: 84 BPM | BODY MASS INDEX: 25.47 KG/M2 | SYSTOLIC BLOOD PRESSURE: 142 MMHG | DIASTOLIC BLOOD PRESSURE: 80 MMHG | OXYGEN SATURATION: 95 % | TEMPERATURE: 98 F | WEIGHT: 188 LBS | HEIGHT: 72 IN

## 2024-12-20 DIAGNOSIS — Z09 POSTOPERATIVE FOLLOW-UP: Primary | ICD-10-CM

## 2024-12-20 DIAGNOSIS — Z86.73 HISTORY OF CVA (CEREBROVASCULAR ACCIDENT): ICD-10-CM

## 2024-12-20 ASSESSMENT — ENCOUNTER SYMPTOMS
COUGH: 0
SHORTNESS OF BREATH: 0
COLOR CHANGE: 0
NAUSEA: 0
SORE THROAT: 0
ABDOMINAL PAIN: 0
DIARRHEA: 0
CHEST TIGHTNESS: 0
VOMITING: 0

## 2024-12-20 NOTE — PROGRESS NOTES
Mr.Gale JESSY Brown is a 79 y.o. male who presents today for  Chief Complaint   Patient presents with    Post-Op Check       HPI:  Patient here today for follow up after recent ventriculoperitoneal shunt completed by Dr. Milian and along side Dr. Gu on 12/14.     \"Echo Brown is a 79 y.o. male with significant medical comorbidities to include left thalamus CVA (May 2024), hypertension, hyperlipidemia, BPH, anxiety, GERD, and he is overweight.  He presents today for evaluation of ventriculomegaly with reports of intermittent forgetfulness and a progressive decline in his gait and balance.  Physical exam findings of bright and awake, oriented x 3, names objects, bilateral EHL weakness, lower extremity hyperreflexia, and a magnetic gait with en bloc turning.  His imaging shows ventriculomegaly with surrounding FLAIR signal changes concerning for NPH.      Patient was initially evaluated in clinic and after discussing the risk benefits and possible complications of surgery Echo was brought to the main operating room for placement of right ventriculoperitoneal shunt with the assistance of Dr. Gu on 12/13/2024.  Postoperatively he did well.  His neuro exam remained stable.  His pain was controlled on oral medication, they were evaluated by Physical Therapy and Occupational Therapy and deemed appropriate for  discharge home.  At discharge the patient was able to ambulate independently in the [place].  Postoperative education was performed at bedside by myself which included wound care instructions, maximal physical activity, use of postoperative pain medication including tapering, and indications for contacting my office or the emergency room.  Arrangements for postoperative follow-up in my clinic with a wound check in 2 weeks with my nurse practitioner and at 6 weeks by myself\"    Patient states that his memory, balance and gait have all improved since his procedure.  Incision site without any erythema, drainage, or

## 2024-12-23 ENCOUNTER — OFFICE VISIT (OUTPATIENT)
Dept: BARIATRICS/WEIGHT MGMT | Facility: CLINIC | Age: 79
End: 2024-12-23
Payer: MEDICARE

## 2024-12-23 ENCOUNTER — OFFICE VISIT (OUTPATIENT)
Dept: NEUROSURGERY | Facility: CLINIC | Age: 79
End: 2024-12-23
Payer: MEDICARE

## 2024-12-23 VITALS
DIASTOLIC BLOOD PRESSURE: 78 MMHG | BODY MASS INDEX: 26.6 KG/M2 | TEMPERATURE: 98.7 F | HEIGHT: 71 IN | WEIGHT: 190 LBS | HEART RATE: 80 BPM | SYSTOLIC BLOOD PRESSURE: 134 MMHG | OXYGEN SATURATION: 98 %

## 2024-12-23 VITALS — HEIGHT: 70 IN | WEIGHT: 184 LBS | BODY MASS INDEX: 26.34 KG/M2

## 2024-12-23 DIAGNOSIS — E66.3 OVERWEIGHT WITH BODY MASS INDEX (BMI) OF 26 TO 26.9 IN ADULT: ICD-10-CM

## 2024-12-23 DIAGNOSIS — G91.2 NORMAL PRESSURE HYDROCEPHALUS: Primary | ICD-10-CM

## 2024-12-23 DIAGNOSIS — Z98.2 S/P VP SHUNT: ICD-10-CM

## 2024-12-23 PROCEDURE — 3075F SYST BP GE 130 - 139MM HG: CPT | Performed by: SURGERY

## 2024-12-23 PROCEDURE — 99024 POSTOP FOLLOW-UP VISIT: CPT | Performed by: NURSE PRACTITIONER

## 2024-12-23 PROCEDURE — 1160F RVW MEDS BY RX/DR IN RCRD: CPT | Performed by: NURSE PRACTITIONER

## 2024-12-23 PROCEDURE — 99024 POSTOP FOLLOW-UP VISIT: CPT | Performed by: SURGERY

## 2024-12-23 PROCEDURE — 1160F RVW MEDS BY RX/DR IN RCRD: CPT | Performed by: SURGERY

## 2024-12-23 PROCEDURE — 1159F MED LIST DOCD IN RCRD: CPT | Performed by: SURGERY

## 2024-12-23 PROCEDURE — 3078F DIAST BP <80 MM HG: CPT | Performed by: SURGERY

## 2024-12-23 PROCEDURE — 1159F MED LIST DOCD IN RCRD: CPT | Performed by: NURSE PRACTITIONER

## 2024-12-23 NOTE — PATIENT INSTRUCTIONS
"DASH Eating Plan  DASH stands for Dietary Approaches to Stop Hypertension. The DASH eating plan is a healthy eating plan that has been shown to:  Lower high blood pressure (hypertension).  Reduce your risk for type 2 diabetes, heart disease, and stroke.  Help with weight loss.  What are tips for following this plan?  Reading food labels  Check food labels for the amount of salt (sodium) per serving. Choose foods with less than 5 percent of the Daily Value (DV) of sodium. In general, foods with less than 300 milligrams (mg) of sodium per serving fit into this eating plan.  To find whole grains, look for the word \"whole\" as the first word in the ingredient list.  Shopping  Buy products labeled as \"low-sodium\" or \"no salt added.\"  Buy fresh foods. Avoid canned foods and pre-made or frozen meals.  Cooking  Try not to add salt when you cook. Use salt-free seasonings or herbs instead of table salt or sea salt. Check with your health care provider or pharmacist before using salt substitutes.  Do not fischer foods. Cook foods in healthy ways, such as baking, boiling, grilling, roasting, or broiling.  Cook using oils that are good for your heart. These include olive, canola, avocado, soybean, and sunflower oil.  Meal planning    Eat a balanced diet. This should include:  4 or more servings of fruits and 4 or more servings of vegetables each day. Try to fill half of your plate with fruits and vegetables.  6-8 servings of whole grains each day.  6 or less servings of lean meat, poultry, or fish each day. 1 oz is 1 serving. A 3 oz (85 g) serving of meat is about the same size as the palm of your hand. One egg is 1 oz (28 g).  2-3 servings of low-fat dairy each day. One serving is 1 cup (237 mL).  1 serving of nuts, seeds, or beans 5 times each week.  2-3 servings of heart-healthy fats. Healthy fats called omega-3 fatty acids are found in foods such as walnuts, flaxseeds, fortified milks, and eggs. These fats are also found in " cold-water fish, such as sardines, salmon, and mackerel.  Limit how much you eat of:  Canned or prepackaged foods.  Food that is high in trans fat, such as fried foods.  Food that is high in saturated fat, such as fatty meat.  Desserts and other sweets, sugary drinks, and other foods with added sugar.  Full-fat dairy products.  Do not salt foods before eating.  Do not eat more than 4 egg yolks a week.  Try to eat at least 2 vegetarian meals a week.  Eat more home-cooked food and less restaurant, buffet, and fast food.  Lifestyle  When eating at a restaurant, ask if your food can be made with less salt or no salt.  If you drink alcohol:  Limit how much you have to:  0-1 drink a day if you are female.  0-2 drinks a day if you are male.  Know how much alcohol is in your drink. In the [place] one drink is one 12 oz bottle of beer (355 mL), one 5 oz glass of wine (148 mL), or one 1½ oz glass of hard liquor (44 mL).  General information  Avoid eating more than 2,300 mg of salt a day. If you have hypertension, you may need to reduce your sodium intake to 1,500 mg a day.  Work with your provider to stay at a healthy body weight or lose weight. Ask what the best weight range is for you.  On most days of the week, get at least 30 minutes of exercise that causes your heart to beat faster. This may include walking, swimming, or biking.  Work with your provider or dietitian to adjust your eating plan to meet your specific calorie needs.  What foods should I eat?  Fruits  All fresh, dried, or frozen fruit. Canned fruits that are in their natural juice and do not have sugar added to them.  Vegetables  Fresh or frozen vegetables that are raw, steamed, roasted, or grilled. Low-sodium or reduced-sodium tomato and vegetable juice. Low-sodium or reduced-sodium tomato sauce and tomato paste. Low-sodium or reduced-sodium canned vegetables.  Grains  Whole-grain or whole-wheat bread. Whole-grain or whole-wheat pasta. Brown rice. Oatmeal.  Quinoa. Bulgur. Whole-grain and low-sodium cereals. Riri bread. Low-fat, low-sodium crackers. Whole-wheat flour tortillas.  Meats and other proteins  Skinless chicken or turkey. Ground chicken or turkey. Pork with fat trimmed off. Fish and seafood. Egg whites. Dried beans, peas, or lentils. Unsalted nuts, nut butters, and seeds. Unsalted canned beans. Lean cuts of beef with fat trimmed off. Low-sodium, lean precooked or cured meat, such as sausages or meat loaves.  Dairy  Low-fat (1%) or fat-free (skim) milk. Reduced-fat, low-fat, or fat-free cheeses. Nonfat, low-sodium ricotta or cottage cheese. Low-fat or nonfat yogurt. Low-fat, low-sodium cheese.  Fats and oils  Soft margarine without trans fats. Vegetable oil. Reduced-fat, low-fat, or light mayonnaise and salad dressings (reduced-sodium). Canola, safflower, olive, avocado, soybean, and sunflower oils. Avocado.  Seasonings and condiments  Herbs. Spices. Seasoning mixes without salt.  Other foods  Unsalted popcorn and pretzels. Fat-free sweets.  The items listed above may not be all the foods and drinks you can have. Talk to a dietitian to learn more.  What foods should I avoid?  Fruits  Canned fruit in a light or heavy syrup. Fried fruit. Fruit in cream or butter sauce.  Vegetables  Creamed or fried vegetables. Vegetables in a cheese sauce. Regular canned vegetables that are not marked as low-sodium or reduced-sodium. Regular canned tomato sauce and paste that are not marked as low-sodium or reduced-sodium. Regular tomato and vegetable juices that are not marked as low-sodium or reduced-sodium. Pickles. Olives.  Grains  Baked goods made with fat, such as croissants, muffins, or some breads. Dry pasta or rice meal packs.  Meats and other proteins  Fatty cuts of meat. Ribs. Fried meat. [place]o. Bologna, salami, and other precooked or cured meats, such as sausages or meat loaves, that are not lean and low in sodium. Fat from the back of a pig (fatback). Samuel.  Salted nuts and seeds. Canned beans with added salt. Canned or smoked fish. Whole eggs or egg yolks. Chicken or turkey with skin.  Dairy  Whole or 2% milk, cream, and half-and-half. Whole or full-fat cream cheese. Whole-fat or sweetened yogurt. Full-fat cheese. Nondairy creamers. Whipped toppings. Processed cheese and cheese spreads.  Fats and oils  Butter. Stick margarine. Lard. Shortening. Ghee. Feliciano fat. Tropical oils, such as coconut, palm kernel, or palm oil.  Seasonings and condiments  Onion salt, garlic salt, seasoned salt, table salt, and sea salt. Worcestershire sauce. Tartar sauce. Barbecue sauce. Teriyaki sauce. Soy sauce, including reduced-sodium soy sauce. Steak sauce. Canned and packaged gravies. Fish sauce. Oyster sauce. Cocktail sauce. Store-bought horseradish. Ketchup. Mustard. Meat flavorings and tenderizers. Bouillon cubes. Hot sauces. Pre-made or packaged marinades. Pre-made or packaged taco seasonings. Relishes. Regular salad dressings.  Other foods  Salted popcorn and pretzels.  The items listed above may not be all the foods and drinks you should avoid. Talk to a dietitian to learn more.  Where to find more information  National Heart, Lung, and Blood [place] (NHLBI): nhlbi.nih.gov  American Heart Association (AHA): heart.org  Academy of Nutrition and Dietetics: eatright.org  National Kidney Foundation (NKF): kidney.org  This information is not intended to replace advice given to you by your health care provider. Make sure you discuss any questions you have with your health care provider.  Document Revised: 01/04/2024 Document Reviewed: 01/04/2024  Elsevier Patient Education © 2024 [employer]vier Inc.

## 2024-12-23 NOTE — PROGRESS NOTES
Chief complaint:   Chief Complaint   Patient presents with   • Cerebral ventriculomegaly     Patient here for 2 week post op visit. He had  shunt placed 12/13/24.        Subjective     HPI:   History of Present Illness  The patient is an 79-year-old male presenting for a follow-up after right ventriculoperitoneal shunt placement on 12/13/2024.    He denies systemic symptoms such as fever or chills. He reports significant improvement in cognitive function, including enhanced mental clarity and memory. His balance and gait have also improved, as noted by his , and he has not experienced any recent falls. The patient has a history of impaired balance following a fall three years ago and has been participating in physical therapy, although he perceives minimal benefit from it. However, he acknowledges a marked improvement in his condition since the shunt placement. He is scheduled to see Dr. Granados today. Additionally, he reports an improvement in urinary frequency.     PFSH:  Past Medical History:   Diagnosis Date   • Anxiety    • BPH (benign prostatic hyperplasia)    • Colon polyp    • CVA (cerebral vascular accident)    • Disease of thyroid gland    • Diverticulosis    • Elevated cholesterol    • Erectile dysfunction    • Essential hypertension    • GERD (gastroesophageal reflux disease)    • Hypertriglyceridemia    • Idiopathic peripheral neuropathy    • Osteoarthritis    • Sleep apnea    • TIA (transient ischemic attack) 5/12/24   • Urinary incontinence     Inknown     Past Surgical History:   Procedure Laterality Date   • BRAIN SURGERY  5/13/24    Hydrocephalus   • COLONOSCOPY  04/18/2016    diverticulosis, internal hemorrhoids,    • COLONOSCOPY  03/30/2011    internal hemorrhoid, diverticulosis   • COLONOSCOPY N/A 03/11/2021    Procedure: COLONOSCOPY WITH ANESTHESIA;  Surgeon: Ricky House MD;  Location: [facility];  Service: Gastroenterology;  Laterality: N/A;  pre: hx polyps  post:  "diverticulosis. hemorrhoid.   Keenan Perez MD   • CYSTOSCOPY  1994    Before TURP   • ENDOSCOPY N/A 03/11/2021    Procedure: ESOPHAGOGASTRODUODENOSCOPY WITH ANESTHESIA;  Surgeon: Ricky House MD;  Location:  [facility] ENDOSCOPY;  Service: Gastroenterology;  Laterality: N/A;  pre: GERD  post: hiatal hernia. gastric polyps.  Keenan Perez MD   • HERNIA REPAIR      inguinal   • PROSTATE SURGERY  1994    TURP   • TURP / TRANSURETHRAL INCISION / DRAINAGE PROSTATE     • VENTRICULOPERITONEAL SHUNT  5/13/24   •  SHUNT INSERTION Right 12/13/2024    Procedure: VENTRICULAR PERITONEAL SHUNT INSERTION STERIOTACTIC, right, with Darwin;  Surgeon: Harley Lombardo MD;  [facility] OR;  Service: Neurosurgery;  Laterality: Right;       Objective      Current Outpatient Medications   Medication Sig Dispense Refill   • alfuzosin (UROXATRAL) 10 MG 24 hr tablet Take 1 tablet by mouth Daily.     • aspirin 81 MG EC tablet Take 1 tablet by mouth Daily. 30 tablet 2   • atorvastatin (Lipitor) 40 MG tablet Take 1 tablet by mouth Daily. 30 tablet 6   • chlorproMAZINE (THORAZINE) 25 MG tablet Take 1 tablet by mouth 3 (Three) Times a Day As Needed (for hiccups). 3 tablet 0   • citalopram (CeleXA) 20 MG tablet Take 1 tablet by mouth Daily.     • diazePAM (VALIUM) 5 MG tablet Take 1 tablet by mouth At Night As Needed for Anxiety.     • levothyroxine (SYNTHROID, LEVOTHROID) 88 MCG tablet Take 1 tablet by mouth Daily.     • lisinopril (PRINIVIL,ZESTRIL) 20 MG tablet Take 1 tablet by mouth Daily.     • Magnesium Hydroxide (MILK OF MAGNESIA PO) Take 60 mL by mouth Every Night.     • omeprazole (priLOSEC) 20 MG capsule Take 1 capsule by mouth Daily.     • rOPINIRole HCl (REQUIP PO) Take 2 mg by mouth Every Night.     • traZODone (DESYREL) 50 MG tablet Take 1 tablet by mouth Every Night.       No current facility-administered medications for this visit.       Vital Signs  Ht 179 cm (70.47\")   Wt 83.5 kg (184 lb)   BMI " 26.05 kg/m²   Physical Exam  Vitals and nursing note reviewed.   Constitutional:       General: He is not in acute distress.     Appearance: Normal appearance. He is well-developed, well-groomed and overweight. He is not ill-appearing, toxic-appearing or diaphoretic.      Comments: BMI 26.05   HENT:      Head: Normocephalic and atraumatic.        Right Ear: Hearing normal.      Left Ear: Hearing normal.   Eyes:      General: Lids are normal.      Extraocular Movements: Extraocular movements intact.      Conjunctiva/sclera: Conjunctivae normal.      Pupils: Pupils are equal, round, and reactive to light.   Neck:      Trachea: Trachea normal.   Cardiovascular:      Rate and Rhythm: Normal rate and regular rhythm.   Pulmonary:      Effort: Pulmonary effort is normal. No tachypnea, bradypnea, accessory muscle usage or respiratory distress.   Abdominal:      Palpations: Abdomen is soft.   Musculoskeletal:      Cervical back: Full passive range of motion without pain and neck supple.   Skin:     General: Skin is warm and dry.   Neurological:      GCS: GCS eye subscore is 4. GCS verbal subscore is 5. GCS motor subscore is 6.      Coordination: Coordination is intact.   Psychiatric:         Speech: Speech normal.         Behavior: Behavior normal. Behavior is cooperative.     Neurological Exam  Mental Status  Awake, alert and oriented to person, place and time. Speech is normal. Language is fluent with no aphasia. Attention and concentration are normal.    Cranial Nerves  CN II: Visual acuity is normal.  CN III, IV, VI: Extraocular movements intact bilaterally. Normal lids and orbits bilaterally. Pupils equal round and reactive to light bilaterally.  CN V: Facial sensation is normal.  CN VII: Full and symmetric facial movement.  CN IX, X: Palate elevates symmetrically  CN XI: Shoulder shrug strength is normal.    Motor  Normal muscle bulk throughout. Normal muscle tone. No pronator drift.                                              Right                     Left  Toe extension                        5                          5                                             Right                     Left  Deltoid                                   5                          5   Biceps                                   5                          5   Triceps                                  5                          5   Wrist extensor                       5                          5   Iliopsoas                               5                          5   Quadriceps                           5                          5   Gastrocnemius                     5                           5   Anterior tibialis                      5                          5    Sensory  Light touch is normal in upper and lower extremities.     Coordination    Finger-to-nose, rapid alternating movements and heel-to-shin normal bilaterally without dysmetria.    Gait    Fairly well-maintained gait without assist.    Incision: WOUND IMAGE - SP right  shunt placement (12/23/2024)   (Consent to obtain photo of postoperative site for documentation purposes only obtained verbally by Mr. Tyler)    Results Review:   CT Head Without Contrast    Result Date: 12/13/2024   1. Status post interval right frontal approach  shunt catheter placement tip to the right of midline with expected postprocedural changes and stable ventricular size. 2. Chronic ischemic changes as above. 3. Paranasal sinus mucosal thickening.  This report was signed and finalized on 12/13/2024 9:51 AM by Brent Smith.      XR Chest 1 View    Result Date: 12/6/2024   No acute findings.  This report was signed and finalized on 12/6/2024 1:39 PM by Dr. Yimi Izquierdo MD.      MRI Abdomen With & Without Contrast    Result Date: 11/27/2024  - Some sequence are degraded by motion artifact. - Focal segmental dilatation of the main pancreatic duct in the pancreatic tail measuring up to 2.9 cm,  unchanged.  There is evidence of chronic pancreatitis in the pancreatic tail.  Ductal dilatation may be sequela of chronic pancreatitis, focal stricture or focal main branch IPMN.  No associated enhancement or pancreatic lesions.  Continued follow-up recommended. - Gallbladder sludge with wall thickening which may be secondary to underdistension.  Acute cholecystitis is thought to be less likely.  Clinical correlation advised. - Mild hepatic steatosis.  Correlation with LFTs recommended. - Post-therapy changes in the left kidney. - Small hiatal hernia. ______________________________________ Electronically signed by: PJ RANDLE M.D. Date:     11/27/2024 Time:    07:42        Assessment/Plan:   NPH  Status post right  shunt placement (12/13/2024)  Current performance setting 5 cm H2O  Assessment & Plan  1. Post-operative status following right ventriculoperitoneal shunt placement.  His incisions are healing well, with no signs of infection such as redness, swelling, drainage, or discharge. He reports improvement in memory, cognition, and walking ability. No issues with urinary frequency were noted. He is advised to maintain cleanliness of the incision site by washing it daily with soapy water, avoiding scrubbing. The application of bacitracin or antibiotic cream to soften the scab. Any symptoms such as fevers, chills, redness, swelling, drainage, or discharge should be reported immediately. A CT scan will be performed prior to his appointment with Dr. Kumar on 01/22/2025.       Overweight (BMI 25.0-29.9)  Body mass index is 26.05 kg/m²..  Information on the DASH diet provided in the AVS.  We will continue to provided diet and exercise information with the goal of weight loss at each scheduled appointment.       Diagnoses and all orders for this visit:    1. Normal pressure hydrocephalus (Primary)    2. S/P  shunt    3. Overweight with body mass index (BMI) of 26 to 26.9 in adult      Return for AS  SCHEDULED.    I discussed the patients findings and my recommendations with patient    MIO Denson

## 2024-12-23 NOTE — PROGRESS NOTES
"  Patient Care Team:  Keenan Perez MD as PCP - General  Keenan Perez MD as PCP - Family Medicine  Ricky House MD as Consulting Physician (Gastroenterology)  Harley Lombardo MD as Surgeon (Neurosurgery)  Marin Lopes APRN as Nurse Practitioner (Nurse Practitioner)    Subjective     Agueda Tyler is a 79 y.o. male.     Agueda is post op 1 week from placement of  shunt.  He is currently on his without complaints.  He states his gait has improved and feels overall much better.    Review Of Systems:  General ROS: negative  Respiratory ROS: no cough, shortness of breath, or wheezing  Cardiovascular ROS: no chest pain or dyspnea on exertion  Gastrointestinal ROS: no abdominal pain, change in bowel habits, or black or bloody stools    The following portions of the patient's history were reviewed and updated as appropriate: allergies, current medications, past family history, past medical history, past social history, past surgical history, and problem list.    Objective   /78 (BP Location: Right arm, Patient Position: Sitting, Cuff Size: Adult)   Pulse 80   Temp 98.7 °F (37.1 °C)   Ht 179.1 cm (70.5\")   Wt 86.2 kg (190 lb)   SpO2 98%   BMI 26.88 kg/m²       12/23/24  1022   Weight: 86.2 kg (190 lb)        General Appearance:  awake, alert, oriented, in no acute distress  Abdomen:  Soft, non-tender, normal bowel sounds; no bruits, organomegaly or masses.  Abnormal shape: normal  Wounds: clean, dry, intact    ASSESSMENT/ PLAN    Encounter Diagnoses   Name Primary?    Normal pressure hydrocephalus Yes       The patient and I discussed their weight restrictions which is defined as avoid lifting greater than 15 lbs for at least 4 weeks to allow healing of his tissue.  I also discussed the avoidance of driving or operating a motor vehicle if he requires, needs taking pain medication, or has a distracting injury or pain because of the risk of injuring himself or others.  The patient " may advance diet as directed.    12/23/24  10:49 CST  Patient Care Team:  Keenan Perez MD as PCP - General  Keenan Perez MD as PCP - Family Medicine  Ricky House MD as Consulting Physician (Gastroenterology)  Harley Lombardo MD as Surgeon (Neurosurgery)  Marin Lopes APRN as Nurse Practitioner (Nurse Practitioner)  Meño Granados MD FACS    Answers submitted by the patient for this visit:  Post Operative Visit (Submitted on 12/17/2024)  Chief Complaint: Follow-up  Pain Control: controlled  Fever: no fever  Diet: adequate intake  Activity: returning to normal  Operative Site Issues: No  Additional information: Experienced hiccups and heartburns for two days.     Headache

## 2024-12-26 ENCOUNTER — PATIENT ROUNDING (BHMG ONLY) (OUTPATIENT)
Dept: BARIATRICS/WEIGHT MGMT | Facility: CLINIC | Age: 79
End: 2024-12-26
Payer: MEDICARE

## 2024-12-26 NOTE — PROGRESS NOTES
December 26, 2024    Hello, may I speak with Agueda Tyler?    My name is BETO Escoto      I am  with [facility]  [address], [address]  [place] [id number] [phone].    Before we get started may I verify your date of birth? 1945    I am calling to officially welcome you to our practice and ask about your recent visit. Is this a good time to talk? yes    Tell me about your visit with us. What things went well?  Everything went well. Nice lady brought me back and was really nice.        We're always looking for ways to make our patients' experiences even better. Do you have recommendations on ways we may improve?  no    Overall were you satisfied with your first visit to our practice? yes       I appreciate you taking the time to speak with me today. Is there anything else I can do for you? No, thank you      Thank you, and have a great day.

## 2025-01-22 ENCOUNTER — HOSPITAL ENCOUNTER (OUTPATIENT)
Dept: CT IMAGING | Facility: HOSPITAL | Age: 80
Discharge: HOME | End: 2025-01-22
Admitting: NURSE PRACTITIONER
Payer: MEDICARE

## 2025-01-22 ENCOUNTER — OFFICE VISIT (OUTPATIENT)
Dept: NEUROSURGERY | Facility: CLINIC | Age: 80
End: 2025-01-22
Payer: MEDICARE

## 2025-01-22 VITALS — BODY MASS INDEX: 26.6 KG/M2 | HEIGHT: 71 IN | WEIGHT: 190 LBS

## 2025-01-22 DIAGNOSIS — E66.3 OVERWEIGHT WITH BODY MASS INDEX (BMI) OF 26 TO 26.9 IN ADULT: ICD-10-CM

## 2025-01-22 DIAGNOSIS — G91.2 NORMAL PRESSURE HYDROCEPHALUS: Primary | ICD-10-CM

## 2025-01-22 DIAGNOSIS — Z98.2 S/P VP SHUNT: ICD-10-CM

## 2025-01-22 DIAGNOSIS — Z98.2 STATUS POST VENTRICULOPERITONEAL SHUNT: ICD-10-CM

## 2025-01-22 DIAGNOSIS — G91.2 NORMAL PRESSURE HYDROCEPHALUS: ICD-10-CM

## 2025-01-22 PROCEDURE — 70450 CT HEAD/BRAIN W/O DYE: CPT

## 2025-01-22 PROCEDURE — 1160F RVW MEDS BY RX/DR IN RCRD: CPT | Performed by: NEUROLOGICAL SURGERY

## 2025-01-22 PROCEDURE — 99024 POSTOP FOLLOW-UP VISIT: CPT | Performed by: NEUROLOGICAL SURGERY

## 2025-01-22 PROCEDURE — 1159F MED LIST DOCD IN RCRD: CPT | Performed by: NEUROLOGICAL SURGERY

## 2025-01-22 NOTE — PROGRESS NOTES
Primary Care Provider: Keenan Perez MD    Chief Complaint:   Chief Complaint   Patient presents with    NPH     Patient here for 4wk post op visit with CT today for review. He had a  shunt placed 12/13/24.     History of Present Illness    HPI  Agueda Tyler is a 79 y.o. male whom presents today for evaluation.  He is a retired pharmacist.    In January 2022, Mr. Tyler fell from a second story loft window onto concrete.  He sustained multiple injuries to include, but not limited to a grade III LEFT renal injury with moderate subcapsular hematoma, pulmonary contusion, minimally displaced left inferior ramus fracture, and a nondisplaced left superior ramus fracture and left sacral ala fracture requiring surgical intervention.    In May 2024, he suffered a left thalamic stroke resulting in right facial and upper extremity numbness and tingling.  Following his CVA he was discharged from [facility] to [facility] inpatient rehab and has since been discharged home, as well as completed physical therapy and Occupational Therapy per [facility].  His residual symptoms include right facial and upper extremity tingling dysesthesias without complaints of numbness or weakness.  Than an 81 mg aspirin, he is no longer taking or prescribed anticoagulant or antiplatelet medications    History of Present Illness  The patient presents for a follow-up of his ventriculoperitoneal shunt placement for normal pressure hydrocephalus.    He reports an overall improvement in his condition, with enhanced balance, mobility, and urinary function. He does not experience any issues with the abdominal incisions. His memory appears to be on an upward trajectory. He has been engaging in physical therapy post-stroke, including home therapy and ambulatory therapy, and continues to perform exercises at home post-surgery. He no longer experiences a constant fear of falling. He notes that his condition improved immediately post-surgery  but has since plateaued. He acknowledges that his recovery is ongoing and expresses readiness to continue his progress.    He has been experiencing intermittent episodes of red eye for approximately 10 years, occurring once or twice annually. The frequency of these episodes has been increasing. He suspects an autoimmune etiology. He does not report any healing complications but mentions a mild soreness in the eye.    He also reports persistent tingling in his right upper extremity, particularly noticeable upon waking in the morning. He has been performing exercises to manage this symptom.         ROS  Review of Systems   Constitutional: Negative.    HENT: Negative.     Eyes:  Positive for redness.   Respiratory: Negative.     Cardiovascular: Negative.    Gastrointestinal:  Positive for constipation.   Endocrine: Negative.    Genitourinary: Negative.    Musculoskeletal: Negative.    Skin: Negative.    Allergic/Immunologic: Negative.    Neurological: Negative.    Hematological: Negative.    Psychiatric/Behavioral: Negative.     All other systems reviewed and are negative.    Past Medical History:   Diagnosis Date    Anxiety     BPH (benign prostatic hyperplasia)     Colon polyp     CVA (cerebral vascular accident)     Disease of thyroid gland     Diverticulosis     Elevated cholesterol     Erectile dysfunction     Essential hypertension     GERD (gastroesophageal reflux disease)     Hypertriglyceridemia     Idiopathic peripheral neuropathy     Osteoarthritis     Sleep apnea     TIA (transient ischemic attack) 5/12/24    Urinary incontinence     Inknown     Past Surgical History:   Procedure Laterality Date    BRAIN SURGERY  5/13/24    Hydrocephalus    COLONOSCOPY  04/18/2016    diverticulosis, internal hemorrhoids,     COLONOSCOPY  03/30/2011    internal hemorrhoid, diverticulosis    COLONOSCOPY N/A 03/11/2021    Procedure: COLONOSCOPY WITH ANESTHESIA;  Surgeon: Ricky House MD;  Location: [facility] ENDOSCOPY;   Service: Gastroenterology;  Laterality: N/A;  pre: hx polyps  post: diverticulosis. hemorrhoid.   Keenan Perez MD    CYSTOSCOPY  1994    Before TURP    ENDOSCOPY N/A 03/11/2021    Procedure: ESOPHAGOGASTRODUODENOSCOPY WITH ANESTHESIA;  Surgeon: Ricky House MD;  Location: [facility] ENDOSCOPY;  Service: Gastroenterology;  Laterality: N/A;  pre: GERD  post: hiatal hernia. gastric polyps.  Keenan Perez MD    HERNIA REPAIR      inguinal    PROSTATE SURGERY  1994    TURP    TURP / TRANSURETHRAL INCISION / DRAINAGE PROSTATE      VENTRICULOPERITONEAL SHUNT  5/13/24     SHUNT INSERTION Right 12/13/2024    Procedure: VENTRICULAR PERITONEAL SHUNT INSERTION STERIOTACTIC, right, with Darwin;  Surgeon: Harley Lombardo MD;  Location: [facility] OR;  Service: Neurosurgery;  Laterality: Right;     Family History: family history includes Arthritis in his mother; Cancer in his mother; Hypertension in his father; Prostate cancer in his father.    Social History:  reports that he has never smoked. He has never used smokeless tobacco. He reports that he does not drink alcohol and does not use drugs.    Medications:    Current Outpatient Medications:     alfuzosin (UROXATRAL) 10 MG 24 hr tablet, Take 1 tablet by mouth Daily., Disp: , Rfl:     aspirin 81 MG EC tablet, Take 1 tablet by mouth Daily., Disp: 30 tablet, Rfl: 2    atorvastatin (Lipitor) 40 MG tablet, Take 1 tablet by mouth Daily., Disp: 30 tablet, Rfl: 6    citalopram (CeleXA) 20 MG tablet, Take 1 tablet by mouth Daily., Disp: , Rfl:     diazePAM (VALIUM) 5 MG tablet, Take 1 tablet by mouth At Night As Needed for Anxiety., Disp: , Rfl:     levothyroxine (SYNTHROID, LEVOTHROID) 88 MCG tablet, Take 1 tablet by mouth Daily., Disp: , Rfl:     lisinopril (PRINIVIL,ZESTRIL) 20 MG tablet, Take 1 tablet by mouth Daily., Disp: , Rfl:     Magnesium Hydroxide (MILK OF MAGNESIA PO), Take 60 mL by mouth Every Night., Disp: , Rfl:     omeprazole (priLOSEC) 20 MG  "capsule, Take 1 capsule by mouth Daily., Disp: , Rfl:     rOPINIRole HCl (REQUIP PO), Take 2 mg by mouth Every Night., Disp: , Rfl:     traZODone (DESYREL) 50 MG tablet, Take 1 tablet by mouth Every Night., Disp: , Rfl:     Allergies:  Sulfa antibiotics    Objective   Ht 179.1 cm (70.5\")   Wt 86.2 kg (190 lb)   BMI 26.88 kg/m²   Physical Exam  Vitals and nursing note reviewed.   Constitutional:       General: He is not in acute distress.     Appearance: Normal appearance. He is well-developed, well-groomed and overweight. He is not ill-appearing, toxic-appearing or diaphoretic.      Comments: BMI 26.31   HENT:      Head: Normocephalic and atraumatic.      Right Ear: Hearing normal.      Left Ear: Hearing normal.   Eyes:      General: Lids are normal.      Extraocular Movements: Extraocular movements intact.      Conjunctiva/sclera: Conjunctivae normal.      Pupils: Pupils are equal, round, and reactive to light.   Neck:      Trachea: Trachea normal.   Cardiovascular:      Rate and Rhythm: Normal rate and regular rhythm.   Pulmonary:      Effort: Pulmonary effort is normal. No tachypnea, bradypnea, accessory muscle usage or respiratory distress.   Abdominal:      Palpations: Abdomen is soft.   Musculoskeletal:      Cervical back: Full passive range of motion without pain and neck supple.   Skin:     General: Skin is warm and dry.   Neurological:      GCS: GCS eye subscore is 4. GCS verbal subscore is 5. GCS motor subscore is 6.      Coordination: Coordination is intact.      Deep Tendon Reflexes:      Reflex Scores:       Tricep reflexes are 3+ on the right side and 2+ on the left side.       Bicep reflexes are 3+ on the right side and 2+ on the left side.       Brachioradialis reflexes are 3+ on the right side and 2+ on the left side.       Patellar reflexes are 4+ on the right side and 3+ on the left side.       Achilles reflexes are 3+ on the right side and 3+ on the left side.  Psychiatric:         Speech: " Speech normal.         Behavior: Behavior normal. Behavior is cooperative.       Neurological Exam  Mental Status  Awake, alert and oriented to person, place and time. Speech is normal. Able to name objects. Attention and concentration are normal.    Cranial Nerves  CN I: Sense of smell is normal.  CN II: Visual acuity is normal.  CN III, IV, VI: Extraocular movements intact bilaterally. Normal lids and orbits bilaterally. Pupils equal round and reactive to light bilaterally.  CN V: Facial sensation is normal.  CN VII: Full and symmetric facial movement.  CN IX, X: Palate elevates symmetrically  CN XI: Shoulder shrug strength is normal.  CN XII: Tongue midline without atrophy or fasciculations.    Motor  Normal muscle bulk throughout. Normal muscle tone.                                               Right                     Left  Toe extension                        4-                          3+                                             Right                     Left  Deltoid                                   5                          5   Biceps                                   5                          5   Triceps                                  5                          5   Wrist extensor                       5                          5   Iliopsoas                               5                          5   Quadriceps                           5                          5   Anterior tibialis                      5                          5   Posterior tibialis                    5                          5    Sensory  Sensation is intact to light touch, pinprick, vibration and proprioception in all four extremities.    Reflexes                                            Right                      Left  Brachioradialis                    3+                         2+  Biceps                                 3+                         2+  Triceps                                3+                          2+  Patellar                                4+                         3+  Achilles                                3+                         3+  Right Plantar: downgoing  Left Plantar: downgoing    Right pathological reflexes: Paula's absent. Ankle clonus absent.  Left pathological reflexes: Paula's absent. Ankle clonus absent.    Coordination    Finger-to-nose, rapid alternating movements and heel-to-shin normal bilaterally without dysmetria.    Gait  Casual gait: Spastic gait.    Imaging: (independent review and interpretation)  5/13/2024.  MRI of the brain shows ventriculomegaly with surrounding FLAIR signal changes, as well is an acute ischemic infarct within the left thalamus.          5/14/2024.  Ventriculomegaly      1/2025  Post Shunt          Results  Imaging  MRI shows areas in the brain that are larger than they should be, indicating possible normal pressure hydrocephalus.    Testing  Lumbar puncture results showed a decrease in time to walk 100 feet from 34 seconds to 26 seconds, and then to 28 seconds. MMSE score improved from 20/30 to 23/30, and then to 25/30.      Imaging  CT scan shows no subdural hematomas and indicates that the shunt has effectively reduced fluid accumulation.          ASSESSMENT and PLAN  Agueda Tyler is a 79 y.o. male with significant medical comorbidities to include left thalamus CVA (May 2024), hypertension, hyperlipidemia, BPH, anxiety, GERD, and he is overweight.  He presents today for evaluation of ventriculomegaly with reports of intermittent forgetfulness and a progressive decline in his gait and balance.  Physical exam findings of bright and awake, oriented x 3, names objects, bilateral EHL weakness, lower extremity hyperreflexia, and a magnetic gait with en bloc turning.  His imaging shows ventriculomegaly with surrounding FLAIR signal changes concerning for NPH.    Recommendations  Ventriculomegaly concerning for NPH    Normal Pressure Hydrocephalus  "(NPH)  Differential diagnoses include normal pressure hydrocephalus, Alzheimer's disease, Parkinson's disease, and other forms of neurocognitive degeneration.    NPH is clinically important because it is 1 of the few treatable forms of dementia.  Although most forms are idiopathic they can occur post subarachnoid hemorrhage, trauma, meningitis, tumor resection.    Clinical presentation includes the common triad of gait disturbance, dementia, and urinary incontinence.  Gait disturbance usually proceeds other symptoms.  Gait tends to be wide-based with short, shuffling steps and unsteadiness when turning.  Often it will be described as a \"magnetic gait\".  Dementia is primarily memory impairment with bradycardia for anemia and bradykinesia.  Urinary incontinence is usually unwitting.  Differentiation from Alzheimer's disease can be challenging but often times NPH patients do not show the deficiencies in orientation, writing, learning and emotional lability that is seen with Alzheimer's disease.    Clinical assessment.  While the tap test has not undergone rigorous perspective evaluation.  A positive response on speech therapy and physical therapy evaluation following lumbar puncture with withdrawal of 40-50 mL of CSF has a positive predictive value of % that shunting will result in improved function.     Treatment is via CSF diversion through programmable ventriculoperitoneal shunt.  For most, use of a medium pressure valve with a closing pressure between 65 and 90 mmHg is used to minimize the likelihood of subdural hematomas.  However I prefer using a programmable valve.  Patients are typically followed monthly for 6-12 months to titrate the valve to optimize efficiency.    Potential complications are as high as 35% due to the generalized frailty of the elderly patients.  Complications include subdural hematomas or hygromas, shunt infection, intracerebral hemorrhage, seizures, and delayed complications including " shunt obstruction.    Outcomes: The most likely symptom to improve after shunting is incontinence, then gait disturbance, and lastly dementia.  Predictors of good outcome include: Presence of the classical triad, gait disturbance as the primary symptom, opening pressure greater than 10 cm of water, large ventricles on CT or MRI with flattening of the sulci and little cortical atrophy, and presence of symptoms for short time.  Patients with cocominant Alzheimer disease and normal pressure hydrocephalus may still improve with shunting and should not be excluded.  In general most responders eventually relapse after 5-7 years of good response.    I discussed the risks of the procedure, including but not limited to infection, bleeding, damage to local structures, CSF leak, seizures, hydrocephalus, weakness, numbness, paralysis, or loss of bowel, and bladder function, stroke, coma, MI, or death.     Assessment & Plan  1. Post-operative status following ventriculoperitoneal shunt placement for normal pressure hydrocephalus.  His condition is satisfactory, with the shunt functioning as expected. Reflexes are brisk on right side and normal on the left. The current setting of the shunt is at 5, which is slightly elevated. A recent CT scan did not reveal any subdural hematomas. The plan is to gradually reduce the shunt setting to prevent rapid brain deflation and potential bleeding. The shunt was adjusted from a setting of 5 to 4 during this visit. A follow-up head CT scan will be scheduled in 1 month to ensure there are no subdurals. If the results are satisfactory, the shunt setting will be further reduced from 4 to 3.    2. Right upper extremity tingling.  He reports significant tingling in the right upper extremity, especially in the mornings. This symptom is being managed with exercises. No new interventions were recommended at this time.    Follow-up  The patient will follow up in 1 month.    PROCEDURE  The patient  underwent ventriculoperitoneal shunt placement for normal pressure hydrocephalus.      Overweight (BMI 25.0-29.9)  Body mass index is 26.88 kg/m²..  Information on the DASH diet provided in the AVS.  We will continue to provided diet and exercise information with the goal of weight loss at each scheduled appointment.     Fall risk  STEADI Fall Risk Assessment has not been completed.  Fall risk information provided in AVS.    Diagnoses and all orders for this visit:    1. Normal pressure hydrocephalus (Primary)  -     CT Head Without Contrast; Future    2. S/P  shunt  -     CT Head Without Contrast; Future    3. Overweight with body mass index (BMI) of 26 to 26.9 in adult          Return in about 1 month (around 2/22/2025) for 1MO WITH LUNA-CT PTA.    Thank you for this Consultation and the opportunity [facility].          Sincerely,  Harley Lombardo MD

## 2025-02-25 ENCOUNTER — HOSPITAL ENCOUNTER (OUTPATIENT)
Dept: CT IMAGING | Facility: HOSPITAL | Age: 80
Discharge: HOME | End: 2025-02-25
Admitting: NEUROLOGICAL SURGERY
Payer: MEDICARE

## 2025-02-25 DIAGNOSIS — G91.2 NORMAL PRESSURE HYDROCEPHALUS: ICD-10-CM

## 2025-02-25 DIAGNOSIS — Z98.2 S/P VP SHUNT: ICD-10-CM

## 2025-02-25 PROCEDURE — 70450 CT HEAD/BRAIN W/O DYE: CPT

## 2025-02-27 ENCOUNTER — OFFICE VISIT (OUTPATIENT)
Dept: NEUROSURGERY | Facility: CLINIC | Age: 80
End: 2025-02-27
Payer: MEDICARE

## 2025-02-27 VITALS — BODY MASS INDEX: 26.6 KG/M2 | HEIGHT: 71 IN | WEIGHT: 190 LBS

## 2025-02-27 DIAGNOSIS — Z91.81 AT RISK FOR FALLS: ICD-10-CM

## 2025-02-27 DIAGNOSIS — G91.2 NORMAL PRESSURE HYDROCEPHALUS: Primary | ICD-10-CM

## 2025-02-27 DIAGNOSIS — Z98.2 S/P VP SHUNT: ICD-10-CM

## 2025-02-27 DIAGNOSIS — E66.3 OVERWEIGHT WITH BODY MASS INDEX (BMI) OF 26 TO 26.9 IN ADULT: ICD-10-CM

## 2025-02-27 NOTE — PATIENT INSTRUCTIONS

## 2025-02-27 NOTE — PROGRESS NOTES
Chief complaint:   Chief Complaint   Patient presents with    NPH     Pt is here for followup.     Subjective     HPI:   Agueda Tyler  History of Present Illness  The patient is an 79-year-old male who presents today for continued evaluation of normal pressure hydrocephalus status post  shunt placement on 12/13/2024. At his last encounter, his  shunt performance setting was titrated from 5 to 4. He is accompanied by his son.    He reports a general sense of well-being.  He notes an improvement in his memory and cognitive function, attributing this to the first adjustment of his shunt. His son corroborates his account noting that his father has not experienced memory lapses.  He reports an improvement in his gait, particularly after waking up in the morning.  He also reports no urinary issues or incontinence, and notes that he only needs to urinate once at night, a significant improvement from his previous condition. He reports no falls or dizziness. He estimates his overall improvement to be between 40 to 50 percent.  He currently rates the severity of her symptoms 0/10.     ROS  Review of Systems   Constitutional: Negative.    HENT: Negative.     Eyes: Negative.    Respiratory: Negative.     Cardiovascular: Negative.    Gastrointestinal: Negative.    Endocrine: Negative.    Genitourinary: Negative.    Musculoskeletal: Negative.    Skin: Negative.    Allergic/Immunologic: Negative.    Neurological: Negative.    Hematological: Negative.    Psychiatric/Behavioral: Negative.     All other systems reviewed and are negative.    PFSH:  Past Medical History:   Diagnosis Date    Anxiety     BPH (benign prostatic hyperplasia)     Colon polyp     CVA (cerebral vascular accident)     Disease of thyroid gland     Diverticulosis     Elevated cholesterol     Erectile dysfunction     Essential hypertension     GERD (gastroesophageal reflux disease)     Hypertriglyceridemia     Idiopathic peripheral neuropathy     Osteoarthritis      Sleep apnea     TIA (transient ischemic attack) 5/12/24    Urinary incontinence     Inknown     Past Surgical History:   Procedure Laterality Date    BRAIN SURGERY  5/13/24    Hydrocephalus    COLONOSCOPY  04/18/2016    diverticulosis, internal hemorrhoids,     COLONOSCOPY  03/30/2011    internal hemorrhoid, diverticulosis    COLONOSCOPY N/A 03/11/2021    Procedure: COLONOSCOPY WITH ANESTHESIA;  Surgeon: Ricky House MD;  Location:  [facility];  Service: Gastroenterology;  Laterality: N/A;  pre: hx polyps  post: diverticulosis. hemorrhoid.   Keenan Perez MD    CYSTOSCOPY  1994    Before TURP    ENDOSCOPY N/A 03/11/2021    Procedure: ESOPHAGOGASTRODUODENOSCOPY WITH ANESTHESIA;  Surgeon: Ricky House MD;  Location:  [facility];  Service: Gastroenterology;  Laterality: N/A;  pre: GERD  post: hiatal hernia. gastric polyps.  Keenan Perez MD    HERNIA REPAIR      inguinal    PROSTATE SURGERY  1994    TURP    TURP / TRANSURETHRAL INCISION / DRAINAGE PROSTATE      VENTRICULOPERITONEAL SHUNT  5/13/24     SHUNT INSERTION Right 12/13/2024    Procedure: VENTRICULAR PERITONEAL SHUNT INSERTION STERIOTACTIC, right, with Darwin;  Surgeon: Harley Lombardo MD;  Location:  [facility];  Service: Neurosurgery;  Laterality: Right;     Objective      Current Outpatient Medications   Medication Sig Dispense Refill    alfuzosin (UROXATRAL) 10 MG 24 hr tablet Take 1 tablet by mouth Daily.      aspirin 81 MG EC tablet Take 1 tablet by mouth Daily. 30 tablet 2    atorvastatin (Lipitor) 40 MG tablet Take 1 tablet by mouth Daily. 30 tablet 6    citalopram (CeleXA) 20 MG tablet Take 1 tablet by mouth Daily.      diazePAM (VALIUM) 5 MG tablet Take 1 tablet by mouth At Night As Needed for Anxiety.      levothyroxine (SYNTHROID, LEVOTHROID) 88 MCG tablet Take 1 tablet by mouth Daily.      lisinopril (PRINIVIL,ZESTRIL) 20 MG tablet Take 1 tablet by mouth Daily.      Magnesium Hydroxide (MILK OF  "MAGNESIA PO) Take 60 mL by mouth Every Night.      omeprazole (priLOSEC) 20 MG capsule Take 1 capsule by mouth Daily.      rOPINIRole HCl (REQUIP PO) Take 2 mg by mouth Every Night.      traZODone (DESYREL) 50 MG tablet Take 1 tablet by mouth Every Night.       No current facility-administered medications for this visit.     Vital Signs  Ht 179.1 cm (70.5\")   Wt 86.2 kg (190 lb)   BMI 26.88 kg/m²   Physical Exam  Vitals and nursing note reviewed.   Constitutional:       General: He is not in acute distress.     Appearance: Normal appearance. He is well-developed, well-groomed and overweight. He is not ill-appearing, toxic-appearing or diaphoretic.      Comments: BMI 26.88   HENT:      Head: Normocephalic and atraumatic.      Right Ear: Hearing normal.      Left Ear: Hearing normal.   Eyes:      General: Lids are normal.      Extraocular Movements: Extraocular movements intact.      Conjunctiva/sclera: Conjunctivae normal.      Pupils: Pupils are equal, round, and reactive to light.   Neck:      Trachea: Trachea normal.   Cardiovascular:      Rate and Rhythm: Normal rate and regular rhythm.   Pulmonary:      Effort: Pulmonary effort is normal. No tachypnea, bradypnea, accessory muscle usage or respiratory distress.   Abdominal:      Palpations: Abdomen is soft.   Musculoskeletal:      Cervical back: Full passive range of motion without pain and neck supple.   Skin:     General: Skin is warm and dry.   Neurological:      GCS: GCS eye subscore is 4. GCS verbal subscore is 5. GCS motor subscore is 6.      Coordination: Coordination is intact.   Psychiatric:         Speech: Speech normal.         Behavior: Behavior normal. Behavior is cooperative.       Neurological Exam  Mental Status  Awake, alert and oriented to person, place and time. Speech is normal. Able to name objects. Attention and concentration are normal.    Cranial Nerves  CN I: Sense of smell is normal.  CN II: Visual acuity is normal.  CN III, IV, VI: " Extraocular movements intact bilaterally. Normal lids and orbits bilaterally. Pupils equal round and reactive to light bilaterally.  CN V: Facial sensation is normal.  CN VII: Full and symmetric facial movement.  CN IX, X: Palate elevates symmetrically  CN XI: Shoulder shrug strength is normal.  CN XII: Tongue midline without atrophy or fasciculations.    Motor  Normal muscle bulk throughout. Normal muscle tone.                                               Right                     Left  Toe extension                        4-                          3+                                             Right                     Left  Deltoid                                   5                          5   Biceps                                   5                          5   Triceps                                  5                          5   Wrist extensor                       5                          5   Iliopsoas                               5                          5   Quadriceps                           5                          5   Anterior tibialis                      5                          5   Posterior tibialis                    5                          5    Sensory  Sensation is intact to light touch, pinprick, vibration and proprioception in all four extremities.    Coordination    Finger-to-nose, rapid alternating movements and heel-to-shin normal bilaterally without dysmetria.    Gait    Fairly well-maintained gait without assist.  (12 bullet pts)    Results Review:   5/13/2024.  MRI of the brain shows ventriculomegaly with surrounding FLAIR signal changes, as well is an acute ischemic infarct within the left thalamus.            5/14/2024.  Ventriculomegaly       1/2025  Post Shunt      2/25/2025        Assessment/Plan:   Agueda Tyler is a 79 y.o. 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  He presents today for evaluation of ventriculomegaly status post  shunt placement performed on 12/13/2024, reporting an overall improvement in his NPH symptoms by 40-50%.  Physical exam findings of  shunt performance setting currently at 4, bright awake, oriented x 3, names objects, bilateral EHL weakness, and a fairly well-maintained gait without assist.  Serial imaging shows a stable appearing  shunt within the right lateral ventricle, relatively unchanged ventriculomegaly, without evidence of acute intracranial blood products.    Recommendations  Normal pressure hydrocephalus  Status post right  shunt placement (12/13/2024)  Mr. Tyler presents today for continued evaluation of normal pressure hydrocephalus status post  shunt placement.  At his last encounter his  shunt was try drainage from 5-4.  In general, he is doing well reporting improvement in his memory/cognition, urinary urgency/nocturia, and gait and balance.  We discussed the pros and cons of  shunt adjustment.  After careful consideration and discussion with his son, Agueda has decided to leave his  shunt set at its current rate of 4.  Will have him return in 2 months for reevaluation.  Mr. Tyler knows he may contact the neurosurgical clinic to return sooner for any new or additional concerns and agrees with this plan of care.    Underweight (BMI < 19.9)   Body mass index is 26.88 kg/m². I recommended Ensure or equivalent nutritional supplement 3 times daily with each meal as a dietary supplement.  Information provided on high-protein diet provided in AVS.    Fall risk  [facility] Fall Risk Assessment was completed, and patient is at MODERATE risk for falls. Assessment completed on:2/27/2025  Fall risk information provided in AVS.    Diagnoses and all orders for this visit:    1. Normal pressure hydrocephalus (Primary)    2. S/P  shunt    3.  Overweight with body mass index (BMI) of 26 to 26.9 in adult    4. At risk for falls      Return for Follow-up with [facility] in 2 months.    Thank you, for allowing me to continue to participate in the care of this patient.    Sincerely,  MIO Denson    Patient or patient representative verbalized consent for the use of Ambient Listening during the visit with  MIO Denson for chart documentation. 2/27/2025  11:05 CST

## 2025-02-28 SDOH — ECONOMIC STABILITY: FOOD INSECURITY: WITHIN THE PAST 12 MONTHS, THE FOOD YOU BOUGHT JUST DIDN'T LAST AND YOU DIDN'T HAVE MONEY TO GET MORE.: NEVER TRUE

## 2025-02-28 SDOH — ECONOMIC STABILITY: FOOD INSECURITY: WITHIN THE PAST 12 MONTHS, YOU WORRIED THAT YOUR FOOD WOULD RUN OUT BEFORE YOU GOT MONEY TO BUY MORE.: NEVER TRUE

## 2025-02-28 SDOH — ECONOMIC STABILITY: INCOME INSECURITY: IN THE LAST 12 MONTHS, WAS THERE A TIME WHEN YOU WERE NOT ABLE TO PAY THE MORTGAGE OR RENT ON TIME?: NO

## 2025-02-28 ASSESSMENT — PATIENT HEALTH QUESTIONNAIRE - PHQ9
7. TROUBLE CONCENTRATING ON THINGS, SUCH AS READING THE NEWSPAPER OR WATCHING TELEVISION: NOT AT ALL
SUM OF ALL RESPONSES TO PHQ QUESTIONS 1-9: 1
3. TROUBLE FALLING OR STAYING ASLEEP: NOT AT ALL
5. POOR APPETITE OR OVEREATING: NOT AT ALL
6. FEELING BAD ABOUT YOURSELF - OR THAT YOU ARE A FAILURE OR HAVE LET YOURSELF OR YOUR FAMILY DOWN: NOT AT ALL
SUM OF ALL RESPONSES TO PHQ QUESTIONS 1-9: 1
SUM OF ALL RESPONSES TO PHQ QUESTIONS 1-9: 1
4. FEELING TIRED OR HAVING LITTLE ENERGY: SEVERAL DAYS
2. FEELING DOWN, DEPRESSED OR HOPELESS: NOT AT ALL
SUM OF ALL RESPONSES TO PHQ QUESTIONS 1-9: 1
SUM OF ALL RESPONSES TO PHQ QUESTIONS 1-9: 1
2. FEELING DOWN, DEPRESSED OR HOPELESS: NOT AT ALL
1. LITTLE INTEREST OR PLEASURE IN DOING THINGS: NOT AT ALL
9. THOUGHTS THAT YOU WOULD BE BETTER OFF DEAD, OR OF HURTING YOURSELF: NOT AT ALL
4. FEELING TIRED OR HAVING LITTLE ENERGY: SEVERAL DAYS
7. TROUBLE CONCENTRATING ON THINGS, SUCH AS READING THE NEWSPAPER OR WATCHING TELEVISION: NOT AT ALL
10. IF YOU CHECKED OFF ANY PROBLEMS, HOW DIFFICULT HAVE THESE PROBLEMS MADE IT FOR YOU TO DO YOUR WORK, TAKE CARE OF THINGS AT HOME, OR GET ALONG WITH OTHER PEOPLE: NOT DIFFICULT AT ALL
5. POOR APPETITE OR OVEREATING: NOT AT ALL
9. THOUGHTS THAT YOU WOULD BE BETTER OFF DEAD, OR OF HURTING YOURSELF: NOT AT ALL
8. MOVING OR SPEAKING SO SLOWLY THAT OTHER PEOPLE COULD HAVE NOTICED. OR THE OPPOSITE - BEING SO FIDGETY OR RESTLESS THAT YOU HAVE BEEN MOVING AROUND A LOT MORE THAN USUAL: NOT AT ALL
6. FEELING BAD ABOUT YOURSELF - OR THAT YOU ARE A FAILURE OR HAVE LET YOURSELF OR YOUR FAMILY DOWN: NOT AT ALL
3. TROUBLE FALLING OR STAYING ASLEEP: NOT AT ALL
8. MOVING OR SPEAKING SO SLOWLY THAT OTHER PEOPLE COULD HAVE NOTICED. OR THE OPPOSITE, BEING SO FIGETY OR RESTLESS THAT YOU HAVE BEEN MOVING AROUND A LOT MORE THAN USUAL: NOT AT ALL
10. IF YOU CHECKED OFF ANY PROBLEMS, HOW DIFFICULT HAVE THESE PROBLEMS MADE IT FOR YOU TO DO YOUR WORK, TAKE CARE OF THINGS AT HOME, OR GET ALONG WITH OTHER PEOPLE: NOT DIFFICULT AT ALL
1. LITTLE INTEREST OR PLEASURE IN DOING THINGS: NOT AT ALL

## 2025-03-03 ENCOUNTER — OFFICE VISIT (OUTPATIENT)
Dept: PRIMARY CARE CLINIC | Age: 80
End: 2025-03-03
Payer: MEDICARE

## 2025-03-03 VITALS
BODY MASS INDEX: 25.9 KG/M2 | HEART RATE: 94 BPM | DIASTOLIC BLOOD PRESSURE: 84 MMHG | TEMPERATURE: 98.1 F | WEIGHT: 191 LBS | SYSTOLIC BLOOD PRESSURE: 128 MMHG | OXYGEN SATURATION: 99 %

## 2025-03-03 DIAGNOSIS — K59.00 CONSTIPATION, UNSPECIFIED CONSTIPATION TYPE: ICD-10-CM

## 2025-03-03 DIAGNOSIS — F41.8 DEPRESSION WITH ANXIETY: ICD-10-CM

## 2025-03-03 DIAGNOSIS — E03.9 PRIMARY HYPOTHYROIDISM: ICD-10-CM

## 2025-03-03 DIAGNOSIS — G91.2 NPH (NORMAL PRESSURE HYDROCEPHALUS) (HCC): Primary | ICD-10-CM

## 2025-03-03 DIAGNOSIS — G25.81 RESTLESS LEG SYNDROME: ICD-10-CM

## 2025-03-03 DIAGNOSIS — F51.01 PRIMARY INSOMNIA: ICD-10-CM

## 2025-03-03 DIAGNOSIS — E78.00 HYPERCHOLESTEROLEMIA: ICD-10-CM

## 2025-03-03 DIAGNOSIS — I10 ESSENTIAL (PRIMARY) HYPERTENSION: ICD-10-CM

## 2025-03-03 DIAGNOSIS — M25.50 ARTHRALGIA, UNSPECIFIED JOINT: ICD-10-CM

## 2025-03-03 DIAGNOSIS — K21.9 GASTROESOPHAGEAL REFLUX DISEASE WITHOUT ESOPHAGITIS: ICD-10-CM

## 2025-03-03 DIAGNOSIS — N18.31 STAGE 3A CHRONIC KIDNEY DISEASE (HCC): ICD-10-CM

## 2025-03-03 DIAGNOSIS — N40.0 BENIGN PROSTATIC HYPERPLASIA WITHOUT LOWER URINARY TRACT SYMPTOMS: ICD-10-CM

## 2025-03-03 PROCEDURE — G8417 CALC BMI ABV UP PARAM F/U: HCPCS | Performed by: FAMILY MEDICINE

## 2025-03-03 PROCEDURE — 99214 OFFICE O/P EST MOD 30 MIN: CPT | Performed by: FAMILY MEDICINE

## 2025-03-03 PROCEDURE — G8427 DOCREV CUR MEDS BY ELIG CLIN: HCPCS | Performed by: FAMILY MEDICINE

## 2025-03-03 PROCEDURE — G2211 COMPLEX E/M VISIT ADD ON: HCPCS | Performed by: FAMILY MEDICINE

## 2025-03-03 PROCEDURE — 1123F ACP DISCUSS/DSCN MKR DOCD: CPT | Performed by: FAMILY MEDICINE

## 2025-03-03 PROCEDURE — 1159F MED LIST DOCD IN RCRD: CPT | Performed by: FAMILY MEDICINE

## 2025-03-03 PROCEDURE — 3074F SYST BP LT 130 MM HG: CPT | Performed by: FAMILY MEDICINE

## 2025-03-03 PROCEDURE — 3079F DIAST BP 80-89 MM HG: CPT | Performed by: FAMILY MEDICINE

## 2025-03-03 PROCEDURE — 1036F TOBACCO NON-USER: CPT | Performed by: FAMILY MEDICINE

## 2025-03-03 RX ORDER — LEVOTHYROXINE SODIUM 88 UG/1
88 TABLET ORAL DAILY
Qty: 90 TABLET | Refills: 1 | Status: SHIPPED | OUTPATIENT
Start: 2025-03-03

## 2025-03-03 NOTE — PATIENT INSTRUCTIONS
Labs before next appointment:  Go to room 405 for labs prior to next visit.   Be sure to fast for at least 10 hours prior to laboratory appointment.   Would recommend getting labs about 1 week prior to the appointment time to ensure they are available at the time of the appointment.   No appointment is necessary for laboratory work as the orders have already been placed.    Lab opens at 6:30 am and closes at 4:30 pm.

## 2025-03-03 NOTE — PROGRESS NOTES
[facility]  [facility] PRIMARY [address]  [address]  [place] [address]  Dept: [phone]  Dept Fax: [phone]  Loc: [phone]    Echo Brown is a 79 y.o. male who presents today for his medical conditions/complaints as noted below.  Echo Brown is here for 3 Month Follow-Up         Patient History:     Normal pressure hydrocephalus  - shunt: December 13, 2024  -Neurosurgery: [facility]: Dr. Ballesteros    Stroke symptoms  -May 12, 2024 hospitalized at [facility]  CT head without contrast: No acute intracranial process.  Advanced chronic small vessel disease.  Ventriculomegaly present concerning for NPH  CT angiogram neck: No arterial occlusion or flow-limiting stenosis.  CT angiogram of head: No arterial occlusion or flow-limiting stenosis.  CT cerebral perfusion: Normal.  No discrete infarct.  MRI brain: No acute intercranial process.  No intracranial mass.  Advanced chronic small vessel ischemic changes.    Echocardiogram: Ejection fraction 66 to 70%.  No right-to-left shunt.  Holter: Predominantly normal sinus rhythm.                  Restless leg syndrome  -Neurology: Dr. Jordan     Pancreatic cyst  -[facility] gastroenterology:  - November 2023: MRI abdomen with and without: 0.9 x 1.8 cm: Unchanged  Pthx     Chronic kidney disease stage III:  - Sustained a 4 foot fall in January 2022 requiring transfer to [facility].  -Sustained a closed injury of the left kidney.  -Developed reduction in GFR which has never returned back to baseline     Left thalamic ischemic infarction  -May 2024  May 2024 workup:  - TTE: Ejection fraction 66 to 70%, no evidence of right to left shunt, no significant valvular disease  -CT cerebral perfusion: Normal  -CT angiogram head neck: No arterial occlusion or flow-limiting stenosis.  No intracranial large vessel occlusion or flow-limiting stenosis.  July 2024 workup:  -72-hour Holter monitor: Relatively benign.  No atrial

## 2025-03-04 ENCOUNTER — OFFICE VISIT (OUTPATIENT)
Dept: NEUROLOGY | Facility: CLINIC | Age: 80
End: 2025-03-04
Payer: MEDICARE

## 2025-03-04 VITALS
WEIGHT: 189.2 LBS | SYSTOLIC BLOOD PRESSURE: 150 MMHG | BODY MASS INDEX: 26.49 KG/M2 | HEART RATE: 72 BPM | DIASTOLIC BLOOD PRESSURE: 96 MMHG | RESPIRATION RATE: 12 BRPM | HEIGHT: 71 IN

## 2025-03-04 DIAGNOSIS — Z98.2 S/P VP SHUNT: ICD-10-CM

## 2025-03-04 DIAGNOSIS — I69.30 HISTORY OF STROKE WITH CURRENT RESIDUAL EFFECTS: Primary | ICD-10-CM

## 2025-03-04 DIAGNOSIS — E78.5 HYPERLIPIDEMIA LDL GOAL <70: ICD-10-CM

## 2025-03-04 DIAGNOSIS — G25.81 RESTLESS LEGS SYNDROME (RLS): ICD-10-CM

## 2025-03-04 DIAGNOSIS — G91.2 NORMAL PRESSURE HYDROCEPHALUS: ICD-10-CM

## 2025-03-04 RX ORDER — ATORVASTATIN CALCIUM 40 MG/1
40 TABLET, FILM COATED ORAL DAILY
Qty: 90 TABLET | Refills: 3 | Status: SHIPPED | OUTPATIENT
Start: 2025-03-04 | End: 2026-03-04

## 2025-03-04 NOTE — PROGRESS NOTES
Neurology Consult Note    [facility]  [address], [address]  [place] [address]  Phone: [phone]  Fax: [phone]    Referring Provider:   No ref. provider found  Primary Care Provider:  Keenan Perez MD    Reason for Consult:  History of stroke  Restless leg syndrome  Ventriculomegaly  Subjective      Agueda Tyler presents to [facility]    History of Present Illness  Pleasant 79-year-old male followed for history of stroke.  He was last seen in clinic by me on 9/3/2024.  Reviewed that record shows patient is maintained on dual platelet therapy.  He is also on aspirin 80 mg nightly.  Patient is also reporting a significant mount of bruising.  He is also had some blood in his stool while straining.  There were also some concerns about memory impairment.  Additionally he notes he has had abnormal gait for approximately 3 years.  He also experiences some urinary incontinence.  I had reviewed a previous MRI that was performed in May 2024.  Reviewed this did show some ventriculomegaly.  Specifically his Clark index was elevated at 0.45.  I did recommend the patient to be evaluated for potential normal pressure hydrocephalus through neurosurgery.    Patient was able to get in with our neurosurgery group.  He saw MIO Conti on 9/26/2024.  Reviewed his note shows patient denied forgetfulness however his wife did endorse intermittent forgetfulness over the years.  Patient did endorse a poor gait and balance.  Patient declined bowel or bladder incontinence to them.  I did review his MRI which showed ventriculomegaly with surrounding FLAIR signal changes which were concerning for NPH.  Patient did elect to proceed with evaluation.  His prelumbar puncture MoCA score was 20 out of 30.  After having a lumbar puncture he did increase to 23 out of 30.  Lumbar puncture was performed on 11/6/2024.  Opening pressure was noted at 12 cm of water.  He did have  approximately 40 cc of fluid removed.  Closing pressure was 5 cm of water.  Additionally he had improvement in his gait per review of physical therapy notes.  MoCA continue to improve to 25 out of 30 on today to after lumbar puncture.  Patient was identified is a good candidate for ventriculoperitoneal shunting.    Patient did undergo ventriculoperitoneal shunting with Dr. Lombardo as well as Dr. Granados on 12/13/2024.  This was completed successfully.  Patient did see MIO Perez in follow-up on 2/27/2025.  Patient current setting on his shunt is 4.  Patient notes improvement in his memory and cognitive function.  Patient notes improvement in his gait.  Denies any urinary issues.  Patient reports approximately 40 to 50% improvement in his overall condition.  He rates the severity of his symptoms as 0 out of 10.    Today patient presents by himself.  Reports to me since last being seen overall he is doing well.  He has noted he has adjusted to his new life.  He notes since having the shunt his function has greatly improved.  He is retired now.  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  He does tell me since the stroke occurred he has continued with mild right-sided weakness in his upper lower extremities as well as some numbness.  Patient notes since having his shunt placed, his gait has improved as well as his cognition.  He is very pleased with the response and glad that he proceed with the procedure.  He has no complaints today to me.  Patient Active Problem List   Diagnosis    Hx of colonic polyp    Gastroesophageal reflux disease without esophagitis    Iron deficiency anemia    BRBPR (bright red blood per rectum)    Acute ischemic stroke    Essential hypertension    Stroke-like symptom    Right upper lobe consolidation    SINDI (obstructive sleep apnea)    Restless legs    Impaired gait and mobility    Cerebral ventriculomegaly due to brain atrophy    Normal pressure hydrocephalus        Past Medical History:    Diagnosis Date    Anxiety     BPH (benign prostatic hyperplasia)     Colon polyp     CVA (cerebral vascular accident)     Disease of thyroid gland     Diverticulosis     Elevated cholesterol     Erectile dysfunction     Essential hypertension     GERD (gastroesophageal reflux disease)     Hypertriglyceridemia     Idiopathic peripheral neuropathy     Osteoarthritis     Sleep apnea     TIA (transient ischemic attack) 5/12/24    Urinary incontinence     Inknown        Social History     Socioeconomic History    Marital status:    Tobacco Use    Smoking status: Never    Smokeless tobacco: Never    Tobacco comments:     Never used   Vaping Use    Vaping status: Never Used   Substance and Sexual Activity    Alcohol use: Never    Drug use: Never    Sexual activity: Defer     Partners: Female     Birth control/protection: Post-menopausal, Natural family planning/Rhythm, Hysterectomy        Allergies   Allergen Reactions    Sulfa Antibiotics Rash          Current Outpatient Medications:     alfuzosin (UROXATRAL) 10 MG 24 hr tablet, Take 1 tablet by mouth Daily., Disp: , Rfl:     aspirin 81 MG EC tablet, Take 1 tablet by mouth Daily., Disp: 30 tablet, Rfl: 2    atorvastatin (Lipitor) 40 MG tablet, Take 1 tablet by mouth Daily., Disp: 90 tablet, Rfl: 3    citalopram (CeleXA) 20 MG tablet, Take 1 tablet by mouth Daily., Disp: , Rfl:     diazePAM (VALIUM) 5 MG tablet, Take 1 tablet by mouth At Night As Needed for Anxiety., Disp: , Rfl:     levothyroxine (SYNTHROID, LEVOTHROID) 88 MCG tablet, Take 1 tablet by mouth Daily., Disp: , Rfl:     lisinopril (PRINIVIL,ZESTRIL) 20 MG tablet, Take 1 tablet by mouth Daily., Disp: , Rfl:     Magnesium Hydroxide (MILK OF MAGNESIA PO), Take 60 mL by mouth Every Night., Disp: , Rfl:     omeprazole (priLOSEC) 20 MG capsule, Take 1 capsule by mouth Daily., Disp: , Rfl:     rOPINIRole HCl (REQUIP PO), Take 2 mg by mouth Every Night., Disp: , Rfl:     traZODone (DESYREL) 50 MG tablet, Take  "1 tablet by mouth Every Night., Disp: , Rfl:        Objective   Vital Signs:   /96 (BP Location: Left arm, Patient Position: Sitting)   Pulse 72   Resp 12   Ht 179.1 cm (70.51\")   Wt 85.8 kg (189 lb 3.2 oz)   BMI 26.75 kg/m²       Physical Exam  Vitals and nursing note reviewed.   Eyes:      General: Lids are normal.      Extraocular Movements: Extraocular movements intact.      Pupils: Pupils are equal, round, and reactive to light.   Pulmonary:      Effort: Pulmonary effort is normal. No respiratory distress.   Skin:     General: Skin is warm and dry.   Neurological:      Deep Tendon Reflexes:      Reflex Scores:       Bicep reflexes are 2+ on the right side and 2+ on the left side.       Brachioradialis reflexes are 2+ on the right side and 2+ on the left side.       Patellar reflexes are 3+ on the right side and 3+ on the left side.       Achilles reflexes are 3+ on the right side and 3+ on the left side.  Psychiatric:         Speech: Speech normal.        Neurological Exam  Mental Status  Awake, alert and oriented to person, place and time. Oriented to person, place, time and situation. Speech is normal. Language is fluent with no aphasia.    Cranial Nerves  CN II: Visual fields full to confrontation.  CN III, IV, VI: Extraocular movements intact bilaterally. Normal lids and orbits bilaterally. Pupils equal round and reactive to light bilaterally.  CN V: Facial sensation is normal.  CN VII: Full and symmetric facial movement.  CN IX, X: Palate elevates symmetrically. Normal gag reflex.  CN XI: Shoulder shrug strength is normal.  CN XII: Tongue midline without atrophy or fasciculations.    Motor  Normal muscle bulk throughout. No fasciculations present. Normal muscle tone. No abnormal involuntary movements. Strength is 5/5 in all four extremities except as noted.  Minimal right  weakness compared to left.    Sensory  Light touch is normal in upper and lower extremities.     Reflexes                "                             Right                      Left  Brachioradialis                    2+                         2+  Biceps                                 2+                         2+  Patellar                                3+                         3+  Achilles                                3+                         3+    Coordination  Right: Finger-to-nose normal.Left: Finger-to-nose normal.    Gait  Casual gait: Normal stance. Normal stride length. Antalgic gait. Normal right arm swing. Normal left arm swing.      Result Review :   The following data was reviewed by: MIO Arredondo on 03/04/2025:  CMP          8/26/2024    08:58 12/6/2024    13:37 12/14/2024    07:00   CMP   Glucose  93  96    BUN  15  18    Creatinine  1.26  1.29    EGFR  57.7  56.1    Sodium  137  137    Potassium  4.1  4.0    Chloride  99  103    Calcium  9.2  8.7    Total Protein  6.7  5.6    Albumin  4.3  3.5    Globulin  2.4  2.1    Total Bilirubin 2.0     2.1  1.8    Alkaline Phosphatase  216  176    AST (SGOT)  26  35    ALT (SGPT)  35  30    Albumin/Globulin Ratio  1.8  1.7    BUN/Creatinine Ratio  11.9  14.0    Anion Gap  9.0  10.0       Details          This result is from an external source.             CBC w/diff          11/6/2024    07:34 12/6/2024    13:37 12/14/2024    07:00   CBC w/Diff   WBC 5.51  9.91  14.60    RBC 5.75  5.78  5.31    Hemoglobin 16.5  16.5  14.9    Hematocrit 48.5  49.7  45.4    MCV 84.3  86.0  85.5    MCH 28.7  28.5  28.1    MCHC 34.0  33.2  32.8    RDW 14.6  14.3  14.0    Platelets 147  155  156    Neutrophil Rel % 70.6   85.1    Immature Granulocyte Rel % 0.4   0.5    Lymphocyte Rel % 17.6   7.7    Monocyte Rel % 7.6   6.0    Eosinophil Rel % 3.1   0.5    Basophil Rel % 0.7   0.2      Lipid Panel          3/18/2024    13:32 5/13/2024    05:31   Lipid Panel   Total Cholesterol  132    Total Cholesterol 145        Triglycerides 231     135    HDL Cholesterol 48     48    VLDL  Cholesterol  24    LDL Cholesterol  51     60    LDL/HDL Ratio  1.19       Details          This result is from an external source.             TSH          3/18/2024    13:32 8/22/2024    07:29   TSH   TSH 2.010     0.673          Details          This result is from an external source.             Most Recent A1C          5/13/2024    05:31   HGBA1C Most Recent   Hemoglobin A1C 5.20           CT Head Without Contrast Stroke Protocol (05/12/2024 13:41)  Study Result    Narrative & Impression   CT HEAD WO CONTRAST STROKE PROTOCOL- 5/12/2024 12:37 PM     HISTORY: Neuro deficit, acute, stroke suspected       DOSE LENGTH PRODUCT: 907.63 mGy.cm. Automated exposure control was also  utilized to decrease patient radiation dose.     TECHNIQUE:  Axial CT of the brain without IV contrast. Sagittal and coronal  reformations are also provided for review. Soft tissue and bone kernels  are available for interpretation.     COMPARISON: None.     FINDINGS:     There is no evidence of acute large vascular territory infarct. Chronic  small vessel ischemic changes. No intra-axial or extra-axial hemorrhage.  No visualized mass lesion or mass effect. Lateral ventriculomegaly.  Bowing of the corpus callosum with attenuation of the subarachnoid space  along the high convexities. Posterior fossa structures are unremarkable.  Visualized paranasal sinuses and mastoids are clear.     IMPRESSION:  1. No acute intracranial process. In particular, there is no acute  hemorrhage identified.  2. Advanced chronic small vessel ischemic changes.  3. Ventriculomegaly is present, pattern suspicious for communicating  hydrocephalus such as NPH.     Findings were discussed with Dr. Koo on 5/12/2024 2:02 PM.     This report was signed and finalized on 5/12/2024 2:02 PM by Dr Joshua Sin.     CT Angiogram Head w AI Analysis of LVO (05/12/2024 13:48)  Study Result    Narrative & Impression   EXAM: CT ANGIOGRAM HEAD W AI ANALYSIS OF LVO- - 5/12/2024  12:38 PM     HISTORY: Neuro Deficit, acute, Stroke suspected       COMPARISON: None.     DOSE LENGTH PRODUCT: 461.15 mGy.cm. Automated exposure control was also  utilized to decrease patient radiation dose.     TECHNIQUE: CTA head and neck performed with intravenous contrast. 3D  postprocessing, including MIPs, performed and images saved to PACS. AI  ANALYSIS OF LVO WAS UTILIZED.  Grading of carotid stenosis performed  with NASCET criteria.     FINDINGS:     There is a typical three-vessel branching pattern off the aortic arch  without significant ostial narrowing. Common carotid arteries are patent  and without flow-limiting stenosis. There is a normal course and caliber  of the internal and external carotid arteries without evidence of a  flow-limiting stenosis. The vertebral arteries originate from the  subclavian arteries without significant ostial narrowing. Lung apices  are clear. Homogeneous thyroid gland. No cervical adenopathy advanced  cervical spine osteoarthritis changes.     Distal ICAs are patent and without flow-limiting stenosis. No  intracranial large vessel occlusion. Anterior and middle cerebral  arteries are patent and without flow-limiting stenosis. Intracranial  vertebral arteries and basilar artery are normal in caliber. Posterior  cerebral arteries are patent and normal in caliber. Fetal origin of the  left posterior cerebral artery. No visualized aneurysm. No intracranial  hemorrhage or mass effect. Lateral ventriculomegaly.     IMPRESSION:     1. No arterial occlusion or flow-limiting stenosis in the neck.  2. No intracranial large vessel occlusion or flow-limiting stenosis.     This report was signed and finalized on 5/12/2024 2:07 PM by Dr Joshua Sin.     CT Angiogram Neck (05/12/2024 13:48)  Study Result    Narrative & Impression   EXAM: CT ANGIOGRAM HEAD W AI ANALYSIS OF LVO- - 5/12/2024 12:38 PM     HISTORY: Neuro Deficit, acute, Stroke suspected       COMPARISON: None.     DOSE  LENGTH PRODUCT: 461.15 mGy.cm. Automated exposure control was also  utilized to decrease patient radiation dose.     TECHNIQUE: CTA head and neck performed with intravenous contrast. 3D  postprocessing, including MIPs, performed and images saved to PACS. AI  ANALYSIS OF LVO WAS UTILIZED.  Grading of carotid stenosis performed  with NASCET criteria.     FINDINGS:     There is a typical three-vessel branching pattern off the aortic arch  without significant ostial narrowing. Common carotid arteries are patent  and without flow-limiting stenosis. There is a normal course and caliber  of the internal and external carotid arteries without evidence of a  flow-limiting stenosis. The vertebral arteries originate from the  subclavian arteries without significant ostial narrowing. Lung apices  are clear. Homogeneous thyroid gland. No cervical adenopathy advanced  cervical spine osteoarthritis changes.     Distal ICAs are patent and without flow-limiting stenosis. No  intracranial large vessel occlusion. Anterior and middle cerebral  arteries are patent and without flow-limiting stenosis. Intracranial  vertebral arteries and basilar artery are normal in caliber. Posterior  cerebral arteries are patent and normal in caliber. Fetal origin of the  left posterior cerebral artery. No visualized aneurysm. No intracranial  hemorrhage or mass effect. Lateral ventriculomegaly.     IMPRESSION:     1. No arterial occlusion or flow-limiting stenosis in the neck.  2. No intracranial large vessel occlusion or flow-limiting stenosis.     This report was signed and finalized on 5/12/2024 2:07 PM by Dr Joshua Sin.     CT CEREBRAL PERFUSION WITH & WITHOUT CONTRAST (05/12/2024 13:52)  Study Result    Narrative & Impression   CT CEREBRAL PERFUSION W WO CONTRAST- 5/12/2024 12:39 PM     HISTORY: Neuro deficit, acute, stroke suspected     Technique:     1. Perfusion CT is performed to acquire images tracking the temporal  course of iodinated  contrast material passing through the cerebral  circulation. Perfusion parameters, such as cerebral blood flow (CBF),  cerebral blood volume (CBV), mean transit time (MTT), etc. are  calculated by [employer] with additional provided perfusion maps and  estimated stroke volumes.  2. Automated exposure control (AEC) protocols are utilized on the  scanner to ensure dose lowered technique.     Comparison: Noncontrast CT brain and brain angiogram dated 5/12/2024  12:39 PM     CT dose: 1637.31 mGy.cm     Findings:     Normal, symmetric and MTT, TTP, CBV and CBF.      Tmax >6.0 seconds volume: 0 ml     CBF < 30% volume: 0 ml     Mismatch volume: 0 ml     Mismatch ratio: None     IMPRESSION:  Impression:  1. Normal, symmetric cerebral perfusion. No discrete infarct or at risk  territory detected by the perfusion software.     This report was signed and finalized on 5/12/2024 2:29 PM by Dr Joshua Sin.     MRI Brain With & Without Contrast (05/12/2024 18:22)  Study Result    Narrative & Impression   MRI BRAIN W WO CONTRAST- 5/12/2024 4:49 PM     HISTORY: Stroke, follow up     TECHNIQUE: Multisequence, multiplanar MRI of the brain with and without  contrast. 20 cc MultiHance IV contrast.     COMPARISON: CT scan dated 5/12/2024     FINDINGS:     No diffusion signal abnormality. Advanced chronic small vessel ischemic  changes. Likely old cortical/subcortical ischemia in the right frontal  lobe. No intra-axial or extra-axial hemorrhage. No intracranial mass  lesion or pathologic enhancement. There is some lateral  ventriculomegaly. Third and fourth ventricles are nondilated. Posterior  fossa structures are unremarkable. Pituitary gland and sella are  unremarkable. The major intracranial flow-voids are preserved. Orbital  contents are unremarkable. The paranasal sinuses are clear. Mastoid air  cells are clear. Normal marrow signal in the skull base and calvarium.     IMPRESSION:     1.  No acute intracranial process. In  particular, no acute ischemia.  2.  No intracranial mass lesion or pathologic enhancement.  3.  Advanced chronic small vessel ischemic changes. There is quite a bit  of white matter volume loss, especially in the right frontal lobe which  I suspect is related to some old cortical/subcortical ischemia.     This report was signed and finalized on 5/13/2024 8:55 AM by Dr Joshua Sin.     MRI Brain Without Contrast (05/13/2024 17:28)  Study Result    Narrative & Impression   EXAMINATION: MRI BRAIN WO CONTRAST-  5/13/2024 5:34 PM     HISTORY: Right-sided weakness with paresthesia and dysarthria.     FINDINGS: Today's exam is compared to previous study of 1 day earlier.     Multiplanar fast spin echo imaging sequences were obtained of the brain  on a high-field magnet without gadolinium enhancement.     There is now a new site of diffusion restriction involving the left  thalamus with associated ADC mapping signal alteration consistent with  an acute ischemic infarct within the left thalamus. This was not present  on yesterday's exam. No additional sites of diffusion restriction are  present. This infarct is nonhemorrhagic. There is diffuse atrophy of the  brain with moderate small vessel ischemic changes involving the  periventricular and higher white matter tracts. There is significant  white matter volume loss within the right frontal lobe likely related to  remote ischemia. There is associated ex vacuo enlargement of the frontal  horn and body of the right lateral ventricle.     There are normal flow voids within the [place].     The orbits are unremarkable.     IMPRESSION:  1. There is a new finding on today's exam of diffusion restriction  within the left thalamus with associated ADC mapping abnormality  consistent with an acute ischemic infarction within the left thalamus.  This was not present on yesterday's exam. This is nonhemorrhagic. No  mass effect or shift of the midline.     2. No additional  sites of diffusion restriction are present. There is  again atrophy of the brain with moderate small vessel ischemic disease  involving the periventricular and higher white matter tracts. There is  significant white matter volume loss within the right frontal lobe with  ex vacuo enlargement of the frontal horn and body of the right lateral  ventricle as demonstrated on yesterday's exam.     This report was signed and finalized on 5/13/2024 5:39 PM by Dr. Rusty Trujillo MD.     Progress Notes by Marin Lopes APRN (09/26/2024 14:45)  Progress Notes by Monique Verduzco CCC-SLP (10/22/2024 15:15)  Progress Notes by Monique Verduzco CCC-SLP (11/06/2024 14:45)  Therapy Treatment Note by Sammy Anton PTA (11/06/2024 15:00)  Therapy Treatment Note by Sammy Anton PTA (11/07/2024 13:15)  Progress Notes by Harley Lombardo MD (11/06/2024 09:00)  Progress Notes by Harley Lombardo MD (11/20/2024 16:00)  Op Note by Harley Lombardo MD (12/13/2024 07:56)  Op Note by Meño Granados MD (12/13/2024 07:56)  Discharge Summary by Harley Lombardo MD (12/14/2024 11:02)  Progress Notes by Marin Lopes APRN (02/27/2025 10:30)                Impression:  Agueda Tyler is a 79 y.o. male who presents for follow-up of stroke.  Patient remained stable.  Recommend continuing secondary prevention through primary care.  Also recommend continuing aspirin and atorvastatin therapy.  Regards to his normal pressure hydrocephalus diagnosis, he has had a positive response with shunting.  This will continue be managed through neurosurgery with management of his shunt.  We will recommend to see patient back in approximately 6 months.  We will continue to treat for restless leg syndrome.    Diagnoses and all orders for this visit:    1. History of stroke with current residual effects (Primary)  -     atorvastatin (Lipitor) 40 MG tablet; Take 1 tablet by mouth Daily.  Dispense: 90 tablet; Refill: 3    2.  Hyperlipidemia LDL goal <70  -     atorvastatin (Lipitor) 40 MG tablet; Take 1 tablet by mouth Daily.  Dispense: 90 tablet; Refill: 3    3. Normal pressure hydrocephalus    4. S/P  shunt    5. Restless legs syndrome (RLS)        Plan:  Continue aspirin 81 mg daily  Continue atorvastatin 40 mg daily  Continue ropinirole 2 mg nightly  LDL goal less than 70  A1c goal less than 6.5  BP goal less than 130/80  Recommend Mediterranean-style diet  Recommend increase activities as tolerated  Return to emergency room for any new stroke symptoms  Follow-up with neurosurgery as scheduled for  shunting management  Follow-up with primary care as scheduled  Follow-up in our clinic 6 months or sooner if needed    The patient and I have discussed the plan of care and he is in full agreement at this time.   I spent total of 60 minutes on this encounter today.  This includes reviewing prior records, obtaining a thorough HPI, assessment of patient, developing a plan of care with the patient, patient discussion, patient education as well as documentation.  I did spend approximately 40 minutes face-to-face with the patient on this encounter today.  Follow Up   Return in about 6 months (around 9/4/2025) for Restless leg syndrome.            Scottie Clayton, MIO  03/04/25  15:25 CST

## 2025-03-10 ENCOUNTER — PATIENT MESSAGE (OUTPATIENT)
Dept: PRIMARY CARE CLINIC | Age: 80
End: 2025-03-10

## 2025-03-10 DIAGNOSIS — G25.81 RESTLESS LEG SYNDROME: ICD-10-CM

## 2025-03-10 RX ORDER — DIAZEPAM 5 MG/1
5 TABLET ORAL NIGHTLY PRN
Qty: 90 TABLET | Refills: 1 | Status: SHIPPED | OUTPATIENT
Start: 2025-03-10 | End: 2025-06-08

## 2025-03-10 NOTE — TELEPHONE ENCOUNTER
Alexxrabia JESSY MarquezBrown called to request a refill on his medication.      Last office visit : 3/3/2025   Next office visit : 8/5/2025     Last UDS:   Benzodiazepine Screen, Urine   Date Value Ref Range Status   09/13/2024 pos  Final     Buprenorphine Urine   Date Value Ref Range Status   09/13/2024 neg  Final     Cocaine Metabolite Screen, Urine   Date Value Ref Range Status   09/13/2024 neg  Final     Oxycodone Screen, Ur   Date Value Ref Range Status   09/13/2024 neg  Final     Propoxyphene Screen, Urine   Date Value Ref Range Status   09/13/2024 neg  Final     THC Screen, Urine   Date Value Ref Range Status   09/13/2024 neg  Final     Tricyclic Antidepressants, Urine   Date Value Ref Range Status   09/13/2024 neg  Final       Last Cesar: DUE  Medication Contract: 9/17/24   Last Fill: 12/24    Requested Prescriptions     Pending Prescriptions Disp Refills    diazePAM (VALIUM) 5 MG tablet 90 tablet 1     Sig: Take 1 tablet by mouth nightly as needed for Anxiety or Sleep for up to 90 days. Max Daily Amount: 5 mg         Please approve or refuse this medication.   Ally Gaitan MA

## 2025-03-11 LAB
BASOPHILS # BLD: 0.1 K/UL (ref 0–0.2)
BASOPHILS NFR BLD: 0.9 % (ref 0–1)
EOSINOPHIL # BLD: 0.2 K/UL (ref 0–0.6)
EOSINOPHIL NFR BLD: 3.2 % (ref 0–5)
ERYTHROCYTE [DISTWIDTH] IN BLOOD BY AUTOMATED COUNT: 13.8 % (ref 11.5–14.5)
ERYTHROCYTE [SEDIMENTATION RATE] IN BLOOD BY WESTERGREN METHOD: 6 MM/HR (ref 0–15)
HCT VFR BLD AUTO: 46.7 % (ref 42–52)
HGB BLD-MCNC: 15.3 G/DL (ref 14–18)
IMM GRANULOCYTES # BLD: 0 K/UL
LYMPHOCYTES # BLD: 0.8 K/UL (ref 1.1–4.5)
LYMPHOCYTES NFR BLD: 15.6 % (ref 20–40)
MCH RBC QN AUTO: 28.9 PG (ref 27–31)
MCHC RBC AUTO-ENTMCNC: 32.8 G/DL (ref 33–37)
MCV RBC AUTO: 88.1 FL (ref 80–94)
MONOCYTES # BLD: 0.3 K/UL (ref 0–0.9)
MONOCYTES NFR BLD: 4.6 % (ref 0–10)
NEUTROPHILS # BLD: 4.1 K/UL (ref 1.5–7.5)
NEUTS SEG NFR BLD: 75.3 % (ref 50–65)
PLATELET # BLD AUTO: 161 K/UL (ref 130–400)
PMV BLD AUTO: 10.6 FL (ref 9.4–12.4)
RBC # BLD AUTO: 5.3 M/UL (ref 4.7–6.1)
RHEUMATOID FACT SER NEPH-ACNC: <10 IU/ML
WBC # BLD AUTO: 5.4 K/UL (ref 4.8–10.8)

## 2025-03-13 LAB — NUCLEAR IGG SER QL IA: NORMAL

## 2025-04-28 ENCOUNTER — APPOINTMENT (OUTPATIENT)
Dept: CT IMAGING | Facility: HOSPITAL | Age: 80
End: 2025-04-28
Payer: MEDICARE

## 2025-04-28 ENCOUNTER — HOSPITAL ENCOUNTER (EMERGENCY)
Facility: HOSPITAL | Age: 80
Discharge: HOME | End: 2025-04-29
Admitting: STUDENT IN AN ORGANIZED HEALTH CARE EDUCATION/TRAINING PROGRAM
Payer: MEDICARE

## 2025-04-28 ENCOUNTER — APPOINTMENT (OUTPATIENT)
Dept: GENERAL RADIOLOGY | Facility: HOSPITAL | Age: 80
End: 2025-04-28
Payer: MEDICARE

## 2025-04-28 DIAGNOSIS — R42 VERTIGO: Primary | ICD-10-CM

## 2025-04-28 DIAGNOSIS — K57.90 DIVERTICULOSIS: ICD-10-CM

## 2025-04-28 DIAGNOSIS — Z98.2 S/P VP SHUNT: ICD-10-CM

## 2025-04-28 DIAGNOSIS — K44.9 HIATAL HERNIA: ICD-10-CM

## 2025-04-28 LAB
ALBUMIN SERPL-MCNC: 4.6 G/DL (ref 3.5–5.2)
ALBUMIN/GLOB SERPL: 1.8 G/DL
ALP SERPL-CCNC: 207 U/L (ref 39–117)
ALT SERPL W P-5'-P-CCNC: 36 U/L (ref 1–41)
ANION GAP SERPL CALCULATED.3IONS-SCNC: 13 MMOL/L (ref 5–15)
APTT PPP: 25.5 SECONDS (ref 24.5–36)
AST SERPL-CCNC: 34 U/L (ref 1–40)
B PARAPERT DNA SPEC QL NAA+PROBE: NOT DETECTED
B PERT DNA SPEC QL NAA+PROBE: NOT DETECTED
BACTERIA UR QL AUTO: ABNORMAL /HPF
BASOPHILS # BLD AUTO: 0.04 10*3/MM3 (ref 0–0.2)
BASOPHILS NFR BLD AUTO: 0.3 % (ref 0–1.5)
BILIRUB SERPL-MCNC: 2.6 MG/DL (ref 0–1.2)
BILIRUB UR QL STRIP: NEGATIVE
BUN SERPL-MCNC: 9 MG/DL (ref 8–23)
BUN/CREAT SERPL: 8.3 (ref 7–25)
C PNEUM DNA NPH QL NAA+NON-PROBE: NOT DETECTED
CALCIUM SPEC-SCNC: 9.3 MG/DL (ref 8.6–10.5)
CHLORIDE SERPL-SCNC: 101 MMOL/L (ref 98–107)
CLARITY UR: ABNORMAL
CO2 SERPL-SCNC: 25 MMOL/L (ref 22–29)
COLOR UR: YELLOW
CREAT SERPL-MCNC: 1.08 MG/DL (ref 0.76–1.27)
D-LACTATE SERPL-SCNC: 1.8 MMOL/L (ref 0.5–2)
DEPRECATED RDW RBC AUTO: 42.2 FL (ref 37–54)
EGFRCR SERPLBLD CKD-EPI 2021: 69.4 ML/MIN/1.73
EOSINOPHIL # BLD AUTO: 0.03 10*3/MM3 (ref 0–0.4)
EOSINOPHIL NFR BLD AUTO: 0.2 % (ref 0.3–6.2)
ERYTHROCYTE [DISTWIDTH] IN BLOOD BY AUTOMATED COUNT: 13.8 % (ref 12.3–15.4)
FLUAV SUBTYP SPEC NAA+PROBE: NOT DETECTED
FLUBV RNA ISLT QL NAA+PROBE: NOT DETECTED
GLOBULIN UR ELPH-MCNC: 2.5 GM/DL
GLUCOSE SERPL-MCNC: 128 MG/DL (ref 65–99)
GLUCOSE UR STRIP-MCNC: NEGATIVE MG/DL
HADV DNA SPEC NAA+PROBE: NOT DETECTED
HCOV 229E RNA SPEC QL NAA+PROBE: NOT DETECTED
HCOV HKU1 RNA SPEC QL NAA+PROBE: NOT DETECTED
HCOV NL63 RNA SPEC QL NAA+PROBE: NOT DETECTED
HCOV OC43 RNA SPEC QL NAA+PROBE: NOT DETECTED
HCT VFR BLD AUTO: 52.6 % (ref 37.5–51)
HGB BLD-MCNC: 17.9 G/DL (ref 13–17.7)
HGB UR QL STRIP.AUTO: ABNORMAL
HMPV RNA NPH QL NAA+NON-PROBE: NOT DETECTED
HOLD SPECIMEN: NORMAL
HPIV1 RNA ISLT QL NAA+PROBE: NOT DETECTED
HPIV2 RNA SPEC QL NAA+PROBE: NOT DETECTED
HPIV3 RNA NPH QL NAA+PROBE: NOT DETECTED
HPIV4 P GENE NPH QL NAA+PROBE: NOT DETECTED
HYALINE CASTS UR QL AUTO: ABNORMAL /LPF
IMM GRANULOCYTES # BLD AUTO: 0.05 10*3/MM3 (ref 0–0.05)
IMM GRANULOCYTES NFR BLD AUTO: 0.3 % (ref 0–0.5)
INR PPP: 1.1 (ref 0.91–1.09)
KETONES UR QL STRIP: ABNORMAL
LEUKOCYTE ESTERASE UR QL STRIP.AUTO: NEGATIVE
LIPASE SERPL-CCNC: 24 U/L (ref 13–60)
LYMPHOCYTES # BLD AUTO: 0.43 10*3/MM3 (ref 0.7–3.1)
LYMPHOCYTES NFR BLD AUTO: 2.9 % (ref 19.6–45.3)
M PNEUMO IGG SER IA-ACNC: NOT DETECTED
MCH RBC QN AUTO: 28.8 PG (ref 26.6–33)
MCHC RBC AUTO-ENTMCNC: 34 G/DL (ref 31.5–35.7)
MCV RBC AUTO: 84.6 FL (ref 79–97)
MONOCYTES # BLD AUTO: 0.46 10*3/MM3 (ref 0.1–0.9)
MONOCYTES NFR BLD AUTO: 3.1 % (ref 5–12)
NEUTROPHILS NFR BLD AUTO: 13.73 10*3/MM3 (ref 1.7–7)
NEUTROPHILS NFR BLD AUTO: 93.2 % (ref 42.7–76)
NITRITE UR QL STRIP: NEGATIVE
NRBC BLD AUTO-RTO: 0 /100 WBC (ref 0–0.2)
PH UR STRIP.AUTO: 8 [PH] (ref 5–8)
PLATELET # BLD AUTO: 164 10*3/MM3 (ref 140–450)
PMV BLD AUTO: 10.4 FL (ref 6–12)
POTASSIUM SERPL-SCNC: 4.4 MMOL/L (ref 3.5–5.2)
PROCALCITONIN SERPL-MCNC: 0.04 NG/ML (ref 0–0.25)
PROT SERPL-MCNC: 7.1 G/DL (ref 6–8.5)
PROT UR QL STRIP: ABNORMAL
PROTHROMBIN TIME: 14.8 SECONDS (ref 11.8–14.8)
RBC # BLD AUTO: 6.22 10*6/MM3 (ref 4.14–5.8)
RBC # UR STRIP: ABNORMAL /HPF
REF LAB TEST METHOD: ABNORMAL
RHINOVIRUS RNA SPEC NAA+PROBE: NOT DETECTED
RSV RNA NPH QL NAA+NON-PROBE: NOT DETECTED
SARS-COV-2 RNA RESP QL NAA+PROBE: NOT DETECTED
SODIUM SERPL-SCNC: 139 MMOL/L (ref 136–145)
SP GR UR STRIP: 1.01 (ref 1–1.03)
SQUAMOUS #/AREA URNS HPF: ABNORMAL /HPF
UROBILINOGEN UR QL STRIP: ABNORMAL
WBC # UR STRIP: ABNORMAL /HPF
WBC NRBC COR # BLD AUTO: 14.74 10*3/MM3 (ref 3.4–10.8)
WHOLE BLOOD HOLD COAG: NORMAL
WHOLE BLOOD HOLD SPECIMEN: NORMAL

## 2025-04-28 PROCEDURE — 84145 PROCALCITONIN (PCT): CPT | Performed by: PHYSICIAN ASSISTANT

## 2025-04-28 PROCEDURE — 83605 ASSAY OF LACTIC ACID: CPT | Performed by: PHYSICIAN ASSISTANT

## 2025-04-28 PROCEDURE — 99285 EMERGENCY DEPT VISIT HI MDM: CPT

## 2025-04-28 PROCEDURE — 70496 CT ANGIOGRAPHY HEAD: CPT

## 2025-04-28 PROCEDURE — 74177 CT ABD & PELVIS W/CONTRAST: CPT

## 2025-04-28 PROCEDURE — 25510000001 IOPAMIDOL PER 1 ML: Performed by: PHYSICIAN ASSISTANT

## 2025-04-28 PROCEDURE — 83690 ASSAY OF LIPASE: CPT | Performed by: PHYSICIAN ASSISTANT

## 2025-04-28 PROCEDURE — 85025 COMPLETE CBC W/AUTO DIFF WBC: CPT | Performed by: PHYSICIAN ASSISTANT

## 2025-04-28 PROCEDURE — 0202U NFCT DS 22 TRGT SARS-COV-2: CPT | Performed by: PHYSICIAN ASSISTANT

## 2025-04-28 PROCEDURE — 80053 COMPREHEN METABOLIC PANEL: CPT | Performed by: PHYSICIAN ASSISTANT

## 2025-04-28 PROCEDURE — 85730 THROMBOPLASTIN TIME PARTIAL: CPT | Performed by: PHYSICIAN ASSISTANT

## 2025-04-28 PROCEDURE — 71046 X-RAY EXAM CHEST 2 VIEWS: CPT

## 2025-04-28 PROCEDURE — 96374 THER/PROPH/DIAG INJ IV PUSH: CPT

## 2025-04-28 PROCEDURE — 81001 URINALYSIS AUTO W/SCOPE: CPT | Performed by: PHYSICIAN ASSISTANT

## 2025-04-28 PROCEDURE — 70450 CT HEAD/BRAIN W/O DYE: CPT

## 2025-04-28 PROCEDURE — 70498 CT ANGIOGRAPHY NECK: CPT

## 2025-04-28 PROCEDURE — 74018 RADEX ABDOMEN 1 VIEW: CPT

## 2025-04-28 PROCEDURE — 85610 PROTHROMBIN TIME: CPT | Performed by: PHYSICIAN ASSISTANT

## 2025-04-28 PROCEDURE — 70250 X-RAY EXAM OF SKULL: CPT

## 2025-04-28 PROCEDURE — 25010000002 ONDANSETRON PER 1 MG: Performed by: PHYSICIAN ASSISTANT

## 2025-04-28 RX ORDER — SODIUM CHLORIDE 0.9 % (FLUSH) 0.9 %
10 SYRINGE (ML) INJECTION AS NEEDED
Status: DISCONTINUED | OUTPATIENT
Start: 2025-04-28 | End: 2025-04-29 | Stop reason: HOSPADM

## 2025-04-28 RX ORDER — IOPAMIDOL 755 MG/ML
100 INJECTION, SOLUTION INTRAVASCULAR
Status: COMPLETED | OUTPATIENT
Start: 2025-04-28 | End: 2025-04-28

## 2025-04-28 RX ORDER — ONDANSETRON 2 MG/ML
4 INJECTION INTRAMUSCULAR; INTRAVENOUS ONCE
Status: COMPLETED | OUTPATIENT
Start: 2025-04-28 | End: 2025-04-28

## 2025-04-28 RX ADMIN — IOPAMIDOL 100 ML: 755 INJECTION, SOLUTION INTRAVENOUS at 22:44

## 2025-04-28 RX ADMIN — ONDANSETRON 4 MG: 2 INJECTION INTRAMUSCULAR; INTRAVENOUS at 21:39

## 2025-04-29 VITALS
DIASTOLIC BLOOD PRESSURE: 86 MMHG | HEART RATE: 95 BPM | RESPIRATION RATE: 18 BRPM | WEIGHT: 186 LBS | TEMPERATURE: 98.3 F | SYSTOLIC BLOOD PRESSURE: 131 MMHG | BODY MASS INDEX: 25.19 KG/M2 | HEIGHT: 72 IN | OXYGEN SATURATION: 96 %

## 2025-04-29 PROCEDURE — 25010000002 METOCLOPRAMIDE PER 10 MG: Performed by: PHYSICIAN ASSISTANT

## 2025-04-29 PROCEDURE — 96375 TX/PRO/DX INJ NEW DRUG ADDON: CPT

## 2025-04-29 RX ORDER — METOCLOPRAMIDE 5 MG/1
5 TABLET ORAL 3 TIMES DAILY PRN
Qty: 12 TABLET | Refills: 0 | Status: SHIPPED | OUTPATIENT
Start: 2025-04-29

## 2025-04-29 RX ORDER — METOCLOPRAMIDE HYDROCHLORIDE 5 MG/ML
10 INJECTION INTRAMUSCULAR; INTRAVENOUS ONCE
Status: COMPLETED | OUTPATIENT
Start: 2025-04-29 | End: 2025-04-29

## 2025-04-29 RX ORDER — MECLIZINE HYDROCHLORIDE 25 MG/1
25 TABLET ORAL 3 TIMES DAILY PRN
Qty: 12 TABLET | Refills: 0 | Status: SHIPPED | OUTPATIENT
Start: 2025-04-29

## 2025-04-29 RX ADMIN — METOCLOPRAMIDE 10 MG: 5 INJECTION, SOLUTION INTRAMUSCULAR; INTRAVENOUS at 01:16

## 2025-04-29 NOTE — DISCHARGE INSTRUCTIONS
As we discussed over the next few days please stay well rested, well-hydrated, make slow positional changes.  Please use the Reglan as needed for nausea, Antivert for dizziness.  It is important you follow with your primary care provider within the next 48 hours for close reevaluation however should you develop any new or worsening symptoms please return to the ER for further evaluation.    Incidentally today you do have evidence of diverticulosis and a hiatal hernia for which you can monitor through your primary care provider.

## 2025-04-29 NOTE — ED PROVIDER NOTES
Subjective   History of Present Illness    Patient is an 79-year-old male ED with nausea, vomiting, abdominal pain.  PMH significant for BPH, history of CVA, thyroid disease, hypertension, GERD, osteoarthritis, sleep apnea, history of TIA, status post  shunt insertion, status post TURP, status post inguinal hernia repair.  Son and wife at bedside to help provide additional history.  Patient states that he had been feeling well all day including eating a taco casserole around 1 PM when shortly after at 130 he developed sudden dizziness, nausea, vomiting.  Patient describes an uneasy feeling in his stomach but denies any localized tenderness.  Patient states that if he is sitting resting the symptoms are somewhat improved however when he stands to get up the dizziness worsens followed by the nausea and vomiting.  Patient reports some loose stools but denies any significant diarrhea.  Patient denies hematemesis, black/bloody/tarry stools, fevers, chills, diaphoresis, dysuria, hematuria or flank pain.  Patient did note that he had a  shunt placed in December and has had no problems since including no episodes of vertigo or dizziness.  Patient attempted to take his Zofran but does not believe it stayed down long enough and thinks he threw it up.  Patient denies use of any other medications.  Patient states that no other individuals who ate the homemade taco casserole are having similar symptoms.  Patient denies any recent known sick contact, recent travel, or any antibiotic use in the past 3 months and presents at this time for further evaluation.    Records reviewed show patient was last seen in the outpatient setting at the neurology office on 3/4/25 for history of stroke, hyperlipidemia goal less than 70, NPH, status post  shunt insertion, restless leg syndrome.    Patient was last seen in the ED on 1/31/2022 for closed nondisplaced fracture pelvis, multiple left-sided rib fractures, left kidney laceration, lung  contusion.    Review of Systems   Constitutional: Negative.  Negative for chills, diaphoresis and fever.   HENT: Negative.  Negative for tinnitus.    Eyes: Negative.  Negative for photophobia and visual disturbance.   Respiratory: Negative.     Cardiovascular: Negative.    Gastrointestinal:  Positive for abdominal pain (Generalized discomfort), nausea and vomiting. Negative for diarrhea.   Genitourinary: Negative.  Negative for dysuria and flank pain.   Musculoskeletal: Negative.  Negative for myalgias, neck pain and neck stiffness.   Skin: Negative.  Negative for rash.   Neurological:  Positive for dizziness. Negative for syncope, light-headedness and headaches.   Psychiatric/Behavioral: Negative.     All other systems reviewed and are negative.      Past Medical History:   Diagnosis Date    Anxiety     BPH (benign prostatic hyperplasia)     Colon polyp     CVA (cerebral vascular accident)     Disease of thyroid gland     Diverticulosis     Elevated cholesterol     Erectile dysfunction     Essential hypertension     GERD (gastroesophageal reflux disease)     Hypertriglyceridemia     Idiopathic peripheral neuropathy     Osteoarthritis     Sleep apnea     TIA (transient ischemic attack) 5/12/24    Urinary incontinence     Inknown       Allergies   Allergen Reactions    Sulfa Antibiotics Rash       Past Surgical History:   Procedure Laterality Date    BRAIN SURGERY  5/13/24    Hydrocephalus    COLONOSCOPY  04/18/2016    diverticulosis, internal hemorrhoids,     COLONOSCOPY  03/30/2011    internal hemorrhoid, diverticulosis    COLONOSCOPY N/A 03/11/2021    Procedure: COLONOSCOPY WITH ANESTHESIA;  Surgeon: Ricky House MD;  Location: [facility] ENDOSCOPY;  Service: Gastroenterology;  Laterality: N/A;  pre: hx polyps  post: diverticulosis. hemorrhoid.   Keenan Perez MD    CYSTOSCOPY  1994    Before TURP    ENDOSCOPY N/A 03/11/2021    Procedure: ESOPHAGOGASTRODUODENOSCOPY WITH ANESTHESIA;  Surgeon: Lacy  Ricky MICHEL MD;  Location: [facility] ENDOSCOPY;  Service: Gastroenterology;  Laterality: N/A;  pre: GERD  post: hiatal hernia. gastric polyps.  Keenan Perez MD    HERNIA REPAIR      inguinal    PROSTATE SURGERY  1994    TURP    TURP / TRANSURETHRAL INCISION / DRAINAGE PROSTATE      VENTRICULOPERITONEAL SHUNT  5/13/24     SHUNT INSERTION Right 12/13/2024    Procedure: VENTRICULAR PERITONEAL SHUNT INSERTION STERIOTACTIC, right, with Darwin;  Surgeon: Harley Lombardo MD;  Location: [facility] OR;  Service: Neurosurgery;  Laterality: Right;       Family History   Problem Relation Age of Onset    Arthritis Mother         Breast Cancer    Cancer Mother     Hypertension Father     Prostate cancer Father     Colon cancer Neg Hx        Social History     Socioeconomic History    Marital status:    Tobacco Use    Smoking status: Never    Smokeless tobacco: Never    Tobacco comments:     Never used   Vaping Use    Vaping status: Never Used   Substance and Sexual Activity    Alcohol use: Never    Drug use: Never    Sexual activity: Defer     Partners: Female     Birth control/protection: Post-menopausal, Natural family planning/Rhythm, Hysterectomy           Objective   Physical Exam  Vitals and nursing note reviewed.   Constitutional:       General: He is not in acute distress.     Appearance: Normal appearance. He is not ill-appearing, toxic-appearing or diaphoretic.   HENT:      Head: Normocephalic.      Mouth/Throat:      Mouth: Mucous membranes are moist.      Pharynx: Oropharynx is clear.   Eyes:      Conjunctiva/sclera: Conjunctivae normal.      Pupils: Pupils are equal, round, and reactive to light.      Comments: No vertical or horizontal nystagmus   Cardiovascular:      Rate and Rhythm: Normal rate and regular rhythm.   Pulmonary:      Effort: Pulmonary effort is normal.      Breath sounds: Normal breath sounds.   Abdominal:      General: Bowel sounds are normal. There is no distension.       Palpations: Abdomen is soft.      Tenderness: There is no abdominal tenderness. There is no right CVA tenderness, left CVA tenderness or guarding.   Musculoskeletal:         General: Normal range of motion.      Cervical back: Normal range of motion and neck supple. No rigidity.   Skin:     General: Skin is warm and dry.   Neurological:      General: No focal deficit present.      Mental Status: He is alert and oriented to person, place, and time.      GCS: GCS eye subscore is 4. GCS verbal subscore is 5. GCS motor subscore is 6.      Cranial Nerves: Cranial nerves 2-12 are intact.      Sensory: Sensation is intact.      Motor: Motor function is intact.   Psychiatric:         Mood and Affect: Mood normal.         Behavior: Behavior normal.         Procedures           ED Course                                                         Medical Decision Making  Problems Addressed:  Diverticulosis: complicated acute illness or injury  Hiatal hernia: complicated acute illness or injury  S/P  shunt: complicated acute illness or injury  Vertigo: complicated acute illness or injury    Amount and/or Complexity of Data Reviewed  Independent Historian:      Details: Wife, Son  External Data Reviewed: labs, radiology and notes.  Labs: ordered. Decision-making details documented in ED Course.  Radiology: ordered. Decision-making details documented in ED Course.  ECG/medicine tests: ordered. Decision-making details documented in ED Course.  Discussion of management or test interpretation with external provider(s): Dr. Aries Rose (attending)    Risk  Prescription drug management.      Patient is an 79-year-old male ED with nausea, vomiting, abdominal pain.  PMH significant for BPH, history of CVA, thyroid disease, hypertension, GERD, osteoarthritis, sleep apnea, history of TIA, status post  shunt insertion, status post TURP, status post inguinal hernia repair.  Upon initial evaluation patient resting comfortably in the bed in  no acute distress.  Nontoxic-appearing, non-ill-appearing, nondiaphoretic.  Patient with hypertensive systolic pressures in the 140s and otherwise unremarkable vital signs.  Examination with no focal neurological deficits.  No vertical or horizontal nystagmus.  No other acute examination findings.  Including abdomen soft, nontender, nondistended.  Discussed with patient and family members need for workup to include labs, urinalysis, as well as head and abdominal imaging.  Patient is amenable to treatment plan with no further questions, concerns, or needs at this time.    Differential diagnosis: Viral gastroenteritis, viral illness, urinary tract infection, systemic infection, electrolyte abnormality, pancreatitis, hepatitis, cholecystitis, choledocholithiasis, appendicitis, diverticulitis,  shunt abnormality, ischemic stroke, hemorrhagic stroke, other    Lab work revealed leukocytosis 14.74, hemoconcentrated H&H 17.9/52.6 with normal plates and no further CBC abnormalities.  CMP with alk phos 207, total bilirubin 2.6.  No further hepatic dysfunction.  Normal renal function.  No electrolyte abnormalities and normal anion gap.  PT/INR 14.8/1.1.  Low concern for pancreatic abnormally such as pancreatitis with normal lipase.  Low concern for systemic or ischemic process with normal lactic acid and further low concern for systemic bacterial process with normal procalcitonin.  Urinalysis with trace proteinuria.  No other evidence of infection or acute findings. Respiratory panel unremarkable.   shunt series shows: Intact  shunt catheter, no shunt discontinuously or kinks.  Head CT without contrast showed: No acute intracranial hemorrhage, mass, no acute intracranial findings, mild cerebral atrophy and chronic small vessel ischemic changes, satisfactory position of right frontal approach ventricle shunt catheter with the tip located within the region of the foramen of Jose.  Head CTA showed: Unremarkable CTA of  head, no LVO.  Neck CTA showed: Unremarkable CTA of the neck with no LVO.  CT imaging of the abdomen and pelvis showed: No acute findings within the abdomen or pelvis, trace pelvic free fluid, findings may be related to  shunt catheter, scattered diverticulosis without diverticulitis, no sign for acute enteritis or colitis, no bowel obstruction, no large intraperitoneal free fluid, no intraperitoneal free air, large hiatal hernia.  Case was discussed with Dr. Aries Rose, attending, who reviewed abdominal imaging with radiologist.  Radiologist reports that the trace amount of free pelvic fluid is related to the  shunt catheter in appropriate amount.  No other acute findings.  Patient was given a dose of Zofran for which he had improvement in his nausea and Reglan for which he then had improvement in his dizziness.  Patient with negative orthostatics.  Patient is able to stand and ambulate with improved ease.  Discussed with patient need for continued rest, hydration, appropriate home use of Reglan versus meclizine as well as need for PCP follow-up within the next 48 hours for close reevaluation.  Advised strict return precautions and need for immediate return to ED should he develop any new or worsening symptoms.  Patient and family members were appreciative with no further questions, concerns, needs at this time and patient is stable for discharge.            Final diagnoses:   Vertigo   Hiatal hernia   S/P  shunt   Diverticulosis       ED Disposition  ED Disposition       ED Disposition   Discharge    Condition   Stable    Comment   --               Keenan Perez MD  [address] [place] [address] [address]  [facility] [address]  [phone]    Schedule an appointment as soon as possible for a visit in 2 days      [facility] EMERGENCY DEPARTMENT  [address] [place] [id number] [phone]    As needed         Medication List        New Prescriptions      meclizine 25 MG  tablet  Commonly known as: ANTIVERT  Take 1 tablet by mouth 3 (Three) Times a Day As Needed for Dizziness.     metoclopramide 5 MG tablet  Commonly known as: REGLAN  Take 1 tablet by mouth 3 (Three) Times a Day As Needed (nausea).               Where to Get Your Medications        These medications were sent to [facility] [address] [facility] - [phone]  - [phone] [address] [address]      Phone: [phone]   meclizine 25 MG tablet  metoclopramide 5 MG tablet            Lucio Shafer PA-C  04/29/25 0204

## 2025-05-14 ENCOUNTER — OFFICE VISIT (OUTPATIENT)
Dept: NEUROSURGERY | Facility: CLINIC | Age: 80
End: 2025-05-14
Payer: MEDICARE

## 2025-05-14 VITALS — WEIGHT: 178 LBS | BODY MASS INDEX: 24.11 KG/M2 | HEIGHT: 72 IN

## 2025-05-14 DIAGNOSIS — G91.2 NORMAL PRESSURE HYDROCEPHALUS: Primary | ICD-10-CM

## 2025-05-14 DIAGNOSIS — Z98.2 S/P VP SHUNT: ICD-10-CM

## 2025-05-14 DIAGNOSIS — Z78.9 NON-SMOKER: ICD-10-CM

## 2025-05-14 NOTE — PROGRESS NOTES
Chief complaint:   Chief Complaint   Patient presents with    Normal pressure hydrocephalus     Patient is here to follow up due to Normal pressure hydrocephalus. Patient denies nay complaints or concerns at this time.     Subjective     HPI:   Agueda Tyler  History of Present Illness  The patient presents for a follow-up of normal pressure hydrocephalus, status post placement of a right ventriculoperitoneal shunt.    Since the last visit, he has been seen once by Marin, during which it was decided to maintain the shunt setting at 4. He reports an overall improvement in his condition, although not to the extent he desires. His memory has improved, and his gait is generally stable, with some days being better than others. He experiences initial difficulty when starting to walk but manages well once he gets going. He also reports improved control over urinary incontinence. He does not experience any headaches. His son notes that his condition seems to have deteriorated since the vertigo episode. He expresses frustration at his inability to perform tasks that were previously second nature to him, such as brushing his teeth. He has lost 30 pounds and currently weighs 172 pounds. He is currently on a regimen of aspirin 81 mg and atorvastatin.    He experienced an episode of nausea, vomiting, and diarrhea, which necessitated an 8-hour stay in the emergency room on 05/2025. During this time, it was confirmed that the shunt was functioning properly and draining into the stomach as expected. The symptoms were attributed to vertigo, which resolved after 2 to 3 days.     ROS  Review of Systems   Constitutional: Negative.    HENT: Negative.     Eyes: Negative.    Respiratory: Negative.     Cardiovascular: Negative.    Gastrointestinal: Negative.    Endocrine: Negative.    Genitourinary: Negative.    Musculoskeletal: Negative.    Skin: Negative.    Allergic/Immunologic: Negative.    Neurological: Negative.    Hematological:  Negative.    Psychiatric/Behavioral: Negative.     All other systems reviewed and are negative.    PFSH:  Past Medical History:   Diagnosis Date    Anxiety     BPH (benign prostatic hyperplasia)     Cataract     Colon polyp     CVA (cerebral vascular accident)     Difficulty walking     Disease of thyroid gland     Diverticulosis     Elevated cholesterol     Erectile dysfunction     Essential hypertension     GERD (gastroesophageal reflux disease)     Hernia 2001    Hypertriglyceridemia     Idiopathic peripheral neuropathy     Osteoarthritis     Sleep apnea     TIA (transient ischemic attack) 5/12/24    Urinary incontinence     Inknown     Past Surgical History:   Procedure Laterality Date    BRAIN SURGERY  5/13/24    Hydrocephalus    COLONOSCOPY  04/18/2016    diverticulosis, internal hemorrhoids,     COLONOSCOPY  03/30/2011    internal hemorrhoid, diverticulosis    COLONOSCOPY N/A 03/11/2021    Procedure: COLONOSCOPY WITH ANESTHESIA;  Surgeon: Ricky House MD;  Location:  [facility];  Service: Gastroenterology;  Laterality: N/A;  pre: hx polyps  post: diverticulosis. hemorrhoid.   Keenan Perez MD    CYSTOSCOPY  1994    Before TURP    ENDOSCOPY N/A 03/11/2021    Procedure: ESOPHAGOGASTRODUODENOSCOPY WITH ANESTHESIA;  Surgeon: Ricky House MD;  Location:  [facility];  Service: Gastroenterology;  Laterality: N/A;  pre: GERD  post: hiatal hernia. gastric polyps.  Keenan Perez MD    EYE SURGERY      Cataracts    HERNIA REPAIR      inguinal    PROSTATE SURGERY  1994    TURP    TURP / TRANSURETHRAL INCISION / DRAINAGE PROSTATE      UPPER GASTROINTESTINAL ENDOSCOPY      VENTRICULOPERITONEAL SHUNT  5/13/24     SHUNT INSERTION Right 12/13/2024    Procedure: VENTRICULAR PERITONEAL SHUNT INSERTION STERIOTACTIC, right, with Darwin;  Surgeon: Harley Lombardo MD;  Location: [facility] OR;  Service: Neurosurgery;  Laterality: Right;     Objective      Current Outpatient Medications  "  Medication Sig Dispense Refill    alfuzosin (UROXATRAL) 10 MG 24 hr tablet Take 1 tablet by mouth Daily.      aspirin 81 MG EC tablet Take 1 tablet by mouth Daily. 30 tablet 2    atorvastatin (Lipitor) 40 MG tablet Take 1 tablet by mouth Daily. 90 tablet 3    citalopram (CeleXA) 20 MG tablet Take 1 tablet by mouth Daily.      diazePAM (VALIUM) 5 MG tablet Take 1 tablet by mouth At Night As Needed for Anxiety.      levothyroxine (SYNTHROID, LEVOTHROID) 88 MCG tablet Take 1 tablet by mouth Daily.      lisinopril (PRINIVIL,ZESTRIL) 20 MG tablet Take 1 tablet by mouth Daily.      Magnesium Hydroxide (MILK OF MAGNESIA PO) Take 60 mL by mouth Every Night.      meclizine (ANTIVERT) 25 MG tablet Take 1 tablet by mouth 3 (Three) Times a Day As Needed for Dizziness. 12 tablet 0    metoclopramide (REGLAN) 5 MG tablet Take 1 tablet by mouth 3 (Three) Times a Day As Needed (nausea). 12 tablet 0    omeprazole (priLOSEC) 20 MG capsule Take 1 capsule by mouth Daily.      rOPINIRole HCl (REQUIP PO) Take 2 mg by mouth Every Night.      traZODone (DESYREL) 50 MG tablet Take 1 tablet by mouth Every Night.       No current facility-administered medications for this visit.     Vital Signs  Ht 182.9 cm (72\")   Wt 80.7 kg (178 lb)   BMI 24.14 kg/m²   Physical Exam  Vitals and nursing note reviewed.   Constitutional:       General: He is not in acute distress.     Appearance: Normal appearance. He is well-developed, well-groomed and overweight. He is not ill-appearing, toxic-appearing or diaphoretic.      Comments: BMI 26.88   HENT:      Head: Normocephalic and atraumatic.      Right Ear: Hearing normal.      Left Ear: Hearing normal.   Eyes:      General: Lids are normal.      Extraocular Movements: Extraocular movements intact.      Conjunctiva/sclera: Conjunctivae normal.      Pupils: Pupils are equal, round, and reactive to light.   Neck:      Trachea: Trachea normal.   Cardiovascular:      Rate and Rhythm: Normal rate and regular " rhythm.   Pulmonary:      Effort: Pulmonary effort is normal. No tachypnea, bradypnea, accessory muscle usage or respiratory distress.   Abdominal:      Palpations: Abdomen is soft.   Musculoskeletal:      Cervical back: Full passive range of motion without pain and neck supple.   Skin:     General: Skin is warm and dry.   Neurological:      GCS: GCS eye subscore is 4. GCS verbal subscore is 5. GCS motor subscore is 6.      Coordination: Coordination is intact.      Deep Tendon Reflexes:      Reflex Scores:       Tricep reflexes are 3+ on the right side.       Bicep reflexes are 3+ on the right side.       Brachioradialis reflexes are 3+ on the right side.       Patellar reflexes are 3+ on the right side.  Psychiatric:         Speech: Speech normal.         Behavior: Behavior normal. Behavior is cooperative.       Neurological Exam  Mental Status  Awake, alert and oriented to person, place and time. Speech is normal. Able to name objects. Attention and concentration are normal.    Cranial Nerves  CN I: Sense of smell is normal.  CN II: Visual acuity is normal.  CN III, IV, VI: Extraocular movements intact bilaterally. Normal lids and orbits bilaterally. Pupils equal round and reactive to light bilaterally.  CN V: Facial sensation is normal.  CN VII: Full and symmetric facial movement.  CN IX, X: Palate elevates symmetrically  CN XI: Shoulder shrug strength is normal.  CN XII: Tongue midline without atrophy or fasciculations.    Motor  Normal muscle bulk throughout. Normal muscle tone.                                               Right                     Left  Toe extension                        4-                          3+                                             Right                     Left  Deltoid                                   5                          5   Biceps                                   5                          5   Triceps                                  5                          5    Wrist extensor                       5                          5   Iliopsoas                               5                          5   Quadriceps                           5                          5   Anterior tibialis                      5                          5   Posterior tibialis                    5                          5    Sensory  Sensation is intact to light touch, pinprick, vibration and proprioception in all four extremities.    Reflexes                                            Right                      Left  Brachioradialis                    3+                          Biceps                                 3+                          Triceps                                3+                          Patellar                                3+                            Coordination    Finger-to-nose, rapid alternating movements and heel-to-shin normal bilaterally without dysmetria.    Gait  Casual gait: Wide stance.  Fairly well-maintained gait without assist.  (12 bullet pts)    Results Review:   5/13/2024.  MRI of the brain shows ventriculomegaly with surrounding FLAIR signal changes, as well is an acute ischemic infarct within the left thalamus.            5/14/2024.  Ventriculomegaly       1/2025  Post Shunt      2/25/2025 4/2025        Assessment/Plan:   Agueda Tyler is a 79 y.o. 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  He presents today for evaluation of ventriculomegaly status post  shunt placement performed on 12/13/2024, reporting an overall improvement in his NPH symptoms by 40-50%.  Physical exam findings of  shunt performance setting currently at 4, bright awake, oriented x 3, names objects, bilateral EHL weakness, and a fairly well-maintained gait without assist.  Serial  imaging shows a stable appearing  shunt within the right lateral ventricle, relatively unchanged ventriculomegaly, without evidence of acute intracranial blood products.    Recommendations  Normal pressure hydrocephalus  Status post right  shunt placement (12/13/2024) currently set to 4  Assessment & Plan  1. Normal Pressure Hydrocephalus     - The patient's condition has shown significant improvement since the initial consultation. The shunt appears to be functioning optimally based on recent imaging studies. The shunt setting was adjusted from 4 to 3 today.      - A head CT without contrast is scheduled for 1 month from now to ensure there is no over shunting or risk of subdural hematomas.     - If any discomfort or adverse effects occur following the adjustment, the patient is advised to contact the office within a week. If necessary, the shunt setting can be reverted to its previous level.     - Patient education included discussing the signs of over shunting, such as headaches when standing up that resolve when lying down, and the importance of monitoring for these symptoms.    2. Vertigo     - The patient experienced an episode of vertigo accompanied by nausea, vomiting, and diarrhea, which resolved after two to three days. Differential diagnoses include labyrinthitis or benign positional vertigo.     - He was evaluated in the emergency room, where tests confirmed the shunt was working properly.     - Patient education included understanding the potential causes of vertigo and the importance of seeking medical attention if symptoms recur.    3. History of Small Thalamic or Basal Ganglia Stroke     - The patient has a history of a small thalamic or basal ganglia stroke. He reports some days are better than others in terms of symptoms, but overall, there has been improvement.     - Current medications include 81 mg aspirin and atorvastatin. Given his age and atrial fibrillation, the risk-benefit profile  favors continuing aspirin.     - Patient education included discussing the importance of continuing aspirin therapy to reduce the risk of further strokes and the potential side effects of aspirin, such as increased bleeding risk.    Follow-up     - The patient will follow up in 1 month with a head CT without contrast to monitor for over shunting and ensure the shunt is functioning properly. If any issues arise before the scheduled follow-up, the patient is advised to contact the office immediately.    PROCEDURE  The ventriculoperitoneal shunt setting was changed from four to three today.    SHUNT ADJUSTMENT PROCEDURE:  Mr. Tyler  shunt is palpable to right anterior lateral scalp.  Shunt pumps and fills well.  This  shunt adjustment was performed using the Kigo Valve adjustment tool.  The  tool was placed above the valve with a opening encompassing the valve mechanism and the blue arrow pointing towards the distal tubing.  The indicator tool (Compass) was placed into the  tool while aligning the red bands on the tool.  The current performance level setting was confirmed at 4.  The  tool was held in position while removing the indicator tool.  The adjustment tool was then placed into the  tool with the blue triangle pointed at the current performance level setting.  By turning the adjustment total, the performance level setting was adjusted to 3.  The  tool was again held in place while removing the adjustment tool.  The indicator tool was placed back into the  tool, again aligning the red bands on the tool, confirming new performance level setting at 3.      Underweight (BMI < 19.9)   Body mass index is 24.14 kg/m². I recommended Ensure or equivalent nutritional supplement 3 times daily with each meal as a dietary supplement.  Information provided on high-protein diet provided in AVS.    Fall risk  STEADI Fall Risk Assessment was completed, and patient is at MODERATE  risk for falls. Assessment completed on:2/27/2025  Fall risk information provided in AVS.    Diagnoses and all orders for this visit:    1. Normal pressure hydrocephalus (Primary)  -     CT Head Without Contrast; Future    2. S/P  shunt  -     CT Head Without Contrast; Future    3. Non-smoker        Return in about 1 month (around 6/14/2025), or ONE MONTH FOLLOW UP WITH MIO RAMSEY WITH MRI.    Thank you, for allowing me to continue to participate in the care of this patient.    Sincerely,  Harley Lombardo MD    I spent 20 minutes caring for Agueda on this date of service. This time includes time spent by me in the following activities: preparing for the visit, reviewing tests, obtaining and/or reviewing a separately obtained history, performing a medically appropriate examination and/or evaluation, counseling and educating the patient/family/caregiver, ordering medications, tests, or procedures, referring and communicating with other health care professionals, documenting information in the medical record, independently interpreting results and communicating that information with the patient/family/caregiver, and/or care coordination.     Medical Decision Making (2/3)  Problem Points (2,3,4 or more)  Chronic stable, 1 (Low)  Data Points (2,3,4 or more)  CATEGORY 1  INDEPENDENT INTERPRETATION Imaging = 1  ORDERED: Imaging (X-ray,CT, MRI) = 1.  CATEGORY 2  CATEGORY 3  Risk (Low, Mod, High)  LOW    E/M = MDM 2 out of 3   or  Time  Est Level 3 - 77228 = Low + (Cat1=2pts or Cat2) + Low Risk   or   30-44 min

## 2025-06-12 ENCOUNTER — HOSPITAL ENCOUNTER (OUTPATIENT)
Dept: CT IMAGING | Facility: HOSPITAL | Age: 80
Discharge: HOME | End: 2025-06-12
Admitting: NEUROLOGICAL SURGERY
Payer: MEDICARE

## 2025-06-12 DIAGNOSIS — Z98.2 S/P VP SHUNT: ICD-10-CM

## 2025-06-12 DIAGNOSIS — G91.2 NORMAL PRESSURE HYDROCEPHALUS: ICD-10-CM

## 2025-06-12 PROCEDURE — 70450 CT HEAD/BRAIN W/O DYE: CPT

## 2025-06-16 ENCOUNTER — OFFICE VISIT (OUTPATIENT)
Dept: NEUROSURGERY | Facility: CLINIC | Age: 80
End: 2025-06-16
Payer: MEDICARE

## 2025-06-16 VITALS — BODY MASS INDEX: 23.95 KG/M2 | HEIGHT: 72 IN | WEIGHT: 176.8 LBS

## 2025-06-16 DIAGNOSIS — Z91.81 AT RISK FOR FALLS: ICD-10-CM

## 2025-06-16 DIAGNOSIS — G91.2 NORMAL PRESSURE HYDROCEPHALUS: Primary | ICD-10-CM

## 2025-06-16 DIAGNOSIS — Z98.2 S/P VP SHUNT: ICD-10-CM

## 2025-06-16 PROCEDURE — 1160F RVW MEDS BY RX/DR IN RCRD: CPT | Performed by: NURSE PRACTITIONER

## 2025-06-16 PROCEDURE — 1159F MED LIST DOCD IN RCRD: CPT | Performed by: NURSE PRACTITIONER

## 2025-06-16 PROCEDURE — 99214 OFFICE O/P EST MOD 30 MIN: CPT | Performed by: NURSE PRACTITIONER

## 2025-06-16 NOTE — PATIENT INSTRUCTIONS
Fall Prevention in the Home, Adult  Falls can cause injuries and can happen to people of all ages. There are many things you can do to make your home safer and to help prevent falls.  What actions can I take to prevent falls?  General information  Use good lighting in all rooms. Make sure to:  Replace any light bulbs that burn out.  Turn on the lights in dark areas and use night-lights.  Keep items that you use often in easy-to-reach places. Lower the shelves around your home if needed.  Move furniture so that there are clear paths around it.  Do not use throw rugs or other things on the floor that can make you trip.  If any of your floors are uneven, fix them.  Add color or contrast paint or tape to clearly arin and help you see:  Grab bars or handrails.  First and last steps of staircases.  Where the edge of each step is.  If you use a ladder or stepladder:  Make sure that it is fully opened. Do not climb a closed ladder.  Make sure the sides of the ladder are locked in place.  Have someone hold the ladder while you use it.  Know where your pets are as you move through your home.  What can I do in the bathroom?         Keep the floor dry. Clean up any water on the floor right away.  Remove soap buildup in the bathtub or shower. Buildup makes bathtubs and showers slippery.  Use non-skid mats or decals on the floor of the bathtub or shower.  Attach bath mats securely with double-sided, non-slip rug tape.  If you need to sit down in the shower, use a non-slip stool.  Install grab bars by the toilet and in the bathtub and shower. Do not use towel bars as grab bars.  What can I do in the bedroom?  Make sure that you have a light by your bed that is easy to reach.  Do not use any sheets or blankets on your bed that hang to the floor.  Have a firm chair or bench with side arms that you can use for support when you get dressed.  What can I do in the kitchen?  Clean up any spills right away.  If you need to reach something  above you, use a step stool with a grab bar.  Keep electrical cords out of the way.  Do not use floor polish or wax that makes floors slippery.  What can I do with my stairs?  Do not leave anything on the stairs.  Make sure that you have a light switch at the top and the bottom of the stairs.  Make sure that there are handrails on both sides of the stairs. Fix handrails that are broken or loose.  Install non-slip stair treads on all your stairs if they do not have carpet.  Avoid having throw rugs at the top or bottom of the stairs.  Choose a carpet that does not hide the edge of the steps on the stairs. Make sure that the carpet is firmly attached to the stairs. Fix carpet that is loose or worn.  What can I do on the outside of my home?  Use bright outdoor lighting.  Fix the edges of walkways and driveways and fix any cracks. Clear paths of anything that can make you trip, such as tools or rocks.  Add color or contrast paint or tape to clearly arin and help you see anything that might make you trip as you walk through a door, such as a raised step or threshold.  Trim any bushes or trees on paths to your home.  Check to see if handrails are loose or broken and that both sides of all steps have handrails. Install guardrails along the edges of any raised decks and porches.  Have leaves, snow, or ice cleared regularly. Use sand, salt, or ice melter on paths if you live where there is ice and snow during the winter.  Clean up any spills in your garage right away. This includes grease or oil spills.  What other actions can I take?  Review your medicines with your doctor. Some medicines can cause dizziness or changes in blood pressure, which increase your risk of falling.  Wear shoes that:  Have a low heel. Do not wear high heels.  Have rubber bottoms and are closed at the toe.  Feel good on your feet and fit well.  Use tools that help you move around if needed. These include:  Canes.  Walkers.  Scooters.  Crutches.  Ask  your doctor what else you can do to help prevent falls. This may include seeing a physical therapist to learn to do exercises to move better and get stronger.  Where to find more information  Centers for Disease Control and PreventionCHARLES: cdc.gov  National Cr[employer] on Aging: martin.nih.gov  National [employer] on Aging: martin.nih.gov  Contact a doctor if:  You are afraid of falling at home.  You feel weak, drowsy, or dizzy at home.  You fall at home.  Get help right away if you:  Lose consciousness or have trouble moving after a fall.  Have a fall that causes a head injury.  These symptoms may be an emergency. Get help right away. Call 911.  Do not wait to see if the symptoms will go away.  Do not drive yourself to the hospital.  This information is not intended to replace advice given to you by your health care provider. Make sure you discuss any questions you have with your health care provider.  Document Revised: 08/21/2023 Document Reviewed: 08/21/2023  [employer]vier Patient Education © 2024 [employer]

## 2025-06-16 NOTE — PROGRESS NOTES
"Chief complaint:   Chief Complaint   Patient presents with    NPH     PT is here for followup on NPH.     Subjective     HPI:   Agueda Tyler   History of Present Illness  The patient is an 79-year-old male who presents today for continued evaluation of normal pressure hydrocephalus status post right  shunt placement on 12/13/2024. At his last encounter on 04/15/2025, his shunt was titrated from 4 to 3.  He is accompanied by son.    Since the adjustment of his shunt, he has experienced an overall improvement in cognitive function. His gait and balance have improved, and he no longer feels like he is going to fall, which was a previous concern. He is now able to walk down the road or block without difficulty, a significant improvement from before. Urinary incontinence and frequency have resolved, with only one instance of nighttime urination reported. Over the past 2-3 weeks, there has been a noticeable positive change in his condition.  Family agrees with overall improvement and has no additional concerns.  His primary complaints which he attributes a recent stroke include unresolved right facial numbness and tingling, as well as right upper extremity numbness and tingling and poor dexterity.  Otherwise, from a NPH standpoint he is happy with his current progress, stating \"I can live like this\".  He currently rates the severity of his symptoms 0/10.    ROS  Review of Systems   Constitutional: Negative.    HENT: Negative.     Eyes: Negative.    Respiratory: Negative.     Cardiovascular: Negative.    Gastrointestinal: Negative.    Endocrine: Negative.    Genitourinary: Negative.    Musculoskeletal: Negative.    Skin: Negative.    Allergic/Immunologic: Negative.    Neurological:  Positive for numbness.   Hematological: Negative.    Psychiatric/Behavioral: Negative.     All other systems reviewed and are negative.    PFSH:  Past Medical History:   Diagnosis Date    Anxiety     BPH (benign prostatic hyperplasia)     " Cataract     Colon polyp     CVA (cerebral vascular accident)     Difficulty walking     Disease of thyroid gland     Diverticulosis     Elevated cholesterol     Erectile dysfunction     Essential hypertension     GERD (gastroesophageal reflux disease)     Hernia 2001    Hypertriglyceridemia     Idiopathic peripheral neuropathy     Osteoarthritis     Sleep apnea     TIA (transient ischemic attack) 5/12/24    Urinary incontinence     Inknown     Past Surgical History:   Procedure Laterality Date    BRAIN SURGERY  5/13/24    Hydrocephalus    COLONOSCOPY  04/18/2016    diverticulosis, internal hemorrhoids,     COLONOSCOPY  03/30/2011    internal hemorrhoid, diverticulosis    COLONOSCOPY N/A 03/11/2021    Procedure: COLONOSCOPY WITH ANESTHESIA;  Surgeon: Ricky House MD;  Location:  [facility];  Service: Gastroenterology;  Laterality: N/A;  pre: hx polyps  post: diverticulosis. hemorrhoid.   Keenan Perez MD    CYSTOSCOPY  1994    Before TURP    ENDOSCOPY N/A 03/11/2021    Procedure: ESOPHAGOGASTRODUODENOSCOPY WITH ANESTHESIA;  Surgeon: Ricky House MD;  Location:  [facility];  Service: Gastroenterology;  Laterality: N/A;  pre: GERD  post: hiatal hernia. gastric polyps.  Keenan Perez MD    EYE SURGERY      Cataracts    HERNIA REPAIR      inguinal    PROSTATE SURGERY  1994    TURP    TURP / TRANSURETHRAL INCISION / DRAINAGE PROSTATE      UPPER GASTROINTESTINAL ENDOSCOPY      VENTRICULOPERITONEAL SHUNT  5/13/24     SHUNT INSERTION Right 12/13/2024    Procedure: VENTRICULAR PERITONEAL SHUNT INSERTION STERIOTACTIC, right, with Darwin;  Surgeon: Harley Lombardo MD;  Location: [facility] OR;  Service: Neurosurgery;  Laterality: Right;     Objective      Current Outpatient Medications   Medication Sig Dispense Refill    alfuzosin (UROXATRAL) 10 MG 24 hr tablet Take 1 tablet by mouth Daily.      aspirin 81 MG EC tablet Take 1 tablet by mouth Daily. 30 tablet 2    atorvastatin  "(Lipitor) 40 MG tablet Take 1 tablet by mouth Daily. 90 tablet 3    citalopram (CeleXA) 20 MG tablet Take 1 tablet by mouth Daily.      diazePAM (VALIUM) 5 MG tablet Take 1 tablet by mouth At Night As Needed for Anxiety.      levothyroxine (SYNTHROID, LEVOTHROID) 88 MCG tablet Take 1 tablet by mouth Daily.      lisinopril (PRINIVIL,ZESTRIL) 20 MG tablet Take 1 tablet by mouth Daily.      Magnesium Hydroxide (MILK OF MAGNESIA PO) Take 60 mL by mouth Every Night.      omeprazole (priLOSEC) 20 MG capsule Take 1 capsule by mouth Daily.      rOPINIRole HCl (REQUIP PO) Take 2 mg by mouth Every Night.      traZODone (DESYREL) 50 MG tablet Take 1 tablet by mouth Every Night.      meclizine (ANTIVERT) 25 MG tablet Take 1 tablet by mouth 3 (Three) Times a Day As Needed for Dizziness. 12 tablet 0    metoclopramide (REGLAN) 5 MG tablet Take 1 tablet by mouth 3 (Three) Times a Day As Needed (nausea). 12 tablet 0     No current facility-administered medications for this visit.     Vital Signs  Ht 182.9 cm (72\")   Wt 80.2 kg (176 lb 12.8 oz)   BMI 23.98 kg/m²   Physical Exam  Vitals and nursing note reviewed.   Constitutional:       General: He is not in acute distress.     Appearance: Normal appearance. He is well-developed, well-groomed and normal weight. He is not ill-appearing, toxic-appearing or diaphoretic.   HENT:      Head: Normocephalic and atraumatic.      Right Ear: Hearing normal.      Left Ear: Hearing normal.   Eyes:      General: Lids are normal.      Extraocular Movements: Extraocular movements intact.      Conjunctiva/sclera: Conjunctivae normal.      Pupils: Pupils are equal, round, and reactive to light.   Neck:      Trachea: Trachea normal.   Cardiovascular:      Rate and Rhythm: Normal rate and regular rhythm.   Pulmonary:      Effort: Pulmonary effort is normal. No tachypnea, bradypnea, accessory muscle usage or respiratory distress.   Abdominal:      Palpations: Abdomen is soft.   Musculoskeletal:      " Cervical back: Full passive range of motion without pain and neck supple.   Skin:     General: Skin is warm and dry.   Neurological:      GCS: GCS eye subscore is 4. GCS verbal subscore is 5. GCS motor subscore is 6.      Coordination: Coordination is intact.      Deep Tendon Reflexes:      Reflex Scores:       Tricep reflexes are 3+ on the right side.       Bicep reflexes are 3+ on the right side.       Brachioradialis reflexes are 3+ on the right side.       Patellar reflexes are 3+ on the right side.  Psychiatric:         Speech: Speech normal.         Behavior: Behavior normal. Behavior is cooperative.       Neurological Exam  Mental Status  Awake, alert and oriented to person, place and time. Speech is normal. Able to name objects. Attention and concentration are normal.    Cranial Nerves  CN I: Sense of smell is normal.  CN II: Visual acuity is normal.  CN III, IV, VI: Extraocular movements intact bilaterally. Normal lids and orbits bilaterally. Pupils equal round and reactive to light bilaterally.  CN V: Facial sensation is normal.  CN VII: Full and symmetric facial movement.  CN IX, X: Palate elevates symmetrically  CN XI: Shoulder shrug strength is normal.  CN XII: Tongue midline without atrophy or fasciculations.    Motor  Normal muscle bulk throughout. Normal muscle tone.                                               Right                     Left  Toe extension                        4-                          3+                                             Right                     Left  Deltoid                                   5                          5   Biceps                                   5                          5   Triceps                                  5                          5   Wrist extensor                       5                          5   Iliopsoas                               5                          5   Quadriceps                           5                          5    Anterior tibialis                      5                          5   Posterior tibialis                    5                          5    Sensory  Sensation is intact to light touch, pinprick, vibration and proprioception in all four extremities.    Reflexes                                            Right                      Left  Brachioradialis                    3+                          Biceps                                 3+                          Triceps                                3+                          Patellar                                3+                            Coordination    Finger-to-nose, rapid alternating movements and heel-to-shin normal bilaterally without dysmetria.    Gait  Casual gait is normal including stance, stride, and arm swing.  Fairly well-maintained gait without assist.  (12 bullet pts)    Results Review:   5/13/2024.  MRI of the brain shows ventriculomegaly with surrounding FLAIR signal changes, as well is an acute ischemic infarct within the left thalamus.            5/14/2024.  Ventriculomegaly       1/2025  Post Shunt       2/25/2025 4/2025 6/12/2025        Assessment/Plan:   Agueda Tyler is a 79 y.o. 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  He presents today for continued evaluation of ventriculomegaly status post  shunt placement performed on 12/13/2024, reporting an overall improvement in his NPH symptoms since his last encounter with titration of  shunt from 4-3.  Physical exam findings of  shunt performance setting currently at 3, bright awake, oriented x 3, names objects, bilateral EHL weakness, and a fairly well-maintained gait without assist.  Serial imaging shows a stable appearing  shunt within the right lateral ventricle, no evidence of  "acute intracranial blood products, and a overall decrease in the size of the lateral ventricles.     Recommendations  Normal pressure hydrocephalus  Status post right  shunt placement (12/13/2024) current performance setting, 3 cm H2O  Agueda has done extremely well since we last saw him.  Over the past 2-3 weeks he reports an overall improvement in triad of NPH symptoms.  We discussed the risk and benefits of shunt titration.  In general, Mr. Tyler is happy with his current progress, stating \"I can live like this\".  Therefore, we will have him return in 6 weeks for reevaluation.  At that time, if his symptoms are stable we will continue as is, otherwise we will discuss titrating his shunt from 3-2 and having him return in 1 month with a repeat CT head.  Mr. Tyler knows he may contact the neurosurgical clinic to return sooner for any new or additional concerns and agrees with this plan of care.    Fall risk  [facility] Fall Risk Assessment was completed, and patient is at MODERATE risk for falls. Assessment completed on:6/16/2025  Fall risk information provided in AVS.    Diagnoses and all orders for this visit:    1. Normal pressure hydrocephalus (Primary)    2. S/P  shunt    3. At risk for falls      Return for FOLLOWUP WITH LUNA IN 6 WKS..    Thank you, for allowing me to continue to participate in the care of this patient.    Sincerely,  MIO Denson    I spent 35 minutes caring for Agueda on this date of service. This time includes time spent by me in the following activities: preparing for the visit, performing a medically appropriate examination and/or evaluation, counseling and educating the patient/family/caregiver, documenting information in the medical record, and independently interpreting results and communicating that information with the patient/family/caregiver.           "

## 2025-07-01 NOTE — PROGRESS NOTES
Subjective    Mr. Tyler is 79 y.o. male    Chief Complaint: BPH    History of Present Illness  Benign Prostatic Hypertrophy  Patient complains of lower urinary tract symptoms. He reports intermittency, nocturia one time a night, and urgency. He denies frequency, incomplete emptying, straining, and weak stream. Patient states symptoms are of mild severity. Onset of symptoms was a few years ago and was gradual in onset. His AUA Symptom Score is, 3/35.He reports a history of no complicating symptoms. He denies flank pain, gross hematuria, kidney stones, and recurrent UTI.  Patient has tried Alpha blockers with improvement.         The following portions of the patient's history were reviewed and updated as appropriate: allergies, current medications, past family history, past medical history, past social history, past surgical history and problem list.    Review of Systems   Genitourinary: Negative.          Current Outpatient Medications:     alfuzosin (UROXATRAL) 10 MG 24 hr tablet, Take 1 tablet by mouth Daily., Disp: , Rfl:     aspirin 81 MG EC tablet, Take 1 tablet by mouth Daily., Disp: 30 tablet, Rfl: 2    atorvastatin (Lipitor) 40 MG tablet, Take 1 tablet by mouth Daily., Disp: 90 tablet, Rfl: 3    citalopram (CeleXA) 20 MG tablet, Take 1 tablet by mouth Daily., Disp: , Rfl:     diazePAM (VALIUM) 5 MG tablet, Take 1 tablet by mouth At Night As Needed for Anxiety., Disp: , Rfl:     levothyroxine (SYNTHROID, LEVOTHROID) 88 MCG tablet, Take 1 tablet by mouth Daily., Disp: , Rfl:     lisinopril (PRINIVIL,ZESTRIL) 20 MG tablet, Take 1 tablet by mouth Daily., Disp: , Rfl:     Magnesium Hydroxide (MILK OF MAGNESIA PO), Take 60 mL by mouth Every Night., Disp: , Rfl:     omeprazole (priLOSEC) 20 MG capsule, Take 1 capsule by mouth Daily., Disp: , Rfl:     rOPINIRole HCl (REQUIP PO), Take 2 mg by mouth Every Night., Disp: , Rfl:     traZODone (DESYREL) 50 MG tablet, Take 1 tablet by mouth Every Night., Disp: , Rfl:      meclizine (ANTIVERT) 25 MG tablet, Take 1 tablet by mouth 3 (Three) Times a Day As Needed for Dizziness., Disp: 12 tablet, Rfl: 0    metoclopramide (REGLAN) 5 MG tablet, Take 1 tablet by mouth 3 (Three) Times a Day As Needed (nausea)., Disp: 12 tablet, Rfl: 0    Past Medical History:   Diagnosis Date    Anxiety     BPH (benign prostatic hyperplasia)     Cataract     Colon polyp     CVA (cerebral vascular accident)     Difficulty walking     Disease of thyroid gland     Diverticulosis     Elevated cholesterol     Erectile dysfunction     Essential hypertension     GERD (gastroesophageal reflux disease)     Hernia 2001    Hypertriglyceridemia     Idiopathic peripheral neuropathy     Osteoarthritis     Sleep apnea     TIA (transient ischemic attack) 5/12/24    Urinary incontinence     Inknown       Past Surgical History:   Procedure Laterality Date    BRAIN SURGERY  5/13/24    Hydrocephalus    COLONOSCOPY  04/18/2016    diverticulosis, internal hemorrhoids,     COLONOSCOPY  03/30/2011    internal hemorrhoid, diverticulosis    COLONOSCOPY N/A 03/11/2021    Procedure: COLONOSCOPY WITH ANESTHESIA;  Surgeon: Ricky House MD;  Location:  [facility];  Service: Gastroenterology;  Laterality: N/A;  pre: hx polyps  post: diverticulosis. hemorrhoid.   Keenan Perez MD    CYSTOSCOPY  1994    Before TURP    ENDOSCOPY N/A 03/11/2021    Procedure: ESOPHAGOGASTRODUODENOSCOPY WITH ANESTHESIA;  Surgeon: Ricky House MD;  Location:  [facility];  Service: Gastroenterology;  Laterality: N/A;  pre: GERD  post: hiatal hernia. gastric polyps.  Keenan Perez MD    EYE SURGERY      Cataracts    HERNIA REPAIR      inguinal    PROSTATE SURGERY  1994    TURP    TURP / TRANSURETHRAL INCISION / DRAINAGE PROSTATE      UPPER GASTROINTESTINAL ENDOSCOPY      VENTRICULOPERITONEAL SHUNT  5/13/24     SHUNT INSERTION Right 12/13/2024    Procedure: VENTRICULAR PERITONEAL SHUNT INSERTION STERIOTACTIC, right, with  "Darwin;  Surgeon: Harley Lombardo MD;  Location: [facility] OR;  Service: Neurosurgery;  Laterality: Right;       Social History     Socioeconomic History    Marital status:    Tobacco Use    Smoking status: Never    Smokeless tobacco: Never    Tobacco comments:     Never used   Vaping Use    Vaping status: Never Used   Substance and Sexual Activity    Alcohol use: Never    Drug use: Never    Sexual activity: Not Currently     Partners: Female     Birth control/protection: Post-menopausal, Hysterectomy       Family History   Problem Relation Age of Onset    Arthritis Mother         Breast Cancer    Cancer Mother     Hypertension Father     Prostate cancer Father     Colon cancer Neg Hx        Objective    Temp 97.8 °F (36.6 °C)   Ht 182.9 cm (72.01\")   Wt 80.3 kg (177 lb)   BMI 24.00 kg/m²     Physical Exam  Vitals reviewed.   Constitutional:       Appearance: Normal appearance.   HENT:      Head: Normocephalic and atraumatic.   Pulmonary:      Effort: Pulmonary effort is normal.   Genitourinary:     Rectum: Normal.      Comments: Digital rectal exam revealed a smooth symmetric prostate.  There was no asymmetry firmness or other abnormalities.  Neurological:      Mental Status: He is alert.   Psychiatric:         Mood and Affect: Mood normal.         Behavior: Behavior normal.     Digital rectal exam revealed a smooth symmetric prostate no suspicious lesions nodules asymmetry and firmness were palpated        Results for orders placed or performed in visit on 07/17/25   POC Urinalysis Dipstick, Multipro    Collection Time: 07/17/25  8:59 AM    Specimen: Urine   Result Value Ref Range    Color Yellow Yellow, Straw, Dark Yellow, Phyllis    Clarity, UA Clear Clear    Glucose, UA Negative Negative mg/dL    Bilirubin Negative Negative    Ketones, UA Negative Negative    Specific Gravity  1.015 1.005 - 1.030    Blood, UA Trace (A) Negative    pH, Urine 7.0 5.0 - 8.0    Protein, POC Negative Negative mg/dL    " Urobilinogen, UA 0.2 E.U./dL Normal, 0.2 E.U./dL    Nitrite, UA Negative Negative    Leukocytes Negative Negative     Assessment and Plan    Diagnoses and all orders for this visit:    1. Benign prostatic hyperplasia with incomplete bladder emptying (Primary)  -     POC Urinalysis Dipstick, Multipro      Patient has had excellent response to alfuzosin from a urologic standpoint he is doing great he will continue alfuzosin no longer gets PSAs his urine is clear digital rectal exam was benign.    Follow up in 1 year.

## 2025-07-17 ENCOUNTER — OFFICE VISIT (OUTPATIENT)
Dept: UROLOGY | Facility: CLINIC | Age: 80
End: 2025-07-17
Payer: MEDICARE

## 2025-07-17 VITALS — TEMPERATURE: 97.8 F | WEIGHT: 177 LBS | BODY MASS INDEX: 23.98 KG/M2 | HEIGHT: 72 IN

## 2025-07-17 DIAGNOSIS — N40.1 BENIGN PROSTATIC HYPERPLASIA WITH INCOMPLETE BLADDER EMPTYING: Primary | ICD-10-CM

## 2025-07-17 DIAGNOSIS — R39.14 BENIGN PROSTATIC HYPERPLASIA WITH INCOMPLETE BLADDER EMPTYING: Primary | ICD-10-CM

## 2025-07-17 LAB
BILIRUB BLD-MCNC: NEGATIVE MG/DL
CLARITY, POC: CLEAR
COLOR UR: YELLOW
GLUCOSE UR STRIP-MCNC: NEGATIVE MG/DL
KETONES UR QL: NEGATIVE
LEUKOCYTE EST, POC: NEGATIVE
NITRITE UR-MCNC: NEGATIVE MG/ML
PH UR: 7 [PH] (ref 5–8)
PROT UR STRIP-MCNC: NEGATIVE MG/DL
RBC # UR STRIP: ABNORMAL /UL
SP GR UR: 1.01 (ref 1–1.03)
UROBILINOGEN UR QL: ABNORMAL

## 2025-07-23 DIAGNOSIS — E78.00 HYPERCHOLESTEROLEMIA: ICD-10-CM

## 2025-07-23 DIAGNOSIS — I10 ESSENTIAL (PRIMARY) HYPERTENSION: ICD-10-CM

## 2025-07-23 DIAGNOSIS — E03.9 PRIMARY HYPOTHYROIDISM: ICD-10-CM

## 2025-07-23 LAB
ALBUMIN SERPL-MCNC: 4.3 G/DL (ref 3.5–5.2)
ALP SERPL-CCNC: 201 U/L (ref 40–129)
ALT SERPL-CCNC: 35 U/L (ref 10–50)
ANION GAP SERPL CALCULATED.3IONS-SCNC: 8 MMOL/L (ref 8–16)
AST SERPL-CCNC: 26 U/L (ref 10–50)
BASOPHILS # BLD: 0.1 K/UL (ref 0–0.2)
BASOPHILS NFR BLD: 0.7 % (ref 0–1)
BILIRUB SERPL-MCNC: 2.2 MG/DL (ref 0.2–1.2)
BUN SERPL-MCNC: 17 MG/DL (ref 8–23)
CALCIUM SERPL-MCNC: 9.7 MG/DL (ref 8.8–10.2)
CHLORIDE SERPL-SCNC: 98 MMOL/L (ref 98–107)
CHOLEST SERPL-MCNC: 119 MG/DL (ref 0–199)
CO2 SERPL-SCNC: 31 MMOL/L (ref 22–29)
CREAT SERPL-MCNC: 1.3 MG/DL (ref 0.7–1.2)
EOSINOPHIL # BLD: 0.2 K/UL (ref 0–0.6)
EOSINOPHIL NFR BLD: 2.1 % (ref 0–5)
ERYTHROCYTE [DISTWIDTH] IN BLOOD BY AUTOMATED COUNT: 14.2 % (ref 11.5–14.5)
GLUCOSE SERPL-MCNC: 81 MG/DL (ref 70–99)
HCT VFR BLD AUTO: 50.6 % (ref 42–52)
HDLC SERPL-MCNC: 85 MG/DL (ref 40–60)
HGB BLD-MCNC: 16.5 G/DL (ref 14–18)
IMM GRANULOCYTES # BLD: 0.1 K/UL
LDLC SERPL CALC-MCNC: 21 MG/DL
LYMPHOCYTES # BLD: 1.8 K/UL (ref 1.1–4.5)
LYMPHOCYTES NFR BLD: 24.9 % (ref 20–40)
MCH RBC QN AUTO: 28.9 PG (ref 27–31)
MCHC RBC AUTO-ENTMCNC: 32.6 G/DL (ref 33–37)
MCV RBC AUTO: 88.6 FL (ref 80–94)
MONOCYTES # BLD: 0.6 K/UL (ref 0–0.9)
MONOCYTES NFR BLD: 7.7 % (ref 0–10)
NEUTROPHILS # BLD: 4.6 K/UL (ref 1.5–7.5)
NEUTS SEG NFR BLD: 63.8 % (ref 50–65)
PLATELET # BLD AUTO: 145 K/UL (ref 130–400)
PMV BLD AUTO: 11.1 FL (ref 9.4–12.4)
POTASSIUM SERPL-SCNC: 4.7 MMOL/L (ref 3.5–5.1)
PROT SERPL-MCNC: 6.2 G/DL (ref 6.4–8.3)
RBC # BLD AUTO: 5.71 M/UL (ref 4.7–6.1)
SODIUM SERPL-SCNC: 137 MMOL/L (ref 136–145)
T4 FREE SERPL-MCNC: 1.67 NG/DL (ref 0.93–1.7)
TRIGL SERPL-MCNC: 65 MG/DL (ref 0–149)
TSH SERPL DL<=0.005 MIU/L-ACNC: 1.7 UIU/ML (ref 0.27–4.2)
WBC # BLD AUTO: 7.2 K/UL (ref 4.8–10.8)

## 2025-07-28 ENCOUNTER — OFFICE VISIT (OUTPATIENT)
Dept: NEUROSURGERY | Facility: CLINIC | Age: 80
End: 2025-07-28
Payer: MEDICARE

## 2025-07-28 VITALS — BODY MASS INDEX: 23.89 KG/M2 | HEIGHT: 72 IN | WEIGHT: 176.4 LBS

## 2025-07-28 DIAGNOSIS — G91.2 NORMAL PRESSURE HYDROCEPHALUS: Primary | ICD-10-CM

## 2025-07-28 DIAGNOSIS — Z91.81 AT RISK FOR FALLS: ICD-10-CM

## 2025-07-28 DIAGNOSIS — Z98.2 S/P VP SHUNT: ICD-10-CM

## 2025-07-28 PROCEDURE — 1160F RVW MEDS BY RX/DR IN RCRD: CPT | Performed by: NURSE PRACTITIONER

## 2025-07-28 PROCEDURE — 62252 CSF SHUNT REPROGRAM: CPT | Performed by: NURSE PRACTITIONER

## 2025-07-28 PROCEDURE — 99214 OFFICE O/P EST MOD 30 MIN: CPT | Performed by: NURSE PRACTITIONER

## 2025-07-28 PROCEDURE — 1159F MED LIST DOCD IN RCRD: CPT | Performed by: NURSE PRACTITIONER

## 2025-07-28 NOTE — PROGRESS NOTES
"Chief complaint:   Chief Complaint   Patient presents with    Normal pressure hydrocephalus     Pt is here for followup.     Subjective     HPI:   Agueda Tyler  History of Present Illness  The patient is an 79-year-old male who presents today for continued evaluation of normal pressure hydrocephalus status post right  shunt placement performed on 12/13/2024.    He reports persistent fatigue and difficulty initiating movement after prolonged periods of sitting or standing. He experiences persistent numbness and tingling dysesthesias on the side of his face and right upper extremity affected by a previous stroke. His cognitive function has improved since the last visit, but he still feels it is not at the desired level. He reports no urinary issues or falls. He does not believe his condition has worsened since the last visit 6 weeks ago, attributing his fatigue to depression. Mr. Tyler goes on to state, \"I can live like this, but is not living\".  He rates the severity of his symptoms 0/10.    ROS  Review of Systems   Constitutional: Negative.    HENT: Negative.     Eyes: Negative.    Respiratory: Negative.     Cardiovascular: Negative.    Gastrointestinal: Negative.    Endocrine: Negative.    Genitourinary: Negative.    Musculoskeletal: Negative.    Skin: Negative.    Allergic/Immunologic: Negative.    Neurological:  Positive for numbness.   Hematological: Negative.    Psychiatric/Behavioral: Negative.     All other systems reviewed and are negative.    PFSH:  Past Medical History:   Diagnosis Date    Anxiety     BPH (benign prostatic hyperplasia)     Cataract     Colon polyp     CVA (cerebral vascular accident)     Difficulty walking     Disease of thyroid gland     Diverticulosis     Elevated cholesterol     Erectile dysfunction     Essential hypertension     GERD (gastroesophageal reflux disease)     Hernia 2001    Hypertriglyceridemia     Idiopathic peripheral neuropathy     Osteoarthritis     Sleep apnea     " TIA (transient ischemic attack) 5/12/24    Urinary incontinence     Inknown     Past Surgical History:   Procedure Laterality Date    BRAIN SURGERY  5/13/24    Hydrocephalus    COLONOSCOPY  04/18/2016    diverticulosis, internal hemorrhoids,     COLONOSCOPY  03/30/2011    internal hemorrhoid, diverticulosis    COLONOSCOPY N/A 03/11/2021    Procedure: COLONOSCOPY WITH ANESTHESIA;  Surgeon: Ricky House MD;  Location:  [facility];  Service: Gastroenterology;  Laterality: N/A;  pre: hx polyps  post: diverticulosis. hemorrhoid.   Keenan Perez MD    CYSTOSCOPY  1994    Before TURP    ENDOSCOPY N/A 03/11/2021    Procedure: ESOPHAGOGASTRODUODENOSCOPY WITH ANESTHESIA;  Surgeon: Ricky House MD;  Location:  [facility];  Service: Gastroenterology;  Laterality: N/A;  pre: GERD  post: hiatal hernia. gastric polyps.  Keenan Perez MD    EYE SURGERY      Cataracts    HERNIA REPAIR      inguinal    PROSTATE SURGERY  1994    TURP    TURP / TRANSURETHRAL INCISION / DRAINAGE PROSTATE      UPPER GASTROINTESTINAL ENDOSCOPY      VENTRICULOPERITONEAL SHUNT  5/13/24     SHUNT INSERTION Right 12/13/2024    Procedure: VENTRICULAR PERITONEAL SHUNT INSERTION STERIOTACTIC, right, with Darwin;  Surgeon: Harley Lombardo MD;  Location:  [facility];  Service: Neurosurgery;  Laterality: Right;     Objective      Current Outpatient Medications   Medication Sig Dispense Refill    alfuzosin (UROXATRAL) 10 MG 24 hr tablet Take 1 tablet by mouth Daily.      aspirin 81 MG EC tablet Take 1 tablet by mouth Daily. 30 tablet 2    atorvastatin (Lipitor) 40 MG tablet Take 1 tablet by mouth Daily. 90 tablet 3    citalopram (CeleXA) 20 MG tablet Take 1 tablet by mouth Daily.      diazePAM (VALIUM) 5 MG tablet Take 1 tablet by mouth At Night As Needed for Anxiety.      levothyroxine (SYNTHROID, LEVOTHROID) 88 MCG tablet Take 1 tablet by mouth Daily.      lisinopril (PRINIVIL,ZESTRIL) 20 MG tablet Take 1 tablet by  "mouth Daily.      Magnesium Hydroxide (MILK OF MAGNESIA PO) Take 60 mL by mouth Every Night.      omeprazole (priLOSEC) 20 MG capsule Take 1 capsule by mouth Daily.      rOPINIRole HCl (REQUIP PO) Take 2 mg by mouth Every Night.      traZODone (DESYREL) 50 MG tablet Take 1 tablet by mouth Every Night.      meclizine (ANTIVERT) 25 MG tablet Take 1 tablet by mouth 3 (Three) Times a Day As Needed for Dizziness. 12 tablet 0    metoclopramide (REGLAN) 5 MG tablet Take 1 tablet by mouth 3 (Three) Times a Day As Needed (nausea). 12 tablet 0     No current facility-administered medications for this visit.     Vital Signs  Ht 182.9 cm (72\")   Wt 80 kg (176 lb 6.4 oz)   BMI 23.92 kg/m²   Physical Exam  Vitals and nursing note reviewed.   Constitutional:       General: He is not in acute distress.     Appearance: Normal appearance. He is well-developed, well-groomed and normal weight. He is not ill-appearing, toxic-appearing or diaphoretic.   HENT:      Head: Normocephalic and atraumatic.        Right Ear: Hearing normal.      Left Ear: Hearing normal.   Eyes:      General: Lids are normal.      Extraocular Movements: Extraocular movements intact.      Conjunctiva/sclera: Conjunctivae normal.      Pupils: Pupils are equal, round, and reactive to light.   Neck:      Trachea: Trachea normal.   Cardiovascular:      Rate and Rhythm: Normal rate and regular rhythm.   Pulmonary:      Effort: Pulmonary effort is normal. No tachypnea, bradypnea, accessory muscle usage or respiratory distress.   Abdominal:      Palpations: Abdomen is soft.   Musculoskeletal:      Cervical back: Full passive range of motion without pain and neck supple.   Skin:     General: Skin is warm and dry.   Neurological:      GCS: GCS eye subscore is 4. GCS verbal subscore is 5. GCS motor subscore is 6.      Coordination: Coordination is intact.   Psychiatric:         Speech: Speech normal.         Behavior: Behavior normal. Behavior is cooperative. "       Neurological Exam  Mental Status  Awake, alert and oriented to person, place and time. Speech is normal. Able to name objects. Attention and concentration are normal.    Cranial Nerves  CN I: Sense of smell is normal.  CN II: Visual acuity is normal.  CN III, IV, VI: Extraocular movements intact bilaterally. Normal lids and orbits bilaterally. Pupils equal round and reactive to light bilaterally.  CN V: Facial sensation is normal.  CN VII: Full and symmetric facial movement.  CN IX, X: Palate elevates symmetrically  CN XI: Shoulder shrug strength is normal.  CN XII: Tongue midline without atrophy or fasciculations.    Motor  Normal muscle bulk throughout. Normal muscle tone.                                               Right                     Left  Toe extension                        4-                          3+                                             Right                     Left  Deltoid                                   5                          5   Biceps                                   5                          5   Triceps                                  5                          5   Wrist extensor                       5                          5   Iliopsoas                               5                          5   Quadriceps                           5                          5   Anterior tibialis                      5                          5   Posterior tibialis                    5                          5    Sensory  Sensation is intact to light touch, pinprick, vibration and proprioception in all four extremities.    Coordination    Finger-to-nose, rapid alternating movements and heel-to-shin normal bilaterally without dysmetria.    Gait    Fairly well-maintained gait without assist.  (12 bullet pts)    Results Review:   5/13/2024.  MRI of the brain shows ventriculomegaly with surrounding FLAIR signal changes, as well is an acute ischemic infarct within the left  thalamus.            5/14/2024.  Ventriculomegaly       1/2025  Post Shunt       2/25/2025 4/2025 6/12/2025        Assessment/Plan:   Agueda Tyler is a 79 y.o. 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  He presents today for continued evaluation of ventriculomegaly status post  shunt placement performed on 12/13/2024, reporting no significant change in NPH symptoms since his  shunt was last adjusted from 4-3.  Unchanged physical exam findings to include,  shunt performance setting currently at 3, bright awake, oriented x 3, names objects, bilateral EHL weakness, and a fairly well-maintained gait without assist.  Prior serial imaging shows a stable appearing  shunt within the right lateral ventricle, no evidence of acute intracranial blood products, and a overall decrease in the size of the lateral ventricles.     Recommendations  Normal pressure hydrocephalus  Status post right  shunt placement (12/13/2024)    shunt adjustment, 3-2 cm H2O  His cognitive function has improved since the last visit, but he still feels it is not at the desired level.  Risk and benefits of  shunt adjustment were discussed and reviewed.  Risk include, but are not limited to a unilateral headache with contralateral weakness, worsening confusion, a decline in speech/comprehension, and/or progressive decline in gait/balance.  Mr. Tyler has requested to proceed with shunt titration.  Will have him return in 1 month for reevaluation.  CT of the head prior to arrival.    SHUNT ADJUSTMENT PROCEDURE:  Mr. Tyler  shunt is palpable to right parietal scalp.  Shunt pumps and fills well and discontinuity present.  This  shunt adjustment was performed using the [employer] adjustment tool.  The  tool was placed above the  valve with a opening encompassing the valve mechanism and the blue arrow pointing towards the distal tubing.  The indicator tool (Compass) was placed into the  tool while aligning the red bands on the tool.  The current performance level setting was confirmed at 3.  The  tool was held in position while removing the indicator tool.  The adjustment tool was then placed into the  tool with the blue triangle pointed at the current performance level setting.  By turning the adjustment total, the performance level setting was adjusted to 2.  The  tool was again held in place while removing the adjustment tool.  The indicator tool was placed back into the  tool, again aligning the red bands on the tool, confirming new performance level setting at 2.     Fall risk  [facility] Fall Risk Assessment was completed, and patient is at MODERATE risk for falls. Assessment completed on:7/28/2025  Fall risk information provided in AVS.    Diagnoses and all orders for this visit:    1. Normal pressure hydrocephalus (Primary)  -     CT Head Without Contrast; Future    2. S/P  shunt  -     CT Head Without Contrast; Future    3. At risk for falls      Return for followup with [place] in 1 mo.  with CT..    Thank you, for allowing me to continue to participate in the care of this patient.    Sincerely,  MIO Denson    I spent 33 minutes caring for Agueda on this date of service. This time includes time spent by me in the following activities: preparing for the visit, reviewing tests, performing a medically appropriate examination and/or evaluation, counseling and educating the patient/family/caregiver, and documenting information in the medical record.

## 2025-07-28 NOTE — PATIENT INSTRUCTIONS

## 2025-07-30 SDOH — HEALTH STABILITY: PHYSICAL HEALTH: ON AVERAGE, HOW MANY MINUTES DO YOU ENGAGE IN EXERCISE AT THIS LEVEL?: 30 MIN

## 2025-07-30 SDOH — HEALTH STABILITY: PHYSICAL HEALTH: ON AVERAGE, HOW MANY DAYS PER WEEK DO YOU ENGAGE IN MODERATE TO STRENUOUS EXERCISE (LIKE A BRISK WALK)?: 4 DAYS

## 2025-07-30 ASSESSMENT — PATIENT HEALTH QUESTIONNAIRE - PHQ9
7. TROUBLE CONCENTRATING ON THINGS, SUCH AS READING THE NEWSPAPER OR WATCHING TELEVISION: NOT AT ALL
SUM OF ALL RESPONSES TO PHQ QUESTIONS 1-9: 2
10. IF YOU CHECKED OFF ANY PROBLEMS, HOW DIFFICULT HAVE THESE PROBLEMS MADE IT FOR YOU TO DO YOUR WORK, TAKE CARE OF THINGS AT HOME, OR GET ALONG WITH OTHER PEOPLE: SOMEWHAT DIFFICULT
5. POOR APPETITE OR OVEREATING: NOT AT ALL
9. THOUGHTS THAT YOU WOULD BE BETTER OFF DEAD, OR OF HURTING YOURSELF: NOT AT ALL
SUM OF ALL RESPONSES TO PHQ QUESTIONS 1-9: 2
4. FEELING TIRED OR HAVING LITTLE ENERGY: NOT AT ALL
6. FEELING BAD ABOUT YOURSELF - OR THAT YOU ARE A FAILURE OR HAVE LET YOURSELF OR YOUR FAMILY DOWN: NOT AT ALL
SUM OF ALL RESPONSES TO PHQ QUESTIONS 1-9: 2
8. MOVING OR SPEAKING SO SLOWLY THAT OTHER PEOPLE COULD HAVE NOTICED. OR THE OPPOSITE, BEING SO FIGETY OR RESTLESS THAT YOU HAVE BEEN MOVING AROUND A LOT MORE THAN USUAL: NOT AT ALL
SUM OF ALL RESPONSES TO PHQ QUESTIONS 1-9: 2
3. TROUBLE FALLING OR STAYING ASLEEP: NOT AT ALL
2. FEELING DOWN, DEPRESSED OR HOPELESS: SEVERAL DAYS
1. LITTLE INTEREST OR PLEASURE IN DOING THINGS: SEVERAL DAYS

## 2025-07-30 ASSESSMENT — LIFESTYLE VARIABLES
HOW MANY STANDARD DRINKS CONTAINING ALCOHOL DO YOU HAVE ON A TYPICAL DAY: 0
HOW OFTEN DO YOU HAVE A DRINK CONTAINING ALCOHOL: NEVER
HOW OFTEN DO YOU HAVE A DRINK CONTAINING ALCOHOL: 1
HOW MANY STANDARD DRINKS CONTAINING ALCOHOL DO YOU HAVE ON A TYPICAL DAY: PATIENT DOES NOT DRINK
HOW OFTEN DO YOU HAVE SIX OR MORE DRINKS ON ONE OCCASION: 1

## 2025-08-05 ENCOUNTER — OFFICE VISIT (OUTPATIENT)
Dept: PRIMARY CARE CLINIC | Age: 80
End: 2025-08-05

## 2025-08-05 VITALS
HEART RATE: 78 BPM | WEIGHT: 177 LBS | SYSTOLIC BLOOD PRESSURE: 116 MMHG | HEIGHT: 72 IN | DIASTOLIC BLOOD PRESSURE: 82 MMHG | BODY MASS INDEX: 23.98 KG/M2 | OXYGEN SATURATION: 99 % | TEMPERATURE: 98.1 F

## 2025-08-05 DIAGNOSIS — G25.81 RESTLESS LEG SYNDROME: ICD-10-CM

## 2025-08-05 DIAGNOSIS — R17 ELEVATED BILIRUBIN: ICD-10-CM

## 2025-08-05 DIAGNOSIS — K21.9 GASTROESOPHAGEAL REFLUX DISEASE WITHOUT ESOPHAGITIS: ICD-10-CM

## 2025-08-05 DIAGNOSIS — H10.9 CONJUNCTIVITIS OF BOTH EYES, UNSPECIFIED CONJUNCTIVITIS TYPE: ICD-10-CM

## 2025-08-05 DIAGNOSIS — E03.9 PRIMARY HYPOTHYROIDISM: ICD-10-CM

## 2025-08-05 DIAGNOSIS — G91.2 NPH (NORMAL PRESSURE HYDROCEPHALUS) (HCC): ICD-10-CM

## 2025-08-05 DIAGNOSIS — R74.8 ELEVATED ALKALINE PHOSPHATASE LEVEL: ICD-10-CM

## 2025-08-05 DIAGNOSIS — Z00.00 ANNUAL PHYSICAL EXAM: Primary | ICD-10-CM

## 2025-08-05 DIAGNOSIS — N18.31 STAGE 3A CHRONIC KIDNEY DISEASE (HCC): ICD-10-CM

## 2025-08-05 DIAGNOSIS — N17.9 ACUTE KIDNEY INJURY: ICD-10-CM

## 2025-08-05 DIAGNOSIS — F41.8 DEPRESSION WITH ANXIETY: ICD-10-CM

## 2025-08-05 DIAGNOSIS — I10 ESSENTIAL (PRIMARY) HYPERTENSION: ICD-10-CM

## 2025-08-05 DIAGNOSIS — E78.00 HYPERCHOLESTEROLEMIA: ICD-10-CM

## 2025-08-05 DIAGNOSIS — N40.0 BENIGN PROSTATIC HYPERPLASIA WITHOUT LOWER URINARY TRACT SYMPTOMS: ICD-10-CM

## 2025-08-05 DIAGNOSIS — F51.01 PRIMARY INSOMNIA: ICD-10-CM

## 2025-08-05 LAB
ALBUMIN SERPL-MCNC: 4.2 G/DL (ref 3.5–5.2)
ALP SERPL-CCNC: 229 U/L (ref 40–129)
ALT SERPL-CCNC: 43 U/L (ref 10–50)
ANION GAP SERPL CALCULATED.3IONS-SCNC: 10 MMOL/L (ref 8–16)
AST SERPL-CCNC: 31 U/L (ref 10–50)
BILIRUB DIRECT SERPL-MCNC: 0.8 MG/DL (ref 0–0.3)
BILIRUB INDIRECT SERPL-MCNC: 1.2 MG/DL (ref 0–1)
BILIRUB SERPL-MCNC: 2 MG/DL (ref 0.2–1.2)
BUN SERPL-MCNC: 12 MG/DL (ref 8–23)
CALCIUM SERPL-MCNC: 9.7 MG/DL (ref 8.8–10.2)
CHLORIDE SERPL-SCNC: 103 MMOL/L (ref 98–107)
CO2 SERPL-SCNC: 29 MMOL/L (ref 22–29)
CREAT SERPL-MCNC: 1.3 MG/DL (ref 0.7–1.2)
GLUCOSE SERPL-MCNC: 94 MG/DL (ref 70–99)
POTASSIUM SERPL-SCNC: 4.5 MMOL/L (ref 3.5–5.1)
PROT SERPL-MCNC: 6.5 G/DL (ref 6.4–8.3)
SODIUM SERPL-SCNC: 142 MMOL/L (ref 136–145)

## 2025-08-05 RX ORDER — PREDNISOLONE ACETATE 10 MG/ML
1 SUSPENSION/ DROPS OPHTHALMIC
Qty: 1 EACH | Refills: 0 | Status: CANCELLED | OUTPATIENT
Start: 2025-08-05

## 2025-08-05 RX ORDER — METHYLPREDNISOLONE 4 MG/1
TABLET ORAL
Qty: 1 KIT | Refills: 0 | Status: SHIPPED | OUTPATIENT
Start: 2025-08-05

## 2025-08-08 LAB
ALP BONE SERPL-CCNC: 103 U/L (ref 0–55)
ALP ISOS SERPL HS-CCNC: 0 U/L
ALP LIVER SERPL-CCNC: 142 U/L (ref 0–94)
ALP SERPL-CCNC: 245 U/L (ref 40–120)

## 2025-08-22 ENCOUNTER — HOSPITAL ENCOUNTER (OUTPATIENT)
Dept: CT IMAGING | Facility: HOSPITAL | Age: 80
Discharge: HOME | End: 2025-08-22
Payer: MEDICARE

## 2025-08-22 DIAGNOSIS — G91.2 NORMAL PRESSURE HYDROCEPHALUS: ICD-10-CM

## 2025-08-22 DIAGNOSIS — Z98.2 S/P VP SHUNT: ICD-10-CM

## 2025-08-22 PROCEDURE — 70450 CT HEAD/BRAIN W/O DYE: CPT

## 2025-08-28 ENCOUNTER — OFFICE VISIT (OUTPATIENT)
Dept: NEUROSURGERY | Facility: CLINIC | Age: 80
End: 2025-08-28
Payer: MEDICARE

## 2025-08-28 VITALS — WEIGHT: 179.6 LBS | HEIGHT: 72 IN | BODY MASS INDEX: 24.33 KG/M2

## 2025-08-28 DIAGNOSIS — Z98.2 S/P VP SHUNT: ICD-10-CM

## 2025-08-28 DIAGNOSIS — G91.2 NORMAL PRESSURE HYDROCEPHALUS: Primary | ICD-10-CM

## 2025-08-28 DIAGNOSIS — Z91.81 AT RISK FOR FALLS: ICD-10-CM

## 2025-08-28 PROCEDURE — 1160F RVW MEDS BY RX/DR IN RCRD: CPT | Performed by: NURSE PRACTITIONER

## 2025-08-28 PROCEDURE — 1159F MED LIST DOCD IN RCRD: CPT | Performed by: NURSE PRACTITIONER

## 2025-08-28 PROCEDURE — 99214 OFFICE O/P EST MOD 30 MIN: CPT | Performed by: NURSE PRACTITIONER

## (undated) DEVICE — MONOPOLAR METZENBAUM SCISSOR, MINI BLADE TIP, DISPOSABLE: Brand: MONOPOLAR METZENBAUM SCISSOR, MINI BLADE TIP, DISPOSABLE

## (undated) DEVICE — SENSR O2 OXIMAX FNGR A/ 18IN NONSTR

## (undated) DEVICE — SUT SILK 0 SUTUPAK TIES 60IN SA6H

## (undated) DEVICE — CLTH CLENS READYCLEANSE PERI CARE PK/5

## (undated) DEVICE — CUFF,BP,DISP,1 TUBE,ADULT,HP: Brand: MEDLINE

## (undated) DEVICE — ANTIBACTERIAL UNDYED BRAIDED (POLYGLACTIN 910), SYNTHETIC ABSORBABLE SUTURE: Brand: COATED VICRYL

## (undated) DEVICE — CONMED SCOPE SAVER BITE BLOCK, 20X27 MM: Brand: SCOPE SAVER

## (undated) DEVICE — 3M™ IOBAN™ 2 ANTIMICROBIAL INCISE DRAPE 6651EZ: Brand: IOBAN™ 2

## (undated) DEVICE — INTENDED FOR TISSUE SEPARATION, AND OTHER PROCEDURES THAT REQUIRE A SHARP SURGICAL BLADE TO PUNCTURE OR CUT.: Brand: BARD-PARKER ® STAINLESS STEEL BLADES

## (undated) DEVICE — CONN FLX BREATHE CIRCT

## (undated) DEVICE — ST TBG PNEUMOCLEAR EVAC SMOKE HIFLO

## (undated) DEVICE — Device: Brand: DEFENDO AIR/WATER/SUCTION AND BIOPSY VALVE

## (undated) DEVICE — PROXIMATE RH ROTATING HEAD SKIN STAPLERS (35 WIDE) CONTAINS 35 STAINLESS STEEL STAPLES: Brand: PROXIMATE

## (undated) DEVICE — PK CRANI 30

## (undated) DEVICE — ELECTRD BLD EZ CLN MOD XLNG 2.75IN

## (undated) DEVICE — GLV SURG DERMASSURE GRN LF PF 7.0

## (undated) DEVICE — SUT SILK 2/0 SUTUPAK TIES 24IN SA75H

## (undated) DEVICE — STYLET 9735428 2 COIL SINGLE PACKAGE

## (undated) DEVICE — THE CHANNEL CLEANING BRUSH IS A NYLON FLEXI BRUSH ATTACHED TO A FLEXIBLE PLASTIC SHEATH DESIGNED TO SAFELY REMOVE DEBRIS FROM FLEXIBLE ENDOSCOPES.

## (undated) DEVICE — STRIP,CLOSURE,WOUND,MEDI-STRIP,1/2X4: Brand: MEDLINE

## (undated) DEVICE — CODMAN® DISPOSABLE CATHETER PASSER: Brand: CODMAN®

## (undated) DEVICE — TBG SMPL FLTR LINE NASL 02/C02 A/ BX/100

## (undated) DEVICE — MASK,OXYGEN,MED CONC,ADLT,7' TUB, UC: Brand: PENDING

## (undated) DEVICE — APPL DURAPREP IODOPHOR APL 26ML

## (undated) DEVICE — DRSNG SURESITE WNDW 2.38X2.75

## (undated) DEVICE — GAUZE,SPONGE,2"X2",8PLY,STERILE,LF,2'S: Brand: MEDLINE

## (undated) DEVICE — ENDOPATH XCEL WITH OPTIVIEW TECHNOLOGY UNIVERSAL TROCAR STABILITY SLEEVES: Brand: ENDOPATH XCEL OPTIVIEW

## (undated) DEVICE — ADHS LIQ MASTISOL 2/3ML

## (undated) DEVICE — GLV SURG DERMASSURE GRN LF PF 8.0

## (undated) DEVICE — 3.0MM PRECISION NEURO (MATCH HEAD)

## (undated) DEVICE — THE STERILE THE STERIS STERILE CAMERA HANDLE COVERS ARE DESIGNED FOR HARMONYAIR 4K CAMERA MODULE, AND PROVIDE STERILE CONTROL THAT ALLOW FOR INCREASING AND DECREASING ILLUMINATION THROUGH SEVEN INTENSITY LEVELS.

## (undated) DEVICE — PATIENT TRACKER 9734887 NON-INVASIVE

## (undated) DEVICE — GLV SURG SENSICARE W/ALOE PF LF 7.5 STRL

## (undated) DEVICE — SCANLAN® SURG-I-PAW® INSTRUMENT COVERS - RED, 1/10" X 5"/ 3 MMX13 CM (2 - 5" PCS /PKG): Brand: SCANLAN® SURG-I-PAW® INSTRUMENT COVERS

## (undated) DEVICE — ENDOPATH PNEUMONEEDLE INSUFFLATION NEEDLES WITH LUER LOCK CONNECTORS 120MM: Brand: ENDOPATH

## (undated) DEVICE — KIT,ANTI FOG,W/SPONGE & FLUID,SOFT PACK: Brand: MEDLINE

## (undated) DEVICE — INTRO, VALVED, 8F X 15CM: Brand: AIRGUARD

## (undated) DEVICE — YANKAUER,BULB TIP WITH VENT: Brand: ARGYLE

## (undated) DEVICE — SUT MNCRYL 4/0 PS2 27IN UD MCP426H

## (undated) DEVICE — ENDOPATH XCEL WITH OPTIVIEW TECHNOLOGY BLADELESS TROCARS WITH STABILITY SLEEVES: Brand: ENDOPATH XCEL OPTIVIEW

## (undated) DEVICE — THE STERILE LIGHT HANDLE COVER IS USED WITH STERIS SURGICAL LIGHTING AND VISUALIZATION SYSTEMS.